# Patient Record
Sex: FEMALE | Race: BLACK OR AFRICAN AMERICAN | Employment: UNEMPLOYED | URBAN - METROPOLITAN AREA
[De-identification: names, ages, dates, MRNs, and addresses within clinical notes are randomized per-mention and may not be internally consistent; named-entity substitution may affect disease eponyms.]

---

## 2017-02-02 ENCOUNTER — OFFICE VISIT (OUTPATIENT)
Dept: FAMILY MEDICINE CLINIC | Age: 57
End: 2017-02-02

## 2017-02-02 VITALS
TEMPERATURE: 96.4 F | HEIGHT: 66 IN | WEIGHT: 293 LBS | RESPIRATION RATE: 16 BRPM | BODY MASS INDEX: 47.09 KG/M2 | HEART RATE: 72 BPM | SYSTOLIC BLOOD PRESSURE: 103 MMHG | DIASTOLIC BLOOD PRESSURE: 59 MMHG | OXYGEN SATURATION: 97 %

## 2017-02-02 DIAGNOSIS — R73.9 HYPERGLYCEMIA: ICD-10-CM

## 2017-02-02 DIAGNOSIS — I10 ESSENTIAL HYPERTENSION: ICD-10-CM

## 2017-02-02 DIAGNOSIS — R74.8 ELEVATED ALKALINE PHOSPHATASE LEVEL: ICD-10-CM

## 2017-02-02 DIAGNOSIS — R73.03 PREDIABETES: Primary | ICD-10-CM

## 2017-02-02 RX ORDER — CARVEDILOL 25 MG/1
25 TABLET ORAL 2 TIMES DAILY WITH MEALS
Qty: 180 TAB | Refills: 3 | Status: ON HOLD | OUTPATIENT
Start: 2017-02-02 | End: 2017-09-12

## 2017-02-02 RX ORDER — MORPHINE SULFATE 15 MG/1
15 TABLET ORAL
COMMUNITY
End: 2017-04-18 | Stop reason: ALTCHOICE

## 2017-02-02 RX ORDER — METFORMIN HYDROCHLORIDE 500 MG/1
1000 TABLET, EXTENDED RELEASE ORAL DAILY
Qty: 180 TAB | Refills: 3 | Status: SHIPPED | OUTPATIENT
Start: 2017-02-02 | End: 2017-10-30

## 2017-02-02 RX ORDER — CARVEDILOL 25 MG/1
25 TABLET ORAL 2 TIMES DAILY WITH MEALS
COMMUNITY
End: 2017-02-02 | Stop reason: SDUPTHER

## 2017-02-02 RX ORDER — LANCETS
EACH MISCELLANEOUS
Qty: 100 EACH | Refills: 11 | Status: SHIPPED | OUTPATIENT
Start: 2017-02-02 | End: 2017-09-08

## 2017-02-02 NOTE — MR AVS SNAPSHOT
Visit Information Date & Time Provider Department Dept. Phone Encounter #  
 2/2/2017 11:40 AM Nonda Fearing. MD Karen Sanabria 72 [phone] [id number] Your Appointments 11/1/2017  8:40 AM  
ESTABLISHED PATIENT with Heidy Lopez MD  
[facility] (ALICE SCHEDULING) Appt Note: 1 yr f/u  
 [address] [place] [address]  
[address] [address] Upcoming Health Maintenance Date Due DTaP/Tdap/Td series (1 - Tdap) 5/27/1979 BREAST CANCER SCRN MAMMOGRAM 8/5/2017 PAP AKA CERVICAL CYTOLOGY 11/13/2018 COLONOSCOPY 6/22/2021 Allergies as of 2/2/2017  Review Complete On: 11/3/2016 By: Corwin Guzman RN Severity Noted Reaction Type Reactions Bees [Hymenoptera Allergenic Extract] High 10/14/2014   Systemic Shortness of Breath, Swelling [place] High 10/14/2014   Systemic Shortness of Breath, Swelling Lisinopril  10/17/2014    Cough Current Immunizations  Reviewed on 10/11/2016 Name Date Influenza Vaccine Katharine Miu) 11/13/2015 Influenza Vaccine (Quad) PF 10/11/2016 Pneumococcal Polysaccharide (PPSV-23) 8/29/2016 Not reviewed this visit You Were Diagnosed With   
  
 Codes Comments Prediabetes    -  Primary ICD-10-CM: R73.03 
ICD-9-CM: 790.29 Essential hypertension     ICD-10-CM: I10 
ICD-9-CM: 401.9 Hyperglycemia     ICD-10-CM: R73.9 ICD-9-CM: 790.29 Vitals BP Pulse Temp Resp Height(growth percentile) Weight(growth percentile) 103/59 (BP 1 Location: Left arm, BP Patient Position: Sitting) 72 96.4 °F (35.8 °C) (Oral) 16 5' 6\" (1.676 m) 331 lb 9.6 oz (150.4 kg) SpO2 BMI OB Status Smoking Status 97% 53.52 kg/m2 Postmenopausal Former Smoker Vitals History BMI and BSA Data Body Mass Index Body Surface Area 53.52 kg/m 2 2.65 m 2 Preferred Pharmacy Pharmacy Name Phone [facility] [address] [facility] [phone] Your Updated Medication List  
  
   
This list is accurate as of: 2/2/17  1:25 PM.  Always use your most recent med list.  
  
  
  
  
 acetaminophen 325 mg tablet Commonly known as:  TYLENOL Take 650 mg by mouth daily. albuterol 90 mcg/actuation inhaler Commonly known as:  PROVENTIL HFA, VENTOLIN HFA, PROAIR HFA Take 2 Puffs by inhalation every four (4) hours as needed for Wheezing or Shortness of Breath. amLODIPine 5 mg tablet Commonly known as:  Og Sandoval Take 1 Tab by mouth daily. aspirin delayed-release 81 mg tablet Take 81 mg by mouth daily. atorvastatin 40 mg tablet Commonly known as:  LIPITOR Take 1 Tab by mouth nightly. carvedilol 25 mg tablet Commonly known as:  Rick Marshal Take 1 Tab by mouth two (2) times daily (with meals). cyanocobalamin 1,000 mcg/mL injection Commonly known as:  VITAMIN B12  
1 mL by SubCUTAneous route every fourteen (14) days. For b12 deficiency  
  
 cyclobenzaprine 5 mg tablet Commonly known as:  FLEXERIL Take 5 mg by mouth two (2) times a day. DULoxetine 60 mg capsule Commonly known as:  CYMBALTA Take 1 Cap by mouth every evening. EPINEPHrine 0.3 mg/0.3 mL injection Commonly known as:  EPIPEN 2-LAURA  
0.3 mL by IntraMUSCular route once as needed for up to 1 dose. ferrous sulfate 325 mg (65 mg iron) tablet Take 325 mg by mouth Daily (before breakfast). furosemide 40 mg tablet Commonly known as:  LASIX Take 1 Tab by mouth daily. glucose blood VI test strips strip Commonly known as:  blood glucose test  
For use with one touch ultra glucometer to check blood sugar once a day and when needed  
  
 hydrALAZINE 100 mg tablet Commonly known as:  APRESOLINE Take 1 Tab by mouth two (2) times a day. HYDROcodone-acetaminophen 5-325 mg per tablet Commonly known as:  Viviana Hogan  
 Take 1 Tab by mouth daily as needed. Insulin Syringe-Needle U-100 1 mL 30 gauge x 5/16 Syrg 1 Each by Does Not Apply route every fourteen (14) days. Indications: for b12 injections Lancets Misc For use with one touch ultra glucometer to check blood sugar once a day and when needed  
  
 levothyroxine 125 mcg tablet Commonly known as:  SYNTHROID Take 1 Tab by mouth Daily (before breakfast). lidocaine 5 % Commonly known as:  Favian Net Apply patch to the affected area for 12 hours a day and remove for 12 hours a day. linaclotide 145 mcg Cap capsule Commonly known as:  Filemon Flock Take 1 Cap by mouth Daily (before breakfast). For constipation  
  
 losartan 100 mg tablet Commonly known as:  COZAAR Take 1 Tab by mouth daily. metFORMIN  mg tablet Commonly known as:  GLUCOPHAGE XR Take 2 Tabs by mouth daily. methocarbamol 500 mg tablet Commonly known as:  ROBAXIN Take 500 mg by mouth three (3) times daily. morphine IR 15 mg tablet Commonly known as:  MS IR Take 15 mg by mouth nightly. pantoprazole 40 mg tablet Commonly known as:  PROTONIX Take 1 Tab by mouth daily. spironolactone 25 mg tablet Commonly known as:  ALDACTONE Take 1 Tab by mouth daily. traMADol 50 mg tablet Commonly known as:  ULTRAM  
Take 1-2 Tabs by mouth every six (6) hours as needed for Pain. Max Daily Amount: 400 mg.  
  
 zolpidem 5 mg tablet Commonly known as:  AMBIEN Take 1 Tab by mouth nightly as needed for Sleep. Max Daily Amount: 5 mg. Prescriptions Sent to Pharmacy Refills  
 metFORMIN ER (GLUCOPHAGE XR) 500 mg tablet 3 Sig: Take 2 Tabs by mouth daily. Class: Normal  
 Pharmacy: [address], [address] [place] [address] Ph #: [phone] Route: Oral  
 carvedilol (COREG) 25 mg tablet 3 Sig: Take 1 Tab by mouth two (2) times daily (with meals).   
 Class: Normal  
 Pharmacy: [address], [address] [place] [address] Ph #: [phone] Route: Oral  
 glucose blood VI test strips (BLOOD GLUCOSE TEST) strip 11 Sig: For use with one touch ultra glucometer to check blood sugar once a day and when needed Class: Normal  
 Pharmacy: [address] Ph #: [phone] Lancets misc 11 Sig: For use with one touch ultra glucometer to check blood sugar once a day and when needed Class: Normal  
 Pharmacy: [address] Ph #: [phone] We Performed the Following HEMOGLOBIN A1C WITH EAG [56358 CPT(R)] METABOLIC PANEL, COMPREHENSIVE [12246 CPT(R)] Patient Instructions TODAY, go to: CHECK OUT Please schedule the following appointments: 
· HTN, heart, weight follow up with Dr. Shelley Caldwell in 3 months 
 
_____________________ Today you were seen for: 
 
I will let you know if we need to change anything because of your labs Keep up the great work! So proud of your weight loss! 
 
_____________________ Review your health maintenance below. Make plans to return and address anything that is due or will be due soon. Health Maintenance Due Topic Date Due  
 DTaP/Tdap/Td  (1 - Tdap) 05/27/1979 Tdap vaccine Please get a tetanus plus pertussis (whooping cough) vaccine at your pharmacy. This is also known as the Tdap vaccine. Ask the pharmacist to tell you what your copay will be. If you need to you can call your insurance company to ask more questions. After you get the vaccine, have the pharmacist fax in documentation to the office. Introducing [facility]! Dear Ez Ortez: Thank you for requesting a [employer] account. Our records indicate that you already have an active [employer] account. You can access your account anytime at [email or web address] Did you know that you can access your hospital and ER discharge instructions at any time in [facility]? You can also review all of your test results from your hospital stay or ER visit. Additional Information If you have questions, please visit the Frequently Asked Questions section of the [employer] website at [email or web address] Remember, [facility] is NOT to be used for urgent needs. For medical emergencies, dial 911. Now available from your iPhone and Android! Please provide this summary of care documentation to your next provider. Your primary care clinician is listed as Juan J Garg. If you have any questions after today's visit, please call [phone].

## 2017-02-02 NOTE — PROGRESS NOTES
[address] Visit   Patient ID:   Magda Colin is a 60 y.o. female. Assessment/Plan:    Denzel Faustin was seen today for medication refill and diabetes. Diagnoses and all orders for this visit:    Prediabetes  Hyperglycemia   -     HEMOGLOBIN A1C WITH EAG  -     metFORMIN ER (GLUCOPHAGE XR) 500 mg tablet; Take 2 Tabs by mouth daily. Essential hypertension  At goal  -     METABOLIC PANEL, COMPREHENSIVE    Other orders  -     carvedilol (COREG) 25 mg tablet; Take 1 Tab by mouth two (2) times daily (with meals). -     glucose blood VI test strips (BLOOD GLUCOSE TEST) strip; For use with one touch ultra glucometer to check blood sugar once a day and when needed  -     Lancets misc; For use with one touch ultra glucometer to check blood sugar once a day and when needed    Obesity  Congratulated on success. Provided encouragement. Medications refilled as above. New order for test strips and lancets sent. Counselled pt on:  Patient health concerns. Patient was offered a choice/choices in the treatment plan today. Patient expresses understanding of the plan and agrees with recommendations. Patient Instructions     TODAY, go to:   CHECK OUT    Please schedule the following appointments:  · HTN, heart, weight follow up with Dr. Michele Guerra in 3 months    _____________________     Today you were seen for:    I will let you know if we need to change anything because of your labs    Keep up the great work! So proud of your weight loss!    _____________________     Review your health maintenance below. Make plans to return and address anything that is due or will be due soon. Health Maintenance Due   Topic Date Due    DTaP/Tdap/Td  (1 - Tdap) 05/27/1979       Tdap vaccine  Please get a tetanus plus pertussis (whooping cough) vaccine at your pharmacy. This is also known as the Tdap vaccine. Ask the pharmacist to tell you what your copay will be.  If you need to you can call your insurance company to ask more questions. After you get the vaccine, have the pharmacist fax in documentation to the office. ?  Subjective:   HPI:  Kirsten Urena is a 60 y.o. female being seen for:   Chief Complaint   Patient presents with    Medication Refill     Metformin, Coreg    Diabetes     Fasting labs. -62 at home. Prediabetes/weight  · Started metformin last visit about 2 months ago  · Now up to two tablets once a day  · Needs refill of metformin  · Went to diabetes class  · Weight down 5 lbs since last visit  · Remains motivated    Medication rec  · needs metformin refill  · Coreg should be 1 tablets twice a day, last time [facility] sent 1/2 tablet  · Initially had ultra one touch meter. She was sent a new glucometer, infinity. As of mid [facility] she is getting supplies for both. She prefers the one touch because the other uses a solution     Able to drive herself to the appointment today    Screening and Prevention Due:  Health Maintenance Due   Topic Date Due    DTaP/Tdap/Td series (1 - Tdap) 05/27/1979        Review of Systems  Otherwise, per HPI    Objective:     Visit Vitals    /59 (BP 1 Location: Left arm, BP Patient Position: Sitting)    Pulse 72    Temp 96.4 °F (35.8 °C) (Oral)    Resp 16    Ht 5' 6\" (1.676 m)    Wt 331 lb 9.6 oz (150.4 kg)    SpO2 97%    BMI 53.52 kg/m2   . Alfredo Nava Wt Readings from Last 3 Encounters:   02/02/17 331 lb 9.6 oz (150.4 kg)   11/03/16 336 lb 9.6 oz (152.7 kg)   11/02/16 338 lb 6.4 oz (153.5 kg)       PHQ 2 / 9, over the last two weeks 11/3/2016   Little interest or pleasure in doing things Not at all   Feeling down, depressed or hopeless Not at all   Total Score PHQ 2 0     Physical Exam   Constitutional: She appears well-developed and well-nourished. No distress. Pulmonary/Chest: Effort normal.   Neurological: She is alert. Psychiatric: She has a normal mood and affect.  Her behavior is normal.     Allergies   Allergen Reactions    Bees [Hymenoptera Allergenic Extract] Shortness of Breath and Swelling    Strawberry Shortness of Breath and Swelling    Lisinopril Cough     Prior to Admission medications    Medication Sig Start Date End Date Taking? Authorizing Provider   morphine IR (MS IR) 15 mg tablet Take 15 mg by mouth nightly. Yes Historical Provider   metFORMIN ER (GLUCOPHAGE XR) 500 mg tablet Take 2 Tabs by mouth daily. 2/2/17  Yes Sofiya Vickers. Martin Jamison MD   carvedilol (COREG) 25 mg tablet Take 1 Tab by mouth two (2) times daily (with meals). 2/2/17  Yes Sofiya Vickers. Martin Jamison MD   glucose blood VI test strips (BLOOD GLUCOSE TEST) strip For use with one touch ultra glucometer to check blood sugar once a day and when needed 2/2/17  Yes Sofiya Vickers. Martin Jamison MD   Lancets misc For use with one touch ultra glucometer to check blood sugar once a day and when needed 2/2/17  Yes Sofiya Vickers. Martin Jamison MD   spironolactone (ALDACTONE) 25 mg tablet Take 1 Tab by mouth daily. 10/18/16  Yes Maudie Gosselin, MD   cyclobenzaprine (FLEXERIL) 5 mg tablet Take 5 mg by mouth two (2) times a day. 10/7/16  Yes Historical Provider   HYDROcodone-acetaminophen (NORCO) 5-325 mg per tablet Take 1 Tab by mouth daily as needed. 10/7/16  Yes Historical Provider   levothyroxine (SYNTHROID) 125 mcg tablet Take 1 Tab by mouth Daily (before breakfast). 10/11/16  Yes 2115 Eric Jamison MD   hydrALAZINE (APRESOLINE) 100 mg tablet Take 1 Tab by mouth two (2) times a day. 10/11/16  Yes 2115 Eric Jamison MD   albuterol (PROVENTIL HFA, VENTOLIN HFA, PROAIR HFA) 90 mcg/actuation inhaler Take 2 Puffs by inhalation every four (4) hours as needed for Wheezing or Shortness of Breath. 10/11/16  Yes 2115 Eric Jamison MD   amLODIPine (NORVASC) 5 mg tablet Take 1 Tab by mouth daily. 10/11/16  Yes 2115 Eric Jamison MD   DULoxetine (CYMBALTA) 60 mg capsule Take 1 Cap by mouth every evening. 10/11/16  Yes 4261 [employer]  Martin Jamison MD   lidocaine (LIDODERM) 5 % Apply patch to the affected area for 12 hours a day and remove for 12 hours a day. 10/11/16  Yes Angelique Eubanks MD   furosemide (LASIX) 40 mg tablet Take 1 Tab by mouth daily. Patient taking differently: Take 40 mg by mouth daily. Only taking lasix Tuesday, Thursday Saturday and gorge 10/11/16  Yes Delfino Zabalas. Christina Eubanks MD   pantoprazole (PROTONIX) 40 mg tablet Take 1 Tab by mouth daily. 10/11/16  Yes Angelique Eubanks MD   linaclotide Laqueta Solomon) 145 mcg cap capsule Take 1 Cap by mouth Daily (before breakfast). For constipation 10/11/16  Yes Delfino Green. Christina Eubanks MD   losartan (COZAAR) 100 mg tablet Take 1 Tab by mouth daily. 10/11/16  Yes Angelique Eubanks MD   atorvastatin (LIPITOR) 40 mg tablet Take 1 Tab by mouth nightly. 10/11/16  Yes Angelique Eubanks MD   cyanocobalamin (VITAMIN B12) 1,000 mcg/mL injection 1 mL by SubCUTAneous route every fourteen (14) days. For b12 deficiency 10/11/16  Yes Delfino Zabalas. Christina Eubanks MD   zolpidem (AMBIEN) 5 mg tablet Take 1 Tab by mouth nightly as needed for Sleep. Max Daily Amount: 5 mg. 10/11/16  Yes Delfino Green. Christina Eubanks MD   Insulin Syringe-Needle U-100 1 mL 30 gauge x 5/16 syrg 1 Each by Does Not Apply route every fourteen (14) days. Indications: for b12 injections 10/11/16  Yes Delfino Zabalas. Christina Eubanks MD   traMADol Aurelia Lior) 50 mg tablet Take 1-2 Tabs by mouth every six (6) hours as needed for Pain. Max Daily Amount: 400 mg. 10/11/16  Yes Delfino Green. Christina Eubanks MD   ferrous sulfate 325 mg (65 mg iron) tablet Take 325 mg by mouth Daily (before breakfast). Yes Historical Provider   methocarbamol (ROBAXIN) 500 mg tablet Take 500 mg by mouth three (3) times daily. Yes Historical Provider   EPINEPHrine (EPIPEN 2-LAURA) 0.3 mg/0.3 mL injection 0.3 mL by IntraMUSCular route once as needed for up to 1 dose. 5/6/16  Yes Zabrina Rodríguez MD   aspirin delayed-release 81 mg tablet Take 81 mg by mouth daily. Yes Historical Provider   acetaminophen (TYLENOL) 325 mg tablet Take 650 mg by mouth daily.    Yes

## 2017-02-02 NOTE — PATIENT INSTRUCTIONS
TODAY, go to:   CHECK OUT    Please schedule the following appointments:  · HTN, heart, weight follow up with Dr. Michele Guerra in 3 months    _____________________     Today you were seen for:    I will let you know if we need to change anything because of your labs    Keep up the great work! So proud of your weight loss!    _____________________     Review your health maintenance below. Make plans to return and address anything that is due or will be due soon. Health Maintenance Due   Topic Date Due    DTaP/Tdap/Td  (1 - Tdap) 05/27/1979       Tdap vaccine  Please get a tetanus plus pertussis (whooping cough) vaccine at your pharmacy. This is also known as the Tdap vaccine. Ask the pharmacist to tell you what your copay will be. If you need to you can call your insurance company to ask more questions. After you get the vaccine, have the pharmacist fax in documentation to the office.

## 2017-02-02 NOTE — PROGRESS NOTES
Chief Complaint   Patient presents with    Medication Refill     Metformin, Coreg    Diabetes     Fasting labs. -62 at home. 1. Have you been to the ER, urgent care clinic since your last visit? Hospitalized since your last visit? No    2. Have you seen or consulted any other health care providers outside of the [address] [place] [address] since your last visit? Include any pap smears or colon screening. No   The patient was counseled on the dangers of tobacco use, and Patient is a non smoker. Reviewed strategies to maximize success, including Continue not to smoke. I have reviewed Health Maintenance with the patient and updated. Advance Care Plan is on file.

## 2017-02-03 LAB
ALBUMIN SERPL-MCNC: 4.1 G/DL (ref 3.5–5.5)
ALBUMIN/GLOB SERPL: 1.5 {RATIO} (ref 1.1–2.5)
ALP SERPL-CCNC: 137 IU/L (ref 39–117)
ALT SERPL-CCNC: 14 IU/L (ref 0–32)
AST SERPL-CCNC: 21 IU/L (ref 0–40)
BILIRUB SERPL-MCNC: 0.3 MG/DL (ref 0–1.2)
BUN SERPL-MCNC: 18 MG/DL (ref 6–24)
BUN/CREAT SERPL: 18 (ref 9–23)
CALCIUM SERPL-MCNC: 9.1 MG/DL (ref 8.7–10.2)
CHLORIDE SERPL-SCNC: 101 MMOL/L (ref 96–106)
CO2 SERPL-SCNC: 23 MMOL/L (ref 18–29)
CREAT SERPL-MCNC: 1.02 MG/DL (ref 0.57–1)
EST. AVERAGE GLUCOSE BLD GHB EST-MCNC: 123 MG/DL
GLOBULIN SER CALC-MCNC: 2.8 G/DL (ref 1.5–4.5)
GLUCOSE SERPL-MCNC: 56 MG/DL (ref 65–99)
HBA1C MFR BLD: 5.9 % (ref 4.8–5.6)
POTASSIUM SERPL-SCNC: 4.9 MMOL/L (ref 3.5–5.2)
PROT SERPL-MCNC: 6.9 G/DL (ref 6–8.5)
SODIUM SERPL-SCNC: 141 MMOL/L (ref 134–144)

## 2017-02-06 NOTE — PROGRESS NOTES
Please add on GGT. I am placing order. Please verify with lab that sample is still available to be added on. If not, let me know so I can make a plan for pt to get blood drawn again. Thank you,  CWT      Continue to monitor your blood sugar. It was low when you had this test checked, at 56. Less than 80 is too low and should be corrected. I will send you a separate message with more on how to correct. Your kidney number is just above normal. We will continue to monitor. Your electrolytes are normal.     Congratulations! Your hemoglobin a1c is down to 5.9. This is the lowest it has been in a year. This is still consistent with prediabetes, but is moving in the right direction. Keep up the great work. Your alkaline phosphatase is moderately elevated. This is a number that can go up either because of your bile ducts in your liver or because of your bones. It has been mildly elevated for at least a year. I will check an additional lab to if liver or bone and let you know if anything different needs to happen.      Best,  Dr. Edilia Melendez

## 2017-02-07 LAB
GGT SERPL-CCNC: 37 IU/L (ref 0–60)
SPECIMEN STATUS REPORT, ROLRST: NORMAL

## 2017-02-10 NOTE — PROGRESS NOTES
Your GGT is normal. This means the alkaline phosphatase may be elevated because of your bones. I am referring you to endocrinology for further evaluation. If you have not been contacted by Endocrinology please let us know.      Best,  Dr. Winters Shaker

## 2017-02-15 ENCOUNTER — TELEPHONE (OUTPATIENT)
Dept: FAMILY MEDICINE CLINIC | Age: 57
End: 2017-02-15

## 2017-02-16 NOTE — PROGRESS NOTES
Spoke with patient, ID verified X 2. Informed patient of lab results and recommendations per Dr. Jose Osborne. Patient verbalized understanding.  Information for Endo was given to patient

## 2017-02-20 ENCOUNTER — TELEPHONE (OUTPATIENT)
Dept: SLEEP MEDICINE | Age: 57
End: 2017-02-20

## 2017-02-20 DIAGNOSIS — G47.33 OSA (OBSTRUCTIVE SLEEP APNEA): Primary | ICD-10-CM

## 2017-03-20 ENCOUNTER — OFFICE VISIT (OUTPATIENT)
Dept: SLEEP MEDICINE | Age: 57
End: 2017-03-20

## 2017-03-20 VITALS
OXYGEN SATURATION: 99 % | HEART RATE: 74 BPM | HEIGHT: 66 IN | BODY MASS INDEX: 47.09 KG/M2 | DIASTOLIC BLOOD PRESSURE: 68 MMHG | WEIGHT: 293 LBS | SYSTOLIC BLOOD PRESSURE: 115 MMHG

## 2017-03-20 DIAGNOSIS — G47.33 OSA (OBSTRUCTIVE SLEEP APNEA): Primary | ICD-10-CM

## 2017-03-20 DIAGNOSIS — E66.01 OBESITY, MORBID, BMI 50 OR HIGHER (HCC): ICD-10-CM

## 2017-03-20 NOTE — PATIENT INSTRUCTIONS
[address] [place]. [place], [address]  Tel.  [phone]  Fax. [address] [facility] [address]  [place], [address] [facility]  Tel.  [phone]  Fax. [phone] [address]  Tel.  [phone]  Fax. [phone]     Learning About CPAP for Sleep Apnea  What is CPAP? CPAP is a small machine that you use at home every night while you sleep. It increases air pressure in your throat to keep your airway open. When you have sleep apnea, this can help you sleep better so you feel much better. CPAP stands for \"continuous positive airway pressure. \"  The CPAP machine will have one of the following:  · A mask that covers your nose and mouth  · Prongs that fit into your nose  · A mask that covers your nose only, the most common type. This type is called NCPAP. The N stands for \"nasal.\"  Why is it done? CPAP is usually the best treatment for obstructive sleep apnea. It is the first treatment choice and the most widely used. Your doctor may suggest CPAP if you have:  · Moderate to severe sleep apnea. · Sleep apnea and coronary artery disease (CAD) or heart failure. How does it help? · CPAP can help you have more normal sleep, so you feel less sleepy and more alert during the daytime. · CPAP may help keep heart failure or other heart problems from getting worse. · NCPAP may help lower your blood pressure. · If you use CPAP, your bed partner may also sleep better because you are not snoring or restless. What are the side effects? Some people who use CPAP have:  · A dry or stuffy nose and a sore throat. · Irritated skin on the face. · Sore eyes. · Bloating. If you have any of these problems, work with your doctor to fix them. Here are some things you can try:  · Be sure the mask or nasal prongs fit well. · See if your doctor can adjust the pressure of your CPAP. · If your nose is dry, try a humidifier.   · If your nose is runny or stuffy, try decongestant medicine or a steroid nasal spray. If these things do not help, you might try a different type of machine. Some machines have air pressure that adjusts on its own. Others have air pressures that are different when you breathe in than when you breathe out. This may reduce discomfort caused by too much pressure in your nose. Where can you learn more? Go to redIT.be  Enter Brii Lombardi in the search box to learn more about \"Learning About CPAP for Sleep Apnea. \"   © 4250-1871 Healthwise, Incorporated. Care instructions adapted under license by [employer] (which disclaims liability or warranty for this information). This care instruction is for use with your licensed healthcare professional. If you have questions about a medical condition or this instruction, always ask your healthcare professional. [address] any warranty or liability for your use of this information. Content Version: 4.5.96428; Last Revised: January 11, 2010  PROPER SLEEP HYGIENE    What to avoid  · Do not have drinks with caffeine, such as coffee or black tea, for 8 hours before bed. · Do not smoke or use other types of tobacco near bedtime. Nicotine is a stimulant and can keep you awake. · Avoid drinking alcohol late in the evening, because it can cause you to wake in the middle of the night. · Do not eat a big meal close to bedtime. If you are hungry, eat a light snack. · Do not drink a lot of water close to bedtime, because the need to urinate may wake you up during the night. · Do not read or watch TV in bed. Use the bed only for sleeping and sexual activity. What to try  · Go to bed at the same time every night, and wake up at the same time every morning. Do not take naps during the day. · Keep your bedroom quiet, dark, and cool. · Get regular exercise, but not within 3 to 4 hours of your bedtime. .  · Sleep on a comfortable pillow and mattress.   · If watching the clock makes you anxious, turn it facing away from you so you cannot see the time. · If you worry when you lie down, start a worry book. Well before bedtime, write down your worries, and then set the book and your concerns aside. · Try meditation or other relaxation techniques before you go to bed. · If you cannot fall asleep, get up and go to another room until you feel sleepy. Do something relaxing. Repeat your bedtime routine before you go to bed again. · Make your house quiet and calm about an hour before bedtime. Turn down the lights, turn off the TV, log off the computer, and turn down the volume on music. This can help you relax after a busy day. Drowsy Driving: The [facility] cites drowsiness as a causing factor in more than 432,719 police reported crashes annually, resulting in 76,000 injuries and 1,500 deaths. Other surveys suggest 55% of people polled have driven while drowsy in the past year, 23% had fallen asleep but not crashed, 3% crashed, and 2% had and accident due to drowsy driving. Who is at risk? Young Drivers: One study of drowsy driving accidents states that 55% of the drivers were under 25 years. Of those, 75% were male. Shift Workers and Travelers: People who work overnight or travel across time zones frequently are at higher risk of experiencing Circadian Rhythm Disorders. They are trying to work and function when their body is programed to sleep. Sleep Deprived: Lack of sleep has a serious impact on your ability to pay attention or focus on a task. Consistently getting less than the average of 8 hours your body needs creates partial or cumulative sleep deprivation. Untreated Sleep Disorders: Sleep Apnea, Narcolepsy, R.L.S., and other sleep disorders (untreated) prevent a person from getting enough restful sleep. This leads to excessive daytime sleepiness and increases the risk for drowsy driving accidents by up to 7 times.   Medications / Alcohol: Even over the counter medications can cause drowsiness. Medications that impair a drivers attention should have a warning label. Alcohol naturally makes you sleepy and on its own can cause accidents. Combined with excessive drowsiness its effects are amplified. Signs of Drowsy Driving:   * You don't remember driving the last few miles   * You may drift out of your rakel   * You are unable to focus and your thoughts wander   * You may yawn more often than normal   * You have difficulty keeping your eyes open / nodding off   * Missing traffic signs, speeding, or tailgating  Prevention-   Good sleep hygiene, lifestyle and behavioral choices have the most impact on drowsy driving. There is no substitute for sleep and the average person requires 8 hours nightly. If you find yourself driving drowsy, stop and sleep. Consider the sleep hygiene tips provided during your visit as well. Medication Refill Policy: Refills for all medications require 1 week advance notice. Please have your pharmacy fax a refill request. We are unable to fax, or call in \"controled substance\" medications and you will need to pick these prescriptions up from our office. Say-Hey Activation    Thank you for requesting access to Say-Hey. Please follow the instructions below to securely access and download your online medical record. Say-Hey allows you to send messages to your doctor, view your test results, renew your prescriptions, schedule appointments, and more. How Do I Sign Up? 1. In your internet browser, go to [email or web address]. 2. Click on the First Time User? Click Here link in the Sign In box. You will see the New Member Sign Up page. 3. Enter your Say-Hey Access Code exactly as it appears below. You will not need to use this code after youve completed the sign-up process. If you do not sign up before the expiration date, you must request a new code. Say-Hey Access Code:  Activation code not generated  Current EyeScribes Status: Active (This is the date your EyeScribes access code will )    4. Enter the last four digits of your Social Security Number (xxxx) and Date of Birth (mm/dd/yyyy) as indicated and click Submit. You will be taken to the next sign-up page. 5. Create a EyeScribes ID. This will be your EyeScribes login ID and cannot be changed, so think of one that is secure and easy to remember. 6. Create a EyeScribes password. You can change your password at any time. 7. Enter your Password Reset Question and Answer. This can be used at a later time if you forget your password. 8. Enter your e-mail address. You will receive e-mail notification when new information is available in [address] 9. Click Sign Up. You can now view and download portions of your medical record. 10. Click the Download Summary menu link to download a portable copy of your medical information. Additional Information    If you have questions, please call [phone]. Remember, EyeScribes is NOT to be used for urgent needs. For medical emergencies, dial 911. Starting a Weight Loss Plan: After Your Visit  Your Care Instructions  If you are thinking about losing weight, it can be hard to know where to start. Your doctor can help you set up a weight loss plan that best meets your needs. You may want to take a class on nutrition or exercise, or join a weight loss support group. If you have questions about how to make changes to your eating or exercise habits, ask your doctor about seeing a registered dietitian or an exercise specialist.  It can be a big challenge to lose weight. But you do not have to make huge changes at once. Make small changes, and stick with them. When those changes become habit, add a few more changes. If you do not think you are ready to make changes right now, try to pick a date in the future.  Make an appointment to see your doctor to discuss whether the time is right for you to start a plan.  Follow-up care is a key part of your treatment and safety. Be sure to make and go to all appointments, and call your doctor if you are having problems. It's also a good idea to know your test results and keep a list of the medicines you take. How can you care for yourself at home? · Set realistic goals. Many people expect to lose much more weight than is likely. A weight loss of 5% to 10% of your body weight may be enough to improve your health. · Get family and friends involved to provide support. Talk to them about why you are trying to lose weight, and ask them to help. They can help by participating in exercise and having meals with you, even if they may be eating something different. · Find what works best for you. If you do not have time or do not like to cook, a program that offers meal replacement bars or shakes may be better for you. Or if you like to prepare meals, finding a plan that includes daily menus and recipes may be best.  · Ask your doctor about other health professionals who can help you achieve your weight loss goals. ¨ A dietitian can help you make healthy changes in your diet. ¨ An exercise specialist or  can help you develop a safe and effective exercise program.  ¨ A counselor or psychiatrist can help you cope with issues such as depression, anxiety, or family problems that can make it hard to focus on weight loss. · Consider joining a support group for people who are trying to lose weight. Your doctor can suggest groups in your area. Where can you learn more? Go to OQO.be  Enter U357 in the search box to learn more about \"Starting a Weight Loss Plan: After Your Visit. \"   © 0512-0445 Healthwise, Incorporated. Care instructions adapted under license by [employer] (which disclaims liability or warranty for this information).  This care instruction is for use with your licensed healthcare professional. If you have questions about a medical condition or this instruction, always ask your healthcare professional. [address] any warranty or liability for your use of this information.   Content Version: 5.2.25361; Last Revised: September 1, 2009

## 2017-03-20 NOTE — MR AVS SNAPSHOT
Visit Information Date & Time Provider Department Dept. Phone Encounter #  
 3/20/2017  9:40 AM Zhane Pizano, [address] [facility] [place] [id number] Follow-up Instructions Return in about 1 year (around 3/20/2018), or if symptoms worsen or fail to improve. Follow-up and Disposition History Your Appointments 4/18/2017  8:50 AM  
New Patient with Emelina Montano MD  
[facility] [address]) Appt Note: np, thyroid and pre diabetic  
 [address] [address]  
[address] [address]  
  
    
 5/2/2017 10:00 AM  
ROUTINE CARE with Clorinda [address] ([address]) Appt Note: 3 month follow up for htn,heart weight [address] [facility] [address]  
  
   
 [address]  
  
    
 11/1/2017  8:40 AM  
ESTABLISHED PATIENT with Elizabeth Mcpherson MD  
[facility] ([facility]) Appt Note: 1 yr f/u  
 [address] [place]  
[address] [address] Upcoming Health Maintenance Date Due DTaP/Tdap/Td series (1 - Tdap) 5/27/1979 BREAST CANCER SCRN MAMMOGRAM 8/5/2017 PAP AKA CERVICAL CYTOLOGY 11/13/2018 COLONOSCOPY 6/22/2021 Allergies as of 3/20/2017  Review Complete On: 3/20/2017 By: Zhane Pizano MD  
  
 Severity Noted Reaction Type Reactions Bees [Hymenoptera Allergenic Extract] High 10/14/2014   Systemic Shortness of Breath, Swelling [place] High 10/14/2014   Systemic Shortness of Breath, Swelling Lisinopril  10/17/2014    Cough Current Immunizations  Reviewed on 10/11/2016 Name Date Influenza Vaccine Janette Felton) 11/13/2015 Influenza Vaccine (Quad) PF 10/11/2016 Pneumococcal Polysaccharide (PPSV-23) 8/29/2016 Not reviewed this visit You Were Diagnosed With   
  
 Codes Comments EDDIE (obstructive sleep apnea)    -  Primary ICD-10-CM: G47.33 
ICD-9-CM: 327.23 Obesity, morbid, BMI 50 or higher (HCC)     ICD-10-CM: E66.01 
ICD-9-CM: 278.01 Vitals BP Pulse Height(growth percentile) Weight(growth percentile) SpO2 BMI  
 115/68 74 5' 6\" (1.676 m) 333 lb (151 kg) 99% 53.75 kg/m2 OB Status Smoking Status Postmenopausal Former Smoker Vitals History BMI and BSA Data Body Mass Index Body Surface Area 53.75 kg/m 2 2.65 m 2 Preferred Pharmacy Pharmacy Name Phone [facility] [address] [place] [address] [phone] Your Updated Medication List  
  
   
This list is accurate as of: 3/20/17  9:54 AM.  Always use your most recent med list.  
  
  
  
  
 acetaminophen 325 mg tablet Commonly known as:  TYLENOL Take 650 mg by mouth daily. albuterol 90 mcg/actuation inhaler Commonly known as:  PROVENTIL HFA, VENTOLIN HFA, PROAIR HFA Take 2 Puffs by inhalation every four (4) hours as needed for Wheezing or Shortness of Breath. amLODIPine 5 mg tablet Commonly known as:  Yaz Croon Take 1 Tab by mouth daily. aspirin delayed-release 81 mg tablet Take 81 mg by mouth daily. atorvastatin 40 mg tablet Commonly known as:  LIPITOR Take 1 Tab by mouth nightly. carvedilol 25 mg tablet Commonly known as:  Yadira Taniya Take 1 Tab by mouth two (2) times daily (with meals). cyanocobalamin 1,000 mcg/mL injection Commonly known as:  VITAMIN B12  
1 mL by SubCUTAneous route every fourteen (14) days. For b12 deficiency  
  
 cyclobenzaprine 5 mg tablet Commonly known as:  FLEXERIL Take 5 mg by mouth two (2) times a day. DULoxetine 60 mg capsule Commonly known as:  CYMBALTA Take 1 Cap by mouth every evening. EPINEPHrine 0.3 mg/0.3 mL injection Commonly known as:  EPIPEN 2-LAURA  
0.3 mL by IntraMUSCular route once as needed for up to 1 dose. ferrous sulfate 325 mg (65 mg iron) tablet Take 325 mg by mouth Daily (before breakfast). furosemide 40 mg tablet Commonly known as:  LASIX Take 1 Tab by mouth daily. glucose blood VI test strips strip Commonly known as:  blood glucose test  
For use with one touch ultra glucometer to check blood sugar once a day and when needed  
  
 hydrALAZINE 100 mg tablet Commonly known as:  APRESOLINE Take 1 Tab by mouth two (2) times a day. HYDROcodone-acetaminophen 5-325 mg per tablet Commonly known as:  Ina Miguel Angel Take 1 Tab by mouth daily as needed. Insulin Syringe-Needle U-100 1 mL 30 gauge x 5/16 Syrg 1 Each by Does Not Apply route every fourteen (14) days. Indications: for b12 injections Lancets Misc For use with one touch ultra glucometer to check blood sugar once a day and when needed  
  
 levothyroxine 125 mcg tablet Commonly known as:  SYNTHROID Take 1 Tab by mouth Daily (before breakfast). lidocaine 5 % Commonly known as:  Joy Narayanan Apply patch to the affected area for 12 hours a day and remove for 12 hours a day. linaclotide 145 mcg Cap capsule Commonly known as:  Georgianne Both Take 1 Cap by mouth Daily (before breakfast). For constipation  
  
 losartan 100 mg tablet Commonly known as:  COZAAR Take 1 Tab by mouth daily. metFORMIN  mg tablet Commonly known as:  GLUCOPHAGE XR Take 2 Tabs by mouth daily. methocarbamol 500 mg tablet Commonly known as:  ROBAXIN Take 500 mg by mouth three (3) times daily. morphine IR 15 mg tablet Commonly known as:  MS IR Take 15 mg by mouth nightly. pantoprazole 40 mg tablet Commonly known as:  PROTONIX Take 1 Tab by mouth daily. spironolactone 25 mg tablet Commonly known as:  ALDACTONE Take 1 Tab by mouth daily. traMADol 50 mg tablet Commonly known as:  ULTRAM  
Take 1-2 Tabs by mouth every six (6) hours as needed for Pain. Max Daily Amount: 400 mg.  
  
 zolpidem 5 mg tablet Commonly known as:  AMBIEN Take 1 Tab by mouth nightly as needed for Sleep. Max Daily Amount: 5 mg. We Performed the Following AMB SUPPLY ORDER [[id number] Custom] Comments:  
 PAP Mask -patient preference, tubing, filters as needed (all sleep supplies) Length of Need = 99 months Jesus Mac M.D.  (electronically signed) Diplomate in Sleep Medicine, ABIM Follow-up Instructions Return in about 1 year (around 3/20/2018), or if symptoms worsen or fail to improve. Patient Instructions 217 [facility] [place] [place]. 1668 [facility], [facility] Tel.  [phone] Fax. 100 [facility] 60 [facility] 200 [facility] Tel.  [phone] Fax. [phone] [address] [place] [address] Tel.  [phone] Fax. [phone] Learning About CPAP for Sleep Apnea What is CPAP? CPAP is a small machine that you use at home every night while you sleep. It increases air pressure in your throat to keep your airway open. When you have sleep apnea, this can help you sleep better so you feel much better. CPAP stands for \"continuous positive airway pressure. \" The CPAP machine will have one of the following: · A mask that covers your nose and mouth · Prongs that fit into your nose · A mask that covers your nose only, the most common type. This type is called NCPAP. The N stands for \"nasal.\" Why is it done? CPAP is usually the best treatment for obstructive sleep apnea. It is the first treatment choice and the most widely used. Your doctor may suggest CPAP if you have: · Moderate to severe sleep apnea. · Sleep apnea and coronary artery disease (CAD) or heart failure. How does it help?  
· CPAP can help you have more normal sleep, so you feel less sleepy and more alert during the daytime. · CPAP may help keep heart failure or other heart problems from getting worse. · NCPAP may help lower your blood pressure. · If you use CPAP, your bed partner may also sleep better because you are not snoring or restless. What are the side effects? Some people who use CPAP have: · A dry or stuffy nose and a sore throat. · Irritated skin on the face. · Sore eyes. · Bloating. If you have any of these problems, work with your doctor to fix them. Here are some things you can try: · Be sure the mask or nasal prongs fit well. · See if your doctor can adjust the pressure of your CPAP. · If your nose is dry, try a humidifier. · If your nose is runny or stuffy, try decongestant medicine or a steroid nasal spray. If these things do not help, you might try a different type of machine. Some machines have air pressure that adjusts on its own. Others have air pressures that are different when you breathe in than when you breathe out. This may reduce discomfort caused by too much pressure in your nose. Where can you learn more? Go to [employer].be Enter C626 in the search box to learn more about \"Learning About CPAP for Sleep Apnea. \"  
© 0602-3563 Healthwise, Incorporated. Care instructions adapted under license by [facility] (which disclaims liability or warranty for this information). This care instruction is for use with your licensed healthcare professional. If you have questions about a medical condition or this instruction, always ask your healthcare professional. [place] [address] any warranty or liability for your use of this information. Content Version: 6.4.26513; Last Revised: January 11, 2010 PROPER SLEEP HYGIENE What to avoid · Do not have drinks with caffeine, such as coffee or black tea, for 8 hours before bed. · Do not smoke or use other types of tobacco near bedtime.  Nicotine is a stimulant and can keep you awake. · Avoid drinking alcohol late in the evening, because it can cause you to wake in the middle of the night. · Do not eat a big meal close to bedtime. If you are hungry, eat a light snack. · Do not drink a lot of water close to bedtime, because the need to urinate may wake you up during the night. · Do not read or watch TV in bed. Use the bed only for sleeping and sexual activity. What to try · Go to bed at the same time every night, and wake up at the same time every morning. Do not take naps during the day. · Keep your bedroom quiet, dark, and cool. · Get regular exercise, but not within 3 to 4 hours of your bedtime. Maximiliano Cruz · Sleep on a comfortable pillow and mattress. · If watching the clock makes you anxious, turn it facing away from you so you cannot see the time. · If you worry when you lie down, start a worry book. Well before bedtime, write down your worries, and then set the book and your concerns aside. · Try meditation or other relaxation techniques before you go to bed. · If you cannot fall asleep, get up and go to another room until you feel sleepy. Do something relaxing. Repeat your bedtime routine before you go to bed again. · Make your house quiet and calm about an hour before bedtime. Turn down the lights, turn off the TV, log off the computer, and turn down the volume on music. This can help you relax after a busy day. Drowsy Driving: The [employer] cites drowsiness as a causing factor in more than 986,981 police reported crashes annually, resulting in 76,000 injuries and 1,500 deaths. Other surveys suggest 55% of people polled have driven while drowsy in the past year, 23% had fallen asleep but not crashed, 3% crashed, and 2% had and accident due to drowsy driving. Who is at risk? Young Drivers: One study of drowsy driving accidents states that 55% of the drivers were under 25 years. Of those, 75% were male. Shift Workers and Travelers: People who work overnight or travel across time zones frequently are at higher risk of experiencing Circadian Rhythm Disorders. They are trying to work and function when their body is programed to sleep. Sleep Deprived: Lack of sleep has a serious impact on your ability to pay attention or focus on a task. Consistently getting less than the average of 8 hours your body needs creates partial or cumulative sleep deprivation. Untreated Sleep Disorders: Sleep Apnea, Narcolepsy, R.L.S., and other sleep disorders (untreated) prevent a person from getting enough restful sleep. This leads to excessive daytime sleepiness and increases the risk for drowsy driving accidents by up to 7 times. Medications / Alcohol: Even over the counter medications can cause drowsiness. Medications that impair a drivers attention should have a warning label. Alcohol naturally makes you sleepy and on its own can cause accidents. Combined with excessive drowsiness its effects are amplified. Signs of Drowsy Driving: * You don't remember driving the last few miles * You may drift out of your rakel * You are unable to focus and your thoughts wander * You may yawn more often than normal 
 * You have difficulty keeping your eyes open / nodding off * Missing traffic signs, speeding, or tailgating Prevention-  
Good sleep hygiene, lifestyle and behavioral choices have the most impact on drowsy driving. There is no substitute for sleep and the average person requires 8 hours nightly. If you find yourself driving drowsy, stop and sleep. Consider the sleep hygiene tips provided during your visit as well. Medication Refill Policy: Refills for all medications require 1 week advance notice. Please have your pharmacy fax a refill request. We are unable to fax, or call in \"controled substance\" medications and you will need to pick these prescriptions up from our office. MyChart Activation Thank you for requesting access to [employer]. Please follow the instructions below to securely access and download your online medical record. [employer] allows you to send messages to your doctor, view your test results, renew your prescriptions, schedule appointments, and more. How Do I Sign Up? 1. In your internet browser, go to [email or web address]. 2. Click on the First Time User? Click Here link in the Sign In box. You will see the New Member Sign Up page. 3. Enter your Perfect [employer] Access Code exactly as it appears below. You will not need to use this code after youve completed the sign-up process. If you do not sign up before the expiration date, you must request a new code. [employer] Access Code: Activation code not generated Current [employer] Status: Active (This is the date your [employer] access code will ) 4. Enter the last four digits of your Social Security Number (xxxx) and Date of Birth (mm/dd/yyyy) as indicated and click Submit. You will be taken to the next sign-up page. 5. Create a [employer] ID. This will be your [employer] login ID and cannot be changed, so think of one that is secure and easy to remember. 6. Create a [employer] password. You can change your password at any time. 7. Enter your Password Reset Question and Answer. This can be used at a later time if you forget your password. 8. Enter your e-mail address. You will receive e-mail notification when new information is available in [address] 9. Click Sign Up. You can now view and download portions of your medical record. 10. Click the Download Summary menu link to download a portable copy of your medical information. Additional Information If you have questions, please call [phone]. Remember, [employer] is NOT to be used for urgent needs. For medical emergencies, dial 911. Starting a Weight Loss Plan: After Your Visit Your Care Instructions If you are thinking about losing weight, it can be hard to know where to start. Your doctor can help you set up a weight loss plan that best meets your needs. You may want to take a class on nutrition or exercise, or join a weight loss support group. If you have questions about how to make changes to your eating or exercise habits, ask your doctor about seeing a registered dietitian or an exercise specialist. 
It can be a big challenge to lose weight. But you do not have to make huge changes at once. Make small changes, and stick with them. When those changes become habit, add a few more changes. If you do not think you are ready to make changes right now, try to pick a date in the future. Make an appointment to see your doctor to discuss whether the time is right for you to start a plan. Follow-up care is a key part of your treatment and safety. Be sure to make and go to all appointments, and call your doctor if you are having problems. It's also a good idea to know your test results and keep a list of the medicines you take. How can you care for yourself at home? · Set realistic goals. Many people expect to lose much more weight than is likely. A weight loss of 5% to 10% of your body weight may be enough to improve your health. · Get family and friends involved to provide support. Talk to them about why you are trying to lose weight, and ask them to help. They can help by participating in exercise and having meals with you, even if they may be eating something different. · Find what works best for you. If you do not have time or do not like to cook, a program that offers meal replacement bars or shakes may be better for you. Or if you like to prepare meals, finding a plan that includes daily menus and recipes may be best. 
· Ask your doctor about other health professionals who can help you achieve your weight loss goals. ¨ A dietitian can help you make healthy changes in your diet. ¨ An exercise specialist or  can help you develop a safe and effective exercise program. 
¨ A counselor or psychiatrist can help you cope with issues such as depression, anxiety, or family problems that can make it hard to focus on weight loss. · Consider joining a support group for people who are trying to lose weight. Your doctor can suggest groups in your area. Where can you learn more? Go to [facility].be Enter Y553 in the search box to learn more about \"Starting a Weight Loss Plan: After Your Visit. \"  
© 2745-9846 Healthwise, Incorporated. Care instructions adapted under license by [facility] (which disclaims liability or warranty for this information). This care instruction is for use with your licensed healthcare professional. If you have questions about a medical condition or this instruction, always ask your healthcare professional. [address] any warranty or liability for your use of this information. Content Version: 3.2.88301; Last Revised: September 1, 2009 Introducing [facility]! Dear Macrina Ulrich: Thank you for requesting a [employer] account. Our records indicate that you already have an active [employer] account. You can access your account anytime at [email or web address] Did you know that you can access your hospital and ER discharge instructions at any time in [employer]? You can also review all of your test results from your hospital stay or ER visit. Additional Information If you have questions, please visit the Frequently Asked Questions section of the [employer] website at [email or web address] Remember, [employer] is NOT to be used for urgent needs. For medical emergencies, dial 911. Now available from your iPhone and Android! Please provide this summary of care documentation to your next provider. Your primary care clinician is listed as Lisbeth García. Mercedes Llanos. If you have any questions after today's visit, please call [phone].

## 2017-03-21 ENCOUNTER — DOCUMENTATION ONLY (OUTPATIENT)
Dept: SLEEP MEDICINE | Age: 57
End: 2017-03-21

## 2017-04-18 ENCOUNTER — OFFICE VISIT (OUTPATIENT)
Dept: ENDOCRINOLOGY | Age: 57
End: 2017-04-18

## 2017-04-18 ENCOUNTER — TELEPHONE (OUTPATIENT)
Dept: FAMILY MEDICINE CLINIC | Age: 57
End: 2017-04-18

## 2017-04-18 VITALS
BODY MASS INDEX: 47.09 KG/M2 | HEIGHT: 66 IN | HEART RATE: 72 BPM | DIASTOLIC BLOOD PRESSURE: 59 MMHG | SYSTOLIC BLOOD PRESSURE: 112 MMHG | WEIGHT: 293 LBS

## 2017-04-18 DIAGNOSIS — I25.10 CORONARY ARTERY DISEASE INVOLVING NATIVE CORONARY ARTERY OF NATIVE HEART WITHOUT ANGINA PECTORIS: ICD-10-CM

## 2017-04-18 DIAGNOSIS — E55.9 VITAMIN D DEFICIENCY: ICD-10-CM

## 2017-04-18 DIAGNOSIS — Z98.84 S/P GASTRIC BYPASS: ICD-10-CM

## 2017-04-18 DIAGNOSIS — E89.0 POST-SURGICAL HYPOTHYROIDISM: ICD-10-CM

## 2017-04-18 DIAGNOSIS — Z86.2 HISTORY OF ANEMIA: ICD-10-CM

## 2017-04-18 DIAGNOSIS — I10 HTN (HYPERTENSION), BENIGN: ICD-10-CM

## 2017-04-18 DIAGNOSIS — R74.8 ALKALINE PHOSPHATASE ELEVATION: Primary | ICD-10-CM

## 2017-04-18 RX ORDER — LEVOTHYROXINE SODIUM 150 UG/1
150 TABLET ORAL
Qty: 90 TAB | Refills: 3
Start: 2017-04-18 | End: 2017-04-18 | Stop reason: SDUPTHER

## 2017-04-18 RX ORDER — INDAPAMIDE 2.5 MG/1
2.5 TABLET, FILM COATED ORAL DAILY
Qty: 90 TAB | Refills: 3
Start: 2017-04-18 | End: 2017-04-18 | Stop reason: SDUPTHER

## 2017-04-18 RX ORDER — MORPHINE SULFATE 15 MG/1
15 TABLET, FILM COATED, EXTENDED RELEASE ORAL DAILY
Status: ON HOLD | COMMUNITY
Start: 2017-03-26 | End: 2017-09-12

## 2017-04-18 RX ORDER — ERGOCALCIFEROL 1.25 MG/1
50000 CAPSULE ORAL 2 TIMES WEEKLY
Qty: 24 CAP | Refills: 3
Start: 2017-04-21 | End: 2017-07-31 | Stop reason: SDUPTHER

## 2017-04-18 RX ORDER — HYDRALAZINE HYDROCHLORIDE 25 MG/1
25 TABLET, FILM COATED ORAL 2 TIMES DAILY
Qty: 180 TAB | Refills: 3
Start: 2017-04-18 | End: 2017-04-18 | Stop reason: SDUPTHER

## 2017-04-18 RX ORDER — CHLORZOXAZONE 500 MG/1
500 TABLET ORAL 3 TIMES DAILY
COMMUNITY
Start: 2017-03-27 | End: 2017-09-12

## 2017-04-18 RX ORDER — HYDROCODONE BITARTRATE AND ACETAMINOPHEN 7.5; 325 MG/1; MG/1
1 TABLET ORAL
COMMUNITY
Start: 2017-03-26 | End: 2017-09-08

## 2017-04-18 NOTE — PROGRESS NOTES
Chief Complaint   Patient presents with    Thyroid Problem    Diabetes    New Patient     History of Present Illness: Margarette Dias is a 60 y.o. female presents for evaluation of elevated alkaline phosphatase. She also has hypothyroidism and pre-diabetes. She is s/p gastric bypass in 1980s (pre-surgery wt was 465), and has also had CVA. Alkaline phosphatase has largely been mildly elevated over last few years  She is not taking any calcium or vitamin D after gastric bypass. Check levels on labs. She also has elevated hemoglobin A1c. She feels she can move forwards and decrease carb intake         Past Medical History:   Diagnosis Date    Advanced care planning/counseling discussion 3/4/16    On File    Bronchitis 2/24/2014    Cervicalgia 8/18/15    Dr. Ade Montero    Chest pain 5/25/15    Hospital[facility] at [facility] 5/25/15 (lab work negative)    Congestive heart failure, unspecified     Last Echo 2/8/15: EF 55-60%    Enthesopathy, spinal ([address] 8/18/15    Dr. Henrry Rubalcava Essential hypertension     Foot drop 8/18/15    Dr. Henrry Rubalcava Heart attack [facility]) 2/24/2013    Was supposed to See Cardiology for possible pacemaker in november 2014- After Cardiology consult locally, no need, EF greatly improved.  Established with Dr. Ibeth Dunn Hiatal hernia 6/2015    3 cm hiatal hernia     Hip pain 8/18/15    Dr. Ade Montero    Hypercholesterolemia     Hyperglycemia 7/2015    A1c 5.9     Hyperlipidemia 6/30/2015    NMR lipoprofile- LDL P 997, LDL-c 71, HLD-C-39, TG-60, HLD-P (25.2), Small LDL-P -541, LDL size 20.6    Hypothyroid     Lower extremity edema     Lumbar spondylosis 8/18/15    Dr. Kim Cuellar 6/2015    EGD/Colonscopy 6/15- Gastritis, internal hemorrhoids and 3 polyps    Murmur     EDDIE on CPAP     Was referred to Pulmonology - Uses CPAP    Osteoarthritis of hip 8/18/15    Dr. Ade Montero    Osteoarthritis, shoulder 8/18/15    Dr. Ade Montero    Radiculopathy, cervical 8/18/15    Dr. Ade Montero    Shoulder pain 8/18/15    Dr. Vahid Cleary Spinal stenosis of cervical region 8/18/15    Dr. Vahid Cleary Spinal stenosis, lumbar 8/18/15    Dr. Vahid Cleary Stroke [facility]) 2/25/2014    Established with Neurology, Emerita Hughes NP-Just hospitalized at [facility] 2/7/15-2/10/15. CT negative, but Late effect CV accident with increased tone described on discharge summary, Carotid dopplers showed 50% stenosis bilaterally.  Vitamin D deficiency 7/2015     Past Surgical History:   Procedure Laterality Date    COLONOSCOPY N/A 6/22/2016    COLONOSCOPY performed by Mak Paredes MD at [facility] ENDOSCOPY    HX BREAST [facility]OPSY  8/11/15    [facility]    HX CHOLECYSTECTOMY      HX COLONOSCOPY  6/2015    Dr. Marily Diaz- 3 complete polypectomies, Internal hemorrhoids, difficult study due to spasm. Repeat in 1 year    HX ENDOSCOPY  6/2015    mild gastritis, 3cm hiatal hernia     HX GASTRIC BYPASS  6/1989    HX HEART CATHETERIZATION  2/2014    HX ORTHOPAEDIC      Right middle finger distal amputation    HX PARTIAL THYROIDECTOMY  ~1990    HX THYROIDECTOMY Left 1985    90% of one side of the thyroid removed     Current Outpatient Prescriptions   Medication Sig    morphine IR (MS IR) 15 mg tablet Take 15 mg by mouth nightly.  metFORMIN ER (GLUCOPHAGE XR) 500 mg tablet Take 2 Tabs by mouth daily.  carvedilol (COREG) 25 mg tablet Take 1 Tab by mouth two (2) times daily (with meals).  glucose blood VI test strips (BLOOD GLUCOSE TEST) strip For use with one touch ultra glucometer to check blood sugar once a day and when needed    Lancets misc For use with one touch ultra glucometer to check blood sugar once a day and when needed    spironolactone (ALDACTONE) 25 mg tablet Take 1 Tab by mouth daily.  cyclobenzaprine (FLEXERIL) 5 mg tablet Take 5 mg by mouth two (2) times a day.  HYDROcodone-acetaminophen (NORCO) 5-325 mg per tablet Take 1 Tab by mouth daily as needed.  levothyroxine (SYNTHROID) 125 mcg tablet Take 1 Tab by mouth Daily (before breakfast).  hydrALAZINE (APRESOLINE) 100 mg tablet Take 1 Tab by mouth two (2) times a day.  amLODIPine (NORVASC) 5 mg tablet Take 1 Tab by mouth daily.  DULoxetine (CYMBALTA) 60 mg capsule Take 1 Cap by mouth every evening.  lidocaine (LIDODERM) 5 % Apply patch to the affected area for 12 hours a day and remove for 12 hours a day.  furosemide (LASIX) 40 mg tablet Take 1 Tab by mouth daily. (Patient taking differently: Take 40 mg by mouth daily. Only taking lasix Tuesday, Thursday Saturday and sunday)    pantoprazole (PROTONIX) 40 mg tablet Take 1 Tab by mouth daily.  linaclotide (LINZESS) 145 mcg cap capsule Take 1 Cap by mouth Daily (before breakfast). For constipation    losartan (COZAAR) 100 mg tablet Take 1 Tab by mouth daily.  atorvastatin (LIPITOR) 40 mg tablet Take 1 Tab by mouth nightly.  cyanocobalamin (VITAMIN B12) 1,000 mcg/mL injection 1 mL by SubCUTAneous route every fourteen (14) days. For b12 deficiency    zolpidem (AMBIEN) 5 mg tablet Take 1 Tab by mouth nightly as needed for Sleep. Max Daily Amount: 5 mg.  Insulin Syringe-Needle U-100 1 mL 30 gauge x 5/16 syrg 1 Each by Does Not Apply route every fourteen (14) days. Indications: for b12 injections    traMADol (ULTRAM) 50 mg tablet Take 1-2 Tabs by mouth every six (6) hours as needed for Pain. Max Daily Amount: 400 mg.  ferrous sulfate 325 mg (65 mg iron) tablet Take 325 mg by mouth Daily (before breakfast).  methocarbamol (ROBAXIN) 500 mg tablet Take 500 mg by mouth three (3) times daily.  EPINEPHrine (EPIPEN 2-LAURA) 0.3 mg/0.3 mL injection 0.3 mL by IntraMUSCular route once as needed for up to 1 dose.  aspirin delayed-release 81 mg tablet Take 81 mg by mouth daily.  acetaminophen (TYLENOL) 325 mg tablet Take 650 mg by mouth daily.  albuterol (PROVENTIL HFA, VENTOLIN HFA, PROAIR HFA) 90 mcg/actuation inhaler Take 2 Puffs by inhalation every four (4) hours as needed for Wheezing or Shortness of Breath.      No current facility-administered medications for this visit. Allergies   Allergen Reactions    Bees [Hymenoptera Allergenic Extract] Shortness of Breath and Swelling    Strawberry Shortness of Breath and Swelling    Lisinopril Cough     Family History   Problem Relation Age of Onset    Heart Disease Father 61    Hypertension Mother     Heart Disease Brother 62    Diabetes Maternal Grandmother      Social History     Social History    Marital status: SINGLE     Spouse name: N/A    Number of children: N/A    Years of education: N/A     Occupational History    Not on file.      Social History Main Topics    Smoking status: Former Smoker     Packs/day: 0.25     Years: 15.00     Types: Cigarettes     Quit date: 6/13/2007    Smokeless tobacco: Never Used    Alcohol use No    Drug use: No    Sexual activity: No     Other Topics Concern    Not on file     Social History Narrative     Review of Systems:  - Constitutional Symptoms: no fevers, chills, weight loss  - Eyes: no blurry vision or double vision  - Cardiovascular: no chest pain or palpitations  - Respiratory: no cough or shortness of breath  - Gastrointestinal: no dysphagia or abdominal pain  - Musculoskeletal: no joint pains or weakness  - Integumentary: no rashes  - Neurological: no numbness, tingling, or headaches  - Psychiatric: no depression or anxiety  - Endocrine: no heat or cold intolerance, no polyuria or polydipsia    Physical Examination:  Visit Vitals    /59    Pulse 72    Ht 5' 6\" (1.676 m)    Wt 336 lb (152.4 kg)    BMI 54.23 kg/m2   -   - General: pleasant, no distress,   - HEENT:No scleral/conjunctival injection, EOMI,MMM  - Neck: supple, no thyromegaly, masses, lymph nodes,  no supraclavicular or dorsocervical fat pads  - Cardiovascular: regular, normal rate  - Respiratory: normal effort  - Integumentary: no edema  - Neurological: alert and oriented  - Psychiatric: normal mood and affect    Data Reviewed:      Component Latest Ref Rng & Units 2/2/2017 10/27/2016 6/7/2016 5/2/2016           2:31 PM  1:48 PM  4:23 AM  9:14 AM   Cholesterol, total      <200 MG/DL   111    Triglyceride      <150 MG/DL   76    HDL Cholesterol      MG/DL   38    LDL, calculated      0 - 100 MG/DL   57.8    VLDL, calculated      MG/DL   15.2    CHOL/HDL Ratio      0 - 5.0     2.9    Hemoglobin A1c, (calculated)      4.8 - 5.6 % 5.9 (H) 6.2 (H)  6.0 (H)   Estimated average glucose      mg/dL 123 131  126     Component      Latest Ref Rng & Units 7/6/2016 5/2/2016 5/2/2016 5/2/2016           8:43 AM  9:14 AM  9:14 AM  9:14 AM   TSH      0.450 - 4.500 uIU/mL 3.150  6.100 (H)    T4, Free      0.82 - 1.77 ng/dL    1.17   Triiodothyronine (T3), free      2.0 - 4.4 pg/mL  3.8       Component      Latest Ref Rng & Units 11/13/2015 11/13/2015 11/13/2015          11:51 AM 11:51 AM 11:51 AM   TSH      0.450 - 4.500 uIU/mL   0.419 (L)   T4, Free      0.82 - 1.77 ng/dL  1.63    Triiodothyronine (T3), free      2.0 - 4.4 pg/mL 3.5       Assessment/Plan:   1. Alkaline phosphatase elevation   - likely due to sub-optimal calcium absorption after gastric bypass and due to lack of vitamin D.   - Recommended ergocalciferol 50,000 units twice weekly  - adding indapamide 2.5 mg may help (less urinary excretion of calcium . 2. S/P gastric bypass   - screening for iron, calcium, vit D, etc deficiencies    3. Post-surgical hypothyroidism    4. Coronary artery disease involving native coronary artery of native heart without angina pectoris    5. Vitamin D deficiency - recommended 50,000 units twice weekly   6. HTN (hypertension), benign   Overall well controlled  -recommend taking losartan and amlodipine at night. Continue spironolactone and carvedilol  Lowered hydralazine dose to 25 mg twice daily. Add indapamide 2.5 mg to AM.   . 7. History of anemia - check ferritin to evaluate system as well.       .Greater than 50% of 45 minute visit was spent counseling the patient about above    Patient Instructions   Thyroid:  Increase levothyroxine to 150 mcg. Take in AM like you are doing  Repeat labs in 3 months    Alkaline phosphatase elevated:  - improving vitamin D will help. Take vitamin D 50,000 units twice weekly    Adding indapamide 2.5 mg (for blood pressure and to decrease calcium losses in urine to improve blood calcium)    Blood pressure:  - Adding indapamide will lower blood pressure  - Decrease Hydralazine to 25 mg when indapamide added. - continue carvedilol  - recommend taking losartan and amlodipine at bedtime/evening.   - continue spironolactone in AM.     Feeling cold:  - optimize thyroid  - will check ferrtin (iron assessment with pre-labs. Follow-up Disposition:  Return in about 3 months (around 7/18/2017).

## 2017-04-18 NOTE — MR AVS SNAPSHOT
Visit Information Date & Time Provider Department Dept. Phone Encounter #  
 4/18/2017  8:50 AM Kishore Jacobson [facility] [phone] [id number] Follow-up Instructions Return in about 3 months (around 7/18/2017). Your Appointments 5/2/2017 10:00 AM  
ROUTINE CARE with Tri Damon. [address] ([facility]) Appt Note: 3 month follow up for htn,heart weight [address] [facility] [address]  
  
   
 [address]  
  
    
 11/1/2017  8:40 AM  
ESTABLISHED PATIENT with Angel Maza MD  
[facility] ([facility] SCHEDULING) Appt Note: 1 yr f/u  
 [address] [place]  
[address] [address] Upcoming Health Maintenance Date Due DTaP/Tdap/Td series (1 - Tdap) 5/27/1979 BREAST CANCER SCRN MAMMOGRAM 8/5/2017 PAP AKA CERVICAL CYTOLOGY 11/13/2018 COLONOSCOPY 6/22/2021 Allergies as of 4/18/2017  Review Complete On: 4/18/2017 By: Andrea Rangel MD  
  
 Severity Noted Reaction Type Reactions Bees [Hymenoptera Allergenic Extract] High 10/14/2014   Systemic Shortness of Breath, Swelling [place] High 10/14/2014   Systemic Shortness of Breath, Swelling Lisinopril  10/17/2014    Cough Current Immunizations  Reviewed on 10/11/2016 Name Date Influenza Vaccine Jaylen Kelly) 11/13/2015 Influenza Vaccine (Quad) PF 10/11/2016 Pneumococcal Polysaccharide (PPSV-23) 8/29/2016 Not reviewed this visit You Were Diagnosed With   
  
 Codes Comments Alkaline phosphatase elevation    -  Primary ICD-10-CM: R74.8 ICD-9-CM: 790.5 S/P gastric bypass     ICD-10-CM: H27.83 ICD-9-CM: V45.86 Post-surgical hypothyroidism     ICD-10-CM: E89.0 ICD-9-CM: 244.0 Coronary artery disease involving native coronary artery of native heart without angina pectoris     ICD-10-CM: I25.10 ICD-9-CM: 414.01 Vitamin D deficiency     ICD-10-CM: E55.9 ICD-9-CM: 268.9 HTN (hypertension), benign     ICD-10-CM: I10 
ICD-9-CM: 401.1 History of anemia     ICD-10-CM: Z86.2 ICD-9-CM: V12.3 Vitals BP Pulse Height(growth percentile) Weight(growth percentile) BMI OB Status 112/59 72 5' 6\" (1.676 m) 336 lb (152.4 kg) 54.23 kg/m2 Postmenopausal  
 Smoking Status Former Smoker Vitals History BMI and BSA Data Body Mass Index Body Surface Area  
 54.23 kg/m 2 2.66 m 2 Preferred Pharmacy Pharmacy Name Phone [place]  [facility] [phone] Your Updated Medication List  
  
   
This list is accurate as of: 4/18/17 10:08 AM.  Always use your most recent med list.  
  
  
  
  
 acetaminophen 325 mg tablet Commonly known as:  TYLENOL Take 650 mg by mouth daily. albuterol 90 mcg/actuation inhaler Commonly known as:  PROVENTIL HFA, VENTOLIN HFA, PROAIR HFA Take 2 Puffs by inhalation every four (4) hours as needed for Wheezing or Shortness of Breath. amLODIPine 5 mg tablet Commonly known as:  Lizzy Punter Take 1 Tab by mouth daily. aspirin delayed-release 81 mg tablet Take 81 mg by mouth daily. atorvastatin 40 mg tablet Commonly known as:  LIPITOR Take 1 Tab by mouth nightly. carvedilol 25 mg tablet Commonly known as:  Maria L Stable Take 1 Tab by mouth two (2) times daily (with meals). chlorzoxazone 500 mg tablet Commonly known as:  Ashwini Notice Take 500 mg by mouth three (3) times daily. cyanocobalamin 1,000 mcg/mL injection Commonly known as:  VITAMIN B12  
1 mL by SubCUTAneous route every fourteen (14) days. For b12 deficiency DULoxetine 60 mg capsule Commonly known as:  CYMBALTA Take 1 Cap by mouth every evening. EPINEPHrine 0.3 mg/0.3 mL injection Commonly known as:  EPIPEN 2-LAURA  
0.3 mL by IntraMUSCular route once as needed for up to 1 dose. ergocalciferol 50,000 unit capsule Commonly known as:  ERGOCALCIFEROL Take 1 Cap by mouth two (2) times a week. Start taking on:  4/21/2017  
  
 ferrous sulfate 325 mg (65 mg iron) tablet Take 325 mg by mouth Daily (before breakfast). furosemide 40 mg tablet Commonly known as:  LASIX Take 1 Tab by mouth daily. glucose blood VI test strips strip Commonly known as:  blood glucose test  
For use with one touch ultra glucometer to check blood sugar once a day and when needed  
  
 hydrALAZINE 25 mg tablet Commonly known as:  APRESOLINE Take 1 Tab by mouth two (2) times a day. HYDROcodone-acetaminophen 7.5-325 mg per tablet Commonly known as:  Elyn Barley Take  by mouth every eight (8) hours as needed. indapamide 2.5 mg tablet Commonly known as:  Anna Rhianna Take 1 Tab by mouth daily. Insulin Syringe-Needle U-100 1 mL 30 gauge x 5/16 Syrg 1 Each by Does Not Apply route every fourteen (14) days. Indications: for b12 injections Lancets Misc For use with one touch ultra glucometer to check blood sugar once a day and when needed  
  
 levothyroxine 150 mcg tablet Commonly known as:  SYNTHROID Take 1 Tab by mouth Daily (before breakfast). lidocaine 5 % Commonly known as:  Twila Evans Apply patch to the affected area for 12 hours a day and remove for 12 hours a day. linaclotide 145 mcg Cap capsule Commonly known as:  Herssaminal Borders Take 1 Cap by mouth Daily (before breakfast). For constipation  
  
 losartan 100 mg tablet Commonly known as:  COZAAR Take 1 Tab by mouth daily. metFORMIN  mg tablet Commonly known as:  GLUCOPHAGE XR Take 2 Tabs by mouth daily. morphine CR 15 mg CR tablet Commonly known as:  MS CONTIN Take 15 mg by mouth daily. pantoprazole 40 mg tablet Commonly known as:  PROTONIX Take 1 Tab by mouth daily. spironolactone 25 mg tablet Commonly known as:  ALDACTONE Take 1 Tab by mouth daily. zolpidem 5 mg tablet Commonly known as:  AMBIEN Take 1 Tab by mouth nightly as needed for Sleep. Max Daily Amount: 5 mg. We Performed the Following FERRITIN [96393 CPT(R)] METABOLIC PANEL, COMPREHENSIVE [07225 CPT(R)] PTH INTACT [76199 CPT(R)] TSH 3RD GENERATION [26683 CPT(R)] VITAMIN D, 25 HYDROXY [id number] CPT(R)] Follow-up Instructions Return in about 3 months (around 7/18/2017). Patient Instructions Thyroid: 
Increase levothyroxine to 150 mcg. Take in AM like you are doing Repeat labs in 3 months Alkaline phosphatase elevated: 
- improving vitamin D will help. Take vitamin D 50,000 units twice weekly Adding indapamide 2.5 mg (for blood pressure and to decrease calcium losses in urine to improve blood calcium) Blood pressure: - Adding indapamide will lower blood pressure - Decrease Hydralazine to 25 mg when indapamide added. - continue carvedilol 
- recommend taking losartan and amlodipine at bedtime/evening.  
- continue spironolactone in AM. Feeling cold: 
- optimize thyroid 
- will check ferrtin (iron assessment with pre-labs. Introducing [facility]! Dear Hemant Pop: Thank you for requesting a "[employer] account. Our records indicate that you already have an active "[employer] account. You can access your account anytime at [email or web address] Did you know that you can access your hospital and ER discharge instructions at any time in "[employer]? You can also review all of your test results from your hospital stay or ER visit. Additional Information If you have questions, please visit the Frequently Asked Questions section of the "[employer] website at [email or web address] Remember, MyChart is NOT to be used for urgent needs. For medical emergencies, dial 911. Now available from your iPhone and Android! Please provide this summary of care documentation to your next provider. Your primary care clinician is listed as Joey Lares. Susanne Oscar. If you have any questions after today's visit, please call [phone].

## 2017-04-18 NOTE — PATIENT INSTRUCTIONS
Thyroid:  Increase levothyroxine to 150 mcg. Take in AM like you are doing  Repeat labs in 3 months    Alkaline phosphatase elevated:  - improving vitamin D will help. Take vitamin D 50,000 units twice weekly    Adding indapamide 2.5 mg (for blood pressure and to decrease calcium losses in urine to improve blood calcium)    Blood pressure:  - Adding indapamide will lower blood pressure  - Decrease Hydralazine to 25 mg when indapamide added. - continue carvedilol  - recommend taking losartan and amlodipine at bedtime/evening.   - continue spironolactone in AM.     Feeling cold:  - optimize thyroid  - will check ferrtin (iron assessment with pre-labs.

## 2017-04-25 DIAGNOSIS — I10 ESSENTIAL HYPERTENSION: ICD-10-CM

## 2017-04-27 ENCOUNTER — TELEPHONE (OUTPATIENT)
Dept: ENDOCRINOLOGY | Age: 57
End: 2017-04-27

## 2017-04-27 RX ORDER — SPIRONOLACTONE 25 MG/1
25 TABLET ORAL DAILY
Qty: 90 TAB | Refills: 3 | Status: SHIPPED | OUTPATIENT
Start: 2017-04-27 | End: 2017-09-12

## 2017-04-27 NOTE — TELEPHONE ENCOUNTER
Requested Prescriptions     Signed Prescriptions Disp Refills    spironolactone (ALDACTONE) 25 mg tablet 90 Tab 3     Sig: Take 1 Tab by mouth daily.      Authorizing Provider: Víctor Cartagena     Ordering User: Saranya Rae    Per Dr. Inna Cao verbal order

## 2017-04-28 DIAGNOSIS — R53.83 FATIGUE, UNSPECIFIED TYPE: ICD-10-CM

## 2017-04-28 NOTE — TELEPHONE ENCOUNTER
She will take indapamide every day. Hopefully the furosemide will not be needed. I would like her to take furosemide only as needed for bothersome edema.

## 2017-05-01 NOTE — TELEPHONE ENCOUNTER
From: Precious Redman  To: [id number] Sahil Mcguire MD  Sent: 4/28/2017 8:17 AM EDT  Subject: Medication Renewal Request    Original authorizing provider: Juan Carlos Rome. MD Precious Rodriguez would like a refill of the following medications:  Insulin Syringe-Needle U-100 1 mL 30 gauge x 5/16 syrg Teagan Mcguire MD]    Preferred pharmacy: [place] [id number]    Comment:

## 2017-05-02 ENCOUNTER — OFFICE VISIT (OUTPATIENT)
Dept: FAMILY MEDICINE CLINIC | Age: 57
End: 2017-05-02

## 2017-05-02 VITALS
BODY MASS INDEX: 47.09 KG/M2 | SYSTOLIC BLOOD PRESSURE: 116 MMHG | RESPIRATION RATE: 18 BRPM | HEIGHT: 66 IN | TEMPERATURE: 97.4 F | WEIGHT: 293 LBS | OXYGEN SATURATION: 98 % | DIASTOLIC BLOOD PRESSURE: 69 MMHG | HEART RATE: 67 BPM

## 2017-05-02 DIAGNOSIS — G47.00 INSOMNIA, UNSPECIFIED TYPE: ICD-10-CM

## 2017-05-02 DIAGNOSIS — R73.03 PREDIABETES: ICD-10-CM

## 2017-05-02 DIAGNOSIS — E66.09 OBESITY DUE TO EXCESS CALORIES, UNSPECIFIED OBESITY SEVERITY: Primary | ICD-10-CM

## 2017-05-02 DIAGNOSIS — I10 ESSENTIAL HYPERTENSION: ICD-10-CM

## 2017-05-02 RX ORDER — LANCETS 33 GAUGE
EACH MISCELLANEOUS
COMMUNITY
Start: 2017-02-06 | End: 2017-09-08

## 2017-05-02 RX ORDER — LEVOTHYROXINE SODIUM 125 UG/1
TABLET ORAL
COMMUNITY
Start: 2017-04-10 | End: 2017-06-13 | Stop reason: DRUGHIGH

## 2017-05-02 RX ORDER — MULTIVITAMIN
TABLET,CHEWABLE ORAL
COMMUNITY
Start: 2017-04-19 | End: 2017-09-08

## 2017-05-02 RX ORDER — ALCOHOL ANTISEPTIC PADS
PADS, MEDICATED (EA) TOPICAL
COMMUNITY
Start: 2017-04-19 | End: 2017-08-01 | Stop reason: SDUPTHER

## 2017-05-02 RX ORDER — CYCLOBENZAPRINE HCL 5 MG
TABLET ORAL
COMMUNITY
Start: 2017-01-23 | End: 2017-06-13

## 2017-05-02 RX ORDER — BLOOD-GLUCOSE CONTROL, NORMAL
EACH MISCELLANEOUS
COMMUNITY
Start: 2017-01-23 | End: 2017-09-08

## 2017-05-02 RX ORDER — HYDRALAZINE HYDROCHLORIDE 100 MG/1
TABLET, FILM COATED ORAL
COMMUNITY
Start: 2017-04-10 | End: 2017-06-13 | Stop reason: DRUGHIGH

## 2017-05-02 RX ORDER — BLOOD-GLUCOSE CONTROL, NORMAL
EACH MISCELLANEOUS
COMMUNITY
Start: 2017-04-19 | End: 2017-09-08

## 2017-05-02 NOTE — MR AVS SNAPSHOT
Visit Information Date & Time Provider Department Dept. Phone Encounter #  
 5/2/2017 10:00 AM Tanisha Stanton MD [facility] [phone] [id number] Your Appointments 7/31/2017  8:50 AM  
ROUTINE CARE with MD Primitivo Richardson Diabetes and Endocrinology [facility]) Appt Note: F/U DIABEETES CP0.00  
 [address] [place] [address]  
[id number] [phone]  
  
    
 11/1/2017  8:40 AM  
ESTABLISHED PATIENT with Shayne Alicea MD  
[facility] ([facility] SCH[facility]) Appt Note: 1 yr f/u  
 [address] [place] [address]  
[address] [address] Upcoming Health Maintenance Date Due DTaP/Tdap/Td series (1 - Tdap) 5/27/1979 BREAST CANCER SCRN MAMMOGRAM 8/5/2017 INFLUENZA AGE 9 TO ADULT 8/1/2017 PAP AKA CERVICAL CYTOLOGY 11/13/2018 COLONOSCOPY 6/22/2021 Allergies as of 5/2/2017  Review Complete On: 5/2/2017 By: Gabriel Pena LPN Severity Noted Reaction Type Reactions Bees [Hymenoptera Allergenic Extract] High 10/14/2014   Systemic Shortness of Breath, Swelling [place] High 10/14/2014   Systemic Shortness of Breath, Swelling Lisinopril  10/17/2014    Cough Current Immunizations  Reviewed on 10/11/2016 Name Date Influenza Vaccine Ethan Freddy) 11/13/2015 Influenza Vaccine (Quad) PF 10/11/2016 Pneumococcal Polysaccharide (PPSV-23) 8/29/2016 Not reviewed this visit You Were Diagnosed With   
  
 Codes Comments Obesity due to excess calories, unspecified obesity severity    -  Primary ICD-10-CM: E66.09 
ICD-9-CM: 278.00 Prediabetes     ICD-10-CM: R73.03 
ICD-9-CM: 790.29 Essential hypertension     ICD-10-CM: I10 
ICD-9-CM: 401.9 Insomnia, unspecified type     ICD-10-CM: G47.00 ICD-9-CM: 780.52 Vitals BP Pulse Temp Resp Height(growth percentile) Weight(growth percentile) 116/69 (BP 1 Location: Right arm, BP Patient Position: Sitting) 67 97.4 °F (36.3 °C) (Oral) 18 5' 6\" (1.676 m) 331 lb (150.1 kg) SpO2 BMI OB Status Smoking Status 98% 53.42 kg/m2 Postmenopausal Former Smoker BMI and BSA Data Body Mass Index Body Surface Area  
 53.42 kg/m 2 2.64 m 2 Preferred Pharmacy Pharmacy Name [facility], [address] Your Updated Medication List  
  
   
This list is accurate as of: 5/2/17 11:07 AM.  Always use your most recent med list.  
  
  
  
  
 acetaminophen 325 mg tablet Commonly known as:  TYLENOL Take 650 mg by mouth daily. albuterol 90 mcg/actuation inhaler Commonly known as:  PROVENTIL HFA, VENTOLIN HFA, PROAIR HFA Take 2 Puffs by inhalation every four (4) hours as needed for Wheezing or Shortness of Breath. ALCOHOL PREP PADS Padm Generic drug:  alcohol swabs  
  
 amLODIPine 5 mg tablet Commonly known as:  Mertzon Tameka Take 1 Tab by mouth daily. aspirin delayed-release 81 mg tablet Take 81 mg by mouth daily. atorvastatin 40 mg tablet Commonly known as:  LIPITOR Take 1 Tab by mouth nightly. carvedilol 25 mg tablet Commonly known as:  Cande Norman Take 1 Tab by mouth two (2) times daily (with meals). chlorzoxazone 500 mg tablet Commonly known as:  Jodeen Ear Take 500 mg by mouth three (3) times daily. cyanocobalamin 1,000 mcg/mL injection Commonly known as:  VITAMIN B12  
1 mL by SubCUTAneous route every fourteen (14) days. For b12 deficiency  
  
 cyclobenzaprine 5 mg tablet Commonly known as:  FLEXERIL  
  
 DULoxetine 60 mg capsule Commonly known as:  CYMBALTA Take 1 Cap by mouth every evening. EPINEPHrine 0.3 mg/0.3 mL injection Commonly known as:  EPIPEN 2-LAURA  
0.3 mL by IntraMUSCular route once as needed for up to 1 dose. ergocalciferol 50,000 unit capsule Commonly known as:  ERGOCALCIFEROL Take 1 Cap by mouth two (2) times a week. ferrous sulfate 325 mg (65 mg iron) tablet Take 325 mg by mouth Daily (before breakfast). Freestyle Control Soln Generic drug:  Blood Glucose Control High&Low  
  
 furosemide 40 mg tablet Commonly known as:  LASIX Take 1 Tab by mouth daily. glucose blood VI test strips strip Commonly known as:  blood glucose test  
For use with one touch ultra glucometer to check blood sugar once a day and when needed * hydrALAZINE 100 mg tablet Commonly known as:  APRESOLINE * hydrALAZINE 25 mg tablet Commonly known as:  APRESOLINE Take 1 Tab by mouth two (2) times a day. HYDROcodone-acetaminophen 7.5-325 mg per tablet Commonly known as:  Yamel Rook Take  by mouth every eight (8) hours as needed. indapamide 2.5 mg tablet Commonly known as:  Javed Curd Take 1 Tab by mouth daily. Insulin Syringe-Needle U-100 1 mL 30 gauge x 5/16 Syrg 1 Each by Does Not Apply route every fourteen (14) days. Indications: for b12 injections * lancets 30 gauge Misc * Lancets Misc For use with one touch ultra glucometer to check blood sugar once a day and when needed * One Touch Delica 33 gauge Misc Generic drug:  lancets * ULTRA THIN LANCETS 31 gauge Misc Generic drug:  lancets * levothyroxine 125 mcg tablet Commonly known as:  SYNTHROID * levothyroxine 150 mcg tablet Commonly known as:  SYNTHROID Take 1 Tab by mouth Daily (before breakfast). lidocaine 5 % Commonly known as:  Power Villarreal Apply patch to the affected area for 12 hours a day and remove for 12 hours a day. linaclotide 145 mcg Cap capsule Commonly known as:  Sachi Roberto Carlos Take 1 Cap by mouth Daily (before breakfast). For constipation  
  
 losartan 100 mg tablet Commonly known as:  COZAAR Take 1 Tab by mouth daily. metFORMIN  mg tablet Commonly known as:  GLUCOPHAGE XR Take 2 Tabs by mouth daily. morphine CR 15 mg CR tablet Commonly known as:  MS CONTIN Take 15 mg by mouth daily. pantoprazole 40 mg tablet Commonly known as:  PROTONIX Take 1 Tab by mouth daily. spironolactone 25 mg tablet Commonly known as:  ALDACTONE Take 1 Tab by mouth daily. suvorexant 5 mg tablet Commonly known as:  Lizzy Roberto Carlos Take 1 Tab by mouth nightly as needed for Insomnia. Max Daily Amount: 5 mg. Increase to 2 tab at night after one week if needed. * Notice: This list has 8 medication(s) that are the same as other medications prescribed for you. Read the directions carefully, and ask your doctor or other care provider to review them with you. Prescriptions Printed Refills  
 suvorexant (BELSOMRA) 5 mg tablet 1 Sig: Take 1 Tab by mouth nightly as needed for Insomnia. Max Daily Amount: 5 mg. Increase to 2 tab at night after one week if needed. Class: Print Route: Oral  
  
Patient Instructions TODAY, please go to: CHECK OUT Please schedule the following appointments at CHECK OUT: 
· BP, weight, insomnia follow up with Dr. Tia Sharpe in 4-6 weeks 
 
_____________________ Today's Plan: 
· STOP ambien. · Start belsomra. · Goal of at least 150 min per week. Ultimate goal of 60 min per day.  
_____________________ Review your health maintenance below. Make plans to return and address anything that is due or will be due soon. Health Maintenance Due Topic Date Due  
 DTaP/Tdap/Td  (1 - Tdap) 05/27/1979  Breast Cancer Screening  08/05/2017 If you need to get your labs done at [facility], visit this site to find a convenient location: [email or web address] Introducing [facility] HE[facility]! Dear Hemant Pop: Thank you for requesting a E96 account. Our records indicate that you already have an active E96 account.   You can access your account anytime at [email or web address] Did you know that you can access your hospital and ER discharge instructions at any time in AF83? You can also review all of your test results from your hospital stay or ER visit. Additional Information If you have questions, please visit the Frequently Asked Questions section of the AF83 website at [email or web address] Remember, AF83 is NOT to be used for urgent needs. For medical emergencies, dial 911. Now available from your iPhone and Android! Please provide this summary of care documentation to your next provider. Your primary care clinician is listed as Joey Lares. Susanne Oscar. If you have any questions after today's visit, please call [phone].

## 2017-05-02 NOTE — PROGRESS NOTES
.. [address] Visit   Patient ID:   Latoya Bautista is a 60 y.o. female. Assessment/Plan:    Hemant Pop was seen today for hypertension and weight management. Diagnoses and all orders for this visit:    Obesity due to excess calories, unspecified obesity severity  Prediabetes  Essential hypertension  Pt does not desire additional medications. Counseled extensively on lifestyle management, ways to improve current activities and food choices. Insomnia, unspecified type  D/c ambien. Change to belsomra. -     suvorexant (BELSOMRA) 5 mg tablet; Take 1 Tab by mouth nightly as needed for Insomnia. Max Daily Amount: 5 mg. Increase to 2 tab at night after one week if needed. Elevated AP  Reviewed Endo note. Counselled pt on:  Patient health concerns, plan as outlined in patient instructions and above. Patient was offered a choice/choices in the treatment plan today. Patient expresses understanding of the plan and agrees with recommendations. More than 50% of this >25 minute encounter was spent in counseling and coordination of care today. Patient Instructions     TODAY, please go to:   CHECK OUT     Please schedule the following appointments at CHECK OUT:  · BP, weight, insomnia follow up with Dr. Tia Sharpe in 4-6 weeks    _____________________     Keon Chavez Plan:  · STOP ambien. · Start belsomra. · Goal of at least 150 min per week. Ultimate goal of 60 min per day.   _____________________     Review your health maintenance below. Make plans to return and address anything that is due or will be due soon.    Health Maintenance Due   Topic Date Due    DTaP/Tdap/Td  (1 - Tdap) 05/27/1979    Breast Cancer Screening  08/05/2017       If you need to get your labs done at [facility], visit this site to find a convenient location: [email or web address]      Subjective:   HPI:  Latoya Bautista is a 60 y.o. female being seen for:   Chief Complaint   Patient presents with    Hypertension     follow up     Weight Management     follow up      · Saw endocrine for elevate AP. Thought to be due to sub-optimal calcium absorption after gastric bypass and lack of vitamin d. MD recommended 50K units ergocalciferol 2x/week. · Lowered hydralazine to 25mg po bid and added indapamide 2.5mg qAM.   · She is to return to see Dr. Gene Perez in July. · Pill pack has not sent yet, but they are coming. Will arrive about 5/9. Weight  · Frustration is what her sister makes her she does not like, so she ultimately throws away. Instead will have oatmeal or special K cereal dry. Her sister may make boiled eggs, rice cakes, waffles, then protein drink, scambled egg and turkey sausage, but she dislikes this. When she dislike the food her sister makes she will have PB and lennie crackers. · For lunch her sister will make her a sandwich and fruit, and she eats. Pt may add a bag of chips  · Would like to do [employer], and lets her work on machine and exercise participation at [employer]. She could participate as desired. She would like to go on Tuesday and Thursday, the days she is not at [facility]. · Baked chicken, baked fish for dinner  · Lennie crackers and cashews are biggest weakness.    · She may eat 3 lennie crackers and PB- this is about 720 calories at a time  · May have palm of cashews this is about 150-200 calories at a time    Insomnia     Screening and Prevention Due:  Health Maintenance Due   Topic Date Due    DTaP/Tdap/Td series (1 - Tdap) 05/27/1979    BREAST CANCER SCRN MAMMOGRAM  08/05/2017        Review of Systems  Otherwise, per HPI  Active Problem List:  Patient Active Problem List   Diagnosis Code    Coronary artery disease involving native coronary artery of native heart without angina pectoris I25.10    Bronchitis J40    High cholesterol E78.00    Cardiomyopathy ([facility] [address] I42.9    SOB (shortness of breath) R06.02    Neck pain, bilateral M54.2    Shoulder pain, bilateral M25.511, M25.512    Muscle spasticity M62.838    Hemiparesis and alteration of sensations as late effects of stroke ([facility]) I69.359, I69.398    Head pain, chronic R51, G89.29    Expressive aphasia R47.01    Heart palpitations R00.2    Ventricular ectopic activity I49.3    Stroke ([facility] [address] I63.9    Thyroid disease E07.9    Hypercholesterolemia E78.00    EDDIE on CPAP G47.33, Z99.89    Essential hypertension I10    Congestive heart failure, unspecified I50.9    Chest pain R07.9    Melena K92.1    Hiatal hernia K44.9    Postoperative hypothyroidism E89.0    Stroke (cerebrum) ([facility]) I63.9    Chronic pain G89.29    Prediabetes R73.03    Obesity  E66.09     ? Objective:     Visit Vitals    /69 (BP 1 Location: Right arm, BP Patient Position: Sitting)    Pulse 67    Temp 97.4 °F (36.3 °C) (Oral)    Resp 18    Ht 5' 6\" (1.676 m)    Wt 331 lb (150.1 kg)    SpO2 98%    BMI 53.42 kg/m2     Wt Readings from Last 3 Encounters:   05/02/17 331 lb (150.1 kg)   04/18/17 336 lb (152.4 kg)   03/20/17 333 lb (151 kg)     PHQ over the last two weeks 5/2/2017   Little interest or pleasure in doing things Not at all   Feeling down, depressed or hopeless Not at all   Total Score PHQ 2 0     Physical Exam  Allergies   Allergen Reactions    Bees [Hymenoptera Allergenic Extract] Shortness of Breath and Swelling    Strawberry Shortness of Breath and Swelling    Lisinopril Cough     Prior to Admission medications    Medication Sig Start Date End Date Taking?  Authorizing Provider   ALCOHOL PREP PADS padm  4/19/17  Yes Historical Provider   FREESTYLE CONTROL soln  4/19/17  Yes Historical Provider   hydrALAZINE (APRESOLINE) 100 mg tablet  4/10/17  Yes Historical Provider   ULTRA THIN LANCETS 31 gauge misc  4/19/17  Yes Historical Provider   ONE TOUCH DELICA 33 gauge misc  8/7/92  Yes Historical Provider   lancets 30 gauge misc  1/23/17  Yes Historical Provider   levothyroxine (SYNTHROID) 125 mcg tablet 4/10/17  Yes Historical Provider   suvorexant (BELSOMRA) 5 mg tablet Take 1 Tab by mouth nightly as needed for Insomnia. Max Daily Amount: 5 mg. Increase to 2 tab at night after one week if needed. 5/2/17  Yes [address] [facility] [address] Geneva Medel MD   spironolactone (ALDACTONE) 25 mg tablet Take 1 Tab by mouth daily. 4/27/17  Yes Lisa Hassan MD   chlorzoxazone (PARAFON FORTE) 500 mg tablet Take 500 mg by mouth three (3) times daily. 3/27/17  Yes Historical Provider   HYDROcodone-acetaminophen (NORCO) 7.5-325 mg per tablet Take  by mouth every eight (8) hours as needed. 3/26/17  Yes Historical Provider   morphine CR (MS CONTIN) 15 mg CR tablet Take 15 mg by mouth daily. 3/26/17  Yes Historical Provider   indapamide (LOZOL) 2.5 mg tablet Take 1 Tab by mouth daily. 4/18/17  Yes Jennifer Jean MD   metFORMIN ER (GLUCOPHAGE XR) 500 mg tablet Take 2 Tabs by mouth daily. 2/2/17  Yes Greig Brittle. Geneva Medel MD   carvedilol (COREG) 25 mg tablet Take 1 Tab by mouth two (2) times daily (with meals). 2/2/17  Yes Greig Brittle. Geneva Medel MD   glucose blood VI test strips (BLOOD GLUCOSE TEST) strip For use with one touch ultra glucometer to check blood sugar once a day and when needed 2/2/17  Yes Greig Brittle. Geneva Medel MD   Lancets misc For use with one touch ultra glucometer to check blood sugar once a day and when needed 2/2/17  Yes Greig Brittle. Geneva Medel MD   albuterol (PROVENTIL HFA, VENTOLIN HFA, PROAIR HFA) 90 mcg/actuation inhaler Take 2 Puffs by inhalation every four (4) hours as needed for Wheezing or Shortness of Breath. 10/11/16  Yes [address] [employer] Geneva Medel MD   amLODIPine (NORVASC) 5 mg tablet Take 1 Tab by mouth daily. 10/11/16  Yes [address] [facility] Geneva Medel MD   DULoxetine (CYMBALTA) 60 mg capsule Take 1 Cap by mouth every evening. 10/11/16  Yes [address] [facility] Dominik Medel MD   lidocaine (LIDODERM) 5 % Apply patch to the affected area for 12 hours a day and remove for 12 hours a day. 10/11/16  Yes [address]  Geneva Medel MD   furosemide (LASIX) 40 mg tablet Take 1 Tab by mouth daily. Patient taking differently: Take 40 mg by mouth daily. Only taking lasix Tuesday, Thursday Saturday and gorge 10/11/16  Yes Charles Heller. Dimitrios Kruger MD   pantoprazole (PROTONIX) 40 mg tablet Take 1 Tab by mouth daily. 10/11/16  Yes Ann Marie Kruger MD   linaclotide Jeri Susie) 145 mcg cap capsule Take 1 Cap by mouth Daily (before breakfast). For constipation 10/11/16  Yes Charles Heller. Dimitrios Kruger MD   losartan (COZAAR) 100 mg tablet Take 1 Tab by mouth daily. 10/11/16  Yes Ann Marie Kruger MD   atorvastatin (LIPITOR) 40 mg tablet Take 1 Tab by mouth nightly. 10/11/16  Yes Ann Marie Kruger MD   cyanocobalamin (VITAMIN B12) 1,000 mcg/mL injection 1 mL by SubCUTAneous route every fourteen (14) days. For b12 deficiency 10/11/16  Yes Charles Heller. Dimitrios Kruger MD   ferrous sulfate 325 mg (65 mg iron) tablet Take 325 mg by mouth Daily (before breakfast). Yes Historical Provider   EPINEPHrine (EPIPEN 2-LAURA) 0.3 mg/0.3 mL injection 0.3 mL by IntraMUSCular route once as needed for up to 1 dose. 5/6/16  Yes Anne Pollack MD   aspirin delayed-release 81 mg tablet Take 81 mg by mouth daily. Yes Historical Provider   acetaminophen (TYLENOL) 325 mg tablet Take 650 mg by mouth daily. Yes    Insulin Syringe-Needle U-100 1 mL 30 gauge x 5/16 syrg 1 Each by Does Not Apply route every fourteen (14) days. Indications: for b12 injections 5/4/17   Charles Heller. Dimitrios Kruger MD   cyclobenzaprine (FLEXERIL) 5 mg tablet  1/23/17   Historical Provider   ergocalciferol (ERGOCALCIFEROL) 50,000 unit capsule Take 1 Cap by mouth two (2) times a week. 4/21/17   Stefani Plunkett MD   levothyroxine (SYNTHROID) 150 mcg tablet Take 1 Tab by mouth Daily (before breakfast). 4/18/17   Stefani Plunkett MD   hydrALAZINE (APRESOLINE) 25 mg tablet Take 1 Tab by mouth two (2) times a day.  4/18/17   Stefani Plunkett MD

## 2017-05-02 NOTE — PATIENT INSTRUCTIONS
TODAY, please go to:   CHECK OUT     Please schedule the following appointments at CHECK OUT:  · BP, weight, insomnia follow up with Dr. Aracelis Fraire in 4-6 weeks    _____________________     Machelle Gordon Plan:  · STOP ambien. · Start belsomra. · Goal of at least 150 min per week. Ultimate goal of 60 min per day.   _____________________     Review your health maintenance below. Make plans to return and address anything that is due or will be due soon.    Health Maintenance Due   Topic Date Due    DTaP/Tdap/Td  (1 - Tdap) 05/27/1979    Breast Cancer Screening  08/05/2017       If you need to get your labs done at [facility], visit this site to find a convenient location: [email or web address]

## 2017-05-02 NOTE — PROGRESS NOTES
Chief Complaint   Patient presents with    Hypertension     follow up     Weight Management     follow up      1. Have you been to the ER, urgent care clinic since your last visit? Hospitalized since your last visit? No     2. Have you seen or consulted any other health care providers outside of the [facility] since your last visit? Include any pap smears or colon screening. No     The patient was counseled on the dangers of tobacco use, and was advised never to start again. Reviewed strategies to maximize success, including never to start again. Health Maintenance Due   Topic Date Due    DTaP/Tdap/Td series (1 - Tdap) Discussed with patient today and advised to follow up. Stated she would receive today. 05/27/1979    BREAST CANCER SCRN MAMMOGRAM Discussed with patient today and advised to follow up.    08/05/2017     ACP is not on file, advised to return.

## 2017-05-04 RX ORDER — SYRINGE-NEEDLE,INSULIN,0.5 ML 30 GX5/16"
1 SYRINGE, EMPTY DISPOSABLE MISCELLANEOUS
Qty: 10 SYRINGE | Refills: 2 | Status: SHIPPED | OUTPATIENT
Start: 2017-05-04 | End: 2017-09-08

## 2017-06-13 ENCOUNTER — PATIENT OUTREACH (OUTPATIENT)
Dept: FAMILY MEDICINE CLINIC | Age: 57
End: 2017-06-13

## 2017-06-13 ENCOUNTER — OFFICE VISIT (OUTPATIENT)
Dept: FAMILY MEDICINE CLINIC | Age: 57
End: 2017-06-13

## 2017-06-13 VITALS
RESPIRATION RATE: 18 BRPM | OXYGEN SATURATION: 97 % | DIASTOLIC BLOOD PRESSURE: 54 MMHG | WEIGHT: 293 LBS | BODY MASS INDEX: 47.09 KG/M2 | HEIGHT: 66 IN | SYSTOLIC BLOOD PRESSURE: 92 MMHG | TEMPERATURE: 97.4 F | HEART RATE: 72 BPM

## 2017-06-13 DIAGNOSIS — I50.9 CHRONIC CONGESTIVE HEART FAILURE, UNSPECIFIED CONGESTIVE HEART FAILURE TYPE: ICD-10-CM

## 2017-06-13 DIAGNOSIS — Z00.00 INITIAL MEDICARE ANNUAL WELLNESS VISIT: Primary | ICD-10-CM

## 2017-06-13 DIAGNOSIS — Z12.39 BREAST CANCER SCREENING: ICD-10-CM

## 2017-06-13 DIAGNOSIS — G47.00 INSOMNIA, UNSPECIFIED TYPE: ICD-10-CM

## 2017-06-13 DIAGNOSIS — Z86.79 HISTORY OF CARDIOMYOPATHY: ICD-10-CM

## 2017-06-13 DIAGNOSIS — I63.9 CEREBROVASCULAR ACCIDENT (CVA), UNSPECIFIED MECHANISM (HCC): ICD-10-CM

## 2017-06-13 DIAGNOSIS — K59.00 CONSTIPATION, UNSPECIFIED CONSTIPATION TYPE: ICD-10-CM

## 2017-06-13 DIAGNOSIS — S91.319A CUT OF FOOT: ICD-10-CM

## 2017-06-13 DIAGNOSIS — Z51.81 MEDICATION MONITORING ENCOUNTER: ICD-10-CM

## 2017-06-13 DIAGNOSIS — Z23 ENCOUNTER FOR IMMUNIZATION: ICD-10-CM

## 2017-06-13 RX ORDER — ZOLPIDEM TARTRATE 5 MG/1
TABLET ORAL
COMMUNITY
Start: 2017-03-06 | End: 2017-06-13

## 2017-06-13 NOTE — MR AVS SNAPSHOT
Visit Information Date & Time Provider Department Dept. Phone Encounter #  
 6/13/2017 10:40 AM Charles Heller. Dimitrios Kruger MD [facility] [phone] [id number] Your Appointments 7/31/2017  8:50 AM  
ROUTINE CARE with MD Prim[facility]) Appt Note: F/U DIABEETES CP0.00  
 [address] [place] [address]  
[address] [facility] [address]  
  
    
 11/1/2017  8:40 AM  
ESTABLISHED PATIENT with Ha Crawford MD  
[facility] ([facility] SCHEDUL[facility]) Appt Note: 1 yr f/u  
 [address] [place] [address]  
[address] [id number] Upcoming Health Maintenance Date Due  
 MEDICARE YEARLY EXAM 5/27/1976 DTaP/Tdap/Td series (1 - Tdap) 5/27/1979 BREAST CANCER SCRN MAMMOGRAM 8/5/2017 INFLUENZA AGE 9 TO ADULT 8/1/2017 PAP AKA CERVICAL CYTOLOGY 11/13/2018 COLONOSCOPY 6/22/2021 Allergies as of 6/13/2017  Review Complete On: 6/13/2017 By: Charles Heller. Dimitrios Kruger MD  
  
 Severity Noted Reaction Type Reactions Bees [Hymenoptera Allergenic Extract] High 10/14/2014   Systemic Shortness of Breath, Swelling [place] High 10/14/2014   Systemic Shortness of Breath, Swelling Lisinopril  10/17/2014    Cough Current Immunizations  Reviewed on 10/11/2016 Name Date Influenza Vaccine Gina Delude) 11/13/2015 Influenza Vaccine (Quad) PF 10/11/2016 Pneumococcal Polysaccharide (PPSV-23) 8/29/2016 Not reviewed this visit You Were Diagnosed With   
  
 Codes Comments Initial Medicare annual wellness visit    -  Primary ICD-10-CM: Z00.00 ICD-9-CM: V70.0 History of cardiomyopathy     ICD-10-CM: Z86.79 
ICD-9-CM: V12.59 Chronic congestive heart failure, unspecified congestive heart failure type ([facility]     ICD-10-CM: I50.9 ICD-9-CM: 428.0 Insomnia, unspecified type     ICD-10-CM: G47.00 ICD-9-CM: 780.52 Constipation, unspecified constipation type     ICD-10-CM: K59.00 ICD-9-CM: 564.00 Cerebrovascular accident (CVA), unspecified mechanism ([facility]     ICD-10-CM: I63.9 ICD-9-CM: 434.91 Cut of foot     ICD-10-CM: S91.319A 
ICD-9-CM: 892.0 Medication monitoring encounter     ICD-10-CM: Z51.81 
ICD-9-CM: V58.83 Vitals BP Pulse Temp Resp Height(growth percentile) Weight(growth percentile) 92/54 (BP 1 Location: Right arm, BP Patient Position: Sitting) 72 97.4 °F (36.3 °C) (Oral) 18 5' 6\" (1.676 m) 318 lb (144.2 kg) SpO2 BMI OB Status Smoking Status 97% 51.33 kg/m2 Postmenopausal Former Smoker BMI and BSA Data Body Mass Index Body Surface Area  
 51.33 kg/m 2 2.59 m 2 Preferred Pharmacy Pharmacy Name [facility], [address] Your Updated Medication List  
  
   
This list is accurate as of: 6/13/17 12:31 PM.  Always use your most recent med list.  
  
  
  
  
 acetaminophen 325 mg tablet Commonly known as:  TYLENOL Take 650 mg by mouth daily. albuterol 90 mcg/actuation inhaler Commonly known as:  PROVENTIL HFA, VENTOLIN HFA, PROAIR HFA Take 2 Puffs by inhalation every four (4) hours as needed for Wheezing or Shortness of Breath. ALCOHOL PREP PADS Padm Generic drug:  alcohol swabs  
  
 amLODIPine 5 mg tablet Commonly known as:  Daly Pedro Take 1 Tab by mouth daily. aspirin delayed-release 81 mg tablet Take 81 mg by mouth daily. atorvastatin 40 mg tablet Commonly known as:  LIPITOR Take 1 Tab by mouth nightly. carvedilol 25 mg tablet Commonly known as:  Uzma Rice Take 1 Tab by mouth two (2) times daily (with meals). chlorzoxazone 500 mg tablet Commonly known as:  Shayla Columbia Take 500 mg by mouth three (3) times daily. cyanocobalamin 1,000 mcg/mL injection Commonly known as:  VITAMIN B12  
1 mL by SubCUTAneous route every fourteen (14) days. For b12 deficiency DULoxetine 60 mg capsule Commonly known as:  CYMBALTA Take 1 Cap by mouth every evening. EPINEPHrine 0.3 mg/0.3 mL injection Commonly known as:  EPIPEN 2-LAURA  
0.3 mL by IntraMUSCular route once as needed for up to 1 dose. ergocalciferol 50,000 unit capsule Commonly known as:  ERGOCALCIFEROL Take 1 Cap by mouth two (2) times a week. ferrous sulfate 325 mg (65 mg iron) tablet Take 325 mg by mouth Daily (before breakfast). Freestyle Control Soln Generic drug:  Blood Glucose Control High&Low  
  
 furosemide 40 mg tablet Commonly known as:  LASIX Take 1 Tab by mouth daily. glucose blood VI test strips strip Commonly known as:  blood glucose test  
For use with one touch ultra glucometer to check blood sugar once a day and when needed  
  
 hydrALAZINE 25 mg tablet Commonly known as:  APRESOLINE Take 1 Tab by mouth two (2) times a day. HYDROcodone-acetaminophen 7.5-325 mg per tablet Commonly known as:  Naila Niels Take  by mouth every eight (8) hours as needed. indapamide 2.5 mg tablet Commonly known as:  Ben Dyerp Take 1 Tab by mouth daily. Insulin Syringe-Needle U-100 1 mL 30 gauge x 5/16 Syrg 1 Each by Does Not Apply route every fourteen (14) days. Indications: for b12 injections * lancets 30 gauge Misc * Lancets Misc For use with one touch ultra glucometer to check blood sugar once a day and when needed * One Touch Delica 33 gauge Misc Generic drug:  lancets * ULTRA THIN LANCETS 31 gauge Misc Generic drug:  lancets  
  
 levothyroxine 150 mcg tablet Commonly known as:  SYNTHROID Take 1 Tab by mouth Daily (before breakfast). lidocaine 5 % Commonly known as:  Mirna Mahoney Apply patch to the affected area for 12 hours a day and remove for 12 hours a day. linaclotide 145 mcg Cap capsule Commonly known as:  Ceil Brooks Take 1 Cap by mouth Daily (before breakfast). For constipation  
  
 losartan 100 mg tablet Commonly known as:  COZAAR Take 1 Tab by mouth daily. metFORMIN  mg tablet Commonly known as:  GLUCOPHAGE XR Take 2 Tabs by mouth daily. morphine CR 15 mg CR tablet Commonly known as:  MS CONTIN Take 15 mg by mouth daily. pantoprazole 40 mg tablet Commonly known as:  PROTONIX Take 1 Tab by mouth daily. spironolactone 25 mg tablet Commonly known as:  ALDACTONE Take 1 Tab by mouth daily. suvorexant 20 mg tablet Commonly known as:  Mary Ann Harsh Take 1 Tab by mouth nightly as needed for Insomnia. Max Daily Amount: 20 mg.  
  
 * Notice: This list has 4 medication(s) that are the same as other medications prescribed for you. Read the directions carefully, and ask your doctor or other care provider to review them with you. Prescriptions Printed Refills  
 suvorexant (BELSOMRA) 20 mg tablet 2 Sig: Take 1 Tab by mouth nightly as needed for Insomnia. Max Daily Amount: 20 mg.  
 Class: Print Route: Oral  
  
We Performed the Following AMB POC EKG ROUTINE W/ 12 LEADS, INTER & REP [56530 CPT(R)] [facility] MONITORING [[id number] Custom] Patient Instructions Brii Stewart TODAY, please go to: CHECK OUT Tdap Urine sample Please schedule the following appointments at CHECK OUT: 
· Insomnia, weight, blood pressure follow up in 3 months 
 
_____________________ Today's Plan: 
· Schedule mammogram 
· Try new dose of belsomra 
_____________________ Review your health maintenance below. Make plans to return and address anything that is due or will be due soon. Health Maintenance Due Topic Date Due  
 Annual Well Visit  05/27/1976  
 DTaP/Tdap/Td  (1 - Tdap) 05/27/1979  Breast Cancer Screening  08/05/2017  
 
 
 
_____________________ Are you stressed out? Overwhelmed? In pain? Feeling disconnected? Consider MINDFULNESS. .. 
[email or web address] 
_____________________ If you need to get your labs done at [facility], visit this site to find a convenient location: [email or web address] Medicare Wellness Visit, Female The best way to live healthy is to have a healthy lifestyle by eating a well-balanced diet, exercising regularly, limiting alcohol and stopping smoking. Regular physical exams and screening tests are another way to keep healthy. Preventive exams provided by your health care provider can find health problems before they become diseases or illnesses. Preventive services including immunizations, screening tests, monitoring and exams can help you take care of your own health. All people over age 72 should have a pneumovax  and and a prevnar shot to prevent pneumonia. These are once in a lifetime unless you and your provider decide differently. All people over 65 should have a yearly flu shot and a tetanus vaccine every 10 years. A bone mass density to screen for osteoporosis or thinning of the bones should be done every 2 years after 65. Screening for diabetes mellitus with a blood sugar test should be done every year. Glaucoma is a disease of the eye due to increased ocular pressure that can lead to blindness and it should be done every year by an eye professional. 
 
Cardiovascular screening tests that check for elevated lipids (fatty part of blood) which can lead to heart disease and strokes should be done every 5 years. Colorectal screening that evaluates for blood or polyps in your colon should be done yearly as a stool test or every five years as a flexible sigmoidoscope or every 10 years as a colonoscopy up to age 76. Breast cancer screening with a mammogram is recommended biennially  for women age 54-69. Screening for cervical cancer with a pap smear and pelvic exam is recommended for women after age 72 years every 2 years up to age 79 or when the provider and patient decide to stop. If there is a history of cervical abnormalities or other increased risk for cancer then the test is recommended yearly. Hepatitis C screening is also recommended for anyone born between [birth year] through [address]. A shingles vaccine is also recommended once in a lifetime after age 61. Your Medicare Wellness Exam is recommended annually. Here is a list of your current Health Maintenance items with a due date: 
Health Maintenance Due Topic Date Due  
 Annual Well Visit  05/27/1978  
 DTaP/Tdap/Td  (1 - Tdap) 05/27/1979  Breast Cancer Screening  08/05/2017 [facility] SERVICES! Dear Maria Ines Brice: Thank you for requesting a [employer] account. Our records indicate that you already have an active [employer] account. You can access your account anytime at [email or web address] Did you know that you can access your hospital and ER discharge instructions at any time in [employer]? You can also review all of your test results from your hospital stay or ER visit. Additional Information If you have questions, please visit the Frequently Asked Questions section of the [employer] website at [email or web address] Remember, [employer] is NOT to be used for urgent needs. For medical emergencies, dial 911. Now available from your iPhone and Android! Please provide this summary of care documentation to your next provider. Your primary care clinician is listed as Tommie Menendez. If you have any questions after today's visit, please call [phone].

## 2017-06-13 NOTE — PATIENT INSTRUCTIONS
.. TODAY, please go to:   CHECK OUT    Tdap   Urine sample      Please schedule the following appointments at CHECK OUT:  · Insomnia, weight, blood pressure follow up in 3 months    _____________________     Today's Plan:  · Schedule mammogram  · Try new dose of belsomra  _____________________     Review your health maintenance below. Make plans to return and address anything that is due or will be due soon. Health Maintenance Due   Topic Date Due    Annual Well Visit  05/27/1976    DTaP/Tdap/Td  (1 - Tdap) 05/27/1979    Breast Cancer Screening  08/05/2017         _____________________     Are you stressed out? Overwhelmed? In pain? Feeling disconnected? Consider MINDFULNESS. ..  [email or web address]  _____________________     If you need to get your labs done at [facility], visit this site to find a convenient location: [email or web address]    Medicare Wellness Visit, Female    The best way to live healthy is to have a healthy lifestyle by eating a well-balanced diet, exercising regularly, limiting alcohol and stopping smoking. Regular physical exams and screening tests are another way to keep healthy. Preventive exams provided by your health care provider can find health problems before they become diseases or illnesses. Preventive services including immunizations, screening tests, monitoring and exams can help you take care of your own health. All people over age 72 should have a pneumovax  and and a prevnar shot to prevent pneumonia. These are once in a lifetime unless you and your provider decide differently. All people over 65 should have a yearly flu shot and a tetanus vaccine every 10 years. A bone mass density to screen for osteoporosis or thinning of the bones should be done every 2 years after 65. Screening for diabetes mellitus with a blood sugar test should be done every year.     Glaucoma is a disease of the eye due to increased ocular pressure that can lead to blindness and it should be done every year by an eye professional.    Cardiovascular screening tests that check for elevated lipids (fatty part of blood) which can lead to heart disease and strokes should be done every 5 years. Colorectal screening that evaluates for blood or polyps in your colon should be done yearly as a stool test or every five years as a flexible sigmoidoscope or every 10 years as a colonoscopy up to age 76. Breast cancer screening with a mammogram is recommended biennially  for women age 54-69. Screening for cervical cancer with a pap smear and pelvic exam is recommended for women after age 72 years every 2 years up to age 79 or when the provider and patient decide to stop. If there is a history of cervical abnormalities or other increased risk for cancer then the test is recommended yearly. Hepatitis C screening is also recommended for anyone born between [birth year] through [address]. A shingles vaccine is also recommended once in a lifetime after age 61. Your Medicare Wellness Exam is recommended annually.     Here is a list of your current Health Maintenance items with a due date:  Health Maintenance Due   Topic Date Due    Annual Well Visit  05/27/1976    DTaP/Tdap/Td  (1 - Tdap) 05/27/1979    Breast Cancer Screening  08/05/2017

## 2017-06-13 NOTE — PROGRESS NOTES
NN Note    [employer] 2017 Patient Annual Health Assessment Form [facility]) received.     Copy of 6/13/17 Encounter note printed & submitted with [facility])

## 2017-06-13 NOTE — PROGRESS NOTES
.. [address] Visit   Patient ID:   Fernando Virk is a 59 y.o. female. Assessment/Plan:    Maria Ines Brice was seen today for blood pressure check, insomnia, weight loss, medication problem and immunization/injection. Diagnoses and all orders for this visit:    Initial Medicare annual wellness visit  -     AMB POC EKG ROUTINE W/ 12 LEADS, INTER & REP  -     COMPLIANCE DRUG SCREEN/PRESCRIPTION MONITORING  - mammogram   ACP reviewed and appropriate. Chronic congestive heart failure, unspecified congestive heart failure type [facility])  History of cardiomyopathy  - sees cardiology. Per last cards note, improved with HTN control. Last EF was normal.     Insomnia, unspecified type  Controlled substance plan:  Medication: belsomra-- also on other medications from pain specialist   appropriate and last checked on: 6/13/2017  Last Urine Toxicology: collected today  Treatment agreement was signed by pt and myself on: 6/13/2017  -     suvorexant (BELSOMRA) 20 mg tablet; Take 1 Tab by mouth nightly as needed for Insomnia. Max Daily Amount: 20 mg. Constipation, unspecified constipation type  Reviewed prn medication options. Encouraged to use. Cerebrovascular accident (CVA), unspecified mechanism ([address]  Still has some left sided weakness. Improves with PT. Continue physical activity. One fall related to LLE on stairs. Has appropriate assistive devices. Cut of foot  -     Tetanus, diphtheria toxoids and acellular pertussis (TDAP) vaccine, in individuals >=7 years, IM  -     NE IMMUNIZ ADMIN,1 SINGLE/COMB VAC/TOXOID    Medication monitoring encounter  Utox    Encounter for immunization  -     Tetanus, diphtheria toxoids and acellular pertussis (TDAP) vaccine, in individuals >=7 years, IM  -     NE IMMUNIZ ADMIN,1 SINGLE/COMB VAC/TOXOID      Counselled pt on:  Patient health concerns, plan as outlined in patient instructions and above.   Patient was offered a choice/choices in the treatment plan today. Patient expresses understanding of the plan and agrees with recommendations. Patient Instructions     . Lara Alma TODAY, please go to:   [facility]    Tdap   Urine sample      Please schedule the following appointments at [facility]:  · Insomnia, weight, blood pressure follow up in 3 months    _____________________     Today's Plan:  · Schedule mammogram  · Try new dose of belsomra  _____________________     Review your health maintenance below. Make plans to return and address anything that is due or will be due soon. Health Maintenance Due   Topic Date Due    Annual Well Visit  05/27/1976    DTaP/Tdap/Td  (1 - Tdap) 05/27/1979    Breast Cancer Screening  08/05/2017         _____________________     Are you stressed out? Overwhelmed? In pain? Feeling disconnected? Consider MINDFULNESS. ..  [email or web address]  _____________________     If you need to get your labs done at [facility], visit this site to find a convenient location: [email or web address]    Medicare Wellness Visit, Female    The best way to live healthy is to have a healthy lifestyle by eating a well-balanced diet, exercising regularly, limiting alcohol and stopping smoking. Regular physical exams and screening tests are another way to keep healthy. Preventive exams provided by your health care provider can find health problems before they become diseases or illnesses. Preventive services including immunizations, screening tests, monitoring and exams can help you take care of your own health. All people over age 72 should have a pneumovax  and and a prevnar shot to prevent pneumonia. These are once in a lifetime unless you and your provider decide differently. All people over 65 should have a yearly flu shot and a tetanus vaccine every 10 years.     A bone mass density to screen for osteoporosis or thinning of the bones should be done every 2 years after 72.    Screening for diabetes mellitus with a blood sugar test should be done every year. Glaucoma is a disease of the eye due to increased ocular pressure that can lead to blindness and it should be done every year by an eye professional.    Cardiovascular screening tests that check for elevated lipids (fatty part of blood) which can lead to heart disease and strokes should be done every 5 years. Colorectal screening that evaluates for blood or polyps in your colon should be done yearly as a stool test or every five years as a flexible sigmoidoscope or every 10 years as a colonoscopy up to age 76. Breast cancer screening with a mammogram is recommended biennially  for women age 54-69. Screening for cervical cancer with a pap smear and pelvic exam is recommended for women after age 72 years every 2 years up to age 79 or when the provider and patient decide to stop. If there is a history of cervical abnormalities or other increased risk for cancer then the test is recommended yearly. Hepatitis C screening is also recommended for anyone born between [birth year] through [address]. A shingles vaccine is also recommended once in a lifetime after age 61. Your Medicare Wellness Exam is recommended annually. Here is a list of your current Health Maintenance items with a due date:  Health Maintenance Due   Topic Date Due    Annual Well Visit  05/27/1976    DTaP/Tdap/Td  (1 - Tdap) 05/27/1979    Breast Cancer Screening  08/05/2017         Subjective:   HPI:  Cheryl Sierra is a 59 y.o. female being seen for:   Chief Complaint   Patient presents with    Blood Pressure Check     follow up     Insomnia     follow up     Weight Loss     follow up     Medication Problem     stated that [address] isn't working for her    Immunization/Injection     · BP- not dizzy or lightheaded    · Insomnia- belsomra worked for a while at 10mg, about a week, then stopped working. Back to \"staring at th ceiling\".  Avoided caffeine. Tried to eat stephenie. Bed at 10. Quiet bedroom, but still could not sleep. Gets up at 5 to get ready to club rec. · Wt down 13 lbs since last visit- sister has tried new calories  · Can walk further, not as tired. · Has cut junk down to minimum, had monica hiding sweets. · Has cut down significantly on lennie crackers. · No stool in about 6 days. Taking linzess. Not using miralax, senna, or colace anymore. Has not yet used suppository or enema this time. [facility] More- Patient Annual Health Assessment  - see below   Review of Systems  Otherwise as noted in HPI      Objective:     Visit Vitals    BP 92/54 (BP 1 Location: Right arm, BP Patient Position: Sitting)    Pulse 72    Temp 97.4 °F (36.3 °C) (Oral)    Resp 18    Ht 5' 6\" (1.676 m)    Wt 318 lb (144.2 kg)    SpO2 97%    BMI 51.33 kg/m2     Wt Readings from Last 3 Encounters:   06/13/17 318 lb (144.2 kg)   05/02/17 331 lb (150.1 kg)   04/18/17 336 lb (152.4 kg)     BP Readings from Last 3 Encounters:   06/13/17 92/54   05/02/17 116/69   04/18/17 112/59     PHQ over the last two weeks 6/13/2017   Little interest or pleasure in doing things Not at all   Feeling down, depressed or hopeless Not at all   Total Score PHQ 2 0         Physical Exam   Constitutional: She appears well-developed and well-nourished. No distress. Pulmonary/Chest: Effort normal. No respiratory distress. Neurological: She is alert. Psychiatric: She has a normal mood and affect. Her behavior is normal.   ._____________________   . Jamila Ferreira This is a \"Welcome to [employer]"  Initial Preventive Physical Examination (IPPE) providing Personalized Prevention Plan Services (Performed in the first 12 months of enrollment)    I have reviewed the patient's medical history in detail and updated the computerized patient record.      History     Past Medical History:   Diagnosis Date    Advanced care planning/counseling discussion 3/4/16    On File    Bronchitis 2/24/2014    Cervicalgia 8/18/15    Dr. Ana Bucio    Chest pain 5/25/15    Hospitalized at [facility] 5/25/15 (lab work negative)    Congestive heart failure, unspecified     Last Echo 2/8/15: EF 55-60%    Constipation 6/13/2017    Enthesopathy, spinal ([address] 8/18/15    Dr. Ady Sam Essential hypertension     Foot drop 8/18/15    Dr. Ady Sam Heart attack [facility]) 2/24/2013    Was supposed to See Cardiology for possible pacemaker in november 2014- After Cardiology consult locally, no need, EF greatly improved. Established with Dr. Mohinder Herrera Hiatal hernia 6/2015    3 cm hiatal hernia     Hip pain 8/18/15    Dr. Ana Bucio    Hypercholesterolemia     Hyperglycemia 7/2015    A1c 5.9     Hyperlipidemia 6/30/2015    NMR lipoprofile- LDL P 997, LDL-c 71, HLD-C-39, TG-60, HLD-P (25.2), Small LDL-P -541, LDL size 20.6    Hypothyroid     Insomnia 6/13/2017    Lower extremity edema     Lumbar spondylosis 8/18/15    Dr. Anne Dooley 6/2015    EGD/Colonscopy 6/15- Gastritis, internal hemorrhoids and 3 polyps    Murmur     EDDIE on CPAP     Was referred to Pulmonology - Uses CPAP    Osteoarthritis of hip 8/18/15    Dr. Ana Bucio    Osteoarthritis, shoulder 8/18/15    Dr. Ady Sam Radiculopathy, cervical 8/18/15    Dr. Ana Bucio    Shoulder pain 8/18/15    Dr. Ady Sam Spinal stenosis of cervical region 8/18/15    Dr. Ady Sam Spinal stenosis, lumbar 8/18/15    Dr. Ady Sam Stroke [facility]) 2/25/2014    Established with Neurology, Sahil Vasquez NP-Just hospitalized at [facility] 2/7/15-2/10/15. CT negative, but Late effect CV accident with increased tone described on discharge summary, Carotid dopplers showed 50% stenosis bilaterally.      Vitamin D deficiency 7/2015      Past Surgical History:   Procedure Laterality Date    COLONOSCOPY N/A 6/22/2016    COL[facility]OSCOPY performed by Steven Power [facility] at [facility] ENDOSCOPY    HX BREAST BIOPSY  8/11/15    [facility]    HX CHOLECYSTECTOMY      HX COLONOSCOPY  6/2015    Dr. Chris Betancourt- 3 complete polypectomies, Internal hemorrhoids, difficult study due to spasm. Repeat in 1 year    HX ENDOSCOPY  6/2015    mild gastritis, 3cm hiatal hernia     HX GASTRIC BYPASS  6/1989    HX HEART CATHETERIZATION  2/2014    HX ORTHOPAEDIC      Right middle finger distal amputation    HX PARTIAL THYROIDECTOMY  ~1990    HX THYROIDECTOMY Left 1985    90% of one side of the thyroid removed     Current Outpatient Prescriptions   Medication Sig Dispense Refill    suvorexant (BELSOMRA) 20 mg tablet Take 1 Tab by mouth nightly as needed for Insomnia. Max Daily Amount: 20 mg. 30 Tab 2    Insulin Syringe-Needle U-100 1 mL 30 gauge x 5/16 syrg 1 Each by Does Not Apply route every fourteen (14) days. Indications: for b12 injections 10 Syringe 2    ALCOHOL PREP PADS padm       FREESTYLE CONTROL soln       ULTRA THIN LANCETS 31 gauge misc       ONE TOUCH DELICA 33 gauge misc       lancets 30 gauge misc       spironolactone (ALDACTONE) 25 mg tablet Take 1 Tab by mouth daily. 90 Tab 3    chlorzoxazone (PARAFON FORTE) 500 mg tablet Take 500 mg by mouth three (3) times daily.  HYDROcodone-acetaminophen (NORCO) 7.5-325 mg per tablet Take  by mouth every eight (8) hours as needed.  morphine CR (MS CONTIN) 15 mg CR tablet Take 15 mg by mouth daily.  ergocalciferol (ERGOCALCIFEROL) 50,000 unit capsule Take 1 Cap by mouth two (2) times a week. 24 Cap 3    levothyroxine (SYNTHROID) 150 mcg tablet Take 1 Tab by mouth Daily (before breakfast). 90 Tab 3    indapamide (LOZOL) 2.5 mg tablet Take 1 Tab by mouth daily. 90 Tab 3    hydrALAZINE (APRESOLINE) 25 mg tablet Take 1 Tab by mouth two (2) times a day. 180 Tab 3    metFORMIN ER (GLUCOPHAGE XR) 500 mg tablet Take 2 Tabs by mouth daily. 180 Tab 3    carvedilol (COREG) 25 mg tablet Take 1 Tab by mouth two (2) times daily (with meals).  180 Tab 3    glucose blood VI test strips (BLOOD GLUCOSE TEST) strip For use with one touch ultra glucometer to check blood sugar once a day and when needed 100 Strip 11    Lancets misc For use with one touch ultra glucometer to check blood sugar once a day and when needed 100 Each 11    albuterol (PROVENTIL HFA, VENTOLIN HFA, PROAIR HFA) 90 mcg/actuation inhaler Take 2 Puffs by inhalation every four (4) hours as needed for Wheezing or Shortness of Breath. 3 Inhaler 3    amLODIPine (NORVASC) 5 mg tablet Take 1 Tab by mouth daily. 90 Tab 3    DULoxetine (CYMBALTA) 60 mg capsule Take 1 Cap by mouth every evening. 90 Cap 1    lidocaine (LIDODERM) 5 % Apply patch to the affected area for 12 hours a day and remove for 12 hours a day. 90 Each 1    furosemide (LASIX) 40 mg tablet Take 1 Tab by mouth daily. (Patient taking differently: Take 40 mg by mouth daily. Only taking lasix Tuesday, Thursday Saturday and sunday) 90 Tab 1    pantoprazole (PROTONIX) 40 mg tablet Take 1 Tab by mouth daily. 90 Tab 3    linaclotide (LINZESS) 145 mcg cap capsule Take 1 Cap by mouth Daily (before breakfast). For constipation 90 Cap 3    losartan (COZAAR) 100 mg tablet Take 1 Tab by mouth daily. 90 Tab 3    atorvastatin (LIPITOR) 40 mg tablet Take 1 Tab by mouth nightly. 90 Tab 3    cyanocobalamin (VITAMIN B12) 1,000 mcg/mL injection 1 mL by SubCUTAneous route every fourteen (14) days. For b12 deficiency 10 mL 11    ferrous sulfate 325 mg (65 mg iron) tablet Take 325 mg by mouth Daily (before breakfast).  EPINEPHrine (EPIPEN 2-LAURA) 0.3 mg/0.3 mL injection 0.3 mL by IntraMUSCular route once as needed for up to 1 dose. 2 Syringe 0    aspirin delayed-release 81 mg tablet Take 81 mg by mouth daily.  acetaminophen (TYLENOL) 325 mg tablet Take 650 mg by mouth daily.        Allergies   Allergen Reactions    Bees [Hymenoptera Allergenic Extract] Shortness of Breath and Swelling    Strawberry Shortness of Breath and Swelling    Lisinopril Cough     Family History   Problem Relation Age of Onset    Heart Disease Father 61    Hypertension Mother     Heart Disease Brother 62    Diabetes Maternal Grandmother      Social History   Substance Use Topics    Smoking status: Former Smoker     Packs/day: 0.25     Years: 15.00     Types: Cigarettes     Quit date: 6/13/2007    Smokeless tobacco: Never Used    Alcohol use No     Diet, Lifestyle: less carbs, more vegetables    Exercise level: incorporating more    Depression Risk Screen     PHQ over the last two weeks 6/13/2017   Little interest or pleasure in doing things Not at all   Feeling down, depressed or hopeless Not at all   Total Score PHQ 2 0     Alcohol Risk Screen     History   Alcohol Use No       Functional Ability and Level of Safety     Hearing Loss   Denies hearing loss    Activities of Daily Living     ADL Assessment 6/13/2017   Feeding yourself No Help Needed   Getting from bed to chair Help Needed   Getting dressed Help Needed   Bathing or showering Help Needed   Walk across the room (includes cane/walker) Help Needed   Using the telphone No Help Needed   Taking your medications No Help Needed   Preparing meals Help Needed   Managing money (expenses/bills) No Help Needed   Moderately strenuous housework (laundry) Help Needed   Shopping for personal items (toiletries/medicines) No Help Needed   Shopping for groceries No Help Needed   Driving No Help Needed   Climbing a flight of stairs Help Needed   Getting to places beyond walking distances No Help Needed       Fall Risk Screen     Fall Risk Assessment, last 12 mths 6/13/2017   Able to walk? Yes   Fall in past 12 months? Yes   Fall with injury? No   Number of falls in past 12 months 1   Fall Risk Score 1   late March, early April. Going up stairs, using hand rail on left side. 15 steps, was on top step. Leg felt like it gave way. Tracy Barriosons onto floor. Had bruising. Was able to rollover. Scoot down a few steps, set for a while, then pulled herself up. Called and let sister know who gave an ice pack when she got home. Stayed upstairs the rest of that day. First fall on those stairs. Abuse Screen   Safe at home. Denies physical or verbal abuse at home. Review of Systems   Had cut on foot, not sure how it happened. Doing well      Physical Examination      Visual Acuity Screening    Right eye Left eye Both eyes   Without correction: 20/50 20/30 20/50   With correction:         Evaluation of Cognitive Function:  Mood/affect: calm today  Appearance: casually dressed    No concerns about memory. 6CIT today is normal.        See above    EKG Screening: NSR, nl axis, nonspecific T wave findings. Patient Care Team:  Bryanna Angelo MD as PCP - General (Family Practice)  Freda Lindsay MD (Cardiology)  Uriel Mc NP (Nurse Practitioner)  Sarika Jon MD as Physician (Physical Medicine and Rehab)  Genny Stein MD (Sleep Medicine)  Ankita Gann MD as Consulting Provider (Endocrinology)   Gets eyes [facility] at [facility].      End-of-life planning  Advanced Directive discussed and documented: yes      Assessment/Plan     Education and counseling provided:  · HM reviewed with due dates: See orders and patient instructions for more  · Personalized Health Advice provided  · Risk factors and management reviewed   · Depression Screening   · Smoking Cessation if applicable  · Vision screening  · Alcohol Screening  · EKG today  · ACP Counseling given with patient consent    Health Maintenance Due   Topic Date Due    Annual Well Visit  05/27/1976    DTaP/Tdap/Td  (1 - Tdap) 05/27/1979    Breast Cancer Screening  08/05/2017

## 2017-06-13 NOTE — PROGRESS NOTES
Chief Complaint   Patient presents with    Blood Pressure Check     follow up     Insomnia     follow up     Weight Loss     follow up     Medication Problem     stated that [address] isn't working for her     1. Have you been to the ER, urgent care clinic since your last visit? Hospitalized since your last visit? No     2. Have you seen or consulted any other health care providers outside of the [address] [place] [address] since your last visit? Include any pap smears or colon screening. Yes, pain management doctor. The patient was counseled on the dangers of tobacco use, and was advised never to start again. Reviewed strategies to maximize success, including never to start again. Health Maintenance Due   Topic Date Due    MEDICARE YEARLY EXAM Discussed with patient today and advised to follow up.    05/27/1976    DTaP/Tdap/Td series (1 - Tdap) Discussed with patient today and advised to follow up.    05/27/1979    BREAST CANCER SCRN MAMMOGRAM Discussed with patient today and advised to follow up.    08/05/2017     ACP is not on file, advised to return.

## 2017-06-13 NOTE — ACP (ADVANCE CARE PLANNING)
Advance Care Planning (ACP) Provider Conversation Snapshot    Date of ACP Conversation: 06/13/17  Persons included in Conversation:  patient  Length of ACP Conversation in minutes:  <16 minutes (Non-Billable)    Authorized Decision Maker (if patient is incapable of making informed decisions): This person is:   SisterAlexandr          For Patients with Decision Making Capacity:   Values/Goals: Exploration of values, goals, and preferences if recovery is not expected, even with continued medical treatment in the event of:  Imminent death  Severe, permanent brain injury    Conversation Outcomes / Follow-Up Plan:   Reviewed existing Advance Directive - reviewed in detail today. Scanned on 8/5/2015 document on file reflects pt's current desires.

## 2017-07-17 DIAGNOSIS — R53.83 FATIGUE, UNSPECIFIED TYPE: ICD-10-CM

## 2017-07-18 RX ORDER — CYANOCOBALAMIN 1000 UG/ML
1000 INJECTION, SOLUTION INTRAMUSCULAR; SUBCUTANEOUS
Qty: 10 ML | Refills: 5 | Status: SHIPPED | OUTPATIENT
Start: 2017-07-18 | End: 2018-07-09 | Stop reason: SDUPTHER

## 2017-07-29 LAB
25(OH)D3+25(OH)D2 SERPL-MCNC: 11.7 NG/ML (ref 30–100)
ALBUMIN SERPL-MCNC: 4.3 G/DL (ref 3.5–5.5)
ALBUMIN/GLOB SERPL: 1.7 {RATIO} (ref 1.2–2.2)
ALP SERPL-CCNC: 135 IU/L (ref 39–117)
ALT SERPL-CCNC: 20 IU/L (ref 0–32)
AST SERPL-CCNC: 18 IU/L (ref 0–40)
BILIRUB SERPL-MCNC: 0.3 MG/DL (ref 0–1.2)
BUN SERPL-MCNC: 33 MG/DL (ref 6–24)
BUN/CREAT SERPL: 22 (ref 9–23)
CALCIUM SERPL-MCNC: 9.2 MG/DL (ref 8.7–10.2)
CHLORIDE SERPL-SCNC: 100 MMOL/L (ref 96–106)
CO2 SERPL-SCNC: 24 MMOL/L (ref 18–29)
CREAT SERPL-MCNC: 1.52 MG/DL (ref 0.57–1)
FERRITIN SERPL-MCNC: 253 NG/ML (ref 15–150)
GLOBULIN SER CALC-MCNC: 2.6 G/DL (ref 1.5–4.5)
GLUCOSE SERPL-MCNC: 93 MG/DL (ref 65–99)
POTASSIUM SERPL-SCNC: 5.2 MMOL/L (ref 3.5–5.2)
PROT SERPL-MCNC: 6.9 G/DL (ref 6–8.5)
PTH-INTACT SERPL-MCNC: 102 PG/ML (ref 15–65)
SODIUM SERPL-SCNC: 138 MMOL/L (ref 134–144)
TSH SERPL DL<=0.005 MIU/L-ACNC: 1.9 UIU/ML (ref 0.45–4.5)

## 2017-07-31 ENCOUNTER — TELEPHONE (OUTPATIENT)
Dept: ENDOCRINOLOGY | Age: 57
End: 2017-07-31

## 2017-07-31 ENCOUNTER — OFFICE VISIT (OUTPATIENT)
Dept: ENDOCRINOLOGY | Age: 57
End: 2017-07-31

## 2017-07-31 VITALS
BODY MASS INDEX: 47.09 KG/M2 | DIASTOLIC BLOOD PRESSURE: 62 MMHG | SYSTOLIC BLOOD PRESSURE: 109 MMHG | WEIGHT: 293 LBS | HEART RATE: 65 BPM | HEIGHT: 66 IN

## 2017-07-31 DIAGNOSIS — N25.81 SECONDARY HYPERPARATHYROIDISM (HCC): ICD-10-CM

## 2017-07-31 DIAGNOSIS — R79.89 ELEVATED SERUM CREATININE: ICD-10-CM

## 2017-07-31 DIAGNOSIS — I50.9 CHRONIC CONGESTIVE HEART FAILURE, UNSPECIFIED CONGESTIVE HEART FAILURE TYPE: ICD-10-CM

## 2017-07-31 DIAGNOSIS — E55.9 VITAMIN D DEFICIENCY: Primary | ICD-10-CM

## 2017-07-31 DIAGNOSIS — R74.8 ALKALINE PHOSPHATASE ELEVATION: ICD-10-CM

## 2017-07-31 DIAGNOSIS — Z98.84 S/P GASTRIC BYPASS: ICD-10-CM

## 2017-07-31 DIAGNOSIS — I10 ESSENTIAL HYPERTENSION: ICD-10-CM

## 2017-07-31 RX ORDER — INDAPAMIDE 1.25 MG/1
1.25 TABLET, FILM COATED ORAL DAILY
Qty: 90 TAB | Refills: 3 | Status: SHIPPED | OUTPATIENT
Start: 2017-07-31 | End: 2017-09-12

## 2017-07-31 RX ORDER — ERGOCALCIFEROL 1.25 MG/1
50000 CAPSULE ORAL 2 TIMES WEEKLY
Qty: 90 CAP | Refills: 3 | Status: SHIPPED | OUTPATIENT
Start: 2017-07-31 | End: 2017-07-31 | Stop reason: SDUPTHER

## 2017-07-31 RX ORDER — AMLODIPINE BESYLATE 5 MG/1
5 TABLET ORAL
Qty: 90 TAB | Refills: 3 | Status: SHIPPED | OUTPATIENT
Start: 2017-07-31 | End: 2017-09-12

## 2017-07-31 RX ORDER — LOSARTAN POTASSIUM 100 MG/1
100 TABLET ORAL
Qty: 90 TAB | Refills: 3 | Status: ON HOLD | OUTPATIENT
Start: 2017-07-31 | End: 2017-09-12

## 2017-07-31 RX ORDER — ERGOCALCIFEROL 1.25 MG/1
50000 CAPSULE ORAL DAILY
Qty: 90 CAP | Refills: 3 | Status: SHIPPED | OUTPATIENT
Start: 2017-07-31 | End: 2018-02-26 | Stop reason: SDUPTHER

## 2017-07-31 NOTE — MR AVS SNAPSHOT
Visit Information Date & Time Provider Department Dept. Phone Encounter #  
 7/31/2017  8:50 AM James Moss, [facility] [id number] Follow-up Instructions Return in about 3 months (around 10/31/2017). Your Appointments 9/12/2017 10:00 AM  
ROUTINE CARE with Stanley Blackwell. Nick Guzman 28 ([facility]) Appt Note: Routine f/up check. , 3 months, per Dr. Gina Smith. , sleep, wt., BP. - lp  
 [address] 
[address] [facility] [address]  
  
   
 [address]  
  
    
 11/1/2017  8:40 AM  
ESTABLISHED PATIENT with Yumiko Guerrero MD  
[facility] (ALICE SCHEDULING) Appt Note: 1 yr f/u  
 [address]  [address]  
[address] [address] Upcoming Health Maintenance Date Due  
 BREAST CANCER SCRN MAMMOGRAM 8/5/2017 INFLUENZA AGE 9 TO ADULT 8/1/2017 MEDICARE YEARLY EXAM 6/14/2018 PAP AKA CERVICAL CYTOLOGY 11/13/2018 COLONOSCOPY 6/22/2021 DTaP/Tdap/Td series (2 - Td) 6/13/2027 Allergies as of 7/31/2017  Review Complete On: 7/31/2017 By: Darron Ibrahim LPN Severity Noted Reaction Type Reactions Bees [Hymenoptera Allergenic Extract] High 10/14/2014   Systemic Shortness of Breath, Swelling [place] High 10/14/2014   Systemic Shortness of Breath, Swelling Lisinopril  10/17/2014    Cough Current Immunizations  Reviewed on 10/11/2016 Name Date Influenza Vaccine Parvin Blight) 11/13/2015 Influenza Vaccine (Quad) PF 10/11/2016 Pneumococcal Polysaccharide (PPSV-23) 8/29/2016 Tdap 6/13/2017 Not reviewed this visit You Were Diagnosed With   
  
 Codes Comments Vitamin D deficiency    -  Primary ICD-10-CM: E55.9 ICD-9-CM: 268.9 Essential hypertension     ICD-10-CM: I10 
ICD-9-CM: 401.9 Chronic congestive heart failure, unspecified congestive heart failure type ([facility]     ICD-10-CM: I50.9 ICD-9-CM: 428.0 S/P gastric bypass     ICD-10-CM: I25.44 ICD-9-CM: V45.86 Alkaline phosphatase elevation     ICD-10-CM: R74.8 ICD-9-CM: 790.5 Secondary hyperparathyroidism ([facility]     ICD-10-CM: N25.81 ICD-9-CM: 18.80 Elevated serum creatinine     ICD-10-CM: R79.89 ICD-9-CM: 790.99 Vitals BP Pulse Height(growth percentile) Weight(growth percentile) BMI OB Status 109/62 65 5' 6\" (1.676 m) 323 lb (146.5 kg) 52.13 kg/m2 Postmenopausal  
 Smoking Status Former Smoker Vitals History BMI and BSA Data Body Mass Index Body Surface Area  
 52.13 kg/m 2 2.61 m 2 Preferred Pharmacy Pharmacy Name Phone [facility], [address] Your Updated Medication List  
  
   
This list is accurate as of: 7/31/17  9:54 AM.  Always use your most recent med list.  
  
  
  
  
 acetaminophen 325 mg tablet Commonly known as:  TYLENOL Take 650 mg by mouth daily. albuterol 90 mcg/actuation inhaler Commonly known as:  PROVENTIL HFA, VENTOLIN HFA, PROAIR HFA Take 2 Puffs by inhalation every four (4) hours as needed for Wheezing or Shortness of Breath. ALCOHOL PREP PADS Padm Generic drug:  alcohol swabs  
  
 amLODIPine 5 mg tablet Commonly known as:  Candido Tiffany Take 1 Tab by mouth nightly. aspirin delayed-release 81 mg tablet Take 81 mg by mouth daily. atorvastatin 40 mg tablet Commonly known as:  LIPITOR Take 1 Tab by mouth nightly. carvedilol 25 mg tablet Commonly known as:  Derian Lacrosse Take 1 Tab by mouth two (2) times daily (with meals). chlorzoxazone 500 mg tablet Commonly known as:  Jamila Gayer Take 500 mg by mouth three (3) times daily. cyanocobalamin 1,000 mcg/mL injection Commonly known as:  VITAMIN B12  
 1 mL by SubCUTAneous route every fourteen (14) days. For b12 deficiency DULoxetine 60 mg capsule Commonly known as:  CYMBALTA Take 1 Cap by mouth every evening. EPINEPHrine 0.3 mg/0.3 mL injection Commonly known as:  EPIPEN 2-LAURA  
0.3 mL by IntraMUSCular route once as needed for up to 1 dose. ergocalciferol 50,000 unit capsule Commonly known as:  ERGOCALCIFEROL Take 1 Cap by mouth two (2) times a week. High needs due to gastric bypass history  
  
 ferrous sulfate 325 mg (65 mg iron) tablet Take 325 mg by mouth Daily (before breakfast). Freestyle Control Soln Generic drug:  Blood Glucose Control High&Low  
  
 glucose blood VI test strips strip Commonly known as:  blood glucose test  
For use with one touch ultra glucometer to check blood sugar once a day and when needed HYDROcodone-acetaminophen 7.5-325 mg per tablet Commonly known as:  Jamel Errol Take  by mouth every eight (8) hours as needed. indapamide 1.25 mg tablet Commonly known as:  [place] Alkory Take 1 Tab by mouth daily. Insulin Syringe-Needle U-100 1 mL 30 gauge x 5/16 Syrg 1 Each by Does Not Apply route every fourteen (14) days. Indications: for b12 injections * lancets 30 gauge Misc * Lancets Misc For use with one touch ultra glucometer to check blood sugar once a day and when needed * One Touch Delica 33 gauge Misc Generic drug:  lancets * ULTRA THIN LANCETS 31 gauge Misc Generic drug:  lancets  
  
 levothyroxine 150 mcg tablet Commonly known as:  SYNTHROID Take 1 Tab by mouth Daily (before breakfast). lidocaine 5 % Commonly known as:  Daryle Gates Apply patch to the affected area for 12 hours a day and remove for 12 hours a day. linaclotide 145 mcg Cap capsule Commonly known as:  Filemon Able Take 1 Cap by mouth Daily (before breakfast). For constipation  
  
 losartan 100 mg tablet Commonly known as:  COZAAR Take 1 Tab by mouth nightly. metFORMIN  mg tablet Commonly known as:  GLUCOPHAGE XR Take 2 Tabs by mouth daily. morphine CR 15 mg CR tablet Commonly known as:  MS CONTIN Take 15 mg by mouth daily. pantoprazole 40 mg tablet Commonly known as:  PROTONIX Take 1 Tab by mouth daily. spironolactone 25 mg tablet Commonly known as:  ALDACTONE Take 1 Tab by mouth daily. suvorexant 20 mg tablet Commonly known as:  Starleen Gadsden Take 1 Tab by mouth nightly as needed for Insomnia. Max Daily Amount: 20 mg.  
  
 * Notice: This list has 4 medication(s) that are the same as other medications prescribed for you. Read the directions carefully, and ask your doctor or other care provider to review them with you. Prescriptions Sent to Pharmacy Refills  
 ergocalciferol (ERGOCALCIFEROL) 50,000 unit capsule 3 Sig: Take 1 Cap by mouth two (2) times a week. High needs due to gastric bypass history Class: Normal  
 Pharmacy: [address] Ph #: [phone] Route: Oral  
 indapamide (LOZOL) 1.25 mg tablet 3 Sig: Take 1 Tab by mouth daily. Class: Normal  
 Pharmacy: [address] Ph #: [phone] Route: Oral  
 amLODIPine (NORVASC) 5 mg tablet 3 Sig: Take 1 Tab by mouth nightly. Class: Normal  
 Pharmacy: [address] Ph #: [phone] Route: Oral  
 losartan (COZAAR) 100 mg tablet 3 Sig: Take 1 Tab by mouth nightly. Class: Normal  
 Pharmacy: [address] Ph #: [phone] Route: Oral  
  
We Performed the Following PTH INTACT [57436 CPT(R)] RENAL FUNCTION PANEL [63267 CPT(R)] VITAMIN D, 25 HYDROXY [id number] CPT(R)] Follow-up Instructions Return in about 3 months (around 10/31/2017). To-Do List   
 08/04/2017 9:00 AM  
 Appointment with [facility] JASON 2 at [address] [place] [address] ([phone]) Shower or bathe using soap and water. Do not use deodorant, powder, perfumes, or lotion the day of your exam.  If your prior mammograms were not performed at [facility] 6 please bring films with you or forward prior images 2 days before your procedure. Check in at registration 15min before your appointment time unless you were instructed to do otherwise. A script is not necessary, but if you have one, please bring it on the day of the mammogram or have it faxed to the department. [facility] [phone] [facility]  [phone] [facility] KIM  [id number] [address] [phone] [facility] [address] [facility] [phone] Patient Instructions Thyroid: 
Continue levothyroxine to 150 mcg. Take in AM like you are doing Low Vitamin D, High Parathyroid hormone level, Alkaline phosphatase elevated: 
Increase vitamin D 50,000 units daily Continue indapamide 2.5 mg (for blood pressure and to decrease calcium losses in urine to improve blood calcium) Blood pressure: 
Stop  Hydralazine to 25 mg 
- continue carvedilol 
- Continue taking losartan - take at night 
- continue spironolactone in AM. - May stop amlodipine (take at night)  if your blood pressure remains low. (less than 110-115 often). Let me know if BP remains < 115 often. Feeling cold: 
- raise blood pressure by taking less medicine - will assess blood counts in future. . 
 
 
  
Introducing [facility]! Dear Wilfred MULLER: Thank you for requesting a mPura account. Our records indicate that you already have an active mPura account. You can access your account anytime at [email or web address] [email or web address] Did you know that you can access your hospital and ER discharge instructions at any time in mPura? You can also review all of your test results from your hospital stay or ER visit. Additional Information If you have questions, please visit the Frequently Asked Questions section of the [employer] website at [email or web address] Remember, [employer] is NOT to be used for urgent needs. For medical emergencies, dial 911. Now available from your iPhone and Android! Please provide this summary of care documentation to your next provider. Your primary care clinician is listed as Akanksha Raza. If you have any questions after today's visit, please call [phone].

## 2017-07-31 NOTE — PROGRESS NOTES
History of Present Illness: Courtney Carrillo is a 59 y.o. female presents for follow-up of follow-up of elevated alkaline phosphatase and vitamin D deficiency. She also has hypothyroidism and pre-diabetes. Of note, she is s/p gastric bypass in 1980s (pre-surgery wt was 465), and has also had CVA. Additionally, she is taking several medications for history of heart failure. This was diagnosed at the same time she had a heart attack and a stroke in 2014. We reviewed that most recent echocardiogram showed normal ejection fraction    After initial visit, she was started on ergocalciferol 50,000 units twice weekly. Additionally, indapamide was added to decrease urinary calcium excretion  Hydralazine dose was decreased from 50-25 mg twice daily. We reviewed her pre-labs. Her vitamin D levels remain very low at 11. Parathyroid hormone alkaline phosphatase remain elevated. She is taking the vitamin D as directed. Hypertension: Blood pressure remains low. She does have fatigue. She reports her blood pressure is also low at home. We discussed her elevated creatinine on recent labs. Her main problems are chronic cold intolerance as well as numbness in her fingers and hands. Past Medical History:   Diagnosis Date    Advanced care planning/counseling discussion 3/4/16    On File    Bronchitis 2/24/2014    Cervicalgia 8/18/15    Dr. Mary Cr    Chest pain 5/25/15    Hospitalized at [facility] 5/25/15 (lab work negative)    Congestive heart failure, unspecified     Last Echo 2/8/15: EF 55-60%    Constipation 6/13/2017    Enthesopathy, spinal ([facility] 8/18/15    Dr. Nikkie Unger Essential hypertension     Foot drop 8/18/15    Dr. Nikkie Unger Heart attack [facility]) 2/24/2013    Was supposed to See Cardiology for possible pacemaker in november 2014- After Cardiology consult locally, no need, EF greatly improved.  Established with Dr. Jerman Wellington Hiatal hernia 6/2015    3 cm hiatal hernia     Hip pain 8/18/15    Dr. Mary Cr  Hypercholesterolemia     Hyperglycemia 7/2015    A1c 5.9     Hyperlipidemia 6/30/2015    NMR lipoprofile- LDL P 997, LDL-c 71, HLD-C-39, TG-60, HLD-P (25.2), Small LDL-P -541, LDL size 20.6    Hypothyroid     Insomnia 6/13/2017    Lower extremity edema     Lumbar spondylosis 8/18/15    Dr. Constantino Dance 6/2015    EGD/Colonscopy 6/15- Gastritis, internal hemorrhoids and 3 polyps    Murmur     EDDIE on CPAP     Was referred to Pulmonology - Uses CPAP    Osteoarthritis of hip 8/18/15    Dr. Robert Jay    Osteoarthritis, shoulder 8/18/15    Dr. Robert Jay    Radiculopathy, cervical 8/18/15    Dr. Robert Jay    Shoulder pain 8/18/15    Dr. Kar Mota Spinal stenosis of cervical region 8/18/15    Dr. Kar Mota Spinal stenosis, lumbar 8/18/15    Dr. Kar Mota Stroke [facility]) 2/25/2014    Established with Neurology, Praveen Bennett, JYOTI-Just hospitalized at [facility] 2/7/15-2/10/15. CT negative, but Late effect CV accident with increased tone described on discharge summary, Carotid dopplers showed 50% stenosis bilaterally.  Vitamin D deficiency 7/2015     Current Outpatient Prescriptions   Medication Sig    indapamide (LOZOL) 1.25 mg tablet Take 1 Tab by mouth daily.  amLODIPine (NORVASC) 5 mg tablet Take 1 Tab by mouth nightly.  losartan (COZAAR) 100 mg tablet Take 1 Tab by mouth nightly.  ergocalciferol (ERGOCALCIFEROL) 50,000 unit capsule Take 1 Cap by mouth daily. High needs due to gastric bypass history    cyanocobalamin (VITAMIN B12) 1,000 mcg/mL injection 1 mL by SubCUTAneous route every fourteen (14) days. For b12 deficiency    suvorexant (BELSOMRA) 20 mg tablet Take 1 Tab by mouth nightly as needed for Insomnia. Max Daily Amount: 20 mg.    Insulin Syringe-Needle U-100 1 mL 30 gauge x 5/16 syrg 1 Each by Does Not Apply route every fourteen (14) days.  Indications: for b12 injections    ALCOHOL PREP PADS padm     FREESTYLE CONTROL soln     ULTRA THIN LANCETS 31 gauge misc     ONE TOUCH DELICA 33 gauge misc  lancets 30 gauge misc     spironolactone (ALDACTONE) 25 mg tablet Take 1 Tab by mouth daily.  chlorzoxazone (PARAFON FORTE) 500 mg tablet Take 500 mg by mouth three (3) times daily.  HYDROcodone-acetaminophen (NORCO) 7.5-325 mg per tablet Take  by mouth every eight (8) hours as needed.  morphine CR (MS CONTIN) 15 mg CR tablet Take 15 mg by mouth daily.  levothyroxine (SYNTHROID) 150 mcg tablet Take 1 Tab by mouth Daily (before breakfast).  metFORMIN ER (GLUCOPHAGE XR) 500 mg tablet Take 2 Tabs by mouth daily.  carvedilol (COREG) 25 mg tablet Take 1 Tab by mouth two (2) times daily (with meals).  glucose blood VI test strips (BLOOD GLUCOSE TEST) strip For use with one touch ultra glucometer to check blood sugar once a day and when needed    Lancets misc For use with one touch ultra glucometer to check blood sugar once a day and when needed    albuterol (PROVENTIL HFA, VENTOLIN HFA, PROAIR HFA) 90 mcg/actuation inhaler Take 2 Puffs by inhalation every four (4) hours as needed for Wheezing or Shortness of Breath.  DULoxetine (CYMBALTA) 60 mg capsule Take 1 Cap by mouth every evening.  lidocaine (LIDODERM) 5 % Apply patch to the affected area for 12 hours a day and remove for 12 hours a day.  pantoprazole (PROTONIX) 40 mg tablet Take 1 Tab by mouth daily.  linaclotide (LINZESS) 145 mcg cap capsule Take 1 Cap by mouth Daily (before breakfast). For constipation    atorvastatin (LIPITOR) 40 mg tablet Take 1 Tab by mouth nightly.  ferrous sulfate 325 mg (65 mg iron) tablet Take 325 mg by mouth Daily (before breakfast).  EPINEPHrine (EPIPEN 2-LAURA) 0.3 mg/0.3 mL injection 0.3 mL by IntraMUSCular route once as needed for up to 1 dose.  aspirin delayed-release 81 mg tablet Take 81 mg by mouth daily.  acetaminophen (TYLENOL) 325 mg tablet Take 650 mg by mouth daily. No current facility-administered medications for this visit.       Allergies   Allergen Reactions    Bees [Hymenoptera Allergenic Extract] Shortness of Breath and Swelling    Strawberry Shortness of Breath and Swelling    Lisinopril Cough         Physical Examination:  Visit Vitals    /62    Pulse 65    Ht 5' 6\" (1.676 m)    Wt 323 lb (146.5 kg)    BMI 52.13 kg/m2   -   - General: pleasant, no distress, uses walker   HEENT: hearing intact, EOMI, clear sclera without icterus  - Cardiovascular: regular, normal rate   - Respiratory: normal effort  - Integumentary: no edema  - Psychiatric: normal mood and affect    Data Reviewed:   Component      Latest Ref Rng & Units 7/28/2017 7/28/2017 7/28/2017 7/28/2017           4:18 PM  4:18 PM  4:18 PM  4:18 PM   Glucose      65 - 99 mg/dL 93      BUN      6 - 24 mg/dL 33 (H)      Creatinine      0.57 - 1.00 mg/dL 1.52 (H)      GFR est non-AA      >59 mL/min/1.73 37 (L)      GFR est AA      >59 mL/min/1.73 43 (L)      BUN/Creatinine ratio      9 - 23 22      Sodium      134 - 144 mmol/L 138      Potassium      3.5 - 5.2 mmol/L 5.2      Chloride      96 - 106 mmol/L 100      CO2      18 - 29 mmol/L 24      Calcium      8.7 - 10.2 mg/dL 9.2      Protein, total      6.0 - 8.5 g/dL 6.9      Albumin      3.5 - 5.5 g/dL 4.3      GLOBULIN, TOTAL      1.5 - 4.5 g/dL 2.6      A-G Ratio      1.2 - 2.2 1.7      Bilirubin, total      0.0 - 1.2 mg/dL 0.3      Alk.  phosphatase      39 - 117 IU/L 135 (H)      AST      0 - 40 IU/L 18      ALT (SGPT)      0 - 32 IU/L 20      TSH      0.450 - 4.500 uIU/mL       Ferritin      15 - 150 ng/mL    253 (H)   VITAMIN D, 25-HYDROXY      30.0 - 100.0 ng/mL   11.7 (L)    PTH, Intact      15 - 65 pg/mL  102 (H)       Component      Latest Ref Rng & Units 7/28/2017           4:18 PM   Glucose      65 - 99 mg/dL    BUN      6 - 24 mg/dL    Creatinine      0.57 - 1.00 mg/dL    GFR est non-AA      >59 mL/min/1.73    GFR est AA      >59 mL/min/1.73    BUN/Creatinine ratio      9 - 23    Sodium      134 - 144 mmol/L    Potassium      3.5 - 5.2 mmol/L Chloride      96 - 106 mmol/L    CO2      18 - 29 mmol/L    Calcium      8.7 - 10.2 mg/dL    Protein, total      6.0 - 8.5 g/dL    Albumin      3.5 - 5.5 g/dL    GLOBULIN, TOTAL      1.5 - 4.5 g/dL    A-G Ratio      1.2 - 2.2    Bilirubin, total      0.0 - 1.2 mg/dL    Alk. phosphatase      39 - 117 IU/L    AST      0 - 40 IU/L    ALT (SGPT)      0 - 32 IU/L    TSH      0.450 - 4.500 uIU/mL 1.900   Ferritin      15 - 150 ng/mL    VITAMIN D, 25-HYDROXY      30.0 - 100.0 ng/mL    PTH, Intact      15 - 65 pg/mL        Assessment/Plan:   1. Vitamin D deficiency   With her history of gastric bypass, her absorption is very poor and therefore her supplementation needs are very high. Change ergocalciferol 50,000 units from twice weekly to daily. We will reassess labs in 3 months   2. Essential hypertension   Blood pressure is too low  We will have her stop hydralazine and decrease indapamide to 1.25 mg daily  If blood pressure remains low, within favor stopping amlodipine. Recommend she take losartan at bedtime. Also recommended she take amlodipine at bedtime as this is associated with lower rates of edema than a.m. dosing. She will continue taking the carvedilol, losartan, spironolactone which she is taking for her history of CHF   3. Chronic congestive heart failure, unspecified congestive heart failure type ([address]   Her ejection fraction has been normal with her last 2 echocardiograms  She is taking carvedilol, losartan, spironolactone for her CHF   4. S/P gastric bypass   -Poor absorption for calcium and vitamin D. Also likely has poor absorption of iron and B12. She is taking B12 injections   5. Alkaline phosphatase elevation   -See above. Correct vitamin D deficiency and secondary hyperparathyroidism   6. Secondary hyperparathyroidism ([address]   Due to vitamin D deficiency. 7. Elevated serum creatinine   May be due to hypotension versus increased diuretics.   Her decreasing blood pressure medicines and I decreased the indapamide dose. We will reassess in 3 months. Dr. Karlos Otero is addressing her B12 deficiency. Either her or I can check her CBC in the future. Patient Instructions   Thyroid:  Continue levothyroxine to 150 mcg. Take in AM like you are doing    Low Vitamin D, High Parathyroid hormone level, Alkaline phosphatase elevated:  Increase vitamin D 50,000 units daily    Continue indapamide 2.5 mg (for blood pressure and to decrease calcium losses in urine to improve blood calcium)    Blood pressure:  Stop  Hydralazine to 25 mg  - continue carvedilol  - Continue taking losartan - take at night  - continue spironolactone in AM.   - May stop amlodipine (take at night)  if your blood pressure remains low. (less than 110-115 often). Let me know if BP remains < 115 often. Feeling cold:  - raise blood pressure by taking less medicine  - will assess blood counts in future. .    Follow-up Disposition:  Return in about 3 months (around 10/31/2017).     Copy sent to:

## 2017-07-31 NOTE — PATIENT INSTRUCTIONS
Thyroid:  Continue levothyroxine to 150 mcg. Take in AM like you are doing    Low Vitamin D, High Parathyroid hormone level, Alkaline phosphatase elevated:  Increase vitamin D 50,000 units daily    Continue indapamide 2.5 mg (for blood pressure and to decrease calcium losses in urine to improve blood calcium)    Blood pressure:  Stop  Hydralazine to 25 mg  - continue carvedilol  - Continue taking losartan - take at night  - continue spironolactone in AM.   - May stop amlodipine (take at night)  if your blood pressure remains low. (less than 110-115 often). Let me know if BP remains < 115 often. Feeling cold:  - raise blood pressure by taking less medicine  - will assess blood counts in future. Manuel Menjivar

## 2017-07-31 NOTE — TELEPHONE ENCOUNTER
Per Dr. Domi Jewell called [place] [id number] pharmacy and spoke to pharmacist Constance Lui and cancelled prescription for hydralazine.

## 2017-08-01 RX ORDER — ALCOHOL ANTISEPTIC PADS
1 PADS, MEDICATED (EA) TOPICAL AS NEEDED
Qty: 100 LANCET | Refills: 5 | Status: SHIPPED | OUTPATIENT
Start: 2017-08-01 | End: 2017-09-08

## 2017-08-04 ENCOUNTER — HOSPITAL ENCOUNTER (OUTPATIENT)
Dept: MAMMOGRAPHY | Age: 57
Discharge: HOME | End: 2017-08-04
Attending: FAMILY MEDICINE
Payer: MEDICARE

## 2017-08-04 DIAGNOSIS — Z12.31 ENCOUNTER FOR MAMMOGRAM TO ESTABLISH BASELINE MAMMOGRAM: ICD-10-CM

## 2017-08-04 PROCEDURE — 77067 SCR MAMMO BI INCL CAD: CPT

## 2017-08-07 RX ORDER — EPINEPHRINE 0.3 MG/.3ML
0.3 INJECTION SUBCUTANEOUS
Qty: 2 SYRINGE | Refills: 3 | Status: SHIPPED | OUTPATIENT
Start: 2017-08-07 | End: 2019-09-05 | Stop reason: SDUPTHER

## 2017-09-08 ENCOUNTER — APPOINTMENT (OUTPATIENT)
Dept: MRI IMAGING | Age: 57
End: 2017-09-08
Attending: HOSPITALIST
Payer: MEDICARE

## 2017-09-08 ENCOUNTER — HOSPITAL ENCOUNTER (OUTPATIENT)
Age: 57
Setting detail: OBSERVATION
Discharge: NURSING, REHAB OR LONG-TERM CARE | End: 2017-09-12
Attending: EMERGENCY MEDICINE | Admitting: HOSPITALIST
Payer: MEDICARE

## 2017-09-08 ENCOUNTER — APPOINTMENT (OUTPATIENT)
Dept: CT IMAGING | Age: 57
End: 2017-09-08
Attending: EMERGENCY MEDICINE
Payer: MEDICARE

## 2017-09-08 DIAGNOSIS — G43.019 INTRACTABLE MIGRAINE WITHOUT AURA AND WITHOUT STATUS MIGRAINOSUS: ICD-10-CM

## 2017-09-08 DIAGNOSIS — R53.1 LEFT-SIDED WEAKNESS: ICD-10-CM

## 2017-09-08 DIAGNOSIS — I63.9 CEREBROVASCULAR ACCIDENT (CVA), UNSPECIFIED MECHANISM (HCC): Primary | ICD-10-CM

## 2017-09-08 DIAGNOSIS — I10 ESSENTIAL HYPERTENSION: ICD-10-CM

## 2017-09-08 LAB
ALBUMIN SERPL-MCNC: 3.4 G/DL (ref 3.5–5)
ALBUMIN/GLOB SERPL: 0.9 {RATIO} (ref 1.1–2.2)
ALP SERPL-CCNC: 130 U/L (ref 45–117)
ALT SERPL-CCNC: 27 U/L (ref 12–78)
ANION GAP SERPL CALC-SCNC: 6 MMOL/L (ref 5–15)
APPEARANCE UR: ABNORMAL
AST SERPL-CCNC: 21 U/L (ref 15–37)
ATRIAL RATE: 68 BPM
BACTERIA URNS QL MICRO: ABNORMAL /HPF
BASOPHILS # BLD: 0 K/UL (ref 0–0.1)
BASOPHILS NFR BLD: 1 % (ref 0–1)
BILIRUB SERPL-MCNC: 0.4 MG/DL (ref 0.2–1)
BILIRUB UR QL: NEGATIVE
BUN SERPL-MCNC: 40 MG/DL (ref 6–20)
BUN/CREAT SERPL: 27 (ref 12–20)
CALCIUM SERPL-MCNC: 8.5 MG/DL (ref 8.5–10.1)
CALCULATED P AXIS, ECG09: 57 DEGREES
CALCULATED R AXIS, ECG10: 46 DEGREES
CALCULATED T AXIS, ECG11: 33 DEGREES
CHLORIDE SERPL-SCNC: 103 MMOL/L (ref 97–108)
CK SERPL-CCNC: 70 U/L (ref 26–192)
CO2 SERPL-SCNC: 25 MMOL/L (ref 21–32)
COLOR UR: ABNORMAL
CREAT SERPL-MCNC: 1.47 MG/DL (ref 0.55–1.02)
DIAGNOSIS, 93000: NORMAL
EOSINOPHIL # BLD: 0.1 K/UL (ref 0–0.4)
EOSINOPHIL NFR BLD: 2 % (ref 0–7)
EPITH CASTS URNS QL MICRO: ABNORMAL /LPF
ERYTHROCYTE [DISTWIDTH] IN BLOOD BY AUTOMATED COUNT: 14.5 % (ref 11.5–14.5)
GLOBULIN SER CALC-MCNC: 3.7 G/DL (ref 2–4)
GLUCOSE BLD STRIP.AUTO-MCNC: 145 MG/DL (ref 65–100)
GLUCOSE BLD STRIP.AUTO-MCNC: 84 MG/DL (ref 65–100)
GLUCOSE BLD STRIP.AUTO-MCNC: 94 MG/DL (ref 65–100)
GLUCOSE SERPL-MCNC: 282 MG/DL (ref 65–100)
GLUCOSE UR STRIP.AUTO-MCNC: NEGATIVE MG/DL
HCT VFR BLD AUTO: 34.8 % (ref 35–47)
HGB BLD-MCNC: 10.9 G/DL (ref 11.5–16)
HGB UR QL STRIP: NEGATIVE
HYALINE CASTS URNS QL MICRO: ABNORMAL /LPF (ref 0–5)
INR BLD: 1.1 (ref 0.9–1.2)
INR PPP: 1.1 (ref 0.9–1.1)
KETONES UR QL STRIP.AUTO: NEGATIVE MG/DL
LEUKOCYTE ESTERASE UR QL STRIP.AUTO: ABNORMAL
LYMPHOCYTES # BLD: 2.4 K/UL (ref 0.8–3.5)
LYMPHOCYTES NFR BLD: 29 % (ref 12–49)
MCH RBC QN AUTO: 27.7 PG (ref 26–34)
MCHC RBC AUTO-ENTMCNC: 31.3 G/DL (ref 30–36.5)
MCV RBC AUTO: 88.5 FL (ref 80–99)
MONOCYTES # BLD: 0.9 K/UL (ref 0–1)
MONOCYTES NFR BLD: 11 % (ref 5–13)
NEUTS SEG # BLD: 4.7 K/UL (ref 1.8–8)
NEUTS SEG NFR BLD: 57 % (ref 32–75)
NITRITE UR QL STRIP.AUTO: POSITIVE
P-R INTERVAL, ECG05: 162 MS
PH UR STRIP: 5 [PH] (ref 5–8)
PLATELET # BLD AUTO: 257 K/UL (ref 150–400)
POTASSIUM SERPL-SCNC: 4.4 MMOL/L (ref 3.5–5.1)
PROT SERPL-MCNC: 7.1 G/DL (ref 6.4–8.2)
PROT UR STRIP-MCNC: NEGATIVE MG/DL
PROTHROMBIN TIME: 10.8 SEC (ref 9–11.1)
Q-T INTERVAL, ECG07: 396 MS
QRS DURATION, ECG06: 88 MS
QTC CALCULATION (BEZET), ECG08: 421 MS
RBC # BLD AUTO: 3.93 M/UL (ref 3.8–5.2)
RBC #/AREA URNS HPF: ABNORMAL /HPF (ref 0–5)
SERVICE CMNT-IMP: ABNORMAL
SERVICE CMNT-IMP: NORMAL
SERVICE CMNT-IMP: NORMAL
SODIUM SERPL-SCNC: 134 MMOL/L (ref 136–145)
SP GR UR REFRACTOMETRY: 1.01 (ref 1–1.03)
TROPONIN I SERPL-MCNC: <0.04 NG/ML
TROPONIN I SERPL-MCNC: <0.04 NG/ML
UROBILINOGEN UR QL STRIP.AUTO: 0.2 EU/DL (ref 0.2–1)
VENTRICULAR RATE, ECG03: 68 BPM
WBC # BLD AUTO: 8.2 K/UL (ref 3.6–11)
WBC URNS QL MICRO: ABNORMAL /HPF (ref 0–4)

## 2017-09-08 PROCEDURE — 93005 ELECTROCARDIOGRAM TRACING: CPT

## 2017-09-08 PROCEDURE — 74011250637 HC RX REV CODE- 250/637: Performed by: EMERGENCY MEDICINE

## 2017-09-08 PROCEDURE — 99218 HC RM OBSERVATION: CPT

## 2017-09-08 PROCEDURE — 74011250636 HC RX REV CODE- 250/636: Performed by: HOSPITALIST

## 2017-09-08 PROCEDURE — 70450 CT HEAD/BRAIN W/O DYE: CPT

## 2017-09-08 PROCEDURE — 85025 COMPLETE CBC W/AUTO DIFF WBC: CPT | Performed by: EMERGENCY MEDICINE

## 2017-09-08 PROCEDURE — 36415 COLL VENOUS BLD VENIPUNCTURE: CPT | Performed by: HOSPITALIST

## 2017-09-08 PROCEDURE — 74011250636 HC RX REV CODE- 250/636: Performed by: EMERGENCY MEDICINE

## 2017-09-08 PROCEDURE — 94762 N-INVAS EAR/PLS OXIMTRY CONT: CPT

## 2017-09-08 PROCEDURE — 82962 GLUCOSE BLOOD TEST: CPT

## 2017-09-08 PROCEDURE — 94660 CPAP INITIATION&MGMT: CPT

## 2017-09-08 PROCEDURE — A9576 INJ PROHANCE MULTIPACK: HCPCS | Performed by: HOSPITALIST

## 2017-09-08 PROCEDURE — 96375 TX/PRO/DX INJ NEW DRUG ADDON: CPT

## 2017-09-08 PROCEDURE — 81001 URINALYSIS AUTO W/SCOPE: CPT | Performed by: EMERGENCY MEDICINE

## 2017-09-08 PROCEDURE — 84484 ASSAY OF TROPONIN QUANT: CPT | Performed by: EMERGENCY MEDICINE

## 2017-09-08 PROCEDURE — 85610 PROTHROMBIN TIME: CPT | Performed by: EMERGENCY MEDICINE

## 2017-09-08 PROCEDURE — 80053 COMPREHEN METABOLIC PANEL: CPT | Performed by: EMERGENCY MEDICINE

## 2017-09-08 PROCEDURE — 85610 PROTHROMBIN TIME: CPT

## 2017-09-08 PROCEDURE — 74011250637 HC RX REV CODE- 250/637: Performed by: HOSPITALIST

## 2017-09-08 PROCEDURE — 96372 THER/PROPH/DIAG INJ SC/IM: CPT

## 2017-09-08 PROCEDURE — 99285 EMERGENCY DEPT VISIT HI MDM: CPT

## 2017-09-08 PROCEDURE — 93880 EXTRACRANIAL BILAT STUDY: CPT

## 2017-09-08 PROCEDURE — 82550 ASSAY OF CK (CPK): CPT | Performed by: EMERGENCY MEDICINE

## 2017-09-08 PROCEDURE — 70553 MRI BRAIN STEM W/O & W/DYE: CPT

## 2017-09-08 PROCEDURE — 96361 HYDRATE IV INFUSION ADD-ON: CPT

## 2017-09-08 PROCEDURE — 86900 BLOOD TYPING SEROLOGIC ABO: CPT | Performed by: EMERGENCY MEDICINE

## 2017-09-08 RX ORDER — CYANOCOBALAMIN 1000 UG/ML
1000 INJECTION, SOLUTION INTRAMUSCULAR; SUBCUTANEOUS
Status: DISCONTINUED | OUTPATIENT
Start: 2017-09-08 | End: 2017-09-12 | Stop reason: HOSPADM

## 2017-09-08 RX ORDER — HYDROCODONE BITARTRATE AND ACETAMINOPHEN 7.5; 325 MG/1; MG/1
1 TABLET ORAL DAILY
Status: DISCONTINUED | OUTPATIENT
Start: 2017-09-09 | End: 2017-09-12

## 2017-09-08 RX ORDER — CYCLOBENZAPRINE HCL 5 MG
5 TABLET ORAL 2 TIMES DAILY
COMMUNITY
End: 2019-10-29

## 2017-09-08 RX ORDER — FUROSEMIDE 40 MG/1
40 TABLET ORAL
Status: DISCONTINUED | OUTPATIENT
Start: 2017-09-09 | End: 2017-09-12

## 2017-09-08 RX ORDER — LIDOCAINE 50 MG/G
1 PATCH TOPICAL EVERY 24 HOURS
Status: DISCONTINUED | OUTPATIENT
Start: 2017-09-08 | End: 2017-09-12 | Stop reason: HOSPADM

## 2017-09-08 RX ORDER — CARVEDILOL 12.5 MG/1
25 TABLET ORAL 2 TIMES DAILY WITH MEALS
Status: DISCONTINUED | OUTPATIENT
Start: 2017-09-08 | End: 2017-09-12

## 2017-09-08 RX ORDER — ACETAMINOPHEN 325 MG/1
650 TABLET ORAL
Status: DISCONTINUED | OUTPATIENT
Start: 2017-09-08 | End: 2017-09-12 | Stop reason: HOSPADM

## 2017-09-08 RX ORDER — CYCLOBENZAPRINE HCL 10 MG
10 TABLET ORAL
Status: DISCONTINUED | OUTPATIENT
Start: 2017-09-08 | End: 2017-09-12 | Stop reason: HOSPADM

## 2017-09-08 RX ORDER — SODIUM CHLORIDE 0.9 % (FLUSH) 0.9 %
5-10 SYRINGE (ML) INJECTION EVERY 8 HOURS
Status: DISCONTINUED | OUTPATIENT
Start: 2017-09-08 | End: 2017-09-12 | Stop reason: HOSPADM

## 2017-09-08 RX ORDER — PANTOPRAZOLE SODIUM 40 MG/1
40 TABLET, DELAYED RELEASE ORAL DAILY
Status: DISCONTINUED | OUTPATIENT
Start: 2017-09-09 | End: 2017-09-12 | Stop reason: HOSPADM

## 2017-09-08 RX ORDER — CHLORZOXAZONE 500 MG/1
500 TABLET ORAL 3 TIMES DAILY
Status: DISCONTINUED | OUTPATIENT
Start: 2017-09-08 | End: 2017-09-12 | Stop reason: HOSPADM

## 2017-09-08 RX ORDER — SODIUM CHLORIDE 0.9 % (FLUSH) 0.9 %
10 SYRINGE (ML) INJECTION
Status: COMPLETED | OUTPATIENT
Start: 2017-09-08 | End: 2017-09-08

## 2017-09-08 RX ORDER — ATORVASTATIN CALCIUM 40 MG/1
40 TABLET, FILM COATED ORAL
Status: DISCONTINUED | OUTPATIENT
Start: 2017-09-08 | End: 2017-09-12 | Stop reason: HOSPADM

## 2017-09-08 RX ORDER — LOSARTAN POTASSIUM 50 MG/1
100 TABLET ORAL
Status: DISCONTINUED | OUTPATIENT
Start: 2017-09-08 | End: 2017-09-12

## 2017-09-08 RX ORDER — FUROSEMIDE 40 MG/1
40 TABLET ORAL
Status: ON HOLD | COMMUNITY
End: 2017-09-12

## 2017-09-08 RX ORDER — LORAZEPAM 2 MG/ML
0.5 INJECTION INTRAMUSCULAR
Status: COMPLETED | OUTPATIENT
Start: 2017-09-08 | End: 2017-09-08

## 2017-09-08 RX ORDER — MORPHINE SULFATE 15 MG/1
15 TABLET, FILM COATED, EXTENDED RELEASE ORAL DAILY
Status: DISCONTINUED | OUTPATIENT
Start: 2017-09-09 | End: 2017-09-12 | Stop reason: HOSPADM

## 2017-09-08 RX ORDER — HEPARIN SODIUM 5000 [USP'U]/ML
5000 INJECTION, SOLUTION INTRAVENOUS; SUBCUTANEOUS EVERY 8 HOURS
Status: DISCONTINUED | OUTPATIENT
Start: 2017-09-08 | End: 2017-09-12 | Stop reason: HOSPADM

## 2017-09-08 RX ORDER — ACETAMINOPHEN 325 MG/1
650 TABLET ORAL
Status: COMPLETED | OUTPATIENT
Start: 2017-09-08 | End: 2017-09-08

## 2017-09-08 RX ORDER — CYCLOBENZAPRINE HCL 10 MG
10 TABLET ORAL
COMMUNITY
End: 2017-09-12

## 2017-09-08 RX ORDER — CYCLOBENZAPRINE HCL 10 MG
5 TABLET ORAL 2 TIMES DAILY
Status: DISCONTINUED | OUTPATIENT
Start: 2017-09-08 | End: 2017-09-09

## 2017-09-08 RX ORDER — DULOXETIN HYDROCHLORIDE 60 MG/1
60 CAPSULE, DELAYED RELEASE ORAL EVERY EVENING
Status: DISCONTINUED | OUTPATIENT
Start: 2017-09-08 | End: 2017-09-09

## 2017-09-08 RX ORDER — ALBUTEROL SULFATE 0.83 MG/ML
2.5 SOLUTION RESPIRATORY (INHALATION)
Status: DISCONTINUED | OUTPATIENT
Start: 2017-09-08 | End: 2017-09-12 | Stop reason: HOSPADM

## 2017-09-08 RX ORDER — SODIUM CHLORIDE 0.9 % (FLUSH) 0.9 %
5-10 SYRINGE (ML) INJECTION AS NEEDED
Status: DISCONTINUED | OUTPATIENT
Start: 2017-09-08 | End: 2017-09-12 | Stop reason: HOSPADM

## 2017-09-08 RX ORDER — IBUPROFEN 600 MG/1
600 TABLET ORAL
Status: DISCONTINUED | OUTPATIENT
Start: 2017-09-08 | End: 2017-09-12 | Stop reason: HOSPADM

## 2017-09-08 RX ORDER — AMLODIPINE BESYLATE 5 MG/1
5 TABLET ORAL
Status: DISCONTINUED | OUTPATIENT
Start: 2017-09-08 | End: 2017-09-12

## 2017-09-08 RX ORDER — HYDROCODONE BITARTRATE AND ACETAMINOPHEN 7.5; 325 MG/1; MG/1
1 TABLET ORAL DAILY
COMMUNITY
End: 2017-09-12

## 2017-09-08 RX ORDER — TRIPROLIDINE/PSEUDOEPHEDRINE 2.5MG-60MG
600 TABLET ORAL
Status: DISCONTINUED | OUTPATIENT
Start: 2017-09-08 | End: 2017-09-12 | Stop reason: HOSPADM

## 2017-09-08 RX ORDER — ACETAMINOPHEN 650 MG/1
650 SUPPOSITORY RECTAL
Status: DISCONTINUED | OUTPATIENT
Start: 2017-09-08 | End: 2017-09-12 | Stop reason: HOSPADM

## 2017-09-08 RX ORDER — LANOLIN ALCOHOL/MO/W.PET/CERES
325 CREAM (GRAM) TOPICAL
Status: DISCONTINUED | OUTPATIENT
Start: 2017-09-09 | End: 2017-09-12 | Stop reason: HOSPADM

## 2017-09-08 RX ORDER — INDAPAMIDE 2.5 MG/1
1.25 TABLET, FILM COATED ORAL DAILY
Status: DISCONTINUED | OUTPATIENT
Start: 2017-09-09 | End: 2017-09-12

## 2017-09-08 RX ORDER — LEVOTHYROXINE SODIUM 150 UG/1
150 TABLET ORAL
Status: DISCONTINUED | OUTPATIENT
Start: 2017-09-09 | End: 2017-09-12

## 2017-09-08 RX ORDER — SPIRONOLACTONE 25 MG/1
25 TABLET ORAL DAILY
Status: DISCONTINUED | OUTPATIENT
Start: 2017-09-09 | End: 2017-09-12

## 2017-09-08 RX ORDER — ERGOCALCIFEROL 1.25 MG/1
50000 CAPSULE ORAL DAILY
Status: DISCONTINUED | OUTPATIENT
Start: 2017-09-09 | End: 2017-09-12 | Stop reason: HOSPADM

## 2017-09-08 RX ORDER — METFORMIN HYDROCHLORIDE 500 MG/1
1000 TABLET, EXTENDED RELEASE ORAL DAILY
Status: DISCONTINUED | OUTPATIENT
Start: 2017-09-09 | End: 2017-09-09

## 2017-09-08 RX ORDER — ASPIRIN 81 MG/1
81 TABLET ORAL DAILY
Status: DISCONTINUED | OUTPATIENT
Start: 2017-09-09 | End: 2017-09-12 | Stop reason: HOSPADM

## 2017-09-08 RX ADMIN — LOSARTAN POTASSIUM 100 MG: 50 TABLET ORAL at 21:54

## 2017-09-08 RX ADMIN — GADOTERIDOL 20 ML: 279.3 INJECTION, SOLUTION INTRAVENOUS at 21:31

## 2017-09-08 RX ADMIN — HEPARIN SODIUM 5000 UNITS: 5000 INJECTION, SOLUTION INTRAVENOUS; SUBCUTANEOUS at 17:48

## 2017-09-08 RX ADMIN — Medication 10 ML: at 17:49

## 2017-09-08 RX ADMIN — CARVEDILOL 25 MG: 12.5 TABLET, FILM COATED ORAL at 17:48

## 2017-09-08 RX ADMIN — CHLORZOXAZONE 500 MG: 500 TABLET ORAL at 21:58

## 2017-09-08 RX ADMIN — AMLODIPINE BESYLATE 5 MG: 5 TABLET ORAL at 21:54

## 2017-09-08 RX ADMIN — ACETAMINOPHEN 650 MG: 325 TABLET, FILM COATED ORAL at 17:47

## 2017-09-08 RX ADMIN — CYCLOBENZAPRINE HYDROCHLORIDE 5 MG: 10 TABLET, FILM COATED ORAL at 17:47

## 2017-09-08 RX ADMIN — Medication 10 ML: at 21:31

## 2017-09-08 RX ADMIN — LORAZEPAM 0.5 MG: 2 INJECTION INTRAMUSCULAR; INTRAVENOUS at 20:02

## 2017-09-08 RX ADMIN — CHLORZOXAZONE 500 MG: 500 TABLET ORAL at 18:11

## 2017-09-08 RX ADMIN — ACETAMINOPHEN 650 MG: 325 TABLET, FILM COATED ORAL at 14:59

## 2017-09-08 RX ADMIN — SODIUM CHLORIDE 1000 ML: 900 INJECTION, SOLUTION INTRAVENOUS at 13:36

## 2017-09-08 RX ADMIN — DULOXETINE HYDROCHLORIDE 60 MG: 60 CAPSULE, DELAYED RELEASE ORAL at 17:53

## 2017-09-08 RX ADMIN — Medication 10 ML: at 21:56

## 2017-09-08 RX ADMIN — ATORVASTATIN CALCIUM 40 MG: 40 TABLET, FILM COATED ORAL at 21:54

## 2017-09-08 NOTE — ED NOTES
TRANSFER - OUT REPORT:    Verbal report given to Tara Cornell Rn(name) on Clarissa Velázquez  being transferred to [facility](unit) for routine progression of care       Report consisted of patients Situation, Background, Assessment and   Recommendations(SBAR). Information from the following report(s) SBAR, ED Summary, Procedure Summary and Recent Results was reviewed with the receiving nurse. Lines:   Peripheral IV 09/08/17 Left Antecubital (Active)   Site Assessment Clean, dry, & intact 9/8/2017 12:05 PM   Phlebitis Assessment 0 9/8/2017 12:05 PM   Infiltration Assessment 0 9/8/2017 12:05 PM   Dressing Status Clean, dry, & intact 9/8/2017 12:05 PM        Opportunity for questions and clarification was provided.       Patient transported with:   [employer]

## 2017-09-08 NOTE — ED NOTES
Dr. Donna Cao at bedside assessing the pt.  Wants to know when we get paperwork from [facility] general.

## 2017-09-08 NOTE — PROGRESS NOTES
Stroke Education documented in Patient Education: YES  Core Measures Documented in Connect Care:  Risk Factors: YES  Warning signs of stroke: YES  When to Activate 911: YES  Medication Education for Risk Factors: YES  Smoking cessation if applicable: N/A  Written Education Given:  YES    Discharge NIH Completed: Admission  Score: 8    BRAINS: YES    Follow Up Appointment Made: Not at this time  Date/Time if applicable: N/A    Stroke binder at bedside with patient along with discharge binder.

## 2017-09-08 NOTE — INTERDISCIPLINARY ROUNDS
IDR/SLIDR Summary          Patient: Carla Narayanan MRN: [id number]    Age: 59 y.o. YOB: 1960 Room/Bed: [facility]   Admit Diagnosis: Left-sided weakness  Principal Diagnosis: <principal problem not specified>   Goals: Stroke education, MRI/Echo/Carotids/labs  Readmission: NO  Quality Measure: CHF  VTE Prophylaxis: Chemical  Influenza Vaccine screening completed? NO  Pneumococcal Vaccine screening completed? NO  Mobility needs: Yes   Nutrition plan:Yes  Consults:P.T, O.T., Speech and Case Management    Financial concerns:No  Escalated to CM? YES  RRAT Score: 12   [facility]   Testing due for pt today? YES  LOS: 0 days Expected length of stay 1-2 days  [facility]   PCP: Izabela Alas.  Elif Singer MD  Transportation needs: Yes    Days before discharge:one day until discharge   Discharge disposition: home with [facility] outpatient vs PT/OT recommendations     Signed:     Karlee Cantu RN  9/8/2017  7:02 PM

## 2017-09-08 NOTE — PROGRESS NOTES
Bedside shift change report given to LILIANA Andrade (oncoming nurse) by Rosalio Vazquez RN (offgoing nurse). Report included the following information SBAR, Kardex, MAR and Cardiac Rhythm NSR.

## 2017-09-08 NOTE — PROGRESS NOTES
Admission Medication Reconciliation:    Information obtained from: patient, [facility] at ([address], [place] [address] pharmacist Santana Bettencourt    Significant PMH/Disease States:   Past Medical History:   Diagnosis Date    Advanced care planning/counseling discussion 3/4/16    On File    Bronchitis 2/24/2014    Cervicalgia 8/18/15    Dr. Tony Bergman    Chest pain 5/25/15    Hospitalized at [facility] 5/25/15 (lab work negative)    Congestive heart failure, unspecified     Last Echo 2/8/15: EF 55-60%    Constipation 6/13/2017    Enthesopathy, spinal ([facility] [address] 8/18/15    Dr. Rosa Hsieh hypertension     Foot drop 8/18/15    Dr. Devin Alvarez Heart attack [facility]) 2/24/2013    Was supposed to See Cardiology for possible pacemaker in november 2014- After Cardiology consult locally, no need, EF greatly improved. Established with Dr. Lilia Mason Hiatal hernia 6/2015    3 cm hiatal hernia     Hip pain 8/18/15    Dr. Tony Bergman    Hypercholesterolemia     Hyperglycemia 7/2015    A1c 5.9     Hyperlipidemia 6/30/2015    NMR lipoprofile- LDL P 997, LDL-c 71, HLD-C-39, TG-60, HLD-P (25.2), Small LDL-P -541, LDL size 20.6    Hypothyroid     Insomnia 6/13/2017    Lower extremity edema     Lumbar spondylosis 8/18/15    Dr. Brandi Busby 6/2015    EGD/Colonscopy 6/15- Gastritis, internal hemorrhoids and 3 polyps    Murmur     EDDIE on CPAP     Was referred to Pulmonology - Uses CPAP    Osteoarthritis of hip 8/18/15    Dr. Tony Bergman    Osteoarthritis, shoulder 8/18/15    Dr. Devin Alvarez Radiculopathy, cervical 8/18/15    Dr. Tony Bergman    Shoulder pain 8/18/15    Dr. Devin Alvarez Spinal stenosis of cervical region 8/18/15    Dr. Devin Alvarez Spinal stenosis, lumbar 8/18/15    Dr. Devin Alvarez Stroke [facility]) 2/25/2014    Established with Neurology, Denice Fonseca NP-Just hospitalized at [facility] 2/7/15-2/10/15. CT negative, but Late effect CV accident with increased tone described on discharge summary, Carotid dopplers showed 50% stenosis bilaterally.      Vitamin D deficiency 2015       Chief Complaint for this Admission:  stroke    Allergies:  Bees [hymenoptera allergenic extract]; Strawberry; and Lisinopril    Prior to Admission Medications:   Prior to Admission Medications   Prescriptions Last Dose Informant Patient Reported? Taking? DULoxetine (CYMBALTA) 60 mg capsule   No Yes   Sig: Take 1 Cap by mouth every evening. EPINEPHrine (EPIPEN 2-LAURA) 0.3 mg/0.3 mL injection   No Yes   Si.3 mL by IntraMUSCular route once as needed for up to 1 dose. HYDROcodone-acetaminophen (NORCO) 7.5-325 mg per tablet   Yes Yes   Sig: Take 1 Tab by mouth daily. albuterol (PROVENTIL HFA, VENTOLIN HFA, PROAIR HFA) 90 mcg/actuation inhaler   No Yes   Sig: Take 2 Puffs by inhalation every four (4) hours as needed for Wheezing or Shortness of Breath. amLODIPine (NORVASC) 5 mg tablet   No Yes   Sig: Take 1 Tab by mouth nightly. aspirin delayed-release 81 mg tablet   Yes Yes   Sig: Take 81 mg by mouth daily. atorvastatin (LIPITOR) 40 mg tablet   No Yes   Sig: Take 1 Tab by mouth nightly. carvedilol (COREG) 25 mg tablet   No Yes   Sig: Take 1 Tab by mouth two (2) times daily (with meals). chlorzoxazone (PARAFON FORTE) 500 mg tablet   Yes Yes   Sig: Take 500 mg by mouth three (3) times daily. cyanocobalamin (VITAMIN B12) 1,000 mcg/mL injection 2017  No Yes   Si mL by SubCUTAneous route every fourteen (14) days. For b12 deficiency   cyclobenzaprine (FLEXERIL) 10 mg tablet   Yes Yes   Sig: Take 10 mg by mouth three (3) times daily as needed for Muscle Spasm(s). cyclobenzaprine (FLEXERIL) 5 mg tablet   Yes Yes   Sig: Take 5 mg by mouth two (2) times a day. ergocalciferol (ERGOCALCIFEROL) 50,000 unit capsule   No Yes   Sig: Take 1 Cap by mouth daily. High needs due to gastric bypass history   ferrous sulfate 325 mg (65 mg iron) tablet   Yes Yes   Sig: Take 325 mg by mouth Daily (before breakfast).    furosemide (LASIX) 40 mg tablet 2017 at Unknown time Yes Yes   Sig: Take 40 mg by mouth every Tuesday, Thursday, Saturday & Sunday. indapamide (LOZOL) 1.25 mg tablet   No Yes   Sig: Take 1 Tab by mouth daily. levothyroxine (SYNTHROID) 150 mcg tablet   No Yes   Sig: Take 1 Tab by mouth Daily (before breakfast). lidocaine (LIDODERM) 5 %   No Yes   Sig: Apply patch to the affected area for 12 hours a day and remove for 12 hours a day. linaclotide (LINZESS) 145 mcg cap capsule   No Yes   Sig: Take 1 Cap by mouth Daily (before breakfast). For constipation   losartan (COZAAR) 100 mg tablet   No Yes   Sig: Take 1 Tab by mouth nightly. metFORMIN ER (GLUCOPHAGE XR) 500 mg tablet   No Yes   Sig: Take 2 Tabs by mouth daily. morphine CR (MS CONTIN) 15 mg CR tablet   Yes Yes   Sig: Take 15 mg by mouth daily. pantoprazole (PROTONIX) 40 mg tablet   No Yes   Sig: Take 1 Tab by mouth daily. spironolactone (ALDACTONE) 25 mg tablet   No Yes   Sig: Take 1 Tab by mouth daily. Facility-Administered Medications: None       Comments/Recommendations: Called patients pharmacy as well as [facility]. Confirmed the above medication list. Added furosemide. Of note, patient had two different prescriptions being filled for two different doses of cyclobenzaprine as noted above. Upon interview, the patient and her family member did not have any medication questions. Patient also noted that she uses a CPAP machine at night. She was able to speak single words at a time which were slurred and was able to gesture with her right hand. Confirmed allergies with patient's pharmacy as well as with the patient.     Sara Neri, PharmD  ED EXT [id number]

## 2017-09-08 NOTE — PROCEDURES
[facility]  *** FINAL REPORT ***    Name: Rony Pepper  MRN: [id number]    Outpatient  : 27 May 1960  HIS Order #: [id number]  [id number] [facility] Visit #: [id number]  Date: 08 Sep 2017    TYPE OF TEST: Cerebrovascular Duplex    REASON FOR TEST  Transient ischemic attacks    Right Carotid:-             Proximal               Mid                 Distal  cm/s  Systolic  Diastolic  Systolic  Diastolic  Systolic  Diastolic  CCA:     69.9      11.0                            61.0      16.0  Bulb:  ECA:     62.0      10.0  ICA:     36.0      14.0                            33.0      10.0  ICA/CCA:  0.6       0.9    ICA Stenosis:    Right Vertebral:-  Finding: Antegrade  Sys:       46.0  Vicky:       19.0    Right Subclavian:    Left Carotid:-            Proximal                Mid                 Distal  cm/s  Systolic  Diastolic  Systolic  Diastolic  Systolic  Diastolic  CCA:    007.9      15.0                            78.0      13.0  Bulb:  ECA:     39.0      12.0  ICA:     56.0      13.0                            40.0      13.0  ICA/CCA:  0.7       1.0    ICA Stenosis:    Left Vertebral:-  Finding: Antegrade  Sys:       55.0  Vicky:       19.0    Left Subclavian:    INTERPRETATION/FINDINGS  PROCEDURE:  Color duplex ultrasound imaging of extracranial  cerebrovascular arteries. FINDINGS:       Right: Internal carotid velocity is within normal limits. There  is narrowing of the internal carotid flow channel on color Doppler  imaging and non- calcific plaque on B-mode imaging, consistent with  less than 50 percent stenosis. The common and external carotid  arteries are patent and without evidence of hemodynamically  significant stenosis. Left:  Internal carotid velocity is within normal limits. There  is narrowing of the internal carotid flow channel on color Doppler  imaging and  calcific plaque on B-mode imaging, consistent with less  than 50 percent stenosis.   The common and external carotid arteries  are patent and without evidence of hemodynamically significant  stenosis. IMPRESSION:  Consistent with less than 50 percent stenosis of the  right internal carotid and less than 50 percent stenosis of the left  internal carotid. Vertebrals are patent with antegrade flow. ADDITIONAL COMMENTS    I have personally reviewed the data relevant to the interpretation of  this  study.     TECHNOLOGIST: Shahida Zavala RVT  Signed: 09/08/2017 06:15 PM    PHYSICIAN: Harjit Vasquez MD  Signed: 09/11/2017 08:56 AM

## 2017-09-08 NOTE — H&P
[address]   [address], [address]   HISTORY AND PHYSICAL       Name:  Karl Hall   MR#:  [id number]   :  1960   Account #:  [de-identified]        Date of Adm:  2017       PRIMARY CARE PHYSICIAN: Jose Diallo MD    CHIEF COMPLAINT: Left-sided weakness since this morning. HISTORY OF PRESENT ILLNESS: This is a 59-year-old female with   past medical history of chronic diastolic CHF, stroke with left residual   weakness, hypercholesterolemia, hypertension, hyperlipidemia, spinal   stenosis, hypothyroidism, who comes over here with worsening   weakness on the left side. The patient claims that she has a history of   stroke in 2014, from which she has residual left-sided weakness   with left-sided foot drop. She lives at home with her sister, but she   goes to [address] [place] [address] rehab 3 days a week. Today, while she was at   [address] [place] [address] at around 10:00 while she was \"playing [employer]\" at   that time she slid on her left side. At that time, her blood sugar was   checked and it was 73 and D10 was administered and the patient was   brought into the ER. The patient claims that at that time, when she slid,   she was having some chest pain as well, which was about 3-4/10 in   intensity, dull, retrosternal, nonradiating. No nausea, vomiting,   headache, dizziness, cough, fever, no changes in bowel movements. REVIEW OF SYSTEMS: Ten review of systems were done. Pertinent   positives as above. The rest of the review of systems were reviewed,   but were all negative. PAST MEDICAL HISTORY   1. Constipation. 2. Chronic diastolic CHF. 3. Constipation. 4. Foot drop. 5. Essential hypertension. 6. Hiatal hernia. 7. Hypercholesterolemia. 8. Hyperlipidemia. 9. Hypothyroidism. 10. Obstructive sleep apnea on CPAP. 11. Stroke. PAST SURGERIES   1. Colonoscopy. 2. Breast biopsy. 3. Cholecystectomy. 4. Gastric bypass in .     FAMILY HISTORY: Significant for coronary artery disease and   diabetes. SOCIAL HISTORY: The patient is a former smoker, quit in 9029,   nonalcoholic, nondrug abuser. ALLERGIES: THE PATIENT IS ALLERGIC TO LISINOPRIL. HOME ALLERGIES: The patient is on the following medications   1. Albuterol 2 puffs q.4h. p.r.n.   2. Norvasc 5 mg p.o. at bedtime. 3. Aspirin 81 mg p.o. daily. 4. Lipitor 40 mg p.o. at bedtime. 5. Coreg 25 mg p.o. b.i.d.   6. Flexeril 10 mg p.o. t.i.d.   7.  mg p.o. b.i.d.   8. Cymbalta 60 mg p.o. q.p.m.   9. Iron sulfate 325 mg p.o. daily. 10. Lasix 40 mg p.o. Tuesday, Thursday, Saturday, Sunday. 11. [place] 7.5 p.o. daily. 12. Lozol 1.25 mg p.o. daily. 13. Synthroid 150 mcg p.o. daily. 14. Linzess 145 mcg p.o. before breakfast.   15. Cozaar 100 mg p.o. at bedtime. 16. Metformin  mg tablets 2 tablets daily. 17. MS Contin CR 15 mg p.o. daily. 18. Protonix 40 mg p.o. daily. 19. Aldactone 25 mg p.o. daily. PHYSICAL EXAMINATION   VITAL SIGNS: At the time the patient was seen, the patient's vitals   were as follows: Blood pressure 116/90, pulse 78, respiratory rate 16,   saturating 100% on room air. GENERAL: Not in acute distress, comfortably lying in bed. HEENT: Head normocephalic, atraumatic. EYES: PERRLA, EOMI. EARS: Bilateral hearing normal, no growths. NOSE: No polyps, no bleeding. MOUTH: Moist mucous membranes. Decent oral hygiene. NECK: Supple. No JVD, no thyromegaly. RESPIRATORY: Clear to auscultation bilaterally. No adventitious   breath sounds. CARDIOVASCULAR: S1, S2 normal. No murmurs, rubs or gallops. GASTROINTESTINAL: Bowel sounds present. Soft, nontender,   nondistended. NEUROLOGICAL: Cranial nerves 2 through 12 intact. Motor 4+/5 left   upper and left lower extremity, 5/5 in right upper and right lower   extremity. Sensory stable. PSYCHIATRIC: Mood and affect appropriate. Alert and oriented x3. SKIN: No rashes or ulcers.     LABORATORY AND RADIOLOGICAL RESULTS: WBC 8.8,   hemoglobin 10.9, hematocrit 34, and a platelet count of 112,170. INR   1.1. Sodium 134, potassium 4.4, chloride 103, bicarbonate 25, glucose   280, BUN 40, creatinine 1.4, albumin of 3.4, AST 21, ALT 27. Troponin   x1 is negative. CT scan of the head was done which did not show any acute process. EKG showed normal sinus rhythm, no ST-T wave changes suggestive   of ischemia. ASSESSMENT AND PLAN: This is a 57-year-old [facility]   female with a past medical history of coronary artery disease,   hypertension, hypercholesterolemia, spinal stenosis, chronic diastolic   CHF, stroke with left residual weakness, comes over here with   worsening left-sided weakness. 1. Worsening left-sided weakness. I am going to admit the patient   under observation. I have already told the patient and the patient's   sister sitting beside the patient. Neurotele was consulted and there   was a question about past medical history of hemorrhagic stroke, that   is why the patient was not deemed a candidate for tPA. We will get   MRI, echo and  for the patient and consult Neurology regarding the   same. 2. Hypertension. We will restart the patient's home medications. 3. History of stroke with left residual. The patient is on aspirin as well as   statin. We will continue that. 4. Morbid obesity. We will consult Nutrition. 5. Hypothyroidism, stable. We would restart the patient's Synthroid. 6. Chest pain. I think it is atypical, but would cycle the troponins   and anyway the patient would get echo. For now I would not consult   Cardiology, but if any of this is abnormal then we can do so. I spent about 45 minutes in taking care of the patient.         Indra Doe MD PG / Pamela Membreno   D:  09/08/2017   14:14   T:  09/08/2017   15:12   Job #:  [id number]

## 2017-09-08 NOTE — ED TRIAGE NOTES
Per EMS pt was @ a rehab facility and became very lethargic with slurred speech. Upon arrival of EMS pt Bblood sugar was 73. Pt was given an infusion of D10. Upon arrival to ER blood sugar was 145.

## 2017-09-08 NOTE — ED NOTES
Teleneurology on camera with pt conducting assessment. Requesting paperwork from [facility] to know if last stroke was hemorrhagic. Teleneurology is waiting on this to make decision about alteplase administration.

## 2017-09-08 NOTE — IP AVS SNAPSHOT
1594 [facility] [place] [address] 
[phone] Patient: Annika Child MRN: [id number] BUM:0/29/8904 You are allergic to the following Allergen Reactions Bees (Hymenoptera Allergenic Extract) Shortness of Breath Swelling Strawberry Shortness of Breath Swelling Lisinopril Cough Recent Documentation Height Weight Breastfeeding? BMI OB Status Smoking Status 1.676 m 148.1 kg No 52.68 kg/m2 Postmenopausal Former Smoker Emergency Contacts Name Discharge Info Relation Home Work Mobile [address] CAREGIVER [3] Sister [23] [phone] Julius SIMENTAL  Other Relative [6]   [phone] About your hospitalization You were admitted on:  September 8, 2017 You last received care in the:  [facility] 6S NEURO-SCI TELE You were discharged on:  September 12, 2017 Unit phone number:  [phone] Why you were hospitalized Your primary diagnosis was:  Left-Sided Weakness Your diagnoses also included:  Intractable Migraine Without Aura And Without Status Migrainosus Providers Seen During Your Hospitalizations Provider Role Specialty Primary office phone Dee Keller MD Attending Provider Emergency Medicine [phone] Andrew Quiroga MD Attending Provider Internal Medicine [phone] Stephanie Deleon MD Attending Provider Hospitalist [phone] Brenda Kaye MD Attending Provider Internal Medicine [phone] Cris Jessica MD Attending Provider Internal Medicine [phone] Your Primary Care Physician (PCP) Primary Care Physician Office Phone Office Fax Trae Prado [phone] Follow-up Information Follow up With Details Comments Contact Info Andrez Cabot Jacquetta Rubins, MD Schedule an appointment as soon as possible for a visit in 1 week for hospital discharge follow-up [address] [place] [address] 
[phone] 9 [facility], DO Schedule an appointment as soon as possible for a visit in 4 weeks For follow-up of migraine headaches [address] [place] [address] [address] [facility] Neurology [facility][1] [address] [place] 
[phone] Ana [facility] 227  home health- call agency if you have not been contacted by noon the day after discharge to inquire as to the day and time of initial visit  [place] [address] 
[phone] Current Discharge Medication List  
  
START taking these medications Dose & Instructions Dispensing Information Comments Morning Noon Evening Bedtime  
 albuterol-ipratropium 2.5 mg-0.5 mg/3 ml Nebu Commonly known as:  Tona Zhuve Your last dose was: Your next dose is:    
   
   
 Dose:  3 mL  
3 mL by Nebulization route every six (6) hours as needed. Quantity:  30 Nebule Refills:  0 HYDROcodone-acetaminophen 5-325 mg per tablet Commonly known as:  [place] No Replaces:  NORCO 7.5-325 mg per tablet Your last dose was: Your next dose is:    
   
   
 Dose:  1 Tab Take 1 Tab by mouth every six (6) hours as needed for Pain. Max Daily Amount: 4 Tabs. Quantity:  20 Tab Refills:  0  
     
   
   
   
  
 insulin lispro 100 unit/mL injection Commonly known as:  HUMALOG Your last dose was: Your next dose is:    
   
   
 Sliding scale before each meal only 140-200 : 2 units 201-250 : 4 units 251-300 : 6 units 301-350 : 8 units >350 : 10 units and call MD  
 Quantity:  1 Vial  
Refills:  0  
     
   
   
   
  
 topiramate 25 mg tablet Commonly known as:  TOPAMAX Your last dose was: Your next dose is:    
   
   
 Dose:  25 mg Take 1 Tab by mouth two (2) times daily (with meals) for 30 days. Quantity:  60 Tab Refills:  0 CONTINUE these medications which have CHANGED Dose & Instructions Dispensing Information Comments Morning Noon Evening Bedtime  
 carvedilol 25 mg tablet Commonly known as:  Suresh Hamper What changed:  additional instructions Your last dose was: Your next dose is:    
   
   
 Dose:  25 mg Take 1 Tab by mouth two (2) times daily (with meals). Hold for SBP<100 or HR <55 Quantity:  180 Tab Refills:  3  
     
   
   
   
  
 cyclobenzaprine 5 mg tablet Commonly known as:  FLEXERIL What changed:  Another medication with the same name was removed. Continue taking this medication, and follow the directions you see here. Your last dose was: Your next dose is:    
   
   
 Dose:  5 mg Take 5 mg by mouth two (2) times a day. Refills:  0  
     
   
   
   
  
 levothyroxine 125 mcg tablet Commonly known as:  SYNTHROID What changed:   
- medication strength 
- how much to take Your last dose was: Your next dose is:    
   
   
 Dose:  125 mcg Take 1 Tab by mouth Daily (before breakfast). Quantity:  30 Tab Refills:  0 Dose increase  
    
   
   
   
  
 losartan 50 mg tablet Commonly known as:  COZAAR Start taking on:  9/15/2017 What changed:   
- medication strength 
- how much to take 
- additional instructions Your last dose was: Your next dose is:    
   
   
 Dose:  50 mg Take 1 Tab by mouth nightly. Hold for SBP<115 Quantity:  30 Tab Refills:  0 Change from AM dosing to PM dosing  
    
   
   
   
  
 morphine CR 15 mg CR tablet Commonly known as:  MS CONTIN What changed:  when to take this Your last dose was: Your next dose is:    
   
   
 Dose:  15 mg Take 1 Tab by mouth every twelve (12) hours. Max Daily Amount: 30 mg.  
 Quantity:  60 Tab Refills:  0 CONTINUE these medications which have NOT CHANGED Dose & Instructions Dispensing Information Comments Morning Noon Evening Bedtime  
 albuterol 90 mcg/actuation inhaler Commonly known as:  PROVENTIL HFA, VENTOLIN HFA, PROAIR HFA Your last dose was: Your next dose is:    
   
   
 Dose:  2 Puff Take 2 Puffs by inhalation every four (4) hours as needed for Wheezing or Shortness of Breath. Quantity:  3 Inhaler Refills:  3  
     
   
   
   
  
 aspirin delayed-release 81 mg tablet Your last dose was: Your next dose is:    
   
   
 Dose:  81 mg Take 81 mg by mouth daily. Refills:  0  
     
   
   
   
  
 atorvastatin 40 mg tablet Commonly known as:  LIPITOR Your last dose was: Your next dose is:    
   
   
 Dose:  40 mg Take 1 Tab by mouth nightly. Quantity:  90 Tab Refills:  3  
     
   
   
   
  
 cyanocobalamin 1,000 mcg/mL injection Commonly known as:  VITAMIN B12 Your last dose was: Your next dose is:    
   
   
 Dose:  1000 mcg 1 mL by SubCUTAneous route every fourteen (14) days. For b12 deficiency Quantity:  10 mL Refills:  5 DULoxetine 60 mg capsule Commonly known as:  CYMBALTA Your last dose was: Your next dose is:    
   
   
 Dose:  60 mg Take 1 Cap by mouth every evening. Quantity:  90 Cap Refills:  1 EPINEPHrine 0.3 mg/0.3 mL injection Commonly known as:  EPIPEN 2-LAURA Your last dose was: Your next dose is:    
   
   
 Dose:  0.3 mg  
0.3 mL by IntraMUSCular route once as needed for up to 1 dose. Quantity:  2 Syringe Refills:  3  
     
   
   
   
  
 ergocalciferol 50,000 unit capsule Commonly known as:  ERGOCALCIFEROL Your last dose was: Your next dose is:    
   
   
 Dose:  82693 Units Take 1 Cap by mouth daily. High needs due to gastric bypass history Quantity:  90 Cap Refills:  3 Higher needs due to gastric bypass. Vit D level only 11 with twice weekly ergocalciferol. ferrous sulfate 325 mg (65 mg iron) tablet Your last dose was: Your next dose is:    
   
   
 Dose:  325 mg Take 325 mg by mouth Daily (before breakfast). Refills:  0  
     
   
   
   
  
 lidocaine 5 % Commonly known as:  Jann Kitchen Your last dose was: Your next dose is:    
   
   
 Apply patch to the affected area for 12 hours a day and remove for 12 hours a day. Quantity:  90 Each Refills:  1  
     
   
   
   
  
 linaclotide 145 mcg Cap capsule Commonly known as:  Syhann Parma Your last dose was: Your next dose is:    
   
   
 Dose:  145 mcg Take 1 Cap by mouth Daily (before breakfast). For constipation Quantity:  90 Cap Refills:  3  
     
   
   
   
  
 metFORMIN  mg tablet Commonly known as:  GLUCOPHAGE XR Your last dose was: Your next dose is:    
   
   
 Dose:  1000 mg Take 2 Tabs by mouth daily. Quantity:  180 Tab Refills:  3  
     
   
   
   
  
 pantoprazole 40 mg tablet Commonly known as:  PROTONIX Your last dose was: Your next dose is:    
   
   
 Dose:  40 mg Take 1 Tab by mouth daily. Quantity:  90 Tab Refills:  3 STOP taking these medications   
 amLODIPine 5 mg tablet Commonly known as:  NORVASC  
   
  
 chlorzoxazone 500 mg tablet Commonly known as:  PARAFON FORTE  
   
  
 furosemide 40 mg tablet Commonly known as:  LASIX  
   
  
 indapamide 1.25 mg tablet Commonly known as:  Heard Jenni 7.5-325 mg per tablet Generic drug:  HYDROcodone-acetaminophen Replaced by:  HYDROcodone-acetaminophen 5-325 mg per tablet  
   
  
 spironolactone 25 mg tablet Commonly known as:  ALDACTONE Where to Get Your Medications These medications were sent to [address] [place], [place] [address]Abhay, [address] [facility] [address] Phone:  [phone]  
  losartan 50 mg tablet Information on where to get these meds will be given to you by the nurse or doctor. ! Ask your nurse or doctor about these medications  
  albuterol-ipratropium 2.5 mg-0.5 mg/3 ml Nebu  
 carvedilol 25 mg tablet HYDROcodone-acetaminophen 5-325 mg per tablet  
 insulin lispro 100 unit/mL injection  
 levothyroxine 125 mcg tablet  
 morphine CR 15 mg CR tablet  
 topiramate 25 mg tablet Discharge Instructions Discharge Instructions PATIENT ID: Jim Conroy MRN: [id number] YOB: 1960 DATE OF ADMISSION: 9/8/2017 11:14 AM   
DATE OF DISCHARGE: 9/10/2017 PRIMARY CARE PROVIDER: Rio Christianson MD  
 
ATTENDING PHYSICIAN: Josue Duke MD 
DISCHARGING PROVIDER: Dr. Nestor Nath MD   
To contact this individual call [phone] and ask the  to page. If unavailable ask to be transferred the Adult Hospitalist Department. DISCHARGE DIAGNOSES weakness, migraine headache CONSULTATIONS: IP CONSULT TO HOSPITALIST 
IP CONSULT TO NEUROLOGY PROCEDURES/SURGERIES: * No surgery found * PENDING TEST RESULTS:  
At the time of discharge the following test results are still pending: n/a FOLLOW UP APPOINTMENTS:  
Follow-up Information Follow up With Details Comments Contact Info Rio Christianson MD Schedule an appointment as soon as possible for a visit in 1 week for hospital discharge follow-up [facility] [address] [place] [address] 
[phone] [address] [place] [address],  Schedule an appointment as soon as possible for a visit in 4 weeks For follow-up of migraine headaches [address] [place] [address] [address] [facility] [address] 
[phone] ADDITIONAL CARE RECOMMENDATIONS:  
You were started on Topomax for migraine headache prevention.  MRI of the brain did not show any new stroke. Follow-up with your doctors for routine care. DIET: Diabetic Diet ACTIVITY: Activity as tolerated WOUND CARE: n/a DISCHARGE MEDICATIONS: 
 See Medication Reconciliation Form · It is important that you take the medication exactly as they are prescribed. · Keep your medication in the bottles provided by the pharmacist and keep a list of the medication names, dosages, and times to be taken in your wallet. · Do not take other medications without consulting your doctor. NOTIFY YOUR PHYSICIAN FOR ANY OF THE FOLLOWING:  
Fever over 101 degrees for 24 hours. Chest pain, shortness of breath, fever, chills, nausea, vomiting, diarrhea, change in mentation, falling, weakness, bleeding. Severe pain or pain not relieved by medications. Or, any other signs or symptoms that you may have questions about. DISPOSITION: 
  Home With: 
 OT  PT  HH  RN  
  
XX SNF/Inpatient Rehab/LTAC Independent/assisted living Hospice Other:  
 
Special instruction 1. Vital sign check Q shift for next 3 days, call MD if SBP<100 or for fever 2. PT/OT 3. Insulin sliding scale as prescribed 4. CPAP, family is bringing from home to use at night, until get CPAP use oxygen 2 L/min at night only 5. BMP and Mg on 9/14/17. PROBLEM LIST Updated: 
Yes x Signed:  
Sebastian Garcia MD 
9/10/2017 
11:00 AM 
 
 
Discharge Orders None ACO Transitions of Care Introducing [employer] [address] offers a voluntary care coordination program to provide high quality service and care to [place] fee-for-service beneficiaries. Karthik Aparicio was designed to help you enhance your health and well-being through the following services: ? Transitions of Care  support for individuals who are transitioning from one care setting to another (example: Hospital to home). ? Chronic and Complex Care Coordination  support for individuals and caregivers of those with serious or chronic illnesses or with more than one chronic (ongoing) condition and those who take a number of different medications. If you meet specific medical criteria, a [address] [place] [address] Rd may call you directly to coordinate your care with your primary care physician and your other care providers. For questions about the [facility] programs, please, contact your physicians office. For general questions or additional information about Accountable Care Organizations: 
Please visit www.medicare.gov/acos. html or call 1-800-MEDICARE (7-261.484.2063) TTY users should call 6-178.432.5072. [employer] Announcement We are excited to announce that we are making your provider's discharge notes available to you in [employer]. You will see these notes when they are completed and signed by the physician that discharged you from your recent hospital stay. If you have any questions or concerns about any information you see in [employer], please call the Health Information Department where you were seen or reach out to your Primary Care Provider for more information about your plan of care. Introducing [facility]! Dear Genny Greenwood: Thank you for requesting a [employer] account. Our records indicate that you already have an active [employer] account. You can access your account anytime at [email or web address] Did you know that you can access your hospital and ER discharge instructions at any time in [employer]? You can also review all of your test results from your hospital stay or ER visit. Additional Information If you have questions, please visit the Frequently Asked Questions section of the [employer] website at [email or web address] Remember, [employer] is NOT to be used for urgent needs. For medical emergencies, dial 911. Now available from your iPhone and Android! General Information Please provide this summary of care documentation to your next provider. Patient Signature:  ____________________________________________________________ Date:  ____________________________________________________________  
  
Rexann Ports Provider Signature:  ____________________________________________________________ Date:  ____________________________________________________________

## 2017-09-08 NOTE — ED PROVIDER NOTES
HPI Comments: 59 y.o. female with extensive past medical history, please see list, significant for MI, CHF, stroke, bronchitis, hypercholesterolemia, essential HTN, hiatal hernia, hyperlipidemia, spinal stenosis, osteoarthritis, and hypothyroid who presents from [place] [address] via EMS with chief complaint of lethargy. Pt has a history of stroke (February 2014) with residual L-sided weakness. Pt was at [place] [address] this morning when she suddenly became lethargic and unable to perform the activities about 30 minutes ago (1040). Pt was able to nod to questions but had slurred speech. EMS states she had a BG level of 73 and BP of 112/68 en route. D10 was administered PTA. Pt currently reports having a HA and CP. Per relative, Pt was normal this morning with clear speech. Relative states Pt took all of her morning medications. Pt is on 81 mg ASA and no other anticoagulants. Pt denies having fever or vomiting. There are no other acute medical concerns at this time. 11:40 PM  Cory Collins MD reviewed electronic medical record system for any past medical records that were available that may contribute to the patients current condition. Pt has a history of stroke on 02/25/2014 with residual L-hemiparesis. Pt was admitted to [facility] from 6/6/16 to 6/8/16 for sudden onset of L-sided weakness, numbness, CP, and HA during [facility]. Pt was seen by  neurotelemetry in the ER and deemed not a candidate for TPA because of her previous hemorrhagic stroke history, and was admitted for further management. MRI brain - chronic lacunar infarcts consistent with intracranial small vessel disease. 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  Pt was discharged on ASA only. PCP: Izabela Alas. Elif Singer MD    Full history, physical exam, and ROS unable to be obtained due to:  Speech difficulty. Note written by Carla Rojas.  Mckayla Garcia, as dictated by Cory Collins MD 11:15 AM    The history is provided by the patient, a relative and the EMS personnel. Past Medical History:   Diagnosis Date    Advanced care planning/counseling discussion 3/4/16    On File    Bronchitis 2/24/2014    Cervicalgia 8/18/15    Dr. Robert Jay    Chest pain 5/25/15    Hospitalized at [facility] 5/25/15 (lab work negative)    Congestive heart failure, unspecified     Last Echo 2/8/15: EF 55-60%    Constipation 6/13/2017    Enthesopathy, spinal ([address] 8/18/15    Dr. Kar Mota Essential hypertension     Foot drop 8/18/15    Dr. Kar Mota Heart attack [facility]) 2/24/2013    Was supposed to See Cardiology for possible pacemaker in november 2014- After Cardiology consult locally, no need, EF greatly improved. Established with Dr. Dee Dee Hale Hiatal hernia 6/2015    3 cm hiatal hernia     Hip pain 8/18/15    Dr. Robert Jay    Hypercholesterolemia     Hyperglycemia 7/2015    A1c 5.9     Hyperlipidemia 6/30/2015    NMR lipoprofile- LDL P 997, LDL-c 71, HLD-C-39, TG-60, HLD-P (25.2), Small LDL-P -541, LDL size 20.6    Hypothyroid     Insomnia 6/13/2017    Lower extremity edema     Lumbar spondylosis 8/18/15    Dr. Constantino Dance 6/2015    EGD/Colonscopy 6/15- Gastritis, internal hemorrhoids and 3 polyps    Murmur     EDDIE on CPAP     Was referred to Pulmonology - Uses CPAP    Osteoarthritis of hip 8/18/15    Dr. Robert Jay    Osteoarthritis, shoulder 8/18/15    Dr. Kar Mota Radiculopathy, cervical 8/18/15    Dr. Robert Jay    Shoulder pain 8/18/15    Dr. Kar Mota Spinal stenosis of cervical region 8/18/15    Dr. Kar Mota Spinal stenosis, lumbar 8/18/15    Dr. Kar Mota Stroke [facility]) 2/25/2014    Established with NeurologyPraveen NP-Just hospitalized at [facility] 2/7/15-2/10/15. CT negative, but Late effect CV accident with increased tone described on discharge summary, Carotid dopplers showed 50% stenosis bilaterally.      Vitamin D deficiency 7/2015       Past Surgical History:   Procedure Laterality Date    COLONOSCOPY N/A 6/22/2016    COLONOSCOPY performed by Ino Bolanos MD at [facility] ENDOSCOPY    HX BREAST BIOPSY  8/11/15    [facility]Neg    HX CHOLECYSTECTOMY      HX COLONOSCOPY  6/2015    Dr. Jerris Sever- 3 complete polypectomies, Internal hemorrhoids, difficult study due to spasm. Repeat in 1 year    HX ENDOSCOPY  6/2015    mild gastritis, 3cm hiatal hernia     HX GASTRIC BYPASS  6/1989    HX HEART CATHETERIZATION  2/2014    HX ORTHOPAEDIC      Right middle finger distal amputation    HX PARTIAL THYROIDECTOMY  ~1990    HX THYROIDECTOMY Left 1985    90% of one side of the thyroid removed         Family History:   Problem Relation Age of Onset    Heart Disease Father 61    Hypertension Mother     Heart Disease Brother 62    Diabetes Maternal Grandmother        Social History     Social History    Marital status: SINGLE     Spouse name: N/A    Number of children: N/A    Years of education: N/A     Occupational History    Not on file. Social History Main Topics    Smoking status: Former Smoker     Packs/day: 0.25     Years: 15.00     Types: Cigarettes     Quit date: 6/13/2007    Smokeless tobacco: Never Used    Alcohol use No    Drug use: No    Sexual activity: No     Other Topics Concern    Not on file     Social History Narrative         ALLERGIES: Bees [hymenoptera allergenic extract]; Strawberry; and Lisinopril    Review of Systems   Unable to perform ROS: Other       Vitals:    09/08/17 1135   BP: (!) 137/33   Pulse: 75   Resp: 25   Temp: 97.6 °F (36.4 °C)   Weight: 143.7 kg (316 lb 12.8 oz)   Height: 5' 6\" (1.676 m)            Physical Exam   Constitutional: She is oriented to person, place, and time. Obese. HENT:   Head: Normocephalic and atraumatic. Mouth/Throat: Mucous membranes are dry. Eyes: EOM are normal. Pupils are equal, round, and reactive to light. Neck: Normal range of motion. Neck supple. No tracheal deviation present.    Cardiovascular: Normal rate, regular rhythm, normal heart sounds and intact distal pulses. Pulmonary/Chest: Effort normal and breath sounds normal. No stridor. No respiratory distress. She has no wheezes. She has no rales. She exhibits no tenderness. Abdominal: Soft. Bowel sounds are normal. She exhibits no distension. There is no tenderness. There is no rebound. Musculoskeletal: Normal range of motion. She exhibits no edema or tenderness. Neurological: She is alert and oriented to person, place, and time. Slow to respond. Upon arrival to ED, Pt was oriented to person, place, and time. L-upper and lower extremity weakness. Subjective decreased sensation in L-upper extremity. Skin: Skin is warm and dry. No rash noted. No pallor. Nursing note and vitals reviewed. Note written by Frederick Cifuentes. Stephanie Sima, as dictated by Beatriz Alvarez MD 11:15 AM       MDM  Number of Diagnoses or Management Options  Cerebrovascular accident (CVA), unspecified mechanism [facility]):   Diagnosis management comments: 64-year-old female with history of hypertension, high cholesterol, CVA with residual left-sided weakness presents from rehabilitation center for sudden change in mental status approximately 30 minutes ago. Patient is slow to respond to questions, left upper and lower extremity weakness. Plan-code stroke protocol. Records indicate a history of hemorrhagic stroke-not candidate for TPA.   Head CT shows no acute process       Amount and/or Complexity of Data Reviewed  Clinical lab tests: ordered and reviewed  Tests in the radiology section of CPT®: ordered and reviewed  Review and summarize past medical records: yes  Discuss the patient with other providers: yes  Independent visualization of images, tracings, or specimens: yes    Risk of Complications, Morbidity, and/or Mortality  Presenting problems: high  Diagnostic procedures: high  Management options: high    Critical Care  Total time providing critical care: 30-74 minutes    Patient Progress  Patient progress: stable    ED Course       Procedures    CONSULT NOTE:  11:28 AM Rachelle An MD spoke with Dr. Tila Lowry, Consult for Teleneurology. Discussed available diagnostic tests and clinical findings. She would like to be called back after Pt returns from CT and is fully examined. ED EKG interpretation:  Rhythm: normal sinus rhythm; and regular . Rate (approx.): 68 bpm; Axis: normal; Normal FL and QRS intervals. Normal ST/T wave segments. Low voltage. No acute ischemia. Unchanged when compared to EKG done 6/13/17. Note written by Kerry Mendoza. Robbi Clark, as dictated by Rachelle An MD 11:47 AM    11:52 AM  Dr. Arielle Peace states Pt is probably not a candidate for TPA due to hx of hemorrhagic stroke in the past.  However, she would like to see Pt via robot. 12:22 PM  Dr. Arielle Peace, Teleneurology, is evaluating Pt. She is requesting records from outside hospital to confirm hemorrhagic stroke in 2014    CONSULT NOTE:  12:55 PM Rachelle An MD spoke with Dr. Genia Cuellar, Consult for Hospitalist.  Discussed available diagnostic tests and clinical findings. He is in agreement with care plans as outlined. He will see and admit.

## 2017-09-08 NOTE — PROGRESS NOTES
Primary Nurse Gabriella Lombardi and Jackelin Collier RN performed a dual skin assessment on this patient No impairment noted  Elier score is 19

## 2017-09-08 NOTE — PROGRESS NOTES
Speech pathology  Consult received for SLP dysphagia screening. Nursing dysphagia screen completed and WNL. If formal SLP evaluation is desired, please re-consult with SLP eval and treat order as SLP does not complete \"screenings\" only evaluations or treatments. Thanks.     Abril Delgado M.S. CCC-SLP

## 2017-09-08 NOTE — PROGRESS NOTES
Spiritual Care Assessment/Progress Notes    Irais Blount [id number]  [id number]    1960  59 y.o.  female    Patient Telephone Number: [phone] (home)   Protestant Affiliation: Wily   Language: [place]   Extended Emergency Contact Information  Primary Emergency Contact: [address] Phone: [phone]  Mobile Phone: [phone]  Relation: Sister  Secondary Emergency Contact: [address] Phone: [phone]  Relation: Other Relative   Patient Active Problem List    Diagnosis Date Noted    Left-sided weakness 09/08/2017    Constipation 06/13/2017    Insomnia 06/13/2017    Obesity  11/03/2016    Prediabetes 10/28/2016    Chronic pain 10/27/2016    Stroke (cerebrum) ([facility] [address] 06/06/2016    Postoperative hypothyroidism 03/04/2016    Thyroid disease     Hypercholesterolemia     EDDIE on CPAP     Essential hypertension     Congestive heart failure ([address]     Melena 06/01/2015    Hiatal hernia 06/01/2015    Chest pain 05/25/2015    Heart palpitations 05/05/2015    Ventricular ectopic activity 05/05/2015    Neck pain, bilateral 04/03/2015    Shoulder pain, bilateral 04/03/2015    Muscle spasticity 04/03/2015    Hemiparesis and alteration of sensations as late effects of stroke ([address] 04/03/2015    Head pain, chronic 04/03/2015    Expressive aphasia 04/03/2015    History of cardiomyopathy 10/17/2014    SOB (shortness of breath) 10/17/2014    High cholesterol 10/14/2014    Bronchitis     Stroke ([address] 02/25/2014    Coronary artery disease involving native coronary artery of native heart without angina pectoris 02/24/2014        Date: 9/8/2017       Level of Protestant/Spiritual Activity:  []         Involved in edie tradition/spiritual practice    []         Not involved in edie tradition/spiritual practice  [x]         Spiritually oriented    []         Claims no spiritual orientation    []         seeking spiritual identity  []         Feels alienated from Islam practice/tradition  []         Feels angry about Islam practice/tradition  [x]         Spirituality/Islam tradition is a resource for coping at this time. []         Not able to assess due to medical condition    Services Provided Today:  [x]         crisis intervention    []         reading Scriptures  [x]         spiritual assessment    [x]         prayer  [x]         empathic listening/emotional support  []         rites and rituals (cite in comments)  []         life review     []         Islam support  []         theological development   []         advocacy  []         ethical dialog     []         blessing  []         bereavement support    [x]         support to family  []         anticipatory grief support   []         help with AMD  []         spiritual guidance    []         meditation      Spiritual Care Needs  [x]         Emotional Support  []         Spiritual/Hinduism Care  []         Loss/Adjustment  []         Advocacy/Referral                /Ethics  []         No needs expressed at               this time  []         Other: (note in               comments)  [address]  []         Follow up visits with               pt/family  []         Provide materials  []         Schedule sacraments  []         Contact Community               Clergy  [x]         Follow up as needed  []         Other: (note in               comments)   Responded to Code S in [phone]. Provided support for sister who was sitting at bedside as pt was being assessed. Assisted by searching and providing a contact number that sister needed. Once some of the staff had cleared room, provided pastoral support for pt who was tearful and mouthing that she wanted to go home. Held pt's hand as sister issued words of encouragement and reassured along with . Prayed with pt and sister.  Advised of availability and attempted to accommodate sister who self-reported and appeared to be coping stephanie.   NELIDA Brothers

## 2017-09-09 PROBLEM — G43.019 INTRACTABLE MIGRAINE WITHOUT AURA AND WITHOUT STATUS MIGRAINOSUS: Status: ACTIVE | Noted: 2017-09-09

## 2017-09-09 LAB
ABO + RH BLD: NORMAL
BLOOD GROUP ANTIBODIES SERPL: NORMAL
CHOLEST SERPL-MCNC: 111 MG/DL
ERYTHROCYTE [DISTWIDTH] IN BLOOD BY AUTOMATED COUNT: 14.4 % (ref 11.5–14.5)
EST. AVERAGE GLUCOSE BLD GHB EST-MCNC: 137 MG/DL
GLUCOSE BLD STRIP.AUTO-MCNC: 86 MG/DL (ref 65–100)
GLUCOSE BLD STRIP.AUTO-MCNC: 87 MG/DL (ref 65–100)
GLUCOSE BLD STRIP.AUTO-MCNC: 88 MG/DL (ref 65–100)
GLUCOSE BLD STRIP.AUTO-MCNC: 88 MG/DL (ref 65–100)
HBA1C MFR BLD: 6.4 % (ref 4.2–6.3)
HCT VFR BLD AUTO: 34.1 % (ref 35–47)
HDLC SERPL-MCNC: 37 MG/DL
HDLC SERPL: 3 {RATIO} (ref 0–5)
HGB BLD-MCNC: 10.7 G/DL (ref 11.5–16)
LDLC SERPL CALC-MCNC: 61.8 MG/DL (ref 0–100)
LIPID PROFILE,FLP: NORMAL
MCH RBC QN AUTO: 27.8 PG (ref 26–34)
MCHC RBC AUTO-ENTMCNC: 31.4 G/DL (ref 30–36.5)
MCV RBC AUTO: 88.6 FL (ref 80–99)
PLATELET # BLD AUTO: 241 K/UL (ref 150–400)
RBC # BLD AUTO: 3.85 M/UL (ref 3.8–5.2)
SERVICE CMNT-IMP: NORMAL
SPECIMEN EXP DATE BLD: NORMAL
T3FREE SERPL-MCNC: 1.9 PG/ML (ref 2.2–4)
TRIGL SERPL-MCNC: 61 MG/DL (ref ?–150)
TROPONIN I SERPL-MCNC: <0.04 NG/ML
TSH SERPL DL<=0.05 MIU/L-ACNC: 0.28 UIU/ML (ref 0.36–3.74)
VLDLC SERPL CALC-MCNC: 12.2 MG/DL
WBC # BLD AUTO: 6.9 K/UL (ref 3.6–11)

## 2017-09-09 PROCEDURE — 82962 GLUCOSE BLOOD TEST: CPT

## 2017-09-09 PROCEDURE — 74011250636 HC RX REV CODE- 250/636: Performed by: HOSPITALIST

## 2017-09-09 PROCEDURE — G8978 MOBILITY CURRENT STATUS: HCPCS

## 2017-09-09 PROCEDURE — 74011250637 HC RX REV CODE- 250/637: Performed by: HOSPITALIST

## 2017-09-09 PROCEDURE — 94660 CPAP INITIATION&MGMT: CPT

## 2017-09-09 PROCEDURE — G8979 MOBILITY GOAL STATUS: HCPCS

## 2017-09-09 PROCEDURE — 85027 COMPLETE CBC AUTOMATED: CPT | Performed by: HOSPITALIST

## 2017-09-09 PROCEDURE — 99218 HC RM OBSERVATION: CPT

## 2017-09-09 PROCEDURE — G8987 SELF CARE CURRENT STATUS: HCPCS

## 2017-09-09 PROCEDURE — 84443 ASSAY THYROID STIM HORMONE: CPT | Performed by: HOSPITALIST

## 2017-09-09 PROCEDURE — 80061 LIPID PANEL: CPT | Performed by: HOSPITALIST

## 2017-09-09 PROCEDURE — G8988 SELF CARE GOAL STATUS: HCPCS

## 2017-09-09 PROCEDURE — 74011000250 HC RX REV CODE- 250: Performed by: PSYCHIATRY & NEUROLOGY

## 2017-09-09 PROCEDURE — 36415 COLL VENOUS BLD VENIPUNCTURE: CPT | Performed by: HOSPITALIST

## 2017-09-09 PROCEDURE — 97165 OT EVAL LOW COMPLEX 30 MIN: CPT

## 2017-09-09 PROCEDURE — 97161 PT EVAL LOW COMPLEX 20 MIN: CPT

## 2017-09-09 PROCEDURE — 74011000258 HC RX REV CODE- 258: Performed by: PSYCHIATRY & NEUROLOGY

## 2017-09-09 PROCEDURE — 96365 THER/PROPH/DIAG IV INF INIT: CPT

## 2017-09-09 PROCEDURE — 83036 HEMOGLOBIN GLYCOSYLATED A1C: CPT | Performed by: HOSPITALIST

## 2017-09-09 PROCEDURE — 93306 TTE W/DOPPLER COMPLETE: CPT

## 2017-09-09 PROCEDURE — 84484 ASSAY OF TROPONIN QUANT: CPT | Performed by: HOSPITALIST

## 2017-09-09 PROCEDURE — 84481 FREE ASSAY (FT-3): CPT | Performed by: HOSPITALIST

## 2017-09-09 PROCEDURE — 96372 THER/PROPH/DIAG INJ SC/IM: CPT

## 2017-09-09 RX ORDER — KETOROLAC TROMETHAMINE 30 MG/ML
30 INJECTION, SOLUTION INTRAMUSCULAR; INTRAVENOUS
Status: DISCONTINUED | OUTPATIENT
Start: 2017-09-09 | End: 2017-09-09

## 2017-09-09 RX ORDER — INSULIN LISPRO 100 [IU]/ML
INJECTION, SOLUTION INTRAVENOUS; SUBCUTANEOUS
Status: DISCONTINUED | OUTPATIENT
Start: 2017-09-09 | End: 2017-09-12 | Stop reason: HOSPADM

## 2017-09-09 RX ORDER — CYCLOBENZAPRINE HCL 10 MG
5 TABLET ORAL EVERY 12 HOURS
Status: DISCONTINUED | OUTPATIENT
Start: 2017-09-09 | End: 2017-09-12 | Stop reason: HOSPADM

## 2017-09-09 RX ORDER — DEXTROSE 50 % IN WATER (D50W) INTRAVENOUS SYRINGE
12.5-25 AS NEEDED
Status: DISCONTINUED | OUTPATIENT
Start: 2017-09-09 | End: 2017-09-12 | Stop reason: HOSPADM

## 2017-09-09 RX ORDER — DULOXETIN HYDROCHLORIDE 60 MG/1
60 CAPSULE, DELAYED RELEASE ORAL
Status: DISCONTINUED | OUTPATIENT
Start: 2017-09-09 | End: 2017-09-12 | Stop reason: HOSPADM

## 2017-09-09 RX ORDER — MAGNESIUM SULFATE 100 %
4 CRYSTALS MISCELLANEOUS AS NEEDED
Status: DISCONTINUED | OUTPATIENT
Start: 2017-09-09 | End: 2017-09-12 | Stop reason: HOSPADM

## 2017-09-09 RX ADMIN — CYCLOBENZAPRINE HYDROCHLORIDE 5 MG: 10 TABLET, FILM COATED ORAL at 09:17

## 2017-09-09 RX ADMIN — DULOXETINE HYDROCHLORIDE 60 MG: 60 CAPSULE, DELAYED RELEASE ORAL at 21:11

## 2017-09-09 RX ADMIN — ASPIRIN 81 MG: 81 TABLET, COATED ORAL at 09:17

## 2017-09-09 RX ADMIN — CHLORZOXAZONE 500 MG: 500 TABLET ORAL at 09:29

## 2017-09-09 RX ADMIN — CARVEDILOL 25 MG: 12.5 TABLET, FILM COATED ORAL at 09:17

## 2017-09-09 RX ADMIN — ERGOCALCIFEROL 50000 UNITS: 1.25 CAPSULE ORAL at 09:17

## 2017-09-09 RX ADMIN — CYCLOBENZAPRINE HYDROCHLORIDE 5 MG: 10 TABLET, FILM COATED ORAL at 21:13

## 2017-09-09 RX ADMIN — CHLORZOXAZONE 500 MG: 500 TABLET ORAL at 18:22

## 2017-09-09 RX ADMIN — Medication 10 ML: at 21:15

## 2017-09-09 RX ADMIN — CHLORZOXAZONE 500 MG: 500 TABLET ORAL at 21:19

## 2017-09-09 RX ADMIN — SPIRONOLACTONE 25 MG: 25 TABLET, FILM COATED ORAL at 09:18

## 2017-09-09 RX ADMIN — HYDROCODONE BITARTRATE AND ACETAMINOPHEN 1 TABLET: 7.5; 325 TABLET ORAL at 09:18

## 2017-09-09 RX ADMIN — INDAPAMIDE 1.25 MG: 2.5 TABLET, FILM COATED ORAL at 09:29

## 2017-09-09 RX ADMIN — HEPARIN SODIUM 5000 UNITS: 5000 INJECTION, SOLUTION INTRAVENOUS; SUBCUTANEOUS at 16:50

## 2017-09-09 RX ADMIN — LOSARTAN POTASSIUM 100 MG: 50 TABLET ORAL at 21:12

## 2017-09-09 RX ADMIN — ATORVASTATIN CALCIUM 40 MG: 40 TABLET, FILM COATED ORAL at 21:12

## 2017-09-09 RX ADMIN — CYANOCOBALAMIN 1000 MCG: 1000 INJECTION, SOLUTION INTRAMUSCULAR at 18:22

## 2017-09-09 RX ADMIN — LEVOTHYROXINE SODIUM 150 MCG: 150 TABLET ORAL at 06:56

## 2017-09-09 RX ADMIN — Medication 10 ML: at 06:56

## 2017-09-09 RX ADMIN — FUROSEMIDE 40 MG: 40 TABLET ORAL at 16:18

## 2017-09-09 RX ADMIN — ACETAMINOPHEN 650 MG: 325 TABLET, FILM COATED ORAL at 16:15

## 2017-09-09 RX ADMIN — AMLODIPINE BESYLATE 5 MG: 5 TABLET ORAL at 21:12

## 2017-09-09 RX ADMIN — HEPARIN SODIUM 5000 UNITS: 5000 INJECTION, SOLUTION INTRAVENOUS; SUBCUTANEOUS at 01:16

## 2017-09-09 RX ADMIN — VALPROATE SODIUM 1000 MG: 100 INJECTION, SOLUTION INTRAVENOUS at 13:24

## 2017-09-09 RX ADMIN — HEPARIN SODIUM 5000 UNITS: 5000 INJECTION, SOLUTION INTRAVENOUS; SUBCUTANEOUS at 09:19

## 2017-09-09 RX ADMIN — FERROUS SULFATE TAB 325 MG (65 MG ELEMENTAL FE) 325 MG: 325 (65 FE) TAB at 06:56

## 2017-09-09 RX ADMIN — PANTOPRAZOLE SODIUM 40 MG: 40 TABLET, DELAYED RELEASE ORAL at 09:17

## 2017-09-09 RX ADMIN — MORPHINE SULFATE 15 MG: 15 TABLET, FILM COATED, EXTENDED RELEASE ORAL at 09:18

## 2017-09-09 NOTE — PROGRESS NOTES
Bedside shift change report given to Otilia Uribe (oncoming nurse) by Kathleen Parekh RN (offgoing nurse).  Report included the following information SBAR, Kardex, MAR, Recent Results and Cardiac Rhythm SR.

## 2017-09-09 NOTE — PROGRESS NOTES
Problem: Self Care Deficits Care Plan (Adult)  Goal: *Acute Goals and Plan of Care (Insert Text)  Occupational Therapy Goals  Initiated 9/9/2017  1. Patient will perform lower body ADLs with minimal assistance/contact guard assist within 7 day(s). 2. Patient will perform upper body ADLs standing 2 mins without fatigue or LOB with modified independence within 7 day(s). 3. Patient will perform all aspects of toileting with independence within 7day(s). 4. Patient will participate in upper extremity therapeutic exercise/activities with independence for 10 minutes within 7 day(s). 5. Patient will utilize energy conservation techniques during functional activities without cues within 7 day(s). OCCUPATIONAL THERAPY EVALUATION  Patient: Ailyn Sheriff (65 y.o. female)  Date: 9/9/2017  Primary Diagnosis: Left-sided weakness        Precautions:   Fall (L AFO)      ASSESSMENT :  Based on the objective data described below, the patient presents with overall Min A x1 for functional mobility with rollator, up to Mod A for lower body ADLs, and overall independent-SBA for upper body ADLs. Patient received supine in bed with nursing present, requesting to get up to [facility]. Patient prefers to be independent and states she likes to [facility] as much as she can for herself\". Patient with baseline LUE/LLE weakness from history of CVA. However, patient reporting increased feeling of global weakness and \"fatigue\" throughout LUE/LLE and face (primarily her eyelid). Per RN, unsure of migraines as potential cause of symptoms; CT negative. Vision WNL and patient reporting light sensitivity at baseline but no functional change since admission. Patient able to complete [facility] transfer with Min A x2 and significantly increased time. Requires assist for bowel hygiene at baseline and is able to complete clothing management with supervision for standing balance. Patient returned to supine with Min A x1 and additional time.  Patient very fatigued and limited with ability to participate in ADLs. Disposition TBD pending ability to progress with acute therapy and need for further history on level of assist available at home and patient's baseline for ambulation (very drowsy for PT and OT sessions and unable to obtain full history); rehab vs HHOT. Patient will benefit from skilled intervention to address the above impairments. Patients rehabilitation potential is considered to be Good  Factors which may influence rehabilitation potential include:   [ ]             None noted  [ ]             Mental ability/status  [ ]             Medical condition  [ ]             Home/family situation and support systems  [ ]             Safety awareness  [ ]             Pain tolerance/management  [ ]             Other:        PLAN :  Recommendations and Planned Interventions:  [X]               Self Care Training                  [X]        Therapeutic Activities  [X]               Functional Mobility Training    [ ]        Cognitive Retraining  [X]               Therapeutic Exercises           [X]        Endurance Activities  [X]               Balance Training                   [ ]        Neuromuscular Re-Education  [ ]               Visual/Perceptual Training     [X]   Home Safety Training  [X]               Patient Education                 [X]        Family Training/Education  [ ]               Other (comment):     Frequency/Duration: Patient will be followed by occupational therapy 5 times a week to address goals. Discharge Recommendations: Rehab vs HHOT  Further Equipment Recommendations for Discharge: Anticipate no needs       SUBJECTIVE:   Patient stated  I try to do as much as I can for myself.       OBJECTIVE DATA SUMMARY:   HISTORY:   Past Medical History:   Diagnosis Date    Advanced care planning/counseling discussion 3/4/16     On File    Bronchitis 2/24/2014    Cervicalgia 8/18/15     Dr. Fareed Bauer    Chest pain 5/25/15     Hospitalized at [facility] 5/25/15 (lab work negative)    Congestive heart failure, unspecified       Last Echo 2/8/15: EF 55-60%    Constipation 6/13/2017    Enthesopathy, spinal ([address] 8/18/15     Dr. Tamra Herrera    Essential hypertension      Foot drop 8/18/15     Dr. Arnold Adorno Heart attack [facility]) 2/24/2013     Was supposed to See Cardiology for possible pacemaker in november 2014- After Cardiology consult locally, no need, EF greatly improved. Established with Dr. Wilson Gloandre Hiatal hernia 6/2015     3 cm hiatal hernia     Hip pain 8/18/15     Dr. Tamra Herrera    Hypercholesterolemia      Hyperglycemia 7/2015     A1c 5.9     Hyperlipidemia 6/30/2015     NMR lipoprofile- LDL P 997, LDL-c 71, HLD-C-39, TG-60, HLD-P (25.2), Small LDL-P -541, LDL size 20.6    Hypothyroid      Insomnia 6/13/2017    Lower extremity edema      Lumbar spondylosis 8/18/15     Dr. Juleen Babinski 6/2015     EGD/Colonscopy 6/15- Gastritis, internal hemorrhoids and 3 polyps    Murmur      EDDIE on CPAP       Was referred to Pulmonology - Uses CPAP    Osteoarthritis of hip 8/18/15     Dr. Tamra Herrera    Osteoarthritis, shoulder 8/18/15     Dr. Arnold Adorno Radiculopathy, cervical 8/18/15     Dr. Tamra Herrera    Shoulder pain 8/18/15     Dr. Arnold Adorno Spinal stenosis of cervical region 8/18/15     Dr. Arnold Adorno Spinal stenosis, lumbar 8/18/15     Dr. Arnold Adorno Stroke [facility]) 2/25/2014     Established with Neurology, Yosvany Frances NP-Just hospitalized at [facility] 2/7/15-2/10/15. CT negative, but Late effect CV accident with increased tone described on discharge summary, Carotid dopplers showed 50% stenosis bilaterally.  Vitamin D deficiency 7/2015     Past Surgical History:   Procedure Laterality Date    COLONOSCOPY N/A 6/22/2016     COLONOSCOPY performed by Cherry Green MD at [facility] ENDOSCOPY    HX BREAST BIOPSY   8/11/15     [facility]---Neg    HX CHOLECYSTECTOMY        HX COLONOSCOPY   6/2015     Dr. Melody Baumgarten- 3 complete polypectomies, Internal hemorrhoids, difficult study due to spasm.  Repeat in 1 year  HX ENDOSCOPY   6/2015     mild gastritis, 3cm hiatal hernia     HX GASTRIC BYPASS   6/1989    HX HEART CATHETERIZATION   2/2014    HX ORTHOPAEDIC         Right middle finger distal amputation    HX PARTIAL THYROIDECTOMY   ~1990    HX THYROIDECTOMY Left 1985     90% of one side of the thyroid removed        Prior Level of Function/Home Situation: per patient report, lives at home with sister and niece's. Patient states she attempts to complete as many ADLs as seh can but she overall has assist for bathing/dressing. Sister completes IADLs and home management tasks. Patient has been attending outpatient PT services for a while now. History of CVA with baseline LUE weakness. Ambulates with rollator. Expanded or extensive additional review of patient history:      Home Situation  Home Environment: Private residence  # Steps to Enter: 3  Rails to Enter: Yes  Hand Rails : Bilateral  One/Two Story Residence: Two story  # of Interior Steps: 14  Living Alone: No  Support Systems: Family member(s)  Patient Expects to be Discharged to[de-identified] Private residence  Current DME Used/Available at Home: Brace/Splint, Walker, rollator  [ ]  Right hand dominant   [ ]  Left hand dominant     EXAMINATION OF PERFORMANCE DEFICITS:  Cognitive/Behavioral Status:  Neurologic State: Alert;Drowsy  Orientation Level: Oriented X4  Cognition: Follows commands;Decreased attention/concentration  Perception: Appears intact  Perseveration: No perseveration noted  Safety/Judgement: Decreased awareness of need for safety;Decreased awareness of need for assistance     Skin: Appears intact     Edema: None noted in BUEs     Hearing:   Auditory  Auditory Impairment: None     Vision/Perceptual:                                      Range of Motion:  AROM: Generally decreased, functional  PROM: Generally decreased, functional     Strength:  Strength: Generally decreased, functional        Coordination:  Coordination: Generally decreased, functional  Fine Motor Skills-Upper: Left Intact; Right Intact    Gross Motor Skills-Upper: Right Intact; Left Impaired     Tone & Sensation:  Tone: Normal  Sensation: Impaired (Baseline some decreased sensation in LUE)        Balance:  Sitting: Intact  Standing: Impaired; Without support  Standing - Static: Good;Constant support  Standing - Dynamic : Fair     Functional Mobility and Transfers for ADLs:  Bed Mobility:  Supine to Sit: Contact guard assistance; Additional time;Assist x1  Sit to Supine: Minimum assistance;Assist x1;Additional time     Transfers:  Sit to Stand: Minimum assistance  Stand to Sit: Minimum assistance  Toilet Transfer : Minimum assistance;Assist x2; Additional time; Adaptive equipment (BSC)     ADL Assessment:  Feeding: Independent*     Oral Facial Hygiene/Grooming: Independent*     Bathing: Moderate assistance (Baseline sister or niece assist)*     Upper Body Dressing: Stand-by assistance*     Lower Body Dressing: Moderate assistance*     Toileting: Moderate assistance   *Inferred per obs of BUE ROM, functional transfers, and activity tolerance/endurance        ADL Intervention and task modifications: Toileting  Bladder Hygiene: Stand-by assistance  Bowel Hygiene: Maximum assistance  Clothing Management: Minimum assistance     Cognitive Retraining  Safety/Judgement: Decreased awareness of need for safety;Decreased awareness of need for assistance     Functional Measure:  Barthel Index:      Bathin  Bladder: 10  Bowels: 10  Groomin  Dressin  Feeding: 10  Mobility: 0  Stairs: 0  Toilet Use: 5  Transfer (Bed to Chair and Back): 10  Total: 55         Barthel and G-code impairment scale:  Percentage of impairment CH  0% CI  1-19% CJ  20-39% CK  40-59% CL  60-79% CM  80-99% CN  100%   Barthel Score 0-100 100 99-80 79-60 59-40 20-39 1-19    0   Barthel Score 0-20 20 17-19 13-16 9-12 5-8 1-4 0      The Barthel ADL Index: Guidelines  1.  The index should be used as a record of what a patient does, not as a record of what a patient could do. 2. The main aim is to establish degree of independence from any help, physical or verbal, however minor and for whatever reason. 3. The need for supervision renders the patient not independent. 4. A patient's performance should be established using the best available evidence. Asking the patient, friends/relatives and nurses are the usual sources, but direct observation and common sense are also important. However direct testing is not needed. 5. Usually the patient's performance over the preceding 24-48 hours is important, but occasionally longer periods will be relevant. 6. Middle categories imply that the patient supplies over 50 per cent of the effort. 7. Use of aids to be independent is allowed. Angel Reynolds., Barthel, D.W. (2633). Functional evaluation: the Barthel Index. [address] [facility] (14)2. YINA Dubois, Paulo North., Dilan Melo., [place], [address] [place] [address] (1999). Measuring the change indisability after inpatient rehabilitation; comparison of the responsiveness of the Barthel Index and Functional [place] Measure. Journal of Neurology, Neurosurgery, and Psychiatry, 66(4), 653-815. Corwin Gan, N.J.A, JES Guthrie.ADAM, & Suzanne Hsieh, M.A. (2004.) Assessment of post-stroke quality of life in cost-effectiveness studies: The usefulness of the Barthel Index and the EuroQoL-5D. Quality of Life Research, 13, 843-46            G codes: In compliance with CMSs Claims Based Outcome Reporting, the following G-code set was chosen for this patient based on their primary functional limitation being treated: The outcome measure chosen to determine the severity of the functional limitation was the Barthel Index with a score of 55/100 which was correlated with the impairment scale.       · Self Care:               - CURRENT STATUS:    CK - 40%-59% impaired, limited or restricted               - GOAL STATUS:           CJ - 20%-39% impaired, limited or restricted               - D/C STATUS:                       ---------------To be determined---------------      Occupational Therapy Evaluation Charge Determination   History Examination Decision-Making   LOW Complexity : Brief history review  MEDIUM Complexity : 3-5 performance deficits relating to physical, cognitive , or psychosocial skils that result in activity limitations and / or participation restrictions MEDIUM Complexity : Patient may present with comorbidities that affect occupational performnce. Miniml to moderate modification of tasks or assistance (eg, physical or verbal ) with assesment(s) is necessary to enable patient to complete evaluation       Based on the above components, the patient evaluation is determined to be of the following complexity level: LOW   Pain:  Pain Scale 1: Visual  Pain Intensity 1: 0  Pain Location 1: Head  Pain Orientation 1: Anterior; Left  Pain Description 1: Constant  Pain Intervention(s) 1: Repositioned;MD notified (comment)  Activity Tolerance:   Good. Please refer to the flowsheet for vital signs taken during this treatment. After treatment:   [ ] Patient left in no apparent distress sitting up in chair  [X] Patient left in no apparent distress in bed  [X] Call bell left within reach  [X] Nursing notified  [ ] Caregiver present  [ ] Bed alarm activated      COMMUNICATION/EDUCATION:   The patients plan of care was discussed with: Physical Therapist and Registered Nurse.  [X] Home safety education was provided and the patient/caregiver indicated understanding. [X] Patient/family have participated as able in goal setting and plan of care. [ ] Patient/family agree to work toward stated goals and plan of care. [ ] Patient understands intent and goals of therapy, but is neutral about his/her participation. [ ] Patient is unable to participate in goal setting and plan of care. This patients plan of care is appropriate for delegation to [facility].      Thank you for this referral.  Luis Enrique Booth, OT  Time Calculation: 15 mins

## 2017-09-09 NOTE — PROGRESS NOTES
Bedside and Verbal shift change report given to [address] [place] [address] (oncoming nurse) by Chante Villarreal RN (offgoing nurse). Report included the following information SBAR, Kardex, ED Summary, Intake/Output, MAR, Accordion, Recent Results and Cardiac Rhythm NSR.

## 2017-09-09 NOTE — PROGRESS NOTES
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 Assessment/Plan:  Left-sided weakness: (POA) acute on chronic, improving. Unclear etiology. Neuro consulted - question of migraine headaches  -MRI brain with no new stroke, notes old infarcts. Carotid dopplers with <50 stenosis b/l.  -PT, OT evals  -trial of IV depakote for headache     HTN: stable on current regimen    Hypothyroidism: continue synthroid    Chest discomfort: resolved, seemed non-cardiac in nature, cardiac biomarkers x3 negative    Chronic diastolic HF: NYHA class II, appears euvolemic, continue home meds. Type II DM: monitor BG, continue SSI    Morbid obesity    Code status: full  DVT prophylaxis: sq heparin  Disposition: home later today or tomorrow  ----------------------------------------------    CC: headache    S: feels like her weakness is at baseline, now c/o of a moderate generalized headache more notable in the front. No vision changes, new weakness, or paresthesias. No n/v/d or dyspnea. Review of Systems:  Pertinent items are noted in HPI.     O:  Visit Vitals    BP 94/51 (BP 1 Location: Right arm, BP Patient Position: At rest)    Pulse 67    Temp 99 °F (37.2 °C)    Resp 14    Ht 5' 6\" (1.676 m)    Wt 147.3 kg (324 lb 12.8 oz)    SpO2 100%    Breastfeeding No    BMI 52.42 kg/m2       PHYSICAL EXAM:  Gen: NAD, non-toxic, obese  HEENT: anicteric sclerae, normal conjunctiva, oropharynx clear, MM moist  Neck: supple, trachea midline, no adenopathy  Heart: RRR, no MRG, no JVD, no peripheral edema  Lungs: CTA b/l, non-labored respirations  Abd: soft, NT, ND, BS+  Extr: warm  Skin: dry, no rash  Neuro: CN II-XII grossly intact, normal speech, moves all extremities, diminished strength LUE  Psych: normal mood, appropriate affect      Intake/Output Summary (Last 24 hours) at 09/09/17 1548  Last data filed at 09/09/17 1324   Gross per 24 hour   Intake               50 ml   Output              150 ml   Net             -100 ml        Recent labs & imaging reviewed:  Recent Labs      09/09/17   0240  09/08/17   1144   WBC  6.9  8.2   HGB  10.7*  10.9*   HCT  34.1*  34.8*   PLT  241  257     Recent Labs      09/08/17   1144   NA  134*   K  4.4   CL  103   CO2  25   BUN  40*   CREA  1.47*   GLU  282*   CA  8.5     Recent Labs      09/08/17   1144   SGOT  21   ALT  27   AP  130*   TBILI  0.4   TP  7.1   ALB  3.4*   GLOB  3.7     Recent Labs      09/08/17   1144  09/08/17   1121   INR  1.1  1.1   PTP  10.8   --         Med list reviewed  Current Facility-Administered Medications   Medication Dose Route Frequency    insulin lispro (HUMALOG) injection   SubCUTAneous AC&HS    glucose chewable tablet 16 g  4 Tab Oral PRN    dextrose (D50W) injection syrg 12.5-25 g  12.5-25 g IntraVENous PRN    glucagon (GLUCAGEN) injection 1 mg  1 mg IntraMUSCular PRN    cyclobenzaprine (FLEXERIL) tablet 5 mg  5 mg Oral Q12H    DULoxetine (CYMBALTA) capsule 60 mg  60 mg Oral QHS    aspirin delayed-release tablet 81 mg  81 mg Oral DAILY    ferrous sulfate tablet 325 mg  325 mg Oral ACB    albuterol (PROVENTIL VENTOLIN) nebulizer solution 2.5 mg  2.5 mg Nebulization Q4H PRN    lidocaine (LIDODERM) 5 % patch 1 Patch  1 Patch TransDERmal Q24H    pantoprazole (PROTONIX) tablet 40 mg  40 mg Oral DAILY    . PHARMACY TO SUBSTITUTE PER PROTOCOL    Per Protocol    atorvastatin (LIPITOR) tablet 40 mg  40 mg Oral QHS    carvedilol (COREG) tablet 25 mg  25 mg Oral BID WITH MEALS    chlorzoxazone (PARAFON FORTE) tablet 500 mg  500 mg Oral TID    morphine CR (MS CONTIN) tablet 15 mg  15 mg Oral DAILY    levothyroxine (SYNTHROID) tablet 150 mcg  150 mcg Oral ACB    spironolactone (ALDACTONE) tablet 25 mg  25 mg Oral DAILY    cyanocobalamin (VITAMIN B12) injection 1,000 mcg  1,000 mcg SubCUTAneous Q 14 DAYS    indapamide (LOZOL) tablet 1.25 mg  1.25 mg Oral DAILY    amLODIPine (NORVASC) tablet 5 mg  5 mg Oral QHS    losartan (COZAAR) tablet 100 mg  100 mg Oral QHS    ergocalciferol (ERGOCALCIFEROL) capsule 50,000 Units  50,000 Units Oral DAILY    HYDROcodone-acetaminophen (NORCO) 7.5-325 mg per tablet 1 Tab  1 Tab Oral DAILY    cyclobenzaprine (FLEXERIL) tablet 10 mg  10 mg Oral TID PRN    furosemide (LASIX) tablet 40 mg  40 mg Oral EVERY TUES,THUR,SAT,SUN    sodium chloride (NS) flush 5-10 mL  5-10 mL IntraVENous Q8H    sodium chloride (NS) flush 5-10 mL  5-10 mL IntraVENous PRN    acetaminophen (TYLENOL) tablet 650 mg  650 mg Oral Q4H PRN    Or    acetaminophen (TYLENOL) solution 650 mg  650 mg Per NG tube Q4H PRN    Or    acetaminophen (TYLENOL) suppository 650 mg  650 mg Rectal Q4H PRN    ibuprofen (MOTRIN) tablet 600 mg  600 mg Oral Q6H PRN    Or    ibuprofen (ADVIL;MOTRIN) 100 mg/5 mL oral suspension 600 mg  600 mg Per NG tube Q6H PRN    meperidine (DEMEROL) injection 12.5 mg  12.5 mg IntraVENous Q4H PRN    heparin (porcine) injection 5,000 Units  5,000 Units SubCUTAneous Q8H       Care Plan discussed with:  Patient/Family and Nurse    Brenda Pretty MD  Internal Medicine  Date of Service: 9/9/2017

## 2017-09-09 NOTE — PROGRESS NOTES
Problem: Falls - Risk of  Goal: *Absence of Falls  Document Yessenia Fall Risk and appropriate interventions in the flowsheet.    Outcome: Progressing Towards Goal  Fall Risk Interventions:  Mobility Interventions: Communicate number of staff needed for ambulation/transfer, Patient to call before getting OOB, PT Consult for mobility concerns           Medication Interventions: Patient to call before getting OOB, Teach patient to arise slowly, Utilize gait belt for transfers/ambulation     Elimination Interventions: Call light in reach, Elevated toilet seat, Patient to call for help with toileting needs

## 2017-09-09 NOTE — CONSULTS
NEUROLOGY  9/9/2017     Consulted by: Stefany Hackett MD        Patient ID:  Abelardo López  [id number]  59 y.o.  1960    Chief Complaint   Patient presents with    Stroke       HPI    Brittany Chen is a 20-year-old woman with multiple stroke risk factors (CHF, MI, hyperlipidemia, hypertension, history of stroke) who was brought to the hospital from rehabilitation when she had worsening lethargy, poor responsiveness, and slurred speech. She had concomitant headache and chest pain at that time. She tells me that she continues to have a left frontal temporal headache that is pounding at times worse with bright lights. She has nausea. She tells me she gets headaches every now and then very severely. She feels acutely ill during her headaches. She is on aspirin 81 mg and Lipitor prior to admission. MRI brain was done and there is no acute process but there is notable chronic ischemic disease. She has residual left hemiparesis due to her stroke. Review of Systems   Eyes: Positive for photophobia. Gastrointestinal: Positive for nausea. Neurological: Positive for speech change, weakness and headaches. All other systems reviewed and are negative. Past Medical History:   Diagnosis Date    Advanced care planning/counseling discussion 3/4/16    On File    Bronchitis 2/24/2014    Cervicalgia 8/18/15    Dr. Fareed Bauer    Chest pain 5/25/15    Hospitalized at [facility] 5/25/15 (lab work negative)    Congestive heart failure, unspecified     Last Echo 2/8/15: EF 55-60%    Constipation 6/13/2017    Enthesopathy, spinal ([facility] [address] 8/18/15    Dr. Amy Marcus Essential hypertension     Foot drop 8/18/15    Dr. Amy Marcus Heart attack [facility]) 2/24/2013    Was supposed to See Cardiology for possible pacemaker in november 2014- After Cardiology consult locally, no need, EF greatly improved.  Established with Dr. Angeli Thorpe Hiatal hernia 6/2015    3 cm hiatal hernia     Hip pain 8/18/15    Dr. Amy Marcus Hypercholesterolemia     Hyperglycemia 7/2015    A1c 5.9     Hyperlipidemia 6/30/2015    NMR lipoprofile- LDL P 997, LDL-c 71, HLD-C-39, TG-60, HLD-P (25.2), Small LDL-P -541, LDL size 20.6    Hypothyroid     Insomnia 6/13/2017    Lower extremity edema     Lumbar spondylosis 8/18/15    Dr. Rachel Rodney 6/2015    EGD/Colonscopy 6/15- Gastritis, internal hemorrhoids and 3 polyps    Murmur     EDDIE on CPAP     Was referred to Pulmonology - Uses CPAP    Osteoarthritis of hip 8/18/15    Dr. Beryl Osgood    Osteoarthritis, shoulder 8/18/15    Dr. Beryl Osgood    Radiculopathy, cervical 8/18/15    Dr. Beryl Osgood    Shoulder pain 8/18/15    Dr. Bassem Sánchez Spinal stenosis of cervical region 8/18/15    Dr. Bassem Sánchez Spinal stenosis, lumbar 8/18/15    Dr. Bassem Sánchez Stroke [facility]) 2/25/2014    Established with Neurology, Peter Airas NP-Just hospitalized at [facility] 2/7/15-2/10/15. CT negative, but Late effect CV accident with increased tone described on discharge summary, Carotid dopplers showed 50% stenosis bilaterally.  Vitamin D deficiency 7/2015     Family History   Problem Relation Age of Onset    Heart Disease Father 61    Hypertension Mother     Heart Disease Brother 62    Diabetes Maternal Grandmother      Social History     Social History    Marital status: SINGLE     Spouse name: N/A    Number of children: N/A    Years of education: N/A     Occupational History    Not on file.      Social History Main Topics    Smoking status: Former Smoker     Packs/day: 0.25     Years: 15.00     Types: Cigarettes     Quit date: 6/13/2007    Smokeless tobacco: Never Used    Alcohol use No    Drug use: No    Sexual activity: No     Other Topics Concern    Not on file     Social History Narrative     Current Facility-Administered Medications   Medication Dose Route Frequency    insulin lispro (HUMALOG) injection   SubCUTAneous AC&HS    glucose chewable tablet 16 g  4 Tab Oral PRN    dextrose (D50W) injection syrg 12.5-25 g 12.5-25 g IntraVENous PRN    glucagon (GLUCAGEN) injection 1 mg  1 mg IntraMUSCular PRN    valproate (DEPACON) 1,000 mg in 0.9% sodium chloride 50 mL IVPB  1 g IntraVENous ONCE    aspirin delayed-release tablet 81 mg  81 mg Oral DAILY    ferrous sulfate tablet 325 mg  325 mg Oral ACB    albuterol (PROVENTIL VENTOLIN) nebulizer solution 2.5 mg  2.5 mg Nebulization Q4H PRN    DULoxetine (CYMBALTA) capsule 60 mg  60 mg Oral QPM    lidocaine (LIDODERM) 5 % patch 1 Patch  1 Patch TransDERmal Q24H    pantoprazole (PROTONIX) tablet 40 mg  40 mg Oral DAILY    . PHARMACY TO SUBSTITUTE PER PROTOCOL    Per Protocol    atorvastatin (LIPITOR) tablet 40 mg  40 mg Oral QHS    carvedilol (COREG) tablet 25 mg  25 mg Oral BID WITH MEALS    chlorzoxazone (PARAFON FORTE) tablet 500 mg  500 mg Oral TID    morphine CR (MS CONTIN) tablet 15 mg  15 mg Oral DAILY    levothyroxine (SYNTHROID) tablet 150 mcg  150 mcg Oral ACB    spironolactone (ALDACTONE) tablet 25 mg  25 mg Oral DAILY    cyanocobalamin (VITAMIN B12) injection 1,000 mcg  1,000 mcg SubCUTAneous Q 14 DAYS    indapamide (LOZOL) tablet 1.25 mg  1.25 mg Oral DAILY    amLODIPine (NORVASC) tablet 5 mg  5 mg Oral QHS    losartan (COZAAR) tablet 100 mg  100 mg Oral QHS    ergocalciferol (ERGOCALCIFEROL) capsule 50,000 Units  50,000 Units Oral DAILY    HYDROcodone-acetaminophen (NORCO) 7.5-325 mg per tablet 1 Tab  1 Tab Oral DAILY    cyclobenzaprine (FLEXERIL) tablet 10 mg  10 mg Oral TID PRN    cyclobenzaprine (FLEXERIL) tablet 5 mg  5 mg Oral BID    furosemide (LASIX) tablet 40 mg  40 mg Oral EVERY TUES,THUR,SAT,SUN    sodium chloride (NS) flush 5-10 mL  5-10 mL IntraVENous Q8H    sodium chloride (NS) flush 5-10 mL  5-10 mL IntraVENous PRN    acetaminophen (TYLENOL) tablet 650 mg  650 mg Oral Q4H PRN    Or    acetaminophen (TYLENOL) solution 650 mg  650 mg Per NG tube Q4H PRN    Or    acetaminophen (TYLENOL) suppository 650 mg  650 mg Rectal Q4H PRN    ibuprofen (MOTRIN) tablet 600 mg  600 mg Oral Q6H PRN    Or    ibuprofen (ADVIL;MOTRIN) 100 mg/5 mL oral suspension 600 mg  600 mg Per NG tube Q6H PRN    meperidine (DEMEROL) injection 12.5 mg  12.5 mg IntraVENous Q4H PRN    heparin (porcine) injection 5,000 Units  5,000 Units SubCUTAneous Q8H     Allergies   Allergen Reactions    Bees [Hymenoptera Allergenic Extract] Shortness of Breath and Swelling    Strawberry Shortness of Breath and Swelling    Lisinopril Cough       Visit Vitals    /65 (BP 1 Location: Left arm, BP Patient Position: At rest)    Pulse 65    Temp 98 °F (36.7 °C)    Resp 14    Ht 5' 6\" (1.676 m)    Wt 147.3 kg (324 lb 12.8 oz)    SpO2 100%    Breastfeeding No    BMI 52.42 kg/m2     Physical Exam   Constitutional: She appears well-developed and well-nourished. Cardiovascular: Normal rate. Pulmonary/Chest: Effort normal.   Skin: Skin is warm and dry. Psychiatric:   In a great deal of pain. Poor eye contact. Slow to respond. Vitals reviewed. Neurologic Exam     Mental Status   Attention: normal.   Level of consciousness: drowsy    Cranial Nerves   Cranial nerves II through XII intact.      CN VII   Left facial weakness: central    Motor Exam   Muscle bulk: normal  Overall muscle tone: normal       Left hemiparesis 4/5 right middle finger  Traumatic amputation distally  Left foot drop requiring AFO     Sensory Exam        Grossly intact to touch     Gait, Coordination, and Reflexes     Gait  Gait: (DeferredAFO not being worn and she is a falls risk)           Lab Results  Component Value Date/Time   WBC 6.9 09/09/2017 02:40 AM   HGB 10.7 09/09/2017 02:40 AM   Hemoglobin (POC) 14.3 02/22/2016 10:57 AM   HCT 34.1 09/09/2017 02:40 AM   Hematocrit (POC) 42 02/22/2016 10:57 AM   PLATELET 947 25/10/0915 02:40 AM   MCV 88.6 09/09/2017 02:40 AM     Lab Results  Component Value Date/Time   Hemoglobin A1c 6.4 09/09/2017 02:40 AM   Hemoglobin A1c 5.9 02/02/2017 02:31 PM Hemoglobin A1c 6.2 10/27/2016 01:48 PM   Glucose 282 09/08/2017 11:44 AM   Glucose (POC) 88 09/09/2017 06:51 AM   LDL, calculated 61.8 09/09/2017 02:40 AM   Creatinine (POC) 0.9 02/22/2016 10:57 AM   Creatinine 1.47 09/08/2017 11:44 AM      Lab Results  Component Value Date/Time   Cholesterol, total 111 09/09/2017 02:40 AM   HDL Cholesterol 37 09/09/2017 02:40 AM   LDL, calculated 61.8 09/09/2017 02:40 AM   LDL-C, External 71 07/01/2015 09:44 AM   Triglyceride 61 09/09/2017 02:40 AM   CHOL/HDL Ratio 3.0 09/09/2017 02:40 AM   Lab Results  Component Value Date/Time   ALT (SGPT) 27 09/08/2017 11:44 AM   AST (SGOT) 21 09/08/2017 11:44 AM   GGT 37 02/02/2017 02:31 PM   Alk. phosphatase 130 09/08/2017 11:44 AM   Bilirubin, total 0.4 09/08/2017 11:44 AM   Albumin 3.4 09/08/2017 11:44 AM   Protein, total 7.1 09/08/2017 11:44 AM   INR 1.1 09/08/2017 11:44 AM   Prothrombin time 10.8 09/08/2017 11:44 AM   PLATELET 369 58/42/3822 02:40 AM                    CT Results (maximum last 3): Results from [place] encounter on 09/08/17   CT CODE NEURO HEAD WO CONTRAST   Narrative INDICATION:   code S    EXAM:  HEAD CT WITHOUT CONTRAST    COMPARISON:  June 6, 2016    TECHNIQUE:  Routine noncontrast axial head CT was performed. Sagittal and  coronal reconstructions were generated. CT dose reduction was achieved through use of a standardized protocol tailored  for this examination and automatic exposure control for dose modulation. Adaptive statistical iterative reconstruction (ASIR) was utilized. FINDINGS:    Ventricles:  Midline, no hydrocephalus. Intracranial Hemorrhage:  None. Brain Parenchyma/Brainstem:  Few small areas of hypodensity involving the right  basal ganglia and corona radiata similar to prior study suggests chronic  ischemic changes. .   Basal Cisterns:  Normal.   Paranasal Sinuses:  Visualized sinuses are clear. Additional Comments:  N/A.          Impression IMPRESSION:    No acute process. Results from [place] encounter on 06/06/16   CTA CODE NEURO HEAD AND NECK W CONT   Narrative **Final Report**      ICD Codes / Adm. Diagnosis: 902707  M50.13 / Chest Pain  chest pain  Examination:  CTA CODE NEURO HEAD NECK W CON  - [id number] - Jun 6 2016 12:22PM  Accession No:  [id number]  Reason:  Increase in left side deficit and high NIH score      REPORT:  EXAM:  CTA CODE NEURO HEAD NECK W CON  INDICATION:  Increase in left side deficit and high NIH score  TECHNIQUE: Axial spiral acquired CT angiography was performed from the   aortic arch up through the intracranial vessels. This was performed during   intravenous bolus infusion 100 mL of Isovue 370. Post-processing with  MIP   reconstructions as well as 3 D surface shaded reconstructions  from aortic   arch to the skull base. COMPARISON: CT head  FINDINGS:  Tortuous otherwise normal origins of the brachiocephalic arteries from the   arch. The common carotid arteries are normal. There is tortuosity of the proximal   left common carotid artery. Atherosclerotic calcification is noted at both carotid bifurcations without   associated stenosis involving the internal or external carotid arteries. There is left greater than right cervical internal carotid artery tortuosity   without associated stenosis. Carotid siphons are unremarkable. Relatively symmetric opacification of middle and anterior cerebral arteries. Vertebral arteries are similar in size. Moderate mid basilar artery stenosis is likely atherosclerotic. The basilar   artery is otherwise relatively unremarkable and supplies both posterior   cerebral arteries. Minimal if any posterior communicating arteries   demonstrated. No abnormal intracranial enhancement. Small foci of decreased attenuation in the right basal ganglia and right   posterior frontal corona radiata. These are consistent with areas of small   vessel ischemic injury of indeterminate age. IMPRESSION:  1. Mild/moderate mid basilar artery stenosis. 2. Atherosclerotic calcification carotid bifurcations without associated   stenosis. 3. Generalized tortuosity of the brachiocephalic and carotid arteries may be   related to chronic hypertension. 4. Areas of decreased attenuation in the right basal ganglia and right   posterior frontal centrum semiovale consistent with age-indeterminate small   vessel type ischemic injury. Signing/Reading Doctor: Oralia Edmonds ([id number])    Approved: Oralia Edmonds ([id number])  Jun 6 2016 12:55PM                                   MRI Results (maximum last 3): Results from [place] encounter on 09/08/17   MRI BRAIN W WO CONT   Narrative CLINICAL HISTORY: Left-sided weakness    INDICATION: Patient presented with left-sided weakness. COMPARISON: 9/8/2017 at 11:11 AM    TECHNIQUE: MR examination of the brain includes axial and sagittal T1  pre-and-post contrast, axial T2, axial FLAIR, axial gradient echo, axial DWI,  coronal T1 postcontrast.  CONTRAST: The patient was administered gadolinium-based contrast material axial  and coronal T1-weighted postcontrast enhanced imaging was obtained. FINDINGS:   There is no intracranial mass, hemorrhage or evidence of acute infarction. There is a remote infarction in the periventricular white matter on the right. Remote infarction in the caudate and right basal ganglia as well. There is no  acute infarction. Minimal scattered foci of increased T2 signal intensity and  corona radiata and centrum semiovale. There is no Chiari or syrinx. The pituitary and infundibulum are grossly  unremarkable. There is no skull base mass. Cerebellopontine angles are grossly  unremarkable. The major intracranial vascular flow-voids are unremarkable. There is no abnormal parenchymal enhancement. There is no abnormal meningeal  enhancement. No evidence of demyelinating process. The cavernous sinuses are symmetric. Optic chiasm and infundibulum grossly  unremarkable. Orbits are grossly symmetric. Dural venous sinuses are grossly  patent. The brain architecture is normal. There is no evidence of midline shift  or mass-effect. There are no extra-axial fluid collections. The mastoid air cells are well pneumatized and clear. Impression IMPRESSION:  No intracranial mass, hemorrhage or evidence of acute infarction. Remote small infarctions in the periventricular white matter on the right and in  the basal ganglia on the right. Results from [place] encounter on 06/06/16   MRA NECK W CONT   Narrative **Final Report**      ICD Codes / Adm. Diagnosis: 780.79  R53.1 / Other malaise and fatigue    Weakness  Examination:  MR NECK MRA W CON  - [id number] - Jun 6 2016  9:26PM  Accession No:  [id number]  Reason:  stroke      REPORT:  EXAM:  MR NECK MRA W CON    INDICATION:  stroke    COMPARISON:  CT angiogram of earlier in the day    TECHNIQUE:    . Contrast enhanced coronal acquisition and 2-D time-of-flight MRA of the   neck were performed utilizing 15 cc Gadavist. Multiplanar reconstructions   were obtained. FINDINGS:    MRA NECK  There is conventional three vessel arch anatomy. The bilateral subclavian,   common carotid, and internal carotid arteries are patent with no   flow-limiting stenosis. There is tortuosity of the great vessels likely due   to chronic hypertension. The vertebral arteries are codominant and patent. There is mild to moderate   stenosis of the mid basilar artery as demonstrated on the previous CT   angiogram. Detailed evaluation of the intracranial vessels was not performed   on this study of the neck. IMPRESSION:   Vascular tortuosity. Otherwise negative MRA of the neck, without stenosis or   irregularity of the great vessels. Mild to moderate mid basilar stenosis.              Signing/Reading Doctor: Kash Claudio ([id number])    Approved: Kash Claudio ([id number])  Jun 6 2016  9:46PM                                 MRI BRAIN W WO CONT   Narrative **Final Report**      ICD Codes / Adm. Diagnosis: 780.79  R53.1 / Other malaise and fatigue    Weakness  Examination:  MR BRAIN W AND WO CON  - [id number] - Jun 6 2016  9:26PM  Accession No:  [id number]  Reason:  Stroke      REPORT:  EXAM:  MR BRAIN W AND WO CON  INDICATION:  Stroke, left-sided facial droop, left-sided weakness  COMPARISON:  CT head of earlier in the day, CTA of head and neck of earlier   in the day  TECHNIQUE:  MR imaging of the brain was performed with sagittal T1, axial   T1, T2, FLAIR, GRE, DWI/ADC; pre and post contrast multiplanar T1 utilizing   15 mL Gadavist.         FINDINGS:    The ventricles are normal in size and are midline. There is no    intracranial hemorrhage  or extra-axial fluid collection. There are a few   chronic lacunar infarcts in the periventricular white matter adjacent to the   right lateral ventricle. There is a chronic lacunar infarct in the left   inferior basal ganglia. There is no acute infarction. The major   intracranial vascular flow-voids are patent. There is no abnormal   parenchymal or meningeal enhancement. The paranasal sinuses and mastoid air   cells are clear. IMPRESSION:     1. No acute findings. 2. Chronic lacunar infarcts consistent with intracranial small vessel   disease. Signing/Reading Doctor: Jacy Francis ([id number])    Deonna Weiss ([id number])  Jun 6 2016  9:42PM                                   VAS/US/Carotid Doppler Results (maximum last 3): Results from [place] encounter on 09/08/17   DUPLEX CAROTID BILATERAL    PET Results (maximum last 3): No results found for this or any previous visit. Assessment and Plan        68-year-old woman with history of stroke who presented with worsening baseline weakness associated with speech change and poor responsiveness.   She had a concomitant severe headache which is suspicious for migraine. MRI is reassuring that there is no acute process so no change to aspirin or Lipitor. I am going to give her a dose of Depakote IV to help abort the headache potentially. Continue antiemetics as needed. Avoid excessive sedating narcotic medication if possible. If She is feeling improvement, I anticipate she will be discharged.             Veverly LolitaDO  NEUROLOGIST  Diplomate [facility]  9/9/2017

## 2017-09-09 NOTE — PROGRESS NOTES
Bedside and Verbal shift change report given to Elver Sifuentes RN (oncoming nurse) by Nadja Hector RN (offgoing nurse). Report included the following information SBAR, Kardex, Intake/Output, MAR, Accordion, Recent Results, Med Rec Status, Cardiac Rhythm NSr and Alarm Parameters . 1600-Bedside and verbal shift change report given to [employer] (oncoming nurse) by Elver Sifuentes RN (offgoing nurse). Report included the following information SBAR, Kardex, Intake/Output, MAR, Accordion, Recent Results, Med Rec Status, Cardiac Rhythm NSR and Alarm Parameters .

## 2017-09-09 NOTE — PROGRESS NOTES
Problem: Mobility Impaired (Adult and Pediatric)  Goal: *Acute Goals and Plan of Care (Insert Text)  Physical Therapy Goals  Initiated 9/9/2017  1. Patient will move from supine to sit and sit to supine and scoot up and down in bed with supervision/set-up within 7 day(s). 2. Patient will transfer from bed to chair and chair to bed with supervision/set-up using the least restrictive device within 7 day(s). 3. Patient will perform sit to stand with supervision/set-up within 7 day(s). 4. Patient will ambulate with supervision/set-up for 50 feet with the least restrictive device within 7 day(s). 5. Patient will ascend/descend 2 stairs with B handrail(s) with minimal assistance/contact guard assist within 7 day(s). 6. Patient will improve Woody Balance score by 7 points within 7 days. PHYSICAL THERAPY EVALUATION- NEURO POPULATION     Patient: Cordelia Arreola (37 y.o. female)  Date: 9/9/2017  Primary Diagnosis: Left-sided weakness        Precautions:   Fall (L AFO)      ASSESSMENT :  Based on the objective data described below, the patient presents with impaired functional mobility as compared to baseline level 2* c/o severe headache with photophobia, increased lethargy, and decreased strength (L>R - hx of L hemiparesis s/p previous CVA) following admission for possible CVA. MRI shows remote small infarcts in periventricular white matter and R BG. Detailed PLOF information difficult to obtain during eval 2* decreased alertness - however best I gather she lives with a sister in a 2 story house, was ambulatory with L AFO and rollator, required assist with ADLs, and was attending rehab 3 days/week. Unsure if pt has 24/7 assistance. Pt was cleared for limited mobility by RN (requesting to allow pt to lay down 2* migraine) - received sitting on EOB. She required increased processing time and cues to complete sit<>stand with min A.  Once standing, pt was able to take several side steps up towards [facility] with min A for stability and RW management. Assisted back to bed with min A. Unable to complete Woody assessment 2* decreased alertness- will reattempt next visit. Unsure at this time if pt is at or below her baseline - anticipate that she will require assistance from family upon discharge. Will place pt on Sunday PT list for follow up. Recommending discharge home with family assist and continued OP PT vs rehab. Patient will benefit from skilled intervention to address the above impairments. Patients rehabilitation potential is considered to be Good  Factors which may influence rehabilitation potential include:   [X]           None noted  [ ]           Mental ability/status  [ ]           Medical condition  [ ]           Home/family situation and support systems  [ ]           Safety awareness  [ ]           Pain tolerance/management  [ ]           Other:        PLAN :  Recommendations and Planned Interventions:  [X]             Bed Mobility Training             [X]      Neuromuscular Re-Education  [X]             Transfer Training                   [ ]      Orthotic/Prosthetic Training  [X]             Gait Training                         [ ]      Modalities  [X]             Therapeutic Exercises           [ ]      Edema Management/Control  [X]             Therapeutic Activities            [X]      Patient and Family Training/Education  [ ]             Other (comment):  Frequency/Duration: Patient will be followed by physical therapy 5 times a week to address goals. Discharge Recommendations: Rehab and Outpatient  Further Equipment Recommendations for Discharge: None anticipated       SUBJECTIVE:   Patient stated My head hurts so bad.       OBJECTIVE DATA SUMMARY:   HISTORY:    Past Medical History:   Diagnosis Date    Advanced care planning/counseling discussion 3/4/16     On File    Bronchitis 2/24/2014    Cervicalgia 8/18/15     Dr. Kimberly Rodrigez    Chest pain 5/25/15     Hospitalized at [facility] 5/25/15 (lab work negative)  Congestive heart failure, unspecified       Last Echo 2/8/15: EF 55-60%    Constipation 6/13/2017    Enthesopathy, spinal ([address] 8/18/15     Dr. Elaina Espino    Essential hypertension      Foot drop 8/18/15     Dr. Casey Harrington Heart attack [facility]) 2/24/2013     Was supposed to See Cardiology for possible pacemaker in november 2014- After Cardiology consult locally, no need, EF greatly improved. Established with Dr. Yonny Zhao Hiatal hernia 6/2015     3 cm hiatal hernia     Hip pain 8/18/15     Dr. Elaina Espino    Hypercholesterolemia      Hyperglycemia 7/2015     A1c 5.9     Hyperlipidemia 6/30/2015     NMR lipoprofile- LDL P 997, LDL-c 71, HLD-C-39, TG-60, HLD-P (25.2), Small LDL-P -541, LDL size 20.6    Hypothyroid      Insomnia 6/13/2017    Lower extremity edema      Lumbar spondylosis 8/18/15     Dr. Suzanne Gutierrez 6/2015     EGD/Colonscopy 6/15- Gastritis, internal hemorrhoids and 3 polyps    Murmur      EDDIE on CPAP       Was referred to Pulmonology - Uses CPAP    Osteoarthritis of hip 8/18/15     Dr. Elaina Espino    Osteoarthritis, shoulder 8/18/15     Dr. Casey Harrington Radiculopathy, cervical 8/18/15     Dr. Elaina Espino    Shoulder pain 8/18/15     Dr. Casey Harrington Spinal stenosis of cervical region 8/18/15     Dr. Casey Harrington Spinal stenosis, lumbar 8/18/15     Dr. Casey Harrington Stroke [facility]) 2/25/2014     Established with Neurology, Corie Cheney, NP-Just hospitalized at [facility] 2/7/15-2/10/15. CT negative, but Late effect CV accident with increased tone described on discharge summary, Carotid dopplers showed 50% stenosis bilaterally.  Vitamin D deficiency 7/2015     Past Surgical History:   Procedure Laterality Date    COLONOSCOPY N/A 6/22/2016     COLONOSCOPY performed by Tracy Rivas MD at [facility] ENDOSCOPY    HX BREAST BIOPSY   8/11/15     [facility]---Neg    HX CHOLECYSTECTOMY        HX COLONOSCOPY   6/2015     Dr. Herman Rodriguez- 3 complete polypectomies, Internal hemorrhoids, difficult study due to spasm.  Repeat in 1 year    HX ENDOSCOPY   6/2015     mild gastritis, 3cm hiatal hernia     HX GASTRIC BYPASS   6/1989    HX HEART CATHETERIZATION   2/2014    HX ORTHOPAEDIC         Right middle finger distal amputation    HX PARTIAL THYROIDECTOMY   ~1990    HX THYROIDECTOMY Left 1985     90% of one side of the thyroid removed     Prior Level of Function/Home Situation: lives with a sister in a 2 story house, 2 steps to enter. Required assist with ADLs, ambulated with Rollator and L AFO. Attending therapy 3 days/week. Unsure level of assist and supervision that she has at home  Personal factors and/or comorbidities impacting plan of care:      Home Situation  Home Environment: Private residence  # Steps to Enter: 3  Rails to Enter: Yes  Hand Rails : Bilateral  One/Two Story Residence: Two story  # of Interior Steps: 14  Living Alone: No  Support Systems: Family member(s)  Patient Expects to be Discharged to[de-identified] Private residence  Current DME Used/Available at Home: [employer], Juan [employer], rollator     EXAMINATION/PRESENTATION/DECISION MAKING:   Critical Behavior:  Neurologic State: Alert, Drowsy  Orientation Level: Oriented X4  Cognition: Follows commands, Decreased attention/concentration  Safety/Judgement: Decreased awareness of need for safety, Decreased awareness of need for assistance  Hearing: Auditory  Auditory Impairment: None  Skin:  Intact where exposed  Edema: none noted  Range Of Motion:  AROM: Generally decreased, functional      Strength:    Strength: Generally decreased, functional (L>R)      Coordination:  Coordination: Generally decreased, functional  Functional Mobility:  Bed Mobility:     Sit to Supine: Minimum assistance     Transfers:  Sit to Stand: Minimum assistance  Stand to Sit: Minimum assistance     Balance:   Sitting: Intact  Standing: Impaired; Without support  Standing - Static: Good;Constant support  Standing - Dynamic : Fair  Ambulation/Gait Training:     Functional Measure  Woody Balance Test: Unable to assess Woody 2* decreased alertness. Will attempt next visit. 56=Maximum possible score;   0-20=High fall risk  21-40=Moderate fall risk   41-56=Low fall risk      Woody Balance Test and G-code impairment scale:  Percentage of Impairment CH     0%    CI     1-19% CJ     20-39% CK     40-59% CL     60-79% CM     80-99% CN      100%   Woody   Score 0-56 56 45-55 34-44 23-33 12-22 1-11 0         G codes: In compliance with CMSs Claims Based Outcome Reporting, the following G-code set was chosen for this patient based on their primary functional limitation being treated: The outcome measure chosen to determine the severity of the functional limitation was the Arora with a score of 0/unable which was correlated with the impairment scale. · Mobility - Walking and Moving Around:               - CURRENT STATUS:    CN - 100% impaired, limited or restricted               - GOAL STATUS:           CN - 100% impaired, limited or restricted               - D/C STATUS:                       ---------------To be determined---------------       Physical Therapy Evaluation Charge Determination   History Examination Presentation Decision-Making   HIGH Complexity :3+ comorbidities / personal factors will impact the outcome/ POC  MEDIUM Complexity : 3 Standardized tests and measures addressing body structure, function, activity limitation and / or participation in recreation  LOW Complexity : Stable, uncomplicated  HIGH Complexity : FOTO score of 1- 25       Based on the above components, the patient evaluation is determined to be of the following complexity level: LOW      Pain:  Pain Scale 1: Visual  Pain Intensity 1: 0  Pain Location 1: Head  Pain Orientation 1: Anterior; Left  Pain Description 1: Constant  Pain Intervention(s) 1: Repositioned;MD notified (comment)  Activity Tolerance:   Limited 2* severe headache and increased lethargy      Please refer to the flowsheet for vital signs taken during this treatment. After treatment:   [ ]     Patient left in no apparent distress sitting up in chair  [X]     Patient left in no apparent distress in bed  [X]     Call bell left within reach  [X]     Nursing notified  [ ]     Caregiver present  [ ]     Bed alarm activated      COMMUNICATION/EDUCATION:   The patients plan of care was discussed with: Registered Nurse.     [X]  Fall prevention education was provided and the patient/caregiver indicated understanding. [X]  Patient/family have participated as able in goal setting and plan of care. [X]  Patient/family agree to work toward stated goals and plan of care. [ ]  Patient understands intent and goals of therapy, but is neutral about his/her participation. [ ]  Patient is unable to participate in goal setting and plan of care.      Thank you for this referral.  Amol Arreola, PT , DPT   Time Calculation: 11 mins

## 2017-09-09 NOTE — PROGRESS NOTES
Problem: Falls - Risk of  Goal: *Absence of Falls  Document Yessenia Fall Risk and appropriate interventions in the flowsheet.    Outcome: Progressing Towards Goal  Fall Risk Interventions:  Mobility Interventions: Communicate number of staff needed for ambulation/transfer, OT consult for ADLs, PT Consult for mobility concerns, Patient to call before getting OOB, PT Consult for assist device competence, Utilize gait belt for transfers/ambulation, Utilize walker, cane, or other assitive device           Medication Interventions: Evaluate medications/consider consulting pharmacy, Patient to call before getting OOB, Teach patient to arise slowly, Utilize gait belt for transfers/ambulation     Elimination Interventions: Call light in reach, Patient to call for help with toileting needs, Toileting schedule/hourly rounds

## 2017-09-10 ENCOUNTER — HOME HEALTH ADMISSION (OUTPATIENT)
Dept: HOME HEALTH SERVICES | Facility: HOME HEALTH | Age: 57
End: 2017-09-10

## 2017-09-10 PROBLEM — G43.019 INTRACTABLE MIGRAINE WITHOUT AURA AND WITHOUT STATUS MIGRAINOSUS: Status: RESOLVED | Noted: 2017-09-09 | Resolved: 2017-09-10

## 2017-09-10 LAB
GLUCOSE BLD STRIP.AUTO-MCNC: 83 MG/DL (ref 65–100)
GLUCOSE BLD STRIP.AUTO-MCNC: 83 MG/DL (ref 65–100)
GLUCOSE BLD STRIP.AUTO-MCNC: 86 MG/DL (ref 65–100)
GLUCOSE BLD STRIP.AUTO-MCNC: 90 MG/DL (ref 65–100)
SERVICE CMNT-IMP: NORMAL

## 2017-09-10 PROCEDURE — 82962 GLUCOSE BLOOD TEST: CPT

## 2017-09-10 PROCEDURE — 97116 GAIT TRAINING THERAPY: CPT

## 2017-09-10 PROCEDURE — 99218 HC RM OBSERVATION: CPT

## 2017-09-10 PROCEDURE — 96367 TX/PROPH/DG ADDL SEQ IV INF: CPT

## 2017-09-10 PROCEDURE — 97530 THERAPEUTIC ACTIVITIES: CPT

## 2017-09-10 PROCEDURE — 74011250637 HC RX REV CODE- 250/637: Performed by: HOSPITALIST

## 2017-09-10 PROCEDURE — 74011250636 HC RX REV CODE- 250/636: Performed by: HOSPITALIST

## 2017-09-10 PROCEDURE — 96372 THER/PROPH/DIAG INJ SC/IM: CPT

## 2017-09-10 PROCEDURE — 94660 CPAP INITIATION&MGMT: CPT

## 2017-09-10 RX ORDER — TOPIRAMATE 25 MG/1
25 TABLET ORAL 2 TIMES DAILY WITH MEALS
Qty: 60 TAB | Refills: 0 | Status: SHIPPED | OUTPATIENT
Start: 2017-09-10 | End: 2017-10-10

## 2017-09-10 RX ORDER — MAGNESIUM SULFATE HEPTAHYDRATE 40 MG/ML
2 INJECTION, SOLUTION INTRAVENOUS ONCE
Status: COMPLETED | OUTPATIENT
Start: 2017-09-10 | End: 2017-09-10

## 2017-09-10 RX ORDER — TOPIRAMATE 25 MG/1
25 TABLET ORAL 2 TIMES DAILY WITH MEALS
Status: DISCONTINUED | OUTPATIENT
Start: 2017-09-10 | End: 2017-09-12 | Stop reason: HOSPADM

## 2017-09-10 RX ADMIN — DULOXETINE HYDROCHLORIDE 60 MG: 60 CAPSULE, DELAYED RELEASE ORAL at 21:22

## 2017-09-10 RX ADMIN — CHLORZOXAZONE 500 MG: 500 TABLET ORAL at 17:15

## 2017-09-10 RX ADMIN — CHLORZOXAZONE 500 MG: 500 TABLET ORAL at 21:31

## 2017-09-10 RX ADMIN — Medication 10 ML: at 21:23

## 2017-09-10 RX ADMIN — TOPIRAMATE 25 MG: 25 TABLET, FILM COATED ORAL at 11:44

## 2017-09-10 RX ADMIN — CHLORZOXAZONE 500 MG: 500 TABLET ORAL at 09:10

## 2017-09-10 RX ADMIN — CARVEDILOL 25 MG: 12.5 TABLET, FILM COATED ORAL at 09:09

## 2017-09-10 RX ADMIN — HEPARIN SODIUM 5000 UNITS: 5000 INJECTION, SOLUTION INTRAVENOUS; SUBCUTANEOUS at 01:00

## 2017-09-10 RX ADMIN — Medication 10 ML: at 13:19

## 2017-09-10 RX ADMIN — FERROUS SULFATE TAB 325 MG (65 MG ELEMENTAL FE) 325 MG: 325 (65 FE) TAB at 09:08

## 2017-09-10 RX ADMIN — ERGOCALCIFEROL 50000 UNITS: 1.25 CAPSULE ORAL at 09:09

## 2017-09-10 RX ADMIN — Medication 10 ML: at 05:28

## 2017-09-10 RX ADMIN — HEPARIN SODIUM 5000 UNITS: 5000 INJECTION, SOLUTION INTRAVENOUS; SUBCUTANEOUS at 17:08

## 2017-09-10 RX ADMIN — TOPIRAMATE 25 MG: 25 TABLET, FILM COATED ORAL at 17:10

## 2017-09-10 RX ADMIN — INDAPAMIDE 1.25 MG: 2.5 TABLET, FILM COATED ORAL at 09:10

## 2017-09-10 RX ADMIN — FUROSEMIDE 40 MG: 40 TABLET ORAL at 17:10

## 2017-09-10 RX ADMIN — MORPHINE SULFATE 15 MG: 15 TABLET, FILM COATED, EXTENDED RELEASE ORAL at 09:10

## 2017-09-10 RX ADMIN — AMLODIPINE BESYLATE 5 MG: 5 TABLET ORAL at 21:24

## 2017-09-10 RX ADMIN — PANTOPRAZOLE SODIUM 40 MG: 40 TABLET, DELAYED RELEASE ORAL at 09:09

## 2017-09-10 RX ADMIN — CYCLOBENZAPRINE HYDROCHLORIDE 5 MG: 10 TABLET, FILM COATED ORAL at 21:24

## 2017-09-10 RX ADMIN — MAGNESIUM SULFATE HEPTAHYDRATE 2 G: 40 INJECTION, SOLUTION INTRAVENOUS at 11:43

## 2017-09-10 RX ADMIN — Medication 10 ML: at 11:43

## 2017-09-10 RX ADMIN — SPIRONOLACTONE 25 MG: 25 TABLET, FILM COATED ORAL at 09:10

## 2017-09-10 RX ADMIN — HYDROCODONE BITARTRATE AND ACETAMINOPHEN 1 TABLET: 7.5; 325 TABLET ORAL at 09:09

## 2017-09-10 RX ADMIN — HEPARIN SODIUM 5000 UNITS: 5000 INJECTION, SOLUTION INTRAVENOUS; SUBCUTANEOUS at 09:09

## 2017-09-10 RX ADMIN — LOSARTAN POTASSIUM 100 MG: 50 TABLET ORAL at 21:23

## 2017-09-10 RX ADMIN — ATORVASTATIN CALCIUM 40 MG: 40 TABLET, FILM COATED ORAL at 21:22

## 2017-09-10 RX ADMIN — LEVOTHYROXINE SODIUM 150 MCG: 150 TABLET ORAL at 09:10

## 2017-09-10 RX ADMIN — CYCLOBENZAPRINE HYDROCHLORIDE 5 MG: 10 TABLET, FILM COATED ORAL at 09:10

## 2017-09-10 RX ADMIN — ASPIRIN 81 MG: 81 TABLET, COATED ORAL at 09:09

## 2017-09-10 NOTE — PROGRESS NOTES
Problem: Falls - Risk of  Goal: *Absence of Falls  Document Yessenia Fall Risk and appropriate interventions in the flowsheet.    Outcome: Progressing Towards Goal  Fall Risk Interventions:  Mobility Interventions: Communicate number of staff needed for ambulation/transfer, OT consult for ADLs, Patient to call before getting OOB, PT Consult for assist device competence, Utilize walker, cane, or other assitive device, Strengthening exercises (ROM-active/passive), PT Consult for mobility concerns           Medication Interventions: Evaluate medications/consider consulting pharmacy, Teach patient to arise slowly, Patient to call before getting OOB     Elimination Interventions: Call light in reach, Elevated toilet seat, Patient to call for help with toileting needs, Toilet paper/wipes in reach, Toileting schedule/hourly rounds

## 2017-09-10 NOTE — PROGRESS NOTES
Problem: Mobility Impaired (Adult and Pediatric)  Goal: *Acute Goals and Plan of Care (Insert Text)  Physical Therapy Goals  Initiated 9/9/2017  1. Patient will move from supine to sit and sit to supine and scoot up and down in bed with supervision/set-up within 7 day(s). 2. Patient will transfer from bed to chair and chair to bed with supervision/set-up using the least restrictive device within 7 day(s). 3. Patient will perform sit to stand with supervision/set-up within 7 day(s). 4. Patient will ambulate with supervision/set-up for 50 feet with the least restrictive device within 7 day(s). 5. Patient will ascend/descend 2 stairs with B handrail(s) with minimal assistance/contact guard assist within 7 day(s). 6. Patient will improve Woody Balance score by 7 points within 7 days. PHYSICAL THERAPY TREATMENT  Patient: Jim Conroy (64 y.o. female)  Date: 9/10/2017  Diagnosis: Left-sided weakness Left-sided weakness       Precautions: Fall (L AFO)      ASSESSMENT:  Chart reviewed. Patient cleared to be seen by nursing staff. Patient seems more awake and alert today. Independently donning AFO on the L sitting EOB. /60. Patient needing min assist sit to stand with bariatric rollator. Patient ambulating out to hallway x 15 feet, CGA. Wide ELISABETH and increased trunk sway noted. Significantly decreased foot clearance on the left even with AFL. Hip hike, exaggerated right sided weight shift, and circumduction to accommodate foot drop. As patient appraching the hallway, lethargy, slower speech and eyes closing noted. Patient asked repeatedly if she was okay, and she continued to state \"I'm alright. I'm alright\". Patient assisted into sitting and instructed to open eyes. Pt reporting that the light in the hallway is hurting her eyes. Patient also agrees that this is not normal for her. After 2-3 minute rest break, patient able to ambulate back to bedside and seated safely. Of concern, BP down to 90/51.  Nursing notified. Pt needing assist with LLE during bed mobility. Called patient's sisiter. Patient is home alone during the day. Patient spends most of the day in her room and has a bedside commode as needed. Family is comfortable with patient coming home, however she would need to get up a full flight of stairs to get to her bedroom. Not safe to attempt stairs today. Discussed with nurse. Patient would benefit from another therapy session with wheelchair transport to the stairs. If patient continues to feel \"dizzy\", family will need to set up patient downstairs. Will call sister back to discuss. Recommend home with HHPT and family support upon discharge after clearing the stairs. Patient adamantly does not want to go to rehab.     [phone]: Called and left a message for family, encouraging first floor set up. Asked them to call back at the [address] [facility] so as not to leave too much information on a voicemail. Progression toward goals:  [X]    Improving appropriately and progressing toward goals  [ ]    Improving slowly and progressing toward goals  [ ]    Not making progress toward goals and plan of care will be adjusted       PLAN:  Patient continues to benefit from skilled intervention to address the above impairments. Continue treatment per established plan of care. Discharge Recommendations:  Home Health with family supervision  Further Equipment Recommendations for Discharge:  none       SUBJECTIVE:   Patient stated I'm alright. Just let me go home.       OBJECTIVE DATA SUMMARY:   Critical Behavior:  Neurologic State: Alert, Eyes open spontaneously  Orientation Level: Oriented X4  Cognition: Follows commands, Appropriate decision making, Appropriate for age attention/concentration, Appropriate safety awareness, Recognition of people/places  Safety/Judgement: Decreased awareness of need for safety, Decreased awareness of need for assistance  Functional Mobility Training:  Bed Mobility:  Supine to Sit: Contact guard assistance  Sit to Supine: Minimum assistance;Assist x1     Transfers:  Sit to Stand: Minimum assistance;Assist x1  Stand to Sit: Contact guard assistance     Balance:  Sitting: Intact  Standing: Intact; With support  Standing - Static: Good;Constant support  Standing - Dynamic : Fair  Ambulation/Gait Training:  Distance (ft): 30 Feet (ft)  Assistive Device: Gait belt;Walker, rollator  Ambulation - Level of Assistance: Contact guard assistance  Gait Abnormalities: Decreased step clearance;Trunk sway increased  Base of Support: Widened  Speed/Brenda: Slow  Step Length: Right shortened;Left shortened                Pain:  Pain Scale 1: Numeric (0 - 10)  Pain Intensity 1: 6  Pain Location 1: Head  Pain Orientation 1: Left; Anterior  Pain Description 1: Aching  Pain Intervention(s) 1: Medication (see MAR) (Topamax po and Magnesium IV)  Activity Tolerance:   Decreased activity tolerance. BP to 90/51 with gait task  Please refer to the flowsheet for vital signs taken during this treatment.   After treatment:   [ ]    Patient left in no apparent distress sitting up in chair  [X]    Patient left in no apparent distress in bed  [X]    Call bell left within reach  [X]    Nursing notified  [ ]    Caregiver present  [ ]    Bed alarm activated      COMMUNICATION/COLLABORATION:   The patients plan of care was discussed with: Registered Nurse     Shreyas York PT, DPT   Time Calculation: 26 mins

## 2017-09-10 NOTE — PROGRESS NOTES
Care Management Interventions  PCP Verified by CM: Yes  Last Visit to PCP: 07/31/17  Mode of Transport at Discharge:  (car)  Transition of Care Consult (CM Consult): [address]: Yes  Discharge Durable Medical Equipment: No  Physical Therapy Consult: Yes  Occupational Therapy Consult: Yes  Current Support Network: Other (patient and sister live together in 2 level home. Patient needs assistance with adl's and ambulates with walker. She was going to outpatient  therapy at [facility] prior to admission. AMD in chart. Good family support. )  Confirm Follow Up Transport: Family  Plan discussed with Pt/Family/Caregiver: Yes  Freedom of Choice Offered: Yes  Discharge Location  Discharge Placement: Home with home health       Cm met with patient as she is being discharged today and consult for [place] was placed in connect [facility]. Patient  confirmed the above-and demographics. AMD in chart-- Darwin Sincere [phone]  ( primary MPOA. ) in 2 level home. Patient has hx of CHF, stroke 2014 with left sided weakness, and HTN. Needs some assistance with adl's and uses RW for ambulation. Patient has good family support. Patient has [facility] Medicare. [address] and is in the  [employer] program. -  Receives medications through mail order. Patient was in agreement with home health and chose [id number]. Referral made to the agency via [employer] [facility] and accepted. Name and number of agency placed on discharge instructions and patient advised to call agency if not contacted by noon the day after discharge to inquire as to the day and time of initial visit. Patient said she has been going to [facility] 3 times a week for outpatient therapy and hopes to continue after home health is completed. Patient has appointment with PCP Joyce Savage in a week. No other transition of care needs identified at this time.

## 2017-09-10 NOTE — PROGRESS NOTES
Problem: Discharge Planning  Goal: *Discharge to safe environment  Outcome: Resolved/Met Date Met:  09/10/17  Home with family and home health

## 2017-09-10 NOTE — PROGRESS NOTES
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 Assessment/Plan:  Left-sided weakness: (POA) acute on chronic, improving. Unclear etiology. Neuro consulted -question of migraine headaches  -MRI brain with no new stroke, notes old infarcts. Carotid dopplers with <50 stenosis b/l.  -worked with PT today, felt like current ome situation not safe, plan to re-eval tomorrow  -headache improved with IV depakote but persists. D/w Dr. Marisa Olivo - rec a trial of IV magnesium and starting topiramate 25 mg bid    HTN: stable on current regimen    Hypothyroidism: continue synthroid    Chest discomfort: resolved, seemed non-cardiac in nature, cardiac biomarkers x3 negative    Chronic diastolic HF: NYHA class II, appears euvolemic, continue home meds. Type II DM: monitor BG, continue SSI    Morbid obesity    Code status: full  DVT prophylaxis: sq heparin  Disposition: home likely tomorrow  ----------------------------------------------    CC: headache    S: better today, frontal headache persists but is only mild. Weakness is at baseline. No n/v/d, no dyspnea. Review of Systems:  Pertinent items are noted in HPI.     O:  Visit Vitals    BP 90/51 (BP 1 Location: Right arm, BP Patient Position: Post activity)    Pulse (!) 58    Temp 98.2 °F (36.8 °C)    Resp 11    Ht 5' 6\" (1.676 m)    Wt 149.4 kg (329 lb 5.9 oz)    SpO2 96%    Breastfeeding No    BMI 53.16 kg/m2       PHYSICAL EXAM:  Gen: NAD, non-toxic, obese  HEENT: anicteric sclerae, normal conjunctiva  Neck: supple  Heart: RRR, no MRG, no JVD, no peripheral edema  Lungs: CTA b/l, non-labored respirations  Abd: soft, NT, ND, BS+  Extr: warm  Skin: dry, no rash  Neuro: CN II-XII grossly intact, normal speech, moves all extremities, diminished strength LUE  Psych: normal mood, appropriate affect    No intake or output data in the 24 hours ending 09/10/17 1355     Recent labs & imaging reviewed:  Recent Labs      09/09/17   0240  09/08/17   1144   WBC  6.9  8.2   HGB  10.7*  10.9*   HCT  34.1*  34.8*   PLT  241  257     Recent Labs      09/08/17   1144   NA  134*   K  4.4   CL  103   CO2  25   BUN  40*   CREA  1.47*   GLU  282*   CA  8.5     Recent Labs      09/08/17   1144   SGOT  21   ALT  27   AP  130*   TBILI  0.4   TP  7.1   ALB  3.4*   GLOB  3.7     Recent Labs      09/08/17   1144  09/08/17   1121   INR  1.1  1.1   PTP  10.8   --         Med list reviewed  Current Facility-Administered Medications   Medication Dose Route Frequency    topiramate (TOPAMAX) tablet 25 mg  25 mg Oral BID WITH MEALS    insulin lispro (HUMALOG) injection   SubCUTAneous AC&HS    glucose chewable tablet 16 g  4 Tab Oral PRN    dextrose (D50W) injection syrg 12.5-25 g  12.5-25 g IntraVENous PRN    glucagon (GLUCAGEN) injection 1 mg  1 mg IntraMUSCular PRN    cyclobenzaprine (FLEXERIL) tablet 5 mg  5 mg Oral Q12H    DULoxetine (CYMBALTA) capsule 60 mg  60 mg Oral QHS    aspirin delayed-release tablet 81 mg  81 mg Oral DAILY    ferrous sulfate tablet 325 mg  325 mg Oral ACB    albuterol (PROVENTIL VENTOLIN) nebulizer solution 2.5 mg  2.5 mg Nebulization Q4H PRN    lidocaine (LIDODERM) 5 % patch 1 Patch  1 Patch TransDERmal Q24H    pantoprazole (PROTONIX) tablet 40 mg  40 mg Oral DAILY    linaclotide (LINZESS) capsule 290 mcg  290 mcg Oral ACB    atorvastatin (LIPITOR) tablet 40 mg  40 mg Oral QHS    carvedilol (COREG) tablet 25 mg  25 mg Oral BID WITH MEALS    chlorzoxazone (PARAFON FORTE) tablet 500 mg  500 mg Oral TID    morphine CR (MS CONTIN) tablet 15 mg  15 mg Oral DAILY    levothyroxine (SYNTHROID) tablet 150 mcg  150 mcg Oral ACB    spironolactone (ALDACTONE) tablet 25 mg  25 mg Oral DAILY    cyanocobalamin (VITAMIN B12) injection 1,000 mcg  1,000 mcg SubCUTAneous Q 14 DAYS    indapamide (LOZOL) tablet 1.25 mg  1.25 mg Oral DAILY    amLODIPine (NORVASC) tablet 5 mg  5 mg Oral QHS    losartan (COZAAR) tablet 100 mg  100 mg Oral QHS    ergocalciferol (ERGOCALCIFEROL) capsule 50,000 Units  50,000 Units Oral DAILY    HYDROcodone-acetaminophen (NORCO) 7.5-325 mg per tablet 1 Tab  1 Tab Oral DAILY    cyclobenzaprine (FLEXERIL) tablet 10 mg  10 mg Oral TID PRN    furosemide (LASIX) tablet 40 mg  40 mg Oral EVERY TUES,THUR,SAT,SUN    sodium chloride (NS) flush 5-10 mL  5-10 mL IntraVENous Q8H    sodium chloride (NS) flush 5-10 mL  5-10 mL IntraVENous PRN    acetaminophen (TYLENOL) tablet 650 mg  650 mg Oral Q4H PRN    Or    acetaminophen (TYLENOL) solution 650 mg  650 mg Per NG tube Q4H PRN    Or    acetaminophen (TYLENOL) suppository 650 mg  650 mg Rectal Q4H PRN    ibuprofen (MOTRIN) tablet 600 mg  600 mg Oral Q6H PRN    Or    ibuprofen (ADVIL;MOTRIN) 100 mg/5 mL oral suspension 600 mg  600 mg Per NG tube Q6H PRN    meperidine (DEMEROL) injection 12.5 mg  12.5 mg IntraVENous Q4H PRN    heparin (porcine) injection 5,000 Units  5,000 Units SubCUTAneous Q8H       Care Plan discussed with:  Patient/Family and Nurse    Isaiah Panad MD  Internal Medicine  Date of Service: 9/10/2017

## 2017-09-11 LAB
GLUCOSE BLD STRIP.AUTO-MCNC: 105 MG/DL (ref 65–100)
GLUCOSE BLD STRIP.AUTO-MCNC: 93 MG/DL (ref 65–100)
GLUCOSE BLD STRIP.AUTO-MCNC: 93 MG/DL (ref 65–100)
GLUCOSE BLD STRIP.AUTO-MCNC: 99 MG/DL (ref 65–100)
SERVICE CMNT-IMP: ABNORMAL
SERVICE CMNT-IMP: NORMAL

## 2017-09-11 PROCEDURE — 74011250637 HC RX REV CODE- 250/637: Performed by: HOSPITALIST

## 2017-09-11 PROCEDURE — 74011000250 HC RX REV CODE- 250: Performed by: HOSPITALIST

## 2017-09-11 PROCEDURE — 94660 CPAP INITIATION&MGMT: CPT

## 2017-09-11 PROCEDURE — 97530 THERAPEUTIC ACTIVITIES: CPT

## 2017-09-11 PROCEDURE — 96366 THER/PROPH/DIAG IV INF ADDON: CPT

## 2017-09-11 PROCEDURE — 97116 GAIT TRAINING THERAPY: CPT

## 2017-09-11 PROCEDURE — 96372 THER/PROPH/DIAG INJ SC/IM: CPT

## 2017-09-11 PROCEDURE — 99218 HC RM OBSERVATION: CPT

## 2017-09-11 PROCEDURE — 82962 GLUCOSE BLOOD TEST: CPT

## 2017-09-11 PROCEDURE — 74011250636 HC RX REV CODE- 250/636: Performed by: HOSPITALIST

## 2017-09-11 PROCEDURE — 74011000258 HC RX REV CODE- 258: Performed by: HOSPITALIST

## 2017-09-11 RX ADMIN — MORPHINE SULFATE 15 MG: 15 TABLET, FILM COATED, EXTENDED RELEASE ORAL at 08:37

## 2017-09-11 RX ADMIN — CYCLOBENZAPRINE HYDROCHLORIDE 5 MG: 10 TABLET, FILM COATED ORAL at 08:40

## 2017-09-11 RX ADMIN — TOPIRAMATE 25 MG: 25 TABLET, FILM COATED ORAL at 16:49

## 2017-09-11 RX ADMIN — Medication 10 ML: at 06:33

## 2017-09-11 RX ADMIN — ATORVASTATIN CALCIUM 40 MG: 40 TABLET, FILM COATED ORAL at 22:04

## 2017-09-11 RX ADMIN — FERROUS SULFATE TAB 325 MG (65 MG ELEMENTAL FE) 325 MG: 325 (65 FE) TAB at 08:37

## 2017-09-11 RX ADMIN — HEPARIN SODIUM 5000 UNITS: 5000 INJECTION, SOLUTION INTRAVENOUS; SUBCUTANEOUS at 16:49

## 2017-09-11 RX ADMIN — CHLORZOXAZONE 500 MG: 500 TABLET ORAL at 08:44

## 2017-09-11 RX ADMIN — LOSARTAN POTASSIUM 100 MG: 50 TABLET ORAL at 22:04

## 2017-09-11 RX ADMIN — HEPARIN SODIUM 5000 UNITS: 5000 INJECTION, SOLUTION INTRAVENOUS; SUBCUTANEOUS at 08:38

## 2017-09-11 RX ADMIN — TOPIRAMATE 25 MG: 25 TABLET, FILM COATED ORAL at 08:37

## 2017-09-11 RX ADMIN — ASPIRIN 81 MG: 81 TABLET, COATED ORAL at 08:38

## 2017-09-11 RX ADMIN — ERGOCALCIFEROL 50000 UNITS: 1.25 CAPSULE ORAL at 08:37

## 2017-09-11 RX ADMIN — CHLORZOXAZONE 500 MG: 500 TABLET ORAL at 23:16

## 2017-09-11 RX ADMIN — DULOXETINE HYDROCHLORIDE 60 MG: 60 CAPSULE, DELAYED RELEASE ORAL at 20:54

## 2017-09-11 RX ADMIN — PANTOPRAZOLE SODIUM 40 MG: 40 TABLET, DELAYED RELEASE ORAL at 08:37

## 2017-09-11 RX ADMIN — AMLODIPINE BESYLATE 5 MG: 5 TABLET ORAL at 22:04

## 2017-09-11 RX ADMIN — INDAPAMIDE 1.25 MG: 2.5 TABLET, FILM COATED ORAL at 08:44

## 2017-09-11 RX ADMIN — Medication 10 ML: at 22:30

## 2017-09-11 RX ADMIN — HYDROCODONE BITARTRATE AND ACETAMINOPHEN 1 TABLET: 7.5; 325 TABLET ORAL at 08:37

## 2017-09-11 RX ADMIN — CYCLOBENZAPRINE HYDROCHLORIDE 5 MG: 10 TABLET, FILM COATED ORAL at 20:54

## 2017-09-11 RX ADMIN — CARVEDILOL 25 MG: 12.5 TABLET, FILM COATED ORAL at 08:38

## 2017-09-11 RX ADMIN — Medication 10 ML: at 16:50

## 2017-09-11 RX ADMIN — SPIRONOLACTONE 25 MG: 25 TABLET, FILM COATED ORAL at 08:38

## 2017-09-11 RX ADMIN — ACETAMINOPHEN 650 MG: 325 TABLET, FILM COATED ORAL at 06:32

## 2017-09-11 RX ADMIN — CHLORZOXAZONE 500 MG: 500 TABLET ORAL at 16:50

## 2017-09-11 RX ADMIN — VALPROATE SODIUM 1000 MG: 100 INJECTION, SOLUTION INTRAVENOUS at 09:54

## 2017-09-11 RX ADMIN — LEVOTHYROXINE SODIUM 150 MCG: 150 TABLET ORAL at 08:37

## 2017-09-11 RX ADMIN — HEPARIN SODIUM 5000 UNITS: 5000 INJECTION, SOLUTION INTRAVENOUS; SUBCUTANEOUS at 01:17

## 2017-09-11 NOTE — INTERDISCIPLINARY ROUNDS
IDR/SLIDR Summary          Patient: Mildred Robert MRN: [id number]    Age: 59 y.o. YOB: 1960 Room/Bed: [facility]   Admit Diagnosis: Left-sided weakness  Principal Diagnosis: Left-sided weakness   Goals: Increase Mobitlity, Work with PT  Readmission: NO  Quality Measure: Not applicable  VTE Prophylaxis: Chemical  Influenza Vaccine screening completed? YES  Pneumococcal Vaccine screening completed? YES  Mobility needs: Yes   Nutrition plan:Yes  Consults:P.T, O.T., Speech and Case Management    Financial concerns:Yes  Escalated to CM? YES  RRAT Score: 9   Interventions:Home Health  Testing due for pt today? NO  LOS: 0 days Expected length of stay 2 days  Discharge plan: Home w/in home rehab   PCP: Akanksha Machado.  Shahida Raza MD  Transportation needs: Yes    Days before discharge:longer than expected LOS  Discharge disposition: Home    Signed:     Carlos Aguirre  9/11/2017  12:19 AM

## 2017-09-11 NOTE — PROGRESS NOTES
Bedside and Verbal shift change report given to Nicole Olmedo RN (oncoming nurse) by Roger Soto RN (offgoing nurse). Report included the following information SBAR, Kardex, Intake/Output, MAR, Recent Results and Cardiac Rhythm NSR.

## 2017-09-11 NOTE — PROGRESS NOTES
Problem: Mobility Impaired (Adult and Pediatric)  Goal: *Acute Goals and Plan of Care (Insert Text)  Physical Therapy Goals  Initiated 9/9/2017  1. Patient will move from supine to sit and sit to supine and scoot up and down in bed with supervision/set-up within 7 day(s). 2. Patient will transfer from bed to chair and chair to bed with supervision/set-up using the least restrictive device within 7 day(s). 3. Patient will perform sit to stand with supervision/set-up within 7 day(s). 4. Patient will ambulate with supervision/set-up for 50 feet with the least restrictive device within 7 day(s). 5. Patient will ascend/descend 2 stairs with B handrail(s) with minimal assistance/contact guard assist within 7 day(s). 6. Patient will improve Woody Balance score by 7 points within 7 days. PHYSICAL THERAPY TREATMENT  Patient: Anastasiya Recinos (41 y.o. female)  Date: 9/11/2017  Diagnosis: Left-sided weakness Left-sided weakness       Precautions: Fall (L AFO)      ASSESSMENT:  Chart reviewed. Patient cleared to be seen by nursing staff. Patient seems more awake and alert today. Independently donning AFO on the L sitting EOB. /58. Patient needing min assist sit to stand with HHA. Patient ambulating short distance to wheelchair with HHA x2 d/t poor balance without RW and poor LLE motor control. Wide ELISABETH and increased trunk sway noted. Significantly decreased foot clearance on the left even with AFO. Hip hike, exaggerated right sided weight shift, and circumduction to accommodate foot drop. Pt wheeled to stairs to attempt stair training to discharge home. Pt required Max A x 2 to complete 2.5 steps. Unable to reach third step despite manual cues and rest breaks. Three attempts to bring L LE onto step. Descending stairs with Max A x 2 and unsafe sequencing. Poor motor control in L LE and weakness limiting ability to complete stairs. Pt is NOT CLEARED on stair training and recommending rehab at discharge.  Pt tearful in stair well but understands the importance of rehab and receptive to this option. Agreeable to try a week of rehab before returning homea s pt realizes she is much weaker than before. Recommending inpatient rehab as pt is highly motivated, has family support and can tolerate 3 hours of therapy a day. Left sitting up in wheelchair. Progression toward goals:  [X]    Improving appropriately and progressing toward goals  [ ]    Improving slowly and progressing toward goals  [ ]    Not making progress toward goals and plan of care will be adjusted       PLAN:  Patient continues to benefit from skilled intervention to address the above impairments. Continue treatment per established plan of care. Discharge Recommendations:  Inpatient Rehab  Further Equipment Recommendations for Discharge:  TBD       SUBJECTIVE:   Patient stated Can I go home now? Collins Pretty      OBJECTIVE DATA SUMMARY:   Critical Behavior:  Neurologic State: Alert  Orientation Level: Oriented X4  Cognition: Appropriate for age attention/concentration, Follows commands  Safety/Judgement: Decreased awareness of need for safety, Decreased awareness of need for assistance  Functional Mobility Training:  Bed Mobility:     Supine to Sit: Contact guard assistance              Transfers:  Sit to Stand: Minimum assistance;Assist x2  Stand to Sit: Contact guard assistance                             Balance:  Sitting: Intact  Standing: Impaired; With support  Standing - Static: Fair;Constant support  Standing - Dynamic : Fair  Ambulation/Gait Training:  Distance (ft): 25 Feet (ft)  Assistive Device: Gait belt; Other (comment) (HHA bilateral)  Ambulation - Level of Assistance: Minimal assistance;Assist x2        Gait Abnormalities: Decreased step clearance;Circumduction; Ataxic;Trunk sway increased        Base of Support: Widened;Shift to right;Center of gravity altered     Speed/Brenda: Shuffled;Pace decreased (<100 feet/min); Delayed  Step Length: Right shortened;Left shortened  Swing Pattern: Left asymmetrical           Stairs:  Number of Stairs Trained: 3  Stairs - Level of Assistance: Maximum assistance;Assist X2;Additional time              Rail Use: Left   Activity Tolerance:   Good  Please refer to the flowsheet for vital signs taken during this treatment.   After treatment:   [X]    Patient left in no apparent distress sitting up in chair  [ ]    Patient left in no apparent distress in bed  [X]    Call bell left within reach  [X]    Nursing notified  [ ]    Caregiver present  [ ]    Bed alarm activated      COMMUNICATION/COLLABORATION:   The patients plan of care was discussed with: Registered Nurse     Rosalio Hernandez, PT   Time Calculation: 32 mins

## 2017-09-11 NOTE — PROGRESS NOTES
Problem: Falls - Risk of  Goal: *Absence of Falls  Document Yessenia Fall Risk and appropriate interventions in the flowsheet.    Outcome: Progressing Towards Goal  Fall Risk Interventions:  Mobility Interventions: Communicate number of staff needed for ambulation/transfer, Patient to call before getting OOB, PT Consult for mobility concerns, PT Consult for assist device competence, Strengthening exercises (ROM-active/passive), Utilize walker, cane, or other assitive device           Medication Interventions: Bed/chair exit alarm, Patient to call before getting OOB, Teach patient to arise slowly     Elimination Interventions: Call light in reach, Toileting schedule/hourly rounds

## 2017-09-11 NOTE — PROGRESS NOTES
Bedside shift change report given to Otilia Uribe (oncoming nurse) by Roscoe Whyte RN (offgoing nurse). Report included the following information SBAR, Kardex, Intake/Output, MAR, Recent Results and Cardiac Rhythm SB/SR. Discussed DC plan for Monday. John Mack

## 2017-09-11 NOTE — PROGRESS NOTES
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 Assessment/Plan:  Left-sided weakness: (POA) acute on chronic, improving. Unclear etiology. Neuro consulted -question of migraine headaches  -MRI brain with no new stroke, notes old infarcts. Carotid dopplers with <50 stenosis b/l.  -worked with PT today, she seems to be doing worse and she now needs rehab  -headache improved with IV depakote but persists. IV magnesium did not help much. D/w Dr. Peter Edwrads will try another 1g of IV Depakote    HTN: stable on current regimen    Hypothyroidism: continue synthroid    Chest discomfort: resolved, seemed non-cardiac in nature, cardiac biomarkers x3 negative    Chronic diastolic HF: NYHA class II, appears euvolemic, continue home meds. Type II DM: monitor BG, continue SSI    Morbid obesity    Code status: full  DVT prophylaxis: sq heparin  Disposition: inpatient rehab vs SNF  ----------------------------------------------    CC: headache    S: asking to go home - overall better, but headache persists although improved in severity. Worked with PT and didn't do well, they are recommending rehab. No n/v/d. Review of Systems:  Pertinent items are noted in HPI.     O:  Visit Vitals    /61    Pulse 67    Temp 98.2 °F (36.8 °C)    Resp 17    Ht 5' 6\" (1.676 m)    Wt 147.2 kg (324 lb 8 oz)    SpO2 100%    Breastfeeding No    BMI 52.38 kg/m2       PHYSICAL EXAM:  Gen: NAD, non-toxic, obese  HEENT: anicteric sclerae, normal conjunctiva  Neck: supple  Heart: RRR, no MRG, no peripheral edema  Lungs: CTA b/l, non-labored respirations  Abd: soft, NT, ND, BS+  Extr: warm  Skin: dry, no rash  Neuro: CN II-XII grossly intact, normal speech, moves all extremities, diminished strength LUE  Psych: normal mood, appropriate affect    No intake or output data in the 24 hours ending 09/11/17 1057     Recent labs & imaging reviewed:  Recent Labs      09/09/17   0240  09/08/17   1144   WBC  6.9  8.2   HGB  10.7*  10.9*   HCT  34.1*  34.8*   PLT  241  257     Recent Labs      09/08/17   1144   NA  134*   K  4.4   CL  103   CO2  25   BUN  40*   CREA  1.47*   GLU  282*   CA  8.5     Recent Labs      09/08/17   1144   SGOT  21   ALT  27   AP  130*   TBILI  0.4   TP  7.1   ALB  3.4*   GLOB  3.7     Recent Labs      09/08/17   1144  09/08/17   1121   INR  1.1  1.1   PTP  10.8   --         Med list reviewed  Current Facility-Administered Medications   Medication Dose Route Frequency    topiramate (TOPAMAX) tablet 25 mg  25 mg Oral BID WITH MEALS    insulin lispro (HUMALOG) injection   SubCUTAneous AC&HS    glucose chewable tablet 16 g  4 Tab Oral PRN    dextrose (D50W) injection syrg 12.5-25 g  12.5-25 g IntraVENous PRN    glucagon (GLUCAGEN) injection 1 mg  1 mg IntraMUSCular PRN    cyclobenzaprine (FLEXERIL) tablet 5 mg  5 mg Oral Q12H    DULoxetine (CYMBALTA) capsule 60 mg  60 mg Oral QHS    aspirin delayed-release tablet 81 mg  81 mg Oral DAILY    ferrous sulfate tablet 325 mg  325 mg Oral ACB    albuterol (PROVENTIL VENTOLIN) nebulizer solution 2.5 mg  2.5 mg Nebulization Q4H PRN    lidocaine (LIDODERM) 5 % patch 1 Patch  1 Patch TransDERmal Q24H    pantoprazole (PROTONIX) tablet 40 mg  40 mg Oral DAILY    linaclotide (LINZESS) capsule 290 mcg  290 mcg Oral ACB    atorvastatin (LIPITOR) tablet 40 mg  40 mg Oral QHS    carvedilol (COREG) tablet 25 mg  25 mg Oral BID WITH MEALS    chlorzoxazone (PARAFON FORTE) tablet 500 mg  500 mg Oral TID    morphine CR (MS CONTIN) tablet 15 mg  15 mg Oral DAILY    levothyroxine (SYNTHROID) tablet 150 mcg  150 mcg Oral ACB    spironolactone (ALDACTONE) tablet 25 mg  25 mg Oral DAILY    cyanocobalamin (VITAMIN B12) injection 1,000 mcg  1,000 mcg SubCUTAneous Q 14 DAYS    indapamide (LOZOL) tablet 1.25 mg  1.25 mg Oral DAILY    amLODIPine (NORVASC) tablet 5 mg  5 mg Oral QHS    losartan (COZAAR) tablet 100 mg  100 mg Oral QHS    ergocalciferol (ERGOCALCIFEROL) capsule 50,000 Units  50,000 Units Oral DAILY    HYDROcodone-acetaminophen (NORCO) 7.5-325 mg per tablet 1 Tab  1 Tab Oral DAILY    cyclobenzaprine (FLEXERIL) tablet 10 mg  10 mg Oral TID PRN    furosemide (LASIX) tablet 40 mg  40 mg Oral EVERY TUES,THUR,SAT,SUN    sodium chloride (NS) flush 5-10 mL  5-10 mL IntraVENous Q8H    sodium chloride (NS) flush 5-10 mL  5-10 mL IntraVENous PRN    acetaminophen (TYLENOL) tablet 650 mg  650 mg Oral Q4H PRN    Or    acetaminophen (TYLENOL) solution 650 mg  650 mg Per NG tube Q4H PRN    Or    acetaminophen (TYLENOL) suppository 650 mg  650 mg Rectal Q4H PRN    ibuprofen (MOTRIN) tablet 600 mg  600 mg Oral Q6H PRN    Or    ibuprofen (ADVIL;MOTRIN) 100 mg/5 mL oral suspension 600 mg  600 mg Per NG tube Q6H PRN    meperidine (DEMEROL) injection 12.5 mg  12.5 mg IntraVENous Q4H PRN    heparin (porcine) injection 5,000 Units  5,000 Units SubCUTAneous Q8H       Care Plan discussed with:  Patient/Family, Nurse and     Kay Amin MD  Internal Medicine  Date of Service: 9/11/2017

## 2017-09-11 NOTE — PROGRESS NOTES
Consult received and appreciated. Reviewed chart and discussed case with nsg.  nsg dysphagia screen completed and WNL and MRI negative for acute infarct. Patient back to baseline function at this time with no concerns regarding speech, language or swallowing function. Formal SLP evaluation not clinically indicated. Please re-consult if further needs arise. Thanks. Maricel Malone M.CD.  CCC-SLP

## 2017-09-11 NOTE — PROGRESS NOTES
Problem: Falls - Risk of  Goal: *Absence of Falls  Document Yessenia Fall Risk and appropriate interventions in the flowsheet.    Outcome: Progressing Towards Goal  Fall Risk Interventions:  Mobility Interventions: Communicate number of staff needed for ambulation/transfer, OT consult for ADLs, Patient to call before getting OOB, PT Consult for mobility concerns, PT Consult for assist device competence, Strengthening exercises (ROM-active/passive), Utilize walker, cane, or other assitive device           Medication Interventions: Assess postural VS orthostatic hypotension, Evaluate medications/consider consulting pharmacy, Patient to call before getting OOB, Teach patient to arise slowly     Elimination Interventions: Call light in reach, Patient to call for help with toileting needs, Toilet paper/wipes in reach, Toileting schedule/hourly rounds

## 2017-09-11 NOTE — PROGRESS NOTES
Patient requests referral to be made to in patient rehab at [address] [place] [address]. Referral has been sent via [email or web address]. Mitul Linares RN     [facility] has declined patient as she is showing to be a [employer] insurance policy participant. [facility] is not contracted/in network with [facility]. Have spoken with physician at [facility] to develop an appropriate plan of care. SNF has been recommended by [facility] Physician. Will present [address] [place] [address] to Ms. Thony Metzger as this facility is in network with her insurance. Based upon therapy notes, patient will need stair training for a safe transition back to her home. Mitul Linares RN      Update: 9/11/17  4:50pm  [facility] has a private room for Ms. Thony Metzger. Will discuss with patient who will need additional therapy following discharge from acute setting.

## 2017-09-12 VITALS
SYSTOLIC BLOOD PRESSURE: 96 MMHG | RESPIRATION RATE: 16 BRPM | BODY MASS INDEX: 47.09 KG/M2 | WEIGHT: 293 LBS | DIASTOLIC BLOOD PRESSURE: 58 MMHG | HEART RATE: 70 BPM | OXYGEN SATURATION: 100 % | HEIGHT: 66 IN | TEMPERATURE: 97.8 F

## 2017-09-12 LAB
ANION GAP SERPL CALC-SCNC: 9 MMOL/L (ref 5–15)
BASOPHILS # BLD: 0 K/UL (ref 0–0.1)
BASOPHILS NFR BLD: 0 % (ref 0–1)
BUN SERPL-MCNC: 37 MG/DL (ref 6–20)
BUN/CREAT SERPL: 24 (ref 12–20)
CALCIUM SERPL-MCNC: 8.9 MG/DL (ref 8.5–10.1)
CHLORIDE SERPL-SCNC: 106 MMOL/L (ref 97–108)
CO2 SERPL-SCNC: 23 MMOL/L (ref 21–32)
CREAT SERPL-MCNC: 1.57 MG/DL (ref 0.55–1.02)
EOSINOPHIL # BLD: 0.2 K/UL (ref 0–0.4)
EOSINOPHIL NFR BLD: 2 % (ref 0–7)
ERYTHROCYTE [DISTWIDTH] IN BLOOD BY AUTOMATED COUNT: 14.7 % (ref 11.5–14.5)
GLUCOSE BLD STRIP.AUTO-MCNC: 101 MG/DL (ref 65–100)
GLUCOSE BLD STRIP.AUTO-MCNC: 112 MG/DL (ref 65–100)
GLUCOSE BLD STRIP.AUTO-MCNC: 81 MG/DL (ref 65–100)
GLUCOSE SERPL-MCNC: 169 MG/DL (ref 65–100)
HCT VFR BLD AUTO: 36.5 % (ref 35–47)
HGB BLD-MCNC: 11.4 G/DL (ref 11.5–16)
LYMPHOCYTES # BLD: 1.6 K/UL (ref 0.8–3.5)
LYMPHOCYTES NFR BLD: 25 % (ref 12–49)
MCH RBC QN AUTO: 27.7 PG (ref 26–34)
MCHC RBC AUTO-ENTMCNC: 31.2 G/DL (ref 30–36.5)
MCV RBC AUTO: 88.6 FL (ref 80–99)
MONOCYTES # BLD: 0.6 K/UL (ref 0–1)
MONOCYTES NFR BLD: 10 % (ref 5–13)
NEUTS SEG # BLD: 3.9 K/UL (ref 1.8–8)
NEUTS SEG NFR BLD: 63 % (ref 32–75)
PLATELET # BLD AUTO: 225 K/UL (ref 150–400)
POTASSIUM SERPL-SCNC: 4.5 MMOL/L (ref 3.5–5.1)
RBC # BLD AUTO: 4.12 M/UL (ref 3.8–5.2)
SERVICE CMNT-IMP: ABNORMAL
SERVICE CMNT-IMP: ABNORMAL
SERVICE CMNT-IMP: NORMAL
SODIUM SERPL-SCNC: 138 MMOL/L (ref 136–145)
WBC # BLD AUTO: 6.2 K/UL (ref 3.6–11)

## 2017-09-12 PROCEDURE — 96361 HYDRATE IV INFUSION ADD-ON: CPT

## 2017-09-12 PROCEDURE — 36415 COLL VENOUS BLD VENIPUNCTURE: CPT | Performed by: INTERNAL MEDICINE

## 2017-09-12 PROCEDURE — 74011250637 HC RX REV CODE- 250/637: Performed by: INTERNAL MEDICINE

## 2017-09-12 PROCEDURE — 85025 COMPLETE CBC W/AUTO DIFF WBC: CPT | Performed by: INTERNAL MEDICINE

## 2017-09-12 PROCEDURE — 74011250637 HC RX REV CODE- 250/637: Performed by: HOSPITALIST

## 2017-09-12 PROCEDURE — 97535 SELF CARE MNGMENT TRAINING: CPT

## 2017-09-12 PROCEDURE — 74011250636 HC RX REV CODE- 250/636: Performed by: HOSPITALIST

## 2017-09-12 PROCEDURE — 82962 GLUCOSE BLOOD TEST: CPT

## 2017-09-12 PROCEDURE — 74011250636 HC RX REV CODE- 250/636: Performed by: INTERNAL MEDICINE

## 2017-09-12 PROCEDURE — 96372 THER/PROPH/DIAG INJ SC/IM: CPT

## 2017-09-12 PROCEDURE — 99218 HC RM OBSERVATION: CPT

## 2017-09-12 PROCEDURE — 80048 BASIC METABOLIC PNL TOTAL CA: CPT | Performed by: INTERNAL MEDICINE

## 2017-09-12 RX ORDER — MORPHINE SULFATE 15 MG/1
15 TABLET, FILM COATED, EXTENDED RELEASE ORAL EVERY 12 HOURS
Qty: 60 TAB | Refills: 0 | Status: SHIPPED | OUTPATIENT
Start: 2017-09-12 | End: 2019-10-29

## 2017-09-12 RX ORDER — CARVEDILOL 25 MG/1
25 TABLET ORAL 2 TIMES DAILY WITH MEALS
Qty: 180 TAB | Refills: 3 | Status: SHIPPED | OUTPATIENT
Start: 2017-09-12 | End: 2017-10-30

## 2017-09-12 RX ORDER — INSULIN LISPRO 100 [IU]/ML
INJECTION, SOLUTION INTRAVENOUS; SUBCUTANEOUS
Qty: 1 VIAL | Refills: 0 | Status: SHIPPED | OUTPATIENT
Start: 2017-09-12 | End: 2017-10-20

## 2017-09-12 RX ORDER — FUROSEMIDE 40 MG/1
40 TABLET ORAL DAILY
Qty: 30 TAB | Refills: 0 | Status: SHIPPED | OUTPATIENT
Start: 2017-09-12 | End: 2017-09-12

## 2017-09-12 RX ORDER — LOSARTAN POTASSIUM 50 MG/1
50 TABLET ORAL
Qty: 30 TAB | Refills: 0 | Status: SHIPPED | OUTPATIENT
Start: 2017-09-15 | End: 2019-03-05 | Stop reason: SDUPTHER

## 2017-09-12 RX ORDER — LEVOTHYROXINE SODIUM 125 UG/1
125 TABLET ORAL
Qty: 30 TAB | Refills: 0 | Status: SHIPPED | OUTPATIENT
Start: 2017-09-12 | End: 2017-10-30 | Stop reason: DRUGHIGH

## 2017-09-12 RX ORDER — HYDROCODONE BITARTRATE AND ACETAMINOPHEN 5; 325 MG/1; MG/1
1 TABLET ORAL
Qty: 20 TAB | Refills: 0 | Status: SHIPPED | OUTPATIENT
Start: 2017-09-12 | End: 2020-04-30 | Stop reason: ALTCHOICE

## 2017-09-12 RX ORDER — IPRATROPIUM BROMIDE AND ALBUTEROL SULFATE 2.5; .5 MG/3ML; MG/3ML
3 SOLUTION RESPIRATORY (INHALATION)
Qty: 30 NEBULE | Refills: 0 | Status: SHIPPED | OUTPATIENT
Start: 2017-09-12

## 2017-09-12 RX ORDER — LOSARTAN POTASSIUM 50 MG/1
50 TABLET ORAL
Qty: 30 TAB | Refills: 0 | Status: SHIPPED | OUTPATIENT
Start: 2017-09-12 | End: 2017-09-12

## 2017-09-12 RX ADMIN — HEPARIN SODIUM 5000 UNITS: 5000 INJECTION, SOLUTION INTRAVENOUS; SUBCUTANEOUS at 00:35

## 2017-09-12 RX ADMIN — ERGOCALCIFEROL 50000 UNITS: 1.25 CAPSULE ORAL at 09:14

## 2017-09-12 RX ADMIN — TOPIRAMATE 25 MG: 25 TABLET, FILM COATED ORAL at 09:14

## 2017-09-12 RX ADMIN — CHLORZOXAZONE 500 MG: 500 TABLET ORAL at 16:13

## 2017-09-12 RX ADMIN — HEPARIN SODIUM 5000 UNITS: 5000 INJECTION, SOLUTION INTRAVENOUS; SUBCUTANEOUS at 16:13

## 2017-09-12 RX ADMIN — IBUPROFEN 600 MG: 600 TABLET, FILM COATED ORAL at 16:14

## 2017-09-12 RX ADMIN — HYDROCODONE BITARTRATE AND ACETAMINOPHEN 1 TABLET: 7.5; 325 TABLET ORAL at 09:14

## 2017-09-12 RX ADMIN — PANTOPRAZOLE SODIUM 40 MG: 40 TABLET, DELAYED RELEASE ORAL at 09:14

## 2017-09-12 RX ADMIN — ASPIRIN 81 MG: 81 TABLET, COATED ORAL at 09:13

## 2017-09-12 RX ADMIN — CHLORZOXAZONE 500 MG: 500 TABLET ORAL at 09:13

## 2017-09-12 RX ADMIN — Medication 10 ML: at 06:32

## 2017-09-12 RX ADMIN — LEVOTHYROXINE SODIUM 150 MCG: 150 TABLET ORAL at 06:32

## 2017-09-12 RX ADMIN — MORPHINE SULFATE 15 MG: 15 TABLET, FILM COATED, EXTENDED RELEASE ORAL at 09:13

## 2017-09-12 RX ADMIN — FERROUS SULFATE TAB 325 MG (65 MG ELEMENTAL FE) 325 MG: 325 (65 FE) TAB at 06:32

## 2017-09-12 RX ADMIN — SODIUM CHLORIDE 500 ML: 900 INJECTION, SOLUTION INTRAVENOUS at 11:06

## 2017-09-12 RX ADMIN — SODIUM CHLORIDE 1000 ML: 900 INJECTION, SOLUTION INTRAVENOUS at 12:17

## 2017-09-12 RX ADMIN — TOPIRAMATE 25 MG: 25 TABLET, FILM COATED ORAL at 17:00

## 2017-09-12 RX ADMIN — HEPARIN SODIUM 5000 UNITS: 5000 INJECTION, SOLUTION INTRAVENOUS; SUBCUTANEOUS at 09:13

## 2017-09-12 RX ADMIN — Medication 10 ML: at 13:56

## 2017-09-12 RX ADMIN — SPIRONOLACTONE 25 MG: 25 TABLET, FILM COATED ORAL at 09:14

## 2017-09-12 RX ADMIN — CYCLOBENZAPRINE HYDROCHLORIDE 5 MG: 10 TABLET, FILM COATED ORAL at 09:13

## 2017-09-12 NOTE — PROGRESS NOTES
Problem: Pressure Injury - Risk of  Goal: *Prevention of pressure ulcer  Outcome: Progressing Towards Goal  Frequent repositioning, more time OOB, PT/OT consult.

## 2017-09-12 NOTE — PROGRESS NOTES
Agree with Raiza Batista RN's assessment     09/11/17 2000   Assessment  Type   Assessment Type Shift assessment   Psychosocial   Psychosocial (WDL) WDL   Purposeful Interaction Yes   Pt Identified Daily Priority Clinical issues (comment)   Caritas Process Nurture loving kindness;Establish trust;Attend basic human needs;Create healing environment   Caring Interventions Reassure; Therapeutic modalities   Reassure Therapeutic listening;Caring rounds   Therapeutic Modalities Humor; Intentional therapeutic touch   Patient Behaviors Calm; Cooperative   Visitor Behaviors Calm; Cooperative   Neuro   Neurologic State Alert   Orientation Level Oriented X4   Cognition Appropriate decision making; Appropriate for age attention/concentration; Appropriate safety awareness   Speech Clear   LUE Motor Response  unequal R greater than L   RUE Motor Response  unequal R greater than L   RLE Motor Response Purposeful   Facial Droop Normal   Assessment L Pupil Brisk;Round   Size L Pupil (mm) 3   Assessment R Pupil Brisk;Round   Size R Pupil (mm) 3   LLE Motor Response Purposeful;Weak;Numbness   Seizure Activity   Seizure Precautions? 0   [place] Coma Scale   Eye Opening 4   Best Verbal Response 5   Best Motor Response 6   [place] Coma Scale Score 15   Confusion Assessment Method (CAM)   Acute Onset and Fluctuating Course (1A) No   Cranial Nerves   Eye Assessment Focuses with eyes   Eye Opening Spontaneously   Cranial Nerve Assessments No facial numbness; No facial weakness; Tongue midline   EENT   EENT (WDL) WDL   Visual Aid Glasses; With patient   Visual Impairment None   Cardiac   Cardiac Rhythm NSR   B/P Outside of Defined Limits Yes   Cardiac Regularity Regular   Heart Sounds S1 S2   Cardiac/Telemetry   Cardiac/Telemetry Monitor On Yes   Box Number 663   Alarm Audible Yes   Alarms Set Yes   Electrodes Replaced No   Respiratory   Respiratory (WDL) X   Respiratory Pattern Dyspnea with exertion   Breath Sounds Bilateral Diminished;Clear Oxygen Therapy   O2 Sat (%) 97 %   O2 Device Room air   Pulmonary Toilet   Pulmonary Toilet H. O.B elevated   Abdominal    Abdominal (WDL) WDL   Last Bowel Movement Date 09/10/17   Abdominal Assessment Obese; Soft   Appetite Good   Bowel Sounds Active    Genitourinary   Genitourinary (WDL) WDL   Urine Assessment   Urinary Status Voiding; Bathroom privileges   Urine Color Shelia   Urine Appearance Clear   Urine Odor None   VTE Prophylaxis (Shift Required Documentation)   Mechanical VTE Orders No  (heparin)   Peripheral Vascular   Peripheral Vascular (WDL) X   LUE Peripheral Vascular   Capillary Refill Less than/equal to 3 seconds   Color Appropriate for race   Temperature Warm   Sensation Decreased;Numbness   LLE Peripheral Vascular    Capillary Refill Less than/equal to 3 seconds   Color Appropriate for race   Temperature Warm   Sensation Decreased; Hypersensitivity   Pedal Pulse Present   RUE Peripheral Vascular    Capillary Refill Less than/equal to 3 seconds   Color Appropriate for race   Temperature Warm   Sensation Present   RLE Peripheral Vascular    Capillary Refill Less than/equal to 3 seconds   Color Appropriate for race   Temperature Warm   Sensation Decreased;Numbness   Skin Integumentary   Skin Integumentary (WDL) WDL   Pressure  Injury Documentation No Pressure Injury Noted-Pressure Ulcer Prevention Initiated   Skin Color Appropriate for ethnicity   Skin Condition/Temp Dry; Warm   Skin Integrity Intact   Turgor Non-tenting   Hair Growth Present   Varicosities Absent   Wound Prevention and Protection Methods   Orientation of Wound Prevention Posterior   Location of Wound Prevention Sacrum/Coccyx   Dressing Present  No   Wound Offloading (Prevention Methods) Bed, pressure reduction mattress;Repositioning;Pillows   Elier Scale (Greater than 11years of age)   Sensory Perception 3   Sensory Interventions Assess changes in LOC; Float heels / Suspension boots;Pressure redistribution bed / mattress / cushion chair (bed type)   Moisture 4   Activity 3   Activity Interventions Increase time out of bed;PT / OT evaluation   Mobility 3   Mobility Interventions HOB 30 degrees or less; Turn and reposition (pillow and wedges);PT / OT evaluation   Nutrition 4   Nutrition Interventions Document food / fluid / supplement intake;Encourage / assist with meals as needed   Friction and Shear 3   Friction Interventions Lift sheet;HOB 30 degrees or less   Elier Score 20   Hygiene and [address] Bathed;Gown changed;Linen changed   Type of [place]/Soap/Water   Hygiene Assistance Partial   Comfort Bed pad changed;Draw sheet changed;Gown changed;Lights dim;Linen changed   Activity and Safety   Activity Level Up with Assistance   Activity In bed   Activity Assistance Partial (one person)   Weight Bearing Status WBAT (Weight Bearing as Tolerated)   PWB (Partial Weight Bearing %) extremities LLE (Left Lower Extremity)   Mode of Transportation Wheelchair   Repositioned Head of bed elevated (degrees)   Patient Turned Turns self   Head of Bed Elevated Self regulated   Activity Response Dyspnea on exertion   Assistive Device Walker (comment)   Safety Measures Bed/Chair-Wheels locked; Bed in low position;Call light within reach   [facility] Risk   Mobility 1.0   Mobility Interventions Patient to call before getting OOB;PT Consult for mobility concerns;Strengthening exercises (ROM-active/passive); Utilize walker, cane, or other assitive device   Mentation 0   Medication 1   Medication Interventions Patient to call before getting OOB; Teach patient to arise slowly;Utilize gait belt for transfers/ambulation   Elimination 1.0   Elimination Interventions Call light in reach; Patient to call for help with toileting needs; Toileting schedule/hourly rounds   Prior Fall History 0   Total Score 3   Standard Fall Precautions Yes   High Fall Risk Yes   Neuro   Neuro (WDL) X   Cardiac   Cardiac (WDL) X   Musculoskeletal   Musculoskeletal (WDL) X   LUE Weakness LLE Weakness;Numbness; Foot drop   RLE Weakness   Special Appliance/Equipment   Special Appliance/Equipment Needed Yes   Special Appliances/Equipment Bed (comment); Louisa Santana

## 2017-09-12 NOTE — PROGRESS NOTES
Physical Therapy    Reviewed chart. Attempted to treat pt. On arrival pt sleepy. BP 81/43 supine HR 73. Notified nurse who is aware of decrease BP. Deferred visit at this time. Will continue to follow.

## 2017-09-12 NOTE — PROGRESS NOTES
BRINA spoke with Kate Morrison at the [facility] today and she stated that she can accept this pt. BRINA also spoke with  from [facility] and he wants to discharge pt to the [facility] today. Pt in agreement with plan. Brina obtained an auth for transport via ambulance with [facility] from [facility] . The auth number is [id number]. BRINA called [facility] and set up transport for 5pm today. An envelope containing pt's kardex, MARs and AVS will be sent with pt to [employer]. Also, pt's nurse will call report.  Pearl Cardona

## 2017-09-12 NOTE — INTERDISCIPLINARY ROUNDS
IDR/SLIDR Summary          Patient: Isela Gardner MRN: [id number]    Age: 59 y.o. YOB: 1960 Room/Bed: [facility]   Admit Diagnosis: Left-sided weakness  Principal Diagnosis: Left-sided weakness   Goals: safety, increase mobility  Readmission: NO  Quality Measure: CHF  VTE Prophylaxis: Chemical  Influenza Vaccine screening completed? YES  Pneumococcal Vaccine screening completed? YES  Mobility needs: Yes   Nutrition plan:Yes  Consults:P.T, O.T. and Case Management    Financial concerns:Yes  Escalated to CM? YES  RRAT Score: 9   Interventions:Home Health  Testing due for pt today? NO  LOS: 0 days Expected length of stay 2 days  Discharge plan: home with in home rehab   PCP: Jeremiah Escobar.  Suleiman Bolden MD  Transportation needs: Yes    Days before discharge:one day until discharge   Discharge disposition: Home    Signed:     Cydney Santiago  9/12/2017  12:16 AM

## 2017-09-12 NOTE — PROGRESS NOTES
Problem: Self Care Deficits Care Plan (Adult)  Goal: *Acute Goals and Plan of Care (Insert Text)  Occupational Therapy Goals  Initiated 9/9/2017  1. Patient will perform lower body ADLs with minimal assistance/contact guard assist within 7 day(s). GOAL MET 9/12/17  2. Patient will perform upper body ADLs standing 2 mins without fatigue or LOB with modified independence within 7 day(s). 3. Patient will perform all aspects of toileting with independence within 7day(s). 4. Patient will participate in upper extremity therapeutic exercise/activities with independence for 10 minutes within 7 day(s). 5. Patient will utilize energy conservation techniques during functional activities without cues within 7 day(s). OCCUPATIONAL THERAPY TREATMENT  Patient: Tatiana Orozco (64 y.o. female)  Date: 9/12/2017  Diagnosis: Left-sided weakness Left-sided weakness       Precautions: Fall (L AFO)      ASSESSMENT:  Patient received on toilet with nurse and PCT. Nursing cleared for OT session. Provided education on safety, energy conservation techniques and sequencing with toileting, UB/LB bathing, UB/LB dressing and oral care tasks at EOB. CGA for all aspects of rollator use with slow pace. Patient requesting to not put on AFO at this time for short amb distance. Patient demonstrated ability to complete all seated aspects of self care at EOB with set up and supervision secondary to assess BP and brief standing aspects with CGA. LUE with impaired coordination and strength however patient with excellent attempts of use of LUE with additional time(which she reports is her dominant hand). Patient needing additional time for all aspects of task secondary to L sided impairments and drowsiness/fatigue. BP with activity stable at 95/54 at EOB, similar to BP taken by nurses prior to toileting. Left sitting EOB with PCT present.       She would benefit from additional therapy services once medically stable prior to returning home with sister. Patient is very motivated to highest level of indep. She reports at home it takes her up to two hours for ADL morning routine at mod indep level. Progression toward goals:  [X]       Improving appropriately and progressing toward goals  [ ]       Improving slowly and progressing toward goals  [ ]       Not making progress toward goals and plan of care will be adjusted       PLAN:  Patient continues to benefit from skilled intervention to address the above impairments. Continue treatment per established plan of care. Discharge Recommendations:  Rehab  Further Equipment Recommendations for Discharge:  TBD rehab       SUBJECTIVE:   Patient stated I would like to live alone again, but my sister won't let me. Kandi Guaman      OBJECTIVE DATA SUMMARY:   Cognitive/Behavioral Status:  Neurologic State: Drowsy  Orientation Level: Oriented X4  Cognition: Appropriate decision making; Appropriate for age attention/concentration; Follows commands              Functional Mobility and Transfers for ADLs:  Bed Mobility:        Transfers:     Functional Transfers  Toilet Transfer : Moderate assistance (sit > stand, amb to bed with CGA at rollator lvel)     Balance:  Sitting: Intact  Standing: Impaired  Standing - Static: Constant support; Fair  Standing - Dynamic : Fair     ADL Intervention:    Provided education through verbal cues on safety, energy conservation techniques and sequencing with toileting, UB/LB bathing, UB/LB dressing and oral care tasks at EOB. CGA for all aspects of rollator use with slow pace. Patient demonstrated ability to complete all seated aspects of self care at EOB with set up and supervision secondary to assess BP. Perdomo sitting at EOB for distal LB dressing and bathing techniques. Brief standing aspects with CGA.   LUE with impaired fine motor and gross motor coordination and strength however patient with excellent attempts of use of LUE with additional time(which she reports is her dominant hand). Patient needing additional time for all aspects of task secondary to L sided impairments and drowsiness/fatigue. She reports additional time for ADLs at home. Grooming  Grooming Assistance: Supervision/set up (seated with additional time)  Washing Face: Supervision/set-up  Brushing Teeth: Supervision/set-up     Upper Body Bathing  Bathing Assistance: Supervision/set-up  Position Performed: Seated edge of bed     Lower Body Bathing  Perineal  : Supervision/set-up  Position Performed:  (EOB, lateral weight shifting)  Lower Body : Supervision/set-up; Compensatory technique training (additional time)  Position Performed: Seated edge of bed (Leanne sitting, vaseline pplied to feet)     Upper Body [address]: Supervision/ set-up     Lower Body Dressing Assistance  Socks: Supervision/set-up (Sergio sitting)  Position Performed: Seated edge of bed     Toileting  Bladder Hygiene: Supervision/set-up  Bowel Hygiene: Maximum assistance (standing)     Pain:  Pain Scale 1: Numeric (0 - 10)  Pain Intensity 1: 0     Activity Tolerance:   BP with activity sitting EOB: 95/54. Stable from previous BP taken by nursing prior to toileting tasks.       After treatment:   [ ] Patient left in no apparent distress sitting up in chair  [X] Patient left in no apparent distress sitting EOB with PCT present  [X] Call bell left within reach  [X] Nursing notified  [ ] PCT present  [ ] Bed alarm activated      COMMUNICATION/COLLABORATION:   The patients plan of care was discussed with: Physical Therapy Assistant, Registered Nurse and Pateint     Oscar Espinosa OT  Time Calculation: 46 mins

## 2017-09-12 NOTE — PROGRESS NOTES
Problem: Self Care Deficits Care Plan (Adult)  Goal: *Acute Goals and Plan of Care (Insert Text)  Occupational Therapy Goals  Initiated 9/9/2017  1. Patient will perform lower body ADLs with minimal assistance/contact guard assist within 7 day(s). GOAL MET 9/12/17  2. Patient will perform upper body ADLs standing 2 mins without fatigue or LOB with modified independence within 7 day(s). 3. Patient will perform all aspects of toileting with independence within 7day(s). 4. Patient will participate in upper extremity therapeutic exercise/activities with independence for 10 minutes within 7 day(s). 5. Patient will utilize energy conservation techniques during functional activities without cues within 7 day(s). OCCUPATIONAL THERAPY TREATMENT  Patient: Isela Gardner (80 y.o. female)  Date: 9/12/2017  Diagnosis: Left-sided weakness Left-sided weakness       Precautions: Fall (L AFO)      ASSESSMENT:  Patient received with nursing on commode who cleared for OT services. Provided education on sequencing and safety with toileting tasks at rollator level. Patient completed UB bathing, LB bathing, UB dressing and LB dressing while EOB or brief standing. Completed with set up for all seated activities and CGA for all standing with additional time. Patient with L UE weakness and impaired coordination which she reports is her dominant hand and she has had deficits since her previous stroke. Demonstrates good compensatory techniques and ability to complete Leanne sitting for distal LB bathing and dressing. Good attempt for use of LUE in all activities. BP      Recommend continued rehab once medically stable prior to dc home with sister.       Progression toward goals:  [X]       Improving appropriately and progressing toward goals  [ ]       Improving slowly and progressing toward goals  [ ]       Not making progress toward goals and plan of care will be adjusted       PLAN:  Patient continues to benefit from skilled intervention to address the above impairments. Continue treatment per established plan of care. Discharge Recommendations:  {AFTER [facility]}  Further Equipment Recommendations for Discharge:  { :19098}       SUBJECTIVE:   Patient stated ***.      OBJECTIVE DATA SUMMARY:   Cognitive/Behavioral Status:  Neurologic State: Drowsy  Orientation Level: Oriented X4  Cognition: Appropriate decision making; Appropriate for age attention/concentration; Follows commands              Functional Mobility and Transfers for ADLs:  Bed Mobility:        Transfers:     Functional Transfers  Toilet Transfer : Moderate assistance (sit > stand, amb to bed with CGA at rollator lvel)     Balance:  Sitting: Intact  Standing: Impaired  Standing - Static: Constant support; Fair  Standing - Dynamic : Fair     ADL Intervention:        Grooming  Grooming Assistance: Supervision/set up (seated with additional time)  Washing Face: Supervision/set-up  Brushing Teeth: Supervision/set-up     Upper Body Bathing  Bathing Assistance: Supervision/set-up  Position Performed: Seated edge of bed     Lower Body Bathing  Perineal  : Supervision/set-up  Position Performed:  (EOB, lateral weight shifting)  Lower Body : Supervision/set-up; Compensatory technique training (additional time)  Position Performed: Seated edge of bed (Leanne sitting, vaseline pplied to feet)     Upper Body [address]: Supervision/ set-up     Lower Body Dressing Assistance  Socks: Supervision/set-up (Sergio sitting)  Position Performed: Seated edge of bed     Toileting  Bladder Hygiene: Supervision/set-up  Bowel Hygiene: Maximum assistance (standing)           Neuro Re-Education:           Therapeutic Exercises:   ***  Pain:  Pain Scale 1: Numeric (0 - 10)  Pain Intensity 1: 0              Activity Tolerance:   ***  Please refer to the flowsheet for vital signs taken during this treatment.   After treatment:   [ ] Patient left in no apparent distress sitting up in chair  [ ] Patient left in no apparent distress in bed  [ ] Call bell left within reach  [ ] Nursing notified  [ ] Caregiver present  [ ] Bed alarm activated      COMMUNICATION/COLLABORATION:   The patients plan of care was discussed with: {therapies:[id number]}     Devin Dates, OT  Time Calculation: 46 mins

## 2017-09-12 NOTE — PROGRESS NOTES
Bedside and Verbal shift change report given to Olimpia Levin RN (oncoming nurse) by Janis Del Angel RN (offgoing nurse). Report included the following information SBAR, Kardex, Intake/Output, MAR and Recent Results.

## 2017-09-12 NOTE — PROGRESS NOTES
Problem: Falls - Risk of  Goal: *Absence of Falls  Document Yessenia Fall Risk and appropriate interventions in the flowsheet.    Outcome: Progressing Towards Goal  Fall Risk Interventions:  Mobility Interventions: Patient to call before getting OOB, PT Consult for mobility concerns, Strengthening exercises (ROM-active/passive), Utilize walker, cane, or other assitive device           Medication Interventions: Patient to call before getting OOB, Teach patient to arise slowly, Utilize gait belt for transfers/ambulation     Elimination Interventions: Call light in reach, Patient to call for help with toileting needs, Toileting schedule/hourly rounds

## 2017-09-12 NOTE — PROGRESS NOTES
Patient a [facility] patient. Spoke with Dr Eduar Kim, he will take over care of patient.      Landy Gonzalez Np   Hospitalist  9/12/2017

## 2017-09-12 NOTE — DISCHARGE INSTRUCTIONS
Discharge Instructions       PATIENT ID: Abelardo López  MRN: [id number]   YOB: 1960    DATE OF ADMISSION: 9/8/2017 11:14 AM    DATE OF DISCHARGE: 9/10/2017    PRIMARY CARE PROVIDER: Patricia Robles. Heath Pantoja MD     ATTENDING PHYSICIAN: Stefany Hackett MD  DISCHARGING PROVIDER: Dr. Natasha Ktae MD    To contact this individual call [phone] and ask the  to page. If unavailable ask to be transferred the Adult Hospitalist Department. DISCHARGE DIAGNOSES weakness, migraine headache    CONSULTATIONS: IP CONSULT TO HOSPITALIST  IP CONSULT TO NEUROLOGY    PROCEDURES/SURGERIES: * No surgery found *    PENDING TEST RESULTS:   At the time of discharge the following test results are still pending: n/a    FOLLOW UP APPOINTMENTS:   Follow-up Information     Follow up With Details Comments [address] PIYUSH Pantoja MD Schedule an appointment as soon as possible for a visit in 1 week for hospital discharge follow-up [address] [facility] [address]      [address] [place] [address],  Schedule an appointment as soon as possible for a visit in 4 weeks For follow-up of migraine headaches [facility] [address] [facility].  [phone]             ADDITIONAL CARE RECOMMENDATIONS:   You were started on Topomax for migraine headache prevention. MRI of the brain did not show any new stroke. Follow-up with your doctors for routine care. DIET: Diabetic Diet    ACTIVITY: Activity as tolerated    WOUND CARE: n/a      DISCHARGE MEDICATIONS:   See Medication Reconciliation Form    · It is important that you take the medication exactly as they are prescribed. · Keep your medication in the bottles provided by the pharmacist and keep a list of the medication names, dosages, and times to be taken in your wallet. · Do not take other medications without consulting your doctor.        NOTIFY YOUR PHYSICIAN FOR ANY OF THE FOLLOWING: Fever over 101 degrees for 24 hours. Chest pain, shortness of breath, fever, chills, nausea, vomiting, diarrhea, change in mentation, falling, weakness, bleeding. Severe pain or pain not relieved by medications. Or, any other signs or symptoms that you may have questions about. DISPOSITION:    Home With:   OT  PT  HH  RN      XX SNF/Inpatient Rehab/LTAC    Independent/assisted living    Hospice    Other:     Special instruction    1. Vital sign check Q shift for next 3 days, call MD if SBP<100 or for fever  2. PT/OT  3. Insulin sliding scale as prescribed  4. CPAP, family is bringing from home to use at night, until get CPAP use oxygen 2 L/min at night only  5. BMP and Mg on 9/14/17.     PROBLEM LIST Updated:  Yes x       Signed:   Josy Hoffmann MD  9/10/2017  11:00 AM

## 2017-10-13 ENCOUNTER — OFFICE VISIT (OUTPATIENT)
Dept: NEUROLOGY | Age: 57
End: 2017-10-13

## 2017-10-13 VITALS
RESPIRATION RATE: 16 BRPM | OXYGEN SATURATION: 96 % | WEIGHT: 293 LBS | HEART RATE: 78 BPM | HEIGHT: 66 IN | BODY MASS INDEX: 47.09 KG/M2 | SYSTOLIC BLOOD PRESSURE: 118 MMHG | DIASTOLIC BLOOD PRESSURE: 70 MMHG

## 2017-10-13 DIAGNOSIS — G43.709 CHRONIC MIGRAINE W/O AURA W/O STATUS MIGRAINOSUS, NOT INTRACTABLE: Primary | ICD-10-CM

## 2017-10-13 RX ORDER — TOPIRAMATE 25 MG/1
75 TABLET ORAL 2 TIMES DAILY
Qty: 180 TAB | Refills: 3 | Status: SHIPPED | OUTPATIENT
Start: 2017-10-13 | End: 2018-01-12 | Stop reason: SDUPTHER

## 2017-10-13 RX ORDER — TOPIRAMATE 25 MG/1
75 TABLET ORAL 2 TIMES DAILY
Qty: 180 TAB | Refills: 3 | Status: SHIPPED | OUTPATIENT
Start: 2017-10-13 | End: 2017-10-13 | Stop reason: SDUPTHER

## 2017-10-13 NOTE — PROGRESS NOTES
Chief Complaint   Patient presents with    Extremity Weakness     left effect    Headache     eval       HPI    Saolme Rome is a 57-year-old woman with a history of stroke who has left hemiparesis here to follow-up after I saw her in the hospital in early September. She presented with a severe left frontal headache determined to be migraine. MRI done at the time of admission did not show any new process. She was discharged on topiramate which she is taking 50 mg twice daily. Still having headache but not as severe. Headache is vertex diffuse and throbbing with light and sound sensitivity. Review of Systems   Eyes: Positive for photophobia. Gastrointestinal: Positive for nausea. Neurological: Positive for headaches. All other systems reviewed and are negative. Past Medical History:   Diagnosis Date    Advanced care planning/counseling discussion 3/4/16    On File    Bronchitis 2/24/2014    Cervicalgia 8/18/15    Dr. Connie Langford    Chest pain 5/25/15    [facility] 5/25/15 (lab work negative)    Congestive heart failure, unspecified     Last Echo 2/8/15: EF 55-60%    Constipation 6/13/2017    Enthesopathy, spinal ([facility] [address] 8/18/15    Dr. Michael Bender Essential hypertension     Foot drop 8/18/15    Dr. Michael Bender Heart attack [facility]) 2/24/2013    Was supposed to See Cardiology for possible pacemaker in november 2014- After Cardiology consult locally, no need, EF greatly improved.  Established with Dr. Carolina Rogers Hiatal hernia 6/2015    3 cm hiatal hernia     Hip pain 8/18/15    Dr. Connie Langford    Hypercholesterolemia     Hyperglycemia 7/2015    A1c 5.9     Hyperlipidemia 6/30/2015    NMR lipoprofile- LDL P 997, LDL-c 71, HLD-C-39, TG-60, HLD-P (25.2), Small LDL-P -541, LDL size 20.6    Hypothyroid     Insomnia 6/13/2017    Lower extremity edema     Lumbar spondylosis 8/18/15    Dr. Dianelys Michelle 6/2015    EGD/Colonscopy 6/15- Gastritis, internal hemorrhoids and 3 polyps    Murmur     EDDIE on CPAP     Was referred to Pulmonology - Uses CPAP    Osteoarthritis of hip 8/18/15    Dr. Elin Trejo    Osteoarthritis, shoulder 8/18/15    Dr. Elin Trejo    Radiculopathy, cervical 8/18/15    Dr. Elin Trejo    Shoulder pain 8/18/15    Dr. Wolfgang Wood Spinal stenosis of cervical region 8/18/15    Dr. Wolfgang Wood Spinal stenosis, lumbar 8/18/15    Dr. Wolfgang Wood Stroke [facility]) 2/25/2014    Established with Neurology, Catrachito Bill, NP-[facility]15. CT negative, but Late effect CV accident with increased tone described on discharge summary, Carotid dopplers showed 50% stenosis bilaterally.  Vitamin D deficiency 7/2015     Family History   Problem Relation Age of Onset    Heart Disease Father 61    Hypertension Mother     Heart Disease Brother 62    Diabetes Maternal Grandmother      Social History     Social History    Marital status: SINGLE     Spouse name: N/A    Number of children: N/A    Years of education: N/A     Occupational History    Not on file. Social History Main Topics    Smoking status: Former Smoker     Packs/day: 0.25     Years: 15.00     Types: Cigarettes     Quit date: 6/13/2007    Smokeless tobacco: Never Used    Alcohol use No    Drug use: No    Sexual activity: No     Other Topics Concern    Not on file     Social History Narrative     Allergies   Allergen Reactions    Bees [Hymenoptera Allergenic Extract] Shortness of Breath and Swelling    Strawberry Shortness of Breath and Swelling    Lisinopril Cough         Current Outpatient Prescriptions   Medication Sig    topiramate (TOPAMAX) 25 mg tablet Take 3 Tabs by mouth two (2) times a day.  carvedilol (COREG) 25 mg tablet Take 1 Tab by mouth two (2) times daily (with meals). Hold for SBP<100 or HR <55    morphine CR (MS CONTIN) 15 mg CR tablet Take 1 Tab by mouth every twelve (12) hours. Max Daily Amount: 30 mg.    levothyroxine (SYNTHROID) 125 mcg tablet Take 1 Tab by mouth Daily (before breakfast).     HYDROcodone-acetaminophen (NORCO) 5-325 mg per tablet Take 1 Tab by mouth every six (6) hours as needed for Pain. Max Daily Amount: 4 Tabs.  albuterol-ipratropium (DUO-NEB) 2.5 mg-0.5 mg/3 ml nebu 3 mL by Nebulization route every six (6) hours as needed.  cyclobenzaprine (FLEXERIL) 5 mg tablet Take 5 mg by mouth two (2) times a day.  EPINEPHrine (EPIPEN 2-LAURA) 0.3 mg/0.3 mL injection 0.3 mL by IntraMUSCular route once as needed for up to 1 dose.  ergocalciferol (ERGOCALCIFEROL) 50,000 unit capsule Take 1 Cap by mouth daily. High needs due to gastric bypass history    cyanocobalamin (VITAMIN B12) 1,000 mcg/mL injection 1 mL by SubCUTAneous route every fourteen (14) days. For b12 deficiency    metFORMIN ER (GLUCOPHAGE XR) 500 mg tablet Take 2 Tabs by mouth daily.  albuterol (PROVENTIL HFA, VENTOLIN HFA, PROAIR HFA) 90 mcg/actuation inhaler Take 2 Puffs by inhalation every four (4) hours as needed for Wheezing or Shortness of Breath.  DULoxetine (CYMBALTA) 60 mg capsule Take 1 Cap by mouth every evening.  lidocaine (LIDODERM) 5 % Apply patch to the affected area for 12 hours a day and remove for 12 hours a day.  pantoprazole (PROTONIX) 40 mg tablet Take 1 Tab by mouth daily.  linaclotide (LINZESS) 145 mcg cap capsule Take 1 Cap by mouth Daily (before breakfast). For constipation    atorvastatin (LIPITOR) 40 mg tablet Take 1 Tab by mouth nightly.  ferrous sulfate 325 mg (65 mg iron) tablet Take 325 mg by mouth Daily (before breakfast).  aspirin delayed-release 81 mg tablet Take 81 mg by mouth daily.  insulin lispro (HUMALOG) 100 unit/mL injection Sliding scale before each meal only  140-200 : 2 units  201-250 : 4 units  251-300 : 6 units  301-350 : 8 units  >350 : 10 units and call MD    losartan (COZAAR) 50 mg tablet Take 1 Tab by mouth nightly. Hold for SBP<115     No current facility-administered medications for this visit.             Neurologic Exam     Mental Status WD/WN adult in NAD, normal grooming  VSS  A&O x 3    PERRL, nonicteric  Face is asymmetric, tongue midline  Speech is fluent and clear  No limb ataxia. No abnl movements. Moving all extemities spontaneously and symmetric  Circumducting gait    CVS RRR  Lungs nonlabored  Skin is warm and dry         Visit Vitals    /70    Pulse 78    Resp 16    Ht 5' 6\" (1.676 m)    Wt 146.5 kg (323 lb)    SpO2 96%    BMI 52.13 kg/m2       Assessment and Plan   Diagnoses and all orders for this visit:    1. Chronic migraine w/o aura w/o status migrainosus, not intractable    Other orders  -     topiramate (TOPAMAX) 25 mg tablet; Take 3 Tabs by mouth two (2) times a day. 49-year-old woman with multiple chronic conditions having severe frequent migraine headaches. I suspect this is multifactorial in the setting of her multiple chronic conditions. She is having some mild benefit with 50 mg dosing so I am going to titrate to 75 twice daily. Side effects have been discussed with her at length. I would like her to continue to be compliant with her CPAP machine. This will help with headache prevention. I would like to see her in 3 months.         2 [facility], [address]  Diplomate QUINTIN

## 2017-10-13 NOTE — MR AVS SNAPSHOT
Visit Information Date & Time Provider Department Dept. Phone Encounter #  
 10/13/2017  2:20  Fili Cabrera, [address] [facility] at [address] [id number] Follow-up Instructions Return in about 3 months (around 1/13/2018). Routing History Your Appointments 10/30/2017  9:10 AM  
ROUTINE CARE with MD Primitivo Chavez Diabetes and Endocrinology [facility]) Appt Note: f/u diabetes cp30.00  
 [address] [place] [address]  
[address] [address]  
  
    
 11/1/2017  8:40 AM  
ESTABLISHED PATIENT with Heidy Lopez MD  
[facility] (ALICE SCHEDULING) Appt Note: 1 yr f/u  
 [address] [place] [address]  
[address] [address] Upcoming Health Maintenance Date Due INFLUENZA AGE 9 TO ADULT 8/1/2017 MEDICARE YEARLY EXAM 6/14/2018 PAP AKA CERVICAL CYTOLOGY 11/13/2018 BREAST CANCER SCRN MAMMOGRAM 8/4/2019 COLONOSCOPY 6/22/2021 DTaP/Tdap/Td series (2 - Td) 6/13/2027 Allergies as of 10/13/2017  Review Complete On: 10/13/2017 By: [address], DO Severity Noted Reaction Type Reactions Bees [Hymenoptera Allergenic Extract] High 10/14/2014   Systemic Shortness of Breath, Swelling [place] High 10/14/2014   Systemic Shortness of Breath, Swelling Lisinopril  10/17/2014    Cough Current Immunizations  Reviewed on 10/11/2016 Name Date Influenza Vaccine Katharine Miu) 11/13/2015 Influenza Vaccine (Quad) PF 10/11/2016 Pneumococcal Polysaccharide (PPSV-23) 8/29/2016 Tdap 6/13/2017 Not reviewed this visit You Were Diagnosed With   
  
 Codes Comments Chronic migraine w/o aura w/o status migrainosus, not intractable    -  Primary ICD-10-CM: M30.515 ICD-9-CM: 346.70 Vitals BP Pulse Resp Height(growth percentile) Weight(growth percentile) SpO2  
 118/70 78 16 5' 6\" (1.676 m) 323 lb (146.5 kg) 96% BMI OB Status Smoking Status 52.13 kg/m2 Postmenopausal Former Smoker Vitals History BMI and BSA Data Body Mass Index Body Surface Area  
 52.13 kg/m 2 2.61 m 2 Preferred Pharmacy Pharmacy Name Phone [facility] [address] [place] [address] [phone] Your Updated Medication List  
  
   
This list is accurate as of: 10/13/17  2:53 PM.  Always use your most recent med list.  
  
  
  
  
 albuterol 90 mcg/actuation inhaler Commonly known as:  PROVENTIL HFA, VENTOLIN HFA, PROAIR HFA Take 2 Puffs by inhalation every four (4) hours as needed for Wheezing or Shortness of Breath. albuterol-ipratropium 2.5 mg-0.5 mg/3 ml Nebu Commonly known as:  DUO-NEB  
3 mL by Nebulization route every six (6) hours as needed. aspirin delayed-release 81 mg tablet Take 81 mg by mouth daily. atorvastatin 40 mg tablet Commonly known as:  LIPITOR Take 1 Tab by mouth nightly. carvedilol 25 mg tablet Commonly known as:  Jestine Pellet Take 1 Tab by mouth two (2) times daily (with meals). Hold for SBP<100 or HR <55  
  
 cyanocobalamin 1,000 mcg/mL injection Commonly known as:  VITAMIN B12  
1 mL by SubCUTAneous route every fourteen (14) days. For b12 deficiency  
  
 cyclobenzaprine 5 mg tablet Commonly known as:  FLEXERIL Take 5 mg by mouth two (2) times a day. DULoxetine 60 mg capsule Commonly known as:  CYMBALTA Take 1 Cap by mouth every evening. EPINEPHrine 0.3 mg/0.3 mL injection Commonly known as:  EPIPEN 2-LAURA  
0.3 mL by IntraMUSCular route once as needed for up to 1 dose. ergocalciferol 50,000 unit capsule Commonly known as:  ERGOCALCIFEROL Take 1 Cap by mouth daily. High needs due to gastric bypass history  
  
 ferrous sulfate 325 mg (65 mg iron) tablet Take 325 mg by mouth Daily (before breakfast). HYDROcodone-acetaminophen 5-325 mg per tablet Commonly known as:  Seth Spears Take 1 Tab by mouth every six (6) hours as needed for Pain. Max Daily Amount: 4 Tabs. insulin lispro 100 unit/mL injection Commonly known as:  HUMALOG Sliding scale before each meal only 140-200 : 2 units 201-250 : 4 units 251-300 : 6 units 301-350 : 8 units >350 : 10 units and call MD  
  
 levothyroxine 125 mcg tablet Commonly known as:  SYNTHROID Take 1 Tab by mouth Daily (before breakfast). lidocaine 5 % Commonly known as:  Calderon Lyman Apply patch to the affected area for 12 hours a day and remove for 12 hours a day. linaclotide 145 mcg Cap capsule Commonly known as:  Noemi Angeles Take 1 Cap by mouth Daily (before breakfast). For constipation  
  
 losartan 50 mg tablet Commonly known as:  COZAAR Take 1 Tab by mouth nightly. Hold for SBP<115  
  
 metFORMIN  mg tablet Commonly known as:  GLUCOPHAGE XR Take 2 Tabs by mouth daily. morphine CR 15 mg CR tablet Commonly known as:  MS CONTIN Take 1 Tab by mouth every twelve (12) hours. Max Daily Amount: 30 mg.  
  
 pantoprazole 40 mg tablet Commonly known as:  PROTONIX Take 1 Tab by mouth daily. topiramate 25 mg tablet Commonly known as:  TOPAMAX Take 3 Tabs by mouth two (2) times a day. Prescriptions Sent to Pharmacy Refills  
 topiramate (TOPAMAX) 25 mg tablet 3 Sig: Take 3 Tabs by mouth two (2) times a day. Class: Normal  
 Pharmacy: [address] [place] [address] #: [phone] Route: Oral  
  
Follow-up Instructions Return in about 3 months (around 1/13/2018). Introducing [facility]! Dear Nazanin Mcdowell: Thank you for requesting a [employer] account. Our records indicate that you already have an active [employer] account. You can access your account anytime at [email or web address] Did you know that you can access your hospital and ER discharge instructions at any time in [employer]? You can also review all of your test results from your hospital stay or ER visit. Additional Information If you have questions, please visit the Frequently Asked Questions section of the [employer] website at [email or web address] Remember, [employer] is NOT to be used for urgent needs. For medical emergencies, dial 911. Now available from your iPhone and Android! Please provide this summary of care documentation to your next provider. Your primary care clinician is listed as Jessie Santiago. If you have any questions after today's visit, please call [phone].

## 2017-10-20 ENCOUNTER — OFFICE VISIT (OUTPATIENT)
Dept: FAMILY MEDICINE CLINIC | Age: 57
End: 2017-10-20

## 2017-10-20 VITALS
DIASTOLIC BLOOD PRESSURE: 83 MMHG | SYSTOLIC BLOOD PRESSURE: 129 MMHG | RESPIRATION RATE: 18 BRPM | TEMPERATURE: 97.9 F | HEART RATE: 87 BPM | BODY MASS INDEX: 47.09 KG/M2 | OXYGEN SATURATION: 99 % | WEIGHT: 293 LBS | HEIGHT: 66 IN

## 2017-10-20 DIAGNOSIS — R73.03 PREDIABETES: ICD-10-CM

## 2017-10-20 DIAGNOSIS — I10 ESSENTIAL HYPERTENSION: Primary | ICD-10-CM

## 2017-10-20 DIAGNOSIS — Z23 ENCOUNTER FOR IMMUNIZATION: ICD-10-CM

## 2017-10-20 DIAGNOSIS — I69.359 HEMIPARESIS AND ALTERATION OF SENSATIONS AS LATE EFFECTS OF STROKE (HCC): ICD-10-CM

## 2017-10-20 DIAGNOSIS — I69.398 HEMIPARESIS AND ALTERATION OF SENSATIONS AS LATE EFFECTS OF STROKE (HCC): ICD-10-CM

## 2017-10-20 DIAGNOSIS — Z00.00 LABORATORY EXAM ORDERED AS PART OF ROUTINE GENERAL MEDICAL EXAMINATION: ICD-10-CM

## 2017-10-20 DIAGNOSIS — Z09 HOSPITAL DISCHARGE FOLLOW-UP: ICD-10-CM

## 2017-10-20 DIAGNOSIS — R32 URINARY INCONTINENCE, UNSPECIFIED TYPE: ICD-10-CM

## 2017-10-20 NOTE — PROGRESS NOTES
Chief Complaint   Patient presents with    Diabetes    Immunization/Injection     flu vaccine     1. Have you been to the ER, urgent care clinic since your last visit? Hospitalized since your last visit? No    2. Have you seen or consulted any other health care providers outside of the [address] [place] [address] since your last visit? Include any pap smears or colon screening. Yes When: neurology clinc-10/13/17,eye doctor-10/18/17,[facility](PT)Sept 9,17    Jatinder Fall is a 59 y.o. female who presents for routine immunizations. She denies any symptoms , reactions or allergies that would exclude them from being immunized today. Risks and adverse reactions were discussed and the VIS was given to them. All questions were addressed. She was observed for 15 min post injection. There were no reactions observed.     Andi Olszewski

## 2017-10-20 NOTE — PATIENT INSTRUCTIONS
Lydia Wan TODAY, please go to:   CHECK OUT     Please schedule the following appointments at [facility] OUT:  · Prediabetes and specialist follow up with Dr. Javan Ramesh in late Jan 2018  · Lab visit, not fasting in the next week.      Today's Plan:  - see Terry Coe

## 2017-10-20 NOTE — PROGRESS NOTES
.. [address] [place] [address] Visit   10/20/2017  Patient ID: Huyen Spain is a 59 y.o. female. Assessment/Plan:    Diagnoses and all orders for this visit:    1. Essential hypertension  At goal. No change     2. Encounter for immunization  -     Influenza virus vaccine (QUADRIVALENT PRES FREE SYRINGE) IM (88618)  -     GA IMMUNIZ ADMIN,1 SINGLE/COMB VAC/TOXOID    3. Urinary incontinence, unspecified type  Discuss alternative options for mx with urogyn  -     REFERRAL TO FEMALE PELVIC MEDICINE AND RECONSTRUCTIVE SURGERY    4. Prediabetes    5. BMI 45.0-49.9, adult ([facility] [address]  Counseled on lifestyle. Congratulated on recent wt loss  Wt Readings from Last 3 Encounters:   10/20/17 305 lb 6.4 oz (138.5 kg)   10/13/17 323 lb (146.5 kg)   09/12/17 326 lb 6.4 oz (148.1 kg)       6. Hospital discharge follow-up  Continue f/u with Neuro    7. Laboratory exam ordered as part of routine general medical examination  -     METABOLIC PANEL, BASIC  -     CBC W/O DIFF    8. Hemiparesis and alteration of sensations as late effects of stroke ([facility] [address]  Continue f/u with neuruology    Other orders  -     CKD REPORT        Counselled pt on Patient health concerns and plans. Patient was offered a choice/choices in the treatment plan today. Reviewed return precautions as appropriate. Patient expresses understanding of the plan and agrees with recommendations. See patient instructions for more. Patient Instructions   . TODAY, please go to:   CHECK OUT     Please schedule the following appointments at [facility] OUT:  · Prediabetes and specialist follow up with Dr. Rona Gurrola in late Jan 2018  · Lab visit, not fasting in the next week.      Today's Plan:  - see urogyn        Subjective:   HPI:  Huyen Spain is a 59 y.o. female being seen for:   Chief Complaint   Patient presents with    Diabetes    Immunization/Injection     flu vaccine        · Admitted from 9/8 to 7/57 for complicated migraine  · At the [facility] from 9/8 to 10/8  · Now back at sister's house. Can't climb stairs yet so has hospital bed downstairs  · P/w workseing left sided weakness   · MRI, ECHO, neuro c/s - possible   · Needs incontinence supplies . Darek should be reaching out to us to get supplies. · Now topamax 75mg po bid   · Lots of problem holding urine. Frequent. · Down at least 20 lbs since last   · BG were high in the laurels and was on insulin   · HHPT OT now    Future Appointments  Date Time Provider Denilson Thompson   10/30/2017 9:10 AM Tabatha Melchor MD RDE [address] [facility]   11/1/2017 8:40 AM Kartik Phillip  [address]   1/12/2018 10:20 AM DO RONAN Roca SCHED   1/24/2018 8:00 AM Geetha Marroquin. Margarita Richards MD [facility] ALICE SCHED       Lab Results   Component Value Date/Time    Hemoglobin A1c 6.4 09/09/2017 02:40 AM    Hemoglobin A1c (POC) 5.9 07/23/2015 09:35 AM        Review of Systems  Otherwise as noted in HPI  ? Objective:     Visit Vitals    /83    Pulse 87    Temp 97.9 °F (36.6 °C) (Oral)    Resp 18    Ht 5' 6\" (1.676 m)    Wt 305 lb 6.4 oz (138.5 kg)    SpO2 99%    BMI 49.29 kg/m2     Wt Readings from Last 3 Encounters:   10/20/17 305 lb 6.4 oz (138.5 kg)   10/13/17 323 lb (146.5 kg)   09/12/17 326 lb 6.4 oz (148.1 kg)     BP Readings from Last 3 Encounters:   10/20/17 129/83   10/13/17 118/70   09/12/17 96/58     PHQ over the last two weeks 6/13/2017   Little interest or pleasure in doing things Not at all   Feeling down, depressed or hopeless Not at all   Total Score PHQ 2 0       Physical Exam   Constitutional: She appears well-developed and well-nourished. No distress. Pulmonary/Chest: Effort normal. No respiratory distress. Neurological: She is alert. Psychiatric: She has a normal mood and affect.  Her behavior is normal.       Allergies   Allergen Reactions    Bees [Hymenoptera Allergenic Extract] Shortness of Breath and Swelling    Strawberry Shortness of Breath and Swelling    Lisinopril Cough     Prior to Admission medications    Medication Sig Start Date End Date Taking? Authorizing Provider   topiramate (TOPAMAX) 25 mg tablet Take 3 Tabs by mouth two (2) times a day. 10/13/17  Yes Vanessa Drew DO   carvedilol (COREG) 25 mg tablet Take 1 Tab by mouth two (2) times daily (with meals). Hold for SBP<100 or HR <55 9/12/17  Yes Louise Badillo MD   morphine CR (MS CONTIN) 15 mg CR tablet Take 1 Tab by mouth every twelve (12) hours. Max Daily Amount: 30 mg. Patient taking differently: Take 15 mg by mouth daily. 9/12/17  Yes Louise Badillo MD   levothyroxine (SYNTHROID) 125 mcg tablet Take 1 Tab by mouth Daily (before breakfast). 9/12/17  Yes Louise Badillo MD   HYDROcodone-acetaminophen (NORCO) 5-325 mg per tablet Take 1 Tab by mouth every six (6) hours as needed for Pain. Max Daily Amount: 4 Tabs. 9/12/17  Yes Louise Badillo MD   albuterol-ipratropium (DUO-NEB) 2.5 mg-0.5 mg/3 ml nebu 3 mL by Nebulization route every six (6) hours as needed. 9/12/17  Yes Louise Badillo MD   losartan (COZAAR) 50 mg tablet Take 1 Tab by mouth nightly. Hold for SBP<115 9/15/17  Yes Louise Badillo MD   cyclobenzaprine (FLEXERIL) 5 mg tablet Take 5 mg by mouth two (2) times a day. Yes Historical Provider   EPINEPHrine (EPIPEN 2-LAURA) 0.3 mg/0.3 mL injection 0.3 mL by IntraMUSCular route once as needed for up to 1 dose. 8/7/17  Yes Dimple Pereira MD   ergocalciferol (ERGOCALCIFEROL) 50,000 unit capsule Take 1 Cap by mouth daily. High needs due to gastric bypass history 7/31/17  Yes Roxy Martin MD   cyanocobalamin (VITAMIN B12) 1,000 mcg/mL injection 1 mL by SubCUTAneous route every fourteen (14) days. For b12 deficiency 7/18/17  Yes Little Pereira MD   metFORMIN ER (GLUCOPHAGE XR) 500 mg tablet Take 2 Tabs by mouth daily. 2/2/17  Yes Little Young.  Jess Pereira MD   albuterol (PROVENTIL HFA, VENTOLIN HFA, PROAIR HFA) 90 mcg/actuation inhaler Take 2 Puffs by inhalation every four (4) hours as needed for Wheezing or Shortness of Breath. 10/11/16  Yes [facility] Dominik Myers MD   DULoxetine (CYMBALTA) 60 mg capsule Take 1 Cap by mouth every evening. 10/11/16  Yes [facility] Dominik Myers MD   lidocaine (LIDODERM) 5 % Apply patch to the affected area for 12 hours a day and remove for 12 hours a day. 10/11/16  Yes [facility] Eric Myers MD   pantoprazole (PROTONIX) 40 mg tablet Take 1 Tab by mouth daily. 10/11/16  Yes [facility] Dominik [facility] MD   linaclotide Darliss Angelucci) 145 mcg cap capsule Take 1 Cap by mouth Daily (before breakfast). For constipation 10/11/16  Yes Princess Plasencia. Austin Myers MD   ferrous sulfate 325 mg (65 mg iron) tablet Take 325 mg by mouth Daily (before breakfast). Yes Historical Provider   aspirin delayed-release 81 mg tablet Take 81 mg by mouth daily.    Yes Historical Provider     Patient Active Problem List   Diagnosis Code    Coronary artery disease involving native coronary artery of native heart without angina pectoris I25.10    Bronchitis J40    High cholesterol E78.00    History of cardiomyopathy Z86.79    SOB (shortness of breath) R06.02    Neck pain, bilateral M54.2    Shoulder pain, bilateral M25.511, M25.512    Muscle spasticity M62.838    Hemiparesis and alteration of sensations as late effects of stroke ([facility]) I69.359, I69.398    Head pain, chronic R51, G89.29    Expressive aphasia R47.01    Heart palpitations R00.2    Ventricular ectopic activity I49.3    Stroke ([facility] [address] I63.9    Thyroid disease E07.9    Hypercholesterolemia E78.00    EDDIE on CPAP G47.33, Z99.89    Essential hypertension I10    Congestive heart failure ([facility]) I50.9    Chest pain R07.9    Melena K92.1    Hiatal hernia K44.9    Postoperative hypothyroidism E89.0    Stroke (cerebrum) ([facility]) I63.9    Chronic pain G89.29    Prediabetes R73.03    Obesity  E66.09    Constipation K59.00    Insomnia G47.00    Left-sided weakness R53.1

## 2017-10-20 NOTE — MR AVS SNAPSHOT
Visit Information Date & Time Provider Department Dept. Phone Encounter #  
 10/20/2017  4:40 PM Sonia Groves MD [facility] [phone] [id number] Your Appointments 10/30/2017  9:10 AM  
ROUTINE CARE with MD Primitivo Phelan Diabetes and Endocrinology [facility]) Appt Note: f/u diabetes cp30.00  
 [address] [place] [address]  
[address] [address]  
  
    
 11/1/2017  8:40 AM  
ESTABLISHED PATIENT with Shaq Jaramillo MD  
[facility] (ALICE SCHEDULING) Appt Note: 1 yr f/u  
 [address] [place] [address]  
[address] [address]  
  
    
 1/12/2018 10:20 AM  
Follow Up with [address] [place], [place] [address] [facility]) Appt Note: f/u headache mj 10/13/2017 [address] [place] [id number]  
[phone]  
  
   
 [address] [place] [address]  [id number] Upcoming Health Maintenance Date Due INFLUENZA AGE 9 TO ADULT 8/1/2017 MEDICARE YEARLY EXAM 6/14/2018 PAP AKA CERVICAL CYTOLOGY 11/13/2018 BREAST CANCER SCRN MAMMOGRAM 8/4/2019 COLONOSCOPY 6/22/2021 DTaP/Tdap/Td series (2 - Td) 6/13/2027 Allergies as of 10/20/2017  Review Complete On: 10/20/2017 By: Andi Olszewski Severity Noted Reaction Type Reactions Bees [Hymenoptera Allergenic Extract] High 10/14/2014   Systemic Shortness of Breath, Swelling Shade High 10/14/2014   Systemic Shortness of Breath, Swelling Lisinopril  10/17/2014    Cough Current Immunizations  Reviewed on 10/11/2016 Name Date Influenza Vaccine Phil Mari) 11/13/2015 Influenza Vaccine (Quad) PF 10/20/2017, 10/11/2016 Pneumococcal Polysaccharide (PPSV-23) 8/29/2016 Tdap 6/13/2017 Not reviewed this visit You Were Diagnosed With   
  
 Codes Comments Encounter for immunization    -  Primary ICD-10-CM: Q76 ICD-9-CM: V03.89 Vitals BP Pulse Temp Resp Height(growth percentile) Weight(growth percentile) 129/83 87 97.9 °F (36.6 °C) (Oral) 18 5' 6\" (1.676 m) 305 lb 6.4 oz (138.5 kg) SpO2 BMI OB Status Smoking Status 99% 49.29 kg/m2 Postmenopausal Former Smoker Vitals History BMI and BSA Data Body Mass Index Body Surface Area  
 49.29 kg/m 2 2.54 m 2 Preferred Pharmacy Pharmacy Name Phone [facility] [address] [place] [address] [phone] Your Updated Medication List  
  
   
This list is accurate as of: 10/20/17  5:27 PM.  Always use your most recent med list.  
  
  
  
  
 albuterol 90 mcg/actuation inhaler Commonly known as:  PROVENTIL HFA, VENTOLIN HFA, PROAIR HFA Take 2 Puffs by inhalation every four (4) hours as needed for Wheezing or Shortness of Breath. albuterol-ipratropium 2.5 mg-0.5 mg/3 ml Nebu Commonly known as:  DUO-NEB  
3 mL by Nebulization route every six (6) hours as needed. aspirin delayed-release 81 mg tablet Take 81 mg by mouth daily. carvedilol 25 mg tablet Commonly known as:  Aamir Gut Take 1 Tab by mouth two (2) times daily (with meals). Hold for SBP<100 or HR <55  
  
 cyanocobalamin 1,000 mcg/mL injection Commonly known as:  VITAMIN B12  
1 mL by SubCUTAneous route every fourteen (14) days. For b12 deficiency  
  
 cyclobenzaprine 5 mg tablet Commonly known as:  FLEXERIL Take 5 mg by mouth two (2) times a day. DULoxetine 60 mg capsule Commonly known as:  CYMBALTA Take 1 Cap by mouth every evening. EPINEPHrine 0.3 mg/0.3 mL injection Commonly known as:  EPIPEN 2-LAURA  
0.3 mL by IntraMUSCular route once as needed for up to 1 dose. ergocalciferol 50,000 unit capsule Commonly known as:  ERGOCALCIFEROL Take 1 Cap by mouth daily. High needs due to gastric bypass history  
  
 ferrous sulfate 325 mg (65 mg iron) tablet Take 325 mg by mouth Daily (before breakfast). HYDROcodone-acetaminophen 5-325 mg per tablet Commonly known as:  Tara Petri Take 1 Tab by mouth every six (6) hours as needed for Pain. Max Daily Amount: 4 Tabs. levothyroxine 125 mcg tablet Commonly known as:  SYNTHROID Take 1 Tab by mouth Daily (before breakfast). lidocaine 5 % Commonly known as:  Franco Cardenas Apply patch to the affected area for 12 hours a day and remove for 12 hours a day. linaclotide 145 mcg Cap capsule Commonly known as:  Nadira Putty Take 1 Cap by mouth Daily (before breakfast). For constipation  
  
 losartan 50 mg tablet Commonly known as:  COZAAR Take 1 Tab by mouth nightly. Hold for SBP<115  
  
 metFORMIN  mg tablet Commonly known as:  GLUCOPHAGE XR Take 2 Tabs by mouth daily. morphine CR 15 mg CR tablet Commonly known as:  MS CONTIN Take 1 Tab by mouth every twelve (12) hours. Max Daily Amount: 30 mg.  
  
 pantoprazole 40 mg tablet Commonly known as:  PROTONIX Take 1 Tab by mouth daily. topiramate 25 mg tablet Commonly known as:  TOPAMAX Take 3 Tabs by mouth two (2) times a day. We Performed the Following INFLUENZA VIRUS VAC QUAD,SPLIT,PRESV FREE SYRINGE IM [id number] CPT(R)] HI IMMUNIZ ADMIN,1 SINGLE/COMB VAC/TOXOID [id number] CPT(R)] Patient Instructions Marcos Phoenix TODAY, please go to: CHECK OUT Please schedule the following appointments at [address] [place] [address]: 
· Prediabetes and specialist follow up with Dr. Ailyn Stone in late Jan 2018 · Lab visit, not fasting in the next week. Today's Plan: 
- see Michaelle López Introducing [facility] & HEALTH SERVICES! Dear Claudette Blue: Thank you for requesting a [employer] account. Our records indicate that you already have an active [employer] account.   You can access your account anytime at [email or web address] Did you know that you can access your hospital and ER discharge instructions at any time in OptTown? You can also review all of your test results from your hospital stay or ER visit. Additional Information If you have questions, please visit the Frequently Asked Questions section of the OptTown website at [email or web address] Remember, OptTown is NOT to be used for urgent needs. For medical emergencies, dial 911. Now available from your iPhone and Android! Please provide this summary of care documentation to your next provider. Your primary care clinician is listed as Bertin Khoury. If you have any questions after today's visit, please call [phone].

## 2017-10-25 ENCOUNTER — TELEPHONE (OUTPATIENT)
Dept: FAMILY MEDICINE CLINIC | Age: 57
End: 2017-10-25

## 2017-10-25 DIAGNOSIS — I51.9 HEART DISEASE: Primary | ICD-10-CM

## 2017-10-25 NOTE — TELEPHONE ENCOUNTER
Patient will be seeing dr Giacomo Mortimer Lamon Riedel on 11/01/2017. Can you please put in a referral requisition for this.

## 2017-10-26 LAB
BUN SERPL-MCNC: 17 MG/DL (ref 6–24)
BUN/CREAT SERPL: 16 (ref 9–23)
CALCIUM SERPL-MCNC: 9.5 MG/DL (ref 8.7–10.2)
CHLORIDE SERPL-SCNC: 107 MMOL/L (ref 96–106)
CO2 SERPL-SCNC: 23 MMOL/L (ref 18–29)
CREAT SERPL-MCNC: 1.07 MG/DL (ref 0.57–1)
ERYTHROCYTE [DISTWIDTH] IN BLOOD BY AUTOMATED COUNT: 15.3 % (ref 12.3–15.4)
GFR SERPLBLD CREATININE-BSD FMLA CKD-EPI: 57 ML/MIN/1.73
GFR SERPLBLD CREATININE-BSD FMLA CKD-EPI: 66 ML/MIN/1.73
GLUCOSE SERPL-MCNC: 109 MG/DL (ref 65–99)
HCT VFR BLD AUTO: 37.7 % (ref 34–46.6)
HGB BLD-MCNC: 12.2 G/DL (ref 11.1–15.9)
INTERPRETATION: NORMAL
MCH RBC QN AUTO: 28.1 PG (ref 26.6–33)
MCHC RBC AUTO-ENTMCNC: 32.4 G/DL (ref 31.5–35.7)
MCV RBC AUTO: 87 FL (ref 79–97)
PLATELET # BLD AUTO: 321 X10E3/UL (ref 150–379)
POTASSIUM SERPL-SCNC: 4.4 MMOL/L (ref 3.5–5.2)
RBC # BLD AUTO: 4.34 X10E6/UL (ref 3.77–5.28)
SODIUM SERPL-SCNC: 146 MMOL/L (ref 134–144)
WBC # BLD AUTO: 7.6 X10E3/UL (ref 3.4–10.8)

## 2017-10-26 NOTE — PROGRESS NOTES
I have reviewed your labs. The results are okay. Kidney function is better.      Best,  Dr. Austin Myers

## 2017-10-27 ENCOUNTER — TELEPHONE (OUTPATIENT)
Dept: FAMILY MEDICINE CLINIC | Age: 57
End: 2017-10-27

## 2017-10-27 NOTE — TELEPHONE ENCOUNTER
[facility] with [address] calling on behalf of patient wanting to know if orders have been received by doctor.  Her contact  Number is [phone]

## 2017-10-30 ENCOUNTER — OFFICE VISIT (OUTPATIENT)
Dept: ENDOCRINOLOGY | Age: 57
End: 2017-10-30

## 2017-10-30 VITALS
BODY MASS INDEX: 47.09 KG/M2 | SYSTOLIC BLOOD PRESSURE: 125 MMHG | WEIGHT: 293 LBS | DIASTOLIC BLOOD PRESSURE: 89 MMHG | HEART RATE: 91 BPM | HEIGHT: 66 IN

## 2017-10-30 DIAGNOSIS — E89.0 POSTOPERATIVE HYPOTHYROIDISM: Chronic | ICD-10-CM

## 2017-10-30 DIAGNOSIS — E55.9 VITAMIN D DEFICIENCY: Primary | ICD-10-CM

## 2017-10-30 DIAGNOSIS — Z98.84 STATUS POST GASTRIC BYPASS FOR OBESITY: ICD-10-CM

## 2017-10-30 DIAGNOSIS — R73.09 ELEVATED HEMOGLOBIN A1C: ICD-10-CM

## 2017-10-30 RX ORDER — CARVEDILOL 6.25 MG/1
TABLET ORAL 2 TIMES DAILY WITH MEALS
COMMUNITY
End: 2018-01-03 | Stop reason: SDUPTHER

## 2017-10-30 RX ORDER — LEVOTHYROXINE SODIUM 150 UG/1
150 TABLET ORAL
Qty: 30 TAB | Refills: 11 | Status: SHIPPED | OUTPATIENT
Start: 2017-10-30 | End: 2018-02-26 | Stop reason: SDUPTHER

## 2017-10-30 RX ORDER — METFORMIN HYDROCHLORIDE 1000 MG/1
1000 TABLET ORAL 2 TIMES DAILY WITH MEALS
COMMUNITY
End: 2018-01-24 | Stop reason: SDUPTHER

## 2017-10-30 NOTE — PATIENT INSTRUCTIONS
Thyroid:  Continue levothyroxine to 150 mcg.  Take in AM like you are doing    Low Vitamin D, High Parathyroid hormone level, Alkaline phosphatase elevated:  Reassess vitamin D after daily vitamin D supplementation    Blood pressure:  Much better/higher and more normal  Kidney function is improved

## 2017-10-30 NOTE — PROGRESS NOTES
History of Present Illness: Catherine Chiang is a 59 y.o. female presents for follow-up of elevated alkaline phosphatase and vitamin D deficiency. She also has hypothyroidism and pre-diabetes. Of note, she is s/p gastric bypass in 1980s (pre-surgery wt was 465), and has also had CVA. Additionally, she is taking several medications for history of heart failure. This was diagnosed at the same time she had a heart attack and a stroke in 2014 . Had hospitalization 9/2017 for stroke-like sx. Multiple BP medications stopped due to hypotension. Renal function improved after medications stopped. Weight down about 20 lbs as well since hospitalization. Taking ergocalciferol 50,000 units weekly now for vit D deficiency. No recent labs. Hypertension: blood pressure is higher and normal now. Was previously too low. Multiple medications stopped and/or decreased. She says her sister thinks her energy is better    Trying to get strength back. Not strong enough to climb stairs. Hypothyroidism: post-surgical. TSH was at goal in 7/2017 after dose increase. Dose decreased from 150 mcg to 125 mcg during hospitalization (labs consistent with non-thyroidial illness/euthyroid sick)    Hemoglobin A1c also elevated. Improving diet and has been having success with wt loss. Past Medical History:   Diagnosis Date    Advanced care planning/counseling discussion 3/4/16    On File    Bronchitis 2/24/2014    Cervicalgia 8/18/15    Dr. Fay Boo    Chest pain 5/25/15    Hospitalized at [facility] 5/25/15 (lab work negative)    Congestive heart failure, unspecified     Last Echo 2/8/15: EF 55-60%    Constipation 6/13/2017    Enthesopathy, spinal ([facility] [address] 8/18/15    Dr. Felicita Ramirez Essential hypertension     Foot drop 8/18/15    Dr. Felicita Ramirez Heart attack 2/24/2013    Was supposed to See Cardiology for possible pacemaker in november 2014- After Cardiology consult locally, no need, EF greatly improved.  Established with Dr. Quiana Stovall Hiatal hernia 6/2015    3 cm hiatal hernia     Hip pain 8/18/15    Dr. Bharat Sidhu    Hypercholesterolemia     Hyperglycemia 7/2015    A1c 5.9     Hyperlipidemia 6/30/2015    NMR lipoprofile- LDL P 997, LDL-c 71, HLD-C-39, TG-60, HLD-P (25.2), Small LDL-P -541, LDL size 20.6    Hypothyroid     Insomnia 6/13/2017    Lower extremity edema     Lumbar spondylosis 8/18/15    Dr. Patsy Estrada 6/2015    EGD/Colonscopy 6/15- Gastritis, internal hemorrhoids and 3 polyps    Murmur     EDDIE on CPAP     Was referred to Pulmonology - Uses CPAP    Osteoarthritis of hip 8/18/15    Dr. Bharat Sidhu    Osteoarthritis, shoulder 8/18/15    Dr. Bharat Sidhu    Radiculopathy, cervical 8/18/15    Dr. Bharat Sidhu    Shoulder pain 8/18/15    Dr. Pope Labdilip Spinal stenosis of cervical region 8/18/15    Dr. Toshia Del Angel Spinal stenosis, lumbar 8/18/15    Dr. Pope Labdilip Stroke [facility]) 2/25/2014    Established with Neurology, Gracie Aly NP-[facility]. CT negative, but Late effect CV accident with increased tone described on discharge summary, Carotid dopplers showed 50% stenosis bilaterally.  Vitamin D deficiency 7/2015     Current Outpatient Prescriptions   Medication Sig    carvedilol (COREG) 6.25 mg tablet Take  by mouth two (2) times daily (with meals).  metFORMIN (GLUCOPHAGE) 1,000 mg tablet Take 1,000 mg by mouth two (2) times daily (with meals).  topiramate (TOPAMAX) 25 mg tablet Take 3 Tabs by mouth two (2) times a day.  morphine CR (MS CONTIN) 15 mg CR tablet Take 1 Tab by mouth every twelve (12) hours. Max Daily Amount: 30 mg. (Patient taking differently: Take 15 mg by mouth daily.)    levothyroxine (SYNTHROID) 125 mcg tablet Take 1 Tab by mouth Daily (before breakfast).  HYDROcodone-acetaminophen (NORCO) 5-325 mg per tablet Take 1 Tab by mouth every six (6) hours as needed for Pain. Max Daily Amount: 4 Tabs.     albuterol-ipratropium (DUO-NEB) 2.5 mg-0.5 mg/3 ml nebu 3 mL by Nebulization route every six (6) hours as needed.  losartan (COZAAR) 50 mg tablet Take 1 Tab by mouth nightly. Hold for SBP<115    cyclobenzaprine (FLEXERIL) 5 mg tablet Take 5 mg by mouth two (2) times a day.  EPINEPHrine (EPIPEN 2-LAURA) 0.3 mg/0.3 mL injection 0.3 mL by IntraMUSCular route once as needed for up to 1 dose.  ergocalciferol (ERGOCALCIFEROL) 50,000 unit capsule Take 1 Cap by mouth daily. High needs due to gastric bypass history    cyanocobalamin (VITAMIN B12) 1,000 mcg/mL injection 1 mL by SubCUTAneous route every fourteen (14) days. For b12 deficiency    albuterol (PROVENTIL HFA, VENTOLIN HFA, PROAIR HFA) 90 mcg/actuation inhaler Take 2 Puffs by inhalation every four (4) hours as needed for Wheezing or Shortness of Breath.  DULoxetine (CYMBALTA) 60 mg capsule Take 1 Cap by mouth every evening.  lidocaine (LIDODERM) 5 % Apply patch to the affected area for 12 hours a day and remove for 12 hours a day.  pantoprazole (PROTONIX) 40 mg tablet Take 1 Tab by mouth daily.  linaclotide (LINZESS) 145 mcg cap capsule Take 1 Cap by mouth Daily (before breakfast). For constipation    ferrous sulfate 325 mg (65 mg iron) tablet Take 325 mg by mouth Daily (before breakfast).  aspirin delayed-release 81 mg tablet Take 81 mg by mouth daily.  carvedilol (COREG) 25 mg tablet Take 1 Tab by mouth two (2) times daily (with meals). Hold for SBP<100 or HR <55    metFORMIN ER (GLUCOPHAGE XR) 500 mg tablet Take 2 Tabs by mouth daily. No current facility-administered medications for this visit. Allergies   Allergen Reactions    Bees [Hymenoptera Allergenic Extract] Shortness of Breath and Swelling    Strawberry Shortness of Breath and Swelling    Lisinopril Cough       Review of Systems:  - Eyes: no recent vision changes.   - Cardiovascular: no chest pain  - Respiratory: no shortness of breath  - Musculoskeletal:see HPI    Physical Examination:  Visit Vitals    /89    Pulse 91    Ht 5' 6\" (1.676 m)    Wt 303 lb (137.4 kg)    BMI 48.91 kg/m2   -   - General: pleasant, no distress, using wheelchair. HEENT: hearing intact, EOMI, clear sclera without icterus  - Cardiovascular: regular, normal rate   - Respiratory: normal effort  - Integumentary: no edema  - Psychiatric: normal mood and affect    Data Reviewed:   Component      Latest Ref Rng & Units 10/25/2017 9/9/2017 9/9/2017 9/9/2017           3:32 PM  2:40 AM  2:40 AM  2:40 AM   Glucose      65 - 99 mg/dL 109 (H)      BUN      6 - 24 mg/dL 17      Creatinine      0.57 - 1.00 mg/dL 1.07 (H)      GFR est non-AA      >59 mL/min/1.73 57 (L)      GFR est AA      >59 mL/min/1.73 66      BUN/Creatinine ratio      9 - 23 16      Sodium      134 - 144 mmol/L 146 (H)      Potassium      3.5 - 5.2 mmol/L 4.4      Chloride      96 - 106 mmol/L 107 (H)      CO2      18 - 29 mmol/L 23      Calcium      8.7 - 10.2 mg/dL 9.5      Hemoglobin A1c, (calculated)      4.2 - 6.3 %    6.4 (H)   Est. average glucose      mg/dL    137   TSH      0.36 - 3.74 uIU/mL   0.28 (L)    VITAMIN D, 25-HYDROXY      30.0 - 100.0 ng/mL       Free Triiodothyronine (T3)      2.2 - 4.0 pg/mL  1.9 (L)       Component      Latest Ref Rng & Units 7/28/2017 7/28/2017           4:18 PM  4:18 PM   Glucose      65 - 99 mg/dL     BUN      6 - 24 mg/dL     Creatinine      0.57 - 1.00 mg/dL     GFR est non-AA      >59 mL/min/1.73     GFR est AA      >59 mL/min/1.73     BUN/Creatinine ratio      9 - 23     Sodium      134 - 144 mmol/L     Potassium      3.5 - 5.2 mmol/L     Chloride      96 - 106 mmol/L     CO2      18 - 29 mmol/L     Calcium      8.7 - 10.2 mg/dL     Hemoglobin A1c, (calculated)      4.2 - 6.3 %     Est. average glucose      mg/dL     TSH      0.36 - 3.74 uIU/mL  1.900   VITAMIN D, 25-HYDROXY      30.0 - 100.0 ng/mL 11.7 (L)    Free Triiodothyronine (T3)      2.2 - 4.0 pg/mL         Assessment/Plan:   1.  Vitamin D deficiency   - should be improved after change to daily ergocalciferol 50,000 units. Will see if Dr Gavino Ferris can add this on to labs done last week. 2. Postoperative hypothyroidism   - TSH was at goal in 7/2017 on 150 mcg. Dose decreased during hospitalization due to low TSH, but Free T3 was also low. Overall labs were consistent with nonthyroidal illness. Increase back to 150 mcg and repeat labs in 3-4 months   3. Status post gastric bypass for obesity   - poor absorption of vit D   4. BMI 45.0-49.9, adult ([facility] [address]  - encouraged wt loss for pre-diabetes and to improve mobility    5      Elevated hemoglobin A1c. Encouraged continued wt loss efforts    Patient Instructions   Thyroid:  Continue levothyroxine to 150 mcg. Take in AM like you are doing    Low Vitamin D, High Parathyroid hormone level, Alkaline phosphatase elevated:  Reassess vitamin D after daily vitamin D supplementation    Blood pressure:  Much better/higher and more normal  Kidney function is improved         Follow-up Disposition:  Return in about 4 months (around 2/28/2018).     Copy sent to:

## 2017-10-30 NOTE — MR AVS SNAPSHOT
Visit Information Date & Time Provider Department Dept. Phone Encounter #  
 10/30/2017  9:10 AM Danial Martins, [address] [facility] Diabetes and Endocrinology [phone] Follow-up Instructions Return in about 4 months (around 2/28/2018). Your Appointments 11/1/2017  8:40 AM  
ESTABLISHED PATIENT with Delora Lanes, MD  
[facility] (ALICE SCHEDULING) Appt Note: 1 yr f/u  
 [address] [place] [address]  
[address] [place] [address]  
  
    
 1/12/2018 10:20 AM  
Follow Up with [address],  [address] [facility] at [address] [facility]) Appt Note: f/u headache mj 10/13/2017 [address] [place] [address] [address]  
[place] [address]  
  
    
 1/24/2018  8:00 AM  
ROUTINE CARE with Husam Helm. Lavinia Kehr, [address] ([facility] Appt Note: pre-diabetes and referral f/u  
 [address] 
[address] [place] [address] [address]  
[phone]  
  
   
 [address] [facility] [address] [facility] [address] [facility] Upcoming Health Maintenance Date Due  
 MEDICARE YEARLY EXAM 6/14/2018 PAP AKA CERVICAL CYTOLOGY 11/13/2018 BREAST CANCER SCRN MAMMOGRAM 8/4/2019 COLONOSCOPY 6/22/2021 DTaP/Tdap/Td series (2 - Td) 6/13/2027 Allergies as of 10/30/2017  Review Complete On: 10/30/2017 By: Danial Martins MD  
  
 Severity Noted Reaction Type Reactions Bees [Hymenoptera Allergenic Extract] High 10/14/2014   Systemic Shortness of Breath, Swelling [place] High 10/14/2014   Systemic Shortness of Breath, Swelling Lisinopril  10/17/2014    Cough Current Immunizations  Reviewed on 10/11/2016 Name Date Influenza Vaccine Leigha Bible) 11/13/2015 Influenza Vaccine (Quad) PF 10/20/2017, 10/11/2016 Pneumococcal Polysaccharide (PPSV-23) 8/29/2016 Tdap 6/13/2017 Not reviewed this visit You Were Diagnosed With   
  
 Codes Comments Vitamin D deficiency    -  Primary ICD-10-CM: E55.9 ICD-9-CM: 268.9 Postoperative hypothyroidism     ICD-10-CM: E89.0 ICD-9-CM: 244.0 Status post gastric bypass for obesity     ICD-10-CM: Z98.84 ICD-9-CM: V45.86 BMI 45.0-49.9, adult [facility])     ICD-10-CM: S21.29 
ICD-9-CM: V85.42 Vitals BP Pulse Height(growth percentile) Weight(growth percentile) BMI OB Status 125/89 91 5' 6\" (1.676 m) 303 lb (137.4 kg) 48.91 kg/m2 Postmenopausal  
 Smoking Status Former Smoker Vitals History BMI and BSA Data Body Mass Index Body Surface Area 48.91 kg/m 2 2.53 m 2 Preferred Pharmacy Pharmacy Name Phone [facility], [address] Your Updated Medication List  
  
   
This list is accurate as of: 10/30/17 10:13 AM.  Always use your most recent med list.  
  
  
  
  
 albuterol 90 mcg/actuation inhaler Commonly known as:  PROVENTIL HFA, VENTOLIN HFA, PROAIR HFA Take 2 Puffs by inhalation every four (4) hours as needed for Wheezing or Shortness of Breath. albuterol-ipratropium 2.5 mg-0.5 mg/3 ml Nebu Commonly known as:  DUO-NEB  
3 mL by Nebulization route every six (6) hours as needed. aspirin delayed-release 81 mg tablet Take 81 mg by mouth daily. carvedilol 6.25 mg tablet Commonly known as:  Martin Been Take  by mouth two (2) times daily (with meals). cyanocobalamin 1,000 mcg/mL injection Commonly known as:  VITAMIN B12  
1 mL by SubCUTAneous route every fourteen (14) days. For b12 deficiency  
  
 cyclobenzaprine 5 mg tablet Commonly known as:  FLEXERIL Take 5 mg by mouth two (2) times a day. DULoxetine 60 mg capsule Commonly known as:  CYMBALTA Take 1 Cap by mouth every evening. EPINEPHrine 0.3 mg/0.3 mL injection Commonly known as:  EPIPEN 2-LAURA  
0.3 mL by IntraMUSCular route once as needed for up to 1 dose. ergocalciferol 50,000 unit capsule Commonly known as:  ERGOCALCIFEROL Take 1 Cap by mouth daily. High needs due to gastric bypass history  
  
 ferrous sulfate 325 mg (65 mg iron) tablet Take 325 mg by mouth Daily (before breakfast). HYDROcodone-acetaminophen 5-325 mg per tablet Commonly known as:  Awa Kind Take 1 Tab by mouth every six (6) hours as needed for Pain. Max Daily Amount: 4 Tabs. levothyroxine 150 mcg tablet Commonly known as:  SYNTHROID Take 1 Tab by mouth Daily (before breakfast). lidocaine 5 % Commonly known as:  [place] Patch Apply patch to the affected area for 12 hours a day and remove for 12 hours a day. linaclotide 145 mcg Cap capsule Commonly known as:  Radha Cola Take 1 Cap by mouth Daily (before breakfast). For constipation  
  
 losartan 50 mg tablet Commonly known as:  COZAAR Take 1 Tab by mouth nightly. Hold for SBP<115  
  
 metFORMIN 1,000 mg tablet Commonly known as:  GLUCOPHAGE Take 1,000 mg by mouth two (2) times daily (with meals). morphine CR 15 mg CR tablet Commonly known as:  MS CONTIN Take 1 Tab by mouth every twelve (12) hours. Max Daily Amount: 30 mg.  
  
 pantoprazole 40 mg tablet Commonly known as:  PROTONIX Take 1 Tab by mouth daily. topiramate 25 mg tablet Commonly known as:  TOPAMAX Take 3 Tabs by mouth two (2) times a day. Prescriptions Sent to Pharmacy Refills  
 levothyroxine (SYNTHROID) 150 mcg tablet 11 Sig: Take 1 Tab by mouth Daily (before breakfast). Class: Normal  
 Pharmacy: [facility] [address] [facility] Ph #: [phone] Route: Oral  
  
Follow-up Instructions Return in about 4 months (around 2/28/2018). Patient Instructions Thyroid: Continue levothyroxine to 150 mcg. Take in AM like you are doing Low Vitamin D, High Parathyroid hormone level, Alkaline phosphatase elevated: 
Reassess vitamin D after daily vitamin D supplementation Blood pressure: Much better/higher and more normal 
Kidney function is improved Introducing [facility] [place] [facility] SERVICES! Dear Asya Mckinney: Thank you for requesting a [employer] account. Our records indicate that you already have an active [employer] account. You can access your account anytime at [email or web address] Did you know that you can access your hospital and ER discharge instructions at any time in [employer]? You can also review all of your test results from your hospital stay or ER visit. Additional Information If you have questions, please visit the Frequently Asked Questions section of the [employer] website at [email or web address] Remember, [employer]anner is NOT to be used for urgent needs. For medical emergencies, dial 911. Now available from your iPhone and Android! Please provide this summary of care documentation to your next provider. Your primary care clinician is listed as Sherman Campbell. If you have any questions after today's visit, please call [phone].

## 2017-11-01 ENCOUNTER — OFFICE VISIT (OUTPATIENT)
Dept: CARDIOLOGY CLINIC | Age: 57
End: 2017-11-01

## 2017-11-01 VITALS
BODY MASS INDEX: 47.09 KG/M2 | WEIGHT: 293 LBS | DIASTOLIC BLOOD PRESSURE: 60 MMHG | SYSTOLIC BLOOD PRESSURE: 110 MMHG | HEART RATE: 80 BPM | HEIGHT: 66 IN

## 2017-11-01 DIAGNOSIS — I10 ESSENTIAL HYPERTENSION: Primary | ICD-10-CM

## 2017-11-01 DIAGNOSIS — I42.0 DILATED CARDIOMYOPATHY (HCC): ICD-10-CM

## 2017-11-01 DIAGNOSIS — I69.359 HEMIPARESIS AND ALTERATION OF SENSATIONS AS LATE EFFECTS OF STROKE (HCC): ICD-10-CM

## 2017-11-01 DIAGNOSIS — I69.398 HEMIPARESIS AND ALTERATION OF SENSATIONS AS LATE EFFECTS OF STROKE (HCC): ICD-10-CM

## 2017-11-01 NOTE — MR AVS SNAPSHOT
Visit Information Date & Time Provider Department Dept. Phone Encounter #  
 11/1/2017  8:40 AM Jaime Ashford MD [facility] [place] [phone] [id number] Your Appointments 1/12/2018 10:20 AM  
Follow Up with [address] DO Sun Cabrera [facility] AT [place] Devora Byrd) Appt Note: f/u headache mj 10/13/2017 [address] [facility] [address]  
[place] [address]  
  
    
 1/24/2018  8:00 AM  
ROUTINE CARE with Kaylie Huerta. Nick Owens 28 (Devora Byrd) Appt Note: pre-diabetes and referral f/u  
 [address] 
[address] [place] [facility] [address]  
[phone]  
  
   
 [address]  
  
    
 2/26/2018  9:10 AM  
ROUTINE CARE with Georgina Castro MD  
[address] [facility] Devora Byrd) Appt Note: f/u diabetes cp0.00  
 [address] [place] [address]  
[address] [facility] [address] [address] Upcoming Health Maintenance Date Due  
 MEDICARE YEARLY EXAM 6/14/2018 PAP AKA CERVICAL CYTOLOGY 11/13/2018 BREAST CANCER SCRN MAMMOGRAM 8/4/2019 COLONOSCOPY 6/22/2021 DTaP/Tdap/Td series (2 - Td) 6/13/2027 Allergies as of 11/1/2017  Review Complete On: 11/1/2017 By: Raudel Williamson LPN Severity Noted Reaction Type Reactions Bees [Hymenoptera Allergenic Extract] High 10/14/2014   Systemic Shortness of Breath, Swelling [place] High 10/14/2014   Systemic Shortness of Breath, Swelling Lisinopril  10/17/2014    Cough Current Immunizations  Reviewed on 10/11/2016 Name Date Influenza Vaccine Arletha Ladan) 11/13/2015 Influenza Vaccine (Quad) PF 10/20/2017, 10/11/2016 Pneumococcal Polysaccharide (PPSV-23) 8/29/2016 Tdap 6/13/2017 Not reviewed this visit Vitals BP Pulse Height(growth percentile) Weight(growth percentile) BMI OB Status 110/60 80 5' 6\" (1.676 m) 300 lb (136.1 kg) 48.42 kg/m2 Postmenopausal  
 Smoking Status Former Smoker Vitals History BMI and BSA Data Body Mass Index Body Surface Area  
 48.42 kg/m 2 2.52 m 2 Preferred Pharmacy Pharmacy Name Phone [phone] Your Updated Medication List  
  
   
This list is accurate as of: 11/1/17  9:27 AM.  Always use your most recent med list.  
  
  
  
  
 albuterol 90 mcg/actuation inhaler Commonly known as:  PROVENTIL HFA, VENTOLIN HFA, PROAIR HFA Take 2 Puffs by inhalation every four (4) hours as needed for Wheezing or Shortness of Breath. albuterol-ipratropium 2.5 mg-0.5 mg/3 ml Nebu Commonly known as:  DUO-NEB  
3 mL by Nebulization route every six (6) hours as needed. aspirin delayed-release 81 mg tablet Take 81 mg by mouth daily. carvedilol 6.25 mg tablet Commonly known as:  Layman Rajan Take  by mouth two (2) times daily (with meals). cyanocobalamin 1,000 mcg/mL injection Commonly known as:  VITAMIN B12  
1 mL by SubCUTAneous route every fourteen (14) days. For b12 deficiency  
  
 cyclobenzaprine 5 mg tablet Commonly known as:  FLEXERIL Take 5 mg by mouth two (2) times a day. DULoxetine 60 mg capsule Commonly known as:  CYMBALTA Take 1 Cap by mouth every evening. EPINEPHrine 0.3 mg/0.3 mL injection Commonly known as:  EPIPEN 2-LAURA  
0.3 mL by IntraMUSCular route once as needed for up to 1 dose. ergocalciferol 50,000 unit capsule Commonly known as:  ERGOCALCIFEROL Take 1 Cap by mouth daily. High needs due to gastric bypass history  
  
 ferrous sulfate 325 mg (65 mg iron) tablet Take 325 mg by mouth Daily (before breakfast). HYDROcodone-acetaminophen 5-325 mg per tablet Commonly known as:  Tara Luis Miguel  
 Take 1 Tab by mouth every six (6) hours as needed for Pain. Max Daily Amount: 4 Tabs. levothyroxine 150 mcg tablet Commonly known as:  SYNTHROID Take 1 Tab by mouth Daily (before breakfast). lidocaine 5 % Commonly known as:  Patrici Copas Apply patch to the affected area for 12 hours a day and remove for 12 hours a day. linaclotide 145 mcg Cap capsule Commonly known as:  Owen Filbert Take 1 Cap by mouth Daily (before breakfast). For constipation  
  
 losartan 50 mg tablet Commonly known as:  COZAAR Take 1 Tab by mouth nightly. Hold for SBP<115  
  
 metFORMIN 1,000 mg tablet Commonly known as:  GLUCOPHAGE Take 1,000 mg by mouth two (2) times daily (with meals). morphine CR 15 mg CR tablet Commonly known as:  MS CONTIN Take 1 Tab by mouth every twelve (12) hours. Max Daily Amount: 30 mg.  
  
 pantoprazole 40 mg tablet Commonly known as:  PROTONIX Take 1 Tab by mouth daily. topiramate 25 mg tablet Commonly known as:  TOPAMAX Take 3 Tabs by mouth two (2) times a day. Introducing [facility]! Dear Graham Koyanagi: Thank you for requesting a [facility] account. Our records indicate that you already have an active [facility] account. You can access your account anytime at [email or web address] Did you know that you can access your hospital and ER discharge instructions at any time in [facility]? You can also review all of your test results from your hospital stay or ER visit. Additional Information If you have questions, please visit the Frequently Asked Questions section of the [employer] website at [email or web address] Remember, [facility] is NOT to be used for urgent needs. For medical emergencies, dial 911. Now available from your iPhone and Android! Please provide this summary of care documentation to your next provider. Your primary care clinician is listed as Hebert Batista.  If you have any questions after today's visit, please call [phone].

## 2017-11-01 NOTE — PROGRESS NOTES
HISTORY OF PRESENT ILLNESS  Arik Mead is a 59 y.o. female. She has obesity and hypertension, as well as hypertensive heart disease. In the past, her left ventricular function was reduced, but it has since come back to normal with control of her blood pressure. She was in the hospital recently with weakness, but no specific diagnosis was made. She has lost thirty-eight pounds since I saw her one year ago and her blood pressure has come down. Her amlodipine has been stopped, as well as her insulin. An echo in the hospital showed normal left ventricular function. Her carvedilol dose was reduced. She is now in a wheelchair. HPI  Patient Active Problem List   Diagnosis Code    Coronary artery disease involving native coronary artery of native heart without angina pectoris I25.10    Bronchitis J40    High cholesterol E78.00    History of cardiomyopathy Z86.79    SOB (shortness of breath) R06.02    Neck pain, bilateral M54.2    Shoulder pain, bilateral M25.511, M25.512    Muscle spasticity M62.838    Hemiparesis and alteration of sensations as late effects of stroke ([facility]) I69.359, I69.398    Head pain, chronic R51, G89.29    Expressive aphasia R47.01    Heart palpitations R00.2    Ventricular ectopic activity I49.3    Stroke (HCC) I63.9    Thyroid disease E07.9    Hypercholesterolemia E78.00    EDDIE on CPAP G47.33, Z99.89    Essential hypertension I10    Congestive heart failure (HCC) I50.9    Chest pain R07.9    Melena K92.1    Hiatal hernia K44.9    Postoperative hypothyroidism E89.0    Stroke (cerebrum) ([facility]) I63.9    Chronic pain G89.29    Prediabetes R73.03    Obesity  E66.09    Constipation K59.00    Insomnia G47.00    Left-sided weakness R53.1     Current Outpatient Prescriptions   Medication Sig Dispense Refill    carvedilol (COREG) 6.25 mg tablet Take  by mouth two (2) times daily (with meals).       metFORMIN (GLUCOPHAGE) 1,000 mg tablet Take 1,000 mg by mouth two (2) times daily (with meals).  levothyroxine (SYNTHROID) 150 mcg tablet Take 1 Tab by mouth Daily (before breakfast). 30 Tab 11    topiramate (TOPAMAX) 25 mg tablet Take 3 Tabs by mouth two (2) times a day. 180 Tab 3    morphine CR (MS CONTIN) 15 mg CR tablet Take 1 Tab by mouth every twelve (12) hours. Max Daily Amount: 30 mg. (Patient taking differently: Take 15 mg by mouth daily.) 60 Tab 0    HYDROcodone-acetaminophen (NORCO) 5-325 mg per tablet Take 1 Tab by mouth every six (6) hours as needed for Pain. Max Daily Amount: 4 Tabs. 20 Tab 0    albuterol-ipratropium (DUO-NEB) 2.5 mg-0.5 mg/3 ml nebu 3 mL by Nebulization route every six (6) hours as needed. 30 Nebule 0    losartan (COZAAR) 50 mg tablet Take 1 Tab by mouth nightly. Hold for SBP<115 30 Tab 0    cyclobenzaprine (FLEXERIL) 5 mg tablet Take 5 mg by mouth two (2) times a day.  EPINEPHrine (EPIPEN 2-LAURA) 0.3 mg/0.3 mL injection 0.3 mL by IntraMUSCular route once as needed for up to 1 dose. 2 Syringe 3    ergocalciferol (ERGOCALCIFEROL) 50,000 unit capsule Take 1 Cap by mouth daily. High needs due to gastric bypass history 90 Cap 3    cyanocobalamin (VITAMIN B12) 1,000 mcg/mL injection 1 mL by SubCUTAneous route every fourteen (14) days. For b12 deficiency 10 mL 5    albuterol (PROVENTIL HFA, VENTOLIN HFA, PROAIR HFA) 90 mcg/actuation inhaler Take 2 Puffs by inhalation every four (4) hours as needed for Wheezing or Shortness of Breath. 3 Inhaler 3    DULoxetine (CYMBALTA) 60 mg capsule Take 1 Cap by mouth every evening. 90 Cap 1    lidocaine (LIDODERM) 5 % Apply patch to the affected area for 12 hours a day and remove for 12 hours a day. 90 Each 1    pantoprazole (PROTONIX) 40 mg tablet Take 1 Tab by mouth daily. 90 Tab 3    linaclotide (LINZESS) 145 mcg cap capsule Take 1 Cap by mouth Daily (before breakfast).  For constipation 90 Cap 3    ferrous sulfate 325 mg (65 mg iron) tablet Take 325 mg by mouth Daily (before breakfast).  aspirin delayed-release 81 mg tablet Take 81 mg by mouth daily. Past Medical History:   Diagnosis Date    Advanced care planning/counseling discussion 3/4/16    On File    Bronchitis 2/24/2014    Cervicalgia 8/18/15    Dr. Jose L Holguin    Chest pain 5/25/15    Hospitalized at [facility] 5/25/15 (lab work negative)    Congestive heart failure, unspecified     Last Echo 2/8/15: EF 55-60%    Constipation 6/13/2017    Enthesopathy, spinal ([address] 8/18/15    Dr. Roselia Vogt Essential hypertension     Foot drop 8/18/15    Dr. Roselia Vogt Heart attack 2/24/2013    Was supposed to See Cardiology for possible pacemaker in november 2014- After Cardiology consult locally, no need, EF greatly improved. Established with Dr. Rosalee Valera Hiatal hernia 6/2015    3 cm hiatal hernia     Hip pain 8/18/15    Dr. Jose L Holguin    Hypercholesterolemia     Hyperglycemia 7/2015    A1c 5.9     Hyperlipidemia 6/30/2015    NMR lipoprofile- LDL P 997, LDL-c 71, HLD-C-39, TG-60, HLD-P (25.2), Small LDL-P -541, LDL size 20.6    Hypothyroid     Insomnia 6/13/2017    Lower extremity edema     Lumbar spondylosis 8/18/15    Dr. Talib Marques 6/2015    EGD/Colonscopy 6/15- Gastritis, internal hemorrhoids and 3 polyps    Murmur     EDDIE on CPAP     Was referred to Pulmonology - Uses CPAP    Osteoarthritis of hip 8/18/15    Dr. Jose L Holguin    Osteoarthritis, shoulder 8/18/15    Dr. Roselia Vogt Radiculopathy, cervical 8/18/15    Dr. Jose L Holguin    Shoulder pain 8/18/15    Dr. Roselia Vogt Spinal stenosis of cervical region 8/18/15    Dr. Roselia Vogt Spinal stenosis, lumbar 8/18/15    Dr. Roselia Vogt Stroke [facility]) 2/25/2014    Established with NeurologyShea NP-Just hospitalized at [facility] 2/7/15-2/10/15. CT negative, but Late effect CV accident with increased tone described on discharge summary, Carotid dopplers showed 50% stenosis bilaterally.      Vitamin D deficiency 7/2015     Past Surgical History:   Procedure Laterality Date    COLONOSCOPY N/A 6/22/2016    [facility]ak Castillo [facility] ENDOSCOPY    HX BREAST BIOPSY  8/11/15    [facility]Neg    HX CHOLECYSTECTOMY      HX COLONOSCOPY  6/2015    Dr. Kalee Patterson- 3 complete polypectomies, Internal hemorrhoids, difficult study due to spasm. Repeat in 1 year    HX ENDOSCOPY  6/2015    mild gastritis, 3cm hiatal hernia     HX GASTRIC BYPASS  6/1989    HX HEART CATHETERIZATION  2/2014    HX ORTHOPAEDIC      Right middle finger distal amputation    HX PARTIAL THYROIDECTOMY  ~1990    HX THYROIDECTOMY Left 1985    90% of one side of the thyroid removed       Review of Systems   Neurological: Positive for focal weakness. All other systems reviewed and are negative. Visit Vitals    /60    Pulse 80    Ht 5' 6\" (1.676 m)    Wt 300 lb (136.1 kg)    BMI 48.42 kg/m2       Physical Exam   Constitutional: She is oriented to person, place, and time. She appears well-nourished. HENT:   Head: Atraumatic. Eyes: Conjunctivae are normal.   Neck: Neck supple. Cardiovascular: Normal rate, regular rhythm and normal heart sounds. Exam reveals no gallop and no friction rub. No murmur heard. Pulmonary/Chest: Breath sounds normal. She has no wheezes. Abdominal: Bowel sounds are normal.   Musculoskeletal: She exhibits no edema. Neurological: She is oriented to person, place, and time. Skin: Skin is dry. Nursing note and vitals reviewed. ASSESSMENT and PLAN  She still has morbid obesity, but she is doing much better following the weight loss. Her blood pressure is improved and she is off insulin. For now, I will continue her regimen and see her in one year.

## 2017-11-02 DIAGNOSIS — E55.9 VITAMIN D DEFICIENCY: Primary | ICD-10-CM

## 2017-11-02 NOTE — PROGRESS NOTES
Please complete form to add on vit D level to 10/25 labs. Please call lab and verify that they can be added on. If not please let me know.      Thank you,  AFTABT

## 2017-11-08 ENCOUNTER — TELEPHONE (OUTPATIENT)
Dept: CARDIOLOGY CLINIC | Age: 57
End: 2017-11-08

## 2017-11-08 NOTE — TELEPHONE ENCOUNTER
[facility]/ Dr. Sy Alexander requesting cardiac clearance for cysto EUA and possible catheter placement under IVCS anesthesia. Clearance to include holding aspirin. If okay, how many days may she hold? I will fax note to [phone] attn: Brandon Cruz.  Phone # [phone]

## 2017-11-08 NOTE — TELEPHONE ENCOUNTER
MD Ashly Owens, RN       Caller: Unspecified (Today,  3:28 PM)                     OK to proceed and stop ASA 5 days prior.        Faxed note

## 2017-11-28 ENCOUNTER — TELEPHONE (OUTPATIENT)
Dept: CARDIOLOGY CLINIC | Age: 57
End: 2017-11-28

## 2017-11-28 NOTE — TELEPHONE ENCOUNTER
[facility]/ Dr. Kash Ramirez is requesting cardiac clearance for cystoscopy with exam and mojica catheter placement under general anesthesia. Clearance may include holding aspirin. If okay, how long may she hold? I will fax to #[phone] attn: Rebecca Cramer. Phone# [phone].

## 2017-11-29 NOTE — TELEPHONE ENCOUNTER
MD Sun Dickerson, RN       Caller: Unspecified (Yesterday,  9:41 AM)                     Clear for surgery, can stop ASA 5 days prior      Faxed clearance

## 2018-01-03 DIAGNOSIS — G89.29 OTHER CHRONIC PAIN: ICD-10-CM

## 2018-01-03 RX ORDER — CARVEDILOL 6.25 MG/1
6.25 TABLET ORAL 2 TIMES DAILY WITH MEALS
Qty: 60 TAB | Refills: 5 | Status: SHIPPED | OUTPATIENT
Start: 2018-01-03 | End: 2018-07-20 | Stop reason: SDUPTHER

## 2018-01-03 NOTE — TELEPHONE ENCOUNTER
Request for Coreg 6.25 mg BID. Last office visit 11/1/17, next office visit 11/7/18. Refills per verbal order from Dr. Ari Berry.

## 2018-01-12 ENCOUNTER — OFFICE VISIT (OUTPATIENT)
Dept: NEUROLOGY | Age: 58
End: 2018-01-12

## 2018-01-12 VITALS
HEART RATE: 88 BPM | SYSTOLIC BLOOD PRESSURE: 130 MMHG | DIASTOLIC BLOOD PRESSURE: 88 MMHG | OXYGEN SATURATION: 95 % | RESPIRATION RATE: 18 BRPM | HEIGHT: 66 IN | BODY MASS INDEX: 46.77 KG/M2 | WEIGHT: 291 LBS

## 2018-01-12 DIAGNOSIS — G43.709 CHRONIC MIGRAINE W/O AURA W/O STATUS MIGRAINOSUS, NOT INTRACTABLE: Primary | ICD-10-CM

## 2018-01-12 PROBLEM — E66.01 OBESITY, MORBID (HCC): Status: ACTIVE | Noted: 2018-01-12

## 2018-01-12 RX ORDER — TOPIRAMATE 100 MG/1
100 TABLET, FILM COATED ORAL 2 TIMES DAILY
Qty: 60 TAB | Refills: 3 | Status: SHIPPED | OUTPATIENT
Start: 2018-01-12 | End: 2018-04-12 | Stop reason: SDUPTHER

## 2018-01-12 RX ORDER — LIDOCAINE 50 MG/G
PATCH TOPICAL
Qty: 90 EACH | Refills: 3 | Status: SHIPPED | OUTPATIENT
Start: 2018-01-12 | End: 2018-06-25 | Stop reason: SDUPTHER

## 2018-01-12 NOTE — PROGRESS NOTES
Pt f/u on headaches. They are better but still gets at least 3 a week. .lasting more than 4 hours at a time. Pt does have an advanced directive on file.

## 2018-01-12 NOTE — PROGRESS NOTES
Chief Complaint   Patient presents with    Follow-up    Migraine       HPI    Ms. Abi Salcido is a 53-year-old woman here to follow-up. She is being seen here for chronic migraines. She has history of stroke with left hemiparesis chronically. Last visit we titrated topiramate to 75 mg twice daily. Headaches are still about 3 days per week which is better than baseline. She is having some mild weight loss which is been helpful. She is using her CPAP machine she tells me. Headaches are still debilitating even though 3 days a week. Headaches are diffuse and throbbing with photophobia and nausea. Review of Systems   Eyes: Positive for photophobia. Gastrointestinal: Positive for nausea. Neurological: Positive for headaches. All other systems reviewed and are negative. Past Medical History:   Diagnosis Date    Advanced care planning/counseling discussion 3/4/16    On File    Bronchitis 2/24/2014    Cervicalgia 8/18/15    Dr. Elier Jessica    Chest pain 5/25/15    [facility] negative)    Congestive heart failure, unspecified     Last Echo 2/8/15: EF 55-60%    Constipation 6/13/2017    Enthesopathy, spinal ([address] 8/18/15    Dr. Han Clayton Essential hypertension     Foot drop 8/18/15    Dr. Han Clayton Heart attack 2/24/2013    Was supposed to See Cardiology for possible pacemaker in november 2014- After Cardiology consult locally, no need, EF greatly improved.  Established with Dr. Lena Go Hiatal hernia 6/2015    3 cm hiatal hernia     Hip pain 8/18/15    Dr. Elier Jessica    Hypercholesterolemia     Hyperglycemia 7/2015    A1c 5.9     Hyperlipidemia 6/30/2015    NMR lipoprofile- LDL P 997, LDL-c 71, HLD-C-39, TG-60, HLD-P (25.2), Small LDL-P -541, LDL size 20.6    Hypothyroid     Insomnia 6/13/2017    Lower extremity edema     Lumbar spondylosis 8/18/15    Dr. Cherrie De La Fuente 6/2015    EGD/Colonscopy 6/15- Gastritis, internal hemorrhoids and 3 polyps    Murmur     EDDIE on CPAP     Was referred to Pulmonology - Uses CPAP    Osteoarthritis of hip 8/18/15    Dr. Leisa Sever    Osteoarthritis, shoulder 8/18/15    Dr. Leisa Sever    Radiculopathy, cervical 8/18/15    Dr. Leisa Sever    Shoulder pain 8/18/15    Dr. Erik Aguila Spinal stenosis of cervical region 8/18/15    Dr. Erik Aguila Spinal stenosis, lumbar 8/18/15    Dr. Erik Aguila Stroke [facility]) 2/25/2014    Established with Neurology, Sosa Fuller [facility]15. CT negative, but Late effect CV accident with increased tone described on discharge summary, Carotid dopplers showed 50% stenosis bilaterally.  Vitamin D deficiency 7/2015     Family History   Problem Relation Age of Onset    Heart Disease Father 61    Hypertension Mother     Heart Disease Brother 62    Diabetes Maternal Grandmother      Social History     Social History    Marital status: SINGLE     Spouse name: N/A    Number of children: N/A    Years of education: N/A     Occupational History    Not on file. Social History Main Topics    Smoking status: Former Smoker     Packs/day: 0.25     Years: 15.00     Types: Cigarettes     Quit date: 6/13/2007    Smokeless tobacco: Never Used    Alcohol use No    Drug use: No    Sexual activity: No     Other Topics Concern    Not on file     Social History Narrative     Allergies   Allergen Reactions    Bees [Hymenoptera Allergenic Extract] Shortness of Breath and Swelling    Strawberry Shortness of Breath and Swelling    Lisinopril Cough         Current Outpatient Prescriptions   Medication Sig    topiramate (TOPAMAX) 100 mg tablet Take 1 Tab by mouth two (2) times a day.  carvedilol (COREG) 6.25 mg tablet Take 1 Tab by mouth two (2) times daily (with meals).  metFORMIN (GLUCOPHAGE) 1,000 mg tablet Take 1,000 mg by mouth two (2) times daily (with meals).  levothyroxine (SYNTHROID) 150 mcg tablet Take 1 Tab by mouth Daily (before breakfast).     morphine CR (MS CONTIN) 15 mg CR tablet Take 1 Tab by mouth every twelve (12) hours. Max Daily Amount: 30 mg. (Patient taking differently: Take 15 mg by mouth daily.)    HYDROcodone-acetaminophen (NORCO) 5-325 mg per tablet Take 1 Tab by mouth every six (6) hours as needed for Pain. Max Daily Amount: 4 Tabs.  albuterol-ipratropium (DUO-NEB) 2.5 mg-0.5 mg/3 ml nebu 3 mL by Nebulization route every six (6) hours as needed.  losartan (COZAAR) 50 mg tablet Take 1 Tab by mouth nightly. Hold for SBP<115    cyclobenzaprine (FLEXERIL) 5 mg tablet Take 5 mg by mouth two (2) times a day.  EPINEPHrine (EPIPEN 2-LAURA) 0.3 mg/0.3 mL injection 0.3 mL by IntraMUSCular route once as needed for up to 1 dose.  ergocalciferol (ERGOCALCIFEROL) 50,000 unit capsule Take 1 Cap by mouth daily. High needs due to gastric bypass history    cyanocobalamin (VITAMIN B12) 1,000 mcg/mL injection 1 mL by SubCUTAneous route every fourteen (14) days. For b12 deficiency    albuterol (PROVENTIL HFA, VENTOLIN HFA, PROAIR HFA) 90 mcg/actuation inhaler Take 2 Puffs by inhalation every four (4) hours as needed for Wheezing or Shortness of Breath.  DULoxetine (CYMBALTA) 60 mg capsule Take 1 Cap by mouth every evening.  lidocaine (LIDODERM) 5 % Apply patch to the affected area for 12 hours a day and remove for 12 hours a day.  pantoprazole (PROTONIX) 40 mg tablet Take 1 Tab by mouth daily.  linaclotide (LINZESS) 145 mcg cap capsule Take 1 Cap by mouth Daily (before breakfast). For constipation    ferrous sulfate 325 mg (65 mg iron) tablet Take 325 mg by mouth Daily (before breakfast).  aspirin delayed-release 81 mg tablet Take 81 mg by mouth daily. No current facility-administered medications for this visit. Neurologic Exam     Mental Status        WD/WN adult in NAD, normal grooming  VSS  A&O x 3    PERRL, nonicteric  Face is asymmetric, tongue midline  Speech is fluent and clear  No limb ataxia. No abnl movements.   Moving all extemities spontaneously and symmetric  Wide circumducting gait with walker    CVS RRR  Lungs nonlabored  Skin is warm and dry         Visit Vitals    /88 (BP 1 Location: Left arm, BP Patient Position: Sitting)    Pulse 88    Resp 18    Ht 5' 6\" (1.676 m)    Wt 132 kg (291 lb)    SpO2 95%    BMI 46.97 kg/m2       Assessment and Plan   Diagnoses and all orders for this visit:    1. Chronic migraine w/o aura w/o status migrainosus, not intractable    Other orders  -     topiramate (TOPAMAX) 100 mg tablet; Take 1 Tab by mouth two (2) times a day. 58-year-old woman with chronic migraine headaches having some mild improvement with topiramate. I am going to titrate to full 100 mg twice daily to hopefully induce better control and at the same time help accelerate her weight loss. Side effects discussed. She agrees. I will see her in 3 months.       [address] [place] [address], [address]  Diplomate GREGN

## 2018-01-12 NOTE — TELEPHONE ENCOUNTER
From: Marcos Self  To: Mela Bermudez MD  Sent: 1/3/2018 11:26 AM EST  Subject: Medication Renewal Request    Original authorizing provider: MD Marcos Rodriguez would like a refill of the following medications:  lidocaine (LIDODERM) 5 % Tex Matthews. Sandy Bermudez MD]    Preferred pharmacy: [place]    Comment:  Dr. Sandy Bermudez, I need to have my metformin 1000mg, furosemide 20mg, One Touch Verio Flex Test Strips and Lidocaine Patch 5%. Hopefully with the patches I can get 60 instead of 30. By it being so cold, pain level increases. Thank you. Please send to [facility].

## 2018-01-12 NOTE — TELEPHONE ENCOUNTER
PCP: Cory Bull. Heena Marley MD    Last appt: 10/25/2017  Future Appointments  Date Time Provider Denilson Thompson   1/24/2018 8:00 AM John Diaz Heena Marley MD [facility] ALICE SCHED   2/26/2018 9:10 AM Nils Torres MD RDE [address] [facility]   4/12/2018 9:40 AM Nathalie Cranker Loring Dumas, DO [address]   11/7/2018 9:20 AM Tisha lBackmon  [address]       Requested Prescriptions     Pending Prescriptions Disp Refills    lidocaine (LIDODERM) 5 % 90 Each 1     Sig: Apply patch to the affected area for 12 hours a day and remove for 12 hours a day.      Lab Results   Component Value Date/Time    Sodium 146 10/25/2017 03:32 PM    Potassium 4.4 10/25/2017 03:32 PM    Chloride 107 10/25/2017 03:32 PM    CO2 23 10/25/2017 03:32 PM    Anion gap 9 09/12/2017 10:06 AM    Glucose 109 10/25/2017 03:32 PM    BUN 17 10/25/2017 03:32 PM    Creatinine 1.07 10/25/2017 03:32 PM    BUN/Creatinine ratio 16 10/25/2017 03:32 PM    GFR est AA 66 10/25/2017 03:32 PM    GFR est non-AA 57 10/25/2017 03:32 PM    Calcium 9.5 10/25/2017 03:32 PM     Lab Results   Component Value Date/Time    Hemoglobin A1c 6.4 09/09/2017 02:40 AM    Hemoglobin A1c (POC) 5.9 07/23/2015 09:35 AM     Lab Results   Component Value Date/Time    Cholesterol, total 111 09/09/2017 02:40 AM    HDL Cholesterol 37 09/09/2017 02:40 AM    LDL, calculated 61.8 09/09/2017 02:40 AM    VLDL, calculated 12.2 09/09/2017 02:40 AM    Triglyceride 61 09/09/2017 02:40 AM    CHOL/HDL Ratio 3.0 09/09/2017 02:40 AM     Lab Results   Component Value Date/Time    TSH 0.28 09/09/2017 02:40 AM

## 2018-01-12 NOTE — MR AVS SNAPSHOT
Visit Information Date & Time Provider Department Dept. Phone Encounter #  
 1/12/2018 10:20 AM Mo Levin Brain Shasta, DO [facility] at [address] [id number] Follow-up Instructions Return in about 3 months (around 4/12/2018). Routing History Your Appointments 1/24/2018  8:00 AM  
ROUTINE CARE with Aretha Quintero. Nick Dejesus 28 ([address]) Appt Note: pre-diabetes and referral f/u  
 [address] 
[address] [place] [address]  
[phone]  
  
   
 [address]  
  
    
 2/26/2018  9:10 AM  
ROUTINE CARE with Jeff Bhatia MD  
[facility] [address]) Appt Note: f/u diabetes cp0.00  
 [address] [address]  
[facility] [address] [address]  
  
    
 11/7/2018  9:20 AM  
ESTABLISHED PATIENT with Steve Soliz MD  
[facility] ([address]) Appt Note: 1 yr f/u  
 [address] [place] [address]  
[address] [address] Upcoming Health Maintenance Date Due  
 MEDICARE YEARLY EXAM 6/14/2018 PAP AKA CERVICAL CYTOLOGY 11/13/2018 BREAST CANCER SCRN MAMMOGRAM 8/4/2019 COLONOSCOPY 6/22/2021 DTaP/Tdap/Td series (2 - Td) 6/13/2027 Allergies as of 1/12/2018  Review Complete On: 1/12/2018 By: Rosette Unger Severity Noted Reaction Type Reactions Bees [Hymenoptera Allergenic Extract] High 10/14/2014   Systemic Shortness of Breath, Swelling [place] High 10/14/2014   Systemic Shortness of Breath, Swelling Lisinopril  10/17/2014    Cough Current Immunizations  Reviewed on 10/11/2016 Name Date Influenza Vaccine Aura Gallo) 11/13/2015 Influenza Vaccine (Quad) PF 10/20/2017, 10/11/2016 Pneumococcal Polysaccharide (PPSV-23) 8/29/2016 Tdap 6/13/2017 Not reviewed this visit You Were Diagnosed With   
  
 Codes Comments Chronic migraine w/o aura w/o status migrainosus, not intractable    -  Primary ICD-10-CM: J69.538 ICD-9-CM: 346.70 Vitals BP Pulse Resp Height(growth percentile) Weight(growth percentile) SpO2  
 130/88 (BP 1 Location: Left arm, BP Patient Position: Sitting) 88 18 5' 6\" (1.676 m) 291 lb (132 kg) 95% BMI OB Status Smoking Status 46.97 kg/m2 Postmenopausal Former Smoker BMI and BSA Data Body Mass Index Body Surface Area  
 46.97 kg/m 2 2.48 m 2 Preferred Pharmacy Pharmacy Name Phone [address] [phone] Your Updated Medication List  
  
   
This list is accurate as of: 1/12/18 11:06 AM.  Always use your most recent med list.  
  
  
  
  
 albuterol 90 mcg/actuation inhaler Commonly known as:  PROVENTIL HFA, VENTOLIN HFA, PROAIR HFA Take 2 Puffs by inhalation every four (4) hours as needed for Wheezing or Shortness of Breath. albuterol-ipratropium 2.5 mg-0.5 mg/3 ml Nebu Commonly known as:  DUO-NEB  
3 mL by Nebulization route every six (6) hours as needed. aspirin delayed-release 81 mg tablet Take 81 mg by mouth daily. carvedilol 6.25 mg tablet Commonly known as:  Mozelle Abebey Take 1 Tab by mouth two (2) times daily (with meals). cyanocobalamin 1,000 mcg/mL injection Commonly known as:  VITAMIN B12  
1 mL by SubCUTAneous route every fourteen (14) days. For b12 deficiency  
  
 cyclobenzaprine 5 mg tablet Commonly known as:  FLEXERIL Take 5 mg by mouth two (2) times a day. DULoxetine 60 mg capsule Commonly known as:  CYMBALTA Take 1 Cap by mouth every evening. EPINEPHrine 0.3 mg/0.3 mL injection Commonly known as:  EPIPEN 2-LAURA  
0.3 mL by IntraMUSCular route once as needed for up to 1 dose. ergocalciferol 50,000 unit capsule Commonly known as:  ERGOCALCIFEROL Take 1 Cap by mouth daily. High needs due to gastric bypass history  
  
 ferrous sulfate 325 mg (65 mg iron) tablet Take 325 mg by mouth Daily (before breakfast). HYDROcodone-acetaminophen 5-325 mg per tablet Commonly known as:  Ferne Arrow Take 1 Tab by mouth every six (6) hours as needed for Pain. Max Daily Amount: 4 Tabs. levothyroxine 150 mcg tablet Commonly known as:  SYNTHROID Take 1 Tab by mouth Daily (before breakfast). lidocaine 5 % Commonly known as:  Antonia Coho Apply patch to the affected area for 12 hours a day and remove for 12 hours a day. linaclotide 145 mcg Cap capsule Commonly known as:  Teodoro Tee Take 1 Cap by mouth Daily (before breakfast). For constipation  
  
 losartan 50 mg tablet Commonly known as:  COZAAR Take 1 Tab by mouth nightly. Hold for SBP<115  
  
 metFORMIN 1,000 mg tablet Commonly known as:  GLUCOPHAGE Take 1,000 mg by mouth two (2) times daily (with meals). morphine CR 15 mg CR tablet Commonly known as:  MS CONTIN Take 1 Tab by mouth every twelve (12) hours. Max Daily Amount: 30 mg.  
  
 pantoprazole 40 mg tablet Commonly known as:  PROTONIX Take 1 Tab by mouth daily. topiramate 100 mg tablet Commonly known as:  TOPAMAX Take 1 Tab by mouth two (2) times a day. Prescriptions Sent to Pharmacy Refills  
 topiramate (TOPAMAX) 100 mg tablet 3 Sig: Take 1 Tab by mouth two (2) times a day. Class: Normal  
 Pharmacy: Gilma Sarmiento [address] Ph #: [phone] Route: Oral  
  
Follow-up Instructions Return in about 3 months (around 4/12/2018). Patient Instructions Headache: Care Instructions Your Care Instructions Headaches have many possible causes. Most headaches aren't a sign of a more serious problem, and they will get better on their own.  Home treatment may help you feel better faster. The doctor has checked you carefully, but problems can develop later. If you notice any problems or new symptoms, get medical treatment right away. Follow-up care is a key part of your treatment and safety. Be sure to make and go to all appointments, and call your doctor if you are having problems. It's also a good idea to know your test results and keep a list of the medicines you take. How can you care for yourself at home? · Do not drive if you have taken a prescription pain medicine. · Rest in a quiet, dark room until your headache is gone. Close your eyes and try to relax or go to sleep. Don't watch TV or read. · Put a cold, moist cloth or cold pack on the painful area for 10 to 20 minutes at a time. Put a thin cloth between the cold pack and your skin. · Use a warm, moist towel or a heating pad set on low to relax tight shoulder and neck muscles. · Have someone gently massage your neck and shoulders. · Take pain medicines exactly as directed. ¨ If the doctor gave you a prescription medicine for pain, take it as prescribed. ¨ If you are not taking a prescription pain medicine, ask your doctor if you can take an over-the-counter medicine. · Be careful not to take pain medicine more often than the instructions allow, because you may get worse or more frequent headaches when the medicine wears off. · Do not ignore new symptoms that occur with a headache, such as a fever, weakness or numbness, vision changes, or confusion. These may be signs of a more serious problem. To prevent headaches · Keep a headache diary so you can figure out what triggers your headaches. Avoiding triggers may help you prevent headaches. Record when each headache began, how long it lasted, and what the pain was like (throbbing, aching, stabbing, or dull).  Write down any other symptoms you had with the headache, such as nausea, flashing lights or dark spots, or sensitivity to bright light or loud noise. Note if the headache occurred near your period. List anything that might have triggered the headache, such as certain foods (chocolate, cheese, wine) or odors, smoke, bright light, stress, or lack of sleep. · Find healthy ways to deal with stress. Headaches are most common during or right after stressful times. Take time to relax before and after you do something that has caused a headache in the past. 
· Try to keep your muscles relaxed by keeping good posture. Check your jaw, face, neck, and shoulder muscles for tension, and try relaxing them. When sitting at a desk, change positions often, and stretch for 30 seconds each hour. · Get plenty of sleep and exercise. · Eat regularly and well. Long periods without food can trigger a headache. · Treat yourself to a massage. Some people find that regular massages are very helpful in relieving tension. · Limit caffeine by not drinking too much coffee, tea, or soda. But don't quit caffeine suddenly, because that can also give you headaches. · Reduce eyestrain from computers by blinking frequently and looking away from the computer screen every so often. Make sure you have proper eyewear and that your monitor is set up properly, about an arm's length away. · Seek help if you have depression or anxiety. Your headaches may be linked to these conditions. Treatment can both prevent headaches and help with symptoms of anxiety or depression. When should you call for help? Call 911 anytime you think you may need emergency care. For example, call if: 
? · You have signs of a stroke. These may include: 
¨ Sudden numbness, paralysis, or weakness in your face, arm, or leg, especially on only one side of your body. ¨ Sudden vision changes. ¨ Sudden trouble speaking. ¨ Sudden confusion or trouble understanding simple statements. ¨ Sudden problems with walking or balance. ¨ A sudden, severe headache that is different from past headaches. ?Call your doctor now or seek immediate medical care if: 
? · You have a new or worse headache. ? · Your headache gets much worse. Where can you learn more? Go to http://noa-em.info/. Enter M271 in the search box to learn more about \"Headache: Care Instructions. \" Current as of: October 14, 2016 Content Version: 11.4 © 3738-2894 [employer]. Care instructions adapted under license by [employer] (which disclaims liability or warranty for this information). If you have questions about a medical condition or this instruction, always ask your healthcare professional. [address] any warranty or liability for your use of this information. Introducing [facility] & HEALTH SERVICES! Dear Terra Giles: Thank you for requesting a ZALORA account. Our records indicate that you already have an active ZALORA account. You can access your account anytime at [email or web address]. [email or web address] Did you know that you can access your hospital and ER discharge instructions at any time in ZALORA? You can also review all of your test results from your hospital stay or ER visit. Additional Information If you have questions, please visit the Frequently Asked Questions section of the ZALORA website at [email or web address] Remember, ZALORA is NOT to be used for urgent needs. For medical emergencies, dial 911. Now available from your iPhone and Android! Please provide this summary of care documentation to your next provider. Your primary care clinician is listed as Mike York. If you have any questions after today's visit, please call [phone].

## 2018-01-12 NOTE — PATIENT INSTRUCTIONS

## 2018-01-24 ENCOUNTER — OFFICE VISIT (OUTPATIENT)
Dept: FAMILY MEDICINE CLINIC | Age: 58
End: 2018-01-24

## 2018-01-24 VITALS
DIASTOLIC BLOOD PRESSURE: 98 MMHG | WEIGHT: 288 LBS | BODY MASS INDEX: 46.28 KG/M2 | RESPIRATION RATE: 18 BRPM | SYSTOLIC BLOOD PRESSURE: 146 MMHG | HEART RATE: 76 BPM | HEIGHT: 66 IN | TEMPERATURE: 96.2 F | OXYGEN SATURATION: 97 %

## 2018-01-24 DIAGNOSIS — E66.01 OBESITY, MORBID (HCC): Primary | ICD-10-CM

## 2018-01-24 DIAGNOSIS — Z97.8 CHRONIC INDWELLING FOLEY CATHETER: ICD-10-CM

## 2018-01-24 DIAGNOSIS — I69.359 HEMIPARESIS AND ALTERATION OF SENSATIONS AS LATE EFFECTS OF STROKE (HCC): ICD-10-CM

## 2018-01-24 DIAGNOSIS — R73.03 PREDIABETES: ICD-10-CM

## 2018-01-24 DIAGNOSIS — R53.1 LEFT-SIDED WEAKNESS: ICD-10-CM

## 2018-01-24 DIAGNOSIS — I69.398 HEMIPARESIS AND ALTERATION OF SENSATIONS AS LATE EFFECTS OF STROKE (HCC): ICD-10-CM

## 2018-01-24 DIAGNOSIS — G47.00 INSOMNIA, UNSPECIFIED TYPE: ICD-10-CM

## 2018-01-24 DIAGNOSIS — I10 ESSENTIAL HYPERTENSION: ICD-10-CM

## 2018-01-24 DIAGNOSIS — I50.9 CHRONIC CONGESTIVE HEART FAILURE, UNSPECIFIED CONGESTIVE HEART FAILURE TYPE: ICD-10-CM

## 2018-01-24 LAB — HBA1C MFR BLD HPLC: 5.6 %

## 2018-01-24 RX ORDER — FUROSEMIDE 40 MG/1
40 TABLET ORAL
COMMUNITY
End: 2018-01-24 | Stop reason: SDUPTHER

## 2018-01-24 RX ORDER — FUROSEMIDE 40 MG/1
40 TABLET ORAL DAILY
Qty: 90 TAB | Refills: 3 | Status: SHIPPED | OUTPATIENT
Start: 2018-01-24 | End: 2018-12-04 | Stop reason: SDUPTHER

## 2018-01-24 RX ORDER — METFORMIN HYDROCHLORIDE 1000 MG/1
1000 TABLET ORAL 2 TIMES DAILY WITH MEALS
Qty: 180 TAB | Refills: 3 | Status: SHIPPED | OUTPATIENT
Start: 2018-01-24 | End: 2018-03-14 | Stop reason: SDUPTHER

## 2018-01-24 NOTE — MR AVS SNAPSHOT
[address] [facility] 
 
 
 [address] 
[address] [place] [address] 
[phone] Patient: Nelia Ramirez MRN: [id number] Visit Information Date & Time Provider Department Dept. Phone Encounter #  
 1/24/2018  8:00 AM Sherrin Prader Dorene Scotland, MD [facility] 74 [phone] [id number] Your Appointments 2/26/2018  9:10 AM  
ROUTINE CARE with MD Primit[facility]) Appt Note: f/u diabetes cp0.00  
 [address]  
[address] [facility] [address]  
  
    
 4/12/2018  9:40 AM  
Follow Up with [facility]) Appt Note: f/u Headaches / [place] [address] [facility] [address]  
[facility] [address] [address]  
  
    
 11/7/2018  9:20 AM  
ESTABLISHED PATIENT with Lucrecia Sarmiento MD  
[facility] (ALICE SCHEDULING) Appt Note: 1 yr f/u  
 [address]  
[address] [facility] [address] Upcoming Health Maintenance Date Due  
 MEDICARE YEARLY EXAM 6/14/2018 PAP AKA CERVICAL CYTOLOGY 11/13/2018 BREAST CANCER SCRN MAMMOGRAM 8/4/2019 COLONOSCOPY 6/22/2021 DTaP/Tdap/Td series (2 - Td) 6/13/2027 Allergies as of 1/24/2018  Review Complete On: 1/24/2018 By: Chikis Alberto. Tracy Kelly MD  
  
 Severity Noted Reaction Type Reactions Bees [Hymenoptera Allergenic Extract] High 10/14/2014   Systemic Shortness of Breath, Swelling [place] High 10/14/2014   Systemic Shortness of Breath, Swelling Lisinopril  10/17/2014    Cough Current Immunizations  Reviewed on 10/11/2016 Name Date Influenza Vaccine Naima Sands) 11/13/2015 Influenza Vaccine (Quad) PF 10/20/2017, 10/11/2016 Pneumococcal Polysaccharide (PPSV-23) 8/29/2016 Tdap 6/13/2017 Not reviewed this visit You Were Diagnosed With   
  
 Codes Comments Obesity, morbid ([facility].)    -  Primary ICD-10-CM: E66.01 
ICD-9-CM: 278.01 Chronic indwelling Dunn catheter     ICD-10-CM: Z92.89 ICD-9-CM: V45.89 Chronic congestive heart failure, unspecified congestive heart failure type ([facility].)     ICD-10-CM: I50.9 ICD-9-CM: 428.0 Essential hypertension     ICD-10-CM: I10 
ICD-9-CM: 401.9 Prediabetes     ICD-10-CM: R73.03 
ICD-9-CM: 790.29 Left-sided weakness     ICD-10-CM: R53.1 ICD-9-CM: 728.87 Hemiparesis and alteration of sensations as late effects of stroke [facility])     ICD-10-CM: A38.209, U57.232 ICD-9-CM: 438.20, 438.6 Insomnia, unspecified type     ICD-10-CM: G47.00 ICD-9-CM: 780.52 Vitals BP Pulse Temp Resp Height(growth percentile) Weight(growth percentile) (!) 146/98 (BP 1 Location: Right arm, BP Patient Position: Sitting) 76 96.2 °F (35.7 °C) (Oral) 18 5' 6\" (1.676 m) 288 lb (130.6 kg) SpO2 BMI OB Status Smoking Status 97% 46.48 kg/m2 Postmenopausal Former Smoker Vitals History BMI and BSA Data Body Mass Index Body Surface Area  
 46.48 kg/m 2 2.47 m 2 Preferred Pharmacy Pharmacy Name Phone [address] [phone] Your Updated Medication List  
  
   
This list is accurate as of: 1/24/18  9:17 AM.  Always use your most recent med list.  
  
  
  
  
 albuterol 90 mcg/actuation inhaler Commonly known as:  PROVENTIL HFA, VENTOLIN HFA, PROAIR HFA Take 2 Puffs by inhalation every four (4) hours as needed for Wheezing or Shortness of Breath. albuterol-ipratropium 2.5 mg-0.5 mg/3 ml Nebu Commonly known as:  DUO-NEB  
3 mL by Nebulization route every six (6) hours as needed. aspirin delayed-release 81 mg tablet Take 81 mg by mouth daily. carvedilol 6.25 mg tablet Commonly known as:  Tio Belcher Take 1 Tab by mouth two (2) times daily (with meals). cyanocobalamin 1,000 mcg/mL injection Commonly known as:  VITAMIN B12  
1 mL by SubCUTAneous route every fourteen (14) days. For b12 deficiency  
  
 cyclobenzaprine 5 mg tablet Commonly known as:  FLEXERIL Take 5 mg by mouth two (2) times a day. DULoxetine 60 mg capsule Commonly known as:  CYMBALTA Take 1 Cap by mouth every evening. EPINEPHrine 0.3 mg/0.3 mL injection Commonly known as:  EPIPEN 2-LAURA  
0.3 mL by IntraMUSCular route once as needed for up to 1 dose. ergocalciferol 50,000 unit capsule Commonly known as:  ERGOCALCIFEROL Take 1 Cap by mouth daily. High needs due to gastric bypass history  
  
 ferrous sulfate 325 mg (65 mg iron) tablet Take 325 mg by mouth Daily (before breakfast). furosemide 40 mg tablet Commonly known as:  LASIX Take 1 Tab by mouth daily. glucose blood VI test strips strip Commonly known as:  blood glucose test  
For use with glucometer to check blood sugar once a day in morning before eating. Please dispense VERIO FLEX STRIPS HYDROcodone-acetaminophen 5-325 mg per tablet Commonly known as:  [place] Take 1 Tab by mouth every six (6) hours as needed for Pain. Max Daily Amount: 4 Tabs. levothyroxine 150 mcg tablet Commonly known as:  SYNTHROID Take 1 Tab by mouth Daily (before breakfast). lidocaine 5 % Commonly known as:  Nader Delatorre Apply patch to the affected area for 12 hours a day and remove for 12 hours a day. linaclotide 145 mcg Cap capsule Commonly known as:  Tip Briggs Take 1 Cap by mouth Daily (before breakfast). For constipation * liraglutide 3 mg/0.5 mL (18 mg/3 mL) pen Commonly known as:  SAXENDA  
0.6 mg daily x1wk then 1.2 mg daily x1wk then 1.8 mg daily x1wk then 2.4 mg daily x1wk then 3.0 mg daily * liraglutide 3 mg/0.5 mL (18 mg/3 mL) pen Commonly known as:  SAXENDA  
0.5 mL by SubCUTAneous route daily. losartan 50 mg tablet Commonly known as:  COZAAR Take 1 Tab by mouth nightly. Hold for SBP<115  
  
 metFORMIN 1,000 mg tablet Commonly known as:  GLUCOPHAGE Take 1 Tab by mouth two (2) times daily (with meals). morphine CR 15 mg CR tablet Commonly known as:  MS CONTIN Take 1 Tab by mouth every twelve (12) hours. Max Daily Amount: 30 mg.  
  
 pantoprazole 40 mg tablet Commonly known as:  PROTONIX Take 1 Tab by mouth daily. suvorexant 20 mg tablet Commonly known as:  Jigar Goody Take 1 Tab by mouth nightly as needed for Insomnia. Max Daily Amount: 20 mg.  
  
 topiramate 100 mg tablet Commonly known as:  TOPAMAX Take 1 Tab by mouth two (2) times a day. * Notice: This list has 2 medication(s) that are the same as other medications prescribed for you. Read the directions carefully, and ask your doctor or other care provider to review them with you. Prescriptions Printed Refills  
 suvorexant (BELSOMRA) 20 mg tablet 5 Sig: Take 1 Tab by mouth nightly as needed for Insomnia. Max Daily Amount: 20 mg.  
 Class: Print Route: Oral  
  
Prescriptions Sent to Pharmacy Refills  
 metFORMIN (GLUCOPHAGE) 1,000 mg tablet 3 Sig: Take 1 Tab by mouth two (2) times daily (with meals). Class: Normal  
 Pharmacy: [facility] DR KARLENE ROSALES [address] [place] [address] Ph #: [phone] Route: Oral  
 furosemide (LASIX) 40 mg tablet 3 Sig: Take 1 Tab by mouth daily. Class: Normal  
 Pharmacy: [facility] DR KARLENE ROSALES [address] [place] [address] Ph #: [phone] Route: Oral  
 glucose blood VI test strips (BLOOD GLUCOSE TEST) strip 11 Sig: For use with glucometer to check blood sugar once a day in morning before eating. Please dispense VERIO FLEX STRIPS  Class: Normal  
 Pharmacy: [address] [place] [address] Ph #: [phone]  
 liraglutide (SAXENDA) 3 mg/0.5 mL (18 mg/3 mL) pen 0 Si.6 mg daily x1wk then 1.2 mg daily x1wk then 1.8 mg daily x1wk then 2.4 mg daily x1wk then 3.0 mg daily Class: Normal  
 Pharmacy: [address] [place] [address] Ph #: [phone]  
 liraglutide (SAXENDA) 3 mg/0.5 mL (18 mg/3 mL) pen 1 Si.5 mL by SubCUTAneous route daily. Class: Normal  
 Pharmacy: [address] [place] [address] Ph #: [phone] Route: SubCUTAneous We Performed the Following REFERRAL TO PHARMACIST [CZG274 Custom] Comments:  
 Please evaluate patient for JÄRVENPÄÄ teaching REFERRAL TO PHYSICAL THERAPY [KZN07 Custom] Comments:  
 Please evaluate patient for PT, weakness related to stroke [facility], [address] Phone numbers: 
General inquiries: [phone] [facility] Rec: [phone] View Location Details Referral Information Referral ID Referred By Referred To  
  
 [id number] Elizabeth Cordova, [address] [address] Visits Status Start Date End Date 1 New Request 18 If your referral has a status of pending review or denied, additional information will be sent to support the outcome of this decision. Referral ID Referred By Referred To  
 [id number] Elizabeth Caceres Not Available Visits Status Start Date End Date 1 New Request 18 If your referral has a status of pending review or denied, additional information will be sent to support the outcome of this decision. Patient Instructions Millicent Delgado TODAY, please go to: CHECK OUT If you received a referral, Show the  Please schedule the following appointments at [facility] OUT: 
· Disability paperwork follow up with Dr. Aldair Gannon before  · Pharmacy Rodolfo Funes) for CHARAN teaching · [place]/Weight and blood pressure follow up with Dr. Feilpe Altamirano in 5-6 weeks Introducing [facility] SERVICES! Dear Cr Bermudez: Thank you for requesting a GLG account. Our records indicate that you already have an active GLG account. You can access your account anytime at [email or web address]. [email or web address] Source Did you know that you can access your hospital and ER discharge instructions at any time in GLG? You can also review all of your test results from your hospital stay or ER visit. Additional Information If you have questions, please visit the Frequently Asked Questions section of the GLG website at [email or web address] Remember, GLG is NOT to be used for urgent needs. For medical emergencies, dial 911. Now available from your iPhone and Android! Please provide this summary of care documentation to your next provider. Your primary care clinician is listed as Jono Muñiz. If you have any questions after today's visit, please call [phone].

## 2018-01-24 NOTE — PROGRESS NOTES
Chief Complaint   Patient presents with    Other     pre-diabetes F/U     Referral Follow Up     1. Have you been to the ER, urgent care clinic since your last visit? Hospitalized since your last visit? No     2. Have you seen or consulted any other health care providers outside of the [address] [place] [address] since your last visit? Include any pap smears or colon screening. Yes, Dr. Elena Her placed in a cath. The patient was counseled on the dangers of tobacco use, and was advised never to start again . Reviewed strategies to maximize success, including never to start again. Leoncio Salazar  Identified pt with two pt identifiers(name and ). Chief Complaint   Patient presents with    Other     pre-diabetes F/U     Referral Follow Up       1. Have you been to the ER, urgent care clinic since your last visit? Hospitalized since your last visit? NO    2. Have you seen or consulted any other health care providers outside of the [address] [place] [address] since your last visit? Include any pap smears or colon screening. NO    [unfilled] has been notified of reason for visit, vitals and flowsheets obtained on patients. Patient received paperwork for advance directive during previous visit but has not completed at this time     Reviewed record In preparation for visit, huddled with provider and have obtained necessary documentation      There are no preventive care reminders to display for this patient.     Wt Readings from Last 3 Encounters:   18 291 lb (132 kg)   17 300 lb (136.1 kg)   10/30/17 303 lb (137.4 kg)     Temp Readings from Last 3 Encounters:   10/20/17 97.9 °F (36.6 °C) (Oral)   17 97.8 °F (36.6 °C)   17 97.4 °F (36.3 °C) (Oral)     BP Readings from Last 3 Encounters:   18 130/88   17 110/60   10/30/17 125/89     Pulse Readings from Last 3 Encounters:   18 88   17 80   10/30/17 91     There were no vitals filed for this visit. Learning Assessment:  :     Learning Assessment 10/13/2017 3/24/2015   PRIMARY LEARNER Patient Patient   HIGHEST LEVEL OF EDUCATION - PRIMARY LEARNER  - SOME COLLEGE   BARRIERS PRIMARY LEARNER - NONE   CO-LEARNER CAREGIVER - No   CO-LEARNER NAME - n/a   PRIMARY LANGUAGE ENGLISH ENGLISH   LEARNER PREFERENCE PRIMARY LISTENING LISTENING   ANSWERED BY patient patient   RELATIONSHIP SELF SELF       Depression Screening:  :     PHQ over the last two weeks 1/24/2018   Little interest or pleasure in doing things Not at all   Feeling down, depressed or hopeless Not at all   Total Score PHQ 2 0       Fall Risk Assessment:  :     Fall Risk Assessment, last 12 mths 6/13/2017   Able to walk? Yes   Fall in past 12 months? Yes   Fall with injury? No   Number of falls in past 12 months 1   Fall Risk Score 1       Abuse Screening:  :     No flowsheet data found. ADL Screening:  :     ADL Assessment 6/13/2017   Feeding yourself No Help Needed   Getting from bed to chair Help Needed   Getting dressed Help Needed   Bathing or showering Help Needed   Walk across the room (includes cane/walker) Help Needed   Using the telphone No Help Needed   Taking your medications No Help Needed   Preparing meals Help Needed   Managing money (expenses/bills) No Help Needed   Moderately strenuous housework (laundry) Help Needed   Shopping for personal items (toiletries/medicines) No Help Needed   Shopping for groceries No Help Needed   Driving No Help Needed   Climbing a flight of stairs Help Needed   Getting to places beyond walking distances No Help Needed               Patient is accompanied by no one I have received verbal consent from Eliana Martinez to discuss any/all medical information while they are present in the room. Medication reconciliation up to date and corrected with patient at this time.

## 2018-01-24 NOTE — PATIENT INSTRUCTIONS
Sari Elizondo TODAY, please go to:   CHECK OUT     If you received a referral, Show the       Please schedule the following appointments at CHECK OUT:  · Disability paperwork follow up with Dr. Franca Azevedo before feb 4th  · Pharmacy Lois Mo) for [address] [place] [address]  · Saxenda/Weight and blood pressure follow up with Dr. Franca Azevedo in 5-6 weeks     Liraglutide (By injection)   Liraglutide (jpp-s-EMEZ-tide)  Treats type 2 diabetes and helps with weight loss in certain patients. Also reduces the risk of heart attacks and strokes in patients with type 2 diabetes and heart or blood vessel disease. Brand Name(s): Saxenda, Victoza   There may be other brand names for this medicine. When This Medicine Should Not Be Used: This medicine is not right for everyone. Do not use it if you had an allergic reaction to liraglutide, or if you have multiple endocrine neoplasia syndrome type 2 (MEN 2) or if you or anyone in your family had medullary thyroid cancer. Tell your doctor if you are pregnant or have become pregnant while you are using this medicine. How to Use This Medicine:   Injectable  · Your doctor will prescribe your exact dose and tell you how often it should be given. This medicine is given as a shot under the skin of your stomach, thighs, or upper arms. · If you use insulin in addition to this medicine, do not mix them into the same syringe. You may give the shots in the same area (including your stomach), but do not give the shots right next to each other. · You may be taught how to give your medicine at home. Make sure you understand all instructions before giving yourself an injection. Do not use more medicine or use it more often than your doctor tells you to. · Check the liquid in the pen. It should be clear and colorless. Do not use it if it is cloudy, discolored, or has particles in it. · You will be shown the body areas where this shot can be given. Use a different body area each time you give yourself a shot.  Keep track of where you give each shot to make sure you rotate body areas. · Use a new needle and syringe each time you inject your medicine. · Never share medicine pens with others under any circumstances. Sharing needles or pens can result in transmission of infection. · Drink extra fluids so you will urinate more often and help prevent kidney problems. · This medicine should come with a Medication Guide. Ask your pharmacist for a copy if you do not have one. · Missed dose: If you miss a dose of this medicine, use it as soon as you remember. Then take your next dose at your usual time. Never take extra medicine to make up for a missed dose. If you miss a dose for 3 days or more, call your doctor to talk about how to restart your treatment. · Store your new, unused medicine pen in its original carton in the refrigerator. Protect it from light. Do not freeze this medicine or use it if it has been frozen. You may store the opened medicine pen in the refrigerator or at room temperature for 30 days. Throw away your used pen after 30 days, even if it still has medicine in it. Always remove the needle from the pen before you store it. · Throw away used needles in a hard, closed container that the needles cannot poke through. Keep this container away from children and pets. Drugs and Foods to Avoid:      Ask your doctor or pharmacist before using any other medicine, including over-the-counter medicines, vitamins, and herbal products. Warnings While Using This Medicine:   · Tell your doctor if you are breastfeeding, or if you have kidney disease, liver disease, digestion problems (including gastroparesis), gallbladder disease, or a history of pancreas problems, depression, or angioedema (swelling of the arms, face, hands, mouth, or throat). · Do not use Saxenda® if you are also using Victoza®. They contain the same medicine.   · This medicine may cause the following problems:   ¨ Increased risk for thyroid tumors  ¨ Pancreatitis  ¨ Low blood sugar  ¨ Kidney problems  ¨ Gallbladder problems, including gallstones  ¨ Thoughts of hurting yourself Amy Fuller)  · Your doctor will do lab tests at regular visits to check on the effects of this medicine. Keep all appointments. · Keep all medicine out of the reach of children. Never share your medicine with anyone. Possible Side Effects While Using This Medicine:   Call your doctor right away if you notice any of these side effects:  · Allergic reaction: Itching or hives, swelling in your face or hands, swelling or tingling in your mouth or throat, chest tightness, trouble breathing  · Change in how much or how often you urinate, painful or burning urination  · Feeling sad or depressed, thoughts of suicide, unusual changes in mood or behavior  · Shaking, trembling, sweating, fast or pounding heartbeat, fainting, hunger, confusion  · Sudden and severe stomach pain, nausea, vomiting, fever, lightheadedness  · Trouble breathing or swallowing, a lump in your neck, hoarseness when speaking  · Yellow skin or eyes  If you notice these less serious side effects, talk with your doctor:   · Decreased appetite  · Diarrhea, constipation, stomach upset  · Dizziness  · Headache  · Redness, itching, swelling, or any changes in your skin where the shot was given  If you notice other side effects that you think are caused by this medicine, tell your doctor. Call your doctor for medical advice about side effects. You may report side effects to FDA at 7-707-FDA-5621  © 2017 [address] [employer] Information is for End User's use only and may not be sold, redistributed or otherwise used for commercial purposes. The above information is an  only. It is not intended as medical advice for individual conditions or treatments. Talk to your doctor, nurse or pharmacist before following any medical regimen to see if it is safe and effective for you.

## 2018-01-24 NOTE — LETTER
NOTIFICATION RETURN TO WORK / SCHOOL 
 
1/24/2018 9:14 AM 
 
Ms. Yonis Mac [facility] To Whom It May Concern: 
 
Yonis Mac is currently under the care of Gerson Randall. She may return to [employer] as of 1/24/2018. If there are questions or concerns please have the patient contact our office. Sincerely, 
 
 
Lisha Cerda MD

## 2018-01-24 NOTE — PROGRESS NOTES
[address] [place] [address] Visit   01/24/2018  Patient ID: Jan Mcmillan is a 59 y.o. female. Assessment/Plan:    Diagnoses and all orders for this visit:    1. Obesity, morbid ([facility] [address]  Doing well with lifestyle efforts. Interest in saxenda based on prior counseling. To pharmacy for teaching and then f/u with me.   -     REFERRAL TO PHARMACIST  -     liraglutide (SAXENDA) 3 mg/0.5 mL (18 mg/3 mL) pen; 0.6 mg daily x1wk then 1.2 mg daily x1wk then 1.8 mg daily x1wk then 2.4 mg daily x1wk then 3.0 mg daily  -     liraglutide (SAXENDA) 3 mg/0.5 mL (18 mg/3 mL) pen; 0.5 mL by SubCUTAneous route daily. 2. Chronic indwelling Dunn catheter  New since seeing urogynecology. 3. Chronic congestive heart failure, unspecified congestive heart failure type ([facility] [address]  4. Essential hypertension  Lasix  refilled    5. Prediabetes  -     glucose blood VI test strips (BLOOD GLUCOSE TEST) strip; For use with glucometer to check blood sugar once a day in morning before eating. Please dispense VERIO FLEX STRIPS  -     AMB POC HEMOGLOBIN A1C    6. Left-sided weakness  -     REFERRAL TO PHYSICAL THERAPY  7. Hemiparesis and alteration of sensations as late effects of stroke (HCC)  -     REFERRAL TO PHYSICAL THERAPY    8. Insomnia, unspecified type  Restart belsomra. New treatment agreement emanuel by pt and myself today. Narcotics will remain under the care of her pain specialist.  -     suvorexant (BELSOMRA) 20 mg tablet; Take 1 Tab by mouth nightly as needed for Insomnia. Max Daily Amount: 20 mg. Other orders  -     metFORMIN (GLUCOPHAGE) 1,000 mg tablet; Take 1 Tab by mouth two (2) times daily (with meals). -     furosemide (LASIX) 40 mg tablet; Take 1 Tab by mouth daily. Counselled pt on Patient health concerns and plans. Patient was offered a choice/choices in the treatment plan today. Reviewed return precautions as appropriate.    Patient expresses understanding of the plan and agrees with recommendations. See patient instructions for more. Next visit: disability paper, urine tox. Patient Instructions   . TODAY, please go to:   CHECK OUT     If you received a referral, Show the       Please schedule the following appointments at CHECK OUT:  · Disability paperwork follow up with Dr. Raul Aldrich before feb 4th  · Pharmacy Susy Herrera) for [address] [place] [address]  · Saxenda/Weight and blood pressure follow up with Dr. Raul Aldrich in 5-6 weeks       Subjective:   HPI:  Laura Garrido is a 59 y.o. female being seen for:   Chief Complaint   Patient presents with    Other     pre-diabetes F/U     Referral Follow Up       Saw Urogynecologist. Lawerance Maze two medications. Had cystoscopy. Ultimately decided on indwelling mojica. Will go to Dr. Jessica Egan office for monthly replacement. Carmen Martines out over the weekend, had to go back to get it replaced    ·  needs refill of lasix (one a day), metformin  · Using pill pack, but cost is variable  · Would like to try belsomra again. It helped, but was too costly. Now on a program that helps her afford medication. · Needs strips for verio flexstrips for glucometer  · Would like to try saxenda to help for weight loss. She is going on a cruise. Overall goal is 225lb. · Needs a release to return to [employer]. Has not been since Sept.  · Typical activities: cooking, shopping, menu planning, movies, feeding hungry  · Would like a referral to go to PT at [address] [place] [address]. Tomorrow is last day of PT at home. ([facility])  · Roaring in left ear, wax draining yesterday     Review of Systems  Otherwise as noted in HPI  ?   Objective:     Visit Vitals    BP (!) 146/98 (BP 1 Location: Right arm, BP Patient Position: Sitting)    Pulse 76    Temp 96.2 °F (35.7 °C) (Oral)    Resp 18    Ht 5' 6\" (1.676 m)    Wt 288 lb (130.6 kg)    SpO2 97%    BMI 46.48 kg/m2     Wt Readings from Last 3 Encounters:   01/24/18 288 lb (130.6 kg)   01/12/18 291 lb (132 kg)   11/01/17 300 lb (136.1 kg)     BP Readings from Last 3 Encounters:   01/24/18 (!) 146/98   01/12/18 130/88   11/01/17 110/60     PHQ over the last two weeks 1/24/2018   Little interest or pleasure in doing things Not at all   Feeling down, depressed or hopeless Not at all   Total Score PHQ 2 0         Physical Exam   Constitutional: She appears well-developed and well-nourished. No distress. HENT:   Left Ear: Tympanic membrane and ear canal normal. Tympanic membrane is not perforated, not erythematous and not bulging. Pulmonary/Chest: Effort normal. No respiratory distress. Neurological: She is alert. Psychiatric: She has a normal mood and affect. Her behavior is normal.       Allergies   Allergen Reactions    Bees [Hymenoptera Allergenic Extract] Shortness of Breath and Swelling    Strawberry Shortness of Breath and Swelling    Lisinopril Cough     Prior to Admission medications    Medication Sig Start Date End Date Taking? Authorizing Provider   metFORMIN (GLUCOPHAGE) 1,000 mg tablet Take 1 Tab by mouth two (2) times daily (with meals). 1/24/18  Yes Mindy Altamirano MD   furosemide (LASIX) 40 mg tablet Take 1 Tab by mouth daily. 1/24/18  Yes Mindy Altamirano MD   suvorexant [facility]) 20 mg tablet Take 1 Tab by mouth nightly as needed for Insomnia. Max Daily Amount: 20 mg. 1/24/18  Yes Debroah Peabody. Felipe Altamirano MD   glucose blood VI test strips (BLOOD GLUCOSE TEST) strip For use with glucometer to check blood sugar once a day in morning before eating. Please dispense VERIO FLEX STRIPS 1/24/18  Yes Debroah Peabody. Felipe Altamirano MD   liraglutide (SAXENDA) 3 mg/0.5 mL (18 mg/3 mL) pen 0.6 mg daily x1wk then 1.2 mg daily x1wk then 1.8 mg daily x1wk then 2.4 mg daily x1wk then 3.0 mg daily 1/24/18  Yes Debroah Peabody. Felipe Altamirano MD   liraglutide (SAXENDA) 3 mg/0.5 mL (18 mg/3 mL) pen 0.5 mL by SubCUTAneous route daily. 1/24/18  Yes Mindy Altamirano MD   lidocaine (LIDODERM) 5 % Apply patch to the affected area for 12 hours a day and remove for 12 hours a day. 1/12/18  Yes [facility] Mingo Griffin MD   topiramate (TOPAMAX) 100 mg tablet Take 1 Tab by mouth two (2) times a day. 1/12/18  Yes Vanessa Drew DO   carvedilol (COREG) 6.25 mg tablet Take 1 Tab by mouth two (2) times daily (with meals). 1/3/18  Yes Farhad Sagastume MD   levothyroxine (SYNTHROID) 150 mcg tablet Take 1 Tab by mouth Daily (before breakfast). 10/30/17  Yes Herminia Mcdonald MD   morphine CR (MS CONTIN) 15 mg CR tablet Take 1 Tab by mouth every twelve (12) hours. Max Daily Amount: 30 mg. Patient taking differently: Take 15 mg by mouth daily. 9/12/17  Yes Omid Gerard MD   HYDROcodone-acetaminophen (NORCO) 5-325 mg per tablet Take 1 Tab by mouth every six (6) hours as needed for Pain. Max Daily Amount: 4 Tabs. 9/12/17  Yes Omid Gerard MD   albuterol-ipratropium (DUO-NEB) 2.5 mg-0.5 mg/3 ml nebu 3 mL by Nebulization route every six (6) hours as needed. 9/12/17  Yes Omid Gerard MD   losartan (COZAAR) 50 mg tablet Take 1 Tab by mouth nightly. Hold for SBP<115 9/15/17  Yes Omid Gerard MD   cyclobenzaprine (FLEXERIL) 5 mg tablet Take 5 mg by mouth two (2) times a day. Yes Historical Provider   EPINEPHrine (EPIPEN 2-LAURA) 0.3 mg/0.3 mL injection 0.3 mL by IntraMUSCular route once as needed for up to 1 dose. 8/7/17  Yes [facility] Mingo Griffin MD   ergocalciferol (ERGOCALCIFEROL) 50,000 unit capsule Take 1 Cap by mouth daily. High needs due to gastric bypass history 7/31/17  Yes Herminia Mcdonald MD   cyanocobalamin (VITAMIN B12) 1,000 mcg/mL injection 1 mL by SubCUTAneous route every fourteen (14) days. For b12 deficiency 7/18/17  Yes Linus Hodge. Mingo Griffin MD   albuterol (PROVENTIL HFA, VENTOLIN HFA, PROAIR HFA) 90 mcg/actuation inhaler Take 2 Puffs by inhalation every four (4) hours as needed for Wheezing or Shortness of Breath. 10/11/16  Yes [facility] Mingo Griffin MD   DULoxetine (CYMBALTA) 60 mg capsule Take 1 Cap by mouth every evening. 10/11/16  Yes [facility]  Mingo Griffin MD   pantoprazole (PROTONIX) 40 mg tablet Take 1 Tab by mouth daily. 10/11/16  Yes Mason Elkins MD   linaclotide Karthik Sin) 145 mcg cap capsule Take 1 Cap by mouth Daily (before breakfast). For constipation 10/11/16  Yes Geraldine Monroe. Jossue Elkins MD   ferrous sulfate 325 mg (65 mg iron) tablet Take 325 mg by mouth Daily (before breakfast). Yes Historical Provider   aspirin delayed-release 81 mg tablet Take 81 mg by mouth daily.    Yes Historical Provider     Patient Active Problem List   Diagnosis Code    Coronary artery disease involving native coronary artery of native heart without angina pectoris I25.10    Bronchitis J40    High cholesterol E78.00    History of cardiomyopathy Z86.79    SOB (shortness of breath) R06.02    Neck pain, bilateral M54.2    Shoulder pain, bilateral M25.511, M25.512    Muscle spasticity M62.838    Hemiparesis and alteration of sensations as late effects of stroke ([facility]) I69.359, I69.398    Head pain, chronic R51, G89.29    Expressive aphasia R47.01    Heart palpitations R00.2    Ventricular ectopic activity I49.3    Stroke ([facility] [address] I63.9    Thyroid disease E07.9    Hypercholesterolemia E78.00    EDDIE on CPAP G47.33, Z99.89    Essential hypertension I10    Congestive heart failure ([facility]) I50.9    Chest pain R07.9    Melena K92.1    Hiatal hernia K44.9    Postoperative hypothyroidism E89.0    Stroke (cerebrum) ([facility]) I63.9    Chronic pain G89.29    Prediabetes R73.03    Obesity  E66.09    Constipation K59.00    Insomnia G47.00    Left-sided weakness R53.1    Vitamin D deficiency E55.9    Obesity, morbid ([facility]) E66.01    Chronic indwelling Dunn catheter Z92.89

## 2018-02-02 ENCOUNTER — OFFICE VISIT (OUTPATIENT)
Dept: FAMILY MEDICINE CLINIC | Age: 58
End: 2018-02-02

## 2018-02-02 VITALS
SYSTOLIC BLOOD PRESSURE: 123 MMHG | DIASTOLIC BLOOD PRESSURE: 75 MMHG | WEIGHT: 278 LBS | TEMPERATURE: 96.8 F | HEART RATE: 88 BPM | HEIGHT: 66 IN | OXYGEN SATURATION: 100 % | BODY MASS INDEX: 44.68 KG/M2 | RESPIRATION RATE: 18 BRPM

## 2018-02-02 DIAGNOSIS — Z97.8 CHRONIC INDWELLING FOLEY CATHETER: ICD-10-CM

## 2018-02-02 DIAGNOSIS — Z02.71 DISABILITY EXAMINATION: Primary | ICD-10-CM

## 2018-02-02 DIAGNOSIS — I25.10 CORONARY ARTERY DISEASE INVOLVING NATIVE CORONARY ARTERY OF NATIVE HEART WITHOUT ANGINA PECTORIS: ICD-10-CM

## 2018-02-02 DIAGNOSIS — I69.398 HEMIPARESIS AND ALTERATION OF SENSATIONS AS LATE EFFECTS OF STROKE (HCC): ICD-10-CM

## 2018-02-02 DIAGNOSIS — I63.9 CEREBROVASCULAR ACCIDENT (CVA), UNSPECIFIED MECHANISM (HCC): ICD-10-CM

## 2018-02-02 DIAGNOSIS — I69.359 HEMIPARESIS AND ALTERATION OF SENSATIONS AS LATE EFFECTS OF STROKE (HCC): ICD-10-CM

## 2018-02-02 DIAGNOSIS — K21.9 GASTROESOPHAGEAL REFLUX DISEASE, ESOPHAGITIS PRESENCE NOT SPECIFIED: ICD-10-CM

## 2018-02-02 NOTE — PROGRESS NOTES
Chief Complaint   Patient presents with    Form Completion     F/U for disability forms     Vitamin D Deficiency     stated that she wants a refill of vitamin D2.      1. Have you been to the ER, urgent care clinic since your last visit? Hospitalized since your last visit? No     2. Have you seen or consulted any other health care providers outside of the [facility] since your last visit? Include any pap smears or colon screening. No     The patient was counseled on the dangers of tobacco use, and was advised never to start again. Reviewed strategies to maximize success, including never to start again. Nettie Daryl  Identified pt with two pt identifiers(name and ). Chief Complaint   Patient presents with    Form Completion     F/U for disability forms     Vitamin D Deficiency     stated that she wants a refill of vitamin D2.        1. Have you been to the ER, urgent care clinic since your last visit? Hospitalized since your last visit? NO    2. Have you seen or consulted any other health care providers outside of the [facility] since your last visit? Include any pap smears or colon screening. NO    Today's provider has been notified of reason for visit, vitals and flowsheets obtained on patients. Patient received paperwork for advance directive during previous visit but has not completed at this time     Reviewed record In preparation for visit, huddled with provider and have obtained necessary documentation      There are no preventive care reminders to display for this patient.     Wt Readings from Last 3 Encounters:   18 288 lb (130.6 kg)   18 291 lb (132 kg)   17 300 lb (136.1 kg)     Temp Readings from Last 3 Encounters:   18 96.2 °F (35.7 °C) (Oral)   10/20/17 97.9 °F (36.6 °C) (Oral)   17 97.8 °F (36.6 °C)     BP Readings from Last 3 Encounters:   18 (!) 146/98   18 130/88   17 110/60     Pulse Readings from Last 3 Encounters:   01/24/18 76   01/12/18 88   11/01/17 80     There were no vitals filed for this visit. Learning Assessment:  :     Learning Assessment 10/13/2017 3/24/2015   PRIMARY LEARNER Patient Patient   HIGHEST LEVEL OF EDUCATION - PRIMARY LEARNER  - SOME COLLEGE   BARRIERS PRIMARY LEARNER - NONE   CO-LEARNER CAREGIVER - No   CO-LEARNER NAME - n/a   PRIMARY LANGUAGE ENGLISH ENGLISH   LEARNER PREFERENCE PRIMARY LISTENING LISTENING   ANSWERED BY patient patient   RELATIONSHIP SELF SELF       Depression Screening:  :     PHQ over the last two weeks 2/2/2018   Little interest or pleasure in doing things Not at all   Feeling down, depressed or hopeless Not at all   Total Score PHQ 2 0       Fall Risk Assessment:  :     Fall Risk Assessment, last 12 mths 6/13/2017   Able to walk? Yes   Fall in past 12 months? Yes   Fall with injury? No   Number of falls in past 12 months 1   Fall Risk Score 1       Abuse Screening:  :     No flowsheet data found. ADL Screening:  :     ADL Assessment 6/13/2017   Feeding yourself No Help Needed   Getting from bed to chair Help Needed   Getting dressed Help Needed   Bathing or showering Help Needed   Walk across the room (includes cane/walker) Help Needed   Using the telphone No Help Needed   Taking your medications No Help Needed   Preparing meals Help Needed   Managing money (expenses/bills) No Help Needed   Moderately strenuous housework (laundry) Help Needed   Shopping for personal items (toiletries/medicines) No Help Needed   Shopping for groceries No Help Needed   Driving No Help Needed   Climbing a flight of stairs Help Needed   Getting to places beyond walking distances No Help Needed       ACP is on file. Medication reconciliation up to date and corrected with patient at this time.

## 2018-02-02 NOTE — PROGRESS NOTES
[address] [place] [address] Visit   02/02/2018  Patient ID: Alyssa Kaiser is a 59 y.o. female. Assessment/Plan:    Diagnoses and all orders for this visit:    1. Disability examination    2. Gastroesophageal reflux disease, esophagitis presence not specified  -     pantoprazole (PROTONIX) 40 mg tablet; Take 1 Tab by mouth daily. 3. Hemiparesis and alteration of sensations as late effects of stroke ([facility] [address]    4. Cerebrovascular accident (CVA), unspecified mechanism ([address]    5. Coronary artery disease involving native coronary artery of native heart without angina pectoris    6. Chronic indwelling Dunn catheter    paperwork completed during visit. See media for more. Counselled pt on Patient health concerns and plans. Patient was offered a choice/choices in the treatment plan today. Reviewed return precautions as appropriate. Patient expresses understanding of the plan and agrees with recommendations. See patient instructions for more. More than 50% of this >25 minute encounter was spent in counseling and coordination of care today. Patient Instructions     . TODAY, please go to:   CHECK OUT        Please schedule the following appointments at [address] [place] [address] [place]:  · Pharmacy [facility]) for [address] [place] [address] Appointments  Date Time Provider Denilson Thompson   2/22/2018 8:40 AM MD LORNE Lynne ALICE SCHED   2/26/2018 9:10 AM Vincent Kim MD RDE [id number] [facility]   4/12/2018 9:40 AM DO LARY Fermin/ [address]   11/7/2018 9:20 AM Sonia Acosta MD [facility] ALICE SCHED         Liraglutide (By injection)   Liraglutide (wtk-g-[id number]-tide)  Treats type 2 diabetes and helps with weight loss in certain patients. Also reduces the risk of heart attacks and strokes in patients with type 2 diabetes and heart or blood vessel disease. Brand Name(s): Saxenda, Victochristiano   There may be other brand names for this medicine. When This Medicine Should Not Be Used:    This medicine is not right for everyone. Do not use it if you had an allergic reaction to liraglutide, or if you have multiple endocrine neoplasia syndrome type 2 (MEN 2) or if you or anyone in your family had medullary thyroid cancer. Tell your doctor if you are pregnant or have become pregnant while you are using this medicine. How to Use This Medicine:   Injectable  · Your doctor will prescribe your exact dose and tell you how often it should be given. This medicine is given as a shot under the skin of your stomach, thighs, or upper arms. · If you use insulin in addition to this medicine, do not mix them into the same syringe. You may give the shots in the same area (including your stomach), but do not give the shots right next to each other. · You may be taught how to give your medicine at home. Make sure you understand all instructions before giving yourself an injection. Do not use more medicine or use it more often than your doctor tells you to. · Check the liquid in the pen. It should be clear and colorless. Do not use it if it is cloudy, discolored, or has particles in it. · You will be shown the body areas where this shot can be given. Use a different body area each time you give yourself a shot. Keep track of where you give each shot to make sure you rotate body areas. · Use a new needle and syringe each time you inject your medicine. · Never share medicine pens with others under any circumstances. Sharing needles or pens can result in transmission of infection. · Drink extra fluids so you will urinate more often and help prevent kidney problems. · This medicine should come with a Medication Guide. Ask your pharmacist for a copy if you do not have one. · Missed dose: If you miss a dose of this medicine, use it as soon as you remember. Then take your next dose at your usual time. Never take extra medicine to make up for a missed dose.  If you miss a dose for 3 days or more, call your doctor to talk about how to restart your treatment. · Store your new, unused medicine pen in its original carton in the refrigerator. Protect it from light. Do not freeze this medicine or use it if it has been frozen. You may store the opened medicine pen in the refrigerator or at room temperature for 30 days. Throw away your used pen after 30 days, even if it still has medicine in it. Always remove the needle from the pen before you store it. · Throw away used needles in a hard, closed container that the needles cannot poke through. Keep this container away from children and pets. Drugs and Foods to Avoid:      Ask your doctor or pharmacist before using any other medicine, including over-the-counter medicines, vitamins, and herbal products. Warnings While Using This Medicine:   · Tell your doctor if you are breastfeeding, or if you have kidney disease, liver disease, digestion problems (including gastroparesis), gallbladder disease, or a history of pancreas problems, depression, or angioedema (swelling of the arms, face, hands, mouth, or throat). · Do not use Saxenda® if you are also using Victoza®. They contain the same medicine. · This medicine may cause the following problems:   ¨ Increased risk for thyroid tumors  ¨ Pancreatitis  ¨ Low blood sugar  ¨ Kidney problems  ¨ Gallbladder problems, including gallstones  ¨ Thoughts of hurting yourself Yovanny Sandoval)  · Your doctor will do lab tests at regular visits to check on the effects of this medicine. Keep all appointments. · Keep all medicine out of the reach of children. Never share your medicine with anyone.   Possible Side Effects While Using This Medicine:   Call your doctor right away if you notice any of these side effects:  · Allergic reaction: Itching or hives, swelling in your face or hands, swelling or tingling in your mouth or throat, chest tightness, trouble breathing  · Change in how much or how often you urinate, painful or burning urination  · Feeling sad or depressed, thoughts of suicide, unusual changes in mood or behavior  · Shaking, trembling, sweating, fast or pounding heartbeat, fainting, hunger, confusion  · Sudden and severe stomach pain, nausea, vomiting, fever, lightheadedness  · Trouble breathing or swallowing, a lump in your neck, hoarseness when speaking  · Yellow skin or eyes  If you notice these less serious side effects, talk with your doctor:   · Decreased appetite  · Diarrhea, constipation, stomach upset  · Dizziness  · Headache  · Redness, itching, swelling, or any changes in your skin where the shot was given  If you notice other side effects that you think are caused by this medicine, tell your doctor. Call your doctor for medical advice about side effects. You may report side effects to FDA at 4-784-BLP-3072  © 2017 [facility] Information is for End User's use only and may not be sold, redistributed or otherwise used for commercial purposes. The above information is an  only. It is not intended as medical advice for individual conditions or treatments. Talk to your doctor, nurse or pharmacist before following any medical regimen to see if it is safe and effective for you. Subjective:   HPI:  Eliel Fleming is a 59 y.o. female being seen for:   Chief Complaint   Patient presents with    Form Completion     F/U for disability forms     Vitamin D Deficiency     stated that she wants a refill of vitamin D2.      ·  here to complete disability paperwork from last job   · On disability d/t stroke  · See media for more     Review of Systems  Otherwise as noted in HPI  ?   Objective:     Visit Vitals    /75 (BP 1 Location: Right arm, BP Patient Position: Sitting)    Pulse 88    Temp 96.8 °F (36 °C) (Oral)    Resp 18    Ht 5' 6\" (1.676 m)    Wt 278 lb (126.1 kg)    SpO2 100%    BMI 44.87 kg/m2     Wt Readings from Last 3 Encounters:   02/02/18 278 lb (126.1 kg)   01/24/18 288 lb (130.6 kg)   01/12/18 291 lb (132 kg)     BP Readings from Last 3 Encounters:   02/02/18 123/75   01/24/18 (!) 146/98   01/12/18 130/88     PHQ over the last two weeks 2/2/2018   Little interest or pleasure in doing things Not at all   Feeling down, depressed or hopeless Not at all   Total Score PHQ 2 0     Physical Exam   Constitutional: She appears well-developed and well-nourished. No distress. Pulmonary/Chest: Effort normal. No respiratory distress. Neurological: She is alert. Psychiatric: She has a normal mood and affect. Her behavior is normal.       Allergies   Allergen Reactions    Bees [Hymenoptera Allergenic Extract] Shortness of Breath and Swelling    Strawberry Shortness of Breath and Swelling    Lisinopril Cough     Prior to Admission medications    Medication Sig Start Date End Date Taking? Authorizing Provider   pantoprazole (PROTONIX) 40 mg tablet Take 1 Tab by mouth daily. 2/16/18  Yes Sherrin Prader Dorene Scotland, MD   metFORMIN (GLUCOPHAGE) 1,000 mg tablet Take 1 Tab by mouth two (2) times daily (with meals). 1/24/18  Yes Sherrin Prader Dorene Scotland, MD   furosemide (LASIX) 40 mg tablet Take 1 Tab by mouth daily. 1/24/18  Yes Sherrin Prader Dorene Scotland, MD   suvorexant [facility]) 20 mg tablet Take 1 Tab by mouth nightly as needed for Insomnia. Max Daily Amount: 20 mg. 1/24/18  Yes Chikis Kelly MD   glucose blood VI test strips (BLOOD GLUCOSE TEST) strip For use with glucometer to check blood sugar once a day in morning before eating. Please dispense VERIO FLEX STRIPS 1/24/18  Yes Chikis Kelly MD   lidocaine (LIDODERM) 5 % Apply patch to the affected area for 12 hours a day and remove for 12 hours a day. 1/12/18  Yes Sherrin Prader Dorene Scotland, MD   topiramate (TOPAMAX) 100 mg tablet Take 1 Tab by mouth two (2) times a day. 1/12/18  Yes Vanessa Drew DO   carvedilol (COREG) 6.25 mg tablet Take 1 Tab by mouth two (2) times daily (with meals).  1/3/18  Yes Lucrecia Sarmiento MD   levothyroxine (SYNTHROID) 150 mcg tablet Take 1 Tab by mouth Daily (before breakfast). 10/30/17  Yes Ayaan Green MD   morphine CR (MS CONTIN) 15 mg CR tablet Take 1 Tab by mouth every twelve (12) hours. Max Daily Amount: 30 mg. Patient taking differently: Take 15 mg by mouth daily. 9/12/17  Yes Clement Alvarez MD   HYDROcodone-acetaminophen (NORCO) 5-325 mg per tablet Take 1 Tab by mouth every six (6) hours as needed for Pain. Max Daily Amount: 4 Tabs. 9/12/17  Yes Clement Alvarez MD   losartan (COZAAR) 50 mg tablet Take 1 Tab by mouth nightly. Hold for SBP<115 9/15/17  Yes Clement Alvarez MD   cyclobenzaprine (FLEXERIL) 5 mg tablet Take 5 mg by mouth two (2) times a day. Yes Historical Provider   EPINEPHrine (EPIPEN 2-LAURA) 0.3 mg/0.3 mL injection 0.3 mL by IntraMUSCular route once as needed for up to 1 dose. 8/7/17  Yes Manuela Self MD   ergocalciferol (ERGOCALCIFEROL) 50,000 unit capsule Take 1 Cap by mouth daily. High needs due to gastric bypass history 7/31/17  Yes Ayaan Green MD   cyanocobalamin (VITAMIN B12) 1,000 mcg/mL injection 1 mL by SubCUTAneous route every fourteen (14) days. For b12 deficiency 7/18/17  Yes Phil Hernandez. Kristin Self MD   albuterol (PROVENTIL HFA, VENTOLIN HFA, PROAIR HFA) 90 mcg/actuation inhaler Take 2 Puffs by inhalation every four (4) hours as needed for Wheezing or Shortness of Breath. 10/11/16  Yes Manuela Self MD   DULoxetine (CYMBALTA) 60 mg capsule Take 1 Cap by mouth every evening. 10/11/16  Yes Manuela Self MD   linaclotide Sun Blue) 145 mcg cap capsule Take 1 Cap by mouth Daily (before breakfast). For constipation 10/11/16  Yes Phil Self MD   ferrous sulfate 325 mg (65 mg iron) tablet Take 325 mg by mouth Daily (before breakfast). Yes Historical Provider   aspirin delayed-release 81 mg tablet Take 81 mg by mouth daily.    Yes Historical Provider   liraglutide (SAXENDA) 3 mg/0.5 mL (18 mg/3 mL) pen 0.6 mg daily x1wk then 1.2 mg daily x1wk then 1.8 mg daily x1wk then 2.4 mg daily x1wk then 3.0 mg daily 1/24/18 Kendrick Wray MD   liraglutide (SAXENDA) 3 mg/0.5 mL (18 mg/3 mL) pen 0.5 mL by SubCUTAneous route daily. 1/24/18   Jennifer Wray MD   albuterol-ipratropium (DUO-NEB) 2.5 mg-0.5 mg/3 ml nebu 3 mL by Nebulization route every six (6) hours as needed.  9/12/17   Roberta Wu MD     Patient Active Problem List   Diagnosis Code    Coronary artery disease involving native coronary artery of native heart without angina pectoris I25.10    Bronchitis J40    High cholesterol E78.00    History of cardiomyopathy Z86.79    SOB (shortness of breath) R06.02    Neck pain, bilateral M54.2    Shoulder pain, bilateral M25.511, M25.512    Muscle spasticity M62.838    Hemiparesis and alteration of sensations as late effects of stroke ([facility]) I69.359, I69.398    Head pain, chronic R51, G89.29    Expressive aphasia R47.01    Heart palpitations R00.2    Ventricular ectopic activity I49.3    Stroke ([facility] [address] I63.9    Thyroid disease E07.9    Hypercholesterolemia E78.00    EDDIE on CPAP G47.33, Z99.89    Essential hypertension I10    Congestive heart failure ([facility]) I50.9    Chest pain R07.9    Melena K92.1    Hiatal hernia K44.9    Postoperative hypothyroidism E89.0    Stroke (cerebrum) ([facility]) I63.9    Chronic pain G89.29    Prediabetes R73.03    Obesity  E66.09    Constipation K59.00    Insomnia G47.00    Left-sided weakness R53.1    Vitamin D deficiency E55.9    Obesity, morbid ([facility]) E66.01    Chronic indwelling Dunn catheter Z92.89

## 2018-02-02 NOTE — MR AVS SNAPSHOT
[address] [facility] [address] 
 
 
 [address] 
[address] [place] [address] 
[phone] Patient: Nurys Goldsmith MRN: [id number] Visit Information Date & Time Provider Department Dept. Phone Encounter #  
 2/2/2018  8:00 AM Mindy Altamirano MD [facility] 74 [phone] [id number] Your Appointments 2/22/2018  8:40 AM  
ROUTINE CARE with Debroah Peabody. Nick Bah 28 ([address]) Appt Note: 5-6 wks follow up  
 [address] 
[address] [facility] [address]  
[phone]  
  
   
 [address]  
  
    
 2/26/2018  9:10 AM  
ROUTINE CARE with Lesia Aguilar MD  
[facility] [facility]) Appt Note: f/u diabetes cp0.00  
 [address] [place] [address]  
[address] [facility] [address]  
  
    
 4/12/2018  9:40 AM  
Follow Up with [facility], DO [facility] at [address] [place] [address]) Appt Note: f/u Headaches / [place] [address] [facility] [address]  
[place] [address] [facility] [address] [address]  
  
    
 11/7/2018  9:20 AM  
ESTABLISHED PATIENT with Shawn Jimenez MD  
[facility] Appt Note: 1 yr f/u  
 [address] [facility] [address] [facility] [address] Upcoming Health Maintenance Date Due  
 MEDICARE YEARLY EXAM 6/14/2018 PAP AKA CERVICAL CYTOLOGY 11/13/2018 BREAST CANCER SCRN MAMMOGRAM 8/4/2019 COLONOSCOPY 6/22/2021 DTaP/Tdap/Td series (2 - Td) 6/13/2027 Allergies as of 2/2/2018  Review Complete On: 2/2/2018 By: Alexey Thompson LPN Severity Noted Reaction Type Reactions Bees [Hymenoptera Allergenic Extract] High 10/14/2014   Systemic Shortness of Breath, Swelling [place] High 10/14/2014   Systemic Shortness of Breath, Swelling Lisinopril  10/17/2014    Cough Current Immunizations  Reviewed on 10/11/2016 Name Date Influenza Vaccine Gavino Massey) 11/13/2015 Influenza Vaccine (Quad) PF 10/20/2017, 10/11/2016 Pneumococcal Polysaccharide (PPSV-23) 8/29/2016 Tdap 6/13/2017 Not reviewed this visit You Were Diagnosed With   
  
 Codes Comments Gastroesophageal reflux disease, esophagitis presence not specified     ICD-10-CM: K21.9 ICD-9-CM: 530.81 Vitals BP Pulse Temp Resp Height(growth percentile) Weight(growth percentile) 123/75 (BP 1 Location: Right arm, BP Patient Position: Sitting) 88 96.8 °F (36 °C) (Oral) 18 5' 6\" (1.676 m) 278 lb (126.1 kg) SpO2 BMI OB Status Smoking Status 100% 44.87 kg/m2 Postmenopausal Former Smoker BMI and BSA Data Body Mass Index Body Surface Area 44.87 kg/m 2 2.42 m 2 Preferred Pharmacy Pharmacy Name Phone [address] [facility] [phone] Your Updated Medication List  
  
   
This list is accurate as of: 2/2/18  9:19 AM.  Always use your most recent med list.  
  
  
  
  
 albuterol 90 mcg/actuation inhaler Commonly known as:  PROVENTIL HFA, VENTOLIN HFA, PROAIR HFA Take 2 Puffs by inhalation every four (4) hours as needed for Wheezing or Shortness of Breath. albuterol-ipratropium 2.5 mg-0.5 mg/3 ml Nebu Commonly known as:  DUO-NEB  
3 mL by Nebulization route every six (6) hours as needed. aspirin delayed-release 81 mg tablet Take 81 mg by mouth daily. carvedilol 6.25 mg tablet Commonly known as:  Martha Locus Take 1 Tab by mouth two (2) times daily (with meals). cyanocobalamin 1,000 mcg/mL injection Commonly known as:  VITAMIN B12  
 1 mL by SubCUTAneous route every fourteen (14) days. For b12 deficiency  
  
 cyclobenzaprine 5 mg tablet Commonly known as:  FLEXERIL Take 5 mg by mouth two (2) times a day. DULoxetine 60 mg capsule Commonly known as:  CYMBALTA Take 1 Cap by mouth every evening. EPINEPHrine 0.3 mg/0.3 mL injection Commonly known as:  EPIPEN 2-LAURA  
0.3 mL by IntraMUSCular route once as needed for up to 1 dose. ergocalciferol 50,000 unit capsule Commonly known as:  ERGOCALCIFEROL Take 1 Cap by mouth daily. High needs due to gastric bypass history  
  
 ferrous sulfate 325 mg (65 mg iron) tablet Take 325 mg by mouth Daily (before breakfast). furosemide 40 mg tablet Commonly known as:  LASIX Take 1 Tab by mouth daily. glucose blood VI test strips strip Commonly known as:  blood glucose test  
For use with glucometer to check blood sugar once a day in morning before eating. Please dispense VERIO FLEX STRIPS HYDROcodone-acetaminophen 5-325 mg per tablet Commonly known as:  Afua Helms Take 1 Tab by mouth every six (6) hours as needed for Pain. Max Daily Amount: 4 Tabs. levothyroxine 150 mcg tablet Commonly known as:  SYNTHROID Take 1 Tab by mouth Daily (before breakfast). lidocaine 5 % Commonly known as:  Conchita Pereira Apply patch to the affected area for 12 hours a day and remove for 12 hours a day. linaclotide 145 mcg Cap capsule Commonly known as:  Margaretann Sacks Take 1 Cap by mouth Daily (before breakfast). For constipation * liraglutide 3 mg/0.5 mL (18 mg/3 mL) pen Commonly known as:  SAXENDA  
0.6 mg daily x1wk then 1.2 mg daily x1wk then 1.8 mg daily x1wk then 2.4 mg daily x1wk then 3.0 mg daily * liraglutide 3 mg/0.5 mL (18 mg/3 mL) pen Commonly known as:  SAXENDA  
0.5 mL by SubCUTAneous route daily. losartan 50 mg tablet Commonly known as:  COZAAR Take 1 Tab by mouth nightly. Hold for SBP<115  
  
 metFORMIN 1,000 mg tablet Commonly known as:  GLUCOPHAGE Take 1 Tab by mouth two (2) times daily (with meals). morphine CR 15 mg CR tablet Commonly known as:  MS CONTIN Take 1 Tab by mouth every twelve (12) hours. Max Daily Amount: 30 mg.  
  
 pantoprazole 40 mg tablet Commonly known as:  PROTONIX Take 1 Tab by mouth daily. suvorexant 20 mg tablet Commonly known as:  Randell Charm Take 1 Tab by mouth nightly as needed for Insomnia. Max Daily Amount: 20 mg.  
  
 topiramate 100 mg tablet Commonly known as:  TOPAMAX Take 1 Tab by mouth two (2) times a day. * Notice: This list has 2 medication(s) that are the same as other medications prescribed for you. Read the directions carefully, and ask your doctor or other care provider to review them with you. Patient Instructions Dee Deedavide Ferrara TODAY, please go to: CHECK OUT Please schedule the following appointments at [address] [place] [address]: 
· Pharmacy Sergio Delay) for [facility] teaching Future Appointments Date Time Provider Denilson Thompson 2/22/2018 8:40 AM Delmas Boeck. Hannah Jones MD [facility] ALICE SCHED  
2/26/2018 9:10 AM Anand Damian MD RDE [address] [facility]  
4/12/2018 9:40 AM Brain Tami,  [address]  
11/7/2018 9:20 AM Evin Rodrigues  [address] Liraglutide (By injection) Liraglutide (qpp-t-KJKA-tide) Treats type 2 diabetes and helps with weight loss in certain patients. Also reduces the risk of heart attacks and strokes in patients with type 2 diabetes and heart or blood vessel disease. Brand Name(s): Oral Arango There may be other brand names for this medicine. When This Medicine Should Not Be Used: This medicine is not right for everyone. Do not use it if you had an allergic reaction to liraglutide, or if you have multiple endocrine neoplasia syndrome type 2 (MEN 2) or if you or anyone in your family had medullary thyroid cancer.  Tell your doctor if you are pregnant or have become pregnant while you are using this medicine. How to Use This Medicine:  
Injectable · Your doctor will prescribe your exact dose and tell you how often it should be given. This medicine is given as a shot under the skin of your stomach, thighs, or upper arms. · If you use insulin in addition to this medicine, do not mix them into the same syringe. You may give the shots in the same area (including your stomach), but do not give the shots right next to each other. · You may be taught how to give your medicine at home. Make sure you understand all instructions before giving yourself an injection. Do not use more medicine or use it more often than your doctor tells you to. · Check the liquid in the pen. It should be clear and colorless. Do not use it if it is cloudy, discolored, or has particles in it. · You will be shown the body areas where this shot can be given. Use a different body area each time you give yourself a shot. Keep track of where you give each shot to make sure you rotate body areas. · Use a new needle and syringe each time you inject your medicine. · Never share medicine pens with others under any circumstances. Sharing needles or pens can result in transmission of infection. · Drink extra fluids so you will urinate more often and help prevent kidney problems. · This medicine should come with a Medication Guide. Ask your pharmacist for a copy if you do not have one. · Missed dose: If you miss a dose of this medicine, use it as soon as you remember. Then take your next dose at your usual time. Never take extra medicine to make up for a missed dose. If you miss a dose for 3 days or more, call your doctor to talk about how to restart your treatment. · Store your new, unused medicine pen in its original carton in the refrigerator. Protect it from light. Do not freeze this medicine or use it if it has been frozen.  You may store the opened medicine pen in the refrigerator or at room temperature for 30 days. Throw away your used pen after 30 days, even if it still has medicine in it. Always remove the needle from the pen before you store it. · Throw away used needles in a hard, closed container that the needles cannot poke through. Keep this container away from children and pets. Drugs and Foods to Avoid: Ask your doctor or pharmacist before using any other medicine, including over-the-counter medicines, vitamins, and herbal products. Warnings While Using This Medicine: · Tell your doctor if you are breastfeeding, or if you have kidney disease, liver disease, digestion problems (including gastroparesis), gallbladder disease, or a history of pancreas problems, depression, or angioedema (swelling of the arms, face, hands, mouth, or throat). · Do not use Saxenda® if you are also using Victoza®. They contain the same medicine. · This medicine may cause the following problems:  
¨ Increased risk for thyroid tumors ¨ Pancreatitis ¨ Low blood sugar ¨ Kidney problems ¨ Gallbladder problems, including gallstones ¨ Thoughts of hurting yourself Amber Vernon) · Your doctor will do lab tests at regular visits to check on the effects of this medicine. Keep all appointments. · Keep all medicine out of the reach of children. Never share your medicine with anyone. Possible Side Effects While Using This Medicine:  
Call your doctor right away if you notice any of these side effects: · Allergic reaction: Itching or hives, swelling in your face or hands, swelling or tingling in your mouth or throat, chest tightness, trouble breathing · Change in how much or how often you urinate, painful or burning urination · Feeling sad or depressed, thoughts of suicide, unusual changes in mood or behavior · Shaking, trembling, sweating, fast or pounding heartbeat, fainting, hunger, confusion · Sudden and severe stomach pain, nausea, vomiting, fever, lightheadedness · Trouble breathing or swallowing, a lump in your neck, hoarseness when speaking · Yellow skin or eyes If you notice these less serious side effects, talk with your doctor: · Decreased appetite · Diarrhea, constipation, stomach upset · Dizziness · Headache · Redness, itching, swelling, or any changes in your skin where the shot was given If you notice other side effects that you think are caused by this medicine, tell your doctor. Call your doctor for medical advice about side effects. You may report side effects to FDA at 1-301-XWF-1893 © 2017 [address] Information is for End User's use only and may not be sold, redistributed or otherwise used for commercial purposes. The above information is an  only. It is not intended as medical advice for individual conditions or treatments. Talk to your doctor, nurse or pharmacist before following any medical regimen to see if it is safe and effective for you. Introducing [facility]! Dear Liz Lester: Thank you for requesting a [employer] account. Our records indicate that you already have an active [employer] account. You can access your account anytime at [email or web address] Did you know that you can access your hospital and ER discharge instructions at any time in [employer]? You can also review all of your test results from your hospital stay or ER visit. Additional Information If you have questions, please visit the Frequently Asked Questions section of the [employer] website at [email or web address] Remember, [employer] is NOT to be used for urgent needs. For medical emergencies, dial 911. Now available from your iPhone and Android! Please provide this summary of care documentation to your next provider. Your primary care clinician is listed as Ruth Ann Clark. If you have any questions after today's visit, please call [phone].

## 2018-02-02 NOTE — PATIENT INSTRUCTIONS
Jairon Quiros TODAY, please go to:   CHECK OUT        Please schedule the following appointments at [address] [place] [address]:  · Pharmacy Vicky Moya) for [facility] [address] [facility] teaching    Future Appointments  Date Time Provider Denilson Densoni   2/22/2018 8:40 AM MD LORNE Urbano ALICE SCHED   2/26/2018 9:10 AM Abril Pete MD RDE [address] [facility]   4/12/2018 9:40 AM Abbi Acre Merlin Police, DO [address]   11/7/2018 9:20 AM MD YESENIA Ward ALICE SCHED         Liraglutide (By injection)   Liraglutide (qim-b-EHHF-tide)  Treats type 2 diabetes and helps with weight loss in certain patients. Also reduces the risk of heart attacks and strokes in patients with type 2 diabetes and heart or blood vessel disease. Brand Name(s): Saxenda, Victoza   There may be other brand names for this medicine. When This Medicine Should Not Be Used: This medicine is not right for everyone. Do not use it if you had an allergic reaction to liraglutide, or if you have multiple endocrine neoplasia syndrome type 2 (MEN 2) or if you or anyone in your family had medullary thyroid cancer. Tell your doctor if you are pregnant or have become pregnant while you are using this medicine. How to Use This Medicine:   Injectable  · Your doctor will prescribe your exact dose and tell you how often it should be given. This medicine is given as a shot under the skin of your stomach, thighs, or upper arms. · If you use insulin in addition to this medicine, do not mix them into the same syringe. You may give the shots in the same area (including your stomach), but do not give the shots right next to each other. · You may be taught how to give your medicine at home. Make sure you understand all instructions before giving yourself an injection. Do not use more medicine or use it more often than your doctor tells you to. · Check the liquid in the pen. It should be clear and colorless. Do not use it if it is cloudy, discolored, or has particles in it.   · You will be shown the body areas where this shot can be given. Use a different body area each time you give yourself a shot. Keep track of where you give each shot to make sure you rotate body areas. · Use a new needle and syringe each time you inject your medicine. · Never share medicine pens with others under any circumstances. Sharing needles or pens can result in transmission of infection. · Drink extra fluids so you will urinate more often and help prevent kidney problems. · This medicine should come with a Medication Guide. Ask your pharmacist for a copy if you do not have one. · Missed dose: If you miss a dose of this medicine, use it as soon as you remember. Then take your next dose at your usual time. Never take extra medicine to make up for a missed dose. If you miss a dose for 3 days or more, call your doctor to talk about how to restart your treatment. · Store your new, unused medicine pen in its original carton in the refrigerator. Protect it from light. Do not freeze this medicine or use it if it has been frozen. You may store the opened medicine pen in the refrigerator or at room temperature for 30 days. Throw away your used pen after 30 days, even if it still has medicine in it. Always remove the needle from the pen before you store it. · Throw away used needles in a hard, closed container that the needles cannot poke through. Keep this container away from children and pets. Drugs and Foods to Avoid:      Ask your doctor or pharmacist before using any other medicine, including over-the-counter medicines, vitamins, and herbal products. Warnings While Using This Medicine:   · Tell your doctor if you are breastfeeding, or if you have kidney disease, liver disease, digestion problems (including gastroparesis), gallbladder disease, or a history of pancreas problems, depression, or angioedema (swelling of the arms, face, hands, mouth, or throat). · Do not use Saxenda® if you are also using Victoza®.  They contain the same medicine. · This medicine may cause the following problems:   ¨ Increased risk for thyroid tumors  ¨ Pancreatitis  ¨ Low blood sugar  ¨ Kidney problems  ¨ Gallbladder problems, including gallstones  ¨ Thoughts of hurting yourself Susana Quintana)  · Your doctor will do lab tests at regular visits to check on the effects of this medicine. Keep all appointments. · Keep all medicine out of the reach of children. Never share your medicine with anyone. Possible Side Effects While Using This Medicine:   Call your doctor right away if you notice any of these side effects:  · Allergic reaction: Itching or hives, swelling in your face or hands, swelling or tingling in your mouth or throat, chest tightness, trouble breathing  · Change in how much or how often you urinate, painful or burning urination  · Feeling sad or depressed, thoughts of suicide, unusual changes in mood or behavior  · Shaking, trembling, sweating, fast or pounding heartbeat, fainting, hunger, confusion  · Sudden and severe stomach pain, nausea, vomiting, fever, lightheadedness  · Trouble breathing or swallowing, a lump in your neck, hoarseness when speaking  · Yellow skin or eyes  If you notice these less serious side effects, talk with your doctor:   · Decreased appetite  · Diarrhea, constipation, stomach upset  · Dizziness  · Headache  · Redness, itching, swelling, or any changes in your skin where the shot was given  If you notice other side effects that you think are caused by this medicine, tell your doctor. Call your doctor for medical advice about side effects. You may report side effects to FDA at 9-678-KHB-9938  © 2017 [facility] Information is for End User's use only and may not be sold, redistributed or otherwise used for commercial purposes. The above information is an  only. It is not intended as medical advice for individual conditions or treatments.  Talk to your doctor, nurse or pharmacist before following any medical regimen to see if it is safe and effective for you.

## 2018-02-15 ENCOUNTER — TELEPHONE (OUTPATIENT)
Dept: FAMILY MEDICINE CLINIC | Age: 58
End: 2018-02-15

## 2018-02-15 NOTE — TELEPHONE ENCOUNTER
----- Message from Kit [address] Dr sent at 2/15/2018 10:16 AM EST -----  Regarding: Dr. Vera Elliott / Telephone  Contact: [phone]  Bhavin Jones, Nurse  wants to know if the request for office notes was received on 02/06/2017. Also, the form for the attending physician wasn't dated when Dr. Vera Elliott sent it back. Please put the date on the form and resend. Best phone # for Bhavin Jones is [phone].

## 2018-02-16 RX ORDER — PANTOPRAZOLE SODIUM 40 MG/1
40 TABLET, DELAYED RELEASE ORAL DAILY
Qty: 90 TAB | Refills: 3 | Status: SHIPPED | OUTPATIENT
Start: 2018-02-16 | End: 2018-03-14 | Stop reason: SDUPTHER

## 2018-02-26 ENCOUNTER — OFFICE VISIT (OUTPATIENT)
Dept: ENDOCRINOLOGY | Age: 58
End: 2018-02-26

## 2018-02-26 VITALS
BODY MASS INDEX: 44.68 KG/M2 | WEIGHT: 278 LBS | HEART RATE: 82 BPM | HEIGHT: 66 IN | SYSTOLIC BLOOD PRESSURE: 97 MMHG | DIASTOLIC BLOOD PRESSURE: 64 MMHG

## 2018-02-26 DIAGNOSIS — R79.89 LOW VITAMIN D LEVEL: Primary | ICD-10-CM

## 2018-02-26 DIAGNOSIS — Z98.84 S/P GASTRIC BYPASS: ICD-10-CM

## 2018-02-26 DIAGNOSIS — E89.0 POSTOPERATIVE HYPOTHYROIDISM: Chronic | ICD-10-CM

## 2018-02-26 DIAGNOSIS — R74.8 ALKALINE PHOSPHATASE ELEVATION: ICD-10-CM

## 2018-02-26 RX ORDER — ERGOCALCIFEROL 1.25 MG/1
50000 CAPSULE ORAL DAILY
Qty: 90 CAP | Refills: 3 | Status: SHIPPED | OUTPATIENT
Start: 2018-02-26 | End: 2018-04-24

## 2018-02-26 RX ORDER — LEVOTHYROXINE SODIUM 150 UG/1
150 TABLET ORAL
Qty: 90 TAB | Refills: 3 | Status: SHIPPED | OUTPATIENT
Start: 2018-02-26 | End: 2018-03-12 | Stop reason: SDUPTHER

## 2018-02-26 NOTE — PATIENT INSTRUCTIONS
Thyroid:  Continue levothyroxine to 150 mcg.    Reassess    Low Vitamin D, High Parathyroid hormone level, Alkaline phosphatase elevated:  Reassess vitamin D after daily vitamin D supplementation

## 2018-02-26 NOTE — PROGRESS NOTES
History of Present Illness: Rickie Stratton is a 59 y.o. female presents for follow-up of elevated alkaline phosphatase and vitamin D deficiency. She also has hypothyroidism and pre-diabetes. Of note, she is s/p gastric bypass in 1980s (pre-surgery wt was 465), and has also had CVA. Additionally, she is taking several medications for history of heart failure. This was diagnosed at the same time she had a heart attack and a stroke in 2014 . Had hospitalization 9/2017 for stroke-like sx. Multiple BP medications stopped due to hypotension. Renal function improved after medications stopped. She continues to lose weight. Weight is down almost 50 lbs since last summer. Taking ergocalciferol 50,000 units daily for vitamin D deficiency. No recent labs. Dose was increased from twice weekly with Vit D level of 11 to daily in summer 2017. Hypothyroidism: post-surgical. TSH was at goal in 7/2017 after dose increase - 150 mcg. TSH was low during hospitalization last summer - suspect non-thyroidal illness was cause. Hemoglobin A1c - normal last month with Dr Linda Walker. Main problem today is gas-related pain. She reports stopping metformin for 1 week in the past and noting no changes in her GI sx. She is drinking lactose -free milk      Past Medical History:   Diagnosis Date    Advanced care planning/counseling discussion 3/4/16    On File    Bronchitis 2/24/2014    Cervicalgia 8/18/15    Dr. Yuan Farris    Chest pain 5/25/15    [facility]ospital[facility] 5/25/15 (lab work negative)    Chronic indwelling Dunn catheter 1/24/2018    Initially placed Jan 2018. Mx by Dr. Nurys Oneill.      Congestive heart failure, unspecified     Last Echo 2/8/15: EF 55-60%    Constipation 6/13/2017    Enthesopathy, spinal ([facility] [address] 8/18/15    Dr. Celestina Vincent Essential hypertension     Foot drop 8/18/15    Dr. Celestina Vincent Heart attack 2/24/2013    Was supposed to See Cardiology for possible pacemaker in november 2014- After Cardiology consult locally, no need, EF greatly improved. Established with Dr. Charlotte Liang Hiatal hernia 6/2015    3 cm hiatal hernia     Hip pain 8/18/15    Dr. Laura Person    Hypercholesterolemia     Hyperglycemia 7/2015    A1c 5.9     Hyperlipidemia 6/30/2015    NMR lipoprofile- LDL P 997, LDL-c 71, HLD-C-39, TG-60, HLD-P (25.2), Small LDL-P -541, LDL size 20.6    Hypothyroid     Insomnia 6/13/2017    Lower extremity edema     Lumbar spondylosis 8/18/15    Dr. Sarah Toro 6/2015    EGD/Colonscopy 6/15- Gastritis, internal hemorrhoids and 3 polyps    Murmur     EDDIE on CPAP     Was referred to Pulmonology - Uses CPAP    Osteoarthritis of hip 8/18/15    Dr. Laura Person    Osteoarthritis, shoulder 8/18/15    Dr. Vashti Tapia Radiculopathy, cervical 8/18/15    Dr. Laura Person    Shoulder pain 8/18/15    Dr. Vashti Tapia Spinal stenosis of cervical region 8/18/15    Dr. Vashti Tapia Spinal stenosis, lumbar 8/18/15    Dr. Vashti Tapia Stroke [facility]) 2/25/2014    Established with Neurology, Ru Wetzel, NP-Just hospitalized at [facility] 2/7/15-2/10/15. CT negative, but Late effect CV accident with increased tone described on discharge summary, Carotid dopplers showed 50% stenosis bilaterally.  Vitamin D deficiency 7/2015     Current Outpatient Prescriptions   Medication Sig    pantoprazole (PROTONIX) 40 mg tablet Take 1 Tab by mouth daily.  metFORMIN (GLUCOPHAGE) 1,000 mg tablet Take 1 Tab by mouth two (2) times daily (with meals).  furosemide (LASIX) 40 mg tablet Take 1 Tab by mouth daily.  suvorexant (BELSOMRA) 20 mg tablet Take 1 Tab by mouth nightly as needed for Insomnia. Max Daily Amount: 20 mg.    glucose blood VI test strips (BLOOD GLUCOSE TEST) strip For use with glucometer to check blood sugar once a day in morning before eating. Please dispense VERIO FLEX STRIPS    lidocaine (LIDODERM) 5 % Apply patch to the affected area for 12 hours a day and remove for 12 hours a day.     topiramate (TOPAMAX) 100 mg tablet Take 1 Tab by mouth two (2) times a day.  carvedilol (COREG) 6.25 mg tablet Take 1 Tab by mouth two (2) times daily (with meals).  levothyroxine (SYNTHROID) 150 mcg tablet Take 1 Tab by mouth Daily (before breakfast).  morphine CR (MS CONTIN) 15 mg CR tablet Take 1 Tab by mouth every twelve (12) hours. Max Daily Amount: 30 mg. (Patient taking differently: Take 15 mg by mouth daily.)    HYDROcodone-acetaminophen (NORCO) 5-325 mg per tablet Take 1 Tab by mouth every six (6) hours as needed for Pain. Max Daily Amount: 4 Tabs.  albuterol-ipratropium (DUO-NEB) 2.5 mg-0.5 mg/3 ml nebu 3 mL by Nebulization route every six (6) hours as needed.  losartan (COZAAR) 50 mg tablet Take 1 Tab by mouth nightly. Hold for SBP<115    cyclobenzaprine (FLEXERIL) 5 mg tablet Take 5 mg by mouth two (2) times a day.  EPINEPHrine (EPIPEN 2-LAURA) 0.3 mg/0.3 mL injection 0.3 mL by IntraMUSCular route once as needed for up to 1 dose.  ergocalciferol (ERGOCALCIFEROL) 50,000 unit capsule Take 1 Cap by mouth daily. High needs due to gastric bypass history    cyanocobalamin (VITAMIN B12) 1,000 mcg/mL injection 1 mL by SubCUTAneous route every fourteen (14) days. For b12 deficiency    albuterol (PROVENTIL HFA, VENTOLIN HFA, PROAIR HFA) 90 mcg/actuation inhaler Take 2 Puffs by inhalation every four (4) hours as needed for Wheezing or Shortness of Breath.  DULoxetine (CYMBALTA) 60 mg capsule Take 1 Cap by mouth every evening.  linaclotide (LINZESS) 145 mcg cap capsule Take 1 Cap by mouth Daily (before breakfast). For constipation    ferrous sulfate 325 mg (65 mg iron) tablet Take 325 mg by mouth Daily (before breakfast).  aspirin delayed-release 81 mg tablet Take 81 mg by mouth daily.     liraglutide (SAXENDA) 3 mg/0.5 mL (18 mg/3 mL) pen 0.6 mg daily x1wk then 1.2 mg daily x1wk then 1.8 mg daily x1wk then 2.4 mg daily x1wk then 3.0 mg daily    liraglutide (SAXENDA) 3 mg/0.5 mL (18 mg/3 mL) pen 0.5 mL by SubCUTAneous route daily. No current facility-administered medications for this visit. Allergies   Allergen Reactions    Bees [Hymenoptera Allergenic Extract] Shortness of Breath and Swelling    Strawberry Shortness of Breath and Swelling    Lisinopril Cough     Review of Systems:  - Eyes: no blurry vision or double vision  - Cardiovascular: no chest pain  - Respiratory: no shortness of breath  - Musculoskeletal: no myalgias      Physical Examination:  Visit Vitals    BP 97/64    Pulse 82    Ht 5' 6\" (1.676 m)    Wt 278 lb (126.1 kg)    BMI 44.87 kg/m2   -   - General: pleasant, no distress, uses walker   HEENT: hearing intact, EOMI, clear sclera without icterus  - Neck: no thyromegaly or LAD  - Cardiovascular: regular, normal rate   - Respiratory: normal effort  - Integumentary: no edema  - Psychiatric: normal mood and affect    Data Reviewed:   Component      Latest Ref Rng & Units 1/24/2018 9/9/2017 9/8/2017 7/28/2017           8:58 AM  2:40 AM 11:44 AM  4:18 PM   Sodium      136 - 145 mmol/L   134 (L)    Potassium      3.5 - 5.1 mmol/L   4.4    Chloride      97 - 108 mmol/L   103    CO2      21 - 32 mmol/L   25    Anion gap      5 - 15 mmol/L   6    Glucose      65 - 100 mg/dL   282 (H)    BUN      6 - 20 MG/DL   40 (H)    Creatinine      0.55 - 1.02 MG/DL   1.47 (H)    BUN/Creatinine ratio      12 - 20     27 (H)    GFR est AA      >60 ml/min/1.73m2   44 (L)    GFR est non-AA      >60 ml/min/1.73m2   36 (L)    Calcium      8.5 - 10.1 MG/DL   8.5    Bilirubin, total      0.2 - 1.0 MG/DL   0.4    ALT (SGPT)      12 - 78 U/L   27    AST      15 - 37 U/L   21    Alk.  phosphatase      45 - 117 U/L   130 (H)    Protein, total      6.4 - 8.2 g/dL   7.1    Albumin      3.5 - 5.0 g/dL   3.4 (L)    Globulin      2.0 - 4.0 g/dL   3.7    A-G Ratio      1.1 - 2.2     0.9 (L)    GGT      0 - 60 IU/L       TSH      0.36 - 3.74 uIU/mL  0.28 (L)     Ferritin      15 - 150 ng/mL       VITAMIN D, 25-HYDROXY 30.0 - 100.0 ng/mL       PTH, Intact      15 - 65 pg/mL    102 (H)   Hemoglobin A1c (POC)      % 5.6        Component      Latest Ref Rng & Units 7/28/2017 7/28/2017 7/28/2017           4:18 PM  4:18 PM  4:18 PM   Sodium      136 - 145 mmol/L      Potassium      3.5 - 5.1 mmol/L      Chloride      97 - 108 mmol/L      CO2      21 - 32 mmol/L      Anion gap      5 - 15 mmol/L      Glucose      65 - 100 mg/dL      BUN      6 - 20 MG/DL      Creatinine      0.55 - 1.02 MG/DL      BUN/Creatinine ratio      12 - 20        GFR est AA      >60 ml/min/1.73m2      GFR est non-AA      >60 ml/min/1.73m2      Calcium      8.5 - 10.1 MG/DL      Bilirubin, total      0.2 - 1.0 MG/DL      ALT (SGPT)      12 - 78 U/L      AST      15 - 37 U/L      Alk. phosphatase      45 - 117 U/L      Protein, total      6.4 - 8.2 g/dL      Albumin      3.5 - 5.0 g/dL      Globulin      2.0 - 4.0 g/dL      A-G Ratio      1.1 - 2.2        GGT      0 - 60 IU/L      TSH      0.36 - 3.74 uIU/mL   1.900   Ferritin      15 - 150 ng/mL  253 (H)    VITAMIN D, 25-HYDROXY      30.0 - 100.0 ng/mL 11.7 (L)     PTH, Intact      15 - 65 pg/mL      Hemoglobin A1c (POC)      %          Assessment/Plan:   1. Low vitamin D level   - reassess after increase to once daily dosing. Recommend checking CMP and PTH level as well   Has much higher needs due to poor absorption after gastric bypass. 2. S/P gastric bypass   - vitamin d, iron, calcium and B12 needs may be higher. 3. Postoperative hypothyroidism   - reassess after weight loss. Needs may have decreased. Low TSH last summer suspected to have been due to non-thyroidal illness. 4. BMI 40.0-44.9, adult [facility])   - she reports making dietary efforts  - assure thyroid level ok. 5. Alkaline phosphatase elevation   - presumably due to secondary hyperparathyroidism due to vit D deficiency and low calcium. Patient Instructions   Thyroid:  Continue levothyroxine to 150 mcg.    Reassess    Low Vitamin D, High Parathyroid hormone level, Alkaline phosphatase elevated:  Reassess vitamin D after daily vitamin D supplementation            Follow-up Disposition:  Return in about 6 months (around 8/26/2018).

## 2018-02-26 NOTE — MR AVS SNAPSHOT
[address] [facility] [address] [place] [address] 
[phone] Patient: Marcos Self MRN: [id number] Visit Information Date & Time Provider Department Dept. Phone Encounter #  
 2/26/2018  9:10 AM Herbert Alicea, [address] [place] [address] Diabetes and Endocrinology [phone] [id number] Your Appointments 2/27/2018  3:40 PM  
ROUTINE CARE with Nick Paiz 28 ([facility] Appt Note: 5-6 wks follow up; â¢5-6 wks follow up cross 02/22/18  
 [address] 
[address] [place] [address]  
[phone]  
  
   
 [address] [place] [address] 4/12/2018  9:40 AM  
Follow Up with Carlos Christiansen DO [facility] at [facility]) Appt Note: f/u Headaches / [place] [address] [facility] [address]  
[address] [address]  
  
    
 11/7/2018  9:20 AM  
ESTABLISHED PATIENT with Baudilio Rosales MD  
[facility] ([facility]) Appt Note: 1 yr f/u  
 [address]  [address] [place] [address]  
[address] [facility],[address] [address] Upcoming Health Maintenance Date Due  
 MEDICARE YEARLY EXAM 6/14/2018 PAP AKA CERVICAL CYTOLOGY 11/13/2018 BREAST CANCER SCRN MAMMOGRAM 8/4/2019 COLONOSCOPY 6/22/2021 DTaP/Tdap/Td series (2 - Td) 6/13/2027 Allergies as of 2/26/2018  Review Complete On: 2/26/2018 By: Herbert Alicea MD  
  
 Severity Noted Reaction Type Reactions Bees [Hymenoptera Allergenic Extract] High 10/14/2014   Systemic Shortness of Breath, Swelling [place] High 10/14/2014   Systemic Shortness of Breath, Swelling Lisinopril  10/17/2014    Cough Current Immunizations  Reviewed on 10/11/2016 Name Date Influenza Vaccine Eloisa Chung) 11/13/2015 Influenza Vaccine (Quad) PF 10/20/2017, 10/11/2016 Pneumococcal Polysaccharide (PPSV-23) 8/29/2016 Tdap 6/13/2017 Not reviewed this visit You Were Diagnosed With   
  
 Codes Comments Low vitamin D level    -  Primary ICD-10-CM: E55.9 ICD-9-CM: 268.9 S/P gastric bypass     ICD-10-CM: H62.86 ICD-9-CM: V45.86 Postoperative hypothyroidism     ICD-10-CM: E89.0 ICD-9-CM: 244.0 Vitals BP Pulse Height(growth percentile) Weight(growth percentile) BMI OB Status 97/64 82 5' 6\" (1.676 m) 278 lb (126.1 kg) 44.87 kg/m2 Postmenopausal  
 Smoking Status Former Smoker Vitals History BMI and BSA Data Body Mass Index Body Surface Area 44.87 kg/m 2 2.42 m 2 Preferred Pharmacy Pharmacy Name Phone [facility] [address] [place] [address] [phone] Your Updated Medication List  
  
   
This list is accurate as of 2/26/18 10:08 AM.  Always use your most recent med list.  
  
  
  
  
 albuterol 90 mcg/actuation inhaler Commonly known as:  PROVENTIL HFA, VENTOLIN HFA, PROAIR HFA Take 2 Puffs by inhalation every four (4) hours as needed for Wheezing or Shortness of Breath. albuterol-ipratropium 2.5 mg-0.5 mg/3 ml Nebu Commonly known as:  DUO-NEB  
3 mL by Nebulization route every six (6) hours as needed. aspirin delayed-release 81 mg tablet Take 81 mg by mouth daily. carvedilol 6.25 mg tablet Commonly known as:  Shagufta [place] Take 1 Tab by mouth two (2) times daily (with meals). cyanocobalamin 1,000 mcg/mL injection Commonly known as:  VITAMIN B12  
1 mL by SubCUTAneous route every fourteen (14) days. For b12 deficiency  
  
 cyclobenzaprine 5 mg tablet Commonly known as:  FLEXERIL Take 5 mg by mouth two (2) times a day. DULoxetine 60 mg capsule Commonly known as:  CYMBALTA Take 1 Cap by mouth every evening. EPINEPHrine 0.3 mg/0.3 mL injection Commonly known as:  EPIPEN 2-LAURA  
0.3 mL by IntraMUSCular route once as needed for up to 1 dose. ergocalciferol 50,000 unit capsule Commonly known as:  ERGOCALCIFEROL Take 1 Cap by mouth daily. High needs due to gastric bypass history  
  
 ferrous sulfate 325 mg (65 mg iron) tablet Take 325 mg by mouth Daily (before breakfast). furosemide 40 mg tablet Commonly known as:  LASIX Take 1 Tab by mouth daily. glucose blood VI test strips strip Commonly known as:  blood glucose test  
For use with glucometer to check blood sugar once a day in morning before eating. Please dispense VERIO FLEX STRIPS HYDROcodone-acetaminophen 5-325 mg per tablet Commonly known as:  [address] [place] Take 1 Tab by mouth every six (6) hours as needed for Pain. Max Daily Amount: 4 Tabs. levothyroxine 150 mcg tablet Commonly known as:  SYNTHROID Take 1 Tab by mouth Daily (before breakfast). lidocaine 5 % Commonly known as:  Bogdan Necessary Apply patch to the affected area for 12 hours a day and remove for 12 hours a day. linaclotide 145 mcg Cap capsule Commonly known as:  Lauretha Mylar Take 1 Cap by mouth Daily (before breakfast). For constipation  
  
 losartan 50 mg tablet Commonly known as:  COZAAR Take 1 Tab by mouth nightly. Hold for SBP<115  
  
 metFORMIN 1,000 mg tablet Commonly known as:  GLUCOPHAGE Take 1 Tab by mouth two (2) times daily (with meals). morphine CR 15 mg CR tablet Commonly known as:  MS CONTIN Take 1 Tab by mouth every twelve (12) hours. Max Daily Amount: 30 mg.  
  
 pantoprazole 40 mg tablet Commonly known as:  PROTONIX Take 1 Tab by mouth daily. suvorexant 20 mg tablet Commonly known as:  [place] Take 1 Tab by mouth nightly as needed for Insomnia. Max Daily Amount: 20 mg.  
  
 topiramate 100 mg tablet Commonly known as:  TOPAMAX Take 1 Tab by mouth two (2) times a day. Patient Instructions Thyroid: 
Continue levothyroxine to 150 mcg. Reassess Low Vitamin D, High Parathyroid hormone level, Alkaline phosphatase elevated: 
Reassess vitamin D after daily vitamin D supplementation Introducing [facility]! Dear Osvaldo Babinski: Thank you for requesting a [employer] account. Our records indicate that you already have an active [employer] account. You can access your account anytime at [email or web address] Did you know that you can access your hospital and ER discharge instructions at any time in [employer]? You can also review all of your test results from your hospital stay or ER visit. Additional Information If you have questions, please visit the Frequently Asked Questions section of the [employer] website at [email or web address] Remember, [employer] is NOT to be used for urgent needs. For medical emergencies, dial 911. Now available from your iPhone and Android! Please provide this summary of care documentation to your next provider. Your primary care clinician is listed as Genevieve Osuna. If you have any questions after today's visit, please call [phone].

## 2018-02-27 ENCOUNTER — OFFICE VISIT (OUTPATIENT)
Dept: FAMILY MEDICINE CLINIC | Age: 58
End: 2018-02-27

## 2018-02-27 VITALS
SYSTOLIC BLOOD PRESSURE: 108 MMHG | RESPIRATION RATE: 18 BRPM | OXYGEN SATURATION: 100 % | HEART RATE: 94 BPM | HEIGHT: 66 IN | TEMPERATURE: 97.3 F | BODY MASS INDEX: 44.36 KG/M2 | DIASTOLIC BLOOD PRESSURE: 69 MMHG | WEIGHT: 276 LBS

## 2018-02-27 DIAGNOSIS — E66.01 OBESITY, MORBID (HCC): ICD-10-CM

## 2018-02-27 DIAGNOSIS — F33.1 MODERATE EPISODE OF RECURRENT MAJOR DEPRESSIVE DISORDER (HCC): ICD-10-CM

## 2018-02-27 DIAGNOSIS — G47.00 INSOMNIA, UNSPECIFIED TYPE: Primary | ICD-10-CM

## 2018-02-27 DIAGNOSIS — I10 ESSENTIAL HYPERTENSION: ICD-10-CM

## 2018-02-27 RX ORDER — ZALEPLON 10 MG/1
10 CAPSULE ORAL
Qty: 30 CAP | Refills: 0 | Status: SHIPPED | OUTPATIENT
Start: 2018-02-27 | End: 2018-03-27 | Stop reason: ALTCHOICE

## 2018-02-27 NOTE — MR AVS SNAPSHOT
[address] [facility] [address] 
 
 
 [address] 
[address] [place] [address] 
[phone] Patient: Jamal Jackson MRN: [id number] [facility]:[birth year] Visit Information Date & Time Provider Department Dept. Phone Encounter #  
 2/27/2018  3:40 PM Geraldine Monroe. Jossue Elkins MD [facility] [phone] [id number] Your Appointments 4/12/2018  9:40 AM  
Follow Up with [address] [place] [address] [facility]rology [facility] at [facility] Appt Note: f/u Headaches / [address] [facility] [address] [facility] [address]  
[address] [address]  
  
    
 11/7/2018  9:20 AM  
ESTABLISHED PATIENT with Cleve Whelan MD  
[facility] ([facility]UL[facility]) Appt Note: 1 yr f/u  
 [address]  [address] [place] [address]  
[address] [address] Upcoming Health Maintenance Date Due  
 MEDICARE YEARLY EXAM 6/14/2018 PAP AKA CERVICAL CYTOLOGY 11/13/2018 BREAST CANCER SCRN MAMMOGRAM 8/4/2019 COLONOSCOPY 6/22/2021 DTaP/Tdap/Td series (2 - Td) 6/13/2027 Allergies as of 2/27/2018  Review Complete On: 2/27/2018 By: Lucy Rodriguez LPN Severity Noted Reaction Type Reactions Bees [Hymenoptera Allergenic Extract] High 10/14/2014   Systemic Shortness of Breath, Swelling [place] High 10/14/2014   Systemic Shortness of Breath, Swelling Lisinopril  10/17/2014    Cough Current Immunizations  Reviewed on 10/11/2016 Name Date Influenza Vaccine Nic Pan) 11/13/2015 Influenza Vaccine (Quad) PF 10/20/2017, 10/11/2016 Pneumococcal Polysaccharide (PPSV-23) 8/29/2016 Tdap 6/13/2017 Not reviewed this visit You Were Diagnosed With   
  
 Codes Comments Insomnia, unspecified type    -  Primary ICD-10-CM: G47.00 ICD-9-CM: 780.52   
 Ob[facility] mor[facility]     ICD-10-CM: E66.01 
ICD-9-CM: 278.01 Essential hypertension     ICD-10-CM: I10 
ICD-9-CM: 401.9 Moderate episode of recurrent major depressive disorder (HCC)     ICD-10-CM: F33.1 ICD-9-CM: 296.32 Vitals BP Pulse Temp Resp Height(growth percentile) Weight(growth percentile) 108/69 (BP 1 Location: Right arm, BP Patient Position: Sitting) 94 97.3 °F (36.3 °C) (Oral) 18 5' 6\" (1.676 m) 276 lb (125.2 kg) SpO2 BMI OB Status Smoking Status 100% 44.55 kg/m2 Postmenopausal Former Smoker BMI and BSA Data Body Mass Index Body Surface Area 44.55 kg/m 2 2.41 m 2 Preferred Pharmacy Pharmacy Name Phone [address] [facility] [address] [place] [address] [phone] Your Updated Medication List  
  
   
This list is accurate as of 2/27/18  4:52 PM.  Always use your most recent med list.  
  
  
  
  
 albuterol 90 mcg/actuation inhaler Commonly known as:  PROVENTIL HFA, VENTOLIN HFA, PROAIR HFA Take 2 Puffs by inhalation every four (4) hours as needed for Wheezing or Shortness of Breath. albuterol-ipratropium 2.5 mg-0.5 mg/3 ml Nebu Commonly known as:  DUO-NEB  
3 mL by Nebulization route every six (6) hours as needed. aspirin delayed-release 81 mg tablet Take 81 mg by mouth daily. carvedilol 6.25 mg tablet Commonly known as:  Shagufta [place] Take 1 Tab by mouth two (2) times daily (with meals). cyanocobalamin 1,000 mcg/mL injection Commonly known as:  VITAMIN B12  
1 mL by SubCUTAneous route every fourteen (14) days. For b12 deficiency  
  
 cyclobenzaprine 5 mg tablet Commonly known as:  FLEXERIL Take 5 mg by mouth two (2) times a day. DULoxetine 60 mg capsule Commonly known as:  CYMBALTA Take 1 Cap by mouth every evening. EPINEPHrine 0.3 mg/0.3 mL injection Commonly known as:  EPIPEN 2-LAURA  
0.3 mL by IntraMUSCular route once as needed for up to 1 dose. ergocalciferol 50,000 unit capsule Commonly known as:  ERGOCALCIFEROL Take 1 Cap by mouth daily. High needs due to gastric bypass history  
  
 ferrous sulfate 325 mg (65 mg iron) tablet Take 325 mg by mouth Daily (before breakfast). furosemide 40 mg tablet Commonly known as:  LASIX Take 1 Tab by mouth daily. glucose blood VI test strips strip Commonly known as:  blood glucose test  
For use with glucometer to check blood sugar once a day in morning before eating. Please dispense VERIO FLEX STRIPS HYDROcodone-acetaminophen 5-325 mg per tablet Commonly known as:  Carly Boast Take 1 Tab by mouth every six (6) hours as needed for Pain. Max Daily Amount: 4 Tabs. levothyroxine 150 mcg tablet Commonly known as:  SYNTHROID Take 1 Tab by mouth Daily (before breakfast). lidocaine 5 % Commonly known as:  Madelyn [place] Apply patch to the affected area for 12 hours a day and remove for 12 hours a day. linaclotide 145 mcg Cap capsule Commonly known as:  Carlos [place] Take 1 Cap by mouth Daily (before breakfast). For constipation  
  
 losartan 50 mg tablet Commonly known as:  COZAAR Take 1 Tab by mouth nightly. Hold for SBP<115  
  
 metFORMIN 1,000 mg tablet Commonly known as:  GLUCOPHAGE Take 1 Tab by mouth two (2) times daily (with meals). morphine CR 15 mg CR tablet Commonly known as:  MS CONTIN Take 1 Tab by mouth every twelve (12) hours. Max Daily Amount: 30 mg.  
  
 pantoprazole 40 mg tablet Commonly known as:  PROTONIX Take 1 Tab by mouth daily. topiramate 100 mg tablet Commonly known as:  TOPAMAX Take 1 Tab by mouth two (2) times a day. zaleplon 10 mg capsule Commonly known as:  SONATA Take 1 Cap by mouth nightly as needed for Sleep. Max Daily Amount: 10 mg. REPLACES [place] Charm Prescriptions Printed  Refills  
 zaleplon (SONATA) 10 mg capsule 0  
 Sig: Take 1 Cap by mouth nightly as needed for Sleep. Max Daily Amount: 10 mg. REPLACES Josh Fish Class: Print Route: Oral  
  
Patient Instructions Lyn Gao TODAY, please go to: CHECK OUT Please schedule the following appointments at CHECK OUT: 
Insomnia and depression follow up with Dr. Celeste Rose in 1 month Today's Plan: 
- take cymbalta in morning 
- start sonata Introducing [facility] SERVICES! Dear Lul Piedra: Thank you for requesting a [employer] account. Our records indicate that you already have an active [employer] account. You can access your account anytime at [email or web address] Did you know that you can access your hospital and ER discharge instructions at any time in [employer]? You can also review all of your test results from your hospital stay or ER visit. Additional Information If you have questions, please visit the Frequently Asked Questions section of the [employer] website at [email or web address] Remember, [employer] is NOT to be used for urgent needs. For medical emergencies, dial 911. Now available from your iPhone and Android! Please provide this summary of care documentation to your next provider. Your primary care clinician is listed as Linda Damico. If you have any questions after today's visit, please call [phone].

## 2018-02-27 NOTE — PATIENT INSTRUCTIONS
Porter Robles TODAY, please go to:   CHECK OUT       Please schedule the following appointments at CHECK OUT:  Insomnia and depression follow up with Dr. Saleem Avalos in 1 month    Today's Plan:  - take cymbalta in morning  - start sonata

## 2018-02-27 NOTE — PROGRESS NOTES
Lisandro Fernandes [address] [place] [address] Visit   02/27/2018  Patient ID: Umesh Briceno is a 59 y.o. female. Assessment/Plan:    Diagnoses and all orders for this visit:    1. Insomnia, unspecified type  Trial sonata. Encouraged combining with sleep hygiene tips as well  -     zaleplon (SONATA) 10 mg capsule; Take 1 Cap by mouth nightly as needed for Sleep. Max Daily Amount: 10 mg. REPLACES BELSOMRA    2. Obesity, morbid ([address]  saxenda not approved. She is still loosing weight without medication. Congratulated on success    3. Essential hypertension  At goal, no change    4. Moderate episode of recurrent major depressive disorder (HCC)  Change cymbalta to AM. Sleep mx as above. If not improved on follow consider changing o adding to pharmacotherapy      Counselled pt on Patient health concerns and plans. Patient was offered a choice/choices in the treatment plan today. Reviewed return precautions as appropriate. Patient expresses understanding of the plan and agrees with recommendations. See patient instructions for more. Patient Instructions   . TODAY, please go to:   CHECK OUT       Please schedule the following appointments at CHECK OUT:  Insomnia and depression follow up with Dr. Megha Ma in 1 month    Today's Plan:  - take cymbalta in morning  - start sonata        Subjective:   HPI:  Umesh Briceno is a 59 y.o. female being seen for:   Chief Complaint   Patient presents with    Other     overall F/U     Sleep Problem    Medication Problem     stated that she wishes to take Cymbalta at a different time, it isn't helping her at all. BP and weight follow up  ·  added saxenda in Jan. Not approved. insomnia   ·  added belsomra. Not working. · Asks if she can retry ambien  · In past has tried: [place], trazodone, lunesta    Takes cymbalta 60mg at 5pm. Asks if she can take smaller doses, several time during the day.  She asks because she cries spontaneously in the day, or related to stress For mood: has only been on cymbalta. Review of Systems  Otherwise as noted in HPI  ? Objective:     Visit Vitals    /69 (BP 1 Location: Right arm, BP Patient Position: Sitting)    Pulse 94    Temp 97.3 °F (36.3 °C) (Oral)    Resp 18    Ht 5' 6\" (1.676 m)    Wt 276 lb (125.2 kg)    SpO2 100%    BMI 44.55 kg/m2     . BP Readings from Last 3 Encounters:   02/27/18 108/69   02/26/18 97/64   02/02/18 123/75     . Wt Readings from Last 3 Encounters:   02/27/18 276 lb (125.2 kg)   02/26/18 278 lb (126.1 kg)   02/02/18 278 lb (126.1 kg)     Physical Exam   Constitutional: She appears well-developed and well-nourished. No distress. Pulmonary/Chest: Effort normal. No respiratory distress. Neurological: She is alert. Psychiatric: She has a normal mood and affect. Her behavior is normal.       Allergies   Allergen Reactions    Bees [Hymenoptera Allergenic Extract] Shortness of Breath and Swelling    Strawberry Shortness of Breath and Swelling    Lisinopril Cough     Prior to Admission medications    Medication Sig Start Date End Date Taking? Authorizing Provider   zaleplon (SONATA) 10 mg capsule Take 1 Cap by mouth nightly as needed for Sleep. Max Daily Amount: 10 mg. REPLACES BELSOMRA 2/27/18  Yes Aretha Quintero. Selena Christensen MD   ergocalciferol (ERGOCALCIFEROL) 50,000 unit capsule Take 1 Cap by mouth daily. High needs due to gastric bypass history 2/26/18  Yes Jeff Bhatia MD   levothyroxine (SYNTHROID) 150 mcg tablet Take 1 Tab by mouth Daily (before breakfast). 2/26/18  Yes Jeff Bhatia MD   pantoprazole (PROTONIX) 40 mg tablet Take 1 Tab by mouth daily. 2/16/18  Yes Kendrick Christensen MD   metFORMIN (GLUCOPHAGE) 1,000 mg tablet Take 1 Tab by mouth two (2) times daily (with meals). 1/24/18  Yes Kendrick Christensen MD   furosemide (LASIX) 40 mg tablet Take 1 Tab by mouth daily. 1/24/18  Yes Kendrick Christensen MD   glucose blood VI test strips (BLOOD GLUCOSE TEST) strip For use with glucometer to check blood sugar once a day in morning before eating. Please dispense VERIO FLEX STRIPS 1/24/18  Yes Soila Chowdhury. Saleem Avalos MD   lidocaine (LIDODERM) 5 % Apply patch to the affected area for 12 hours a day and remove for 12 hours a day. 1/12/18  Yes Veneda Councilman Glenice Libel, MD   topiramate (TOPAMAX) 100 mg tablet Take 1 Tab by mouth two (2) times a day. 1/12/18  Yes Vanessa Drew DO   carvedilol (COREG) 6.25 mg tablet Take 1 Tab by mouth two (2) times daily (with meals). 1/3/18  Yes Elizabeth Delacruz MD   morphine CR (MS CONTIN) 15 mg CR tablet Take 1 Tab by mouth every twelve (12) hours. Max Daily Amount: 30 mg. Patient taking differently: Take 15 mg by mouth daily. 9/12/17  Yes Amari Vogt MD   HYDROcodone-acetaminophen (NORCO) 5-325 mg per tablet Take 1 Tab by mouth every six (6) hours as needed for Pain. Max Daily Amount: 4 Tabs. 9/12/17  Yes Amari Vogt MD   albuterol-ipratropium (DUO-NEB) 2.5 mg-0.5 mg/3 ml nebu 3 mL by Nebulization route every six (6) hours as needed. 9/12/17  Yes Amari Vogt MD   losartan (COZAAR) 50 mg tablet Take 1 Tab by mouth nightly. Hold for SBP<115 9/15/17  Yes Amari Vogt MD   cyclobenzaprine (FLEXERIL) 5 mg tablet Take 5 mg by mouth two (2) times a day. Yes Historical Provider   EPINEPHrine (EPIPEN 2-LAURA) 0.3 mg/0.3 mL injection 0.3 mL by IntraMUSCular route once as needed for up to 1 dose. 8/7/17  Yes Veneda Councilman Glenice Libel, MD   cyanocobalamin (VITAMIN B12) 1,000 mcg/mL injection 1 mL by SubCUTAneous route every fourteen (14) days. For b12 deficiency 7/18/17  Yes Soila Chowdhury. Saleem Avalos MD   albuterol (PROVENTIL HFA, VENTOLIN HFA, PROAIR HFA) 90 mcg/actuation inhaler Take 2 Puffs by inhalation every four (4) hours as needed for Wheezing or Shortness of Breath. 10/11/16  Yes Veneda Councilman Glenice Libel, MD   DULoxetine (CYMBALTA) 60 mg capsule Take 1 Cap by mouth every evening. 10/11/16  Yes Veneda Councilman Glenice Libel, MD   linaclotide [place]) 145 mcg cap capsule Take 1 Cap by mouth Daily (before breakfast). For constipation 10/11/16  Yes Debroah Peabody. Felipe Altamirano MD   ferrous sulfate 325 mg (65 mg iron) tablet Take 325 mg by mouth Daily (before breakfast). Yes Historical Provider   aspirin delayed-release 81 mg tablet Take 81 mg by mouth daily.    Yes Historical Provider     Patient Active Problem List   Diagnosis Code    Coronary artery disease involving native coronary artery of native heart without angina pectoris I25.10    Bronchitis J40    High cholesterol E78.00    History of cardiomyopathy Z86.79    SOB (shortness of breath) R06.02    Neck pain, bilateral M54.2    Shoulder pain, bilateral M25.511, M25.512    Muscle spasticity M62.838    Hemiparesis and alteration of sensations as late effects of stroke ([facility]) I69.359, I69.398    Head pain, chronic R51, G89.29    Expressive aphasia R47.01    Heart palpitations R00.2    Ventricular ectopic activity I49.3    Stroke ([facility] I63.9    Thyroid disease E07.9    Hypercholesterolemia E78.00    EDDIE on CPAP G47.33, Z99.89    Essential hypertension I10    Congestive heart failure (HCC) I50.9    Chest pain R07.9    Melena K92.1    Hiatal hernia K44.9    Postoperative hypothyroidism E89.0    Stroke (cerebrum) ([facility]) I63.9    Chronic pain G89.29    Prediabetes R73.03    Constipation K59.00    Insomnia G47.00    Left-sided weakness R53.1    Vitamin D deficiency E55.9    Obesity, morbid ([facility]) E66.01    Chronic indwelling Dunn catheter Z92.89

## 2018-02-27 NOTE — PROGRESS NOTES
Chief Complaint   Patient presents with    Other     overall F/U      1. Have you been to the ER, urgent care clinic since your last visit? Hospitalized since your last visit? No     2. Have you seen or consulted any other health care providers outside of the [address] [place] [address] since your last visit? Include any pap smears or colon screening. No     The patient was counseled on the dangers of tobacco use, and was advised never to start again. Reviewed strategies to maximize success, including never to start again. Jocelynn Monson  Identified pt with two pt identifiers(name and ). Chief Complaint   Patient presents with    Other     overall F/U        1. Have you been to the ER, urgent care clinic since your last visit? Hospitalized since your last visit? NO    2. Have you seen or consulted any other health care providers outside of the [address] [place] [address] since your last visit? Include any pap smears or colon screening. NO    Today's provider has been notified of reason for visit, vitals and flowsheets obtained on patients. Patient received paperwork for advance directive during previous visit but has not completed at this time     Reviewed record In preparation for visit, huddled with provider and have obtained necessary documentation      There are no preventive care reminders to display for this patient. Wt Readings from Last 3 Encounters:   18 278 lb (126.1 kg)   18 278 lb (126.1 kg)   18 288 lb (130.6 kg)     Temp Readings from Last 3 Encounters:   18 96.8 °F (36 °C) (Oral)   18 96.2 °F (35.7 °C) (Oral)   10/20/17 97.9 °F (36.6 °C) (Oral)     BP Readings from Last 3 Encounters:   18 97/64   18 123/75   18 (!) 146/98     Pulse Readings from Last 3 Encounters:   18 82   18 88   18 76     There were no vitals filed for this visit.       Learning Assessment:  :     Learning Assessment 10/13/2017 3/24/2015   PRIMARY LEARNER Patient Patient   HIGHEST LEVEL OF EDUCATION - PRIMARY LEARNER  - SOME COLLEGE   BARRIERS PRIMARY LEARNER - NONE   CO-LEARNER CAREGIVER - No   CO-LEARNER NAME - n/a   PRIMARY LANGUAGE ENGLISH ENGLISH   LEARNER PREFERENCE PRIMARY LISTENING LISTENING   ANSWERED BY patient patient   RELATIONSHIP SELF SELF       Depression Screening:  :     PHQ over the last two weeks 2/27/2018   Little interest or pleasure in doing things Not at all   Feeling down, depressed or hopeless Not at all   Total Score PHQ 2 0       Fall Risk Assessment:  :     Fall Risk Assessment, last 12 mths 6/13/2017   Able to walk? Yes   Fall in past 12 months? Yes   Fall with injury? No   Number of falls in past 12 months 1   Fall Risk Score 1       Abuse Screening:  :     No flowsheet data found. ADL Screening:  :     ADL Assessment 6/13/2017   Feeding yourself No Help Needed   Getting from bed to chair Help Needed   Getting dressed Help Needed   Bathing or showering Help Needed   Walk across the room (includes cane/walker) Help Needed   Using the telphone No Help Needed   Taking your medications No Help Needed   Preparing meals Help Needed   Managing money (expenses/bills) No Help Needed   Moderately strenuous housework (laundry) Help Needed   Shopping for personal items (toiletries/medicines) No Help Needed   Shopping for groceries No Help Needed   Driving No Help Needed   Climbing a flight of stairs Help Needed   Getting to places beyond walking distances No Help Needed           ACP is on file. Medication reconciliation up to date and corrected with patient at this time.

## 2018-03-12 RX ORDER — LEVOTHYROXINE SODIUM 150 UG/1
150 TABLET ORAL
Qty: 90 TAB | Refills: 3 | Status: SHIPPED | OUTPATIENT
Start: 2018-03-12 | End: 2018-03-14 | Stop reason: SDUPTHER

## 2018-03-12 NOTE — TELEPHONE ENCOUNTER
3/12/2018  10:23 AM      Refill for    -  levothyroxine (SYNTHROID) 150 mcg tablet           Please send to  [id number], [address]    Thanks

## 2018-03-14 DIAGNOSIS — I63.9 CEREBROVASCULAR ACCIDENT (CVA), UNSPECIFIED MECHANISM (HCC): Primary | ICD-10-CM

## 2018-03-14 DIAGNOSIS — G89.29 OTHER CHRONIC PAIN: ICD-10-CM

## 2018-03-14 DIAGNOSIS — I69.398 HEMIPARESIS AND ALTERATION OF SENSATIONS AS LATE EFFECTS OF STROKE (HCC): ICD-10-CM

## 2018-03-14 DIAGNOSIS — K21.9 GASTROESOPHAGEAL REFLUX DISEASE, ESOPHAGITIS PRESENCE NOT SPECIFIED: ICD-10-CM

## 2018-03-14 DIAGNOSIS — I69.359 HEMIPARESIS AND ALTERATION OF SENSATIONS AS LATE EFFECTS OF STROKE (HCC): ICD-10-CM

## 2018-03-14 NOTE — TELEPHONE ENCOUNTER
----- Message from Akash Brasher sent at 3/14/2018 10:45 AM EDT -----  Regarding: Dr. Deny Land / Mireille Ponce  Pt is requesting a refill on \"Metformin\" 1000 mg, \"Msymbalta\" 60 mg, Thyroid medication 150 mg and Gerd medication.   [facility] DR KARLENE ROSALES: [phone]  Fax: pt unsure  Pt's best contact: [phone]

## 2018-03-14 NOTE — TELEPHONE ENCOUNTER
PCP: Yossi See. Margot Cao MD    Last appt: 2/27/2018  Future Appointments  Date Time Provider Denilson Thompson   3/27/2018 10:20 AM York Mac Margot Cao MD [facility] ALICE SCHED   4/12/2018 9:40 AM DO RONAN Fermin ALICE SCHED   4/27/2018 1:50 PM Vincent Kim MD RDE [id number] [facility]   11/7/2018 9:20 AM Sonia Acosta  [address]       Requested Prescriptions     Pending Prescriptions Disp Refills    pantoprazole (PROTONIX) 40 mg tablet 90 Tab 3     Sig: Take 1 Tab by mouth daily.  metFORMIN (GLUCOPHAGE) 1,000 mg tablet 180 Tab 3     Sig: Take 1 Tab by mouth two (2) times daily (with meals).  DULoxetine (CYMBALTA) 60 mg capsule 90 Cap 1     Sig: Take 1 Cap by mouth every evening.  levothyroxine (SYNTHROID) 150 mcg tablet 90 Tab 3     Sig: Take 1 Tab by mouth Daily (before breakfast).      Lab Results   Component Value Date/Time    Sodium 146 (H) 10/25/2017 03:32 PM    Potassium 4.4 10/25/2017 03:32 PM    Chloride 107 (H) 10/25/2017 03:32 PM    CO2 23 10/25/2017 03:32 PM    Anion gap 9 09/12/2017 10:06 AM    Glucose 109 (H) 10/25/2017 03:32 PM    BUN 17 10/25/2017 03:32 PM    Creatinine 1.07 (H) 10/25/2017 03:32 PM    BUN/Creatinine ratio 16 10/25/2017 03:32 PM    GFR est AA 66 10/25/2017 03:32 PM    GFR est non-AA 57 (L) 10/25/2017 03:32 PM    Calcium 9.5 10/25/2017 03:32 PM     Lab Results   Component Value Date/Time    Hemoglobin A1c 6.4 (H) 09/09/2017 02:40 AM    Hemoglobin A1c (POC) 5.6 01/24/2018 08:58 AM     Lab Results   Component Value Date/Time    Cholesterol, total 111 09/09/2017 02:40 AM    HDL Cholesterol 37 09/09/2017 02:40 AM    LDL, calculated 61.8 09/09/2017 02:40 AM    VLDL, calculated 12.2 09/09/2017 02:40 AM    Triglyceride 61 09/09/2017 02:40 AM    CHOL/HDL Ratio 3.0 09/09/2017 02:40 AM     Lab Results   Component Value Date/Time    TSH 0.28 (L) 09/09/2017 02:40 AM

## 2018-03-19 RX ORDER — PANTOPRAZOLE SODIUM 40 MG/1
40 TABLET, DELAYED RELEASE ORAL DAILY
Qty: 90 TAB | Refills: 3 | Status: SHIPPED | OUTPATIENT
Start: 2018-03-19 | End: 2019-03-05 | Stop reason: SDUPTHER

## 2018-03-19 RX ORDER — DULOXETIN HYDROCHLORIDE 60 MG/1
60 CAPSULE, DELAYED RELEASE ORAL EVERY EVENING
Qty: 90 CAP | Refills: 3 | Status: SHIPPED | OUTPATIENT
Start: 2018-03-19 | End: 2018-03-27 | Stop reason: SDUPTHER

## 2018-03-19 RX ORDER — LEVOTHYROXINE SODIUM 150 UG/1
150 TABLET ORAL
Qty: 90 TAB | Refills: 3 | Status: SHIPPED | OUTPATIENT
Start: 2018-03-19 | End: 2018-04-27 | Stop reason: SDUPTHER

## 2018-03-19 RX ORDER — METFORMIN HYDROCHLORIDE 1000 MG/1
1000 TABLET ORAL 2 TIMES DAILY WITH MEALS
Qty: 180 TAB | Refills: 3 | Status: SHIPPED | OUTPATIENT
Start: 2018-03-19 | End: 2018-03-27 | Stop reason: SDUPTHER

## 2018-03-27 ENCOUNTER — OFFICE VISIT (OUTPATIENT)
Dept: FAMILY MEDICINE CLINIC | Age: 58
End: 2018-03-27

## 2018-03-27 VITALS
BODY MASS INDEX: 44.16 KG/M2 | HEIGHT: 66 IN | OXYGEN SATURATION: 97 % | SYSTOLIC BLOOD PRESSURE: 118 MMHG | WEIGHT: 274.8 LBS | HEART RATE: 65 BPM | DIASTOLIC BLOOD PRESSURE: 66 MMHG | TEMPERATURE: 97.2 F | RESPIRATION RATE: 20 BRPM

## 2018-03-27 DIAGNOSIS — N25.81 SECONDARY HYPERPARATHYROIDISM (HCC): ICD-10-CM

## 2018-03-27 DIAGNOSIS — I42.9 CARDIOMYOPATHY, UNSPECIFIED TYPE (HCC): ICD-10-CM

## 2018-03-27 DIAGNOSIS — E55.9 VITAMIN D DEFICIENCY: ICD-10-CM

## 2018-03-27 DIAGNOSIS — I69.354 HEMIPLEGIA AND HEMIPARESIS FOLLOWING CEREBRAL INFARCTION AFFECTING LEFT NON-DOMINANT SIDE (HCC): ICD-10-CM

## 2018-03-27 DIAGNOSIS — R74.8 ELEVATED ALKALINE PHOSPHATASE LEVEL: ICD-10-CM

## 2018-03-27 DIAGNOSIS — F11.20 UNCOMPLICATED OPIOID DEPENDENCE (HCC): ICD-10-CM

## 2018-03-27 DIAGNOSIS — F13.20 SEDATIVE DEPENDENCE (HCC): ICD-10-CM

## 2018-03-27 DIAGNOSIS — I11.0 HYPERTENSIVE HEART DISEASE WITH CONGESTIVE HEART FAILURE, UNSPECIFIED HEART FAILURE TYPE (HCC): ICD-10-CM

## 2018-03-27 DIAGNOSIS — G89.29 OTHER CHRONIC PAIN: ICD-10-CM

## 2018-03-27 DIAGNOSIS — E89.0 POSTOPERATIVE HYPOTHYROIDISM: Chronic | ICD-10-CM

## 2018-03-27 DIAGNOSIS — G47.00 INSOMNIA, UNSPECIFIED TYPE: Primary | ICD-10-CM

## 2018-03-27 RX ORDER — INDAPAMIDE 2.5 MG/1
2.5 TABLET, FILM COATED ORAL
COMMUNITY
End: 2018-04-24

## 2018-03-27 RX ORDER — ZOLPIDEM TARTRATE 6.25 MG/1
6.25 TABLET, FILM COATED, EXTENDED RELEASE ORAL
COMMUNITY
End: 2018-03-27 | Stop reason: ALTCHOICE

## 2018-03-27 RX ORDER — METFORMIN HYDROCHLORIDE 1000 MG/1
1000 TABLET ORAL 2 TIMES DAILY WITH MEALS
Qty: 180 TAB | Refills: 3 | Status: SHIPPED | OUTPATIENT
Start: 2018-03-27 | End: 2018-09-28 | Stop reason: SDUPTHER

## 2018-03-27 RX ORDER — RAMELTEON 8 MG/1
8 TABLET ORAL
Qty: 30 TAB | Refills: 1 | Status: SHIPPED | OUTPATIENT
Start: 2018-03-27 | End: 2018-08-16 | Stop reason: SDUPTHER

## 2018-03-27 RX ORDER — DULOXETIN HYDROCHLORIDE 60 MG/1
60 CAPSULE, DELAYED RELEASE ORAL DAILY
Qty: 90 CAP | Refills: 3 | Status: SHIPPED | OUTPATIENT
Start: 2018-03-27 | End: 2019-03-05

## 2018-03-27 RX ORDER — EPINEPHRINE NASAL SOLUTION 1 MG/ML
0.5 SOLUTION NASAL
COMMUNITY
End: 2018-11-01

## 2018-03-27 RX ORDER — SPIRONOLACTONE 25 MG/1
25 TABLET ORAL
COMMUNITY
End: 2018-04-24

## 2018-03-27 NOTE — PROGRESS NOTES
[address] [place] [address] Visit   03/27/2018  Patient ID: Marcos Self is a 59 y.o. female. Assessment/Plan:    Diagnoses and all orders for this visit:    1. Insomnia, unspecified type   evaluated and appropriate. Treatment agreement on file. -     ramelteon (ROZEREM) 8 mg tablet; Take 1 Tab by mouth nightly. Take 30 min before bedtime    2. Other chronic pain  -     DULoxetine (CYMBALTA) 60 mg capsule; Take 1 Cap by mouth daily. 3. Postoperative hypothyroidism  Managed by Endocrine  -     TSH AND FREE T4    4. Vitamin D deficiency  Managed by Endocrine  -     VITAMIN D, 25 HYDROXY  -     METABOLIC PANEL, COMPREHENSIVE  -     PTH INTACT    5. Elevated alkaline phosphatase level  Managed by Endocrine  -     VITAMIN D, 25 HYDROXY  -     METABOLIC PANEL, COMPREHENSIVE  -     PTH INTACT    6. Secondary hyperparathyroidism ([facility]  Managed by Endocrine  -     VITAMIN D, 25 HYDROXY  -     METABOLIC PANEL, COMPREHENSIVE  -     PTH INTACT    7. Uncomplicated opioid dependence ([facility]  Opioid medication managed by Pain Center    8. Sedative dependence ([facility]  Sleep medication as above. 9. Hypertensive heart disease with congestive heart failure, unspecified heart failure type ([facility]  10. Cardiomyopathy, unspecified type [facility])  Managed by cardiology  Continue obesity management. 11. Hemiplegia and hemiparesis following cerebral infarction affecting left non-dominant side (HCC)  Continue to manage risk factors for stroke. Continue PT/OT    Other orders  -     metFORMIN (GLUCOPHAGE) 1,000 mg tablet; Take 1 Tab by mouth two (2) times daily (with meals). -     CKD REPORT        Counselled pt on Patient health concerns and plans. Patient was offered a choice/choices in the treatment plan today. Reviewed return precautions as appropriate. Patient expresses understanding of the plan and agrees with recommendations. See patient instructions for more.       More than 50% of this >25 minute encounter was spent in counseling and coordination of care today. Patient Instructions   . TODAY, please go to:   CHECK OUT        Please schedule the following appointments at CHECK OUT:  Insomnia follow up with Dr. Juanita Goodman in 4 weeks     Today's Plan:  - stop sonata    - start rozerem  - let Dr. Juanita Goodman know is about 2 weeks if it is not working. Then we will try remeron. Subjective:   HPI:  Ever Walter is a 59 y.o. female being seen for:   Chief Complaint   Patient presents with    Depression     1m f/u    Sleep Problem     1m f/u     Insomnia  · Taking sonata made her more awake  · In past has tried: Jannette Kemps, trazodone, lunesta, belsomra, sonata  · Jannette Kemps was most successful, would get to sleep but not stay sleep. Would get 3-4 hours  · Taking cymbalta in the day has made her more calm. · Has not tried: rozerem, remeron, silenor    Pt has Prediabetes    Screening and Prevention Due:  Health Maintenance Due   Topic Date Due    ZOSTER VACCINE AGE 60>  03/27/2018         Review of Systems  Otherwise as noted in HPI  ? Objective:     Visit Vitals    /66 (BP 1 Location: Right arm, BP Patient Position: Sitting)    Pulse 65    Temp 97.2 °F (36.2 °C) (Oral)    Resp 20    Ht 5' 6\" (1.676 m)    Wt 274 lb 12.8 oz (124.6 kg)    SpO2 97%    BMI 44.35 kg/m2     . Wt Readings from Last 3 Encounters:   03/27/18 274 lb 12.8 oz (124.6 kg)   02/27/18 276 lb (125.2 kg)   02/26/18 278 lb (126.1 kg)       Physical Exam   Constitutional: She appears well-developed and well-nourished. No distress. Pulmonary/Chest: Effort normal. No respiratory distress. Neurological: She is alert. Psychiatric: She has a normal mood and affect. Her behavior is normal.       Allergies   Allergen Reactions    Bees [Hymenoptera Allergenic Extract] Shortness of Breath and Swelling    Strawberry Shortness of Breath and Swelling    Lisinopril Cough     Prior to Admission medications    Medication Sig Start Date End Date Taking? Authorizing Provider   EPINEPHrine (ADRENALIN) 1 mg/mL nasal solution 0.5 mL by Nasal route. Yes Historical Provider   metFORMIN (GLUCOPHAGE) 1,000 mg tablet Take 1 Tab by mouth two (2) times daily (with meals). 3/27/18  Yes Tri Snowden MD   DULoxetine (CYMBALTA) 60 mg capsule Take 1 Cap by mouth daily. 3/27/18  Yes Tri Snowden MD   ramelteon (ROZEREM) 8 mg tablet Take 1 Tab by mouth nightly. Take 30 min before bedtime 3/27/18  Yes Ayaka Veliz. Abelardo Snowden MD   pantoprazole (PROTONIX) 40 mg tablet Take 1 Tab by mouth daily. 3/19/18  Yes Ayaka Veliz. Abelardo Snowden MD   levothyroxine (SYNTHROID) 150 mcg tablet Take 1 Tab by mouth Daily (before breakfast). 3/19/18  Yes Ayaka Veliz. Abelardo Snowden MD   ergocalciferol (ERGOCALCIFEROL) 50,000 unit capsule Take 1 Cap by mouth daily. High needs due to gastric bypass history 2/26/18  Yes Arlette Fernando MD   furosemide (LASIX) 40 mg tablet Take 1 Tab by mouth daily. 1/24/18  Yes Tri Snowden MD   glucose blood VI test strips (BLOOD GLUCOSE TEST) strip For use with glucometer to check blood sugar once a day in morning before eating. Please dispense VERIO FLEX STRIPS 1/24/18  Yes Ayaka Veliz. Abelardo Snowden MD   lidocaine (LIDODERM) 5 % Apply patch to the affected area for 12 hours a day and remove for 12 hours a day. 1/12/18  Yes Tri Snowden MD   topiramate (TOPAMAX) 100 mg tablet Take 1 Tab by mouth two (2) times a day. 1/12/18  Yes Vanessa Drew DO   carvedilol (COREG) 6.25 mg tablet Take 1 Tab by mouth two (2) times daily (with meals). 1/3/18  Yes Mahin Lambert MD   morphine CR (MS CONTIN) 15 mg CR tablet Take 1 Tab by mouth every twelve (12) hours. Max Daily Amount: 30 mg. Patient taking differently: Take 15 mg by mouth daily. 9/12/17  Yes Donavon Ray MD   HYDROcodone-acetaminophen (NORCO) 5-325 mg per tablet Take 1 Tab by mouth every six (6) hours as needed for Pain. Max Daily Amount: 4 Tabs.  9/12/17  Yes Donavon Ray MD   albuterol-ipratropium (DUO-NEB) 2.5 mg-0.5 mg/3 ml nebu 3 mL by Nebulization route every six (6) hours as needed. 9/12/17  Yes Buck Hoffmann MD   losartan (COZAAR) 50 mg tablet Take 1 Tab by mouth nightly. Hold for SBP<115 9/15/17  Yes Buck Hoffmann MD   cyclobenzaprine (FLEXERIL) 5 mg tablet Take 5 mg by mouth two (2) times a day. Yes Historical Provider   EPINEPHrine (EPIPEN 2-LAURA) 0.3 mg/0.3 mL injection 0.3 mL by IntraMUSCular route once as needed for up to 1 dose. 8/7/17  Yes Nelson Aldrich MD   cyanocobalamin (VITAMIN B12) 1,000 mcg/mL injection 1 mL by SubCUTAneous route every fourteen (14) days. For b12 deficiency 7/18/17  Yes Christel Branch. Raul Aldrich MD   albuterol (PROVENTIL HFA, VENTOLIN HFA, PROAIR HFA) 90 mcg/actuation inhaler Take 2 Puffs by inhalation every four (4) hours as needed for Wheezing or Shortness of Breath. 10/11/16  Yes Nelson Aldrich MD   linaclotide [place]i [place]) 145 mcg cap capsule Take 1 Cap by mouth Daily (before breakfast). For constipation 10/11/16  Yes Christel Branch. Raul Aldrich MD   ferrous sulfate 325 mg (65 mg iron) tablet Take 325 mg by mouth Daily (before breakfast). Yes Historical Provider   aspirin delayed-release 81 mg tablet Take 81 mg by mouth daily. Yes Historical Provider   indapamide (LOZOL) 2.5 mg tablet Take 2.5 mg by mouth. Historical Provider   spironolactone (ALDACTONE) 25 mg tablet Take 25 mg by mouth.     Historical Provider     Patient Active Problem List   Diagnosis Code    Coronary artery disease involving native coronary artery of native heart without angina pectoris I25.10    Bronchitis J40    High cholesterol E78.00    History of cardiomyopathy Z86.79    SOB (shortness of breath) R06.02    Neck pain, bilateral M54.2    Shoulder pain, bilateral M25.511, M25.512    Muscle spasticity M62.838    Hemiparesis and alteration of sensations as late effects of stroke ([facility]) I69.359, I69.398    Head pain, chronic R51, G89.29    Expressive aphasia R47.01    Heart palpitations R00.2    Ventricular ectopic activity I49.3    Stroke ([address] I63.9    Thyroid disease E07.9    Hypercholesterolemia E78.00    EDDIE on CPAP G47.33, Z99.89    Essential hypertension I10    Congestive heart failure (HCC) I50.9    Chest pain R07.9    Melena K92.1    Hiatal hernia K44.9    Postoperative hypothyroidism E89.0    Stroke (cerebrum) ([facility]) I63.9    Chronic pain G89.29    Prediabetes R73.03    Constipation K59.00    Insomnia G47.00    Left-sided weakness R53.1    Vitamin D deficiency E55.9    Obesity, morbid ([facility]) E66.01    Chronic indwelling Dunn catheter Z92.89

## 2018-03-27 NOTE — PROGRESS NOTES
Shannon Hays  Identified pt with two pt identifiers(name and ). Chief Complaint   Patient presents with    Depression     1m f/u    Sleep Problem     1m f/u     Pt states the Leeroy Brill is not working. Medication does not cause any sleepiness. Pt to discuss Zoster vaccine and cost.    1. Have you been to the ER, urgent care clinic since your last visit? Hospitalized since your last visit? NO    2. Have you seen or consulted any other health care providers outside of the [address] [place] [address] since your last visit? Include any pap smears or colon screening. NO    Today's provider has been notified of reason for visit, vitals and flowsheets obtained on patients.      Patient received paperwork for advance directive during previous visit but has not completed at this time     Reviewed record In preparation for visit, huddled with provider and have obtained necessary documentation      Health Maintenance Due   Topic    ZOSTER VACCINE AGE 60>        Wt Readings from Last 3 Encounters:   18 274 lb 12.8 oz (124.6 kg)   18 276 lb (125.2 kg)   18 278 lb (126.1 kg)     Temp Readings from Last 3 Encounters:   18 97.2 °F (36.2 °C) (Oral)   18 97.3 °F (36.3 °C) (Oral)   18 96.8 °F (36 °C) (Oral)     BP Readings from Last 3 Encounters:   18 118/66   18 108/69   18 97/64     Pulse Readings from Last 3 Encounters:   18 65   18 94   18 82     Vitals:    18 1039   BP: 118/66   Pulse: 65   Resp: 20   Temp: 97.2 °F (36.2 °C)   TempSrc: Oral   SpO2: 97%   Weight: 274 lb 12.8 oz (124.6 kg)   Height: 5' 6\" (1.676 m)   PainSc:   6   PainLoc: Shoulder         Learning Assessment:  :     Learning Assessment 10/13/2017 3/24/2015   PRIMARY LEARNER Patient Patient   HIGHEST LEVEL OF EDUCATION - PRIMARY LEARNER  - SOME COLLEGE   BARRIERS PRIMARY LEARNER - [address] CAREGIVER - No   CO-LEARNER NAME - n/a   PRIMARY LANGUAGE ENGLISH ENGLISH   LEARNER PREFERENCE PRIMARY LISTENING LISTENING   ANSWERED BY patient patient   RELATIONSHIP SELF SELF       Depression Screening:  :     PHQ over the last two weeks 2/27/2018   Little interest or pleasure in doing things Not at all   Feeling down, depressed or hopeless Not at all   Total Score PHQ 2 0       Fall Risk Assessment:  :     Fall Risk Assessment, last 12 mths 6/13/2017   Able to walk? Yes   Fall in past 12 months? Yes   Fall with injury? No   Number of falls in past 12 months 1   Fall Risk Score 1       Abuse Screening:  :     No flowsheet data found. ADL Screening:  :     ADL Assessment 6/13/2017   Feeding yourself No Help Needed   Getting from bed to chair Help Needed   Getting dressed Help Needed   Bathing or showering Help Needed   Walk across the room (includes cane/walker) Help Needed   Using the telphone No Help Needed   Taking your medications No Help Needed   Preparing meals Help Needed   Managing money (expenses/bills) No Help Needed   Moderately strenuous housework (laundry) Help Needed   Shopping for personal items (toiletries/medicines) No Help Needed   Shopping for groceries No Help Needed   Driving No Help Needed   Climbing a flight of stairs Help Needed   Getting to places beyond walking distances No Help Needed                 Medication reconciliation up to date and corrected with patient at this time.

## 2018-03-27 NOTE — PATIENT INSTRUCTIONS
Da Foster TODAY, please go to:   CHECK OUT        Please schedule the following appointments at CHECK OUT:  Insomnia follow up with Dr. Felipe Altamirano in 4 weeks     Today's Plan:  - stop sonata    - start rozerem  - let Dr. Felipe Altamirano know is about 2 weeks if it is not working. Then we will try remeron.

## 2018-03-27 NOTE — MR AVS SNAPSHOT
[address] [facility] [address] 
 
 
 [address] 
[address] [facility] [address] 
[phone] Patient: Obdulia Chang MRN: [id number] DRK:1/29/8850 Visit Information Date & Time Provider Department Dept. Phone Encounter #  
 3/27/2018 10:20 AM Ingris Sy MD [place] [id number] [phone] [id number] Your Appointments 4/12/2018  9:40 AM  
Follow Up with [address] [place], [place] [address]DO Rogerse Mei [facility] [address] [place] [address]) Appt Note: f/u Headaches / [place] [address] [facility] [address]  
[phone]  
  
   
 [address] [place] [id number]  
  
    
 4/27/2018  1:50 PM  
ROUTINE CARE with Abril Pete MD  
[facility] [address] [facility]) Appt Note: f/u diabetes cp0.00  
 [address] [address]  
[address] [facility] [address]  
  
    
 11/7/2018  9:20 AM  
ESTABLISHED PATIENT with Mahin Elizondo MD  
[facility] ([address]) Appt Note: 1 yr f/u  
 [address] [place] [address]  
[address] [address] Upcoming Health Maintenance Date Due ZOSTER VACCINE AGE 60> 3/27/2018 MEDICARE YEARLY EXAM 6/14/2018 PAP AKA CERVICAL CYTOLOGY 11/13/2018 BREAST CANCER SCRN MAMMOGRAM 8/4/2019 COLONOSCOPY 6/22/2021 DTaP/Tdap/Td series (2 - Td) 6/13/2027 Allergies as of 3/27/2018  Review Complete On: 3/27/2018 By: Virgie Austin LPN Severity Noted Reaction Type Reactions Bees [Hymenoptera Allergenic Extract] High 10/14/2014   Systemic Shortness of Breath, Swelling [place] High 10/14/2014   Systemic Shortness of Breath, Swelling Lisinopril  10/17/2014    Cough Current Immunizations  Reviewed on 10/11/2016 Name Date Influenza Vaccine Bambi Seller) 11/13/2015 Influenza Vaccine (Quad) PF 10/20/2017, 10/11/2016 Pneumococcal Polysaccharide (PPSV-23) 8/29/2016 Tdap 6/13/2017 Not reviewed this visit You Were Diagnosed With   
  
 Codes Comments Insomnia, unspecified type    -  Primary ICD-10-CM: G47.00 ICD-9-CM: 780.52 Other chronic pain     ICD-10-CM: G89.29 ICD-9-CM: 338.29 Vitals BP Pulse Temp Resp Height(growth percentile) Weight(growth percentile)  
 118/66 (BP 1 Location: Right arm, BP Patient Position: Sitting) 65 97.2 °F (36.2 °C) (Oral) 20 5' 6\" (1.676 m) 274 lb 12.8 oz (124.6 kg) SpO2 BMI OB Status Smoking Status 97% 44.35 kg/m2 Postmenopausal Former Smoker Vitals History BMI and BSA Data Body Mass Index Body Surface Area 44.35 kg/m 2 2.41 m 2 Preferred Pharmacy Pharmacy Name Phone [address] [phone] Your Updated Medication List  
  
   
This list is accurate as of 3/27/18 11:33 AM.  Always use your most recent med list.  
  
  
  
  
 albuterol 90 mcg/actuation inhaler Commonly known as:  PROVENTIL HFA, VENTOLIN HFA, PROAIR HFA Take 2 Puffs by inhalation every four (4) hours as needed for Wheezing or Shortness of Breath. albuterol-ipratropium 2.5 mg-0.5 mg/3 ml Nebu Commonly known as:  DUO-NEB  
3 mL by Nebulization route every six (6) hours as needed. aspirin delayed-release 81 mg tablet Take 81 mg by mouth daily. carvedilol 6.25 mg tablet Commonly known as:  Clerance Barley Take 1 Tab by mouth two (2) times daily (with meals). cyanocobalamin 1,000 mcg/mL injection Commonly known as:  VITAMIN B12  
1 mL by SubCUTAneous route every fourteen (14) days. For b12 deficiency  
  
 cyclobenzaprine 5 mg tablet Commonly known as:  FLEXERIL Take 5 mg by mouth two (2) times a day. DULoxetine 60 mg capsule Commonly known as:  CYMBALTA Take 1 Cap by mouth daily. EPINEPHrine 0.3 mg/0.3 mL injection Commonly known as:  EPIPEN 2-LAURA  
0.3 mL by IntraMUSCular route once as needed for up to 1 dose. EPINEPHrine 1 mg/mL nasal solution Commonly known as:  ADRENALIN  
0.5 mL by Nasal route.  
  
 ergocalciferol 50,000 unit capsule Commonly known as:  ERGOCALCIFEROL Take 1 Cap by mouth daily. High needs due to gastric bypass history  
  
 ferrous sulfate 325 mg (65 mg iron) tablet Take 325 mg by mouth Daily (before breakfast). furosemide 40 mg tablet Commonly known as:  LASIX Take 1 Tab by mouth daily. glucose blood VI test strips strip Commonly known as:  blood glucose test  
For use with glucometer to check blood sugar once a day in morning before eating. Please dispense VERIO FLEX STRIPS HYDROcodone-acetaminophen 5-325 mg per tablet Commonly known as:  [place] Take 1 Tab by mouth every six (6) hours as needed for Pain. Max Daily Amount: 4 Tabs. indapamide 2.5 mg tablet Commonly known as:  Waylan Casper Take 2.5 mg by mouth.  
  
 levothyroxine 150 mcg tablet Commonly known as:  SYNTHROID Take 1 Tab by mouth Daily (before breakfast). lidocaine 5 % Commonly known as:  Nader Delatorre Apply patch to the affected area for 12 hours a day and remove for 12 hours a day. linaclotide 145 mcg Cap capsule Commonly known as:  Tip Briggs Take 1 Cap by mouth Daily (before breakfast). For constipation  
  
 losartan 50 mg tablet Commonly known as:  COZAAR Take 1 Tab by mouth nightly. Hold for SBP<115  
  
 metFORMIN 1,000 mg tablet Commonly known as:  GLUCOPHAGE Take 1 Tab by mouth two (2) times daily (with meals). morphine CR 15 mg CR tablet Commonly known as:  MS CONTIN Take 1 Tab by mouth every twelve (12) hours. Max Daily Amount: 30 mg.  
  
 pantoprazole 40 mg tablet Commonly known as:  PROTONIX Take 1 Tab by mouth daily. ramelteon 8 mg tablet Commonly known as:  ROZEREM  
 Take 1 Tab by mouth nightly. Take 30 min before bedtime  
  
 spironolactone 25 mg tablet Commonly known as:  ALDACTONE Take 25 mg by mouth. topiramate 100 mg tablet Commonly known as:  TOPAMAX Take 1 Tab by mouth two (2) times a day. Prescriptions Sent to Pharmacy Refills  
 metFORMIN (GLUCOPHAGE) 1,000 mg tablet 3 Sig: Take 1 Tab by mouth two (2) times daily (with meals). Class: Normal  
 Pharmacy: [address] [place] [address] Ph #: [phone] Route: Oral  
 DULoxetine (CYMBALTA) 60 mg capsule 3 Sig: Take 1 Cap by mouth daily. Class: Normal  
 Pharmacy: [address] [place] [address] Ph #: [phone] Route: Oral  
 ramelteon (ROZEREM) 8 mg tablet 1 Sig: Take 1 Tab by mouth nightly. Take 30 min before bedtime Class: Normal  
 Pharmacy: [address] [place] [address] Ph #: [phone] Route: Oral  
  
Patient Instructions Doreen Gone TODAY, please go to: CHECK OUT Please schedule the following appointments at CHECK OUT: 
Insomnia follow up with Dr. Army Hamilton in 4 weeks Today's Plan: 
- stop sonata 
 
- start rozerem 
- let Dr. Army Hamilton know is about 2 weeks if it is not working. Then we will try remeron. Introducing [facility]! Dear Prashant Arias: Thank you for requesting a [employer] account. Our records indicate that you already have an active [employer] account. You can access your account anytime at [email or web address] Did you know that you can access your hospital and ER discharge instructions at any time in [employer]? You can also review all of your test results from your hospital stay or ER visit. Additional Information If you have questions, please visit the Frequently Asked Questions section of the [employer] website at [email or web address] Remember, [employer] is NOT to be used for urgent needs.  For medical emergencies, dial 911. Now available from your iPhone and Android! Please provide this summary of care documentation to your next provider. Your primary care clinician is listed as Dima Crenshaw. If you have any questions after today's visit, please call [phone].

## 2018-03-28 LAB
25(OH)D3+25(OH)D2 SERPL-MCNC: 121 NG/ML (ref 30–100)
ALBUMIN SERPL-MCNC: 4.1 G/DL (ref 3.5–5.5)
ALBUMIN/GLOB SERPL: 1.8 {RATIO} (ref 1.2–2.2)
ALP SERPL-CCNC: 114 IU/L (ref 39–117)
ALT SERPL-CCNC: 24 IU/L (ref 0–32)
AST SERPL-CCNC: 24 IU/L (ref 0–40)
BILIRUB SERPL-MCNC: 0.3 MG/DL (ref 0–1.2)
BUN SERPL-MCNC: 15 MG/DL (ref 6–24)
BUN/CREAT SERPL: 17 (ref 9–23)
CALCIUM SERPL-MCNC: 9.1 MG/DL (ref 8.7–10.2)
CHLORIDE SERPL-SCNC: 105 MMOL/L (ref 96–106)
CO2 SERPL-SCNC: 24 MMOL/L (ref 18–29)
CREAT SERPL-MCNC: 0.86 MG/DL (ref 0.57–1)
GFR SERPLBLD CREATININE-BSD FMLA CKD-EPI: 74 ML/MIN/1.73
GFR SERPLBLD CREATININE-BSD FMLA CKD-EPI: 86 ML/MIN/1.73
GLOBULIN SER CALC-MCNC: 2.3 G/DL (ref 1.5–4.5)
GLUCOSE SERPL-MCNC: 99 MG/DL (ref 65–99)
INTERPRETATION: NORMAL
POTASSIUM SERPL-SCNC: 4.2 MMOL/L (ref 3.5–5.2)
PROT SERPL-MCNC: 6.4 G/DL (ref 6–8.5)
PTH-INTACT SERPL-MCNC: 43 PG/ML (ref 15–65)
SODIUM SERPL-SCNC: 143 MMOL/L (ref 134–144)
T4 FREE SERPL-MCNC: 1.61 NG/DL (ref 0.82–1.77)
TSH SERPL DL<=0.005 MIU/L-ACNC: 0.44 UIU/ML (ref 0.45–4.5)

## 2018-03-29 NOTE — PROGRESS NOTES
Will send Rhone Apparelt message recommending decrease in Vit D to 5 days/week and levothyroxine to 6.5 tablets/week. Calcium and PTH levels look very good so higher vit D has helped with secondary hyperparathyroidism.

## 2018-04-04 ENCOUNTER — PATIENT OUTREACH (OUTPATIENT)
Dept: FAMILY MEDICINE CLINIC | Age: 58
End: 2018-04-04

## 2018-04-04 NOTE — PROGRESS NOTES
NN Note    [facility] 2018 Patient Annual Health Assessment Form received. Copy of 3/27/2018 Encounter note printed & submitted with form.

## 2018-04-12 ENCOUNTER — OFFICE VISIT (OUTPATIENT)
Dept: NEUROLOGY | Age: 58
End: 2018-04-12

## 2018-04-12 VITALS
BODY MASS INDEX: 43.55 KG/M2 | OXYGEN SATURATION: 98 % | HEART RATE: 82 BPM | HEIGHT: 66 IN | RESPIRATION RATE: 16 BRPM | DIASTOLIC BLOOD PRESSURE: 78 MMHG | WEIGHT: 271 LBS | SYSTOLIC BLOOD PRESSURE: 112 MMHG

## 2018-04-12 DIAGNOSIS — G43.709 CHRONIC MIGRAINE W/O AURA W/O STATUS MIGRAINOSUS, NOT INTRACTABLE: Primary | ICD-10-CM

## 2018-04-12 DIAGNOSIS — G81.14 LEFT SPASTIC HEMIPARESIS (HCC): ICD-10-CM

## 2018-04-12 RX ORDER — TOPIRAMATE 100 MG/1
100 TABLET, FILM COATED ORAL 2 TIMES DAILY
Qty: 60 TAB | Refills: 6 | Status: SHIPPED | OUTPATIENT
Start: 2018-04-12 | End: 2018-12-18 | Stop reason: SDUPTHER

## 2018-04-12 RX ORDER — BACLOFEN 10 MG/1
10 TABLET ORAL 3 TIMES DAILY
Qty: 90 TAB | Refills: 3 | Status: SHIPPED | OUTPATIENT
Start: 2018-04-12 | End: 2018-09-01 | Stop reason: SDUPTHER

## 2018-04-12 NOTE — PROGRESS NOTES
Chief Complaint   Patient presents with    Migraine       HPI    57-year-old woman here to follow-up on chronic migraine. Last visit we titrated Topamax 100 mg twice daily but she still has 4 days of headache pain per week without much change despite medication increase. Additionally, she is complaining of a new issue which is left arm spasm. This is new since I last saw her. She does have a history of left hemiparesis due to old stroke. She finds that her left arm wants to flex at the elbow and her wrist and fingers curl. Pain is persistent. Migraine medication history: Topiramate, duloxetine, valproic acid, losartan, amlodipine    Review of Systems   Eyes: Positive for photophobia. Gastrointestinal: Positive for nausea. Musculoskeletal: Positive for myalgias. Neurological: Positive for headaches. All other systems reviewed and are negative. Past Medical History:   Diagnosis Date    Advanced care planning/counseling discussion 3/4/16    On File    Bronchitis 2/24/2014    Cervicalgia 8/18/15    Dr. Noni Russell    Chest pain 5/25/15    [facility] 5/25/15 (lab work negative)    Chronic indwelling Dunn catheter 1/24/2018    Initially placed Jan 2018. Mx by Dr. Perla Eugene.  Congestive heart failure, unspecified     Last Echo 2/8/15: EF 55-60%    Constipation 6/13/2017    Enthesopathy, spinal ([facility] [address] 8/18/15    Dr. Noni Russell    Essential hypertension     Foot drop 8/18/15    Dr. Irais Alberto Heart attack [facility]) 2/24/2013    Was supposed to See Cardiology for possible pacemaker in november 2014- After Cardiology consult locally, no need, EF greatly improved.  Established with Dr. Sharifa Epps Hiatal hernia 6/2015    3 cm hiatal hernia     Hip pain 8/18/15    Dr. oNni Russell    Hypercholesterolemia     Hyperglycemia 7/2015    A1c 5.9     Hyperlipidemia 6/30/2015    NMR lipoprofile- LDL P 997, LDL-c 71, HLD-C-39, TG-60, HLD-P (25.2), Small LDL-P -541, LDL size 20.6    Hypothyroid     Insomnia 6/13/2017    Lower extremity edema     Lumbar spondylosis 8/18/15    Dr. Kennedy Gene 6/2015    EGD/Colonscopy 6/15- Gastritis, internal hemorrhoids and 3 polyps    Murmur     EDDIE on CPAP     Was referred to Pulmonology - Uses CPAP    Osteoarthritis of hip 8/18/15    Dr. Leo Ding    Osteoarthritis, shoulder 8/18/15    Dr. Leo Ding    Radiculopathy, cervical 8/18/15    Dr. Leo Ding    Shoulder pain 8/18/15    Dr. Sd Clements Spinal stenosis of cervical region 8/18/15    Dr. Sd Clements Spinal stenosis, lumbar 8/18/15    Dr. Sd Clements Stroke [facility]) 2/25/2014    Established with Neurology, Azul Rodriguez NP-[facility]/10[facility]15. CT negative, but Late effect CV accident with increased tone described on discharge summary, Carotid dopplers showed 50% stenosis bilaterally.  Vitamin D deficiency 7/2015     Family History   Problem Relation Age of Onset    Heart Disease Father 61    Hypertension Mother     Heart Disease Brother 62    Diabetes Maternal Grandmother      Social History     Social History    Marital status: SINGLE     Spouse name: N/A    Number of children: N/A    Years of education: N/A     Occupational History    Not on file. Social History Main Topics    Smoking status: Former Smoker     Packs/day: 0.25     Years: 15.00     Types: Cigarettes     Quit date: 6/13/2007    Smokeless tobacco: Never Used    Alcohol use No    Drug use: No    Sexual activity: No     Other Topics Concern    Not on file     Social History Narrative     Allergies   Allergen Reactions    Bees [Hymenoptera Allergenic Extract] Shortness of Breath and Swelling    Strawberry Shortness of Breath and Swelling    Lisinopril Cough         Current Outpatient Prescriptions   Medication Sig    onabotulinumtoxinA (BOTOX) 200 unit injection 200 Units by IntraMUSCular route every three (3) months.  Inject to selected muscles head and neck bilaterally every 3 months for migraine  Indications: MIGRAINE PREVENTION  topiramate (TOPAMAX) 100 mg tablet Take 1 Tab by mouth two (2) times a day.  baclofen (LIORESAL) 10 mg tablet Take 1 Tab by mouth three (3) times daily.  EPINEPHrine (ADRENALIN) 1 mg/mL nasal solution 0.5 mL by Nasal route.  indapamide (LOZOL) 2.5 mg tablet Take 2.5 mg by mouth.  spironolactone (ALDACTONE) 25 mg tablet Take 25 mg by mouth.  DULoxetine (CYMBALTA) 60 mg capsule Take 1 Cap by mouth daily.  ramelteon (ROZEREM) 8 mg tablet Take 1 Tab by mouth nightly. Take 30 min before bedtime    pantoprazole (PROTONIX) 40 mg tablet Take 1 Tab by mouth daily.  levothyroxine (SYNTHROID) 150 mcg tablet Take 1 Tab by mouth Daily (before breakfast).  ergocalciferol (ERGOCALCIFEROL) 50,000 unit capsule Take 1 Cap by mouth daily. High needs due to gastric bypass history    furosemide (LASIX) 40 mg tablet Take 1 Tab by mouth daily.  glucose blood VI test strips (BLOOD GLUCOSE TEST) strip For use with glucometer to check blood sugar once a day in morning before eating. Please dispense VERIO FLEX STRIPS    lidocaine (LIDODERM) 5 % Apply patch to the affected area for 12 hours a day and remove for 12 hours a day.  carvedilol (COREG) 6.25 mg tablet Take 1 Tab by mouth two (2) times daily (with meals).  morphine CR (MS CONTIN) 15 mg CR tablet Take 1 Tab by mouth every twelve (12) hours. Max Daily Amount: 30 mg. (Patient taking differently: Take 15 mg by mouth daily.)    HYDROcodone-acetaminophen (NORCO) 5-325 mg per tablet Take 1 Tab by mouth every six (6) hours as needed for Pain. Max Daily Amount: 4 Tabs.  albuterol-ipratropium (DUO-NEB) 2.5 mg-0.5 mg/3 ml nebu 3 mL by Nebulization route every six (6) hours as needed.  losartan (COZAAR) 50 mg tablet Take 1 Tab by mouth nightly. Hold for SBP<115    cyclobenzaprine (FLEXERIL) 5 mg tablet Take 5 mg by mouth two (2) times a day.     EPINEPHrine (EPIPEN 2-LAURA) 0.3 mg/0.3 mL injection 0.3 mL by IntraMUSCular route once as needed for up to 1 dose.  cyanocobalamin (VITAMIN B12) 1,000 mcg/mL injection 1 mL by SubCUTAneous route every fourteen (14) days. For b12 deficiency    albuterol (PROVENTIL HFA, VENTOLIN HFA, PROAIR HFA) 90 mcg/actuation inhaler Take 2 Puffs by inhalation every four (4) hours as needed for Wheezing or Shortness of Breath.  linaclotide (LINZESS) 145 mcg cap capsule Take 1 Cap by mouth Daily (before breakfast). For constipation    ferrous sulfate 325 mg (65 mg iron) tablet Take 325 mg by mouth Daily (before breakfast).  aspirin delayed-release 81 mg tablet Take 81 mg by mouth daily.  metFORMIN (GLUCOPHAGE) 1,000 mg tablet Take 1 Tab by mouth two (2) times daily (with meals). No current facility-administered medications for this visit. Neurologic Exam     Mental Status        WD/WN adult in NAD, normal grooming  VSS  A&O x 3    PERRL, nonicteric  Face is mildly asymmetric to the left, tongue midline  Speech is slightly dysarthric but baseline  No limb ataxia. No abnl movements. Left arm slightly increased tone, 4/5 left side  Wide slow gait with Rollator    CVS RRR  Lungs nonlabored  Skin is warm and dry         Visit Vitals    /78    Pulse 82    Resp 16    Ht 5' 6\" (1.676 m)    Wt 122.9 kg (271 lb)    SpO2 98%    BMI 43.74 kg/m2       Assessment and Plan   Diagnoses and all orders for this visit:    1. Chronic migraine w/o aura w/o status migrainosus, not intractable    2. Left spastic hemiparesis (HCC)    Other orders  -     onabotulinumtoxinA (BOTOX) 200 unit injection; 200 Units by IntraMUSCular route every three (3) months. Inject to selected muscles head and neck bilaterally every 3 months for migraine  Indications: MIGRAINE PREVENTION  -     topiramate (TOPAMAX) 100 mg tablet; Take 1 Tab by mouth two (2) times a day. -     baclofen (LIORESAL) 10 mg tablet; Take 1 Tab by mouth three (3) times daily.       49-year-old woman with chronic migraine headaches not having good control yet despite medications. She has more than 15 days of headache pain per month. She has been on anticonvulsants, antidepressants, antihypertensives. I do recommend that we attempt Botox injections for chronic migraine at this point. I have placed the order. Additionally, she is having increasing spasticity now of the left arm likely due to her history of stroke. We will try baclofen initially. May need to consider Botox injections to the left limb for upper limb spasticity at some point if needed. I will see her in 3 months for med management and prior to that for Botox injections for chronic migraine.         [address] [facility], [facility] [address]  Diplomate GREGN

## 2018-04-12 NOTE — MR AVS SNAPSHOT
[facility] [address] 
[phone] Patient: Radha Nguyen MRN: [id number] IFN:[id number] Visit Information Date & Time Provider Department Dept. Phone Encounter #  
 4/12/2018  9:40 AM DO Mor Evans [facility] at [address] [place] [address] Road [id number] Follow-up Instructions Return in about 3 months (around 7/12/2018). Routing History Your Appointments 4/12/2018  9:40 AM  
Follow Up with DO Mor Marcial [facility] at [facility] Appt Note: f/u Headaches / [facility] [address]  
[facility] [address]  
  
    
 4/24/2018 10:00 AM  
ROUTINE CARE with Theo Reed. Fiona [place] [address] ([facility]) Appt Note: follow up  
 [address] [facility] 
[address] [facility]  
  
   
 [address] [facility] [address]  
  
    
 4/27/2018  1:50 PM  
ROUTINE CARE with Kaykay Hannah MD  
[address] [facility]) Appt Note: f/u diabetes cp0.00  
 [address] [address]  
[address] [facility] [address]  
  
    
 11/7/2018  9:20 AM  
ESTABLISHED PATIENT with Fany Jo MD  
[facility] ([facility]) Appt Note: 1 yr f/u  
 [address] [place] [address]  
[address] [address] Upcoming Health Maintenance Date Due ZOSTER VACCINE AGE 60> 3/27/2018 MEDICARE YEARLY EXAM 6/14/2018 PAP AKA CERVICAL CYTOLOGY 11/13/2018 BREAST CANCER SCRN MAMMOGRAM 8/4/2019 COLONOSCOPY 6/22/2021 DTaP/Tdap/Td series (2 - Td) 6/13/2027 Allergies as of 4/12/2018  Review Complete On: 4/12/2018 By: Eladio Arevalo Severity Noted Reaction Type Reactions Bees [Hymenoptera Allergenic Extract] High 10/14/2014   Systemic Shortness of Breath, Swelling Beecher High 10/14/2014   Systemic Shortness of Breath, Swelling Lisinopril  10/17/2014    Cough Current Immunizations  Reviewed on 10/11/2016 Name Date Influenza Vaccine Haven Sallies) 11/13/2015 Influenza Vaccine (Quad) PF 10/20/2017, 10/11/2016 Pneumococcal Polysaccharide (PPSV-23) 8/29/2016 Tdap 6/13/2017 Not reviewed this visit You Were Diagnosed With   
  
 Codes Comments Chronic migraine w/o aura w/o status migrainosus, not intractable    -  Primary ICD-10-CM: D12.045 ICD-9-CM: 346.70 Left spastic hemiparesis (HCC)     ICD-10-CM: G81.14 
ICD-9-CM: 342.10 Vitals BP Pulse Resp Height(growth percentile) Weight(growth percentile) SpO2  
 112/78 82 16 5' 6\" (1.676 m) 271 lb (122.9 kg) 98% BMI OB Status Smoking Status 43.74 kg/m2 Postmenopausal Former Smoker Vitals History BMI and BSA Data Body Mass Index Body Surface Area 43.74 kg/m 2 2.39 m 2 Preferred Pharmacy Pharmacy Name Phone [address] [phone] Your Updated Medication List  
  
   
This list is accurate as of 4/12/18  9:36 AM.  Always use your most recent med list.  
  
  
  
  
 albuterol 90 mcg/actuation inhaler Commonly known as:  PROVENTIL HFA, VENTOLIN HFA, PROAIR HFA Take 2 Puffs by inhalation every four (4) hours as needed for Wheezing or Shortness of Breath. albuterol-ipratropium 2.5 mg-0.5 mg/3 ml Nebu Commonly known as:  DUO-NEB  
3 mL by Nebulization route every six (6) hours as needed. aspirin delayed-release 81 mg tablet Take 81 mg by mouth daily. baclofen 10 mg tablet Commonly known as:  LIORESAL Take 1 Tab by mouth three (3) times daily. carvedilol 6.25 mg tablet Commonly known as:  Pennyottoniel Araceli Take 1 Tab by mouth two (2) times daily (with meals). cyanocobalamin 1,000 mcg/mL injection Commonly known as:  VITAMIN B12  
1 mL by SubCUTAneous route every fourteen (14) days. For b12 deficiency  
  
 cyclobenzaprine 5 mg tablet Commonly known as:  FLEXERIL Take 5 mg by mouth two (2) times a day. DULoxetine 60 mg capsule Commonly known as:  CYMBALTA Take 1 Cap by mouth daily. EPINEPHrine 0.3 mg/0.3 mL injection Commonly known as:  EPIPEN 2-LAURA  
0.3 mL by IntraMUSCular route once as needed for up to 1 dose. EPINEPHrine 1 mg/mL nasal solution Commonly known as:  ADRENALIN  
0.5 mL by Nasal route.  
  
 ergocalciferol 50,000 unit capsule Commonly known as:  ERGOCALCIFEROL Take 1 Cap by mouth daily. High needs due to gastric bypass history  
  
 ferrous sulfate 325 mg (65 mg iron) tablet Take 325 mg by mouth Daily (before breakfast). furosemide 40 mg tablet Commonly known as:  LASIX Take 1 Tab by mouth daily. glucose blood VI test strips strip Commonly known as:  blood glucose test  
For use with glucometer to check blood sugar once a day in morning before eating. Please dispense VERIO FLEX STRIPS HYDROcodone-acetaminophen 5-325 mg per tablet Commonly known as:  Billye [place] Take 1 Tab by mouth every six (6) hours as needed for Pain. Max Daily Amount: 4 Tabs. indapamide 2.5 mg tablet Commonly known as:  Wandra [place] Take 2.5 mg by mouth.  
  
 levothyroxine 150 mcg tablet Commonly known as:  SYNTHROID Take 1 Tab by mouth Daily (before breakfast). lidocaine 5 % Commonly known as:  Chan Kelly Apply patch to the affected area for 12 hours a day and remove for 12 hours a day. linaclotide 145 mcg Cap capsule Commonly known as:  Neelam Campos Take 1 Cap by mouth Daily (before breakfast). For constipation  
  
 losartan 50 mg tablet Commonly known as:  COZAAR  
 Take 1 Tab by mouth nightly. Hold for SBP<115  
  
 metFORMIN 1,000 mg tablet Commonly known as:  GLUCOPHAGE Take 1 Tab by mouth two (2) times daily (with meals). morphine CR 15 mg CR tablet Commonly known as:  MS CONTIN Take 1 Tab by mouth every twelve (12) hours. Max Daily Amount: 30 mg.  
  
 onabotulinumtoxinA 200 unit injection Commonly known as:  BOTOX  
200 Units by IntraMUSCular route every three (3) months. Inject to selected muscles head and neck bilaterally every 3 months for migraine  Indications: MIGRAINE PREVENTION  
  
 pantoprazole 40 mg tablet Commonly known as:  PROTONIX Take 1 Tab by mouth daily. ramelteon 8 mg tablet Commonly known as:  ROZEREM Take 1 Tab by mouth nightly. Take 30 min before bedtime  
  
 spironolactone 25 mg tablet Commonly known as:  ALDACTONE Take 25 mg by mouth. topiramate 100 mg tablet Commonly known as:  TOPAMAX Take 1 Tab by mouth two (2) times a day. Prescriptions Printed Refills  
 onabotulinumtoxinA (BOTOX) 200 unit injection 3 Si Units by IntraMUSCular route every three (3) months. Inject to selected muscles head and neck bilaterally every 3 months for migraine  Indications: MIGRAINE PREVENTION Class: Print Route: IntraMUSCular Prescriptions Sent to Pharmacy Refills  
 topiramate (TOPAMAX) 100 mg tablet 6 Sig: Take 1 Tab by mouth two (2) times a day. Class: Normal  
 Pharmacy: [address] [place] [address] Ph #: [phone] Route: Oral  
 baclofen (LIORESAL) 10 mg tablet 3 Sig: Take 1 Tab by mouth three (3) times daily. Class: Normal  
 Pharmacy: [address] [place] [address] Ph #: [phone] Route: Oral  
  
Follow-up Instructions Return in about 3 months (around 2018). Introducing [facility] & HEALTH SERVICES! Dear Vanna Vicente: Thank you for requesting a [email or web address] account.   Our records indicate that you already have an active Beijingyicheng account. You can access your account anytime at [email or web address] Did you know that you can access your hospital and ER discharge instructions at any time in Beijingyicheng? You can also review all of your test results from your hospital stay or ER visit. Additional Information If you have questions, please visit the Frequently Asked Questions section of the Beijing[employer]ic[employer] website at [email or web address] Remember, Beijingyicheng is NOT to be used for urgent needs. For medical emergencies, dial 911. Now available from your iPhone and Android! Please provide this summary of care documentation to your next provider. Your primary care clinician is listed as [facility] . If you have any questions after today's visit, please call [phone].

## 2018-04-24 ENCOUNTER — OFFICE VISIT (OUTPATIENT)
Dept: FAMILY MEDICINE CLINIC | Age: 58
End: 2018-04-24

## 2018-04-24 VITALS
HEIGHT: 66 IN | HEART RATE: 74 BPM | DIASTOLIC BLOOD PRESSURE: 75 MMHG | WEIGHT: 268 LBS | TEMPERATURE: 97.3 F | BODY MASS INDEX: 43.07 KG/M2 | OXYGEN SATURATION: 98 % | SYSTOLIC BLOOD PRESSURE: 118 MMHG | RESPIRATION RATE: 16 BRPM

## 2018-04-24 DIAGNOSIS — E66.01 OBESITY, MORBID (HCC): ICD-10-CM

## 2018-04-24 DIAGNOSIS — I63.9 CEREBROVASCULAR ACCIDENT (CVA), UNSPECIFIED MECHANISM (HCC): Chronic | ICD-10-CM

## 2018-04-24 DIAGNOSIS — I69.398 HEMIPARESIS AND ALTERATION OF SENSATIONS AS LATE EFFECTS OF STROKE (HCC): ICD-10-CM

## 2018-04-24 DIAGNOSIS — Z97.8 CHRONIC INDWELLING FOLEY CATHETER: ICD-10-CM

## 2018-04-24 DIAGNOSIS — Z23 ENCOUNTER FOR IMMUNIZATION: Primary | ICD-10-CM

## 2018-04-24 DIAGNOSIS — I50.9 CHRONIC CONGESTIVE HEART FAILURE, UNSPECIFIED HEART FAILURE TYPE (HCC): ICD-10-CM

## 2018-04-24 DIAGNOSIS — I69.359 HEMIPARESIS AND ALTERATION OF SENSATIONS AS LATE EFFECTS OF STROKE (HCC): ICD-10-CM

## 2018-04-24 RX ORDER — SYRINGE-NEEDLE,INSULIN,0.5 ML 30 GX5/16"
SYRINGE, EMPTY DISPOSABLE MISCELLANEOUS
Qty: 12 SYRINGE | Refills: 1 | Status: SHIPPED | OUTPATIENT
Start: 2018-04-24 | End: 2018-04-27 | Stop reason: SDUPTHER

## 2018-04-24 NOTE — PATIENT INSTRUCTIONS
Brenden Lopez TODAY, please go to:   CHECK OUT - If you received a referral, Show the       Please schedule the following appointments at [address] [place] [address]:  Nurse visit to read PPD on Thursday or Friday (48-72 hours)  Prediabetes and weight follow up with Dr Kelly Santiago between August and October 2018    Today's Plan:  Paper done today.

## 2018-04-24 NOTE — MR AVS SNAPSHOT
Khloe Tellez 
 
 
 [address] 
[address] [place] [address] 
[phone] Patient: Valentina Navarrete MRN: [id number] [id number] Visit Information Date & Time Provider Department Dept. Phone Encounter #  
 4/24/2018 10:00 AM Raymond Conrad Booth MD [facility] [phone] [id number] Your Appointments 4/27/2018  1:50 PM  
ROUTINE CARE with MD Primitivo Lynn [facility] [address] [place] [address]) Appt Note: f/u diabetes cp0.00  
 [address] [place]  
[address] [place] [address]  
  
    
 7/10/2018 10:20 AM  
Follow Up with [address] [place] [address] Neurology Clinic at [facility] [address] [facility]) Appt Note: 3 month follow up KRU 4/12  
 [address] [place] [address]  
[address] [address]  
  
    
 11/7/2018  9:20 AM  
ESTABLISHED PATIENT with Lilly Reynolds MD  
[facility] ([facility]) Appt Note: 1 yr f/u  
 [address] [place]  
[address] [address] Upcoming Health Maintenance Date Due ZOSTER VACCINE AGE 60> 3/27/2018 MEDICARE YEARLY EXAM 6/14/2018 PAP AKA CERVICAL CYTOLOGY 11/13/2018 BREAST CANCER SCRN MAMMOGRAM 8/4/2019 COLONOSCOPY 6/22/2021 DTaP/Tdap/Td series (2 - Td) 6/13/2027 Allergies as of 4/24/2018  Review Complete On: 4/24/2018 By: Jayden South Severity Noted Reaction Type Reactions Bees [Hymenoptera Allergenic Extract] High 10/14/2014   Systemic Shortness of Breath, Swelling [place] High 10/14/2014   Systemic Shortness of Breath, Swelling Lisinopril  10/17/2014    Cough Current Immunizations  Reviewed on 10/11/2016 Name Date Influenza Vaccine Colleen Hollow) 11/13/2015 Influenza Vaccine (Quad) PF 10/20/2017, 10/11/2016 Pneumococcal Polysaccharide (PPSV-23) 8/29/2016 Tdap 6/13/2017 Not reviewed this visit You Were Diagnosed With   
  
 Codes Comments Encounter for immunization    -  Primary ICD-10-CM: F42 ICD-9-CM: V03.89 Cerebrovascular accident (CVA), unspecified mechanism ([facility]     ICD-10-CM: I63.9 ICD-9-CM: 434.91 Hemiparesis and alteration of sensations as late effects of stroke [facility])     ICD-10-CM: Y71.523, P26.484 ICD-9-CM: 438.20, 438.6 Chronic congestive heart failure, unspecified heart failure type ([facility].)     ICD-10-CM: I50.9 ICD-9-CM: 428.0 Obesity, morbid ([facility].)     ICD-10-CM: E66.01 
ICD-9-CM: 278.01 Chronic indwelling Dunn catheter     ICD-10-CM: Z92.89 ICD-9-CM: V45.89 Vitals BP Pulse Temp Resp Height(growth percentile) Weight(growth percentile) 118/75 (BP 1 Location: Left arm, BP Patient Position: Sitting) 74 97.3 °F (36.3 °C) (Oral) 16 5' 6\" (1.676 m) 268 lb (121.6 kg) SpO2 BMI OB Status Smoking Status 98% 43.26 kg/m2 Postmenopausal Former Smoker Vitals History BMI and BSA Data Body Mass Index Body Surface Area  
 43.26 kg/m 2 2.38 m 2 Preferred Pharmacy Pharmacy Name Phone 500 [address] [phone] Your Updated Medication List  
  
   
This list is accurate as of 4/24/18 11:29 AM.  Always use your most recent med list.  
  
  
  
  
 albuterol 90 mcg/actuation inhaler Commonly known as:  PROVENTIL HFA, VENTOLIN HFA, PROAIR HFA Take 2 Puffs by inhalation every four (4) hours as needed for Wheezing or Shortness of Breath. albuterol-ipratropium 2.5 mg-0.5 mg/3 ml Nebu Commonly known as:  DUO-NEB  
3 mL by Nebulization route every six (6) hours as needed. aspirin delayed-release 81 mg tablet Take 81 mg by mouth daily. baclofen 10 mg tablet Commonly known as:  LIORESAL Take 1 Tab by mouth three (3) times daily. carvedilol 6.25 mg tablet Commonly known as:  Penjose g Philliper Take 1 Tab by mouth two (2) times daily (with meals). cyanocobalamin 1,000 mcg/mL injection Commonly known as:  VITAMIN B12  
1 mL by SubCUTAneous route every fourteen (14) days. For b12 deficiency  
  
 cyclobenzaprine 5 mg tablet Commonly known as:  FLEXERIL Take 5 mg by mouth two (2) times a day. DULoxetine 60 mg capsule Commonly known as:  CYMBALTA Take 1 Cap by mouth daily. EPINEPHrine 0.3 mg/0.3 mL injection Commonly known as:  EPIPEN 2-LAURA  
0.3 mL by IntraMUSCular route once as needed for up to 1 dose. EPINEPHrine 1 mg/mL nasal solution Commonly known as:  ADRENALIN  
0.5 mL by Nasal route. ferrous sulfate 325 mg (65 mg iron) tablet Take 325 mg by mouth Daily (before breakfast). furosemide 40 mg tablet Commonly known as:  LASIX Take 1 Tab by mouth daily. glucose blood VI test strips strip Commonly known as:  blood glucose test  
For use with glucometer to check blood sugar once a day in morning before eating. Please dispense VERIO FLEX STRIPS HYDROcodone-acetaminophen 5-325 mg per tablet Commonly known as:  [address] Take 1 Tab by mouth every six (6) hours as needed for Pain. Max Daily Amount: 4 Tabs. Insulin Syringe-Needle U-100 1 mL 30 gauge x 5/16 Syrg Commonly known as:  INSULIN SYRINGE For use to administer b12 injections  
  
 levothyroxine 150 mcg tablet Commonly known as:  SYNTHROID Take 1 Tab by mouth Daily (before breakfast). lidocaine 5 % Commonly known as:  Chan Kelly Apply patch to the affected area for 12 hours a day and remove for 12 hours a day. linaclotide 145 mcg Cap capsule Commonly known as:  Neelam Campos Take 1 Cap by mouth Daily (before breakfast). For constipation  
  
 losartan 50 mg tablet Commonly known as:  COZAAR  
 Take 1 Tab by mouth nightly. Hold for SBP<115  
  
 metFORMIN 1,000 mg tablet Commonly known as:  GLUCOPHAGE Take 1 Tab by mouth two (2) times daily (with meals). morphine CR 15 mg CR tablet Commonly known as:  MS CONTIN Take 1 Tab by mouth every twelve (12) hours. Max Daily Amount: 30 mg.  
  
 onabotulinumtoxinA 200 unit injection Commonly known as:  BOTOX  
200 Units by IntraMUSCular route every three (3) months. Inject to selected muscles head and neck bilaterally every 3 months for migraine  Indications: MIGRAINE PREVENTION  
  
 pantoprazole 40 mg tablet Commonly known as:  PROTONIX Take 1 Tab by mouth daily. ramelteon 8 mg tablet Commonly known as:  ROZEREM Take 1 Tab by mouth nightly. Take 30 min before bedtime  
  
 topiramate 100 mg tablet Commonly known as:  TOPAMAX Take 1 Tab by mouth two (2) times a day. varicella-zoster recombinant (PF) 50 mcg/0.5 mL Susr injection Commonly known as:  SHINGRIX (PF)  
0.5 mL by IntraMUSCular route once for 1 dose. Administer second dose 2-6 months after first dose. Prescriptions Printed Refills  
 varicella-zoster recombinant, PF, (SHINGRIX, PF,) 50 mcg/0.5 mL susr injection 1 Si.5 mL by IntraMUSCular route once for 1 dose. Administer second dose 2-6 months after first dose. Class: Print Route: IntraMUSCular Prescriptions Sent to Pharmacy Refills Insulin Syringe-Needle U-100 (INSULIN SYRINGE) 1 mL 30 gauge x 5/16 syrg 1 Sig: For use to administer b12 injections Class: Normal  
 Pharmacy: [facility] DR KARLENE ROSALES [address] [place] [address] Ph #: [phone] Patient Instructions Rustam Betancur TODAY, please go to: CHECK OUT - If you received a referral, Show the  Please schedule the following appointments at [facility] OUT: 
Nurse visit to read PPD on Thursday or Friday (48-72 hours) Prediabetes and weight follow up with Dr Suleiman Bolden between August and 2018 Today's Plan: 
Paper done today. Introducing [facility] & HEALTH SERVICES! Dear Ree Hamm: Thank you for requesting a Talkable account. Our records indicate that you already have an active Talkable account. You can access your account anytime at [email or web address] Did you know that you can access your hospital and ER discharge instructions at any time in Talkable? You can also review all of your test results from your hospital stay or ER visit. Additional Information If you have questions, please visit the Frequently Asked Questions section of the Talkable website at [email or web address] Remember, Talkable is NOT to be used for urgent needs. For medical emergencies, dial 911. Now available from your iPhone and Android! Please provide this summary of care documentation to your next provider. Your primary care clinician is listed as Jalyn Martínez. If you have any questions after today's visit, please call [phone].

## 2018-04-24 NOTE — PROGRESS NOTES
PPD Placement note  Reji Castañeda, 59 y.o. female is here today for placement of PPD test  Reason for PPD test: [facility]  Pt taken PPD test before: yes  Verified in allergy area and with patient that they are not allergic to the products PPD is made of (Phenol or Tween). Yes  Is patient taking any oral or IV steroid medication now or have they taken it in the last month? no  Has the patient ever received the BCG vaccine?: no  Has the patient been in recent contact with anyone known or suspected of having active TB disease?: no       Date of exposure (if applicable): N/A       Name of person they were exposed to (if applicable): N/A  Patient's Country of origin?: [place]  O: Alert and oriented in NAD. P:  PPD placed on 4/24/2018. Patient advised to return for reading within 48-72 hours, Thursday 04/26/2018 to have PPD read by nurse available.

## 2018-04-24 NOTE — LETTER
4/24/2018 11:09 AM 
 
Ms. Naomie Pineda [address] [facility] Patient Active Problem List  
Diagnosis Code  Coronary artery disease involving native coronary artery of native heart without angina pectoris I25.10  Bronchitis J40  
 High cholesterol E78.00  
 History of cardiomyopathy Z86.79  
 SOB (shortness of breath) R06.02  
 Neck pain, bilateral M54.2  Shoulder pain, bilateral M25.511, M25.512  Muscle spasticity M62.838  
 Hemiparesis and alteration of sensations as late effects of stroke (HCC) I69.359, I69.398  
 Head pain, chronic R51, G89.29  
 Expressive aphasia R47.01  
 Heart palpitations R00.2  Ventricular ectopic activity I49.3  Stroke ([address] I63.9  Thyroid disease E07.9  Hypercholesterolemia E78.00  
 EDDIE on CPAP G47.33, Z99.89  
 Essential hypertension I10  
 Congestive heart failure (HCC) I50.9  Chest pain R07.9  Melena K92.1  Hiatal hernia K44.9  Postoperative hypothyroidism E89.0  Chronic pain G89.29  
 Prediabetes R73.03  
 Constipation K59.00  
 Insomnia G47.00  Left-sided weakness R53.1  Vitamin D deficiency E55.9  Obesity, morbid ([address] E66.01  
 Chronic indwelling Dunn catheter Z92.89 Past Medical History:  
Diagnosis Date  Advanced care planning/counseling discussion 3/4/16 On File  Bronchitis 2/24/2014  Cervicalgia 8/18/15 Dr. Lory Amin  
 Chest pain 5/25/15 Hospitalized at [facility] 5/25/15 (lab work negative)  Chronic indwelling Dunn catheter 1/24/2018 Initially placed Jan 2018. Mx by Dr. Lara Tavares.  Congestive heart failure, unspecified Last Echo 2/8/15: EF 55-60%  Constipation 6/13/2017  Enthesopathy, spinal ([address] 8/18/15 Dr. Keely Elise Essential hypertension  Foot drop 8/18/15 Dr. Keely Elise Heart attack [facility]) 2/24/2013  Was supposed to See Cardiology for possible pacemaker in november 2014- After Cardiology consult locally, no need, EF greatly improved. Established with Dr. Claire Kerr  Hiatal hernia 6/2015  
 3 cm hiatal hernia  Hip pain 8/18/15 Dr. Bradley Lopez  
 Hypercholesterolemia  Hyperglycemia 7/2015 A1c 5.9  Hyperlipidemia 6/30/2015 NMR lipoprofile- LDL P 997, LDL-c 71, HLD-C-39, TG-60, HLD-P (25.2), Small LDL-P -541, LDL size 20.6  Hypothyroid  Insomnia 6/13/2017  Lower extremity edema  Lumbar spondylosis 8/18/15 Dr. Gamaliel Hodgson 6/2015 EGD/Colonscopy 6/15- Gastritis, internal hemorrhoids and 3 polyps  Murmur  EDDIE on CPAP Was referred to Pulmonology - Uses CPAP  
 Osteoarthritis of hip 8/18/15 Dr. Bradley Lopez  
 Osteoarthritis, shoulder 8/18/15 Dr. Bradley Lopez  
 Radiculopathy, cervical 8/18/15 Dr. Bradley Lopez  
 Shoulder pain 8/18/15 Dr. Darrion Phoenix Spinal stenosis of cervical region 8/18/15 Dr. Bradley Lopez  
 Spinal stenosis, lumbar 8/18/15 Dr. Bradley Lopez  
 Stroke [facility]) 2/25/2014 Established with Neurology, Marisol Rucker NP-Just hospitalized at [facility] 2/7/15-2/10/15. CT negative, but Late effect CV accident with increased tone described on discharge summary, Carotid dopplers showed 50% stenosis bilaterally.  Vitamin D deficiency 7/2015 Past Surgical History:  
Procedure Laterality Date  COLONOSCOPY N/A 6/22/2016 COLONOSCOPY performed by Moe Smith MD at [facility] ENDOSCOPY  
 HX BREAST BIOPSY  8/11/15 [facility]---Neg  
 HX CHOLECYSTECTOMY  HX COLONOSCOPY  6/2015 Dr. Nicole Gray- 3 complete polypectomies, Internal hemorrhoids, difficult study due to spasm. Repeat in 1 year  HX ENDOSCOPY  6/2015  
 mild gastritis, 3cm hiatal hernia  HX GASTRIC BYPASS  6/1989  HX HEART CATHETERIZATION  2/2014  HX ORTHOPAEDIC Right middle finger distal amputation  HX PARTIAL THYROIDECTOMY  ~1990  
 HX THYROIDECTOMY Left 1985  
 90% of one side of the thyroid removed Prior to Admission medications Medication Sig Start Date End Date Taking? Authorizing Provider  
varicella-zoster recombinant, PF, (SHINGRIX, PF,) 50 mcg/0.5 mL susr injection 0.5 mL by IntraMUSCular route once for 1 dose. Administer second dose 2-6 months after first dose. 4/24/18 4/24/18 Yes Logan Washington. Dee Dee Pan MD  
topiramate (TOPAMAX) 100 mg tablet Take 1 Tab by mouth two (2) times a day. 4/12/18  Yes Vanessa Drew DO  
baclofen (LIORESAL) 10 mg tablet Take 1 Tab by mouth three (3) times daily. 4/12/18  Yes Vanessa Drew DO  
metFORMIN (GLUCOPHAGE) 1,000 mg tablet Take 1 Tab by mouth two (2) times daily (with meals). 3/27/18  Yes Matti Pan MD  
DULoxetine (CYMBALTA) 60 mg capsule Take 1 Cap by mouth daily. 3/27/18  Yes Matti Pan MD  
ramelteon (ROZEREM) 8 mg tablet Take 1 Tab by mouth nightly. Take 30 min before bedtime 3/27/18  Yes Logan Washington. Dee Dee Pan MD  
pantoprazole (PROTONIX) 40 mg tablet Take 1 Tab by mouth daily. 3/19/18  Yes Logan Washington. Dee Dee Pan MD  
levothyroxine (SYNTHROID) 150 mcg tablet Take 1 Tab by mouth Daily (before breakfast). 3/19/18  Yes Logan Washington. Dee Dee Pan MD  
furosemide (LASIX) 40 mg tablet Take 1 Tab by mouth daily. 1/24/18  Yes Matti Pan MD  
glucose blood VI test strips (BLOOD GLUCOSE TEST) strip For use with glucometer to check blood sugar once a day in morning before eating. Please dispense VERIO FLEX STRIPS 1/24/18  Yes Logan Washington. Dee Dee Pan MD  
lidocaine (LIDODERM) 5 % Apply patch to the affected area for 12 hours a day and remove for 12 hours a day. 1/12/18  Yes Matti Pan MD  
carvedilol (COREG) 6.25 mg tablet Take 1 Tab by mouth two (2) times daily (with meals). 1/3/18  Yes Chikis Rogers MD  
morphine CR (MS CONTIN) 15 mg CR tablet Take 1 Tab by mouth every twelve (12) hours. Max Daily Amount: 30 mg. Patient taking differently: Take 15 mg by mouth daily.  9/12/17  Yes Stephanie Casey MD  
HYDROcodone-acetaminophen (NORCO) 5-325 mg per tablet Take 1 Tab by mouth every six (6) hours as needed for Pain. Max Daily Amount: 4 Tabs. 9/12/17  Yes Cris Jessica MD  
albuterol-ipratropium (DUO-NEB) 2.5 mg-0.5 mg/3 ml nebu 3 mL by Nebulization route every six (6) hours as needed. 9/12/17  Yes Cris Jessica MD  
losartan (COZAAR) 50 mg tablet Take 1 Tab by mouth nightly. Hold for SBP<115 9/15/17  Yes Cris Jessica MD  
cyclobenzaprine (FLEXERIL) 5 mg tablet Take 5 mg by mouth two (2) times a day. Yes Historical Provider EPINEPHrine (EPIPEN 2-LAURA) 0.3 mg/0.3 mL injection 0.3 mL by IntraMUSCular route once as needed for up to 1 dose. 8/7/17  Yes Andrez Cabot Jacquetta Rubins, MD  
cyanocobalamin (VITAMIN B12) 1,000 mcg/mL injection 1 mL by SubCUTAneous route every fourteen (14) days. For b12 deficiency 7/18/17  Yes Edna Gunn. Oskar Tidwell MD  
albuterol (PROVENTIL HFA, VENTOLIN HFA, PROAIR HFA) 90 mcg/actuation inhaler Take 2 Puffs by inhalation every four (4) hours as needed for Wheezing or Shortness of Breath. 10/11/16  Yes Andrez Cabot Jacquetta Rubins, MD  
linaclotide Julane Ramon) 145 mcg cap capsule Take 1 Cap by mouth Daily (before breakfast). For constipation 10/11/16  Yes Edna Gunn. Oskar Tidewll MD  
ferrous sulfate 325 mg (65 mg iron) tablet Take 325 mg by mouth Daily (before breakfast). Yes Historical Provider  
aspirin delayed-release 81 mg tablet Take 81 mg by mouth daily. Yes Historical Provider  
onabotulinumtoxinA (BOTOX) 200 unit injection 200 Units by IntraMUSCular route every three (3) months. Inject to selected muscles head and neck bilaterally every 3 months for migraine  Indications: MIGRAINE PREVENTION 4/12/18   Vanessa Drew,   
EPINEPHrine (ADRENALIN) 1 mg/mL nasal solution 0.5 mL by Nasal route. Historical Provider Allergies Allergen Reactions  Bees [Hymenoptera Allergenic Extract] Shortness of Breath and Swelling  Strawberry Shortness of Breath and Swelling  Lisinopril Cough Sincerely, 
 
 
Andrez Cabot Jacquetta Rubins, MD

## 2018-04-24 NOTE — PROGRESS NOTES
[address] [place] [address] Visit   04/24/2018  Patient ID: Abelardo López is a 59 y.o. female. Assessment/Plan:    Diagnoses and all orders for this visit:    1. Encounter for immunization  -     varicella-zoster recombinant, PF, (SHINGRIX, PF,) 50 mcg/0.5 mL susr injection; 0.5 mL by IntraMUSCular route once for 1 dose. Administer second dose 2-6 months after first dose. -     AMB POC TUBERCULOSIS, INTRADERMAL (SKIN TEST)    2. Cerebrovascular accident (CVA), unspecified mechanism ([address]    3. Hemiparesis and alteration of sensations as late effects of stroke ([address]    4. Chronic congestive heart failure, unspecified heart failure type ([address]    5. Obesity, morbid ([address]    6. Chronic indwelling Dunn catheter    Form completed for [facility]    See patient instructions for more. Counselled pt on Patient health concerns and plans. Patient was offered a choice/choices in the treatment plan today. Reviewed return precautions as appropriate. Patient expresses understanding of the plan and agrees with recommendations. Patient Instructions   . TODAY, please go to:   CHECK OUT - If you received a referral, Show the       Please schedule the following appointments at [address] [place] [address]:  Nurse visit to read PPD on Thursday or Friday (48-72 hours)  Prediabetes and weight follow up with Dr Heath Pantoja between August and October 2018    Today's Plan:  Paper done today. Subjective:   HPI:  Abelardo López is a 59 y.o. female being seen for:   Chief Complaint   Patient presents with    Immunization/Injection     TB shot       · Goes to [facility] MW/F, will start going on M/F  · Will start to go to [address] [place] [address] works day program T/W/R/Sat  · Needs forms completed for [facility]  · Can meet new people, have additional growth. There is always on nurse on duty at the new program  · Started on baclofen by neurology.  Helps with headaches, sometimes stutters though  · Needs TB test  · No cough, fever, unintentional weight loss, international travel. Does spend time in a day program currently       Review of Systems  Otherwise as noted in HPI    Social History     Social History Narrative     History   Smoking Status    Former Smoker    Packs/day: 0.25    Years: 15.00    Types: Cigarettes    Quit date: 6/13/2007   Smokeless Tobacco    Never Used     ? Objective:     Visit Vitals    /75 (BP 1 Location: Left arm, BP Patient Position: Sitting)    Pulse 74    Temp 97.3 °F (36.3 °C) (Oral)    Resp 16    Ht 5' 6\" (1.676 m)    Wt 268 lb (121.6 kg)    SpO2 98%    BMI 43.26 kg/m2       . Wt Readings from Last 3 Encounters:   04/27/18 266 lb 9.6 oz (120.9 kg)   04/24/18 268 lb (121.6 kg)   04/12/18 271 lb (122.9 kg)       Physical Exam   Constitutional: She appears well-developed and well-nourished. No distress. Pulmonary/Chest: Effort normal. No respiratory distress. Neurological: She is alert. Psychiatric: She has a normal mood and affect. Her behavior is normal.       Allergies   Allergen Reactions    Bees [Hymenoptera Allergenic Extract] Shortness of Breath and Swelling    Strawberry Shortness of Breath and Swelling    Lisinopril Cough     Prior to Admission medications    Medication Sig Start Date End Date Taking? Authorizing Provider   topiramate (TOPAMAX) 100 mg tablet Take 1 Tab by mouth two (2) times a day. 4/12/18  Yes Vanessa Drew DO   baclofen (LIORESAL) 10 mg tablet Take 1 Tab by mouth three (3) times daily. 4/12/18  Yes Vanessa Drew DO   metFORMIN (GLUCOPHAGE) 1,000 mg tablet Take 1 Tab by mouth two (2) times daily (with meals). 3/27/18  Yes James Bounds Olga Pulido MD   DULoxetine (CYMBALTA) 60 mg capsule Take 1 Cap by mouth daily. 3/27/18  Yes James Bounds Olga Pulido MD   ramelteon (ROZEREM) 8 mg tablet Take 1 Tab by mouth nightly. Take 30 min before bedtime 3/27/18  Yes Arabella Quiñonez.  Olga Pulido MD   pantoprazole (PROTONIX) 40 mg tablet Take 1 Tab by mouth daily. 3/19/18  Yes Israel Case MD   furosemide (LASIX) 40 mg tablet Take 1 Tab by mouth daily. 1/24/18  Yes Ana Case MD   glucose blood VI test strips (BLOOD GLUCOSE TEST) strip For use with glucometer to check blood sugar once a day in morning before eating. Please dispense VERIO FLEX STRIPS 1/24/18  Yes Israel Case MD   lidocaine (LIDODERM) 5 % Apply patch to the affected area for 12 hours a day and remove for 12 hours a day. 1/12/18  Yes Ana Case MD   carvedilol (COREG) 6.25 mg tablet Take 1 Tab by mouth two (2) times daily (with meals). 1/3/18  Yes Adebyao Kerns MD   morphine CR (MS CONTIN) 15 mg CR tablet Take 1 Tab by mouth every twelve (12) hours. Max Daily Amount: 30 mg. Patient taking differently: Take 15 mg by mouth daily. 9/12/17  Yes Monico Flanagan MD   HYDROcodone-acetaminophen (NORCO) 5-325 mg per tablet Take 1 Tab by mouth every six (6) hours as needed for Pain. Max Daily Amount: 4 Tabs. 9/12/17  Yes Monico Flanagan MD   albuterol-ipratropium (DUO-NEB) 2.5 mg-0.5 mg/3 ml nebu 3 mL by Nebulization route every six (6) hours as needed. 9/12/17  Yes Monico Flanagan MD   losartan (COZAAR) 50 mg tablet Take 1 Tab by mouth nightly. Hold for SBP<115 9/15/17  Yes Monico Flanagan MD   cyclobenzaprine (FLEXERIL) 5 mg tablet Take 5 mg by mouth two (2) times a day. Yes Historical Provider   EPINEPHrine (EPIPEN 2-LAURA) 0.3 mg/0.3 mL injection 0.3 mL by IntraMUSCular route once as needed for up to 1 dose. 8/7/17  Yes Ana Case MD   cyanocobalamin (VITAMIN B12) 1,000 mcg/mL injection 1 mL by SubCUTAneous route every fourteen (14) days. For b12 deficiency 7/18/17  Yes Israel Case MD   albuterol (PROVENTIL HFA, VENTOLIN HFA, PROAIR HFA) 90 mcg/actuation inhaler Take 2 Puffs by inhalation every four (4) hours as needed for Wheezing or Shortness of Breath. 10/11/16  Yes Ana Case MD   linaclotide Mariajose Lowe) 145 mcg cap capsule Take 1 Cap by mouth Daily (before breakfast). For constipation 10/11/16  Yes Izabela Alas. Elif Singer MD   ferrous sulfate 325 mg (65 mg iron) tablet Take 325 mg by mouth Daily (before breakfast). Yes Historical Provider   aspirin delayed-release 81 mg tablet Take 81 mg by mouth daily. Yes Historical Provider   Insulin Syringe-Needle U-100 (INSULIN SYRINGE) 1 mL 30 gauge x 5/16 syrg For use to administer b12 injections 4/27/18   Alice Melgoza MD   ergocalciferol (ERGOCALCIFEROL) 50,000 unit capsule Take 1 Cap by mouth every other day. 4/27/18   Alice Melgoza MD   levothyroxine (SYNTHROID) 150 mcg tablet Take 1 tablet daily and 1/2 tablet on Sundays (6.5 tablets/week). 4/27/18   Alice Melgoza MD   onabotulinumtoxinA (BOTOX) 200 unit injection 200 Units by IntraMUSCular route every three (3) months. Inject to selected muscles head and neck bilaterally every 3 months for migraine  Indications: MIGRAINE PREVENTION 4/12/18   Vanessa Drew DO   EPINEPHrine (ADRENALIN) 1 mg/mL nasal solution 0.5 mL by Nasal route.     Historical Provider     Patient Active Problem List   Diagnosis Code    Coronary artery disease involving native coronary artery of native heart without angina pectoris I25.10    Bronchitis J40    High cholesterol E78.00    History of cardiomyopathy Z86.79    SOB (shortness of breath) R06.02    Neck pain, bilateral M54.2    Shoulder pain, bilateral M25.511, M25.512    Muscle spasticity M62.838    Hemiparesis and alteration of sensations as late effects of stroke (HCC) I69.359, I69.398    Head pain, chronic R51, G89.29    Expressive aphasia R47.01    Heart palpitations R00.2    Ventricular ectopic activity I49.3    Stroke ([address] I63.9    Thyroid disease E07.9    Hypercholesterolemia E78.00    EDDIE on CPAP G47.33, Z99.89    Essential hypertension I10    Congestive heart failure (HCC) I50.9    Chest pain R07.9    Melena K92.1    Hiatal hernia K44.9    Postoperative hypothyroidism E89.0    Chronic pain G89.29    Prediabetes R73.03    Constipation K59.00    Insomnia G47.00    Left-sided weakness R53.1    Vitamin D deficiency E55.9    Obesity, morbid (HCC) E66.01    Chronic indwelling Dunn catheter Z92.89

## 2018-04-24 NOTE — PROGRESS NOTES
Amy Alfredo is a 59 y.o. female    1. Have you been to the ER, urgent care clinic since your last visit? Hospitalized since your last visit? No    2. Have you seen or consulted any other health care providers outside of the [address] [place] [address] since your last visit? Include any pap smears or colon screening.  No    Chief Complaint   Patient presents with    Immunization/Injection     TB shot       Visit Vitals    /75 (BP 1 Location: Left arm, BP Patient Position: Sitting)    Pulse 74    Temp 97.3 °F (36.3 °C) (Oral)    Resp 16    Ht 5' 6\" (1.676 m)    Wt 268 lb (121.6 kg)    SpO2 98%    BMI 43.26 kg/m2

## 2018-04-26 ENCOUNTER — CLINICAL SUPPORT (OUTPATIENT)
Dept: FAMILY MEDICINE CLINIC | Age: 58
End: 2018-04-26

## 2018-04-26 DIAGNOSIS — Z11.1 ENCOUNTER FOR PPD SKIN TEST READING: Primary | ICD-10-CM

## 2018-04-26 LAB
MM INDURATION POC: 0 MM (ref 0–5)
PPD POC: NEGATIVE NEGATIVE

## 2018-04-26 NOTE — PROGRESS NOTES
Patient had PPD read. 0 mm of induration. Form completed. PPD Reading Note  PPD read and results entered in [address]. Result: 0 mm induration.   Interpretation: Negative  If test not read within 48-72 hours of initial placement, patient advised to repeat in other arm 1-3 weeks after this test.  Allergic reaction:no

## 2018-04-27 ENCOUNTER — OFFICE VISIT (OUTPATIENT)
Dept: ENDOCRINOLOGY | Age: 58
End: 2018-04-27

## 2018-04-27 VITALS
WEIGHT: 266.6 LBS | RESPIRATION RATE: 16 BRPM | HEART RATE: 80 BPM | BODY MASS INDEX: 44.42 KG/M2 | HEIGHT: 65 IN | OXYGEN SATURATION: 98 % | SYSTOLIC BLOOD PRESSURE: 97 MMHG | DIASTOLIC BLOOD PRESSURE: 78 MMHG

## 2018-04-27 DIAGNOSIS — E89.0 POSTOPERATIVE HYPOTHYROIDISM: Primary | Chronic | ICD-10-CM

## 2018-04-27 DIAGNOSIS — E55.9 VITAMIN D DEFICIENCY: ICD-10-CM

## 2018-04-27 DIAGNOSIS — Z98.84 S/P GASTRIC BYPASS: ICD-10-CM

## 2018-04-27 RX ORDER — LEVOTHYROXINE SODIUM 150 UG/1
TABLET ORAL
Qty: 90 TAB | Refills: 3 | Status: SHIPPED | OUTPATIENT
Start: 2018-04-27 | End: 2018-11-02 | Stop reason: DRUGHIGH

## 2018-04-27 RX ORDER — ERGOCALCIFEROL 1.25 MG/1
50000 CAPSULE ORAL EVERY OTHER DAY
Qty: 45 CAP | Refills: 3 | Status: SHIPPED | OUTPATIENT
Start: 2018-04-27 | End: 2018-09-28

## 2018-04-27 RX ORDER — SYRINGE-NEEDLE,INSULIN,0.5 ML 30 GX5/16"
SYRINGE, EMPTY DISPOSABLE MISCELLANEOUS
Qty: 12 SYRINGE | Refills: 1 | Status: SHIPPED | OUTPATIENT
Start: 2018-04-27 | End: 2019-03-12

## 2018-04-27 NOTE — PATIENT INSTRUCTIONS
Thyroid:  Small decrease - Take 150 mcg most days of the week and 1/2 tablet on Sundays    Low Vitamin D, High Parathyroid hormone level, Alkaline phosphatase elevated:  Continue vitamin D, but at a lower dose than before    50,000 units every other day.

## 2018-04-27 NOTE — PROGRESS NOTES
History of Present Illness: Radha Nguyen is a 59 y.o. female presents for follow-up of  elevated alkaline phosphatase and vitamin D deficiency. She also has hypothyroidism and pre-diabetes. Of note, she is s/p gastric bypass in 1980s (pre-surgery wt was 465), and has also had CVA. Additionally, she is taking several medications for history of heart failure. This was diagnosed at the same time she had a heart attack and a stroke in 2014 . Had hospitalization 9/2017 for stroke-like sx. Multiple BP medications stopped due to hypotension. Renal function improved after medications stopped. She continues to lose weight. Weight is down 60 lbs in last year. This may be a reason for the decrease in thyroid hormone needs. Weight loss is intentional.     Was ergocalciferol 50,000 units daily for vitamin D deficiency. Level was high at 121 in March and vitamin D stopped. Dose was increased from twice weekly with Vit D level of 11 to daily in summer 2017.   - > will have her decrease by taking 50,000 units every other day. Hypothyroidism: post-surgical. TSH was at goal in 7/2017 after dose increase - 150 mcg. TSH was low during hospitalization in summer 2017 - suspect non-thyroidal illness was cause. However, it has remained low, perhaps due to weight loss. Hemoglobin A1c - normal last after weight loss. Overall she is doing well. Had some word finding problems. Taking topiramate for migraine HA     BP is low. She takes losartan only if BP is > 115. She did take losartan last night. Past Medical History:   Diagnosis Date    Advanced care planning/counseling discussion 3/4/16    On File    Bronchitis 2/24/2014    Cervicalgia 8/18/15    Dr. Raad Hannah    Chest pain 5/25/15    Hospitalized at [facility] 5/25/15 (lab work negative)    Chronic indwelling Dunn catheter 1/24/2018    Initially placed Jan 2018. Mx by Dr. Hermilo Olivo.      Congestive heart failure, unspecified     Last Echo 2/8/15: EF 55-60%  Constipation 6/13/2017    Enthesopathy, spinal ([facility] 8/18/15    Dr. Elaina Espino    Essential hypertension     Foot drop 8/18/15    Dr. Casey Harrington Heart attack [facility]) 2/24/2013    Was supposed to See Cardiology for possible pacemaker in november 2014- After Cardiology consult locally, no need, EF greatly improved. Established with Dr. Yonny Zhao Hiatal hernia 6/2015    3 cm hiatal hernia     Hip pain 8/18/15    Dr. Elaina Espino    Hypercholesterolemia     Hyperglycemia 7/2015    A1c 5.9     Hyperlipidemia 6/30/2015    NMR lipoprofile- LDL P 997, LDL-c 71, HLD-C-39, TG-60, HLD-P (25.2), Small LDL-P -541, LDL size 20.6    Hypothyroid     Insomnia 6/13/2017    Lower extremity edema     Lumbar spondylosis 8/18/15    Dr. Suzanne Gutierrez 6/2015    EGD/Colonscopy 6/15- Gastritis, internal hemorrhoids and 3 polyps    Murmur     EDDIE on CPAP     Was referred to Pulmonology - Uses CPAP    Osteoarthritis of hip 8/18/15    Dr. Elaina Espino    Osteoarthritis, shoulder 8/18/15    Dr. Casey Harrington Radiculopathy, cervical 8/18/15    Dr. Elaina Espino    Shoulder pain 8/18/15    Dr. Casey Harrington Spinal stenosis of cervical region 8/18/15    Dr. Casey Harrington Spinal stenosis, lumbar 8/18/15    Dr. Casey Harrington Stroke [facility]) 2/25/2014    Established with Neurology, Corie Needs, NP-Just hospitalized at [facility] 2/7/15-2/10/15. CT negative, but Late effect CV accident with increased tone described on discharge summary, Carotid dopplers showed 50% stenosis bilaterally.  Vitamin D deficiency 7/2015     Current Outpatient Prescriptions   Medication Sig    Insulin Syringe-Needle U-100 (INSULIN SYRINGE) 1 mL 30 gauge x 5/16 syrg For use to administer b12 injections    topiramate (TOPAMAX) 100 mg tablet Take 1 Tab by mouth two (2) times a day.  baclofen (LIORESAL) 10 mg tablet Take 1 Tab by mouth three (3) times daily.  EPINEPHrine (ADRENALIN) 1 mg/mL nasal solution 0.5 mL by Nasal route.     metFORMIN (GLUCOPHAGE) 1,000 mg tablet Take 1 Tab by mouth two (2) times daily (with meals).  DULoxetine (CYMBALTA) 60 mg capsule Take 1 Cap by mouth daily.  ramelteon (ROZEREM) 8 mg tablet Take 1 Tab by mouth nightly. Take 30 min before bedtime    pantoprazole (PROTONIX) 40 mg tablet Take 1 Tab by mouth daily.  levothyroxine (SYNTHROID) 150 mcg tablet Take 1 Tab by mouth Daily (before breakfast).  furosemide (LASIX) 40 mg tablet Take 1 Tab by mouth daily.  glucose blood VI test strips (BLOOD GLUCOSE TEST) strip For use with glucometer to check blood sugar once a day in morning before eating. Please dispense VERIO FLEX STRIPS    lidocaine (LIDODERM) 5 % Apply patch to the affected area for 12 hours a day and remove for 12 hours a day.  carvedilol (COREG) 6.25 mg tablet Take 1 Tab by mouth two (2) times daily (with meals).  morphine CR (MS CONTIN) 15 mg CR tablet Take 1 Tab by mouth every twelve (12) hours. Max Daily Amount: 30 mg. (Patient taking differently: Take 15 mg by mouth daily.)    HYDROcodone-acetaminophen (NORCO) 5-325 mg per tablet Take 1 Tab by mouth every six (6) hours as needed for Pain. Max Daily Amount: 4 Tabs.  albuterol-ipratropium (DUO-NEB) 2.5 mg-0.5 mg/3 ml nebu 3 mL by Nebulization route every six (6) hours as needed.  losartan (COZAAR) 50 mg tablet Take 1 Tab by mouth nightly. Hold for SBP<115    cyclobenzaprine (FLEXERIL) 5 mg tablet Take 5 mg by mouth two (2) times a day.  EPINEPHrine (EPIPEN 2-LAURA) 0.3 mg/0.3 mL injection 0.3 mL by IntraMUSCular route once as needed for up to 1 dose.  cyanocobalamin (VITAMIN B12) 1,000 mcg/mL injection 1 mL by SubCUTAneous route every fourteen (14) days. For b12 deficiency    albuterol (PROVENTIL HFA, VENTOLIN HFA, PROAIR HFA) 90 mcg/actuation inhaler Take 2 Puffs by inhalation every four (4) hours as needed for Wheezing or Shortness of Breath.  linaclotide (LINZESS) 145 mcg cap capsule Take 1 Cap by mouth Daily (before breakfast).  For constipation    ferrous sulfate 325 mg (65 mg iron) tablet Take 325 mg by mouth Daily (before breakfast).  aspirin delayed-release 81 mg tablet Take 81 mg by mouth daily.  onabotulinumtoxinA (BOTOX) 200 unit injection 200 Units by IntraMUSCular route every three (3) months. Inject to selected muscles head and neck bilaterally every 3 months for migraine  Indications: MIGRAINE PREVENTION     No current facility-administered medications for this visit.       Allergies   Allergen Reactions    Bees [Hymenoptera Allergenic Extract] Shortness of Breath and Swelling    Strawberry Shortness of Breath and Swelling    Lisinopril Cough       Review of Systems:  - Eyes: no blurry vision or double vision  - Cardiovascular: no chest pain  - Respiratory: no shortness of breath  - Musculoskeletal: no myalgias  - Neurological see HPI     Physical Examination:  Visit Vitals    BP 97/78    Pulse 80    Resp 16    Ht 5' 5\" (1.651 m)    Wt 266 lb 9.6 oz (120.9 kg)    SpO2 98%    BMI 44.36 kg/m2   -   - General: pleasant, no distress, uses walker   HEENT: hearing intact, EOMI, clear sclera without icterus  - Cardiovascular: regular, normal rate   - Respiratory: normal effort  - Integumentary: no edema  - Psychiatric: normal mood and affect    Data Reviewed:   Component      Latest Ref Rng & Units 3/27/2018 3/27/2018 3/27/2018 3/27/2018          12:06 PM 12:06 PM 12:06 PM 12:06 PM   Glucose      65 - 99 mg/dL   99    BUN      6 - 24 mg/dL   15    Creatinine      0.57 - 1.00 mg/dL   0.86    GFR est non-AA      >59 mL/min/1.73   74    GFR est AA      >59 mL/min/1.73   86    BUN/Creatinine ratio      9 - 23   17    Sodium      134 - 144 mmol/L   143    Potassium      3.5 - 5.2 mmol/L   4.2    Chloride      96 - 106 mmol/L   105    CO2      18 - 29 mmol/L   24    Calcium      8.7 - 10.2 mg/dL   9.1    Protein, total      6.0 - 8.5 g/dL   6.4    Albumin      3.5 - 5.5 g/dL   4.1    GLOBULIN, TOTAL      1.5 - 4.5 g/dL   2.3    A-G Ratio      1.2 - 2.2   1.8    Bilirubin, total      0.0 - 1.2 mg/dL   0.3    Alk. phosphatase      39 - 117 IU/L   114    AST      0 - 40 IU/L   24    ALT (SGPT)      0 - 32 IU/L   24    TSH      0.450 - 4.500 uIU/mL 0.438 (L)      T4, Free      0.82 - 1.77 ng/dL 1.61      VITAMIN D, 25-HYDROXY      30.0 - 100.0 ng/mL    121.0 (H)   PTH, Intact      15 - 65 pg/mL  43       Component      Latest Ref Rng & Units 7/28/2017 7/28/2017           4:18 PM  4:18 PM   Glucose      65 - 99 mg/dL 93    BUN      6 - 24 mg/dL 33 (H)    Creatinine      0.57 - 1.00 mg/dL 1.52 (H)    GFR est non-AA      >59 mL/min/1.73 37 (L)    GFR est AA      >59 mL/min/1.73 43 (L)    BUN/Creatinine ratio      9 - 23 22    Sodium      134 - 144 mmol/L 138    Potassium      3.5 - 5.2 mmol/L 5.2    Chloride      96 - 106 mmol/L 100    CO2      18 - 29 mmol/L 24    Calcium      8.7 - 10.2 mg/dL 9.2    Protein, total      6.0 - 8.5 g/dL 6.9    Albumin      3.5 - 5.5 g/dL 4.3    GLOBULIN, TOTAL      1.5 - 4.5 g/dL 2.6    A-G Ratio      1.2 - 2.2 1.7    Bilirubin, total      0.0 - 1.2 mg/dL 0.3    Alk. phosphatase      39 - 117 IU/L 135 (H)    AST      0 - 40 IU/L 18    ALT (SGPT)      0 - 32 IU/L 20    TSH      0.450 - 4.500 uIU/mL     T4, Free      0.82 - 1.77 ng/dL     VITAMIN D, 25-HYDROXY      30.0 - 100.0 ng/mL  11.7 (L)   PTH, Intact      15 - 65 pg/mL         Assessment/Plan:   1. Postoperative hypothyroidism   - she has lost almost 20% of her body weight over last 9 months. This is likely explanation for lower thyroid hormone needs  - lower dose by taking 150 mcg most days and 1/2 tablet on Sundays = 6.5 tablets/week or 139 mcg/day   2. Vitamin D deficiency   - level too high on daily 50,000 unit dose, but PTH was normal and alkaline phosphatase was normal, so she responded well to supplementation  - Resume ergocalciferol at 50,000 units EVERY OTHER DAY. - reassess in 3-6 months. 3. S/P gastric bypass   - poor absorption. Higher vitamin D needs   4.       BMI 44- congratulated her on her weight loss and dietary efforts. Patient Instructions   Thyroid:  Small decrease - Take 150 mcg most days of the week and 1/2 tablet on Sundays    Low Vitamin D, High Parathyroid hormone level, Alkaline phosphatase elevated:  Continue vitamin D, but at a lower dose than before    50,000 units every other day. Follow-up Disposition:  Return in about 6 months (around 10/27/2018).     Copy sent to:

## 2018-04-27 NOTE — PROGRESS NOTES
Lab Results   Component Value Date/Time    Hemoglobin A1c 6.4 (H) 09/09/2017 02:40 AM    Hemoglobin A1c 5.9 (H) 02/02/2017 02:31 PM    Hemoglobin A1c 6.2 (H) 10/27/2016 01:48 PM

## 2018-04-27 NOTE — MR AVS SNAPSHOT
[address] [facility] [address] [place] [address] 
[phone] Patient: Prudence Felty MRN: [id number] Visit Information Date & Time Provider Department Dept. Phone Encounter #  
 4/27/2018  1:50 PM Stefano Fleischer, [facility] [id number] Your Appointments 7/10/2018 10:20 AM  
Follow Up with [facility] at [address]) Appt Note: 3 month follow up KRU 4/12  
 [facility] [address]  
[address]  
  
    
 9/25/2018  9:00 AM  
ROUTINE CARE with Lizzy Batista. [address] ([address]) Appt Note: prediabetes and weight follow up  
 [address] 
[address] [facility] [address]  
[phone]  
  
   
 [address]  
  
    
 11/7/2018  9:20 AM  
ESTABLISHED PATIENT with Patrick Sunshine MD  
[facility] ([facility]) Appt Note: 1 yr f/u  
 [address]  [address] [place] [address]  
[address] [facility] [address] Upcoming Health Maintenance Date Due ZOSTER VACCINE AGE 60> 3/27/2018 MEDICARE YEARLY EXAM 6/14/2018 Influenza Age 5 to Adult 8/1/2018 PAP AKA CERVICAL CYTOLOGY 11/13/2018 BREAST CANCER SCRN MAMMOGRAM 8/4/2019 COLONOSCOPY 6/22/2021 DTaP/Tdap/Td series (2 - Td) 6/13/2027 Allergies as of 4/27/2018  Review Complete On: 4/27/2018 By: Stefano Fleischer, MD  
  
 Severity Noted Reaction Type Reactions Bees [Hymenoptera Allergenic Extract] High 10/14/2014   Systemic Shortness of Breath, Swelling [place] High 10/14/2014   Systemic Shortness of Breath, Swelling Lisinopril  10/17/2014    Cough Current Immunizations  Reviewed on 10/11/2016 Name Date Influenza Vaccine Venkatesh Bitter) 11/13/2015 Influenza Vaccine (Quad) PF 10/20/2017, 10/11/2016 Pneumococcal Polysaccharide (PPSV-23) 8/29/2016 TB Skin Test (PPD) Intradermal 4/24/2018 Tdap 6/13/2017 Not reviewed this visit You Were Diagnosed With   
  
 Codes Comments Postoperative hypothyroidism    -  Primary ICD-10-CM: E89.0 ICD-9-CM: 244.0 Vitamin D deficiency     ICD-10-CM: E55.9 ICD-9-CM: 268.9 S/P gastric bypass     ICD-10-CM: W08.77 ICD-9-CM: V45.86 Vitals BP Pulse Resp Height(growth percentile) Weight(growth percentile) SpO2  
 97/78 80 16 5' 5\" (1.651 m) 266 lb 9.6 oz (120.9 kg) 98% BMI OB Status Smoking Status 44.36 kg/m2 Postmenopausal Former Smoker Vitals History BMI and BSA Data Body Mass Index Body Surface Area 44.36 kg/m 2 2.35 m 2 Preferred Pharmacy Pharmacy Name Phone [facility] [address] [place] [address] [phone] Your Updated Medication List  
  
   
This list is accurate as of 4/27/18  2:46 PM.  Always use your most recent med list.  
  
  
  
  
 albuterol 90 mcg/actuation inhaler Commonly known as:  PROVENTIL HFA, VENTOLIN HFA, PROAIR HFA Take 2 Puffs by inhalation every four (4) hours as needed for Wheezing or Shortness of Breath. albuterol-ipratropium 2.5 mg-0.5 mg/3 ml Nebu Commonly known as:  DUO-NEB  
3 mL by Nebulization route every six (6) hours as needed. aspirin delayed-release 81 mg tablet Take 81 mg by mouth daily. baclofen 10 mg tablet Commonly known as:  LIORESAL Take 1 Tab by mouth three (3) times daily. carvedilol 6.25 mg tablet Commonly known as:  Peggi [employer] Take 1 Tab by mouth two (2) times daily (with meals). cyanocobalamin 1,000 mcg/mL injection Commonly known as:  VITAMIN B12  
1 mL by SubCUTAneous route every fourteen (14) days. For b12 deficiency cyclobenzaprine 5 mg tablet Commonly known as:  FLEXERIL Take 5 mg by mouth two (2) times a day. DULoxetine 60 mg capsule Commonly known as:  CYMBALTA Take 1 Cap by mouth daily. EPINEPHrine 0.3 mg/0.3 mL injection Commonly known as:  EPIPEN 2-LAURA  
0.3 mL by IntraMUSCular route once as needed for up to 1 dose. EPINEPHrine 1 mg/mL nasal solution Commonly known as:  ADRENALIN  
0.5 mL by Nasal route.  
  
 ergocalciferol 50,000 unit capsule Commonly known as:  ERGOCALCIFEROL Take 1 Cap by mouth every other day. ferrous sulfate 325 mg (65 mg iron) tablet Take 325 mg by mouth Daily (before breakfast). furosemide 40 mg tablet Commonly known as:  LASIX Take 1 Tab by mouth daily. glucose blood VI test strips strip Commonly known as:  blood glucose test  
For use with glucometer to check blood sugar once a day in morning before eating. Please dispense VERIO FLEX STRIPS HYDROcodone-acetaminophen 5-325 mg per tablet Commonly known as:  Florence Mcleanrolando Take 1 Tab by mouth every six (6) hours as needed for Pain. Max Daily Amount: 4 Tabs. Insulin Syringe-Needle U-100 1 mL 30 gauge x 5/16 Syrg Commonly known as:  INSULIN SYRINGE For use to administer b12 injections  
  
 levothyroxine 150 mcg tablet Commonly known as:  SYNTHROID Take 1 tablet daily and 1/2 tablet on Sundays (6.5 tablets/week). lidocaine 5 % Commonly known as:  Jazmín ud Apply patch to the affected area for 12 hours a day and remove for 12 hours a day. linaclotide 145 mcg Cap capsule Commonly known as:  Bon Christensen Take 1 Cap by mouth Daily (before breakfast). For constipation  
  
 losartan 50 mg tablet Commonly known as:  COZAAR Take 1 Tab by mouth nightly. Hold for SBP<115  
  
 metFORMIN 1,000 mg tablet Commonly known as:  GLUCOPHAGE Take 1 Tab by mouth two (2) times daily (with meals). morphine CR 15 mg CR tablet Commonly known as:  MS CONTIN  
 Take 1 Tab by mouth every twelve (12) hours. Max Daily Amount: 30 mg.  
  
 onabotulinumtoxinA 200 unit injection Commonly known as:  BOTOX  
200 Units by IntraMUSCular route every three (3) months. Inject to selected muscles head and neck bilaterally every 3 months for migraine  Indications: MIGRAINE PREVENTION  
  
 pantoprazole 40 mg tablet Commonly known as:  PROTONIX Take 1 Tab by mouth daily. ramelteon 8 mg tablet Commonly known as:  ROZEREM Take 1 Tab by mouth nightly. Take 30 min before bedtime  
  
 topiramate 100 mg tablet Commonly known as:  TOPAMAX Take 1 Tab by mouth two (2) times a day. Prescriptions Sent to Pharmacy Refills Insulin Syringe-Needle U-100 (INSULIN SYRINGE) 1 mL 30 gauge x 5/16 syrg 1 Sig: For use to administer b12 injections Class: Normal  
 Pharmacy: [facility] DR KARLENE ROSALES [address] [place] [address] Ph #: [phone]  
 ergocalciferol (ERGOCALCIFEROL) 50,000 unit capsule 3 Sig: Take 1 Cap by mouth every other day. Class: Normal  
 Pharmacy: [facility] DR KARLENE ROSALES [address] [place] [address] Ph #: [phone] Route: Oral  
 levothyroxine (SYNTHROID) 150 mcg tablet 3 Sig: Take 1 tablet daily and 1/2 tablet on Sundays (6.5 tablets/week). Class: Normal  
 Pharmacy: [facility] DR KARLENE ROSALES [address] [place] [address] Ph #: [phone] Patient Instructions Thyroid: 
Small decrease - Take 150 mcg most days of the week and 1/2 tablet on Sundays Low Vitamin D, High Parathyroid hormone level, Alkaline phosphatase elevated: 
Continue vitamin D, but at a lower dose than before 50,000 units every other day. Introducing [facility]! Dear Janet Lima: Thank you for requesting a [employer] account. Our records indicate that you already have an active [employer] account. You can access your account anytime at [email or web address] Did you know that you can access your hospital and ER discharge instructions at any time in [employer]? You can also review all of your test results from your hospital stay or ER visit. Additional Information If you have questions, please visit the Frequently Asked Questions section of the [employer] website at [email or web address] Remember, [employer] is NOT to be used for urgent needs. For medical emergencies, dial 911. Now available from your iPhone and Android! Please provide this summary of care documentation to your next provider. Your primary care clinician is listed as Jose Sr. If you have any questions after today's visit, please call [phone].

## 2018-05-22 ENCOUNTER — TELEPHONE (OUTPATIENT)
Dept: NEUROLOGY | Age: 58
End: 2018-05-22

## 2018-05-22 NOTE — TELEPHONE ENCOUNTER
Re: Botox    PA for  submitted via CMM to [employer]. Auto approval:  Desmond Deepti (Key: [id number])   Botox 200UNIT solution   Form  [employer] Electronic PA Form   Created   33 minutes ago   Sent to Plan   10 minutes ago   Plan Response   10 minutes ago   Submit Clinical Questions   1 minute ago   Determination   Favorable   now   Message from Plan  CaseId:[id number];Status:Approved; Review Type:Prior Auth; Coverage Start Date:05/22/2018; Coverage End Date:05/22/2019    Botox CPT code 81820 PA manually faxed to [facility]. Included completed PA form, office note 4/12/18 and Botox  approval.   Pending status. Fax confirmation received 5/22/18. Will update the nurse when determination is made.

## 2018-05-23 ENCOUNTER — TELEPHONE (OUTPATIENT)
Dept: NEUROLOGY | Age: 58
End: 2018-05-23

## 2018-05-23 NOTE — TELEPHONE ENCOUNTER
Patient called back stating she was returning Noreen's call about how long her migraines were lasting. Please call her back.

## 2018-05-24 NOTE — TELEPHONE ENCOUNTER
Spoke with patient who stated headaches last over 12 hrs each time. triptap and spoke with Eryn Dang and provided the above information.  He verbalized undertanding

## 2018-05-25 ENCOUNTER — TELEPHONE (OUTPATIENT)
Dept: NEUROLOGY | Age: 58
End: 2018-05-25

## 2018-05-25 NOTE — TELEPHONE ENCOUNTER
Re: Botox    Approval fax received from [employer] for CPT code 76567. Auth # [id number]. Auth good 5/22/18 - 5/24/19. Per Liza Pineda @ [facility] is for ONE visit only. She cannot modify the auth to include more visits. Per Liza Pineda once procedure has taken place a new PA will be needed for next procedure. She informed me to \"write in\" the number of services being requested the next time. SPP is [facility] per Justin Rosales @ [facility]. [facility] phone # is [phone]. Forward to nurse. Please schedule shipment for medication. Pt needs an appointment scheduled for her procedure.

## 2018-05-30 ENCOUNTER — TELEPHONE (OUTPATIENT)
Dept: FAMILY MEDICINE CLINIC | Age: 58
End: 2018-05-30

## 2018-05-30 NOTE — TELEPHONE ENCOUNTER
Called patient. Verified name and . Verified that she would like Rx for fenoprofen, cyclobenzapril, diclofenac/capsaicin solution, triamcinolone/dimethicone cream, and zulma-K pads.      Paper orders signed for [facility]

## 2018-06-12 DIAGNOSIS — G89.29 OTHER CHRONIC PAIN: ICD-10-CM

## 2018-06-12 DIAGNOSIS — R73.03 PREDIABETES: ICD-10-CM

## 2018-06-13 ENCOUNTER — DOCUMENTATION ONLY (OUTPATIENT)
Dept: NEUROLOGY | Age: 58
End: 2018-06-13

## 2018-06-15 ENCOUNTER — TELEPHONE (OUTPATIENT)
Dept: NEUROLOGY | Age: 58
End: 2018-06-15

## 2018-06-15 RX ORDER — LIDOCAINE 50 MG/G
PATCH TOPICAL
Qty: 90 EACH | Refills: 3 | Status: CANCELLED | OUTPATIENT
Start: 2018-06-15

## 2018-06-18 DIAGNOSIS — R73.03 PREDIABETES: ICD-10-CM

## 2018-06-18 NOTE — TELEPHONE ENCOUNTER
PCP: George Bañuelos. Basim Tee MD    Last appt: 4/26/2018  Future Appointments  Date Time Provider Denilson Thompson   7/10/2018 10:20 AM DO RONAN Yates [facility] SCHED   9/25/2018 9:00 AM [address]  Basim Tee MD [facility] SCHED   11/7/2018 9:20 AM Marcello Kam  [address]       Requested Prescriptions      No prescriptions requested or ordered in this encounter       Prior labs and Blood pressures:  BP Readings from Last 3 Encounters:   04/27/18 97/78   04/24/18 118/75   04/12/18 112/78     Lab Results   Component Value Date/Time    Sodium 143 03/27/2018 12:06 PM    Potassium 4.2 03/27/2018 12:06 PM    Chloride 105 03/27/2018 12:06 PM    CO2 24 03/27/2018 12:06 PM    Anion gap 9 09/12/2017 10:06 AM    Glucose 99 03/27/2018 12:06 PM    BUN 15 03/27/2018 12:06 PM    Creatinine 0.86 03/27/2018 12:06 PM    BUN/Creatinine ratio 17 03/27/2018 12:06 PM    GFR est AA 86 03/27/2018 12:06 PM    GFR est non-AA 74 03/27/2018 12:06 PM    Calcium 9.1 03/27/2018 12:06 PM     Lab Results   Component Value Date/Time    Hemoglobin A1c 6.4 (H) 09/09/2017 02:40 AM    Hemoglobin A1c (POC) 5.6 01/24/2018 08:58 AM     Lab Results   Component Value Date/Time    Cholesterol, total 111 09/09/2017 02:40 AM    HDL Cholesterol 37 09/09/2017 02:40 AM    LDL, calculated 61.8 09/09/2017 02:40 AM    VLDL, calculated 12.2 09/09/2017 02:40 AM    Triglyceride 61 09/09/2017 02:40 AM    CHOL/HDL Ratio 3.0 09/09/2017 02:40 AM     Lab Results   Component Value Date/Time    VITAMIN D, 25-HYDROXY 121.0 (H) 03/27/2018 12:06 PM       Lab Results   Component Value Date/Time    TSH 0.438 (L) 03/27/2018 12:06 PM

## 2018-06-20 PROBLEM — L30.9 DERMATITIS: Status: ACTIVE | Noted: 2018-06-20

## 2018-06-22 ENCOUNTER — TELEPHONE (OUTPATIENT)
Dept: FAMILY MEDICINE CLINIC | Age: 58
End: 2018-06-22

## 2018-06-22 NOTE — TELEPHONE ENCOUNTER
Checked in the completed faxes and there were other medications that have been faxed but did not see these two in there.

## 2018-06-22 NOTE — TELEPHONE ENCOUNTER
[place] pharmacy sent a request for Omega-3-Acid Ethyl Esters 1gm capsule with a quantity of 360 for 90 days. Take 2 capsule by mouth twice daily. Hydrocortisone Valerate Cream 0.2%, 180gm for 90 days. Apply 1 gram up to 2 times daily to affected area(s).

## 2018-06-25 DIAGNOSIS — G89.29 OTHER CHRONIC PAIN: ICD-10-CM

## 2018-06-25 NOTE — TELEPHONE ENCOUNTER
PCP: Lisa Lawson. Prince Efrain MD    Last appt: 4/26/2018  Future Appointments  Date Time Provider Denilson Thompson   6/28/2018 9:00 AM Janak Hallman, DO [facility] SCH[facility]   7/10/2018 10:20 AM Janak Hallman, DO [facility] SCHED   9/28/2018 8:00 AM Eric Lyle NP [facility] SCHED   11/7/2018 9:20 AM Elysia Gamez  [address]       Requested Prescriptions     Pending Prescriptions Disp Refills    lidocaine (LIDODERM) 5 % 90 Each 3     Sig: Apply patch to the affected area for 12 hours a day and remove for 12 hours a day.        Prior labs and Blood pressures:  BP Readings from Last 3 Encounters:   04/27/18 97/78   04/24/18 118/75   04/12/18 112/78     Lab Results   Component Value Date/Time    Sodium 143 03/27/2018 12:06 PM    Potassium 4.2 03/27/2018 12:06 PM    Chloride 105 03/27/2018 12:06 PM    CO2 24 03/27/2018 12:06 PM    Anion gap 9 09/12/2017 10:06 AM    Glucose 99 03/27/2018 12:06 PM    BUN 15 03/27/2018 12:06 PM    Creatinine 0.86 03/27/2018 12:06 PM    BUN/Creatinine ratio 17 03/27/2018 12:06 PM    GFR est AA 86 03/27/2018 12:06 PM    GFR est non-AA 74 03/27/2018 12:06 PM    Calcium 9.1 03/27/2018 12:06 PM     Lab Results   Component Value Date/Time    Hemoglobin A1c 6.4 (H) 09/09/2017 02:40 AM    Hemoglobin A1c (POC) 5.6 01/24/2018 08:58 AM     Lab Results   Component Value Date/Time    Cholesterol, total 111 09/09/2017 02:40 AM    HDL Cholesterol 37 09/09/2017 02:40 AM    LDL, calculated 61.8 09/09/2017 02:40 AM    VLDL, calculated 12.2 09/09/2017 02:40 AM    Triglyceride 61 09/09/2017 02:40 AM    CHOL/HDL Ratio 3.0 09/09/2017 02:40 AM     Lab Results   Component Value Date/Time    VITAMIN D, 25-HYDROXY 121.0 (H) 03/27/2018 12:06 PM       Lab Results   Component Value Date/Time    TSH 0.438 (L) 03/27/2018 12:06 PM

## 2018-06-27 RX ORDER — LIDOCAINE 50 MG/G
PATCH TOPICAL
Qty: 90 EACH | Refills: 3 | Status: SHIPPED | OUTPATIENT
Start: 2018-06-27 | End: 2018-08-09 | Stop reason: SDUPTHER

## 2018-06-28 ENCOUNTER — OFFICE VISIT (OUTPATIENT)
Dept: NEUROLOGY | Age: 58
End: 2018-06-28

## 2018-06-28 VITALS — DIASTOLIC BLOOD PRESSURE: 70 MMHG | SYSTOLIC BLOOD PRESSURE: 110 MMHG | RESPIRATION RATE: 18 BRPM

## 2018-06-28 DIAGNOSIS — G43.719 INTRACTABLE CHRONIC MIGRAINE WITHOUT AURA AND WITHOUT STATUS MIGRAINOSUS: Primary | ICD-10-CM

## 2018-06-28 NOTE — PATIENT INSTRUCTIONS

## 2018-06-28 NOTE — PROGRESS NOTES
Botox Injection Note     2018     Patient:  Amy Alfredo   YOB: 1960  Date of Visit: 2018      Indication: patient has chronic migraine headaches greater than 15 days per month lasting more than 4 hours each. She has failed or not tolerated 2 or more medication preventatives. Written consent was signed and verified by me. Risks and benefits were discussed to include possible cosmetic asymmetry which is not permament or life threatening. She has baseline right facial asymmetry due to stroke. That asymmetry may be exacerbated temporarily with injections and she is aware. Patient name and  was confirmed prior to procedure. Time out performed. Procedure:   Botox concentration: 200 units in 2 ml of preservative-free normal saline. 1:1 dilution. LOT#: [id number]  EXP:  2021    Injections and distribution as follow :      Units/site  Sites Loc Subtotal    procerus 5 1 ML 5   corrugaters 2.5 2 BL 5   frontalis 5 8 BL 40   Temporalis 10 4 BL 40   Occipitalis 10 2 BL 20   Cervical paraspinals 5 4 BL 20   Trapezius 10 4 BL 40         200 units Botox were reconstituted, 170 units injected as above and the remainder was unavoidably wasted. Patient tolerated procedure well. Return in 3 months for repeat injections.     _____________________________   Juan J Gilman DO  [address], ABPN

## 2018-07-09 DIAGNOSIS — R53.83 FATIGUE, UNSPECIFIED TYPE: ICD-10-CM

## 2018-07-09 RX ORDER — CYANOCOBALAMIN 1000 UG/ML
1000 INJECTION, SOLUTION INTRAMUSCULAR; SUBCUTANEOUS
Qty: 10 ML | Refills: 2 | Status: SHIPPED | OUTPATIENT
Start: 2018-07-09 | End: 2018-10-14 | Stop reason: SDUPTHER

## 2018-07-09 NOTE — TELEPHONE ENCOUNTER
PCP: Kaylie Huerta. Roxann Diaz MD    Last appt: 2018  Future Appointments  Date Time Provider Denilson Thompson   7/10/2018 10:20 AM Radha Noel Erlanger, DO [facility]   2018 9:00 AM Vanessa Noel Erlanger, DO [facility]   2018 8:00 AM Marta Hollis NP [facility]   2018 9:20 AM Jaime Ashford  [address]       Requested Prescriptions     Pending Prescriptions Disp Refills    cyanocobalamin (VITAMIN B12) 1,000 mcg/mL injection 10 mL 5     Si mL by SubCUTAneous route every fourteen (14) days.  For b12 deficiency       Prior labs and Blood pressures:  BP Readings from Last 3 Encounters:   18 110/70   18 97/78   18 118/75     Lab Results   Component Value Date/Time    Sodium 143 2018 12:06 PM    Potassium 4.2 2018 12:06 PM    Chloride 105 2018 12:06 PM    CO2 24 2018 12:06 PM    Anion gap 9 2017 10:06 AM    Glucose 99 2018 12:06 PM    BUN 15 2018 12:06 PM    Creatinine 0.86 2018 12:06 PM    BUN/Creatinine ratio 17 2018 12:06 PM    GFR est AA 86 2018 12:06 PM    GFR est non-AA 74 2018 12:06 PM    Calcium 9.1 2018 12:06 PM     Lab Results   Component Value Date/Time    Hemoglobin A1c 6.4 (H) 2017 02:40 AM    Hemoglobin A1c (POC) 5.6 2018 08:58 AM     Lab Results   Component Value Date/Time    Cholesterol, total 111 2017 02:40 AM    HDL Cholesterol 37 2017 02:40 AM    LDL, calculated 61.8 2017 02:40 AM    VLDL, calculated 12.2 2017 02:40 AM    Triglyceride 61 2017 02:40 AM    CHOL/HDL Ratio 3.0 2017 02:40 AM     Lab Results   Component Value Date/Time    VITAMIN D, 25-HYDROXY 121.0 (H) 2018 12:06 PM       Lab Results   Component Value Date/Time    TSH 0.438 (L) 2018 12:06 PM

## 2018-07-20 RX ORDER — CARVEDILOL 6.25 MG/1
6.25 TABLET ORAL 2 TIMES DAILY WITH MEALS
Qty: 60 TAB | Refills: 5 | Status: SHIPPED | OUTPATIENT
Start: 2018-07-20 | End: 2018-11-07 | Stop reason: SDUPTHER

## 2018-07-20 NOTE — TELEPHONE ENCOUNTER
Requested Prescriptions     Signed Prescriptions Disp Refills    carvedilol (COREG) 6.25 mg tablet 60 Tab 5     Sig: Take 1 Tab by mouth two (2) times daily (with meals).      Authorizing Provider: Zuri Henriquez     Ordering User: Belem Abel       Per Dr. Bia Tilley  Date Time Provider Denilson Thompson   9/20/2018 9:00 AM [address] [place] [address] [facility] SCHED   9/28/2018 8:00 AM Anjali Shipman NP [facility] SCHED   11/7/2018 9:20 AM July Crenshaw  [address]

## 2018-08-09 DIAGNOSIS — G89.29 OTHER CHRONIC PAIN: ICD-10-CM

## 2018-08-11 RX ORDER — LIDOCAINE 50 MG/G
PATCH TOPICAL
Qty: 90 EACH | Refills: 3 | Status: SHIPPED | OUTPATIENT
Start: 2018-08-11 | End: 2020-05-04

## 2018-08-15 ENCOUNTER — TELEPHONE (OUTPATIENT)
Dept: NEUROLOGY | Age: 58
End: 2018-08-15

## 2018-08-15 NOTE — TELEPHONE ENCOUNTER
Re: Botox    [id number] PA request submitted to [facility] via fax. Fax included completed PA form and clinical notes 6/28/18 and 4/12/18. Fax confirmation received 8/15/18.    [id number] pending. Will update when determination has been made.

## 2018-08-17 ENCOUNTER — HOSPITAL ENCOUNTER (OUTPATIENT)
Dept: MAMMOGRAPHY | Age: 58
Discharge: HOME | End: 2018-08-17
Attending: FAMILY MEDICINE
Payer: MEDICARE

## 2018-08-17 DIAGNOSIS — Z12.39 SCREENING BREAST EXAMINATION: ICD-10-CM

## 2018-08-17 PROCEDURE — 77067 SCR MAMMO BI INCL CAD: CPT

## 2018-09-04 RX ORDER — BACLOFEN 10 MG/1
TABLET ORAL
Qty: 90 TAB | Refills: 3 | Status: SHIPPED | OUTPATIENT
Start: 2018-09-04 | End: 2018-12-18 | Stop reason: SDUPTHER

## 2018-09-11 ENCOUNTER — DOCUMENTATION ONLY (OUTPATIENT)
Dept: NEUROLOGY | Age: 58
End: 2018-09-11

## 2018-09-19 DIAGNOSIS — R73.03 PREDIABETES: ICD-10-CM

## 2018-09-19 RX ORDER — LANCING DEVICE
EACH MISCELLANEOUS
Status: CANCELLED | OUTPATIENT
Start: 2018-09-19

## 2018-09-19 RX ORDER — INSULIN PUMP SYRINGE, 3 ML
EACH MISCELLANEOUS
Qty: 1 KIT | Refills: 0 | Status: CANCELLED | OUTPATIENT
Start: 2018-09-19

## 2018-09-19 NOTE — TELEPHONE ENCOUNTER
PCP: Sonia Groves MD    Last appt: 4/26/2018  Future Appointments  Date Time Provider Denilson Thompson   9/20/2018 9:00 AM DO RONAN Sanon [facility] SCH[facility]   9/28/2018 8:00 AM JYOTI Rao [facility] SCHED   11/1/2018 1:50 PM Gemma Hernandez MD RDE [address] 11/7/2018 9:20 AM Shaq Jaramillo  [address]   11/8/2018 4:00 PM Malvin Gambino  [address]       Requested Prescriptions     Pending Prescriptions Disp Refills    glucose blood VI test strips (BLOOD GLUCOSE TEST) strip 100 Strip 11     Sig: For use with glucometer to check blood sugar once a day in morning before eating. Please dispense VERIO FLEX STRIPS    Blood Glucose Control, High (ONETOUCH VERIO HIGH CONTROL) soln       Sig: by Does Not Apply route.  alcohol swabs (BD SINGLE USE SWABS REGULAR) padm       Sig: by Apply Externally route.  Lancing Device (LANCING DEVICE WITH LANCETS) misc       Sig: by Does Not Apply route.  Blood-Glucose Meter monitoring kit 1 Kit 0    calcipotriene (DOVONEX) 0.005 % topical cream 60 g 0     Sig: Apply  to affected area two (2) times a day.        Prior labs and Blood pressures:  BP Readings from Last 3 Encounters:   06/28/18 110/70   04/27/18 97/78   04/24/18 118/75     Lab Results   Component Value Date/Time    Sodium 143 03/27/2018 12:06 PM    Potassium 4.2 03/27/2018 12:06 PM    Chloride 105 03/27/2018 12:06 PM    CO2 24 03/27/2018 12:06 PM    Anion gap 9 09/12/2017 10:06 AM    Glucose 99 03/27/2018 12:06 PM    BUN 15 03/27/2018 12:06 PM    Creatinine 0.86 03/27/2018 12:06 PM    BUN/Creatinine ratio 17 03/27/2018 12:06 PM    GFR est AA 86 03/27/2018 12:06 PM    GFR est non-AA 74 03/27/2018 12:06 PM    Calcium 9.1 03/27/2018 12:06 PM     Lab Results   Component Value Date/Time    Hemoglobin A1c 6.4 (H) 09/09/2017 02:40 AM    Hemoglobin A1c (POC) 5.6 01/24/2018 08:58 AM     Lab Results   Component Value Date/Time    Cholesterol, total 111 09/09/2017 02:40 AM    HDL Cholesterol 37 09/09/2017 02:40 AM    LDL, calculated 61.8 09/09/2017 02:40 AM    VLDL, calculated 12.2 09/09/2017 02:40 AM    Triglyceride 61 09/09/2017 02:40 AM    CHOL/HDL Ratio 3.0 09/09/2017 02:40 AM     Lab Results   Component Value Date/Time    VITAMIN D, 25-HYDROXY 121.0 (H) 03/27/2018 12:06 PM       Lab Results   Component Value Date/Time    TSH 0.438 (L) 03/27/2018 12:06 PM

## 2018-09-19 NOTE — TELEPHONE ENCOUNTER
[facility] sent a request for blood glucose monitor, test strips, control solution, lancing device, alcohol swabs, and Calcipotriene cream for 2 times a day and 3 refills.

## 2018-09-20 ENCOUNTER — OFFICE VISIT (OUTPATIENT)
Dept: NEUROLOGY | Age: 58
End: 2018-09-20

## 2018-09-20 VITALS — RESPIRATION RATE: 18 BRPM | OXYGEN SATURATION: 98 %

## 2018-09-20 DIAGNOSIS — G43.719 INTRACTABLE CHRONIC MIGRAINE WITHOUT AURA AND WITHOUT STATUS MIGRAINOSUS: Primary | ICD-10-CM

## 2018-09-20 NOTE — PROGRESS NOTES
Botox Injection Note 2018 Patient:  Rachel Agarwal YOB: 1960 Date of Visit: 2018 Indication: patient has chronic migraine headaches greater than 15 days per month lasting more than 4 hours each. She has failed or not tolerated 2 or more medication preventatives. Written consent was signed and verified by me. Risks and benefits were discussed to include possible cosmetic asymmetry which is not permament or life threatening. Patient name and  was confirmed prior to procedure. Time out performed. Procedure:  
Botox concentration: 200 units in 2 ml of preservative-free normal saline. 1:1 dilution. LOT#: [id number] EXP:  2021 Injections and distribution as follow :  
 
 Units/site  Sites Loc Subtotal   
procerus 5 1 ML 5  
corrugaters 2.5 2 BL 5  
frontalis 5 8 BL 40 Temporalis 10 4 BL 40 Occipitalis 10 2 BL 20 Cervical paraspinals 5 4 BL 20 Trapezius 10 4 BL 40  
 
   
200 units Botox were reconstituted, 170 units injected as above and the remainder was unavoidably wasted. Patient tolerated procedure well. Return in 3 months for repeat injections. _____________________________ Freddie Bergman DO 
NEUROLOGIST [facility]

## 2018-09-20 NOTE — PATIENT INSTRUCTIONS

## 2018-09-20 NOTE — MR AVS SNAPSHOT
[facility] [address] [place] [address] 
[phone] Patient: Gianna Kirby MRN: [id number] Visit Information Date & Time Provider Department Dept. Phone Encounter #  
 9/20/2018  9:00  [facility] at [address] [id number] Your Appointments 9/28/2018  8:00 AM  
New Patient with JYOTI Driver (ALICE Mccrary) Appt Note: prediabetes and weight follow up; new pt prediabetes and weight follow up per dr Brice Schirmer [id number] On license of [facility] [address]  
  
   
 [address]  
  
    
 11/1/2018  1:50 PM  
Follow Up with Erum Navarro MD  
[facility]-[address]) Appt Note: f/up for diabetes $0CP yd 09/04/18  
 [address] [facility] [address] [place] [address]  
[phone]  
  
   
 [address] [facility] [address]  
  
    
 11/7/2018  9:20 AM  
ESTABLISHED PATIENT with Heidy Lopez MD  
[facility] ([address]) Appt Note: 1 yr f/u  
 [address] [address]  
[address] [facility] [address]  
  
    
 11/8/2018  4:00 PM  
New Patient with Erica Rea MD  
[address] [facility] ([address]) Appt Note: Np self refd, last seen by Dr. Luana Mayo in 2017, needs to be reevaluated for EDDIE- bring machine [address] [facility] [address] [phone] [address] [facility] [address] [phone] Upcoming Health Maintenance Date Due ZOSTER VACCINE AGE 60> 3/27/2018 Influenza Age 5 to Adult 8/1/2018 MEDICARE YEARLY EXAM 8/15/2018 PAP AKA CERVICAL CYTOLOGY 11/13/2018 BREAST CANCER SCRN MAMMOGRAM 8/17/2020 COLONOSCOPY 6/22/2021 DTaP/Tdap/Td series (2 - Td) 6/13/2027 Allergies as of 9/20/2018  Review Complete On: 9/20/2018 By: Shea Scott DO Severity Noted Reaction Type Reactions Bees [Hymenoptera Allergenic Extract] High 10/14/2014   Systemic Shortness of Breath, Swelling [place] High 10/14/2014   Systemic Shortness of Breath, Swelling Lisinopril  10/17/2014    Cough Current Immunizations  Reviewed on 10/11/2016 Name Date Influenza Vaccine Trudy Goody) 11/13/2015 Influenza Vaccine (Quad) PF 10/20/2017, 10/11/2016 Pneumococcal Polysaccharide (PPSV-23) 8/29/2016 TB Skin Test (PPD) Intradermal 4/24/2018 Tdap 6/13/2017 Not reviewed this visit You Were Diagnosed With   
  
 Codes Comments Intractable chronic migraine without aura and without status migrainosus    -  Primary ICD-10-CM: D58.397 ICD-9-CM: 346.71 Vitals OB Status Smoking Status Postmenopausal Former Smoker Preferred Pharmacy Pharmacy Name Phone [address] [phone] Your Updated Medication List  
  
   
This list is accurate as of 9/20/18  9:13 AM.  Always use your most recent med list.  
  
  
  
  
 albuterol 90 mcg/actuation inhaler Commonly known as:  PROVENTIL HFA, VENTOLIN HFA, PROAIR HFA Take 2 Puffs by inhalation every four (4) hours as needed for Wheezing or Shortness of Breath. albuterol-ipratropium 2.5 mg-0.5 mg/3 ml Nebu Commonly known as:  DUO-NEB  
3 mL by Nebulization route every six (6) hours as needed. aspirin delayed-release 81 mg tablet Take 81 mg by mouth daily. baclofen 10 mg tablet Commonly known as:  LIORESAL  
TAKE 1 TABLET BY MOUTH THREE TIMES DAILY  
  
 carvedilol 6.25 mg tablet Commonly known as:  Monda Lovings Take 1 Tab by mouth two (2) times daily (with meals). cyanocobalamin 1,000 mcg/mL injection Commonly known as:  VITAMIN B12  
 1 mL by SubCUTAneous route every fourteen (14) days. For b12 deficiency  
  
 cyclobenzaprine 5 mg tablet Commonly known as:  FLEXERIL Take 5 mg by mouth two (2) times a day. DULoxetine 60 mg capsule Commonly known as:  CYMBALTA Take 1 Cap by mouth daily. EPINEPHrine 0.3 mg/0.3 mL injection Commonly known as:  EPIPEN 2-LAURA  
0.3 mL by IntraMUSCular route once as needed for up to 1 dose. EPINEPHrine 1 mg/mL nasal solution Commonly known as:  ADRENALIN  
0.5 mL by Nasal route.  
  
 ergocalciferol 50,000 unit capsule Commonly known as:  ERGOCALCIFEROL Take 1 Cap by mouth every other day. ferrous sulfate 325 mg (65 mg iron) tablet Take 325 mg by mouth Daily (before breakfast). furosemide 40 mg tablet Commonly known as:  LASIX Take 1 Tab by mouth daily. * glucose blood VI test strips strip Commonly known as:  blood glucose test  
For use with glucometer to check blood sugar once a day in morning before eating. Please dispense VERIO FLEX STRIPS  
  
 * glucose blood VI test strips strip Commonly known as:  blood glucose test  
For use with glucometer to check blood sugar once a day in morning before eating. Please dispense brand covered by insurance. HYDROcodone-acetaminophen 5-325 mg per tablet Commonly known as:  March Castles Take 1 Tab by mouth every six (6) hours as needed for Pain. Max Daily Amount: 4 Tabs. Insulin Syringe-Needle U-100 1 mL 30 gauge x 5/16 Syrg Commonly known as:  INSULIN SYRINGE For use to administer b12 injections  
  
 levothyroxine 150 mcg tablet Commonly known as:  SYNTHROID Take 1 tablet daily and 1/2 tablet on Sundays (6.5 tablets/week). lidocaine 5 % Commonly known as:  Minor Alt Apply patch to the affected area for 12 hours a day and remove for 12 hours a day. linaclotide 145 mcg Cap capsule Commonly known as:  Dorothye Bottom Take 1 Cap by mouth Daily (before breakfast). For constipation losartan 50 mg tablet Commonly known as:  COZAAR Take 1 Tab by mouth nightly. Hold for SBP<115  
  
 metFORMIN 1,000 mg tablet Commonly known as:  GLUCOPHAGE Take 1 Tab by mouth two (2) times daily (with meals). morphine CR 15 mg CR tablet Commonly known as:  MS CONTIN Take 1 Tab by mouth every twelve (12) hours. Max Daily Amount: 30 mg.  
  
 onabotulinumtoxinA 200 unit injection Commonly known as:  BOTOX  
200 Units by IntraMUSCular route every three (3) months. Inject to selected muscles head and neck bilaterally every 3 months for migraine  Indications: MIGRAINE PREVENTION  
  
 pantoprazole 40 mg tablet Commonly known as:  PROTONIX Take 1 Tab by mouth daily. ROZEREM 8 mg tablet Generic drug:  ramelteon TAKE 1 TABLET BY MOUTH NIGHTLY. TAKE 30 MINUTES BEFORE BEDTIME  
  
 topiramate 100 mg tablet Commonly known as:  TOPAMAX Take 1 Tab by mouth two (2) times a day. * Notice: This list has 2 medication(s) that are the same as other medications prescribed for you. Read the directions carefully, and ask your doctor or other care provider to review them with you. We Performed the Following [address] [id number] CPT(R)] Introducing \[facility]\"" & [facility] SERVICES! Dear Elena Estrella: Thank you for requesting a [employer] account. Our records indicate that you already have an active [employer] account. You can access your account anytime at [email or web address] Did you know that you can access your hospital and ER discharge instructions at any time in [employer]? You can also review all of your test results from your hospital stay or ER visit. Additional Information If you have questions, please visit the Frequently Asked Questions section of the [employer] website at [email or web address] Remember, [employer] is NOT to be used for urgent needs. For medical emergencies, dial 911. Now available from your iPhone and Android! Please provide this summary of care documentation to your next provider. Your primary care clinician is listed as Elaine Restrepo. If you have any questions after today's visit, please call [phone].

## 2018-09-24 RX ORDER — LANCETS
EACH MISCELLANEOUS
Qty: 100 EACH | Refills: 3 | Status: SHIPPED | OUTPATIENT
Start: 2018-09-24 | End: 2019-03-12

## 2018-09-24 RX ORDER — INSULIN PUMP SYRINGE, 3 ML
EACH MISCELLANEOUS
Qty: 1 KIT | Refills: 0 | Status: SHIPPED | OUTPATIENT
Start: 2018-09-24 | End: 2018-11-01

## 2018-09-24 RX ORDER — ISOPROPYL ALCOHOL 70 ML/100ML
SWAB TOPICAL
Qty: 100 PAD | Refills: 3 | Status: SHIPPED | OUTPATIENT
Start: 2018-09-24 | End: 2019-03-12

## 2018-09-24 RX ORDER — CALCIPOTRIENE 50 UG/G
CREAM TOPICAL
Qty: 60 G | Refills: 11 | Status: SHIPPED | OUTPATIENT
Start: 2018-09-24

## 2018-09-24 NOTE — TELEPHONE ENCOUNTER
Orders have been signed to print. Please clarify where they need to go.  They will be in my office when the information is available

## 2018-09-28 ENCOUNTER — OFFICE VISIT (OUTPATIENT)
Dept: FAMILY MEDICINE CLINIC | Age: 58
End: 2018-09-28

## 2018-09-28 VITALS
HEIGHT: 65 IN | SYSTOLIC BLOOD PRESSURE: 94 MMHG | RESPIRATION RATE: 18 BRPM | HEART RATE: 88 BPM | DIASTOLIC BLOOD PRESSURE: 57 MMHG | WEIGHT: 226.3 LBS | BODY MASS INDEX: 37.7 KG/M2 | TEMPERATURE: 97.8 F | OXYGEN SATURATION: 99 %

## 2018-09-28 DIAGNOSIS — R73.03 PREDIABETES: Primary | ICD-10-CM

## 2018-09-28 DIAGNOSIS — K59.04 CHRONIC IDIOPATHIC CONSTIPATION: ICD-10-CM

## 2018-09-28 DIAGNOSIS — M62.81 LEFT-SIDED MUSCLE WEAKNESS: ICD-10-CM

## 2018-09-28 LAB — HBA1C MFR BLD HPLC: 5.1 %

## 2018-09-28 RX ORDER — METFORMIN HYDROCHLORIDE 1000 MG/1
1000 TABLET ORAL DAILY
Qty: 180 TAB | Refills: 3
Start: 2018-09-28 | End: 2018-12-28

## 2018-09-28 NOTE — PATIENT INSTRUCTIONS
1) Prediabetes - current therapy: metformin 1000mg twice a day    Hemoglobin A1c is a 3 month average of your blood sugar and used as a marker of diabetes. Goal is less than 5.7%, and above 6.4% is considered diabetes. Your Hemoglobin A1c is 5.1% which means your prediabetes is under better control compared to your A1c value when it was 5.6% at your last check. Continue to work on [employer] and exercises. With the weight loss and healthy eating, your metformin dose can be decreased. Reduce the metformin to 1 pill a day and come back in 3 months to recheck the value. 2) Refill on Linzess sent in    3) Please send us information on where to send order for hospital bed (fax number and form, if you have one)    4) Referral to physical therapy     Prediabetes: Care Instructions  Your Care Instructions    Prediabetes is a warning sign that you are at risk for getting type 2 diabetes. It means that your blood sugar is higher than it should be. The food you eat turns into sugar, which your body uses for energy. Normally, an organ called the pancreas makes insulin, which allows the sugar in your blood to get into your body's cells. But when your body can't use insulin the right way, the sugar doesn't move into cells. It stays in your blood instead. This is called insulin resistance. The buildup of sugar in the blood causes prediabetes. The good news is that lifestyle changes may help you get your blood sugar back to normal and help you avoid or delay diabetes. Follow-up care is a key part of your treatment and safety. Be sure to make and go to all appointments, and call your doctor if you are having problems. It's also a good idea to know your test results and keep a list of the medicines you take. How can you care for yourself at home? · Watch your weight. A healthy weight helps your body use insulin properly. · Limit the amount of calories, sweets, and unhealthy fat you eat.  Ask your doctor if you should see a dietitian. A registered dietitian can help you create meal plans that fit your lifestyle. · Get at least 30 minutes of exercise on most days of the week. Exercise helps control your blood sugar. It also helps you maintain a healthy weight. Walking is a good choice. You also may want to do other activities, such as running, swimming, cycling, or playing tennis or team sports. · Do not smoke. Smoking can make prediabetes worse. If you need help quitting, talk to your doctor about stop-smoking programs and medicines. These can increase your chances of quitting for good. · If your doctor prescribed medicines, take them exactly as prescribed. Call your doctor if you think you are having a problem with your medicine. You will get more details on the specific medicines your doctor prescribes. When should you call for help? Watch closely for changes in your health, and be sure to contact your doctor if:    · You have any symptoms of diabetes. These may include:  ¨ Being thirsty more often. ¨ Urinating more. ¨ Being hungrier. ¨ Losing weight. ¨ Being very tired. ¨ Having blurry vision.     · You have a wound that will not heal.     · You have an infection that will not go away.     · You have problems with your blood pressure.     · You want more information about diabetes and how you can keep from getting it. Where can you learn more? Go to http://noa-em.info/. Enter I222 in the search box to learn more about \"Prediabetes: Care Instructions. \"  Current as of: December 7, 2017  Content Version: 11.7  © 2489-7714 [employer]. Care instructions adapted under license by [employer] (which disclaims liability or warranty for this information). If you have questions about a medical condition or this instruction, always ask your healthcare professional. [address] any warranty or liability for your use of this information.

## 2018-09-28 NOTE — PROGRESS NOTES
S: Patricia Iyer is a 59 y.o. yo female who presents for diabetes follow up. Assessment/Plan:    1. Prediabetes  -current therapy: metformin 1000mg bid  -has lost 40lbs since April 2018 (intentional)  -A1c =5.1% (down from 5.6% Jan 2018)  -reduce metformin to 1 tab 1000mg daily    2.  Left sided muscle weakness  - requesting referral for PT at [address] [place] [address]; limited left side ROM; 2/5 upper strength; 3/5 lower strength  -hospital bed d/c on 9/24/18, will need to crawl upstairs to get to bed, requesting order be sent to ; pt to provide fax number and info    RTC 3 months for DM/weight check     Bariatric surgery pt 1980's (presurgery wt 465lbs)  2014 - MI/stroke - went to ED for lung issue, had a MI while in hospital, next morning had heart cath and that night had a stroke  w/dx of HF    Current therapy: metformin 1000mg bid  Blood sugars at home:   Numbness/tingling - due to stroke     Patricia Iyer has lost 40lbs since last visit April 2018 - intentional; \"trying not to eat everything in sight\" also on topiramate for migraines which reduces appetite    BP today = 94/57; pt denies lightheaded, dizziness, doesn't get at dizzy standing up; states her BP can get as low at 70 and she feels fine    Left side weakness from stroke requesting referral for PT at [address] [place] [address] and hospital bed   Had hospital bed, but taken back on 9/24/18, now has to crawl upstairs to get to bed, requesting order be sent to ; pt to provide fax number and info    Diabetes Maintenance:  Statin: none - LDL =61; TC = 111  BP at goal: takes losartan if BP >115  ASA: 81mg daily  ACEI or ARB: cough lisinopril  PPSV23: 2016  Last Eye exam: 10/2017  Flu: last week at [address]:   Lab Results   Component Value Date/Time    Hemoglobin A1c 6.4 (H) 09/09/2017 02:40 AM    Hemoglobin A1c (POC) 5.6 01/24/2018 08:58 AM     Last lipids:   Lab Results   Component Value Date/Time    LDL, calculated 61.8 09/09/2017 02:40 AM     Last Cr:   Lab Results   Component Value Date/Time    Creatinine (POC) 0.9 02/22/2016 10:57 AM    Creatinine 0.86 03/27/2018 12:06 PM     Social history:   Nutrition: eats healthy - doesn't like salmon, but sister cooks it a lot  Physical: uses walker - has residual physical limitations d/t stroke - does exercises and niece helps her   Social: lives with sister (ever since stroke)    History   Smoking Status    Former Smoker    Packs/day: 0.25    Years: 15.00    Types: Cigarettes    Quit date: 6/13/2007   Smokeless Tobacco    Never Used     History   Alcohol Use No     History   Drug Use No          Review of Systems:  - Constitutional Symptoms: no fevers or chills  - Cardiovascular: no chest pain  - Respiratory: no shortness of breath  ROS is negative otherwise. I reviewed the following:  Past Medical History:   Diagnosis Date    Advanced care planning/counseling discussion 3/4/16    On File    Bronchitis 2/24/2014    Cervicalgia 8/18/15    Dr. Rc Aguilera    Chest pain 5/25/15    Hospitalized at [facility] 5/25/15 (lab work negative)    Chronic indwelling Dunn catheter 1/24/2018    Initially placed Jan 2018. Mx by Dr. Shruthi Harrison.  Congestive heart failure, unspecified     Last Echo 2/8/15: EF 55-60%    Constipation 6/13/2017    Enthesopathy, spinal ([facility] [address] 8/18/15    Dr. Rc Aguilera    Essential hypertension     Foot drop 8/18/15    Dr. Phan Book Heart [facility]) 2/24/2013    Was supposed to See Cardiology for possible pacemaker in november 2014- After Cardiology consult locally, no need, EF greatly improved.  Established with Dr. Michael Phillips Hiatal hernia 6/2015    3 cm hiatal hernia     Hip pain 8/18/15    Dr. Rc Aguilera    Hypercholesterolemia     Hyperglycemia 7/2015    A1c 5.9     Hyperlipidemia 6/30/2015    NMR lipoprofile- LDL P 997, LDL-c 71, HLD-C-39, TG-60, HLD-P (25.2), Small LDL-P -541, LDL size 20.6    Hypothyroid     Insomnia 6/13/2017    Lower extremity edema  Lumbar spondylosis 8/18/15    Dr. Brianne Pepe 6/2015    EGD/Colonscopy 6/15- Gastritis, internal hemorrhoids and 3 polyps    Murmur     EDDIE on CPAP     Was referred to Pulmonology - Uses CPAP    Osteoarthritis of hip 8/18/15    Dr. Ashley Campbell    Osteoarthritis, shoulder 8/18/15    Dr. Ashley Campbell    Radiculopathy, cervical 8/18/15    Dr. Ashley Campbell    Shoulder pain 8/18/15    Dr. Huy Davis Spinal stenosis of cervical region 8/18/15    Dr. Huy Davis Spinal stenosis, lumbar 8/18/15    Dr. Huy Davis Stroke [facility]) 2/25/2014    Established with Neurology, Dani Hatch NP-Just hospitalized at [facility] 2/7/15-2/10/15. CT negative, but Late effect CV accident with increased tone described on discharge summary, Carotid dopplers showed 50% stenosis bilaterally.  Vitamin D deficiency 7/2015        Current Outpatient Prescriptions   Medication Sig Dispense Refill    Blood Glucose Control, High (ONETOUCH VERIO HIGH CONTROL) soln For use with glucometer 1 Each 3    alcohol swabs (BD SINGLE USE SWABS REGULAR) padm To use when checking blood sugar 100 Pad 3    calcipotriene (DOVONEX) 0.005 % topical cream Apply  to affected area daily as needed. 60 g 11    Lancets misc For use with glucometer to check blood sugar once a day in morning before eating. Please dispense brand covered by insurance. 100 Each 3    glucose blood VI test strips (BLOOD GLUCOSE TEST) strip For use with glucometer to check blood sugar once a day in morning before eating. Please dispense brand covered by insurance. 100 Strip 3    Blood-Glucose Meter monitoring kit to check blood sugar once a day in morning before eating. Please dispense brand covered by insurance. 1 Kit 0    baclofen (LIORESAL) 10 mg tablet TAKE 1 TABLET BY MOUTH THREE TIMES DAILY 90 Tab 3    ROZEREM 8 mg tablet TAKE 1 TABLET BY MOUTH NIGHTLY.  TAKE 30 MINUTES BEFORE BEDTIME 90 Tab 2    lidocaine (LIDODERM) 5 % Apply patch to the affected area for 12 hours a day and remove for 12 hours a day. 90 Each 3    carvedilol (COREG) 6.25 mg tablet Take 1 Tab by mouth two (2) times daily (with meals). 60 Tab 5    cyanocobalamin (VITAMIN B12) 1,000 mcg/mL injection 1 mL by SubCUTAneous route every fourteen (14) days. For b12 deficiency 10 mL 2    glucose blood VI test strips (BLOOD GLUCOSE TEST) strip For use with glucometer to check blood sugar once a day in morning before eating. Please dispense brand covered by insurance. 100 Strip 11    glucose blood VI test strips (BLOOD GLUCOSE TEST) strip For use with glucometer to check blood sugar once a day in morning before eating. Please dispense VERIO FLEX STRIPS 100 Strip 11    Insulin Syringe-Needle U-100 (INSULIN SYRINGE) 1 mL 30 gauge x 5/16 syrg For use to administer b12 injections 12 Syringe 1    ergocalciferol (ERGOCALCIFEROL) 50,000 unit capsule Take 1 Cap by mouth every other day. 45 Cap 3    levothyroxine (SYNTHROID) 150 mcg tablet Take 1 tablet daily and 1/2 tablet on Sundays (6.5 tablets/week). 90 Tab 3    onabotulinumtoxinA (BOTOX) 200 unit injection 200 Units by IntraMUSCular route every three (3) months. Inject to selected muscles head and neck bilaterally every 3 months for migraine  Indications: MIGRAINE PREVENTION 200 Units 3    topiramate (TOPAMAX) 100 mg tablet Take 1 Tab by mouth two (2) times a day. 60 Tab 6    EPINEPHrine (ADRENALIN) 1 mg/mL nasal solution 0.5 mL by Nasal route.  metFORMIN (GLUCOPHAGE) 1,000 mg tablet Take 1 Tab by mouth two (2) times daily (with meals). 180 Tab 3    DULoxetine (CYMBALTA) 60 mg capsule Take 1 Cap by mouth daily. 90 Cap 3    pantoprazole (PROTONIX) 40 mg tablet Take 1 Tab by mouth daily. 90 Tab 3    furosemide (LASIX) 40 mg tablet Take 1 Tab by mouth daily. 90 Tab 3    morphine CR (MS CONTIN) 15 mg CR tablet Take 1 Tab by mouth every twelve (12) hours. Max Daily Amount: 30 mg.  (Patient taking differently: Take 15 mg by mouth daily.) 60 Tab 0    HYDROcodone-acetaminophen (NORCO) 5-325 mg per tablet Take 1 Tab by mouth every six (6) hours as needed for Pain. Max Daily Amount: 4 Tabs. 20 Tab 0    albuterol-ipratropium (DUO-NEB) 2.5 mg-0.5 mg/3 ml nebu 3 mL by Nebulization route every six (6) hours as needed. 30 Nebule 0    losartan (COZAAR) 50 mg tablet Take 1 Tab by mouth nightly. Hold for SBP<115 30 Tab 0    cyclobenzaprine (FLEXERIL) 5 mg tablet Take 5 mg by mouth two (2) times a day.  EPINEPHrine (EPIPEN 2-LAURA) 0.3 mg/0.3 mL injection 0.3 mL by IntraMUSCular route once as needed for up to 1 dose. 2 Syringe 3    albuterol (PROVENTIL HFA, VENTOLIN HFA, PROAIR HFA) 90 mcg/actuation inhaler Take 2 Puffs by inhalation every four (4) hours as needed for Wheezing or Shortness of Breath. 3 Inhaler 3    linaclotide (LINZESS) 145 mcg cap capsule Take 1 Cap by mouth Daily (before breakfast). For constipation 90 Cap 3    ferrous sulfate 325 mg (65 mg iron) tablet Take 325 mg by mouth Daily (before breakfast).  aspirin delayed-release 81 mg tablet Take 81 mg by mouth daily. Allergies   Allergen Reactions    Bees [Hymenoptera Allergenic Extract] Shortness of Breath and Swelling    Strawberry Shortness of Breath and Swelling    Lisinopril Cough       O:   Visit Vitals    BP 94/57 (BP 1 Location: Right arm, BP Patient Position: Sitting)    Pulse 88    Temp 97.8 °F (36.6 °C) (Oral)    Resp 18    Ht 5' 5\" (1.651 m)    Wt 226 lb 4.8 oz (102.6 kg)    SpO2 99%    BMI 37.66 kg/m2        GENERAL: Terry Escobar  is in no acute distress. Non-toxic. EYE: PERRLA. RESP: Unlabored without SOB. Speaking in full sentences. Breath sounds are symmetrical bilaterally. Clear to auscultation to all fields. No wheezes. No rales or rhonchi. CV: normal rate. Regular rhythm. S1, S2 audible. No murmur noted. No rubs, clicks or gallops noted. MUSC:  Intact x 4 extremities. Left side: Limited ROM upper and lower extremities.   left upper extremity: 2/5 strength; left lower extremity: 3/5 strength. No pain with movement. NEURO:  awake, alert and oriented to person, place, and time and event. Clear speech. HEME/LYMPH: pedal pulses palpable 2+. LE nonpitting peripheral edema. SKIN: Skin is warm and dry. Turgor is normal. No petechiae, no purpura, no rash. No cyanosis. No mottling, jaundice or pallor. _______________________________________________________________  Patient education was done. Advised on nutrition, physical activity, weight management, tobacco, alcohol and safety. Medication risks/benefits,  interactions and alternatives discussed with patient. Advised of frequent feet checks. Advised yearly eye exam. Reviewed warning signs of hypertension, stroke and heart attack      Patient verbalized understanding and agreed with plan of care. Patient was given an after visit summary which included current diagnoses, medications and vital signs. Follow up in 3 months. Discussed BMI and healthy weight. Encouraged patient to work to implement changes including diet high in fruits, vegetables, lean protein and good fats. Limit refined, processed carbohydrates and sugar. Encouraged regular exercise.

## 2018-09-28 NOTE — MR AVS SNAPSHOT
[address] [facility] 
 
 
 [address] 
[address] [place]  [address] 
[phone] Patient: Azra Garcia MRN: [id number] UAL:6/15/5130 Visit Information Date & Time Provider Department Dept. Phone Encounter #  
 9/28/2018  8:00 AM Brando Benites NP [facility] [phone] [id number] Follow-up Instructions Return in about 3 months (around 12/28/2018), or if symptoms worsen or fail to improve, for prediabetes/weight check. Your Appointments 11/1/2018  1:50 PM  
Follow Up with Tabatha Melchor MD  
[facility] Appt Note: f/up for diabetes $0CP yd 09/04/18  
 [address] [facility] [address]  
[phone]  
  
   
 [address] [facility] [address]  
  
    
 11/7/2018  9:20 AM  
ESTABLISHED PATIENT with Kartik Phillip MD  
[facility] ([address] [place] [address]) Appt Note: 1 yr f/u  
 [address] [facility] [address]  
[address] [facility] [address]  
  
    
 11/8/2018  4:00 PM  
New Patient with Thania Montenegro MD  
[address] [facility] [place] ([address] [place] [address]) Appt Note: Np self refd, last seen by Dr. Lisa Sky in 2017, needs to be reevaluated for EDDIE- bring machine [address] [facility] [address] [phone] [address] [place] [address]., [address] [phone]  
  
    
 12/13/2018  1:30 PM  
PROCEDURE with Nino Arce DO [facility] at [address]) Appt Note: botox inj / [address] [facility] [address]  
[phone]  
  
   
 [address] [facility]  [address] Upcoming Health Maintenance Date Due Shingrix Vaccine Age 50> (1 of 2) 5/27/2008 Influenza Age 5 to Adult 8/1/2018 MEDICARE YEARLY EXAM 8/15/2018 PAP AKA CERVICAL CYTOLOGY 11/13/2018 BREAST CANCER SCRN MAMMOGRAM 8/17/2020 COLONOSCOPY 6/22/2021 DTaP/Tdap/Td series (2 - Td) 6/13/2027 Allergies as of 9/28/2018  Review Complete On: 9/28/2018 By: Avinash Rivera NP Severity Noted Reaction Type Reactions Bees [Hymenoptera Allergenic Extract] High 10/14/2014   Systemic Shortness of Breath, Swelling [place] High 10/14/2014   Systemic Shortness of Breath, Swelling Lisinopril  10/17/2014    Cough Current Immunizations  Reviewed on 9/28/2018 Name Date Influenza Vaccine Leigha Bible) 11/13/2015 Influenza Vaccine (Quad) PF 10/20/2017, 10/11/2016 Pneumococcal Polysaccharide (PPSV-23) 8/29/2016 TB Skin Test (PPD) Intradermal 4/24/2018 Tdap 6/13/2017 Reviewed by Avinash Rivera NP on 9/28/2018 at  8:19 AM  
You Were Diagnosed With   
  
 Codes Comments Prediabetes    -  Primary ICD-10-CM: R73.03 
ICD-9-CM: 790.29 Chronic idiopathic constipation     ICD-10-CM: K59.04 
ICD-9-CM: 564.00 Left-sided muscle weakness     ICD-10-CM: M62.81 ICD-9-CM: 728.87 Vitals BP Pulse Temp Resp Height(growth percentile) Weight(growth percentile) 94/57 (BP 1 Location: Right arm, BP Patient Position: Sitting) 88 97.8 °F (36.6 °C) (Oral) 18 5' 5\" (1.651 m) 226 lb 4.8 oz (102.6 kg) SpO2 BMI OB Status Smoking Status 99% 37.66 kg/m2 Postmenopausal Former Smoker BMI and BSA Data Body Mass Index Body Surface Area  
 37.66 kg/m 2 2.17 m 2 Preferred Pharmacy Pharmacy Name Phone [address] [phone] Your Updated Medication List  
  
   
This list is accurate as of 9/28/18  8:50 AM.  Always use your most recent med list.  
  
  
  
  
 albuterol 90 mcg/actuation inhaler Commonly known as:  PROVENTIL HFA, VENTOLIN HFA, PROAIR HFA  
 Take 2 Puffs by inhalation every four (4) hours as needed for Wheezing or Shortness of Breath. albuterol-ipratropium 2.5 mg-0.5 mg/3 ml Nebu Commonly known as:  DUO-NEB  
3 mL by Nebulization route every six (6) hours as needed. alcohol swabs Padm Commonly known as:  BD Single Use Swabs Regular To use when checking blood sugar  
  
 aspirin delayed-release 81 mg tablet Take 81 mg by mouth daily. baclofen 10 mg tablet Commonly known as:  LIORESAL  
TAKE 1 TABLET BY MOUTH THREE TIMES DAILY Blood Glucose Control, High Soln Commonly known as:  ONETOUCH VERIO HIGH CONTROL For use with glucometer Blood-Glucose Meter monitoring kit  
to check blood sugar once a day in morning before eating. Please dispense brand covered by insurance. calcipotriene 0.005 % topical cream  
Commonly known as:  Darek Marlene Apply  to affected area daily as needed. carvedilol 6.25 mg tablet Commonly known as:  Jestine Pellet Take 1 Tab by mouth two (2) times daily (with meals). cyanocobalamin 1,000 mcg/mL injection Commonly known as:  VITAMIN B12  
1 mL by SubCUTAneous route every fourteen (14) days. For b12 deficiency  
  
 cyclobenzaprine 5 mg tablet Commonly known as:  FLEXERIL Take 5 mg by mouth two (2) times a day. DULoxetine 60 mg capsule Commonly known as:  CYMBALTA Take 1 Cap by mouth daily. EPINEPHrine 0.3 mg/0.3 mL injection Commonly known as:  EPIPEN 2-LAURA  
0.3 mL by IntraMUSCular route once as needed for up to 1 dose. EPINEPHrine 1 mg/mL nasal solution Commonly known as:  ADRENALIN  
0.5 mL by Nasal route.  
  
 ergocalciferol 50,000 unit capsule Commonly known as:  ERGOCALCIFEROL Take 1 Cap by mouth every other day. ferrous sulfate 325 mg (65 mg iron) tablet Take 325 mg by mouth Daily (before breakfast). furosemide 40 mg tablet Commonly known as:  LASIX Take 1 Tab by mouth daily. * glucose blood VI test strips strip Commonly known as:  blood glucose test  
For use with glucometer to check blood sugar once a day in morning before eating. Please dispense VERIO FLEX STRIPS  
  
 * glucose blood VI test strips strip Commonly known as:  blood glucose test  
For use with glucometer to check blood sugar once a day in morning before eating. Please dispense brand covered by insurance. * glucose blood VI test strips strip Commonly known as:  blood glucose test  
For use with glucometer to check blood sugar once a day in morning before eating. Please dispense brand covered by insurance. HYDROcodone-acetaminophen 5-325 mg per tablet Commonly known as:  Lorna Stinson Take 1 Tab by mouth every six (6) hours as needed for Pain. Max Daily Amount: 4 Tabs. Insulin Syringe-Needle U-100 1 mL 30 gauge x 5/16 Syrg Commonly known as:  INSULIN SYRINGE For use to administer b12 injections Lancets Misc For use with glucometer to check blood sugar once a day in morning before eating. Please dispense brand covered by insurance. levothyroxine 150 mcg tablet Commonly known as:  SYNTHROID Take 1 tablet daily and 1/2 tablet on Sundays (6.5 tablets/week). lidocaine 5 % Commonly known as:  Carissa Arnold Apply patch to the affected area for 12 hours a day and remove for 12 hours a day. linaclotide 145 mcg Cap capsule Commonly known as:  Anamoose Runner Take 1 Cap by mouth Daily (before breakfast). For constipation  
  
 losartan 50 mg tablet Commonly known as:  COZAAR Take 1 Tab by mouth nightly. Hold for SBP<115  
  
 metFORMIN 1,000 mg tablet Commonly known as:  GLUCOPHAGE Take 1 Tab by mouth two (2) times daily (with meals). morphine CR 15 mg CR tablet Commonly known as:  MS CONTIN Take 1 Tab by mouth every twelve (12) hours. Max Daily Amount: 30 mg.  
  
 onabotulinumtoxinA 200 unit injection Commonly known as:  BOTOX  
200 Units by IntraMUSCular route every three (3) months.  Inject to selected muscles head and neck bilaterally every 3 months for migraine  Indications: MIGRAINE PREVENTION  
  
 pantoprazole 40 mg tablet Commonly known as:  PROTONIX Take 1 Tab by mouth daily. ROZEREM 8 mg tablet Generic drug:  ramelteon TAKE 1 TABLET BY MOUTH NIGHTLY. TAKE 30 MINUTES BEFORE BEDTIME  
  
 topiramate 100 mg tablet Commonly known as:  TOPAMAX Take 1 Tab by mouth two (2) times a day. * Notice: This list has 3 medication(s) that are the same as other medications prescribed for you. Read the directions carefully, and ask your doctor or other care provider to review them with you. Prescriptions Sent to Pharmacy Refills  
 linaclotide (LINZESS) 145 mcg cap capsule 3 Sig: Take 1 Cap by mouth Daily (before breakfast). For constipation Class: Normal  
 Pharmacy: [facility] [place] Ph #: [phone] Route: Oral  
  
We Performed the Following AMB POC HEMOGLOBIN A1C [50052 CPT(R)] REFERRAL TO PHYSICAL THERAPY [SOJ48 Custom] Comments:  
 Please evaluate nad treat pt for left sided weakness s/p stroke Pt is established at [address] [place] [address] and would like to return there Follow-up Instructions Return in about 3 months (around 12/28/2018), or if symptoms worsen or fail to improve, for prediabetes/weight check. Referral Information Referral ID Referred By Referred To  
  
 [id number] Jolie WAGNER Not Available Visits Status Start Date End Date 1 New Request 9/28/18 9/28/19 If your referral has a status of pending review or denied, additional information will be sent to support the outcome of this decision. Patient Instructions 1) Prediabetes - current therapy: metformin 1000mg twice a day Hemoglobin A1c is a 3 month average of your blood sugar and used as a marker of diabetes.   Goal is less than 5.7%, and above 6.4% is considered diabetes. Your Hemoglobin A1c is 5.1% which means your prediabetes is under better control compared to your A1c value when it was 5.6% at your last check. Continue to work on [employer] and exercises. With the weight loss and healthy eating, your metformin dose can be decreased. Reduce the metformin to 1 pill a day and come back in 3 months to recheck the value. 2) Refill on Linzess sent in 3) Please send us information on where to send order for hospital bed (fax number and form, if you have one) 4) Referral to physical therapy Prediabetes: Care Instructions Your Care Instructions Prediabetes is a warning sign that you are at risk for getting type 2 diabetes. It means that your blood sugar is higher than it should be. The food you eat turns into sugar, which your body uses for energy. Normally, an organ called the pancreas makes insulin, which allows the sugar in your blood to get into your body's cells. But when your body can't use insulin the right way, the sugar doesn't move into cells. It stays in your blood instead. This is called insulin resistance. The buildup of sugar in the blood causes prediabetes. The good news is that lifestyle changes may help you get your blood sugar back to normal and help you avoid or delay diabetes. Follow-up care is a key part of your treatment and safety. Be sure to make and go to all appointments, and call your doctor if you are having problems. It's also a good idea to know your test results and keep a list of the medicines you take. How can you care for yourself at home? · Watch your weight. A healthy weight helps your body use insulin properly. · Limit the amount of calories, sweets, and unhealthy fat you eat. Ask your doctor if you should see a dietitian. A registered dietitian can help you create meal plans that fit your lifestyle. · Get at least 30 minutes of exercise on most days of the week.  Exercise helps control your blood sugar. It also helps you maintain a healthy weight. Walking is a good choice. You also may want to do other activities, such as running, swimming, cycling, or playing tennis or team sports. · Do not smoke. Smoking can make prediabetes worse. If you need help quitting, talk to your doctor about stop-smoking programs and medicines. These can increase your chances of quitting for good. · If your doctor prescribed medicines, take them exactly as prescribed. Call your doctor if you think you are having a problem with your medicine. You will get more details on the specific medicines your doctor prescribes. When should you call for help? Watch closely for changes in your health, and be sure to contact your doctor if: 
  · You have any symptoms of diabetes. These may include: ¨ Being thirsty more often. ¨ Urinating more. ¨ Being hungrier. ¨ Losing weight. ¨ Being very tired. ¨ Having blurry vision.  
  · You have a wound that will not heal.  
  · You have an infection that will not go away.  
  · You have problems with your blood pressure.  
  · You want more information about diabetes and how you can keep from getting it. Where can you learn more? Go to http://noa-em.info/. Enter I222 in the search box to learn more about \"Prediabetes: Care Instructions. \" Current as of: December 7, 2017 Content Version: 11.7 © 7405-7825 [employer]. Care instructions adapted under license by [employer] (which disclaims liability or warranty for this information). If you have questions about a medical condition or this instruction, always ask your healthcare professional. [place] [address] any warranty or liability for your use of this information. Introducing [facility]! Dear Laci Tovar: Thank you for requesting a Showcase account.   Our records indicate that you already have an active NuPathe account. You can access your account anytime at [email or web address]. [email or web address] Did you know that you can access your hospital and ER discharge instructions at any time in NuPathe? You can also review all of your test results from your hospital stay or ER visit. Additional Information If you have questions, please visit the Frequently Asked Questions section of the NuPathe website at [email or web address]/. Remember, NuPathe is NOT to be used for urgent needs. For medical emergencies, dial 911. Now available from your iPhone and Android! Please provide this summary of care documentation to your next provider. Your primary care clinician is listed as Wendy Rodriguez. If you have any questions after today's visit, please call [phone].

## 2018-09-28 NOTE — PROGRESS NOTES
Chief Complaint   Patient presents with    Weight Management     Rochelle Case  Identified pt with two pt identifiers(name and ). Chief Complaint   Patient presents with    Weight Management       1. Have you been to the ER, urgent care clinic since your last visit? n  Hospitalized since your last visit? n  2. Have you seen or consulted any other health care providers outside of the [address] [place] [address] since your last visit?n   Include any pap smears or colon screening. Advance Care Planning    In the event something were to happen to you and you were unable to speak on your behalf, do you have an Advance Directive/ Living Will in place stating your wishes?   NO    If yes, do we have a copy on file NO    If no, would you like information YES    Medication reconciliation up to date and corrected with patient at this time. y    Today's provider has been notified of reason for visit, vitals and flowsheets obtained on patients. y    Reviewed record in preparation for visit, huddled with provider and have obtained necessary documentation.y      Health Maintenance Due   Topic    Shingrix Vaccine Age 50> (1 of 2)    Influenza Age 5 to Adult     MEDICARE YEARLY EXAM     PAP AKA CERVICAL CYTOLOGY        Wt Readings from Last 3 Encounters:   18 226 lb 4.8 oz (102.6 kg)   18 266 lb 9.6 oz (120.9 kg)   18 268 lb (121.6 kg)     Temp Readings from Last 3 Encounters:   18 97.8 °F (36.6 °C) (Oral)   18 97.3 °F (36.3 °C) (Oral)   18 97.2 °F (36.2 °C) (Oral)     BP Readings from Last 3 Encounters:   18 94/57   18 110/70   18 97/78     Pulse Readings from Last 3 Encounters:   18 88   18 80   18 74     Vitals:    18 0814   BP: 94/57   Pulse: 88   Resp: 18   Temp: 97.8 °F (36.6 °C)   TempSrc: Oral   SpO2: 99%   Weight: 226 lb 4.8 oz (102.6 kg)   Height: 5' 5\" (1.651 m)   PainSc:   6         Learning Assessment:  :     Learning Assessment 10/13/2017 3/24/2015   PRIMARY LEARNER Patient Patient   HIGHEST LEVEL OF EDUCATION - PRIMARY LEARNER  - SOME COLLEGE   BARRIERS PRIMARY LEARNER - NONE   CO-LEARNER CAREGIVER - No   CO-LEARNER NAME - n/a   PRIMARY LANGUAGE ENGLISH ENGLISH   LEARNER PREFERENCE PRIMARY LISTENING LISTENING   ANSWERED BY patient patient   RELATIONSHIP SELF SELF       Depression Screening:  :     PHQ over the last two weeks 9/28/2018   Little interest or pleasure in doing things Not at all   Feeling down, depressed, irritable, or hopeless Not at all   Total Score PHQ 2 0       Fall Risk Assessment:  :     Fall Risk Assessment, last 12 mths 6/13/2017   Able to walk? Yes   Fall in past 12 months? Yes   Fall with injury? No   Number of falls in past 12 months 1   Fall Risk Score 1       Abuse Screening:  :     No flowsheet data found. ADL Screening:  :     ADL Assessment 9/28/2018   Feeding yourself No Help Needed   Getting from bed to chair No Help Needed   Getting dressed No Help Needed   Bathing or showering No Help Needed   Walk across the room (includes cane/walker) No Help Needed   Using the telphone No Help Needed   Taking your medications No Help Needed   Preparing meals No Help Needed   Managing money (expenses/bills) No Help Needed   Moderately strenuous housework (laundry) No Help Needed   Shopping for personal items (toiletries/medicines) No Help Needed   Shopping for groceries No Help Needed   Driving No Help Needed   Climbing a flight of stairs No Help Needed   Getting to places beyond walking distances No Help Needed                 Medication reconciliation up to date and corrected with patient at this time.

## 2018-10-02 NOTE — TELEPHONE ENCOUNTER
Writer called [facility]. Writer placed on hold. Writer sent to VM box after 5 minutes. Will try call and back again later to get fax number.

## 2018-10-02 NOTE — TELEPHONE ENCOUNTER
Subjective:   Steffanie Wilson is a 42 y.o. female presents for postop care 17 days following laparoscopic distal pancreatectomy converted to open procedure by Dr. Hetal Anaya. Pt had CT scan on 4/24/18 which showed splenic infarct. Appetite is good. Eating a regular diet without difficulty. Bowel movements are regular. However, patient is not feeling feel well.  is concerned.  notes that she has been sleeping a lot more the past 2 days, less energy. 2 days ago started having sporadic fevers of 100.5F - 101F. She would take tylenol and it would go away and return later on. No fever today; last took tylenol at 9 am but no fever at that point in the morning. Stopped taking gabapentin 2 days ago due to HA. Pt decreased amt of roxicodone 10 mg from every 3 hours to every 4 hours. Can make it rhough the night without taking pain medicine. Still in a lot of pain and hurts to ride in the car, she \"can't imagine going to the store. \"  Has rx for narcan from discharge. Drain output 120-150cc per day for the past 2 days. Advance directive not on file      Objective:     Visit Vitals    /70 (BP 1 Location: Left arm, BP Patient Position: Sitting)    Pulse 91    Temp 98.4 °F (36.9 °C) (Oral)    Resp 18    Ht 5' 3\" (1.6 m)    Wt 159 lb (72.1 kg)    SpO2 96%    BMI 28.17 kg/m2         General:  fatigued, no distress, accompanied by    Abdomen: bowel sounds active, very TTPin RUQ around side and to her back   Incision:   healing well, no drainage, no erythema, no seroma, no swelling, no dehiscence, incisions well approximated   Heart: regular rate and rhythm, S1, S2 normal, no murmur, click, rub or gallop   Lungs: clear to auscultation bilaterally     BETSY with opaque milky discharge        Assessment:     S/P laparoscopic distal pancreatectomy converted to open procedure. Fevers  R/o splenic abscess/intra-abdominal infection    Plan:   D/W and plan per Dr. Josseline Park  1.  Admit to Writer called patient to confirm printed prescriptions were to go to [facility]. Writer spoke with patient. Patient verified . Writer asked patient if [facility] was the correct place her DM supplies and cream needed to be sent to. Patient verbalized that it was the correct place. Writer verbalized understanding and appreciation. hospital.  2. CT scan today  3. check drain fluid for amylase  4. Labs  5. Pain control  6. NPO for now  7. IVF      Patient verbalized understanding and agreement. Ms. Crescencio Elizabeth has a reminder for a \"due or due soon\" health maintenance. I have asked that she contact her primary care provider for follow-up on this health maintenance.

## 2018-10-14 DIAGNOSIS — R53.83 FATIGUE, UNSPECIFIED TYPE: ICD-10-CM

## 2018-10-15 NOTE — TELEPHONE ENCOUNTER
PCP: Joan Flores.  Chioma Figueroa MD    Last appt: 9/28/2018  Future Appointments  Date Time Provider Denilson Thompson   11/1/2018 1:50 PM Shellie Perry MD RDE [address] 11/7/2018 9:20 AM Henrry Reeves  [address]   11/8/2018 4:00 PM Cameron Conley  [address]   12/13/2018 1:30 PM Mayelin Rodriguez Frames, DO [address]   12/28/2018 8:30 AM Vinh Sylvester NP [facility] ALICE SCHED       Requested Prescriptions     Pending Prescriptions Disp Refills    cyanocobalamin (VITAMIN B12) 1,000 mcg/mL injection [Pharmacy Med Name: Michael Duarte  7     Sig: INJECT 1 ML UNDER THE SKIN EVERY 14 DAYS FOR B12 DEFICIENCY       Prior labs and Blood pressures:  BP Readings from Last 3 Encounters:   09/28/18 94/57   06/28/18 110/70   04/27/18 97/78     Lab Results   Component Value Date/Time    Sodium 143 03/27/2018 12:06 PM    Potassium 4.2 03/27/2018 12:06 PM    Chloride 105 03/27/2018 12:06 PM    CO2 24 03/27/2018 12:06 PM    Anion gap 9 09/12/2017 10:06 AM    Glucose 99 03/27/2018 12:06 PM    BUN 15 03/27/2018 12:06 PM    Creatinine 0.86 03/27/2018 12:06 PM    BUN/Creatinine ratio 17 03/27/2018 12:06 PM    GFR est AA 86 03/27/2018 12:06 PM    GFR est non-AA 74 03/27/2018 12:06 PM    Calcium 9.1 03/27/2018 12:06 PM     Lab Results   Component Value Date/Time    Hemoglobin A1c 6.4 (H) 09/09/2017 02:40 AM    Hemoglobin A1c (POC) 5.1 09/28/2018 08:20 AM     Lab Results   Component Value Date/Time    Cholesterol, total 111 09/09/2017 02:40 AM    HDL Cholesterol 37 09/09/2017 02:40 AM    LDL, calculated 61.8 09/09/2017 02:40 AM    VLDL, calculated 12.2 09/09/2017 02:40 AM    Triglyceride 61 09/09/2017 02:40 AM    CHOL/HDL Ratio 3.0 09/09/2017 02:40 AM     Lab Results   Component Value Date/Time    VITAMIN D, 25-HYDROXY 121.0 (H) 03/27/2018 12:06 PM       Lab Results   Component Value Date/Time    TSH 0.438 (L) 03/27/2018 12:06 PM

## 2018-10-17 RX ORDER — CYANOCOBALAMIN 1000 UG/ML
INJECTION, SOLUTION INTRAMUSCULAR; SUBCUTANEOUS
Qty: 1 ML | Refills: 1 | Status: SHIPPED | OUTPATIENT
Start: 2018-10-17 | End: 2020-01-30 | Stop reason: SDUPTHER

## 2018-10-19 ENCOUNTER — TELEPHONE (OUTPATIENT)
Dept: FAMILY MEDICINE CLINIC | Age: 58
End: 2018-10-19

## 2018-10-19 NOTE — TELEPHONE ENCOUNTER
French Blood from [address] [place] [address] called back and stated that she given us the wrong evaluation code and it should be [phone]

## 2018-10-19 NOTE — TELEPHONE ENCOUNTER
[facility] stating that for patient's PT referral they need the evaluation code to be [phone].   P: [phone]

## 2018-11-01 ENCOUNTER — OFFICE VISIT (OUTPATIENT)
Dept: ENDOCRINOLOGY | Age: 58
End: 2018-11-01

## 2018-11-01 VITALS
WEIGHT: 226.4 LBS | RESPIRATION RATE: 14 BRPM | SYSTOLIC BLOOD PRESSURE: 101 MMHG | HEART RATE: 96 BPM | BODY MASS INDEX: 37.72 KG/M2 | OXYGEN SATURATION: 97 % | HEIGHT: 65 IN | DIASTOLIC BLOOD PRESSURE: 70 MMHG

## 2018-11-01 DIAGNOSIS — R68.89 COLD INTOLERANCE: ICD-10-CM

## 2018-11-01 DIAGNOSIS — R74.8 ALKALINE PHOSPHATASE ELEVATION: ICD-10-CM

## 2018-11-01 DIAGNOSIS — E55.9 VITAMIN D DEFICIENCY: ICD-10-CM

## 2018-11-01 DIAGNOSIS — K90.9 INTESTINAL MALABSORPTION, UNSPECIFIED TYPE: ICD-10-CM

## 2018-11-01 DIAGNOSIS — E89.0 POSTOPERATIVE HYPOTHYROIDISM: Primary | ICD-10-CM

## 2018-11-01 NOTE — LETTER
11/2/2018 Ms. Selam Rosen [address] [facility] Dear Selam Rosen: 
 
Please find your most recent results below. Resulted Orders METABOLIC PANEL, COMPREHENSIVE Result Value Ref Range Glucose 93 65 - 99 mg/dL BUN 18 8 - 27 mg/dL Creatinine 0.85 0.57 - 1.00 mg/dL GFR est non-AA 75 >59 mL/min/1.73 GFR est AA 86 >59 mL/min/1.73  
 BUN/Creatinine ratio 21 12 - 28 Sodium 141 134 - 144 mmol/L Potassium 4.3 3.5 - 5.2 mmol/L Chloride 100 96 - 106 mmol/L  
 CO2 26 20 - 29 mmol/L Calcium 9.2 8.7 - 10.3 mg/dL Protein, total 6.1 6.0 - 8.5 g/dL Albumin 3.8 3.6 - 4.8 g/dL GLOBULIN, TOTAL 2.3 1.5 - 4.5 g/dL A-G Ratio 1.7 1.2 - 2.2 Bilirubin, total <0.2 0.0 - 1.2 mg/dL Alk. phosphatase 96 39 - 117 IU/L  
 AST (SGOT) 16 0 - 40 IU/L  
 ALT (SGPT) 18 0 - 32 IU/L  
   
   
TSH 3RD GENERATION Result Value Ref Range TSH 0.038 (L) 0.450 - 4.500 uIU/mL T4, FREE Result Value Ref Range T4, Free 1.73 0.82 - 1.77 ng/dL VITAMIN D, 25 HYDROXY Result Value Ref Range VITAMIN D, 25-HYDROXY 33.9 30.0 - 100.0 ng/mL FERRITIN Result Value Ref Range Ferritin 292 (H) 15 - 150 ng/mL RECOMMENDATIONS: 
TSH (Thyroid Stimulating Hormone): This is a pituitary hormone used to assess thyroid function. It is low, indicating that current levothyroxine dose is too much for you. You need less thyroid medication with weight loss. I have sent a new prescription for levothyroxine 125 mcg to your pharmacy. We will plan on reassessing in 3-4 months Vitamin D: Your value is 35, which is at the very low end of the optimal range. It is much lower than it was several months ago at 121. I recommend taking levothyroxine 50,000 units once weekly. This was sent to your pharmacy as well. We can reassess this in 3-4 months too. GFR and creatinine are markers of kidney function.   Your values are normal.  
 
 Ferritin is an assessment of your body's iron stores. It is also elevated with inflammation. I suspect this is the case for you. Please call me if you have any questions: [phone] Sincerely, 
 
 
Yenifer Kee MD

## 2018-11-01 NOTE — PROGRESS NOTES
Live Rosenthal is a 59 y.o. female      Chief Complaint   Patient presents with    Follow-up    Diabetes     Last A1c 5.1%       1. Have you been to the ER, urgent care clinic since your last visit? Hospitalized since your last visit? No    2. Have you seen or consulted any other health care providers outside of the [facility] since your last visit? Include any pap smears or colon screening.  No

## 2018-11-01 NOTE — PATIENT INSTRUCTIONS
Thyroid:  Reassess. Likely will need less with weight loss      Low Vitamin D, High Parathyroid hormone level, Alkaline phosphatase elevated:   Will reassess the need to resume vitamin D

## 2018-11-01 NOTE — PROGRESS NOTES
History of Present Illness: Aura Frost is a 59 y.o. female presents for follow-up of hypothyroidism following surgery. She also has hypothyroidism and pre-diabetes. Of note, she is s/p gastric bypass in 1980s (pre-surgery wt was 465), and has also had CVA. Additionally, she is taking several medications for history of heart failure. This was diagnosed at the same time she had a heart attack and a stroke in 2014 . Had hospitalization 9/2017 for stroke-like sx. She continues to lose weight. Weight is down 110 lbs in last year. This weight loss is intentional. She has a goal of < 200 lb. History of Vit D Def - presumably due to poor absorption after gastric bypass. Was ergocalciferol 50,000 units daily for vitamin D deficiency. Level was high at 121 in March and vitamin D stopped. Dose had previously been increased from twice weekly with Vit D level of 11 to daily in summer 2017.   - no vitamin D since this summer 2018 - stopped after refills ran out. Hypothyroidism: post-surgical. TSH was at goal in 7/2017 on 150 mcg  TSH was lower after weight loss  Now takign 150 mcg most days and 1/2 tablet on Sundays    Hemoglobin A1c - normal last after weight loss. Still taking metformin, but decreased to once daily     Overall she is doing well. Constipation is one of her main problems. Taking Linzess. BP is low - takes losartan at night and carvedilol twice daily. No orthostatic sx. Past Medical History:   Diagnosis Date    Advanced care planning/counseling discussion 3/4/16    On File    Bronchitis 2/24/2014    Cervicalgia 8/18/15    Dr. Chyna Garcia    Chest pain 5/25/15    Hospitalized at [facility] 5/25/15 (lab work negative)    Chronic indwelling Dunn catheter 1/24/2018    Initially placed Jan 2018. Mx by Dr. Juan Arndt.      Congestive heart failure, unspecified     Last Echo 2/8/15: EF 55-60%    Constipation 6/13/2017    Enthesopathy, spinal ([facility] [address] 8/18/15    Dr. Ebony Ny hypertension     Foot drop 8/18/15    Dr. Shanice Mojica Heart attack [facility]) 2/24/2013    Was supposed to See Cardiology for possible pacemaker in november 2014- After Cardiology consult locally, no need, EF greatly improved. Established with Dr. Kaelyn Mendoza Hiatal hernia 6/2015    3 cm hiatal hernia     Hip pain 8/18/15    Dr. Orlando Fry    Hypercholesterolemia     Hyperglycemia 7/2015    A1c 5.9     Hyperlipidemia 6/30/2015    NMR lipoprofile- LDL P 997, LDL-c 71, HLD-C-39, TG-60, HLD-P (25.2), Small LDL-P -541, LDL size 20.6    Hypothyroid     Insomnia 6/13/2017    Lower extremity edema     Lumbar spondylosis 8/18/15    Dr. Patrick Mai 6/2015    EGD/Colonscopy 6/15- Gastritis, internal hemorrhoids and 3 polyps    Murmur     EDDIE on CPAP     Was referred to Pulmonology - Uses CPAP    Osteoarthritis of hip 8/18/15    Dr. Orlando Fry    Osteoarthritis, shoulder 8/18/15    Dr. Shanice Mojica Radiculopathy, cervical 8/18/15    Dr. Orlando Fry    Shoulder pain 8/18/15    Dr. Shanice Mojica Spinal stenosis of cervical region 8/18/15    Dr. Shanice Mojica Spinal stenosis, lumbar 8/18/15    Dr. Shanice Mojica Stroke [facility]) 2/25/2014    Established with Neurology, Leeroy Pantoja, NP-[facility]15. CT negative, but Late effect CV accident with increased tone described on discharge summary, Carotid dopplers showed 50% stenosis bilaterally.  Vitamin D deficiency 7/2015     Current Outpatient Medications   Medication Sig    cyanocobalamin (VITAMIN B12) 1,000 mcg/mL injection INJECT 1 ML UNDER THE SKIN EVERY 14 DAYS FOR B12 DEFICIENCY    linaclotide (LINZESS) 145 mcg cap capsule Take 1 Cap by mouth Daily (before breakfast). For constipation    metFORMIN (GLUCOPHAGE) 1,000 mg tablet Take 1 Tab by mouth daily.     Blood Glucose Control, High (ONETOUCH VERIO HIGH CONTROL) soln For use with glucometer    alcohol swabs (BD SINGLE USE SWABS REGULAR) padm To use when checking blood sugar    calcipotriene (DOVONEX) 0.005 % topical cream Apply  to affected area daily as needed.  Lancets misc For use with glucometer to check blood sugar once a day in morning before eating. Please dispense brand covered by insurance.  glucose blood VI test strips (BLOOD GLUCOSE TEST) strip For use with glucometer to check blood sugar once a day in morning before eating. Please dispense brand covered by insurance.  Blood-Glucose Meter monitoring kit to check blood sugar once a day in morning before eating. Please dispense brand covered by insurance.  baclofen (LIORESAL) 10 mg tablet TAKE 1 TABLET BY MOUTH THREE TIMES DAILY    ROZEREM 8 mg tablet TAKE 1 TABLET BY MOUTH NIGHTLY. TAKE 30 MINUTES BEFORE BEDTIME    lidocaine (LIDODERM) 5 % Apply patch to the affected area for 12 hours a day and remove for 12 hours a day.  glucose blood VI test strips (BLOOD GLUCOSE TEST) strip For use with glucometer to check blood sugar once a day in morning before eating. Please dispense brand covered by insurance.  glucose blood VI test strips (BLOOD GLUCOSE TEST) strip For use with glucometer to check blood sugar once a day in morning before eating. Please dispense VERIO FLEX STRIPS    Insulin Syringe-Needle U-100 (INSULIN SYRINGE) 1 mL 30 gauge x 5/16 syrg For use to administer b12 injections    levothyroxine (SYNTHROID) 150 mcg tablet Take 1 tablet daily and 1/2 tablet on Sundays (6.5 tablets/week).  onabotulinumtoxinA (BOTOX) 200 unit injection 200 Units by IntraMUSCular route every three (3) months. Inject to selected muscles head and neck bilaterally every 3 months for migraine  Indications: MIGRAINE PREVENTION    topiramate (TOPAMAX) 100 mg tablet Take 1 Tab by mouth two (2) times a day.  EPINEPHrine (ADRENALIN) 1 mg/mL nasal solution 0.5 mL by Nasal route.  DULoxetine (CYMBALTA) 60 mg capsule Take 1 Cap by mouth daily.  pantoprazole (PROTONIX) 40 mg tablet Take 1 Tab by mouth daily.     furosemide (LASIX) 40 mg tablet Take 1 Tab by mouth daily.  morphine CR (MS CONTIN) 15 mg CR tablet Take 1 Tab by mouth every twelve (12) hours. Max Daily Amount: 30 mg. (Patient taking differently: Take 15 mg by mouth daily.)    HYDROcodone-acetaminophen (NORCO) 5-325 mg per tablet Take 1 Tab by mouth every six (6) hours as needed for Pain. Max Daily Amount: 4 Tabs.  albuterol-ipratropium (DUO-NEB) 2.5 mg-0.5 mg/3 ml nebu 3 mL by Nebulization route every six (6) hours as needed.  losartan (COZAAR) 50 mg tablet Take 1 Tab by mouth nightly. Hold for SBP<115    cyclobenzaprine (FLEXERIL) 5 mg tablet Take 5 mg by mouth two (2) times a day.  EPINEPHrine (EPIPEN 2-LAURA) 0.3 mg/0.3 mL injection 0.3 mL by IntraMUSCular route once as needed for up to 1 dose.  albuterol (PROVENTIL HFA, VENTOLIN HFA, PROAIR HFA) 90 mcg/actuation inhaler Take 2 Puffs by inhalation every four (4) hours as needed for Wheezing or Shortness of Breath.  ferrous sulfate 325 mg (65 mg iron) tablet Take 325 mg by mouth Daily (before breakfast).  aspirin delayed-release 81 mg tablet Take 81 mg by mouth daily.  carvedilol (COREG) 6.25 mg tablet Take 1 Tab by mouth two (2) times daily (with meals). No current facility-administered medications for this visit. Allergies   Allergen Reactions    Bees [Hymenoptera Allergenic Extract] Shortness of Breath and Swelling    Strawberry Shortness of Breath and Swelling    Lisinopril Cough       Review of Systems:  - Eyes: no blurry vision or double vision  - Cardiovascular: no chest pain. + stable palpitations.    - Respiratory: no shortness of breath  - Musculoskeletal: no myalgias  - Neurological: no numbness/tingling in extremities    Physical Examination:  Visit Vitals  /70 (BP 1 Location: Right arm, BP Patient Position: Sitting)   Pulse 96   Resp 14   Ht 5' 5\" (1.651 m)   Wt 226 lb 6.4 oz (102.7 kg)   SpO2 97%   BMI 37.67 kg/m²   -   General: pleasant, no distress, uses walker   HEENT: hearing intact, EOMI, clear sclera without icterus  - Cardiovascular: regular, normal rate   - Respiratory: normal effort  - Integumentary: no edema  - Psychiatric: normal mood and affect    Data Reviewed:   Component      Latest Ref Rng & Units 9/28/2018 3/27/2018 3/27/2018 3/27/2018           8:20 AM 12:06 PM 12:06 PM 12:06 PM   Glucose      65 - 99 mg/dL   99    BUN      6 - 24 mg/dL   15    Creatinine      0.57 - 1.00 mg/dL   0.86    GFR est non-AA      >59 mL/min/1.73   74    GFR est AA      >59 mL/min/1.73   86    BUN/Creatinine ratio      9 - 23   17    Sodium      134 - 144 mmol/L   143    Potassium      3.5 - 5.2 mmol/L   4.2    Chloride      96 - 106 mmol/L   105    CO2      18 - 29 mmol/L   24    Calcium      8.7 - 10.2 mg/dL   9.1    Protein, total      6.0 - 8.5 g/dL   6.4    Albumin      3.5 - 5.5 g/dL   4.1    GLOBULIN, TOTAL      1.5 - 4.5 g/dL   2.3    A-G Ratio      1.2 - 2.2   1.8    Bilirubin, total      0.0 - 1.2 mg/dL   0.3    Alk. phosphatase      39 - 117 IU/L   114    AST      0 - 40 IU/L   24    ALT (SGPT)      0 - 32 IU/L   24    TSH      0.450 - 4.500 uIU/mL  0.438 (L)     T4, Free      0.82 - 1.77 ng/dL  1.61     VITAMIN D, 25-HYDROXY      30.0 - 100.0 ng/mL    121.0 (H)   Hemoglobin A1c (POC)      % 5.1        Component      Latest Ref Rng & Units 9/9/2017           2:40 AM   Glucose      65 - 99 mg/dL    BUN      6 - 24 mg/dL    Creatinine      0.57 - 1.00 mg/dL    GFR est non-AA      >59 mL/min/1.73    GFR est AA      >59 mL/min/1.73    BUN/Creatinine ratio      9 - 23    Sodium      134 - 144 mmol/L    Potassium      3.5 - 5.2 mmol/L    Chloride      96 - 106 mmol/L    CO2      18 - 29 mmol/L    Calcium      8.7 - 10.2 mg/dL    Protein, total      6.0 - 8.5 g/dL    Albumin      3.5 - 5.5 g/dL    GLOBULIN, TOTAL      1.5 - 4.5 g/dL    A-G Ratio      1.2 - 2.2    Bilirubin, total      0.0 - 1.2 mg/dL    Alk.  phosphatase      39 - 117 IU/L    AST      0 - 40 IU/L    ALT (SGPT)      0 - 32 IU/L    TSH      0.450 - 4.500 uIU/mL 0.28 (L)   T4, Free      0.82 - 1.77 ng/dL    VITAMIN D, 25-HYDROXY      30.0 - 100.0 ng/mL    Hemoglobin A1c (POC)      %        Assessment/Plan:   1. Postoperative hypothyroidism   Labs on 129 mcg/day (150 mcg x 6.5 tablets/week)  Adjust as needed. May need less with weight loss. 2. Vitamin D deficiency   - reassess off supplements for about 6 months. - lower needs with weight loss. 3. BMI 37.0-37.9, adult   - she has had great success with weight loss. 4. Alkaline phosphatase elevation    5. Cold intolerance - ferritin   6. Intestinal malabsorption, unspecified type  - screen for low iron = ferritin     Patient Instructions   Thyroid:  Reassess. Likely will need less with weight loss      Low Vitamin D, High Parathyroid hormone level, Alkaline phosphatase elevated: Will reassess the need to resume vitamin D              Follow-up Disposition:  Return in about 6 months (around 5/1/2019).     Copy sent to:

## 2018-11-02 LAB
25(OH)D3+25(OH)D2 SERPL-MCNC: 33.9 NG/ML (ref 30–100)
ALBUMIN SERPL-MCNC: 3.8 G/DL (ref 3.6–4.8)
ALBUMIN/GLOB SERPL: 1.7 {RATIO} (ref 1.2–2.2)
ALP SERPL-CCNC: 96 IU/L (ref 39–117)
ALT SERPL-CCNC: 18 IU/L (ref 0–32)
AST SERPL-CCNC: 16 IU/L (ref 0–40)
BILIRUB SERPL-MCNC: <0.2 MG/DL (ref 0–1.2)
BUN SERPL-MCNC: 18 MG/DL (ref 8–27)
BUN/CREAT SERPL: 21 (ref 12–28)
CALCIUM SERPL-MCNC: 9.2 MG/DL (ref 8.7–10.3)
CHLORIDE SERPL-SCNC: 100 MMOL/L (ref 96–106)
CO2 SERPL-SCNC: 26 MMOL/L (ref 20–29)
CREAT SERPL-MCNC: 0.85 MG/DL (ref 0.57–1)
FERRITIN SERPL-MCNC: 292 NG/ML (ref 15–150)
GLOBULIN SER CALC-MCNC: 2.3 G/DL (ref 1.5–4.5)
GLUCOSE SERPL-MCNC: 93 MG/DL (ref 65–99)
POTASSIUM SERPL-SCNC: 4.3 MMOL/L (ref 3.5–5.2)
PROT SERPL-MCNC: 6.1 G/DL (ref 6–8.5)
SODIUM SERPL-SCNC: 141 MMOL/L (ref 134–144)
T4 FREE SERPL-MCNC: 1.73 NG/DL (ref 0.82–1.77)
TSH SERPL DL<=0.005 MIU/L-ACNC: 0.04 UIU/ML (ref 0.45–4.5)

## 2018-11-02 RX ORDER — LEVOTHYROXINE SODIUM 125 UG/1
125 TABLET ORAL
Qty: 90 TAB | Refills: 3 | Status: SHIPPED | OUTPATIENT
Start: 2018-11-02 | End: 2019-04-23 | Stop reason: SDUPTHER

## 2018-11-02 RX ORDER — ERGOCALCIFEROL 1.25 MG/1
50000 CAPSULE ORAL
Qty: 12 CAP | Refills: 1 | Status: SHIPPED | OUTPATIENT
Start: 2018-11-02 | End: 2019-04-23 | Stop reason: SDUPTHER

## 2018-11-02 NOTE — PROGRESS NOTES
TSH low after weight loss. Currently she is taking approximately 140 mcg/day (150 mcg x 6.5). Will lower dose to 125 mcg. Recommend reassessing in 3-4 months. Vitamin D - level improved and now at very low end of the normal range. Will have her resume 50,000 units once weekly and follow in 3- 4 months. Ferritin is high, likely due to inflammation. Please call and mail lab letter.

## 2018-11-05 ENCOUNTER — TELEPHONE (OUTPATIENT)
Dept: ENDOCRINOLOGY | Age: 58
End: 2018-11-05

## 2018-11-05 NOTE — TELEPHONE ENCOUNTER
Contacted patient to inform lab results per Dr. Beau Castrejon. No answer. [facility]    Lab letter mailed to address on file.

## 2018-11-05 NOTE — TELEPHONE ENCOUNTER
----- Message from Louisa Pickard MD sent at 11/2/2018  5:14 PM EDT -----  TSH low after weight loss. Currently she is taking approximately 140 mcg/day (150 mcg x 6.5). Will lower dose to 125 mcg. Recommend reassessing in 3-4 months. Vitamin D - level improved and now at very low end of the normal range. Will have her resume 50,000 units once weekly and follow in 3- 4 months. Ferritin is high, likely due to inflammation. Please call and mail lab letter.

## 2018-11-07 ENCOUNTER — OFFICE VISIT (OUTPATIENT)
Dept: CARDIOLOGY CLINIC | Age: 58
End: 2018-11-07

## 2018-11-07 VITALS
HEART RATE: 86 BPM | OXYGEN SATURATION: 98 % | BODY MASS INDEX: 37.15 KG/M2 | RESPIRATION RATE: 18 BRPM | SYSTOLIC BLOOD PRESSURE: 110 MMHG | DIASTOLIC BLOOD PRESSURE: 70 MMHG | HEIGHT: 65 IN | WEIGHT: 223 LBS

## 2018-11-07 DIAGNOSIS — I69.359 HEMIPARESIS AND ALTERATION OF SENSATIONS AS LATE EFFECTS OF STROKE (HCC): ICD-10-CM

## 2018-11-07 DIAGNOSIS — I69.398 HEMIPARESIS AND ALTERATION OF SENSATIONS AS LATE EFFECTS OF STROKE (HCC): ICD-10-CM

## 2018-11-07 DIAGNOSIS — I42.0 DILATED CARDIOMYOPATHY (HCC): ICD-10-CM

## 2018-11-07 DIAGNOSIS — I10 ESSENTIAL HYPERTENSION: Primary | ICD-10-CM

## 2018-11-07 RX ORDER — CARVEDILOL 6.25 MG/1
6.25 TABLET ORAL 2 TIMES DAILY WITH MEALS
Qty: 60 TAB | Refills: 11 | Status: SHIPPED | OUTPATIENT
Start: 2018-11-07 | End: 2019-11-16 | Stop reason: SDUPTHER

## 2018-11-07 NOTE — PROGRESS NOTES
HISTORY OF PRESENT ILLNESS  Ionaa Joaquin is a 59 y.o. female. She has a history of hypertension and diabetic cardiomyopathy which has improved with blood pressure control and weight loss. Since she was here a year ago, she has lost another 76 pounds. She was in a wheelchair, but was using a walker now. However, she has an indwelling Dunn catheter with a bag attached to her left leg because of urinary incontinence. She has prior from her prior strokes. Follow-up         Review of Systems   Constitutional: Positive for weight loss. Genitourinary: Positive for frequency. Musculoskeletal: Positive for myalgias. All other systems reviewed and are negative. Physical Exam   Constitutional: She is oriented to person, place, and time. She appears well-nourished. HENT:   Head: Atraumatic. Eyes: Conjunctivae are normal.   Neck: Neck supple. Cardiovascular: Normal rate, regular rhythm and normal heart sounds. Exam reveals no gallop and no friction rub. No murmur heard. Pulmonary/Chest: Breath sounds normal. She has no wheezes. Abdominal: Bowel sounds are normal.   Musculoskeletal: She exhibits no edema. Neurological: She is oriented to person, place, and time. Skin: Skin is dry. Psychiatric: Her behavior is normal.   Nursing note and vitals reviewed. ASSESSMENT and PLAN  Her blood pressure is well controlled on this regimen at 110. I will continue this and plan to see her back in one year. Her weight loss is remarkable but she has certainly changed her diet. Her major problem at this time results from her prior strokes.

## 2018-12-04 NOTE — TELEPHONE ENCOUNTER
PCP: Vazquez Soto MD    Last appt: 9/28/2018  Future Appointments   Date Time Provider Denilson Thompson   12/18/2018  3:00 PM Claudean Daily, DO [address]   12/28/2018  8:30 AM Pearl Coyle NP [facility] ALICE SCHED   1/29/2019 11:00 AM Kati Gross MD [facility] ALICE SCHED   4/23/2019  9:50 AM Nestor Marino MD RDE [facility] ALICE SCHED   11/12/2019  9:00 AM Mickey Greenberg  [address]       Requested Prescriptions     Pending Prescriptions Disp Refills    furosemide (LASIX) 40 mg tablet 90 Tab 3     Sig: Take 1 Tab by mouth daily.        Prior labs and Blood pressures:  BP Readings from Last 3 Encounters:   11/07/18 110/70   11/01/18 101/70   09/28/18 94/57     Lab Results   Component Value Date/Time    Sodium 141 11/01/2018 02:15 PM    Potassium 4.3 11/01/2018 02:15 PM    Chloride 100 11/01/2018 02:15 PM    CO2 26 11/01/2018 02:15 PM    Anion gap 9 09/12/2017 10:06 AM    Glucose 93 11/01/2018 02:15 PM    BUN 18 11/01/2018 02:15 PM    Creatinine 0.85 11/01/2018 02:15 PM    BUN/Creatinine ratio 21 11/01/2018 02:15 PM    GFR est AA 86 11/01/2018 02:15 PM    GFR est non-AA 75 11/01/2018 02:15 PM    Calcium 9.2 11/01/2018 02:15 PM     Lab Results   Component Value Date/Time    Hemoglobin A1c 6.4 (H) 09/09/2017 02:40 AM    Hemoglobin A1c (POC) 5.1 09/28/2018 08:20 AM     Lab Results   Component Value Date/Time    Cholesterol, total 111 09/09/2017 02:40 AM    HDL Cholesterol 37 09/09/2017 02:40 AM    LDL, calculated 61.8 09/09/2017 02:40 AM    VLDL, calculated 12.2 09/09/2017 02:40 AM    Triglyceride 61 09/09/2017 02:40 AM    CHOL/HDL Ratio 3.0 09/09/2017 02:40 AM     Lab Results   Component Value Date/Time    VITAMIN D, 25-HYDROXY 33.9 11/01/2018 02:15 PM       Lab Results   Component Value Date/Time    TSH 0.038 (L) 11/01/2018 02:15 PM

## 2018-12-04 NOTE — TELEPHONE ENCOUNTER
Requested Prescriptions     Pending Prescriptions Disp Refills    furosemide (LASIX) 40 mg tablet 90 Tab 3     Sig: Take 1 Tab by mouth daily. Patient has an appointment 12/18/2018 scheduled to establish care.

## 2018-12-06 RX ORDER — FUROSEMIDE 40 MG/1
40 TABLET ORAL DAILY
Qty: 90 TAB | Refills: 0 | Status: SHIPPED | OUTPATIENT
Start: 2018-12-06 | End: 2018-12-28 | Stop reason: SDUPTHER

## 2018-12-18 ENCOUNTER — OFFICE VISIT (OUTPATIENT)
Dept: NEUROLOGY | Age: 58
End: 2018-12-18

## 2018-12-18 VITALS
HEART RATE: 76 BPM | RESPIRATION RATE: 18 BRPM | HEIGHT: 65 IN | DIASTOLIC BLOOD PRESSURE: 84 MMHG | OXYGEN SATURATION: 99 % | SYSTOLIC BLOOD PRESSURE: 118 MMHG | BODY MASS INDEX: 36.99 KG/M2 | WEIGHT: 222 LBS

## 2018-12-18 DIAGNOSIS — G43.719 INTRACTABLE CHRONIC MIGRAINE WITHOUT AURA AND WITHOUT STATUS MIGRAINOSUS: Primary | ICD-10-CM

## 2018-12-18 RX ORDER — TOPIRAMATE 100 MG/1
100 TABLET, FILM COATED ORAL 2 TIMES DAILY
Qty: 180 TAB | Refills: 2 | Status: SHIPPED | OUTPATIENT
Start: 2018-12-18 | End: 2018-12-31 | Stop reason: SDUPTHER

## 2018-12-18 RX ORDER — BACLOFEN 10 MG/1
TABLET ORAL
Qty: 270 TAB | Refills: 2 | Status: SHIPPED | OUTPATIENT
Start: 2018-12-18 | End: 2019-11-17 | Stop reason: SDUPTHER

## 2018-12-18 NOTE — PROGRESS NOTES
Botox Injection Note     2018     Patient:  Amy Avila   YOB: 1960  Date of Visit: 2018      Indication: patient has chronic migraine headaches greater than 15 days per month lasting more than 4 hours each. She has failed or not tolerated 2 or more medication preventatives. Written consent was signed and verified by me. Risks and benefits were discussed to include possible cosmetic asymmetry which is not permament or life threatening. Patient name and  was confirmed prior to procedure. Time out performed. Procedure:   Botox concentration: 200 units in 2 ml of preservative-free normal saline. 1:1 dilution. LOT#: [id number]  EXP:  2021    Injections and distribution as follow :      Units/site  Sites Loc Subtotal    procerus 5 1 ML 5   corrugaters 2.5 2 BL 5   frontalis 5 8 BL 40   Temporalis 10 4 BL 40   Occipitalis 10 2 BL 20   Cervical paraspinals 5 4 BL 20   Trapezius 10 4 BL 40         200 units Botox were reconstituted, 170 units injected as above and the remainder was unavoidably wasted. Patient tolerated procedure well. Return in 3 months for repeat injections.     _____________________________   Seb Hernandez DO  [address], ABPN

## 2018-12-28 ENCOUNTER — OFFICE VISIT (OUTPATIENT)
Dept: FAMILY MEDICINE CLINIC | Age: 58
End: 2018-12-28

## 2018-12-28 VITALS
DIASTOLIC BLOOD PRESSURE: 67 MMHG | RESPIRATION RATE: 20 BRPM | WEIGHT: 211.6 LBS | HEART RATE: 89 BPM | OXYGEN SATURATION: 98 % | SYSTOLIC BLOOD PRESSURE: 96 MMHG | TEMPERATURE: 97.8 F | HEIGHT: 65 IN | BODY MASS INDEX: 35.25 KG/M2

## 2018-12-28 DIAGNOSIS — I50.9 CHRONIC CONGESTIVE HEART FAILURE, UNSPECIFIED HEART FAILURE TYPE (HCC): ICD-10-CM

## 2018-12-28 DIAGNOSIS — R73.03 PREDIABETES: Primary | ICD-10-CM

## 2018-12-28 DIAGNOSIS — I69.359 HEMIPARESIS AND ALTERATION OF SENSATIONS AS LATE EFFECTS OF STROKE (HCC): ICD-10-CM

## 2018-12-28 DIAGNOSIS — I69.398 HEMIPARESIS AND ALTERATION OF SENSATIONS AS LATE EFFECTS OF STROKE (HCC): ICD-10-CM

## 2018-12-28 LAB — HBA1C MFR BLD HPLC: 5.3 %

## 2018-12-28 RX ORDER — FUROSEMIDE 40 MG/1
40 TABLET ORAL DAILY
Qty: 90 TAB | Refills: 1 | Status: SHIPPED | OUTPATIENT
Start: 2018-12-28 | End: 2019-08-03 | Stop reason: SDUPTHER

## 2018-12-28 RX ORDER — METFORMIN HYDROCHLORIDE 500 MG/1
500 TABLET ORAL DAILY
Qty: 90 TAB | Refills: 1 | Status: SHIPPED | OUTPATIENT
Start: 2018-12-28 | End: 2019-04-23

## 2018-12-28 RX ORDER — MIRTAZAPINE 15 MG/1
30 TABLET, FILM COATED ORAL
COMMUNITY
Start: 2018-12-19 | End: 2019-09-05

## 2018-12-28 NOTE — PROGRESS NOTES
S: Amy Avila is a 59 y.o. yo female who presents for diabetes follow up. Assessment/Plan:    1. Prediabetes  -A1c = 5.3% today, increase from 5.1% 9/2018  -current therapy: 1000mg metformin daily  -lost 11lbs, total of 57lbs since 4/2018  -reduce metformin dose to 500mg daily, discussed dc'ing med if possible    2.  Hemiparesis and alteration of sensations as late effects of stroke ([facility] [address]  -requesting stair chair lift, but not covered by Medicare  -referral to [facility] [address] to see if any programs help with cost    RTC 3 months for DM/med check      Current therapy: metformin 1000mg daily  + numbness/tingling d/t stroke in 2014  No frequent urination/nocturia  A few SOB at times, no activity that precipitates it, but hasn't needed to go to the ED   Takes weight every day and checks in with [facility] - has lasix if sweling  BP: 96/67    Amy Avila has lost 11lbs since last visit 9/2018, a total of 57lbs since April 2018 - all intentional weight loss   A1c = 5.1 (9/2018)  Statin: none - LDL =61; TC = 111  BP at goal: takes losartan if BP >115  ASA: 81mg daily  PPSV23: 2016  Last Eye exam: 10/2017  Flu: UTD    Last AIC:   Lab Results   Component Value Date/Time    Hemoglobin A1c 6.4 (H) 09/09/2017 02:40 AM    Hemoglobin A1c (POC) 5.1 09/28/2018 08:20 AM     Last lipids:   Lab Results   Component Value Date/Time    LDL, calculated 61.8 09/09/2017 02:40 AM     Last Cr:   Lab Results   Component Value Date/Time    Creatinine (POC) 0.9 02/22/2016 10:57 AM    Creatinine 0.85 11/01/2018 02:15 PM     Social history:   Nutrition:   Breakfast: oatmeal, eggs and garcia, yogurt and toast  Lunch: varies, if goes to [address], [address], has hot dogs baked beans and applesauce; or soups and cornbread, sandwich and salad  Dinner: at home, sister will make chicken and veggies, lasagna and salad, liver and onions, broccoli, sandwich, fish  Drinks: water, stopped soda - only when stomach is upset  Social: lives with sister    Social History     Tobacco Use   Smoking Status Former Smoker    Packs/day: 0.25    Years: 15.00    Pack years: 3.75    Types: Cigarettes    Last attempt to quit: 2007    Years since quittin.5   Smokeless Tobacco Never Used     Social History     Substance and Sexual Activity   Alcohol Use No     Social History     Substance and Sexual Activity   Drug Use No      Review of Systems:  - Constitutional Symptoms: no fevers or chills  - Cardiovascular: no chest pain  - Respiratory: no shortness of breath  ROS is negative otherwise. I reviewed the following:  Past Medical History:   Diagnosis Date    Advanced care planning/counseling discussion 3/4/16    On File    Bronchitis 2014    Cervicalgia 8/18/15    Dr. Keri Brandt    Chest pain 5/25/15    [facility]ospital[facility] at [facility] 5/25/15 (lab work negative)    Chronic indwelling Dunn catheter 2018    Initially placed 2018. Mx by Dr. Bro Grider.  Congestive heart failure, unspecified     Last Echo 2/8/15: EF 55-60%    Constipation 2017    Enthesopathy, spinal ([facility] [address] 8/18/15    Dr. Keri Brandt    Essential hypertension     Foot drop 8/18/15    Dr. Cee Real Heart attack [facility]) 2013    Was supposed to See Cardiology for possible pacemaker in 2014- After Cardiology consult locally, no need, EF greatly improved.  Established with Dr. Estelle House Hiatal hernia 2015    3 cm hiatal hernia     Hip pain 8/18/15    Dr. Keri Brandt    Hypercholesterolemia     Hyperglycemia 2015    A1c 5.9     Hyperlipidemia 2015    NMR lipoprofile- LDL P 997, LDL-c 71, HLD-C-39, TG-60, HLD-P (25.2), Small LDL-P -541, LDL size 20.6    Hypothyroid     Insomnia 2017    Lower extremity edema     Lumbar spondylosis 8/18/15    Dr. Celestina Murphy 2015    EGD/Colonscopy 6/15- Gastritis, internal hemorrhoids and 3 polyps    Murmur     EDDIE on CPAP     Was referred to Pulmonology - Uses CPAP    Osteoarthritis of hip 8/18/15    Dr. Keri Brandt    Osteoarthritis, shoulder 8/18/15    Dr. Teofilo Morales    Radiculopathy, cervical 8/18/15    Dr. Teofilo Morales    Shoulder pain 8/18/15    Dr. Jorge Cerna Spinal stenosis of cervical region 8/18/15    Dr. Jorge Cerna Spinal stenosis, lumbar 8/18/15    Dr. Jorge Cerna Stroke [facility]) 2/25/2014    Established with Neurology, Nan Montoya, NP-[facility]-2/10/15. CT negative, but Late effect CV accident with increased tone described on discharge summary, Carotid dopplers showed 50% stenosis bilaterally.  Vitamin D deficiency 7/2015        Current Outpatient Medications   Medication Sig Dispense Refill    topiramate (TOPAMAX) 100 mg tablet Take 1 Tab by mouth two (2) times a day. 180 Tab 2    baclofen (LIORESAL) 10 mg tablet TAKE 1 TABLET BY MOUTH THREE TIMES DAILY 270 Tab 2    furosemide (LASIX) 40 mg tablet Take 1 Tab by mouth daily. 90 Tab 0    carvedilol (COREG) 6.25 mg tablet Take 1 Tab by mouth two (2) times daily (with meals). 60 Tab 11    levothyroxine (SYNTHROID) 125 mcg tablet Take 1 Tab by mouth Daily (before breakfast). 90 Tab 3    ergocalciferol (ERGOCALCIFEROL) 50,000 unit capsule Take 1 Cap by mouth every seven (7) days. 12 Cap 1    cyanocobalamin (VITAMIN B12) 1,000 mcg/mL injection INJECT 1 ML UNDER THE SKIN EVERY 14 DAYS FOR B12 DEFICIENCY 1 mL 1    linaclotide (LINZESS) 145 mcg cap capsule Take 1 Cap by mouth Daily (before breakfast). For constipation 90 Cap 3    metFORMIN (GLUCOPHAGE) 1,000 mg tablet Take 1 Tab by mouth daily. 180 Tab 3    Blood Glucose Control, High (ONETOUCH VERIO HIGH CONTROL) soln For use with glucometer 1 Each 3    alcohol swabs (BD SINGLE USE SWABS REGULAR) padm To use when checking blood sugar 100 Pad 3    calcipotriene (DOVONEX) 0.005 % topical cream Apply  to affected area daily as needed. 60 g 11    Lancets misc For use with glucometer to check blood sugar once a day in morning before eating. Please dispense brand covered by insurance.  100 Each 3    ROZEREM 8 mg tablet TAKE 1 TABLET BY MOUTH NIGHTLY. TAKE 30 MINUTES BEFORE BEDTIME 90 Tab 2    lidocaine (LIDODERM) 5 % Apply patch to the affected area for 12 hours a day and remove for 12 hours a day. 90 Each 3    glucose blood VI test strips (BLOOD GLUCOSE TEST) strip For use with glucometer to check blood sugar once a day in morning before eating. Please dispense VERIO FLEX STRIPS 100 Strip 11    Insulin Syringe-Needle U-100 (INSULIN SYRINGE) 1 mL 30 gauge x 5/16 syrg For use to administer b12 injections 12 Syringe 1    onabotulinumtoxinA (BOTOX) 200 unit injection 200 Units by IntraMUSCular route every three (3) months. Inject to selected muscles head and neck bilaterally every 3 months for migraine  Indications: MIGRAINE PREVENTION 200 Units 3    DULoxetine (CYMBALTA) 60 mg capsule Take 1 Cap by mouth daily. 90 Cap 3    pantoprazole (PROTONIX) 40 mg tablet Take 1 Tab by mouth daily. 90 Tab 3    morphine CR (MS CONTIN) 15 mg CR tablet Take 1 Tab by mouth every twelve (12) hours. Max Daily Amount: 30 mg. (Patient taking differently: Take 15 mg by mouth daily.) 60 Tab 0    HYDROcodone-acetaminophen (NORCO) 5-325 mg per tablet Take 1 Tab by mouth every six (6) hours as needed for Pain. Max Daily Amount: 4 Tabs. 20 Tab 0    albuterol-ipratropium (DUO-NEB) 2.5 mg-0.5 mg/3 ml nebu 3 mL by Nebulization route every six (6) hours as needed. 30 Nebule 0    losartan (COZAAR) 50 mg tablet Take 1 Tab by mouth nightly. Hold for SBP<115 30 Tab 0    cyclobenzaprine (FLEXERIL) 5 mg tablet Take 5 mg by mouth two (2) times a day.  EPINEPHrine (EPIPEN 2-LAURA) 0.3 mg/0.3 mL injection 0.3 mL by IntraMUSCular route once as needed for up to 1 dose. 2 Syringe 3    albuterol (PROVENTIL HFA, VENTOLIN HFA, PROAIR HFA) 90 mcg/actuation inhaler Take 2 Puffs by inhalation every four (4) hours as needed for Wheezing or Shortness of Breath. 3 Inhaler 3    ferrous sulfate 325 mg (65 mg iron) tablet Take 325 mg by mouth Daily (before breakfast).       aspirin delayed-release 81 mg tablet Take 81 mg by mouth daily. Allergies   Allergen Reactions    Bees [Hymenoptera Allergenic Extract] Shortness of Breath and Swelling    Strawberry Shortness of Breath and Swelling    Lisinopril Cough       O:   Visit Vitals  BP 96/67 (BP 1 Location: Right arm, BP Patient Position: Sitting)   Pulse 89   Temp 97.8 °F (36.6 °C) (Oral)   Resp 20   Ht 5' 5\" (1.651 m)   Wt 211 lb 9.6 oz (96 kg)   SpO2 98%   BMI 35.21 kg/m²          PAIN: No complaints of pain today. GENERAL: Elo Kenny  is in no acute distress. Non-toxic. EYE: PERRLA. RESP: Unlabored without SOB. Speaking in full sentences. Breath sounds are symmetrical bilaterally. Clear to auscultation to all fields. No wheezes. No rales or rhonchi. CV: normal rate. Regular rhythm. S1, S2 audible. No murmur noted. No rubs, clicks or gallops noted. NEURO:  awake, alert and oriented to person, place, and time and event. Clear speech. Steady gait. HEME/LYMPH: pedal pulses palpable 2+. Nonpitting LE peripheral edema is noted. SKIN: Skin is warm and dry. Turgor is normal. No petechiae, no purpura, no rash. No cyanosis. No mottling, jaundice or pallor. _______________________________________________________________  Patient education was done. Advised on nutrition, physical activity, weight management, tobacco, alcohol and safety. Medication risks/benefits,  interactions and alternatives discussed with patient. Advised of frequent feet checks. Advised yearly eye exam. Reviewed warning signs of hypertension, stroke and heart attack      Patient verbalized understanding and agreed with plan of care. Patient was given an after visit summary which included current diagnoses, medications and vital signs. Follow up in 3 months.

## 2018-12-28 NOTE — PROGRESS NOTES
Leigh Bence  Identified pt with two pt identifiers(name and ). Chief Complaint   Patient presents with    Diabetes     Rm 10    Weight Loss       1. Have you been to the ER, urgent care clinic since your last visit? Hospitalized since your last visit? 2. Have you seen or consulted any other health care providers outside of the [address] [place] [address] since your last visit? Include any pap smears or colon screening. Advance Care Planning    In the event something were to happen to you and you were unable to speak on your behalf, do you have an Advance Directive/ Living Will in place stating your wishes? YES    If yes, do we have a copy on file YES    If no, would you like information NO    Medication reconciliation up to date and corrected with patient at this time. Today's provider has been notified of reason for visit, vitals and flowsheets obtained on patients. Reviewed record in preparation for visit, huddled with provider and have obtained necessary documentation.       Health Maintenance Due   Topic    Shingrix Vaccine Age 50> (1 of 2)    Influenza Age 5 to Adult     MEDICARE YEARLY EXAM     PAP AKA CERVICAL CYTOLOGY        Wt Readings from Last 3 Encounters:   18 211 lb 9.6 oz (96 kg)   18 222 lb (100.7 kg)   18 223 lb (101.2 kg)     Temp Readings from Last 3 Encounters:   18 97.8 °F (36.6 °C) (Oral)   18 97.8 °F (36.6 °C) (Oral)   18 97.3 °F (36.3 °C) (Oral)     BP Readings from Last 3 Encounters:   18 96/67   18 118/84   18 110/70     Pulse Readings from Last 3 Encounters:   18 89   18 76   18 86     Vitals:    18 0849   BP: 96/67   Pulse: 89   Resp: 20   Temp: 97.8 °F (36.6 °C)   TempSrc: Oral   SpO2: 98%   Weight: 211 lb 9.6 oz (96 kg)   Height: 5' 5\" (1.651 m)   PainSc:   4         Learning Assessment:  :     Learning Assessment 2018 10/13/2017 3/24/2015   PRIMARY LEARNER Patient Patient Patient   HIGHEST LEVEL OF EDUCATION - PRIMARY LEARNER  - - SOME COLLEGE   BARRIERS PRIMARY LEARNER - - NONE   CO-LEARNER CAREGIVER - - No   CO-LEARNER NAME - - n/a   PRIMARY LANGUAGE ENGLISH ENGLISH ENGLISH   LEARNER PREFERENCE PRIMARY DEMONSTRATION LISTENING LISTENING   ANSWERED BY patient patient patient   RELATIONSHIP SELF SELF SELF       Depression Screening:  :     PHQ over the last two weeks 12/28/2018   Little interest or pleasure in doing things Not at all   Feeling down, depressed, irritable, or hopeless Not at all   Total Score PHQ 2 0       No flowsheet data found. Fall Risk Assessment:  :     Fall Risk Assessment, last 12 mths 6/13/2017   Able to walk? Yes   Fall in past 12 months? Yes   Fall with injury? No   Number of falls in past 12 months 1   Fall Risk Score 1       Abuse Screening:  :     No flowsheet data found. ADL Screening:  :     ADL Assessment 9/28/2018   Feeding yourself No Help Needed   Getting from bed to chair No Help Needed   Getting dressed No Help Needed   Bathing or showering No Help Needed   Walk across the room (includes cane/walker) No Help Needed   Using the telphone No Help Needed   Taking your medications No Help Needed   Preparing meals No Help Needed   Managing money (expenses/bills) No Help Needed   Moderately strenuous housework (laundry) No Help Needed   Shopping for personal items (toiletries/medicines) No Help Needed   Shopping for groceries No Help Needed   Driving No Help Needed   Climbing a flight of stairs No Help Needed   Getting to places beyond walking distances No Help Needed           BMI:  Weight Metrics 12/28/2018 12/18/2018 11/7/2018 11/1/2018 9/28/2018 4/27/2018 4/24/2018   Weight 211 lb 9.6 oz 222 lb 223 lb 226 lb 6.4 oz 226 lb 4.8 oz 266 lb 9.6 oz 268 lb   BMI 35.21 kg/m2 36.94 kg/m2 37.11 kg/m2 37.67 kg/m2 37.66 kg/m2 44.36 kg/m2 43.26 kg/m2           Medication reconciliation up to date and corrected with patient at this time.

## 2018-12-28 NOTE — PATIENT INSTRUCTIONS
1) A1c = 5.3%, and increase from 5.1% in Sept 2018. Reduce metformin from 1000mg daily to 500mg. Come back in 3 months so we can recheck A1c. Goal is to stop metformin, if A1c stays in normal range. 2) Stair chair lift not covered by Medicare. [employer] installs and may have a patricio program to help with cost of chairs. [phone]. I also sent note to Owen Mccurdy who is  here and may have some ideas. Prediabetes: Care Instructions  Your Care Instructions    Prediabetes is a warning sign that you are at risk for getting type 2 diabetes. It means that your blood sugar is higher than it should be. The food you eat turns into sugar, which your body uses for energy. Normally, an organ called the pancreas makes insulin, which allows the sugar in your blood to get into your body's cells. But when your body can't use insulin the right way, the sugar doesn't move into cells. It stays in your blood instead. This is called insulin resistance. The buildup of sugar in the blood causes prediabetes. The good news is that lifestyle changes may help you get your blood sugar back to normal and help you avoid or delay diabetes. Follow-up care is a key part of your treatment and safety. Be sure to make and go to all appointments, and call your doctor if you are having problems. It's also a good idea to know your test results and keep a list of the medicines you take. How can you care for yourself at home? · Watch your weight. A healthy weight helps your body use insulin properly. · Limit the amount of calories, sweets, and unhealthy fat you eat. Ask your doctor if you should see a dietitian. A registered dietitian can help you create meal plans that fit your lifestyle. · Get at least 30 minutes of exercise on most days of the week. Exercise helps control your blood sugar. It also helps you maintain a healthy weight. Walking is a good choice.  You also may want to do other activities, such as running, swimming, cycling, or playing tennis or team sports. · Do not smoke. Smoking can make prediabetes worse. If you need help quitting, talk to your doctor about stop-smoking programs and medicines. These can increase your chances of quitting for good. · If your doctor prescribed medicines, take them exactly as prescribed. Call your doctor if you think you are having a problem with your medicine. You will get more details on the specific medicines your doctor prescribes. When should you call for help? Watch closely for changes in your health, and be sure to contact your doctor if:    · You have any symptoms of diabetes. These may include:  ? Being thirsty more often. ? Urinating more. ? Being hungrier. ? Losing weight. ? Being very tired. ? Having blurry vision.     · You have a wound that will not heal.     · You have an infection that will not go away.     · You have problems with your blood pressure.     · You want more information about diabetes and how you can keep from getting it. Where can you learn more? Go to http://noa-em.info/. Enter I222 in the search box to learn more about \"Prediabetes: Care Instructions. \"  Current as of: December 7, 2017  Content Version: 11.8  © 0258-5445 Healthwise, Incorporated. Care instructions adapted under license by [employer] (which disclaims liability or warranty for this information). If you have questions about a medical condition or this instruction, always ask your healthcare professional. [address] any warranty or liability for your use of this information.

## 2018-12-31 ENCOUNTER — TELEPHONE (OUTPATIENT)
Dept: FAMILY MEDICINE CLINIC | Age: 58
End: 2018-12-31

## 2018-12-31 NOTE — TELEPHONE ENCOUNTER
Requested Prescriptions     Pending Prescriptions Disp Refills    topiramate (TOPAMAX) 100 mg tablet 180 Tab 2     Sig: Take 1 Tab by mouth two (2) times a day.

## 2018-12-31 NOTE — TELEPHONE ENCOUNTER
Outbound call to patient. Voice message left w/contact information requesting a return call. Tommie Mackenzie of the call re: stair chair lift and insurance coverage.       LITA Steinberg

## 2019-01-02 RX ORDER — TOPIRAMATE 100 MG/1
100 TABLET, FILM COATED ORAL 2 TIMES DAILY
Qty: 180 TAB | Refills: 2 | Status: SHIPPED | OUTPATIENT
Start: 2019-01-02 | End: 2019-12-30

## 2019-01-07 ENCOUNTER — TELEPHONE (OUTPATIENT)
Dept: FAMILY MEDICINE CLINIC | Age: 59
End: 2019-01-07

## 2019-01-07 NOTE — TELEPHONE ENCOUNTER
10:30 am, Outbound call to patient. Voice message left w/contact information requesting a return call. LITA Gunn    10:28 am, Outbound call to patient. Voice message w/contact information requesting a return call.     LITA Gunn

## 2019-01-07 NOTE — TELEPHONE ENCOUNTER
Inbound call from patient. Identified self, role and nature of the call. Pt acknowledged understanding. Pt identifiers ( & address) verified per HIPAA policy. Pt referred to writer re: chair stair lift. Writer provided patient the following numbers regarding chair stair lift. Unsure if health insurance payer's will cover this expense.      -[employer] @ [phone].  -[employer] @ [phone]. -[employer] @ [phone]. [place] shows patient's primary payor ([facility] [employer] Medicare Replacement/Advantage)  2018. Inquired if patient recv'd a new insurance card \"yes, I have\". Instructed pt to bring in during next scheduled visit. Pt acknowledged understanding. Further informed pt to call in and schedule a PCP visit. Pt verbally agreed to do so.       LITA Rivas

## 2019-01-29 ENCOUNTER — OFFICE VISIT (OUTPATIENT)
Dept: SLEEP MEDICINE | Age: 59
End: 2019-01-29

## 2019-01-29 VITALS
HEART RATE: 77 BPM | OXYGEN SATURATION: 98 % | WEIGHT: 214 LBS | DIASTOLIC BLOOD PRESSURE: 75 MMHG | SYSTOLIC BLOOD PRESSURE: 109 MMHG | HEIGHT: 65 IN | BODY MASS INDEX: 35.65 KG/M2

## 2019-01-29 DIAGNOSIS — E07.9 THYROID DISEASE: ICD-10-CM

## 2019-01-29 DIAGNOSIS — Z86.73 H/O: STROKE: ICD-10-CM

## 2019-01-29 DIAGNOSIS — G47.33 OSA (OBSTRUCTIVE SLEEP APNEA): Primary | ICD-10-CM

## 2019-01-29 DIAGNOSIS — Z86.79 H/O CHF: ICD-10-CM

## 2019-01-29 NOTE — PROGRESS NOTES
[facility] [place]. [place], [address]  Tel.  [phone]  Fax. [address] [facility] [address]  [place], [address] [facility]  Tel.  [phone]  Fax. [phone] [address]  Tel.  [phone]  Fax. [phone]     Linda Sagastume is a 59 y.o. female seen for a positive airway pressure follow-up. She reports  problems using the device. She is 86% compliant over the past 90 days. The following problems are identified:    Drowsiness yes Problems exhaling no   Snoring yes Forget to put on no   Mask Comfortable no Can't fall asleep no   Dry Mouth yes Mask falls off no   Air Leaking yes Frequent awakenings yes         She admits that her sleep has worsened. She reports of waking up gasping for air on PAP therapy, she has had to sleep with extra pillows and reports of difficulty sleeping flat. AHI on download is elevated. Her weight has decreased from 333 to 214 lbs. Allergies   Allergen Reactions    Bees [Hymenoptera Allergenic Extract] Shortness of Breath and Swelling    Strawberry Shortness of Breath and Swelling    Lisinopril Cough       She has a current medication list which includes the following prescription(s): metformin, baclofen, levothyroxine, linaclotide, blood glucose control, high, alcohol swabs, lancets, glucose blood vi test strips, insulin syringe-needle u-100, topiramate, mirtazapine, furosemide, carvedilol, ergocalciferol, cyanocobalamin, calcipotriene, rozerem, lidocaine, onabotulinumtoxina, duloxetine, pantoprazole, morphine cr, hydrocodone-acetaminophen, albuterol-ipratropium, losartan, cyclobenzaprine, epinephrine, albuterol, ferrous sulfate, and aspirin delayed-release. .      She  has a past medical history of Advanced care planning/counseling discussion (3/4/16), Bronchitis (2/24/2014), Cervicalgia (8/18/15), Chest pain (5/25/15), Chronic indwelling Dunn catheter (1/24/2018), Congestive heart failure, unspecified, Constipation (6/13/2017), Enthesopathy, spinal ([facility] [address] (8/18/15), Essential hypertension, Foot drop (8/18/15), Heart attack ([facility] [address] (2/24/2013), Hiatal hernia (6/2015), Hip pain (8/18/15), Hypercholesterolemia, Hyperglycemia (7/2015), Hyperlipidemia (6/30/2015), Hypothyroid, Insomnia (6/13/2017), Lower extremity edema, Lumbar spondylosis (8/18/15), Melena (6/2015), Murmur, EDDIE on CPAP, Osteoarthritis of hip (8/18/15), Osteoarthritis, shoulder (8/18/15), Radiculopathy, cervical (8/18/15), Shoulder pain (8/18/15), Spinal stenosis of cervical region (8/18/15), Spinal stenosis, lumbar (8/18/15), Stroke ([facility] [address] (2/25/2014), and Vitamin D deficiency (7/2015). Sunset Sleepiness Score: 13   and Modified F.O.S.Q. Score Total / 2: 9      O>    Visit Vitals  /75   Pulse 77   Ht 5' 5\" (1.651 m)   Wt 214 lb (97.1 kg)   SpO2 98%   BMI 35.61 kg/m²         General:   Not in acute distress   Eyes:  Anicteric sclerae, no obvious strabismus   Nose:  No obvious nasal septum deviation    Oropharynx:   Class 4 oropharyngeal outlet, thick tongue base, uvula not seen due to low-lying soft palate, narrow tonsilo-pharyngeal pilars   Tonsils:   tonsils are not visualized due to low-lying soft palate   Neck:   midline trachea   Chest/Lungs:  Equal lung expansion, clear on auscultation    CVS:  Normal rate, regular rhythm; no JVD   Skin:  Warm to touch; no obvious rashes   Neuro:  No focal deficits ; no obvious tremor    Psych:  Normal affect,  normal countenance;           A>    ICD-10-CM ICD-9-CM    1. EDDIE (obstructive sleep apnea) G47.33 327.23 SPLIT CPAP/PSG      CANCELED: SPLIT CPAP/PSG   2. Thyroid disease E07.9 246.9    3. BMI 38.0-38.9,adult Z68.38 V85.38    4. H/O: stroke Z86.73 V12.54    5. H/O CHF Z86.79 V12.59      AHI = ?. On Resmed S9 AutoSet :  15 - 20 cmH2O. Compliant:      yes    Therapeutic Response:  Negative    P>    * Patient agrees to return for sleep testing due to weight loss and adjustment of settings.  New CPAP device to be prescribed after testing if indicated. Orders Placed This Encounter    SPLIT CPAP/PSG     Standing Status:   Future     Standing Expiration Date:   7/29/2019     Order Specific Question:   Reason for Exam     Answer:   EDDIE       * We have recommended a dedicated weight loss through appropriate diet and an exercise regiment as significant weight reduction has been shown to reduce severity of obstructive sleep apnea. * Follow-up Disposition:  Return if symptoms worsen or fail to improve. * She was asked to contact our office for any problems regarding PAP therapy. * Counseling was provided regarding the importance of regular PAP use and on proper sleep hygiene and safe driving. * Re-enforced proper and regular cleaning for the device. Thank you for allowing us to participate in your patient's medical care. Mihai Velazquez MD, FAASM  Electronically signed.  01/29/19

## 2019-01-29 NOTE — PATIENT INSTRUCTIONS
[address] [facility] [place]. [place], [address]  Tel.  [phone]  Fax. 100 [facility] 60  [place], [address] [facility]  Tel.  [phone]  Fax. [phone] [address]  Tel.  [phone]  Fax. [phone]     Sleep Apnea: After Your Visit  Your Care Instructions  Sleep apnea occurs when you frequently stop breathing for 10 seconds or longer during sleep. It can be mild to severe, based on the number of times per hour that you stop breathing or have slowed breathing. Blocked or narrowed airways in your nose, mouth, or throat can cause sleep apnea. Your airway can become blocked when your throat muscles and tongue relax during sleep. Sleep apnea is common, occurring in 1 out of 20 individuals. Individuals having any of the following characteristics should be evaluated and treated right away due to high risk and detrimental consequences from untreated sleep apnea:  1. Obesity  2. Congestive Heart failure  3. Atrial Fibrillation  4. Uncontrolled Hypertension  5. Type II Diabetes  6. Night-time Arrhythmias  7. Stroke  8. Pulmonary Hypertension  9. High-risk Driving Populations (pilots, truck drivers, etc.)  10. Patients Considering Weight-loss Surgery    How do you know you have sleep apnea? You probably have sleep apnea if you answer 'yes' to 3 or more of the following questions:  S - Have you been told that you Snore? T - Are you often Tired during the day? O - Has anyone Observed you stop breathing while sleeping? P- Do you have (or are being treated for) high blood Pressure? B - Are you obese (Body Mass Index > 35)? A - Is your Age 48years old or older? N - Is your Neck size greater than 16 inches? G - Are you male Gender? A sleep physician can prescribe a breathing device that prevents tissues in the throat from blocking your airway.  Or your doctor may recommend using a dental device (oral breathing device) to help keep your airway open. In some cases, surgery may be needed to remove enlarged tissues in the throat. Follow-up care is a key part of your treatment and safety. Be sure to make and go to all appointments, and call your doctor if you are having problems. It's also a good idea to know your test results and keep a list of the medicines you take. How can you care for yourself at home? · Lose weight, if needed. It may reduce the number of times you stop breathing or have slowed breathing. · Go to bed at the same time every night. · Sleep on your side. It may stop mild apnea. If you tend to roll onto your back, sew a pocket in the back of your pajama top. Put a tennis ball into the pocket, and stitch the pocket shut. This will help keep you from sleeping on your back. · Avoid alcohol and medicines such as sleeping pills and sedatives before bed. · Do not smoke. Smoking can make sleep apnea worse. If you need help quitting, talk to your doctor about stop-smoking programs and medicines. These can increase your chances of quitting for good. · Prop up the head of your bed 4 to 6 inches by putting bricks under the legs of the bed. · Treat breathing problems, such as a stuffy nose, caused by a cold or allergies. · Use a continuous positive airway pressure (CPAP) breathing machine if lifestyle changes do not help your apnea and your doctor recommends it. The machine keeps your airway from closing when you sleep. · If CPAP does not help you, ask your doctor whether you should try other breathing machines. A bilevel positive airway pressure machine has two types of air pressureâone for breathing in and one for breathing out. Another device raises or lowers air pressure as needed while you breathe. · If your nose feels dry or bleeds when using one of these machines, talk with your doctor about increasing moisture in the air. A humidifier may help.   · If your nose is runny or stuffy from using a breathing machine, talk with your doctor about using decongestants or a corticosteroid nasal spray. When should you call for help? Watch closely for changes in your health, and be sure to contact your doctor if:  · You still have sleep apnea even though you have made lifestyle changes. · You are thinking of trying a device such as CPAP. · You are having problems using a CPAP or similar machine. Where can you learn more? Go to [employer]. Enter S323 in the search box to learn more about \"Sleep Apnea: After Your Visit. \"   © 9676-9498 Healthwise, Incorporated. Care instructions adapted under license by Count includes the [facility] [employer] (which disclaims liability or warranty for this information). This care instruction is for use with your licensed healthcare professional. If you have questions about a medical condition or this instruction, always ask your healthcare professional. Frosty Brightly any warranty or liability for your use of this information. PROPER SLEEP HYGIENE    What to avoid  · Do not have drinks with caffeine, such as coffee or black tea, for 8 hours before bed. · Do not smoke or use other types of tobacco near bedtime. Nicotine is a stimulant and can keep you awake. · Avoid drinking alcohol late in the evening, because it can cause you to wake in the middle of the night. · Do not eat a big meal close to bedtime. If you are hungry, eat a light snack. · Do not drink a lot of water close to bedtime, because the need to urinate may wake you up during the night. · Do not read or watch TV in bed. Use the bed only for sleeping and sexual activity. What to try  · Go to bed at the same time every night, and wake up at the same time every morning. Do not take naps during the day. · Keep your bedroom quiet, dark, and cool. · Get regular exercise, but not within 3 to 4 hours of your bedtime. .  · Sleep on a comfortable pillow and mattress.   · If watching the clock makes you anxious, turn it facing away from you so you cannot see the time. · If you worry when you lie down, start a worry book. Well before bedtime, write down your worries, and then set the book and your concerns aside. · Try meditation or other relaxation techniques before you go to bed. · If you cannot fall asleep, get up and go to another room until you feel sleepy. Do something relaxing. Repeat your bedtime routine before you go to bed again. · Make your house quiet and calm about an hour before bedtime. Turn down the lights, turn off the TV, log off the computer, and turn down the volume on music. This can help you relax after a busy day. Drowsy Driving  The [address] [place] [address] Road Traffic Safety Administration cites drowsiness as a causing factor in more than 023,902 police reported crashes annually, resulting in 76,000 injuries and 1,500 deaths. Other surveys suggest 55% of people polled have driven while drowsy in the past year, 23% had fallen asleep but not crashed, 3% crashed, and 2% had and accident due to drowsy driving. Who is at risk? Young Drivers: One study of drowsy driving accidents states that 55% of the drivers were under 25 years. Of those, 75% were male. Shift Workers and Travelers: People who work overnight or travel across time zones frequently are at higher risk of experiencing Circadian Rhythm Disorders. They are trying to work and function when their body is programed to sleep. Sleep Deprived: Lack of sleep has a serious impact on your ability to pay attention or focus on a task. Consistently getting less than the average of 8 hours your body needs creates partial or cumulative sleep deprivation. Untreated Sleep Disorders: Sleep Apnea, Narcolepsy, R.L.S., and other sleep disorders (untreated) prevent a person from getting enough restful sleep. This leads to excessive daytime sleepiness and increases the risk for drowsy driving accidents by up to 7 times.   Medications / Alcohol: Even over the counter medications can cause drowsiness. Medications that impair a drivers attention should have a warning label. Alcohol naturally makes you sleepy and on its own can cause accidents. Combined with excessive drowsiness its effects are amplified. Signs of Drowsy Driving:   * You don't remember driving the last few miles   * You may drift out of your rakel   * You are unable to focus and your thoughts wander   * You may yawn more often than normal   * You have difficulty keeping your eyes open / nodding off   * Missing traffic signs, speeding, or tailgating  Prevention-   Good sleep hygiene, lifestyle and behavioral choices have the most impact on drowsy driving. There is no substitute for sleep and the average person requires 8 hours nightly. If you find yourself driving drowsy, stop and sleep. Consider the sleep hygiene tips provided during your visit as well. Medication Refill Policy: Refills for all medications require 1 week advance notice. Please have your pharmacy fax a refill request. We are unable to fax, or call in \"controled substance\" medications and you will need to pick these prescriptions up from our office. [employer] Activation    Thank you for requesting access to [employer]. Please follow the instructions below to securely access and download your online medical record. [employer] allows you to send messages to your doctor, view your test results, renew your prescriptions, schedule appointments, and more. How Do I Sign Up? 1. In your internet browser, go to [email or web address]. 2. Click on the First Time User? Click Here link in the Sign In box. You will see the New Member Sign Up page. 3. Enter your [employer] Access Code exactly as it appears below. You will not need to use this code after youve completed the sign-up process. If you do not sign up before the expiration date, you must request a new code. [employer] Access Code:  Activation code not generated  Current [employer] Status: Active (This is the date your [employer] access code will )    4. Enter the last four digits of your Social Security Number (xxxx) and Date of Birth (mm/dd/yyyy) as indicated and click Submit. You will be taken to the next sign-up page. 5. Create a PiperScoutt ID. This will be your Features[employer] login ID and cannot be changed, so think of one that is secure and easy to remember. 6. Create a Features[employer] password. You can change your password at any time. 7. Enter your Password Reset Question and Answer. This can be used at a later time if you forget your password. 8. Enter your e-mail address. You will receive e-mail notification when new information is available in [address] 9. Click Sign Up. You can now view and download portions of your medical record. 10. Click the Download Summary menu link to download a portable copy of your medical information. Additional Information    If you have questions, please call [phone]. Remember, [employer] is NOT to be used for urgent needs. For medical emergencies, dial 911.

## 2019-02-08 ENCOUNTER — TELEPHONE (OUTPATIENT)
Dept: NEUROLOGY | Age: 59
End: 2019-02-08

## 2019-02-08 NOTE — TELEPHONE ENCOUNTER
Re: Botox    Per Hodan Kelly, pt is ineligible with insurance plan we have on file. Called and s/w Juana @ pt's [address] to get pharmacy benefit info:  Member ID # [id number]  Rx BIN: [id number]  Rx PCN: MD     PA submitted via CMM to [address] Approved:  Austin Arboleda Siegel: [id number] - PA Case ID: [id number] Need help? Call us at [phone]   Outcome   Approved today   Questionnaire submitted. PA Case [id number] Status: Approved. Authorization and Notifications Completed. \"    Per approval fax from [address]:     approved. Auth # [id number]. Auth good 1/1/19 - 2/8/20. Called and s/w Daniela Alicea @ [place] (medical benefit) to see if [address] needs a PA. Per Daniela Alicea [place] is required for [id number], however pt is a [employer] member so PA needs to be setup with them. S/w Alisia @ [facility] to setup [address] PA. [facility]. She informed me that she will fax our office the PA request form. Have not received the form yet. Will complete once received.

## 2019-02-12 ENCOUNTER — TELEPHONE (OUTPATIENT)
Dept: NEUROLOGY | Age: 59
End: 2019-02-12

## 2019-02-12 NOTE — TELEPHONE ENCOUNTER
Re: Botox    [id number] PA submitted via fax to [facility]. Fax included completed PA form and clinical notes 12/18/18, 9/20/18 and 4/12/18. Fax confirmation received 2/12/19. Pending status. Will update when determination is made.

## 2019-02-12 NOTE — TELEPHONE ENCOUNTER
Blanca calling in regards to clinical  notes request that wasn't fully received. Please advise.     Best call back # [phone]

## 2019-02-12 NOTE — TELEPHONE ENCOUNTER
Fax was resent to [facility] after I noticed that only 10 of the 12 pages went through. New fax confirmation indicated 12 out of 12 pages were sent. Called the contact number provided ([phone]) and automated system said extension has not been activated. Did not leave a message.

## 2019-02-13 NOTE — TELEPHONE ENCOUNTER
Re: Botox    Approval received from Blanca/[place]. [id number] approved. Auth # [id number]. Auth good 2/12/19 - 2/12/20.  already approved. Auth good until 2/8/20. SPP is [facility]. Phone # is [phone]. Forward to nurse.

## 2019-02-27 ENCOUNTER — TELEPHONE (OUTPATIENT)
Dept: FAMILY MEDICINE CLINIC | Age: 59
End: 2019-02-27

## 2019-02-27 NOTE — TELEPHONE ENCOUNTER
Writer returned call from mo, message left. If call back, they need to speak with sleep study doctor for sleep supplys, was not seen in this office for that.

## 2019-02-27 NOTE — TELEPHONE ENCOUNTER
----- Message from Rony Cabello sent at 2/27/2019  9:42 AM EST -----  Regarding: MANOLO ArtC / telephone  Mo with [facility] is calling to find out the status of the request for CPAP supplies. Mo's contact [phone].

## 2019-03-05 ENCOUNTER — DOCUMENTATION ONLY (OUTPATIENT)
Dept: NEUROLOGY | Age: 59
End: 2019-03-05

## 2019-03-05 ENCOUNTER — OFFICE VISIT (OUTPATIENT)
Dept: FAMILY MEDICINE CLINIC | Age: 59
End: 2019-03-05

## 2019-03-05 VITALS
DIASTOLIC BLOOD PRESSURE: 82 MMHG | SYSTOLIC BLOOD PRESSURE: 126 MMHG | OXYGEN SATURATION: 97 % | WEIGHT: 207.5 LBS | RESPIRATION RATE: 18 BRPM | HEART RATE: 76 BPM | BODY MASS INDEX: 34.57 KG/M2 | HEIGHT: 65 IN | TEMPERATURE: 95.8 F

## 2019-03-05 DIAGNOSIS — R73.03 PREDIABETES: Primary | ICD-10-CM

## 2019-03-05 DIAGNOSIS — G89.29 OTHER CHRONIC PAIN: ICD-10-CM

## 2019-03-05 DIAGNOSIS — I10 ESSENTIAL HYPERTENSION: ICD-10-CM

## 2019-03-05 DIAGNOSIS — R79.89 LOW TSH LEVEL: ICD-10-CM

## 2019-03-05 DIAGNOSIS — E78.00 HIGH CHOLESTEROL: ICD-10-CM

## 2019-03-05 DIAGNOSIS — K21.9 GASTROESOPHAGEAL REFLUX DISEASE, ESOPHAGITIS PRESENCE NOT SPECIFIED: ICD-10-CM

## 2019-03-05 RX ORDER — PANTOPRAZOLE SODIUM 40 MG/1
40 TABLET, DELAYED RELEASE ORAL DAILY
Qty: 90 TAB | Refills: 3 | Status: SHIPPED | OUTPATIENT
Start: 2019-03-05 | End: 2020-01-30 | Stop reason: SDUPTHER

## 2019-03-05 RX ORDER — LOSARTAN POTASSIUM 50 MG/1
50 TABLET ORAL
Qty: 90 TAB | Refills: 1 | Status: SHIPPED | OUTPATIENT
Start: 2019-03-05 | End: 2020-11-20 | Stop reason: SDUPTHER

## 2019-03-05 RX ORDER — DULOXETIN HYDROCHLORIDE 60 MG/1
120 CAPSULE, DELAYED RELEASE ORAL DAILY
Qty: 180 CAP | Refills: 0 | Status: SHIPPED | OUTPATIENT
Start: 2019-03-05

## 2019-03-05 NOTE — PROGRESS NOTES
Annika Child  Identified pt with two pt identifiers(name and ). No chief complaint on file. 1. Have you been to the ER, urgent care clinic since your last visit? no Hospitalized since your last visit? no    2. Have you seen or consulted any other health care providers outside of the [address] [place] [address] since your last visit? Include any pap smears or colon screening. no      Advance Care Planning    In the event something were to happen to you and you were unable to speak on your behalf, do you have an Advance Directive/ Living Will in place stating your wishes? NO    If yes, do we have a copy on file NO    If no, would you like information NO    Medication reconciliation up to date and corrected with patient at this time. Today's provider has been notified of reason for visit, vitals and flowsheets obtained on patients. Reviewed record in preparation for visit, huddled with provider and have obtained necessary documentation. Health Maintenance Due   Topic    Shingrix Vaccine Age 50> (1 of 2)    MEDICARE YEARLY EXAM     PAP AKA CERVICAL CYTOLOGY        Wt Readings from Last 3 Encounters:   19 214 lb (97.1 kg)   18 211 lb 9.6 oz (96 kg)   18 222 lb (100.7 kg)     Temp Readings from Last 3 Encounters:   18 97.8 °F (36.6 °C) (Oral)   18 97.8 °F (36.6 °C) (Oral)   18 97.3 °F (36.3 °C) (Oral)     BP Readings from Last 3 Encounters:   19 109/75   18 96/67   18 118/84     Pulse Readings from Last 3 Encounters:   19 77   18 89   18 76     There were no vitals filed for this visit.       Learning Assessment:  :     Learning Assessment 2018 10/13/2017 3/24/2015   PRIMARY LEARNER Patient Patient Patient   HIGHEST LEVEL OF EDUCATION - PRIMARY LEARNER  - - SOME COLLEGE   BARRIERS PRIMARY LEARNER - - NONE   CO-LEARNER CAREGIVER - - No   CO-LEARNER NAME - - n/a   PRIMARY LANGUAGE ENGLISH ENGLISH ENGLISH   LEARNER PREFERENCE PRIMARY DEMONSTRATION LISTENING LISTENING   ANSWERED BY patient patient patient   RELATIONSHIP SELF SELF SELF       Depression Screening:  :     3 most recent PHQ Screens 12/28/2018   Little interest or pleasure in doing things Not at all   Feeling down, depressed, irritable, or hopeless Not at all   Total Score PHQ 2 0       No flowsheet data found. Fall Risk Assessment:  :     Fall Risk Assessment, last 12 mths 6/13/2017   Able to walk? Yes   Fall in past 12 months? Yes   Fall with injury? No   Number of falls in past 12 months 1   Fall Risk Score 1       Abuse Screening:  :     No flowsheet data found. ADL Screening:  :     ADL Assessment 9/28/2018   Feeding yourself No Help Needed   Getting from bed to chair No Help Needed   Getting dressed No Help Needed   Bathing or showering No Help Needed   Walk across the room (includes cane/walker) No Help Needed   Using the telphone No Help Needed   Taking your medications No Help Needed   Preparing meals No Help Needed   Managing money (expenses/bills) No Help Needed   Moderately strenuous housework (laundry) No Help Needed   Shopping for personal items (toiletries/medicines) No Help Needed   Shopping for groceries No Help Needed   Driving No Help Needed   Climbing a flight of stairs No Help Needed   Getting to places beyond walking distances No Help Needed           BMI:  Weight Metrics 1/29/2019 12/28/2018 12/18/2018 11/7/2018 11/1/2018 9/28/2018 4/27/2018   Weight 214 lb 211 lb 9.6 oz 222 lb 223 lb 226 lb 6.4 oz 226 lb 4.8 oz 266 lb 9.6 oz   BMI 35.61 kg/m2 35.21 kg/m2 36.94 kg/m2 37.11 kg/m2 37.67 kg/m2 37.66 kg/m2 44.36 kg/m2           Medication reconciliation up to date and corrected with patient at this time.

## 2019-03-05 NOTE — PATIENT INSTRUCTIONS
1) Weight loss of 7 lbs which is a total of 64lbs since April 2018. Will check the A1c today and if in normal range, will discontinue the metformin. Your TSH was low in November 2018, will check that today as well     Keep up the good work on weight loss! You stated your energy level is improving, which is great! Labs  The following blood work was ordered today. Once the tests are completed, you will receive a letter, email or phone call with the results. If you have not heard from us within 10-14 days, please call the office for the results. Complete Blood Count (CBC) helps evaluate your overall health, including hemoglobin and red blood cells that carry oxygen, white blood cells that fight infection and platelets that help with clotting. Hemoglobin A1c is a 3 month average of your blood sugar and used as a marker of your diabetes control. A normal value is less than 5.7%. Total Cholesterol is the total number of cholesterol particles in your blood, which includes triglycerides, HDL and LDL. A small amount of cholesterol is needed for the body's cells, hormones, and metabolism. Goal is less than 200. Triglycerides are the short term fats in your blood which are used for energy. Goal is less than 150. High Density Lipids (HDL) is the \"good\" cholesterol in your blood. HDL helps remove bad cholesterol from your blood. Goal is more than 40. Low Density Lipids (LDL) is the \"bad\" cholesterol in your blood. LDL can stick to your arteries, raising the risk for heart attack and stroke. Goal is less than 100      Your thyroid is responsible for secreting hormones and regulating your metabolism. Thyroid Stimulating Hormone (TSH) is a test for thyroid function. TSH is a hormone made by the pituitary gland in the brain and tells your thyroid gland to make hormones and release them into your blood.  If your thyroid isn't producing enough hormone, you may have symptoms such as unexplained weight gain, fatigue, hair loss. If your thyroid is producing too much hormone, you may have symptoms of diarrhea, heart palpitations, fatigue, unexplained weight loss. A normal TSH value is between 0.45 - 4.5. 2) Please check your blood pressure through the week at staggered times in the day. (If you check in the morning, it should be at least one hour after your morning blood pressure medications.)      Arm monitors are most accurate. If you use a wrist monitor, make sure your wrist is at heart level. You can bring your home monitor to your next visit and have it calibrated with the machine in the office to gauge your readings. Sit  with your feet uncrossed and relax for 5 minutes before taking your BP. Keep a written record of your blood pressure readings and bring it to each appointment. If your systolic (top) blood pressure is consistently greater than 140mmHg or less than [id number] of the diastolic (bottom) number is consistently greater than 90mmHg or less than 60mmHg then please schedule an office appointment. Work on healthy eating - no salt diet - and incorporating daily exercise into your routine. Cardiac symptoms that would need immediate attention include: uncomfortable pressure, squeezing, fullness or pain in the center of your chest. Pain or discomfort in one or both arms, the back, neck, jaw or stomach. Shortness of breath with or without chest discomfort, breaking out in a cold sweat, nausea or lightheadedness. Learning About Diabetes Food Guidelines  Your Care Instructions    Meal planning is important to manage diabetes. It helps keep your blood sugar at a target level (which you set with your doctor). You don't have to eat special foods. You can eat what your family eats, including sweets once in a while. But you do have to pay attention to how often you eat and how much you eat of certain foods.   You may want to work with a dietitian or a certified diabetes educator (CDE) to help you plan meals and snacks. A dietitian or CDE can also help you lose weight if that is one of your goals. What should you know about eating carbs? Managing the amount of carbohydrate (carbs) you eat is an important part of healthy meals when you have diabetes. Carbohydrate is found in many foods. · Learn which foods have carbs. And learn the amounts of carbs in different foods. ? Bread, cereal, pasta, and rice have about 15 grams of carbs in a serving. A serving is 1 slice of bread (1 ounce), ½ cup of cooked cereal, or 1/3 cup of cooked pasta or rice. ? Fruits have 15 grams of carbs in a serving. A serving is 1 small fresh fruit, such as an apple or orange; ½ of a banana; ½ cup of cooked or canned fruit; ½ cup of fruit juice; 1 cup of melon or raspberries; or 2 tablespoons of dried fruit. ? Milk and no-sugar-added yogurt have 15 grams of carbs in a serving. A serving is 1 cup of milk or 2/3 cup of no-sugar-added yogurt. ? Starchy vegetables have 15 grams of carbs in a serving. A serving is ½ cup of mashed potatoes or sweet potato; 1 cup winter squash; ½ of a small baked potato; ½ cup of cooked beans; or ½ cup cooked corn or green peas. · Learn how much carbs to eat each day and at each meal. A dietitian or CDE can teach you how to keep track of the amount of carbs you eat. This is called carbohydrate counting. · If you are not sure how to count carbohydrate grams, use the Plate Method to plan meals. It is a good, quick way to make sure that you have a balanced meal. It also helps you spread carbs throughout the day. ? Divide your plate by types of foods. Put non-starchy vegetables on half the plate, meat or other protein food on one-quarter of the plate, and a grain or starchy vegetable in the final quarter of the plate.  To this you can add a small piece of fruit and 1 cup of milk or yogurt, depending on how many carbs you are supposed to eat at a meal.  · Try to eat about the same amount of carbs at each meal. Do not \"save up\" your daily allowance of carbs to eat at one meal.  · Proteins have very little or no carbs per serving. Examples of proteins are beef, chicken, turkey, fish, eggs, tofu, cheese, cottage cheese, and peanut butter. A serving size of meat is 3 ounces, which is about the size of a deck of cards. Examples of meat substitute serving sizes (equal to 1 ounce of meat) are 1/4 cup of cottage cheese, 1 egg, 1 tablespoon of peanut butter, and ½ cup of tofu. How can you eat out and still eat healthy? · Learn to estimate the serving sizes of foods that have carbohydrate. If you measure food at home, it will be easier to estimate the amount in a serving of restaurant food. · If the meal you order has too much carbohydrate (such as potatoes, corn, or baked beans), ask to have a low-carbohydrate food instead. Ask for a salad or green vegetables. · If you use insulin, check your blood sugar before and after eating out to help you plan how much to eat in the future. · If you eat more carbohydrate at a meal than you had planned, take a walk or do other exercise. This will help lower your blood sugar. What else should you know? · Limit saturated fat, such as the fat from meat and dairy products. This is a healthy choice because people who have diabetes are at higher risk of heart disease. So choose lean cuts of meat and nonfat or low-fat dairy products. Use olive or canola oil instead of butter or shortening when cooking. · Don't skip meals. Your blood sugar may drop too low if you skip meals and take insulin or certain medicines for diabetes. · Check with your doctor before you drink alcohol. Alcohol can cause your blood sugar to drop too low. Alcohol can also cause a bad reaction if you take certain diabetes medicines. Follow-up care is a key part of your treatment and safety. Be sure to make and go to all appointments, and call your doctor if you are having problems.  It's also a good idea to know your test results and keep a list of the medicines you take. Where can you learn more? Go to http://noa-em.info/. Enter G833 in the search box to learn more about \"Learning About Diabetes Food Guidelines. \"  Current as of: July 25, 2018  Content Version: 11.9  © 9446-4615 [employer]. Care instructions adapted under license by [employer] (which disclaims liability or warranty for this information). If you have questions about a medical condition or this instruction, always ask your healthcare professional. [address] any warranty or liability for your use of this information.

## 2019-03-06 LAB
BASOPHILS # BLD AUTO: 0 X10E3/UL (ref 0–0.2)
BASOPHILS NFR BLD AUTO: 1 %
CHOLEST SERPL-MCNC: 196 MG/DL (ref 100–199)
EOSINOPHIL # BLD AUTO: 0.3 X10E3/UL (ref 0–0.4)
EOSINOPHIL NFR BLD AUTO: 6 %
ERYTHROCYTE [DISTWIDTH] IN BLOOD BY AUTOMATED COUNT: 14.8 % (ref 12.3–15.4)
EST. AVERAGE GLUCOSE BLD GHB EST-MCNC: 103 MG/DL
HBA1C MFR BLD: 5.2 % (ref 4.8–5.6)
HCT VFR BLD AUTO: 37 % (ref 34–46.6)
HDLC SERPL-MCNC: 59 MG/DL
HGB BLD-MCNC: 11.6 G/DL (ref 11.1–15.9)
IMM GRANULOCYTES # BLD AUTO: 0 X10E3/UL (ref 0–0.1)
IMM GRANULOCYTES NFR BLD AUTO: 0 %
INTERPRETATION, 910389: NORMAL
LDLC SERPL CALC-MCNC: 123 MG/DL (ref 0–99)
LYMPHOCYTES # BLD AUTO: 1.6 X10E3/UL (ref 0.7–3.1)
LYMPHOCYTES NFR BLD AUTO: 39 %
MCH RBC QN AUTO: 28.9 PG (ref 26.6–33)
MCHC RBC AUTO-ENTMCNC: 31.4 G/DL (ref 31.5–35.7)
MCV RBC AUTO: 92 FL (ref 79–97)
MONOCYTES # BLD AUTO: 0.5 X10E3/UL (ref 0.1–0.9)
MONOCYTES NFR BLD AUTO: 11 %
NEUTROPHILS # BLD AUTO: 1.8 X10E3/UL (ref 1.4–7)
NEUTROPHILS NFR BLD AUTO: 43 %
PLATELET # BLD AUTO: 292 X10E3/UL (ref 150–379)
RBC # BLD AUTO: 4.01 X10E6/UL (ref 3.77–5.28)
TRIGL SERPL-MCNC: 72 MG/DL (ref 0–149)
TSH SERPL DL<=0.005 MIU/L-ACNC: 0.67 UIU/ML (ref 0.45–4.5)
VLDLC SERPL CALC-MCNC: 14 MG/DL (ref 5–40)
WBC # BLD AUTO: 4.2 X10E3/UL (ref 3.4–10.8)

## 2019-03-14 ENCOUNTER — OFFICE VISIT (OUTPATIENT)
Dept: NEUROLOGY | Age: 59
End: 2019-03-14

## 2019-03-14 VITALS
HEIGHT: 65 IN | OXYGEN SATURATION: 98 % | SYSTOLIC BLOOD PRESSURE: 126 MMHG | HEART RATE: 68 BPM | DIASTOLIC BLOOD PRESSURE: 62 MMHG | WEIGHT: 208 LBS | RESPIRATION RATE: 18 BRPM | BODY MASS INDEX: 34.66 KG/M2

## 2019-03-14 DIAGNOSIS — G43.719 INTRACTABLE CHRONIC MIGRAINE WITHOUT AURA AND WITHOUT STATUS MIGRAINOSUS: Primary | ICD-10-CM

## 2019-03-14 NOTE — PROGRESS NOTES
Botox Injection Note     3/14/2019     Patient:  Sharif Moody   YOB: 1960  Date of Visit: 3/14/2019      Indication: patient has chronic migraine headaches greater than 15 days per month lasting more than 4 hours each. She has failed or not tolerated 2 or more medication preventatives. Written consent was signed and verified by me. Risks and benefits were discussed to include possible cosmetic asymmetry which is not permament or life threatening. Patient name and  was confirmed prior to procedure. Time out performed. Procedure:   Botox concentration: 200 units in 2 ml of preservative-free normal saline. 1:1 dilution. LOT#: [id number]  EXP:  2021    Injections and distribution as follow :      Units/site  Sites Loc Subtotal    procerus 5 1 ML 5   corrugaters 2.5 2 BL 5   frontalis 5 8 BL 40   Temporalis 10 4 BL 40   Occipitalis 10 2 BL 20   Cervical paraspinals 5 4 BL 20   Trapezius 10 4 BL 40         200 units Botox were reconstituted, 170 units injected as above and the remainder was unavoidably wasted. Patient tolerated procedure well. Return in 3 months for repeat injections.     _____________________________   Heather July, DO  [address], ABPN

## 2019-03-25 ENCOUNTER — DOCUMENTATION ONLY (OUTPATIENT)
Dept: FAMILY MEDICINE CLINIC | Age: 59
End: 2019-03-25

## 2019-03-25 NOTE — PROGRESS NOTES
Prior Auth for [facility] ([facility] [place]) has been approved. Approval ID is [id number]. Approval is active 03/12/2019 - 03/11/2020.  Patient & Pharmacy  notified

## 2019-04-05 ENCOUNTER — HOSPITAL ENCOUNTER (OUTPATIENT)
Dept: SLEEP MEDICINE | Age: 59
Discharge: HOME | End: 2019-04-05
Payer: MEDICARE

## 2019-04-05 VITALS
HEART RATE: 84 BPM | DIASTOLIC BLOOD PRESSURE: 90 MMHG | BODY MASS INDEX: 34.92 KG/M2 | HEIGHT: 65 IN | WEIGHT: 209.6 LBS | SYSTOLIC BLOOD PRESSURE: 143 MMHG | OXYGEN SATURATION: 98 %

## 2019-04-05 DIAGNOSIS — G47.33 OSA (OBSTRUCTIVE SLEEP APNEA): ICD-10-CM

## 2019-04-05 PROCEDURE — 95810 POLYSOM 6/> YRS 4/> PARAM: CPT | Performed by: INTERNAL MEDICINE

## 2019-04-11 ENCOUNTER — DOCUMENTATION ONLY (OUTPATIENT)
Dept: SLEEP MEDICINE | Age: 59
End: 2019-04-11

## 2019-04-13 ENCOUNTER — TELEPHONE (OUTPATIENT)
Dept: SLEEP MEDICINE | Age: 59
End: 2019-04-13

## 2019-04-13 NOTE — TELEPHONE ENCOUNTER
Results of sleep testing reviewed with patient who reports of significant weight loss. She agrees to follow-up in 6 months due significant comorbidity. She was made aware of the increase in N3 sleep noted on the sleep test which is indicative of chronic sleep loss and was advised to get a minimum of 7 hours of sleep per night.

## 2019-04-23 ENCOUNTER — OFFICE VISIT (OUTPATIENT)
Dept: ENDOCRINOLOGY | Age: 59
End: 2019-04-23

## 2019-04-23 VITALS
DIASTOLIC BLOOD PRESSURE: 68 MMHG | HEART RATE: 109 BPM | SYSTOLIC BLOOD PRESSURE: 99 MMHG | HEIGHT: 65 IN | BODY MASS INDEX: 35.52 KG/M2 | WEIGHT: 213.2 LBS

## 2019-04-23 DIAGNOSIS — Z98.84 S/P GASTRIC BYPASS: ICD-10-CM

## 2019-04-23 DIAGNOSIS — R79.89 LOW VITAMIN D LEVEL: ICD-10-CM

## 2019-04-23 DIAGNOSIS — R53.83 FATIGUE, UNSPECIFIED TYPE: ICD-10-CM

## 2019-04-23 DIAGNOSIS — E89.0 POSTOPERATIVE HYPOTHYROIDISM: Primary | ICD-10-CM

## 2019-04-23 RX ORDER — ERGOCALCIFEROL 1.25 MG/1
50000 CAPSULE ORAL
Qty: 12 CAP | Refills: 1 | Status: SHIPPED | OUTPATIENT
Start: 2019-04-23 | End: 2020-01-30 | Stop reason: SDUPTHER

## 2019-04-23 RX ORDER — LEVOTHYROXINE SODIUM 125 UG/1
125 TABLET ORAL
Qty: 90 TAB | Refills: 3 | Status: SHIPPED | OUTPATIENT
Start: 2019-04-23 | End: 2020-04-21 | Stop reason: SDUPTHER

## 2019-04-23 NOTE — PATIENT INSTRUCTIONS
Thyroid:  Continue 125 mcg daily. Low Vitamin D, High Parathyroid hormone level, Alkaline phosphatase elevated:  Continue vitamin D weekly. Fatigue:   May be due to Baclofen + pain medications +/- lower blood pressure

## 2019-04-23 NOTE — PROGRESS NOTES
History of Present Illness: Josafat Deng is a 59 y.o. female presents for follow-up of hypothyroidism  She also has hypothyroidism and pre-diabetes. Of note, she is s/p gastric bypass in 1980s (pre-surgery wt was 465), and has also had CVA. Additionally, she is taking several medications for history of heart failure. This was diagnosed at the same time she had a heart attack and a stroke in 2014 . Had hospitalization 9/2017 for stroke-like sx. History of Vit D Def - presumably due to poor absorption after gastric bypass. Was ergocalciferol 50,000 units daily for vitamin D deficiency. Level was high at 121 in 3/2018 and vitamin D stopped. Now taking vitamin D 50,000 units once weekly. Hypothyroidism: post-surgical.   Taking 125 mcg daily. Fatigue and arthritis are two main problems    BP is low - takes losartan at night and carvedilol twice daily. No orthostatic sx. Past Medical History:   Diagnosis Date    Advanced care planning/counseling discussion 3/4/16    On File    Bronchitis 2/24/2014    Cervicalgia 8/18/15    Dr. Mariel Putnam    Chest pain 5/25/15    Hospitalized at [facility] 5/25/15 (lab work negative)    Chronic indwelling Dunn catheter 1/24/2018    Initially placed Jan 2018. Mx by Dr. Marcel Vargas.  Congestive heart failure, unspecified     Last Echo 2/8/15: EF 55-60%    Constipation 6/13/2017    Enthesopathy, spinal ([address] 8/18/15    Dr. Mariel Putnam    Essential hypertension     Foot drop 8/18/15    Dr. Delray Frankel Heart attack [facility]) 2/24/2013    Was supposed to See Cardiology for possible pacemaker in november 2014- After Cardiology consult locally, no need, EF greatly improved.  Established with Dr. Lizzette Emerson Hiatal hernia 6/2015    3 cm hiatal hernia     Hip pain 8/18/15    Dr. Mariel Putnam    Hypercholesterolemia     Hyperglycemia 7/2015    A1c 5.9     Hyperlipidemia 6/30/2015    NMR lipoprofile- LDL P 997, LDL-c 71, HLD-C-39, TG-60, HLD-P (25.2), Small LDL-P -541, LDL size 20.6    Hypothyroid     Insomnia 6/13/2017    Lower extremity edema     Lumbar spondylosis 8/18/15    Dr. Rachel Darby 6/2015    EGD/Colonscopy 6/15- Gastritis, internal hemorrhoids and 3 polyps    Murmur     EDDIE on CPAP     Was referred to Pulmonology - Uses CPAP    Osteoarthritis of hip 8/18/15    Dr. Risa Zimmerman    Osteoarthritis, shoulder 8/18/15    Dr. Risa Zimmerman    Radiculopathy, cervical 8/18/15    Dr. Risa Zimmerman    Shoulder pain 8/18/15    Dr. Johnny Phillips Spinal stenosis of cervical region 8/18/15    Dr. Johnny Phillips Spinal stenosis, lumbar 8/18/15    Dr. Johnny Phillips Stroke [facility]) 2/25/2014    Established with Neurology, Yajaira Delaney NP-Just hospitalized at [facility] 2/7/15-2/10/15. CT negative, but Late effect CV accident with increased tone described on discharge summary, Carotid dopplers showed 50% stenosis bilaterally.  Vitamin D deficiency 7/2015     Current Outpatient Medications   Medication Sig    pantoprazole (PROTONIX) 40 mg tablet Take 1 Tab by mouth daily.  DULoxetine (CYMBALTA) 60 mg capsule Take 2 Caps by mouth daily.  losartan (COZAAR) 50 mg tablet Take 1 Tab by mouth nightly. Hold for SBP<115    topiramate (TOPAMAX) 100 mg tablet Take 1 Tab by mouth two (2) times a day.  mirtazapine (REMERON) 15 mg tablet 30 mg.    metFORMIN (GLUCOPHAGE) 500 mg tablet Take 1 Tab by mouth daily.  furosemide (LASIX) 40 mg tablet Take 1 Tab by mouth daily.  baclofen (LIORESAL) 10 mg tablet TAKE 1 TABLET BY MOUTH THREE TIMES DAILY    carvedilol (COREG) 6.25 mg tablet Take 1 Tab by mouth two (2) times daily (with meals).  levothyroxine (SYNTHROID) 125 mcg tablet Take 1 Tab by mouth Daily (before breakfast).  ergocalciferol (ERGOCALCIFEROL) 50,000 unit capsule Take 1 Cap by mouth every seven (7) days.     cyanocobalamin (VITAMIN B12) 1,000 mcg/mL injection INJECT 1 ML UNDER THE SKIN EVERY 14 DAYS FOR B12 DEFICIENCY    linaclotide (LINZESS) 145 mcg cap capsule Take 1 Cap by mouth Daily (before breakfast). For constipation    calcipotriene (DOVONEX) 0.005 % topical cream Apply  to affected area daily as needed.  lidocaine (LIDODERM) 5 % Apply patch to the affected area for 12 hours a day and remove for 12 hours a day.  onabotulinumtoxinA (BOTOX) 200 unit injection 200 Units by IntraMUSCular route every three (3) months. Inject to selected muscles head and neck bilaterally every 3 months for migraine  Indications: MIGRAINE PREVENTION    morphine CR (MS CONTIN) 15 mg CR tablet Take 1 Tab by mouth every twelve (12) hours. Max Daily Amount: 30 mg. (Patient taking differently: Take 15 mg by mouth daily.)    HYDROcodone-acetaminophen (NORCO) 5-325 mg per tablet Take 1 Tab by mouth every six (6) hours as needed for Pain. Max Daily Amount: 4 Tabs.  albuterol-ipratropium (DUO-NEB) 2.5 mg-0.5 mg/3 ml nebu 3 mL by Nebulization route every six (6) hours as needed.  cyclobenzaprine (FLEXERIL) 5 mg tablet Take 5 mg by mouth two (2) times a day.  EPINEPHrine (EPIPEN 2-LAURA) 0.3 mg/0.3 mL injection 0.3 mL by IntraMUSCular route once as needed for up to 1 dose.  albuterol (PROVENTIL HFA, VENTOLIN HFA, PROAIR HFA) 90 mcg/actuation inhaler Take 2 Puffs by inhalation every four (4) hours as needed for Wheezing or Shortness of Breath.  ferrous sulfate 325 mg (65 mg iron) tablet Take 325 mg by mouth Daily (before breakfast).  aspirin delayed-release 81 mg tablet Take 81 mg by mouth daily. No current facility-administered medications for this visit.       Allergies   Allergen Reactions    Bees [Hymenoptera Allergenic Extract] Shortness of Breath and Swelling    Strawberry Shortness of Breath and Swelling    Lisinopril Cough       Review of Systems:  - Eyes: no blurry vision or double vision  - Cardiovascular: no chest pain  - Respiratory: no shortness of breath  - Musculoskeletal: See HPI    Physical Examination:  Visit Vitals  BP 99/68 (BP 1 Location: Left arm, BP Patient Position: Sitting) Pulse (!) 109   Ht 5' 5\" (1.651 m)   Wt 213 lb 3.2 oz (96.7 kg)   BMI 35.48 kg/m²   -   - General: pleasant, no distress, uses walker   HEENT: hearing intact, EOMI, clear sclera without icterus  - Cardiovascular: regular, normal rate   - Respiratory: normal effort  - Integumentary: no edema  - Psychiatric: normal mood and affect    Data Reviewed:   Lab Results   Component Value Date/Time    Hemoglobin A1c 5.2 03/05/2019 08:56 AM      Lab Results   Component Value Date/Time    Sodium 141 11/01/2018 02:15 PM    Potassium 4.3 11/01/2018 02:15 PM    Creatinine 0.85 11/01/2018 02:15 PM        Lab Results   Component Value Date/Time    Cholesterol, total 196 03/05/2019 08:56 AM    HDL Cholesterol 59 03/05/2019 08:56 AM    LDL, calculated 123 03/05/2019 08:56 AM    Triglyceride 72 03/05/2019 08:56 AM      Lab Results   Component Value Date/Time    TSH 0.665 03/05/2019 08:56 AM    TSH 0.038 11/01/2018 02:15 PM    T4, Free 1.73 11/01/2018 02:15 PM        Assessment/Plan:   1. Postoperative hypothyroidism   125 mcg appears to be correct dose for her after she has lost her weight. Continue current dose. 2. Low vitamin D level   Continue 50,000 units once weekly. We will plan on reassessing in the future. 3. BMI 35.0-35.9,adult   She has had good success with weight loss. Hopefully she can maintain the weight loss. 4. S/P gastric bypass   She is at risk for having malabsorption of vitamins, such as vitamin D, calcium, iron, B12. She is currently taking iron and vitamin D and B12 injections. 5.      Fatigue, unspecified: She is taking multiple medications which can negatively affect her energy level, such as muscle relaxers and opiate-based pain medications. Patient Instructions   Thyroid:  Continue 125 mcg daily. Low Vitamin D, High Parathyroid hormone level, Alkaline phosphatase elevated:  Continue vitamin D weekly. Fatigue:   May be due to Baclofen + pain medications +/- lower blood pressure                Follow-up and Dispositions    · Return in about 6 months (around 10/23/2019).            Copy sent to:

## 2019-05-21 ENCOUNTER — TELEPHONE (OUTPATIENT)
Dept: FAMILY MEDICINE CLINIC | Age: 59
End: 2019-05-21

## 2019-05-21 NOTE — TELEPHONE ENCOUNTER
[id number] sent a request for Metformin Hydrochloride ER tablets (Roberto Carlos Sales). Request for 90 days supply.

## 2019-05-22 NOTE — TELEPHONE ENCOUNTER
Pt medicated per ERP orders. Awaiting xray.    Writer called patient, two patient identifiers used for patient verification, name and , writer inquired about metformin refill request, advised patient that metformin was d/c 2019, advised patient that her A1C on 3/16/2019 was 5.2 . Patient advised writer that she was just taking the ones that she had left, writer advised patient that she needs to discontinue taking metformin.

## 2019-05-24 ENCOUNTER — HOSPITAL ENCOUNTER (OUTPATIENT)
Dept: INTERVENTIONAL RADIOLOGY/VASCULAR | Age: 59
Discharge: HOME | End: 2019-05-24
Attending: UROLOGY
Payer: MEDICARE

## 2019-05-24 VITALS
TEMPERATURE: 98.6 F | HEART RATE: 65 BPM | WEIGHT: 194 LBS | RESPIRATION RATE: 14 BRPM | DIASTOLIC BLOOD PRESSURE: 80 MMHG | OXYGEN SATURATION: 100 % | BODY MASS INDEX: 31.18 KG/M2 | SYSTOLIC BLOOD PRESSURE: 114 MMHG | HEIGHT: 66 IN

## 2019-05-24 DIAGNOSIS — R33.9 URINARY RETENTION: ICD-10-CM

## 2019-05-24 PROCEDURE — C1894 INTRO/SHEATH, NON-LASER: HCPCS

## 2019-05-24 PROCEDURE — 74011000250 HC RX REV CODE- 250: Performed by: STUDENT IN AN ORGANIZED HEALTH CARE EDUCATION/TRAINING PROGRAM

## 2019-05-24 PROCEDURE — 77030005518 HC CATH URETH FOL 2W BARD -B

## 2019-05-24 PROCEDURE — 74011636320 HC RX REV CODE- 636/320: Performed by: STUDENT IN AN ORGANIZED HEALTH CARE EDUCATION/TRAINING PROGRAM

## 2019-05-24 PROCEDURE — 77030002996 HC SUT SLK J&J -A

## 2019-05-24 PROCEDURE — 77030010324 HC TBNG CNTRST MRTM -A

## 2019-05-24 PROCEDURE — 74011250636 HC RX REV CODE- 250/636: Performed by: STUDENT IN AN ORGANIZED HEALTH CARE EDUCATION/TRAINING PROGRAM

## 2019-05-24 PROCEDURE — C1769 GUIDE WIRE: HCPCS

## 2019-05-24 PROCEDURE — 77030011230 HC DIL VESL COON COOK -B

## 2019-05-24 PROCEDURE — 77030010548 HC BG WND DRN ARMD -B

## 2019-05-24 PROCEDURE — 77002 NEEDLE LOCALIZATION BY XRAY: CPT

## 2019-05-24 PROCEDURE — C1892 INTRO/SHEATH,FIXED,PEEL-AWAY: HCPCS

## 2019-05-24 PROCEDURE — 74011250636 HC RX REV CODE- 250/636

## 2019-05-24 RX ORDER — CEFAZOLIN SODIUM/WATER 2 G/20 ML
2 SYRINGE (ML) INTRAVENOUS
Status: COMPLETED | OUTPATIENT
Start: 2019-05-24 | End: 2019-05-24

## 2019-05-24 RX ORDER — CEFAZOLIN SODIUM/WATER 2 G/20 ML
SYRINGE (ML) INTRAVENOUS
Status: COMPLETED
Start: 2019-05-24 | End: 2019-05-24

## 2019-05-24 RX ORDER — FENTANYL CITRATE 50 UG/ML
100 INJECTION, SOLUTION INTRAMUSCULAR; INTRAVENOUS
Status: DISCONTINUED | OUTPATIENT
Start: 2019-05-24 | End: 2019-05-24

## 2019-05-24 RX ORDER — HEPARIN SODIUM 200 [USP'U]/100ML
400 INJECTION, SOLUTION INTRAVENOUS ONCE
Status: COMPLETED | OUTPATIENT
Start: 2019-05-24 | End: 2019-05-24

## 2019-05-24 RX ORDER — MIDAZOLAM HYDROCHLORIDE 1 MG/ML
5 INJECTION, SOLUTION INTRAMUSCULAR; INTRAVENOUS
Status: DISCONTINUED | OUTPATIENT
Start: 2019-05-24 | End: 2019-05-24

## 2019-05-24 RX ORDER — SODIUM CHLORIDE 9 MG/ML
25 INJECTION, SOLUTION INTRAVENOUS CONTINUOUS
Status: DISCONTINUED | OUTPATIENT
Start: 2019-05-24 | End: 2019-05-24

## 2019-05-24 RX ORDER — LIDOCAINE HYDROCHLORIDE 20 MG/ML
JELLY TOPICAL ONCE
Status: COMPLETED | OUTPATIENT
Start: 2019-05-24 | End: 2019-05-24

## 2019-05-24 RX ORDER — LIDOCAINE HYDROCHLORIDE 20 MG/ML
20 INJECTION, SOLUTION INFILTRATION; PERINEURAL ONCE
Status: COMPLETED | OUTPATIENT
Start: 2019-05-24 | End: 2019-05-24

## 2019-05-24 RX ADMIN — MIDAZOLAM 2 MG: 1 INJECTION INTRAMUSCULAR; INTRAVENOUS at 08:22

## 2019-05-24 RX ADMIN — Medication 2 G: at 07:59

## 2019-05-24 RX ADMIN — FENTANYL CITRATE 25 MCG: 50 INJECTION, SOLUTION INTRAMUSCULAR; INTRAVENOUS at 08:41

## 2019-05-24 RX ADMIN — MIDAZOLAM 1 MG: 1 INJECTION INTRAMUSCULAR; INTRAVENOUS at 08:33

## 2019-05-24 RX ADMIN — LIDOCAINE HYDROCHLORIDE 20 ML: 20 INJECTION, SOLUTION INFILTRATION; PERINEURAL at 08:50

## 2019-05-24 RX ADMIN — FENTANYL CITRATE 25 MCG: 50 INJECTION, SOLUTION INTRAMUSCULAR; INTRAVENOUS at 08:34

## 2019-05-24 RX ADMIN — IOPAMIDOL 20 ML: 755 INJECTION, SOLUTION INTRAVENOUS at 08:50

## 2019-05-24 RX ADMIN — FENTANYL CITRATE 50 MCG: 50 INJECTION, SOLUTION INTRAMUSCULAR; INTRAVENOUS at 08:22

## 2019-05-24 RX ADMIN — HEPARIN SODIUM IN SODIUM CHLORIDE: 200 INJECTION INTRAVENOUS at 08:50

## 2019-05-24 RX ADMIN — LIDOCAINE HYDROCHLORIDE 2 ML: 20 JELLY TOPICAL at 08:52

## 2019-05-24 RX ADMIN — FENTANYL CITRATE 25 MCG: 50 INJECTION, SOLUTION INTRAMUSCULAR; INTRAVENOUS at 08:38

## 2019-05-24 RX ADMIN — SODIUM CHLORIDE 25 ML/HR: 900 INJECTION, SOLUTION INTRAVENOUS at 08:00

## 2019-05-24 NOTE — H&P
Radiology History and Physical    Patient: Yaz Menchaca 59 y.o. female       Chief Complaint: No chief complaint on file. History of Present Illness: Urinary retention. Patient with mojica catheter. Requested to have suprapubic catheter placed    History:    Past Medical History:   Diagnosis Date    Advanced care planning/counseling discussion 3/4/16    On File    Bronchitis 2/24/2014    Cervicalgia 8/18/15    Dr. Bharat Sidhu    Chest pain 5/25/15    Hospitalized at [facility] 5/25/15 (lab work negative)    Chronic indwelling Mojica catheter 1/24/2018    Initially placed Jan 2018. Mx by Dr. Fawn Landeros.  Congestive heart failure, unspecified     Last Echo 2/8/15: EF 55-60%    Constipation 6/13/2017    Enthesopathy, spinal ([facility] [address] 8/18/15    Dr. Bharat Sidhu    Essential hypertension     Foot drop 8/18/15    Dr. Toshia Del Angel Heart attack [facility]) 2/24/2013    Was supposed to See Cardiology for possible pacemaker in november 2014- After Cardiology consult locally, no need, EF greatly improved.  Established with Dr. Oswaldo Zamudio Hiatal hernia 6/2015    3 cm hiatal hernia     Hip pain 8/18/15    Dr. Bharat Sidhu    Hypercholesterolemia     Hyperglycemia 7/2015    A1c 5.9     Hyperlipidemia 6/30/2015    NMR lipoprofile- LDL P 997, LDL-c 71, HLD-C-39, TG-60, HLD-P (25.2), Small LDL-P -541, LDL size 20.6    Hypothyroid     Insomnia 6/13/2017    Lower extremity edema     Lumbar spondylosis 8/18/15    Dr. Patsy Estrada 6/2015    EGD/Colonscopy 6/15- Gastritis, internal hemorrhoids and 3 polyps    Murmur     EDDIE on CPAP     Was referred to Pulmonology - Uses CPAP    Osteoarthritis of hip 8/18/15    Dr. Bharat Sidhu    Osteoarthritis, shoulder 8/18/15    Dr. Toshia Del Angel Radiculopathy, cervical 8/18/15    Dr. Bharat Sidhu    Shoulder pain 8/18/15    Dr. Toshia Del Angel Spinal stenosis of cervical region 8/18/15    Dr. Toshia Del Angel Spinal stenosis, lumbar 8/18/15    Dr. Toshia Del Angel Stroke [facility]) 2/25/2014    Established with Neurology, Tomma Beecham, NP-Just hospitalized at [facility] 2/7/15-2/10/15. CT negative, but Late effect CV accident with increased tone described on discharge summary, Carotid dopplers showed 50% stenosis bilaterally.  Vitamin D deficiency 2015     Family History   Problem Relation Age of Onset    Heart Disease Father 61    Hypertension Mother     Heart Disease Brother 62    Diabetes Maternal Grandmother      Social History     Socioeconomic History    Marital status: SINGLE     Spouse name: Not on file    Number of children: Not on file    Years of education: Not on file    Highest education level: Not on file   Occupational History    Not on file   Social Needs    Financial resource strain: Not on file    Food insecurity:     Worry: Not on file     Inability: Not on file    Transportation needs:     Medical: Not on file     Non-medical: Not on file   Tobacco Use    Smoking status: Former Smoker     Packs/day: 0.25     Years: 15.00     Pack years: 3.75     Types: Cigarettes     Last attempt to quit: 2007     Years since quittin.9    Smokeless tobacco: Never Used   Substance and Sexual Activity    Alcohol use: No    Drug use: No    Sexual activity: Never   Lifestyle    Physical activity:     Days per week: Not on file     Minutes per session: Not on file    Stress: Not on file   Relationships    Social connections:     Talks on phone: Not on file     Gets together: Not on file     Attends Congregational service: Not on file     Active member of club or organization: Not on file     Attends meetings of clubs or organizations: Not on file     Relationship status: Not on file    Intimate partner violence:     Fear of current or ex partner: Not on file     Emotionally abused: Not on file     Physically abused: Not on file     Forced sexual activity: Not on file   Other Topics Concern    Not on file   Social History Narrative    Not on file       Allergies:    Allergies   Allergen Reactions    Bees Seat 14A Allergenic Extract] Shortness of Breath and Swelling    Strawberry Shortness of Breath and Swelling    Lisinopril Cough       Current Medications:  Current Outpatient Medications   Medication Sig    BOTOX 200 unit injection INJECT INTO THE BILATERAL MUSCLES OF THE HEAD AND NECK EVERY 3 MONTHS FOR MIGRAINES. DISCARD UNUSED PORTIONS    ergocalciferol (ERGOCALCIFEROL) 50,000 unit capsule Take 1 Cap by mouth every seven (7) days.  levothyroxine (SYNTHROID) 125 mcg tablet Take 1 Tab by mouth Daily (before breakfast).  pantoprazole (PROTONIX) 40 mg tablet Take 1 Tab by mouth daily.  DULoxetine (CYMBALTA) 60 mg capsule Take 2 Caps by mouth daily.  losartan (COZAAR) 50 mg tablet Take 1 Tab by mouth nightly. Hold for SBP<115    topiramate (TOPAMAX) 100 mg tablet Take 1 Tab by mouth two (2) times a day.  mirtazapine (REMERON) 15 mg tablet 30 mg.    furosemide (LASIX) 40 mg tablet Take 1 Tab by mouth daily.  baclofen (LIORESAL) 10 mg tablet TAKE 1 TABLET BY MOUTH THREE TIMES DAILY    carvedilol (COREG) 6.25 mg tablet Take 1 Tab by mouth two (2) times daily (with meals).  cyanocobalamin (VITAMIN B12) 1,000 mcg/mL injection INJECT 1 ML UNDER THE SKIN EVERY 14 DAYS FOR B12 DEFICIENCY    linaclotide (LINZESS) 145 mcg cap capsule Take 1 Cap by mouth Daily (before breakfast). For constipation    calcipotriene (DOVONEX) 0.005 % topical cream Apply  to affected area daily as needed.  lidocaine (LIDODERM) 5 % Apply patch to the affected area for 12 hours a day and remove for 12 hours a day.  morphine CR (MS CONTIN) 15 mg CR tablet Take 1 Tab by mouth every twelve (12) hours. Max Daily Amount: 30 mg. (Patient taking differently: Take 15 mg by mouth daily.)    HYDROcodone-acetaminophen (NORCO) 5-325 mg per tablet Take 1 Tab by mouth every six (6) hours as needed for Pain. Max Daily Amount: 4 Tabs.     albuterol-ipratropium (DUO-NEB) 2.5 mg-0.5 mg/3 ml nebu 3 mL by Nebulization route every six (6) hours as needed.  cyclobenzaprine (FLEXERIL) 5 mg tablet Take 5 mg by mouth two (2) times a day.  EPINEPHrine (EPIPEN 2-LAURA) 0.3 mg/0.3 mL injection 0.3 mL by IntraMUSCular route once as needed for up to 1 dose.  albuterol (PROVENTIL HFA, VENTOLIN HFA, PROAIR HFA) 90 mcg/actuation inhaler Take 2 Puffs by inhalation every four (4) hours as needed for Wheezing or Shortness of Breath.  ferrous sulfate 325 mg (65 mg iron) tablet Take 325 mg by mouth Daily (before breakfast).  aspirin delayed-release 81 mg tablet Take 81 mg by mouth daily. Current Facility-Administered Medications   Medication Dose Route Frequency    0.9% sodium chloride infusion  25 mL/hr IntraVENous CONTINUOUS    fentaNYL citrate (PF) injection 100 mcg  100 mcg IntraVENous Multiple    midazolam (VERSED) injection 5 mg  5 mg IntraVENous Rad Multiple    iopamidol (ISOVUE-370) 76 % injection 100 mL  100 mL IntraVENous ONCE    heparinized saline 2 units/mL infusion 800 Units  400 mL Irrigation ONCE    sodium bicarbonate (4%) (NEUT) injection 2 mL  2 mL SubCUTAneous ONCE    lidocaine (XYLOCAINE) 20 mg/mL (2 %) injection 400 mg  20 mL SubCUTAneous ONCE    lidocaine (URO-JET/ GLYDO) 2 % jelly   Urethral ONCE        Physical Exam:  There were no vitals taken for this visit. GENERAL: alert, cooperative, no distress, appears stated age  LUNG: clear to auscultation bilaterally  HEART: regular rate and rhythm  ABD: Non tender, non distended. Alerts:    Hospital Problems  Date Reviewed: 4/23/2019    None          Laboratory:    No results for input(s): HGB, HCT, WBC, PLT, INR, BUN, CREA, K, CRCLT, HGBEXT, HCTEXT, PLTEXT in the last 72 hours. No lab exists for component: PTT, PT, INREXT      Plan of Care/Planned Procedure:  Risks, benefits, and alternatives reviewed with patient and she agrees to proceed with the procedure. Deemed appropriate or moderate sedation with versed and fentanyl.       Jaclyn Gregory MD

## 2019-05-24 NOTE — DISCHARGE INSTRUCTIONS
[facility] [address] [place] [address],[address]  Special Procedure/Radiology Department      Radiologist: Jose Armando Ekaterina    Date: May 24, 2019    Suprapubic catheter Discharge Instructions    Someone should stay with you for the next 24 hours because you did receive sedation. Increase your oral fluid intake for the next 24 hours. Resume your previous diet and follow medication reconciliation form. Do not drive a vehicle or sign any legal documents for the next 24 hours. Watch for signs of infection:  redness, swelling, drainage, pus, fever, chills, or vomiting please call your urologist right away. Use large drainage bag at night and leg bag during the day. Keep dressing clean and dry, do not get it wet. You may be sore for the next day or two. You make take Tylenol as directed on the label, for soreness. If you have increasing pain over the next few days, please call the attending physician. Follow up with your urologist, as directed    How do I clean the skin around my suprapubic catheter? The opening created for the suprapubic catheter is called a stoma. Clean the skin around your stoma every day unless your caregiver tells you differently. The following are directions for cleaning the skin around your stoma:   Gather all the items you will need:  o Warm water and soap without lotions or perfumes in it.  o Clean washcloth or sterile gauze bandage. o Clean towel.  o New gauze bandage. o Sterile (clean) medical gloves. o Trash can.  Remove the bandage and look at the site:  o Wash your hands using soap, or use a hand . Put on clean gloves. o Gently remove the bandage. Do this by supporting the skin around the stoma with one hand. With the other hand, gently remove any adhesive tape by pulling in the direction of hair growth. Throw the bandage away in the trash can. Look for problems such as redness, separation of skin, red spots and swelling.  Report any skin changes to your

## 2019-05-29 ENCOUNTER — DOCUMENTATION ONLY (OUTPATIENT)
Dept: NEUROLOGY | Age: 59
End: 2019-05-29

## 2019-06-04 RX ORDER — OMEGA-3-ACID ETHYL ESTERS 1 G/1
2 CAPSULE, LIQUID FILLED ORAL 2 TIMES DAILY
Qty: 120 CAP | Refills: 2 | Status: SHIPPED | OUTPATIENT
Start: 2019-06-04 | End: 2022-10-07 | Stop reason: ALTCHOICE

## 2019-06-06 ENCOUNTER — OFFICE VISIT (OUTPATIENT)
Dept: NEUROLOGY | Age: 59
End: 2019-06-06

## 2019-06-06 VITALS
WEIGHT: 215 LBS | BODY MASS INDEX: 34.55 KG/M2 | DIASTOLIC BLOOD PRESSURE: 70 MMHG | RESPIRATION RATE: 18 BRPM | HEIGHT: 66 IN | HEART RATE: 77 BPM | SYSTOLIC BLOOD PRESSURE: 118 MMHG | OXYGEN SATURATION: 99 %

## 2019-06-06 DIAGNOSIS — G43.719 INTRACTABLE CHRONIC MIGRAINE WITHOUT AURA AND WITHOUT STATUS MIGRAINOSUS: Primary | ICD-10-CM

## 2019-06-06 NOTE — PROGRESS NOTES
Botox Injection Note     2019     Patient:  Naomie Christina   YOB: 1960  Date of Visit: 2019      Indication: patient has chronic migraine headaches greater than 15 days per month lasting more than 4 hours each. She has failed or not tolerated 2 or more medication preventatives. Written consent was signed and verified by me. Risks and benefits were discussed to include possible cosmetic asymmetry which is not permament or life threatening. Patient name and  was confirmed prior to procedure. Time out performed. Procedure:   Botox concentration: 200 units in 2 ml of preservative-free normal saline. 1:1 dilution. LOT#: [id number]  EXP:  2021    Injections and distribution as follow :      Units/site  Sites Loc Subtotal    procerus 5 1 ML 5   corrugaters 2.5 2 BL 5   frontalis 5 8 BL 40   Temporalis 10 4 BL 40   Occipitalis 10 2 BL 20   Cervical paraspinals 5 4 BL 20   Trapezius 10 4 BL 40         200 units Botox were reconstituted, 170 units injected as above and the remainder was unavoidably wasted. Patient tolerated procedure well. Return in 3 months for repeat injections.     _____________________________   Edilia Ceron DO  [facility], ABPN

## 2019-06-06 NOTE — PROGRESS NOTES
Pt here for botox. Feels like a vice  in the back of her head. When it happens it lasts for 3-4 days at a time. Pt says she has a headache daily.

## 2019-06-21 ENCOUNTER — TELEPHONE (OUTPATIENT)
Dept: FAMILY MEDICINE CLINIC | Age: 59
End: 2019-06-21

## 2019-06-27 NOTE — MR AVS SNAPSHOT
[facility] [address] [place] [address] 
[phone] Patient: Reji Castañeda MRN: [id number]:[id number] Visit Information Date & Time Provider Department Dept. Phone Encounter #  
 6/28/2018  9:00 AM [address] [place] [address] [facility] [address] [id number] Your Appointments 7/10/2018 10:20 AM  
Follow Up with [address] [place] [address] DO [address] [facility] Appt Note: 3 month follow up KRU 4/12  
 [address] [place] [address]  
[phone]  
  
   
 [address] [facility] [address] [facility]  [id number]  
  
    
 9/28/2018  8:00 AM  
New Patient with JYOTI Mcadams (ALICE Mccrary) Appt Note: prediabetes and weight follow up; new pt prediabetes and weight follow up per dr Vane Websters [address] [id number]  
[phone]  
  
   
 [address]  
  
    
 11/7/2018  9:20 AM  
ESTABLISHED PATIENT with Adebayo Kerns MD  
[facility] (ALICE [facility]IOR) Appt Note: 1 yr f/u  
 [address]  
[address] [address] Upcoming Health Maintenance Date Due ZOSTER VACCINE AGE 60> 3/27/2018 MEDICARE YEARLY EXAM 6/14/2018 Influenza Age 5 to Adult 8/1/2018 PAP AKA CERVICAL CYTOLOGY 11/13/2018 BREAST CANCER SCRN MAMMOGRAM 8/4/2019 COLONOSCOPY 6/22/2021 DTaP/Tdap/Td series (2 - Td) 6/13/2027 Allergies as of 6/28/2018  Review Complete On: 6/28/2018 By: Ale Elias DO Rick Severity Noted Reaction Type Reactions Bees [Hymenoptera Allergenic Extract] High 10/14/2014   Systemic Shortness of Breath, Swelling Pt is looking for his test results.   [place] High 10/14/2014   Systemic Shortness of Breath, Swelling Lisinopril  10/17/2014    Cough Current Immunizations  Reviewed on 10/11/2016 Name Date Influenza Vaccine Zoe Hanottoniel) 11/13/2015 Influenza Vaccine (Quad) PF 10/20/2017, 10/11/2016 Pneumococcal Polysaccharide (PPSV-23) 8/29/2016 TB Skin Test (PPD) Intradermal 4/24/2018 Tdap 6/13/2017 Not reviewed this visit You Were Diagnosed With   
  
 Codes Comments Intractable chronic migraine without aura and without status migrainosus    -  Primary ICD-10-CM: B40.958 ICD-9-CM: 346.71 Vitals BP Resp OB Status Smoking Status 110/70 18 Postmenopausal Former Smoker Vitals History Preferred Pharmacy Pharmacy Name Phone [facility] Jaffe, Erik  [phone] Your Updated Medication List  
  
   
This list is accurate as of 6/28/18  9:04 AM.  Always use your most recent med list.  
  
  
  
  
 albuterol 90 mcg/actuation inhaler Commonly known as:  PROVENTIL HFA, VENTOLIN HFA, PROAIR HFA Take 2 Puffs by inhalation every four (4) hours as needed for Wheezing or Shortness of Breath. albuterol-ipratropium 2.5 mg-0.5 mg/3 ml Nebu Commonly known as:  DUO-NEB  
3 mL by Nebulization route every six (6) hours as needed. aspirin delayed-release 81 mg tablet Take 81 mg by mouth daily. baclofen 10 mg tablet Commonly known as:  LIORESAL Take 1 Tab by mouth three (3) times daily. carvedilol 6.25 mg tablet Commonly known as:  Janalyn Abi Take 1 Tab by mouth two (2) times daily (with meals). cyanocobalamin 1,000 mcg/mL injection Commonly known as:  VITAMIN B12  
1 mL by SubCUTAneous route every fourteen (14) days. For b12 deficiency  
  
 cyclobenzaprine 5 mg tablet Commonly known as:  FLEXERIL Take 5 mg by mouth two (2) times a day. DULoxetine 60 mg capsule Commonly known as:  CYMBALTA Take 1 Cap by mouth daily. EPINEPHrine 0.3 mg/0.3 mL injection Commonly known as:  EPIPEN 2-LAURA  
0.3 mL by IntraMUSCular route once as needed for up to 1 dose. EPINEPHrine 1 mg/mL nasal solution Commonly known as:  ADRENALIN  
0.5 mL by Nasal route.  
  
 ergocalciferol 50,000 unit capsule Commonly known as:  ERGOCALCIFEROL Take 1 Cap by mouth every other day. ferrous sulfate 325 mg (65 mg iron) tablet Take 325 mg by mouth Daily (before breakfast). furosemide 40 mg tablet Commonly known as:  LASIX Take 1 Tab by mouth daily. * glucose blood VI test strips strip Commonly known as:  blood glucose test  
For use with glucometer to check blood sugar once a day in morning before eating. Please dispense VERIO FLEX STRIPS  
  
 * glucose blood VI test strips strip Commonly known as:  blood glucose test  
For use with glucometer to check blood sugar once a day in morning before eating. Please dispense brand covered by insurance. HYDROcodone-acetaminophen 5-325 mg per tablet Commonly known as:  Jamel Nicolee Take 1 Tab by mouth every six (6) hours as needed for Pain. Max Daily Amount: 4 Tabs. Insulin Syringe-Needle U-100 1 mL 30 gauge x 5/16 Syrg Commonly known as:  INSULIN SYRINGE For use to administer b12 injections  
  
 levothyroxine 150 mcg tablet Commonly known as:  SYNTHROID Take 1 tablet daily and 1/2 tablet on Sundays (6.5 tablets/week). lidocaine 5 % Commonly known as:  Daryle Gates Apply patch to the affected area for 12 hours a day and remove for 12 hours a day. linaclotide 145 mcg Cap capsule Commonly known as:  Filemon Able Take 1 Cap by mouth Daily (before breakfast). For constipation  
  
 losartan 50 mg tablet Commonly known as:  COZAAR Take 1 Tab by mouth nightly. Hold for SBP<115  
  
 metFORMIN 1,000 mg tablet Commonly known as:  GLUCOPHAGE Take 1 Tab by mouth two (2) times daily (with meals). morphine CR 15 mg CR tablet Commonly known as:  MS CONTIN Take 1 Tab by mouth every twelve (12) hours. Max Daily Amount: 30 mg.  
  
 onabotulinumtoxinA 200 unit injection Commonly known as:  BOTOX  
200 Units by IntraMUSCular route every three (3) months. Inject to selected muscles head and neck bilaterally every 3 months for migraine  Indications: MIGRAINE PREVENTION  
  
 pantoprazole 40 mg tablet Commonly known as:  PROTONIX Take 1 Tab by mouth daily. ramelteon 8 mg tablet Commonly known as:  ROZEREM Take 1 Tab by mouth nightly. Take 30 min before bedtime  
  
 topiramate 100 mg tablet Commonly known as:  TOPAMAX Take 1 Tab by mouth two (2) times a day. * Notice: This list has 2 medication(s) that are the same as other medications prescribed for you. Read the directions carefully, and ask your doctor or other care provider to review them with you. We Performed the Following [address] [id number] CPT(R)] Patient Instructions A Healthy Lifestyle: Care Instructions Your Care Instructions A healthy lifestyle can help you feel good, stay at a healthy weight, and have plenty of energy for both work and play. A healthy lifestyle is something you can share with your whole family. A healthy lifestyle also can lower your risk for serious health problems, such as high blood pressure, heart disease, and diabetes. You can follow a few steps listed below to improve your health and the health of your family. Follow-up care is a key part of your treatment and safety. Be sure to make and go to all appointments, and call your doctor if you are having problems. It's also a good idea to know your test results and keep a list of the medicines you take. How can you care for yourself at home? · Do not eat too much sugar, fat, or fast foods. You can still have dessert and treats now and then. The goal is moderation. · Start small to improve your eating habits. Pay attention to portion sizes, drink less juice and soda pop, and eat more fruits and vegetables. ¨ Eat a healthy amount of food. A 3-ounce serving of meat, for example, is about the size of a deck of cards. Fill the rest of your plate with vegetables and whole grains. ¨ Limit the amount of soda and sports drinks you have every day. Drink more water when you are thirsty. ¨ Eat at least 5 servings of fruits and vegetables every day. It may seem like a lot, but it is not hard to reach this goal. A serving or helping is 1 piece of fruit, 1 cup of vegetables, or 2 cups of leafy, raw vegetables. Have an apple or some carrot sticks as an afternoon snack instead of a candy bar. Try to have fruits and/or vegetables at every meal. 
· Make exercise part of your daily routine. You may want to start with simple activities, such as walking, bicycling, or slow swimming. Try to be active 30 to 60 minutes every day. You do not need to do all 30 to 60 minutes all at once. For example, you can exercise 3 times a day for 10 or 20 minutes. Moderate exercise is safe for most people, but it is always a good idea to talk to your doctor before starting an exercise program. 
· Keep moving. Cecilia Bi the lawn, work in the garden, or [employer]. Take the stairs instead of the elevator at work. · If you smoke, quit. People who smoke have an increased risk for heart attack, stroke, cancer, and other lung illnesses. Quitting is hard, but there are ways to boost your chance of quitting tobacco for good. ¨ Use nicotine gum, patches, or lozenges. ¨ Ask your doctor about stop-smoking programs and medicines. ¨ Keep trying. In addition to reducing your risk of diseases in the future, you will notice some benefits soon after you stop using tobacco. If you have shortness of breath or asthma symptoms, they will likely get better within a few weeks after you quit. · Limit how much alcohol you drink. Moderate amounts of alcohol (up to 2 drinks a day for men, 1 drink a day for women) are okay. But drinking too much can lead to liver problems, high blood pressure, and other health problems. Family health If you have a family, there are many things you can do together to improve your health. · Eat meals together as a family as often as possible. · Eat healthy foods. This includes fruits, vegetables, lean meats and dairy, and whole grains. · Include your family in your fitness plan. Most people think of activities such as jogging or tennis as the way to fitness, but there are many ways you and your family can be more active. Anything that makes you breathe hard and gets your heart pumping is exercise. Here are some tips: 
¨ Walk to do errands or to take your child to school or the bus. ¨ Go for a family bike ride after dinner instead of watching TV. Where can you learn more? Go to http://noa-em.info/. Enter O663 in the search box to learn more about \"A Healthy Lifestyle: Care Instructions. \" Current as of: May 12, 2017 Content Version: 11.4 © 3493-9652 "[employer]. Care instructions adapted under license by [employer] (which disclaims liability or warranty for this information). If you have questions about a medical condition or this instruction, always ask your healthcare professional. [address] any warranty or liability for your use of this information. Introducing [facility] HE[facility] SERVICES! Dear Jeremiah Gunn: Thank you for requesting a [employer] account. Our records indicate that you already have an active [employer] account. You can access your account anytime at [email or web address] Did you know that you can access your hospital and ER discharge instructions at any time in Ak[employer]? You can also review all of your test results from your hospital stay or ER visit. Additional Information If you have questions, please visit the Frequently Asked Questions section of the [employer] website at [email or web address] Remember, [employer] is NOT to be used for urgent needs. For medical emergencies, dial 911. Now available from your iPhone and Android! Please provide this summary of care documentation to your next provider. Your primary care clinician is listed as Gabby Lopez. If you have any questions after today's visit, please call [phone].

## 2019-07-02 NOTE — TELEPHONE ENCOUNTER
Maxwell from [address] [place] [address] is calling to check the status of patient's order for incontinence supplies for patient. Maxwell re-faxed orders today if provider needed them.  Tete's Contact Information is Phone: [phone] Ext. [phone]

## 2019-07-17 NOTE — TELEPHONE ENCOUNTER
I called the [address] [place] [address] Road and spoke to Brandee Mitchell to clarify this. Results sent to patient on spotdockt

## 2019-08-05 RX ORDER — FUROSEMIDE 40 MG/1
TABLET ORAL
Qty: 90 TAB | Refills: 1 | Status: SHIPPED | OUTPATIENT
Start: 2019-08-05 | End: 2020-02-17

## 2019-08-28 ENCOUNTER — TELEPHONE (OUTPATIENT)
Dept: NEUROLOGY | Age: 59
End: 2019-08-28

## 2019-08-28 NOTE — TELEPHONE ENCOUNTER
Re:  Botox    Patient has been approved previously. Re-verified coverage today. Botox  auth 1/1/19 - 2/8/20. Botox [id number] auth 2/12/19 - 2/12/20. [facility] ph [phone]. kk      Referring to nurse for Botox shipment inquiry.

## 2019-08-29 ENCOUNTER — DOCUMENTATION ONLY (OUTPATIENT)
Dept: NEUROLOGY | Age: 59
End: 2019-08-29

## 2019-09-05 ENCOUNTER — OFFICE VISIT (OUTPATIENT)
Dept: FAMILY MEDICINE CLINIC | Age: 59
End: 2019-09-05

## 2019-09-05 ENCOUNTER — OFFICE VISIT (OUTPATIENT)
Dept: NEUROLOGY | Age: 59
End: 2019-09-05

## 2019-09-05 VITALS
TEMPERATURE: 96.5 F | DIASTOLIC BLOOD PRESSURE: 83 MMHG | HEART RATE: 64 BPM | RESPIRATION RATE: 18 BRPM | OXYGEN SATURATION: 98 % | SYSTOLIC BLOOD PRESSURE: 127 MMHG | WEIGHT: 235.8 LBS | BODY MASS INDEX: 37.9 KG/M2 | HEIGHT: 66 IN

## 2019-09-05 VITALS
HEART RATE: 65 BPM | OXYGEN SATURATION: 98 % | WEIGHT: 233 LBS | RESPIRATION RATE: 14 BRPM | DIASTOLIC BLOOD PRESSURE: 84 MMHG | SYSTOLIC BLOOD PRESSURE: 130 MMHG | BODY MASS INDEX: 37.45 KG/M2 | HEIGHT: 66 IN

## 2019-09-05 DIAGNOSIS — M79.10 MUSCLE ACHE: ICD-10-CM

## 2019-09-05 DIAGNOSIS — G43.719 INTRACTABLE CHRONIC MIGRAINE WITHOUT AURA AND WITHOUT STATUS MIGRAINOSUS: Primary | ICD-10-CM

## 2019-09-05 DIAGNOSIS — R73.03 PREDIABETES: Primary | ICD-10-CM

## 2019-09-05 DIAGNOSIS — Z23 ENCOUNTER FOR IMMUNIZATION: ICD-10-CM

## 2019-09-05 DIAGNOSIS — I10 ESSENTIAL HYPERTENSION: ICD-10-CM

## 2019-09-05 RX ORDER — TRAZODONE HYDROCHLORIDE 100 MG/1
200 TABLET ORAL DAILY
Refills: 2 | COMMUNITY
Start: 2019-08-06 | End: 2019-12-05

## 2019-09-05 RX ORDER — METHYLPREDNISOLONE 4 MG/1
TABLET ORAL
Qty: 1 DOSE PACK | Refills: 0 | Status: SHIPPED | OUTPATIENT
Start: 2019-09-05 | End: 2019-10-29

## 2019-09-05 RX ORDER — EPINEPHRINE 0.3 MG/.3ML
0.3 INJECTION SUBCUTANEOUS
Qty: 2 SYRINGE | Refills: 3 | Status: SHIPPED | OUTPATIENT
Start: 2019-09-05 | End: 2019-09-05

## 2019-09-05 NOTE — PROGRESS NOTES
Botox Injection Note     2019     Patient:  Saralyn Hashimoto   YOB: 1960  Date of Visit: 2019      Indication: patient has chronic migraine headaches greater than 15 days per month lasting more than 4 hours each. She has failed or not tolerated 2 or more medication preventatives. Written consent was signed and verified by me. Risks and benefits were discussed to include possible cosmetic asymmetry which is not permament or life threatening. Patient name and  was confirmed prior to procedure. Time out performed. Procedure:   Botox concentration: 200 units in 2 ml of preservative-free normal saline. 1:1 dilution. LOT#: [id number]  EXP:  2022    Injections and distribution as follow :      Units/site  Sites Loc Subtotal    procerus 5 1 ML 5   corrugaters 2.5 2 BL 5   frontalis 5 8 BL 40   Temporalis 10 4 BL 40   Occipitalis 10 2 BL 20   Cervical paraspinals 5 4 BL 20   Trapezius 10 4 BL 40         200 units Botox were reconstituted, 170 units injected as above and the remainder was unavoidably wasted. Patient tolerated procedure well. Return in 3 months for repeat injections.     _____________________________   Ayla [facility], DO  [address], ABPN

## 2019-09-05 NOTE — PROGRESS NOTES
Sid Davila  Identified pt with two pt identifiers(name and ). Chief Complaint   Patient presents with    Hypertension     rm 10/non fasting/diabetes/headaches       1. Have you been to the ER, urgent care clinic since your last visit? no  Hospitalized since your last visit? no    2. Have you seen or consulted any other health care providers outside of the [address] [place] [address] since your last visit? Include any pap smears or colon screening. no      Advance Care Planning    In the event something were to happen to you and you were unable to speak on your behalf, do you have an Advance Directive/ Living Will in place stating your wishes? NO    If yes, do we have a copy on file NO    If no, would you like information NO    Medication reconciliation up to date and corrected with patient at this time. Today's provider has been notified of reason for visit, vitals and flowsheets obtained on patients. Reviewed record in preparation for visit, huddled with provider and have obtained necessary documentation. Health Maintenance Due   Topic    Shingrix Vaccine Age 50> (1 of 2)    MEDICARE YEARLY EXAM     PAP AKA CERVICAL CYTOLOGY     Influenza Age 5 to Adult        Wt Readings from Last 3 Encounters:   19 215 lb (97.5 kg)   19 194 lb (88 kg)   19 213 lb 3.2 oz (96.7 kg)     Temp Readings from Last 3 Encounters:   19 98.6 °F (37 °C)   19 95.8 °F (35.4 °C) (Oral)   18 97.8 °F (36.6 °C) (Oral)     BP Readings from Last 3 Encounters:   19 118/70   19 114/80   19 99/68     Pulse Readings from Last 3 Encounters:   19 77   19 65   19 (!) 109     There were no vitals filed for this visit.       Learning Assessment:  :     Learning Assessment 2018 10/13/2017 3/24/2015   PRIMARY LEARNER Patient Patient Patient   HIGHEST LEVEL OF EDUCATION - PRIMARY LEARNER  - - SOME COLLEGE   BARRIERS PRIMARY LEARNER - - [address] CAREGIVER - - No   CO-LEARNER NAME - - n/a   PRIMARY LANGUAGE ENGLISH ENGLISH ENGLISH   LEARNER PREFERENCE PRIMARY DEMONSTRATION LISTENING LISTENING   ANSWERED BY patient patient patient   RELATIONSHIP SELF SELF SELF       Depression Screening:  :     3 most recent PHQ Screens 6/6/2019   Little interest or pleasure in doing things Several days   Feeling down, depressed, irritable, or hopeless Several days   Total Score PHQ 2 2       No flowsheet data found. Fall Risk Assessment:  :     Fall Risk Assessment, last 12 mths 6/13/2017   Able to walk? Yes   Fall in past 12 months? Yes   Fall with injury? No   Number of falls in past 12 months 1   Fall Risk Score 1       Abuse Screening:  :     Abuse Screening Questionnaire 3/5/2019   Do you ever feel afraid of your partner? N   Are you in a relationship with someone who physically or mentally threatens you? N   Is it safe for you to go home?  Y       ADL Screening:  :     ADL Assessment 9/28/2018   Feeding yourself No Help Needed   Getting from bed to chair No Help Needed   Getting dressed No Help Needed   Bathing or showering No Help Needed   Walk across the room (includes cane/walker) No Help Needed   Using the telphone No Help Needed   Taking your medications No Help Needed   Preparing meals No Help Needed   Managing money (expenses/bills) No Help Needed   Moderately strenuous housework (laundry) No Help Needed   Shopping for personal items (toiletries/medicines) No Help Needed   Shopping for groceries No Help Needed   Driving No Help Needed   Climbing a flight of stairs No Help Needed   Getting to places beyond walking distances No Help Needed           BMI:  Weight Metrics 6/6/2019 5/24/2019 4/23/2019 4/5/2019 3/14/2019 3/5/2019 1/29/2019   Weight 215 lb 194 lb 213 lb 3.2 oz 209 lb 9.6 oz 208 lb 207 lb 8 oz 214 lb   BMI 34.7 kg/m2 31.31 kg/m2 35.48 kg/m2 34.88 kg/m2 34.61 kg/m2 34.53 kg/m2 35.61 kg/m2           Medication reconciliation up to date and corrected with patient at this time.

## 2019-09-05 NOTE — PROGRESS NOTES
S: Bora Diana is a 62 y.o. BLACK OR  female who presents for HTN/blood pressure follow up. Assessment/Plan:    1. Prediabetes  -A1c - 5.2% (3/2019), taken off metformin  -has gained 20lbs in past 3 months d/t calorie intake; advised to keep food journal  - HEMOGLOBIN A1C WITH EAG    2. Essential hypertension  -current therapy: lasix 40mg + losartan 50mg  -BP= 127/83  -advised to monitor BP at home and keep log; goal<140/90 make OV if consistently above goal or <110/60  -will check potassium level today    3. Encounter for immunization  - INFLUENZA VIRUS VAC QUAD,SPLIT,PRESV FREE SYRINGE IM  - IA IMMUNIZ ADMIN,1 SINGLE/COMB VAC/TOXOID    RTC 3-6 months, depending on lab values      Bora Diana has gained 20lbs since 2019 - \"eating everything under the sun\" since being taken off metformin   Current therapy: lasix 40mg + losartan 50mg  Taking as prescribed  BP today: 127/83  No chest pain or discomfort, elevated heart rate,  palpitations or edema in extremities  occ SOB   +cough, with sporadic congestion   No Fatigue  occ Dizziness - no pattern - when gets up     Review of Systems:  - Constitutional symptoms: no fevers/chills  - Respiratory: no SOB, difficulty or pain with breathing, wheezes, or cough. - Gastrointestinal: no dysphagia or abdominal pain  ROS is negative otherwise.     Social History:  Nutrition: eating a lot - healthy and unhealthy   Physical: ride bike at home   Social: lives with sister    Social History     Tobacco Use   Smoking Status Former Smoker    Packs/day: 0.25    Years: 15.00    Pack years: 3.75    Types: Cigarettes    Last attempt to quit: 2007    Years since quittin.2   Smokeless Tobacco Never Used     Social History     Substance and Sexual Activity   Alcohol Use No     Social History     Substance and Sexual Activity   Drug Use No       3 most recent PHQ Screens 2019   Little interest or pleasure in doing things Not at all   Feeling down, depressed, irritable, or hopeless Not at all   Total Score PHQ 2 0       I reviewed the following:  Past Medical History:   Diagnosis Date    Advanced care planning/counseling discussion 3/4/16    On File    Bronchitis 2/24/2014    Cervicalgia 8/18/15    Dr. Tone Marino    Chest pain 5/25/15    Hospitalized at [facility] 5/25/15 (lab work negative)    Chronic indwelling Dunn catheter 1/24/2018    Initially placed Jan 2018. Mx by Dr. Shane Tolentino.  Congestive heart failure, unspecified     Last Echo 2/8/15: EF 55-60%    Constipation 6/13/2017    Enthesopathy, spinal ([address] 8/18/15    Dr. Tone Marino    Essential hypertension     Foot drop 8/18/15    Dr. Nahed Mccord Heart attack [facility]) 2/24/2013    Was supposed to See Cardiology for possible pacemaker in november 2014- After Cardiology consult locally, no need, EF greatly improved. Established with Dr. Diony Saleh Hiatal hernia 6/2015    3 cm hiatal hernia     Hip pain 8/18/15    Dr. Tone Marino    Hypercholesterolemia     Hyperglycemia 7/2015    A1c 5.9     Hyperlipidemia 6/30/2015    NMR lipoprofile- LDL P 997, LDL-c 71, HLD-C-39, TG-60, HLD-P (25.2), Small LDL-P -541, LDL size 20.6    Hypothyroid     Insomnia 6/13/2017    Lower extremity edema     Lumbar spondylosis 8/18/15    Dr. Danyelle Phelan 6/2015    EGD/Colonscopy 6/15- Gastritis, internal hemorrhoids and 3 polyps    Murmur     EDDIE on CPAP     Was referred to Pulmonology - Uses CPAP    Osteoarthritis of hip 8/18/15    Dr. Tone Marino    Osteoarthritis, shoulder 8/18/15    Dr. Nahed Mccord Radiculopathy, cervical 8/18/15    Dr. Tone Marino    Shoulder pain 8/18/15    Dr. Nahed Mccord Spinal stenosis of cervical region 8/18/15    Dr. Nahed Mccord Spinal stenosis, lumbar 8/18/15    Dr. Nahed Mccord Stroke [facility]) 2/25/2014    Established with Neurology, [facility], NP-Just hospitalized at [facility] 2/7/15-2/10/15.  CT negative, but Late effect CV accident with increased tone described on discharge summary, Carotid dopplers showed 50% stenosis bilaterally.  Vitamin D deficiency 7/2015       Current Outpatient Medications   Medication Sig Dispense Refill    furosemide (LASIX) 40 mg tablet TAKE 1 TABLET BY MOUTH ONCE DAILY 90 Tab 1    omega-3 acid ethyl esters (LOVAZA) 1 gram capsule Take 2 Caps by mouth two (2) times a day. 120 Cap 2    BOTOX 200 unit injection INJECT INTO THE BILATERAL MUSCLES OF THE HEAD AND NECK EVERY 3 MONTHS FOR MIGRAINES. DISCARD UNUSED PORTIONS 200 Units 3    ergocalciferol (ERGOCALCIFEROL) 50,000 unit capsule Take 1 Cap by mouth every seven (7) days. 12 Cap 1    levothyroxine (SYNTHROID) 125 mcg tablet Take 1 Tab by mouth Daily (before breakfast). 90 Tab 3    pantoprazole (PROTONIX) 40 mg tablet Take 1 Tab by mouth daily. 90 Tab 3    DULoxetine (CYMBALTA) 60 mg capsule Take 2 Caps by mouth daily. 180 Cap 0    losartan (COZAAR) 50 mg tablet Take 1 Tab by mouth nightly. Hold for SBP<115 90 Tab 1    topiramate (TOPAMAX) 100 mg tablet Take 1 Tab by mouth two (2) times a day. 180 Tab 2    baclofen (LIORESAL) 10 mg tablet TAKE 1 TABLET BY MOUTH THREE TIMES DAILY 270 Tab 2    carvedilol (COREG) 6.25 mg tablet Take 1 Tab by mouth two (2) times daily (with meals). 60 Tab 11    cyanocobalamin (VITAMIN B12) 1,000 mcg/mL injection INJECT 1 ML UNDER THE SKIN EVERY 14 DAYS FOR B12 DEFICIENCY 1 mL 1    linaclotide (LINZESS) 145 mcg cap capsule Take 1 Cap by mouth Daily (before breakfast). For constipation 90 Cap 3    calcipotriene (DOVONEX) 0.005 % topical cream Apply  to affected area daily as needed. 60 g 11    lidocaine (LIDODERM) 5 % Apply patch to the affected area for 12 hours a day and remove for 12 hours a day. 90 Each 3    morphine CR (MS CONTIN) 15 mg CR tablet Take 1 Tab by mouth every twelve (12) hours. Max Daily Amount: 30 mg.  (Patient taking differently: Take 15 mg by mouth daily.) 60 Tab 0    HYDROcodone-acetaminophen (NORCO) 5-325 mg per tablet Take 1 Tab by mouth every six (6) hours as needed for Pain. Max Daily Amount: 4 Tabs. 20 Tab 0    albuterol-ipratropium (DUO-NEB) 2.5 mg-0.5 mg/3 ml nebu 3 mL by Nebulization route every six (6) hours as needed. 30 Nebule 0    EPINEPHrine (EPIPEN 2-LAURA) 0.3 mg/0.3 mL injection 0.3 mL by IntraMUSCular route once as needed for up to 1 dose. 2 Syringe 3    albuterol (PROVENTIL HFA, VENTOLIN HFA, PROAIR HFA) 90 mcg/actuation inhaler Take 2 Puffs by inhalation every four (4) hours as needed for Wheezing or Shortness of Breath. 3 Inhaler 3    ferrous sulfate 325 mg (65 mg iron) tablet Take 325 mg by mouth Daily (before breakfast).  aspirin delayed-release 81 mg tablet Take 81 mg by mouth daily.  traZODone (DESYREL) 100 mg tablet TAKE 1 TO 1 & 1 2 (ONE TO ONE & ONE HALF) TABLETS BY MOUTH AT BEDTIME FOR SLEEP  2    mirtazapine (REMERON) 15 mg tablet 30 mg.      cyclobenzaprine (FLEXERIL) 5 mg tablet Take 5 mg by mouth two (2) times a day. Allergies   Allergen Reactions    Bees [Hymenoptera Allergenic Extract] Shortness of Breath and Swelling    Strawberry Shortness of Breath and Swelling    Lisinopril Cough       O: VS:   Visit Vitals  /83 (BP 1 Location: Right arm, BP Patient Position: Sitting)   Pulse 64   Temp 96.5 °F (35.8 °C) (Oral)   Resp 18   Ht 5' 6\" (1.676 m)   Wt 235 lb 12.8 oz (107 kg)   SpO2 98%   BMI 38.06 kg/m²     Data Reviewed:   · A1C:      Lab Results   Component Value Date/Time    Hemoglobin A1c 5.2 03/05/2019 08:56 AM    Hemoglobin A1c (POC) 5.3 12/28/2018 09:15 AM     · Lipids:  Lab Results   Component Value Date/Time    Cholesterol, total 196 03/05/2019 08:56 AM    HDL Cholesterol 59 03/05/2019 08:56 AM    LDL, calculated 123 (H) 03/05/2019 08:56 AM    VLDL, calculated 14 03/05/2019 08:56 AM    Triglyceride 72 03/05/2019 08:56 AM    CHOL/HDL Ratio 3.0 09/09/2017 02:40 AM       GENERAL: Jing Warroad is in no acute distress. EYE: PERRLA. EOMs intact. Sclera anicteric without injection. No drainage or discharge. EARS: Hearing intact bilaterally. External ear canals normal without evidence of blood or swelling. Bilateral TM's intact, pearly grey with landmarks visible. No erythema or effusion. NOSE: Patent. Nasal turbinates pink. No erythema. No discharge. MOUTH: mucous membranes pink and moist. Posterior pharynx normal with cobblestone appearance. No erythema, white exudate or obstruction. NECK: supple. Midline trachea. No cervical adenopathy noted. RESP: Unlabored without SOB. Speaking in full sentences. Breath sounds are symmetrical bilaterally. Clear to auscultation to all fields. No wheezes. No rales or rhonchi. CV: Normal rate. Regular rhythm. S1, S2 audible. No murmur noted. No rubs, clicks or gallops noted. NEURO:  awake, alert and oriented to person, place, and time and event. Clear speech. Steady gait. HEME/LYMPH: Pedal pulses palpable 2+ x 4 extremities. Left LE mild peripheral edema is noted. SKIN: Skin is warm and dry. Turgor is normal. No petechiae, no purpura, no rash. No cyanosis. No mottling, jaundice or pallor. ____________________________________________________________________  Reviewed medications and side effects. Reviewed warning signs of hypertension, stroke and heart attack. Advised on nutrition, physical activity, weight management, tobacco, and alcohol.     Patient verbalized understanding and agreed to plan of care. Follow up in 3-6 months as directed.

## 2019-09-05 NOTE — PATIENT INSTRUCTIONS
1) Blood pressure looks good 127/83  Please check your blood pressure through the week at staggered times in the day. (If you check in the morning, it should be at least one hour after your morning blood pressure medications.)      Arm monitors are most accurate. If you use a wrist monitor, make sure your wrist is at heart level. You can bring your home monitor to your next visit and have it calibrated with the machine in the office to gauge your readings. Sit  with your feet uncrossed and relax for 5 minutes before taking your BP. Keep a written record of your blood pressure readings and bring it to each appointment. If your systolic (top) blood pressure is consistently greater than 140mmHg or less than 243PPAZ of the diastolic (bottom) number is consistently greater than 90mmHg or less than 60mmHg then please schedule an office appointment. Work on healthy eating - no salt diet, more potassium (helps flush out sodium,making healthier heart and arteries) - and incorporating daily exercise into your routine. Cardiac symptoms that would need immediate attention include: uncomfortable pressure, squeezing, fullness or pain in the center of your chest. Pain or discomfort in one or both arms, the back, neck, jaw or stomach. Shortness of breath with or without chest discomfort, breaking out in a cold sweat, nausea or lightheadedness. 2) Weight gain of 20lbs since June. Try and substitute an activity - like going for a walk - instead of eating snacks. 3) Labs  The following blood work was ordered today. Once the tests are completed, you will receive a letter, email or phone call with the results. If you have not heard from us within 10-14 days, please call the office for the results. Potassium is a mineral that helps keep the right mix of fluids in your body. It also helps your nerves and muscles work properly. Potassium is found in milk, meats, and all fresh foods, including fruits and vegetables. Most adults need about 5 grams of potassium a day. The foods you eat should supply all the potassium you need. Some health conditions, such as kidney problems and stomach problems with vomiting and diarrhea, can cause a loss of potassium. Some medicines, such as water pills (diuretics), can cause low potassium. Hemoglobin A1c is a 3 month average of your blood sugar and used as a marker of your diabetes control. A normal value is less than 5.7%. 4) Flu vaccine today         Eating Healthy Foods: Care Instructions  Your Care Instructions    Eating healthy foods can help lower your risk for disease. Healthy food gives you energy and keeps your heart strong, your brain active, your muscles working, and your bones strong. A healthy diet includes a variety of foods from the basic food groups: grains, vegetables, fruits, milk and milk products, and meat and beans. Some people may eat more of their favorite foods from only one food group and, as a result, miss getting the nutrients they need. So, it is important to pay attention not only to what you eat but also to what you are missing from your diet. You can eat a healthy, balanced diet by making a few small changes. Follow-up care is a key part of your treatment and safety. Be sure to make and go to all appointments, and call your doctor if you are having problems. It's also a good idea to know your test results and keep a list of the medicines you take. How can you care for yourself at home? Look at what you eat  · Keep a food diary for a week or two and record everything you eat or drink. Track the number of servings you eat from each food group. · For a balanced diet every day, eat a variety of:  ? 6 or more ounce-equivalents of grains, such as cereals, breads, crackers, rice, or pasta, every day.  An ounce-equivalent is 1 slice of bread, 1 cup of ready-to-eat cereal, or ½ cup of cooked rice, cooked pasta, or cooked cereal.  ? 2½ cups of vegetables, especially:  § Dark-green vegetables such as broccoli and spinach. § Orange vegetables such as carrots and sweet potatoes. § Dry beans (such as bagley and kidney beans) and peas (such as lentils). ? 2 cups of fresh, frozen, or canned fruit. A small apple or 1 banana or orange equals 1 cup. ? 3 cups of nonfat or low-fat milk, yogurt, or other milk products. ? 5½ ounces of meat and beans, such as chicken, fish, lean meat, beans, nuts, and seeds. One egg, 1 tablespoon of peanut butter, ½ ounce nuts or seeds, or ¼ cup of cooked beans equals 1 ounce of meat. · Learn how to read food labels for serving sizes and ingredients. Fast-food and convenience-food meals often contain few or no fruits or vegetables. Make sure you eat some fruits and vegetables to make the meal more nutritious. · Look at your food diary. For each food group, add up what you have eaten and then divide the total by the number of days. This will give you an idea of how much you are eating from each food group. See if you can find some ways to change your diet to make it more healthy. Start small  · Do not try to make dramatic changes to your diet all at once. You might feel that you are missing out on your favorite foods and then be more likely to fail. · Start slowly, and gradually change your habits. Try some of the following:  ? Use whole wheat bread instead of white bread. ? Use nonfat or low-fat milk instead of whole milk. ? Eat brown rice instead of white rice, and eat whole wheat pasta instead of white-flour pasta. ? Try low-fat cheeses and low-fat yogurt. ? Add more fruits and vegetables to meals and have them for snacks. ? Add lettuce, tomato, cucumber, and onion to sandwiches. ? Add fruit to yogurt and cereal.  Enjoy food  · You can still eat your favorite foods. You just may need to eat less of them. If your favorite foods are high in fat, salt, and sugar, limit how often you eat them, but do not cut them out entirely.   · Eat a wide variety of foods. Make healthy choices when eating out  · The type of restaurant you choose can help you make healthy choices. Even fast-food chains are now offering more low-fat or healthier choices on the menu. · Choose smaller portions, or take half of your meal home. · When eating out, try:  ? A veggie pizza with a whole wheat crust or grilled chicken (instead of sausage or pepperoni). ? Pasta with roasted vegetables, grilled chicken, or marinara sauce instead of cream sauce. ? A vegetable wrap or grilled chicken wrap. ? Broiled or poached food instead of fried or breaded items. Make healthy choices easy  · Buy packaged, prewashed, ready-to-eat fresh vegetables and fruits, such as baby carrots, salad mixes, and chopped or shredded broccoli and cauliflower. · Buy packaged, presliced fruits, such as melon or pineapple. · Choose 100% fruit or vegetable juice instead of soda. Limit juice intake to 4 to 6 oz (½ to ¾ cup) a day. · Blend low-fat yogurt, fruit juice, and canned or frozen fruit to make a smoothie for breakfast or a snack. Where can you learn more? Go to http://noa-em.info/. Enter T756 in the search box to learn more about \"Eating Healthy Foods: Care Instructions. \"  Current as of: November 7, 2018  Content Version: 12.1  © 7065-6243 [employer]. Care instructions adapted under license by [employer] (which disclaims liability or warranty for this information). If you have questions about a medical condition or this instruction, always ask your healthcare professional. [place] [address] any warranty or liability for your use of this information. Vaccine Information Statement    Influenza (Flu) Vaccine (Inactivated or Recombinant): What You Need to Know    Many Vaccine Information Statements are available in [place] and other languages.  See www.immunize.org/vis  Hojas de información sobre vacunas están disponibles en español y en muchos otros idiomas. Visite www.immunize.org/vis    1. Why get vaccinated? Influenza vaccine can prevent influenza (flu). Flu is a contagious disease that spreads around the [place] every year, usually between October and May. Anyone can get the flu, but it is more dangerous for some people. Infants and young children, people 72years of age and older, pregnant women, and people with certain health conditions or a weakened immune system are at greatest risk of flu complications. Pneumonia, bronchitis, sinus infections and ear infections are examples of flu-related complications. If you have a medical condition, such as heart disease, cancer or diabetes, flu can make it worse. Flu can cause fever and chills, sore throat, muscle aches, fatigue, cough, headache, and runny or stuffy nose. Some people may have vomiting and diarrhea, though this is more common in children than adults. Each year thousands of people in the [facility] die from flu, and many more are hospitalized. Flu vaccine prevents millions of illnesses and flu-related visits to the doctor each year. 2. Influenza vaccines     CDC recommends everyone 10months of age and older get vaccinated every flu season. Children 6 months through 6years of age may need 2 doses during a single flu season. Everyone else needs only 1 dose each flu season. It takes about 2 weeks for protection to develop after vaccination. There are many flu viruses, and they are always changing. Each year a new flu vaccine is made to protect against three or four viruses that are likely to cause disease in the upcoming flu season. Even when the vaccine doesnt exactly match these viruses, it may still provide some protection. Influenza vaccine does not cause flu. Influenza vaccine may be given at the same time as other vaccines.     3. Talk with your health care provider    Tell your vaccine provider if the person getting the vaccine:   Has had an allergic reaction after a previous dose of influenza vaccine, or has any severe, life-threatening allergies.  Has ever had Guillain-Barré Syndrome (also called GBS). In some cases, your health care provider may decide to postpone influenza vaccination to a future visit. People with minor illnesses, such as a cold, may be vaccinated. People who are moderately or severely ill should usually wait until they recover before getting influenza vaccine. Your health care provider can give you more information. 4. Risks of a reaction     Soreness, redness, and swelling where shot is given, fever, muscle aches, and headache can happen after influenza vaccine.  There may be a very small increased risk of Guillain-Barré Syndrome (GBS) after inactivated influenza vaccine (the flu shot). Moises Schaumann children who get the flu shot along with pneumococcal vaccine (PCV13), and/or DTaP vaccine at the same time might be slightly more likely to have a seizure caused by fever. Tell your health care provider if a child who is getting flu vaccine has ever had a seizure. People sometimes faint after medical procedures, including vaccination. Tell your provider if you feel dizzy or have vision changes or ringing in the ears. As with any medicine, there is a very remote chance of a vaccine causing a severe allergic reaction, other serious injury, or death. 5. What if there is a serious problem? An allergic reaction could occur after the vaccinated person leaves the clinic. If you see signs of a severe allergic reaction (hives, swelling of the face and throat, difficulty breathing, a fast heartbeat, dizziness, or weakness), call 9-1-1 and get the person to the nearest hospital.    For other signs that concern you, call your health care provider. Adverse reactions should be reported to the Vaccine Adverse Event Reporting System (VAERS).  Your health care provider will usually file this report, or you can do it yourself. Visit the VAERS website at www.vaers. hhs.gov or call 6-407.757.2031. VAERS is only for reporting reactions, and VAERS staff do not give medical advice. 6. The National Vaccine Injury Compensation Program    The [facility] Vaccine Injury Compensation Program (VICP) is a federal program that was created to compensate people who may have been injured by certain vaccines. Visit the VICP website at www.[facility].gov/vaccinecompensation or call 2-952.570.3069 to learn about the program and about filing a claim. There is a time limit to file a claim for compensation. 7. How can I learn more?  Ask your health care provider.  Call your local or state health department.  Contact the Centers for Disease Control and Prevention (CDC):  - Call 0-949.495.7763 (1-800-CDC-INFO) or  - Visit CDCs influenza website at www.cdc.gov/flu    Vaccine Information Statement (Interim)  Inactivated Influenza Vaccine   8/15/2019  42 RAMYA Choi Falling [id number]   Department of Health and Human Services  Centers for Disease Control and Prevention    Office Use Only

## 2019-09-06 LAB
EST. AVERAGE GLUCOSE BLD GHB EST-MCNC: 108 MG/DL
HBA1C MFR BLD: 5.4 % (ref 4.8–5.6)
POTASSIUM SERPL-SCNC: 4.5 MMOL/L (ref 3.5–5.2)

## 2019-09-23 ENCOUNTER — HOSPITAL ENCOUNTER (OUTPATIENT)
Dept: MAMMOGRAPHY | Age: 59
Discharge: HOME | End: 2019-09-23
Attending: NURSE PRACTITIONER
Payer: MEDICARE

## 2019-09-23 DIAGNOSIS — Z12.39 BREAST SCREENING: ICD-10-CM

## 2019-09-23 PROCEDURE — 77067 SCR MAMMO BI INCL CAD: CPT

## 2019-09-28 DIAGNOSIS — K59.04 CHRONIC IDIOPATHIC CONSTIPATION: ICD-10-CM

## 2019-09-29 NOTE — TELEPHONE ENCOUNTER
PCP: Orlando Dent NP    Last appt: 9/5/2019  Future Appointments   Date Time Provider Denilson Thompson   10/8/2019  9:20 AM Mumtaz Gunter MD [facility] ALICE SCHED   10/29/2019  9:50 AM Flaco Bhagat MD [facility] [address]   11/12/2019  9:00 AM Blanka Trevino  [address]   12/5/2019  8:30 AM Orlando Dent NP [facility] ALICE SCHED   12/5/2019 10:30 AM Jovanni Drew Mc,  [facility]       Requested Prescriptions     Pending Prescriptions Disp Refills    LINZESS 145 mcg cap capsule [Pharmacy Med Name: Achille Lundborg 145MCG      CAP] 90 Cap 3     Sig: TAKE 1 CAPSULE BY MOUTH ONCE DAILY BEFORE BREAKFAST FOR CONSTIPATION       Prior labs and Blood pressures:  BP Readings from Last 3 Encounters:   09/05/19 130/84   09/05/19 127/83   06/06/19 118/70     Lab Results   Component Value Date/Time    Sodium 141 11/01/2018 02:15 PM    Potassium 4.5 09/05/2019 12:02 PM    Chloride 100 11/01/2018 02:15 PM    CO2 26 11/01/2018 02:15 PM    Anion gap 9 09/12/2017 10:06 AM    Glucose 93 11/01/2018 02:15 PM    BUN 18 11/01/2018 02:15 PM    Creatinine 0.85 11/01/2018 02:15 PM    BUN/Creatinine ratio 21 11/01/2018 02:15 PM    GFR est AA 86 11/01/2018 02:15 PM    GFR est non-AA 75 11/01/2018 02:15 PM    Calcium 9.2 11/01/2018 02:15 PM     Lab Results   Component Value Date/Time    Hemoglobin A1c 5.4 09/05/2019 12:02 PM    Hemoglobin A1c (POC) 5.3 12/28/2018 09:15 AM     Lab Results   Component Value Date/Time    Cholesterol, total 196 03/05/2019 08:56 AM    HDL Cholesterol 59 03/05/2019 08:56 AM    LDL, calculated 123 (H) 03/05/2019 08:56 AM    VLDL, calculated 14 03/05/2019 08:56 AM    Triglyceride 72 03/05/2019 08:56 AM    CHOL/HDL Ratio 3.0 09/09/2017 02:40 AM     Lab Results   Component Value Date/Time    VITAMIN D, 25-HYDROXY 33.9 11/01/2018 02:15 PM       Lab Results   Component Value Date/Time    TSH 0.665 03/05/2019 08:56 AM    TSH 0.038 (L) 11/01/2018 02:15 PM

## 2019-09-30 RX ORDER — LINACLOTIDE 145 UG/1
CAPSULE, GELATIN COATED ORAL
Qty: 90 CAP | Refills: 3 | Status: SHIPPED | OUTPATIENT
Start: 2019-09-30 | End: 2020-11-16

## 2019-10-08 ENCOUNTER — TELEPHONE (OUTPATIENT)
Dept: FAMILY MEDICINE CLINIC | Age: 59
End: 2019-10-08

## 2019-10-08 ENCOUNTER — OFFICE VISIT (OUTPATIENT)
Dept: SLEEP MEDICINE | Age: 59
End: 2019-10-08

## 2019-10-08 VITALS
OXYGEN SATURATION: 100 % | SYSTOLIC BLOOD PRESSURE: 120 MMHG | WEIGHT: 234 LBS | DIASTOLIC BLOOD PRESSURE: 84 MMHG | BODY MASS INDEX: 37.61 KG/M2 | HEIGHT: 66 IN | HEART RATE: 79 BPM

## 2019-10-08 DIAGNOSIS — I10 ESSENTIAL HYPERTENSION: ICD-10-CM

## 2019-10-08 DIAGNOSIS — R06.83 SNORING: ICD-10-CM

## 2019-10-08 DIAGNOSIS — Z86.59 H/O: DEPRESSION: ICD-10-CM

## 2019-10-08 DIAGNOSIS — G47.33 OSA (OBSTRUCTIVE SLEEP APNEA): Primary | ICD-10-CM

## 2019-10-08 DIAGNOSIS — I10 ESSENTIAL HYPERTENSION: Primary | ICD-10-CM

## 2019-10-08 NOTE — PATIENT INSTRUCTIONS
[facility] [place]. [place], [address]  Tel.  [phone]  Fax. 100 [facility] [address]  [place], [address] [facility]  Tel.  [phone]  Fax. [phone] [address]  Tel.  [phone]  Fax. [phone]     Sleep Apnea: After Your Visit  Your Care Instructions  Sleep apnea occurs when you frequently stop breathing for 10 seconds or longer during sleep. It can be mild to severe, based on the number of times per hour that you stop breathing or have slowed breathing. Blocked or narrowed airways in your nose, mouth, or throat can cause sleep apnea. Your airway can become blocked when your throat muscles and tongue relax during sleep. Sleep apnea is common, occurring in 1 out of 20 individuals. Individuals having any of the following characteristics should be evaluated and treated right away due to high risk and detrimental consequences from untreated sleep apnea:  1. Obesity  2. Congestive Heart failure  3. Atrial Fibrillation  4. Uncontrolled Hypertension  5. Type II Diabetes  6. Night-time Arrhythmias  7. Stroke  8. Pulmonary Hypertension  9. High-risk Driving Populations (pilots, truck drivers, etc.)  10. Patients Considering Weight-loss Surgery    How do you know you have sleep apnea? You probably have sleep apnea if you answer 'yes' to 3 or more of the following questions:  S - Have you been told that you Snore? T - Are you often Tired during the day? O - Has anyone Observed you stop breathing while sleeping? P- Do you have (or are being treated for) high blood Pressure? B - Are you obese (Body Mass Index > 35)? A - Is your Age 48years old or older? N - Is your Neck size greater than 16 inches? G - Are you male Gender? A sleep physician can prescribe a breathing device that prevents tissues in the throat from blocking your airway.  Or your doctor may recommend using a dental device (oral breathing device) to help keep your airway open. In some cases, surgery may be needed to remove enlarged tissues in the throat. Follow-up care is a key part of your treatment and safety. Be sure to make and go to all appointments, and call your doctor if you are having problems. It's also a good idea to know your test results and keep a list of the medicines you take. How can you care for yourself at home? · Lose weight, if needed. It may reduce the number of times you stop breathing or have slowed breathing. · Go to bed at the same time every night. · Sleep on your side. It may stop mild apnea. If you tend to roll onto your back, sew a pocket in the back of your pajama top. Put a tennis ball into the pocket, and stitch the pocket shut. This will help keep you from sleeping on your back. · Avoid alcohol and medicines such as sleeping pills and sedatives before bed. · Do not smoke. Smoking can make sleep apnea worse. If you need help quitting, talk to your doctor about stop-smoking programs and medicines. These can increase your chances of quitting for good. · Prop up the head of your bed 4 to 6 inches by putting bricks under the legs of the bed. · Treat breathing problems, such as a stuffy nose, caused by a cold or allergies. · Use a continuous positive airway pressure (CPAP) breathing machine if lifestyle changes do not help your apnea and your doctor recommends it. The machine keeps your airway from closing when you sleep. · If CPAP does not help you, ask your doctor whether you should try other breathing machines. A bilevel positive airway pressure machine has two types of air pressureâone for breathing in and one for breathing out. Another device raises or lowers air pressure as needed while you breathe. · If your nose feels dry or bleeds when using one of these machines, talk with your doctor about increasing moisture in the air. A humidifier may help.   · If your nose is runny or stuffy from using a breathing machine, talk with your doctor about using decongestants or a corticosteroid nasal spray. When should you call for help? Watch closely for changes in your health, and be sure to contact your doctor if:  · You still have sleep apnea even though you have made lifestyle changes. · You are thinking of trying a device such as CPAP. · You are having problems using a CPAP or similar machine. Where can you learn more? Go to ipDatatel. Enter U986 in the search box to learn more about \"Sleep Apnea: After Your Visit. \"   © 4252-6427 Healthwise, Incorporated. Care instructions adapted under license by [employer] (which disclaims liability or warranty for this information). This care instruction is for use with your licensed healthcare professional. If you have questions about a medical condition or this instruction, always ask your healthcare professional. Sima Daveys any warranty or liability for your use of this information. PROPER SLEEP HYGIENE    What to avoid  · Do not have drinks with caffeine, such as coffee or black tea, for 8 hours before bed. · Do not smoke or use other types of tobacco near bedtime. Nicotine is a stimulant and can keep you awake. · Avoid drinking alcohol late in the evening, because it can cause you to wake in the middle of the night. · Do not eat a big meal close to bedtime. If you are hungry, eat a light snack. · Do not drink a lot of water close to bedtime, because the need to urinate may wake you up during the night. · Do not read or watch TV in bed. Use the bed only for sleeping and sexual activity. What to try  · Go to bed at the same time every night, and wake up at the same time every morning. Do not take naps during the day. · Keep your bedroom quiet, dark, and cool. · Get regular exercise, but not within 3 to 4 hours of your bedtime. .  · Sleep on a comfortable pillow and mattress.   · If watching the clock makes you anxious, turn it facing away from you so you cannot see the time. · If you worry when you lie down, start a worry book. Well before bedtime, write down your worries, and then set the book and your concerns aside. · Try meditation or other relaxation techniques before you go to bed. · If you cannot fall asleep, get up and go to another room until you feel sleepy. Do something relaxing. Repeat your bedtime routine before you go to bed again. · Make your house quiet and calm about an hour before bedtime. Turn down the lights, turn off the TV, log off the computer, and turn down the volume on music. This can help you relax after a busy day. Drowsy Driving  The [address] [place] [address] Road Traffic Safety Administration cites drowsiness as a causing factor in more than 004,513 police reported crashes annually, resulting in 76,000 injuries and 1,500 deaths. Other surveys suggest 55% of people polled have driven while drowsy in the past year, 23% had fallen asleep but not crashed, 3% crashed, and 2% had and accident due to drowsy driving. Who is at risk? Young Drivers: One study of drowsy driving accidents states that 55% of the drivers were under 25 years. Of those, 75% were male. Shift Workers and Travelers: People who work overnight or travel across time zones frequently are at higher risk of experiencing Circadian Rhythm Disorders. They are trying to work and function when their body is programed to sleep. Sleep Deprived: Lack of sleep has a serious impact on your ability to pay attention or focus on a task. Consistently getting less than the average of 8 hours your body needs creates partial or cumulative sleep deprivation. Untreated Sleep Disorders: Sleep Apnea, Narcolepsy, R.L.S., and other sleep disorders (untreated) prevent a person from getting enough restful sleep. This leads to excessive daytime sleepiness and increases the risk for drowsy driving accidents by up to 7 times.   Medications / Alcohol: Even over the counter medications can cause drowsiness. Medications that impair a drivers attention should have a warning label. Alcohol naturally makes you sleepy and on its own can cause accidents. Combined with excessive drowsiness its effects are amplified. Signs of Drowsy Driving:   * You don't remember driving the last few miles   * You may drift out of your rakel   * You are unable to focus and your thoughts wander   * You may yawn more often than normal   * You have difficulty keeping your eyes open / nodding off   * Missing traffic signs, speeding, or tailgating  Prevention-   Good sleep hygiene, lifestyle and behavioral choices have the most impact on drowsy driving. There is no substitute for sleep and the average person requires 8 hours nightly. If you find yourself driving drowsy, stop and sleep. Consider the sleep hygiene tips provided during your visit as well. Medication Refill Policy: Refills for all medications require 1 week advance notice. Please have your pharmacy fax a refill request. We are unable to fax, or call in \"controled substance\" medications and you will need to pick these prescriptions up from our office. [employer] Activation    Thank you for requesting access to [employer]. Please follow the instructions below to securely access and download your online medical record. [employer] allows you to send messages to your doctor, view your test results, renew your prescriptions, schedule appointments, and more. How Do I Sign Up? 1. In your internet browser, go to [email or web address]. 2. Click on the First Time User? Click Here link in the Sign In box. You will see the New Member Sign Up page. 3. Enter your [employer] Access Code exactly as it appears below. You will not need to use this code after youve completed the sign-up process. If you do not sign up before the expiration date, you must request a new code. [employer] Access Code:  Activation code not generated  Current [employer] Status: Active (This is the date your [employer] access code will )    4. Enter the last four digits of your Social Security Number (xxxx) and Date of Birth (mm/dd/yyyy) as indicated and click Submit. You will be taken to the next sign-up page. 5. Create a [employer] ID. This will be your [employer] login ID and cannot be changed, so think of one that is secure and easy to remember. 6. Create a [employer] password. You can change your password at any time. 7. Enter your Password Reset Question and Answer. This can be used at a later time if you forget your password. 8. Enter your e-mail address. You will receive e-mail notification when new information is available in [address] 9. Click Sign Up. You can now view and download portions of your medical record. 10. Click the Download Summary menu link to download a portable copy of your medical information. Additional Information    If you have questions, please call [phone]. Remember, [employer] is NOT to be used for urgent needs. For medical emergencies, dial 911.

## 2019-10-08 NOTE — PROGRESS NOTES
Ms. Kati Arboleda was last evaluated on 1-29-19, she reported of waking up gasping for air on PAP therapy, she has had to sleep with extra pillows and reports of difficulty sleeping flat. AHI on download was elevated. Her weight has decreased from 333 to 214 lbs.     Polysomnogram was performed 04-05-19 and the results of the study were explained to the patient. Please refer to interpretation report for further details. Apnea/Hypopnea index of 0.6 which indicates no significant apnea, moderate snoring was present. She has since gained about 10% of her weight and continues to have snoring, awakening in the middle of the night because of no specific reason, due to gasping for air and feels tired an groggy on waking. \"I was more comfortable with CPAP therapy\" she stated today. Visit Vitals  /84   Pulse 79   Ht 5' 6\" (1.676 m)   Wt 234 lb (106.1 kg)   SpO2 100%   BMI 37.77 kg/m²         General:   Not in acute distress   Eyes:  Anicteric sclerae, no obvious strabismus   Nose:  No obvious nasal septum deviation    Oropharynx:   Class 4 oropharyngeal outlet, thick tongue base, enlarged and boggy uvula, low-lying soft palate, narrow tonsilo-pharyngeal pilars   Tonsils:   tonsils are not visualized   Neck:    midline trachea   Chest/Lungs:  Equal lung expansion, clear on auscultation    CVS:  Normal rate, regular rhythm; no JVD   Skin:  Warm to touch; no obvious rashes   Neuro:  No focal deficits ; no obvious tremor    Psych:  Normal affect,  normal countenance;           Plan:     * The patient currently has a Moderate Risk for having sleep apnea. STOP-BANG score 5.  * Sleep testing was ordered for initial evaluation. * She was provided information on sleep apnea including coresponding risk factors and the importance of proper treatment. * Treatment options if indicated were reviewed today. Patient agrees to a trial of PAP therapy (new device) if indicated.   * Counseling was provided regarding proper sleep hygiene (including effect of light on sleep), paradoxical intention, stimulus control, sleep environment safety and safe driving. * Effect of sleep disturbance on weight was reviewed. We have recommended a dedicated weight loss through appropriate diet and an exercise regiment as significant weight reduction has been shown to reduce severity of obstructive sleep apnea. * Patient agrees to telephone [phone]  follow-up by myself or lead sleep technologist shortly after sleep study to review results and plan final management.     (patient has given permission for a message to be left regarding test results and further management if patient cannot be cannot be reached directly). Thank you for allowing us to participate in your patient's medical care. We'll keep you updated on these investigations. Office visit exceeded 25 minutes with counseling and direction of care taking up more than 50% of the allotted time. Anthony Tyson MD, [facility]  Electronically signed.

## 2019-10-15 ENCOUNTER — TELEPHONE (OUTPATIENT)
Dept: FAMILY MEDICINE CLINIC | Age: 59
End: 2019-10-15

## 2019-10-15 NOTE — TELEPHONE ENCOUNTER
----- Message from Beryl Perez sent at 10/15/2019 10:53 AM EDT -----  Regarding: Marina Naylor telephone   General Message/Vendor Calls    Caller's first and last name: Lydia Arnold adult        Reason for call: requesting most recent physical and TB screening       Callback required yes/no and why: yes       Best contact number(s): [phone]        Details to clarify the request:      Beryl Perez

## 2019-10-16 NOTE — TELEPHONE ENCOUNTER
Writer printed off most recent office note and PPD screening and faxed to number provided in previous message.

## 2019-10-16 NOTE — TELEPHONE ENCOUNTER
Writer called [facility] regarding information needed for patient. Writer spoke with Bud Rodriguez. Patient's name and  verified. Writer notified Bud Jennifer that most recent wellness exam was in 2017, and do not see where another has been performed as of yet. Stated that patient been into see PCP in September and can send that note with the most recent PPD screening. Bud Rodriguez verbalized understanding and appreciation; stated that most recent note from PCP visit is fine. Gave fax # [phone] for writer to fax information to. Writer verbalized understanding and appreciation.

## 2019-10-16 NOTE — TELEPHONE ENCOUNTER
Writer called patient regarding records request from adult . Writer spoke with patient. Patient verified . Writer received verbal permission via telephone call to release most recent CPE and TB screening test in chart to [employer] for the adult  she attends.

## 2019-10-18 ENCOUNTER — TELEPHONE (OUTPATIENT)
Dept: SLEEP MEDICINE | Age: 59
End: 2019-10-18

## 2019-10-29 ENCOUNTER — OFFICE VISIT (OUTPATIENT)
Dept: SLEEP MEDICINE | Age: 59
End: 2019-10-29

## 2019-10-29 ENCOUNTER — OFFICE VISIT (OUTPATIENT)
Dept: ENDOCRINOLOGY | Age: 59
End: 2019-10-29

## 2019-10-29 VITALS
BODY MASS INDEX: 37.99 KG/M2 | DIASTOLIC BLOOD PRESSURE: 86 MMHG | HEIGHT: 66 IN | HEART RATE: 68 BPM | SYSTOLIC BLOOD PRESSURE: 131 MMHG | WEIGHT: 236.4 LBS

## 2019-10-29 DIAGNOSIS — R53.83 FATIGUE, UNSPECIFIED TYPE: ICD-10-CM

## 2019-10-29 DIAGNOSIS — E89.0 POSTOPERATIVE HYPOTHYROIDISM: Primary | ICD-10-CM

## 2019-10-29 DIAGNOSIS — E55.9 VITAMIN D DEFICIENCY: ICD-10-CM

## 2019-10-29 DIAGNOSIS — G47.33 OSA (OBSTRUCTIVE SLEEP APNEA): Primary | ICD-10-CM

## 2019-10-29 DIAGNOSIS — Z98.84 S/P GASTRIC BYPASS: ICD-10-CM

## 2019-10-29 NOTE — PROGRESS NOTES
History of Present Illness: Steph Elias is a 62 y.o. female presents for follow-up of hypothyroidism. She also has hypothyroidism and history of pre-diabetes. Of note, she is s/p gastric bypass in 1980s (pre-surgery wt was 465), and has also had CVA. Additionally, she is taking several medications for history of heart failure. This was diagnosed at the same time she had a heart attack and a stroke in 2014 . Had hospitalization 9/2017 for stroke-like sx. Currently, her main problems are fatigue, headaches and arthritis. History of Vit D Def - presumably due to poor absorption after gastric bypass. Was ergocalciferol 50,000 units daily for vitamin D deficiency. Level was high at 121 in 3/2018 and vitamin D stopped. Now taking vitamin D 50,000 units once weekly. Level was acceptable in fall 2018    Hypothyroidism: post-surgical.   Taking 125 mcg daily. TSH normal with last check. Dose was decreased over the years due to weight loss    Fatigue and arthritis are two main problems. These are currently stable. Past Medical History:   Diagnosis Date    Advanced care planning/counseling discussion 3/4/16    On File    Bronchitis 2/24/2014    Cervicalgia 8/18/15    Dr. Handy Laughlin    Chest pain 5/25/15    [facility] (lab work negative)    Chronic indwelling Dunn catheter 1/24/2018    Initially placed Jan 2018. Mx by Dr. Freda Mar.  Congestive heart failure, unspecified     Last Echo 2/8/15: EF 55-60%    Constipation 6/13/2017    Enthesopathy, spinal ([address] 8/18/15    Dr. Handy Laughlin    Essential hypertension     Foot drop 8/18/15    Dr. Ginny Coronel [facility]) 2/24/2013    Was supposed to See Cardiology for possible pacemaker in november 2014- After Cardiology consult locally, no need, EF greatly improved.  Established with Dr. Tabatha Beltran Hiatal hernia 6/2015    3 cm hiatal hernia     Hip pain 8/18/15    Dr. Handy Laughlin    Hypercholesterolemia     Hyperglycemia 7/2015 A1c 5.9     Hyperlipidemia 6/30/2015    NMR lipoprofile- LDL P 997, LDL-c 71, HLD-C-39, TG-60, HLD-P (25.2), Small LDL-P -541, LDL size 20.6    Hypothyroid     Insomnia 6/13/2017    Lower extremity edema     Lumbar spondylosis 8/18/15    Dr. Jeane Chang 6/2015    EGD/Colonscopy 6/15- Gastritis, internal hemorrhoids and 3 polyps    Murmur     EDDIE on CPAP     Was referred to Pulmonology - Uses CPAP    Osteoarthritis of hip 8/18/15    Dr. 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 Michael Baeza    Radiculopathy, cervical 8/18/15    Dr. Michael Baeza    Shoulder pain 8/18/15    Dr. Yoandy Green Spinal stenosis of cervical region 8/18/15    Dr. Yoandy Green Spinal stenosis, lumbar 8/18/15    Dr. Yoandy Green Stroke [facility]) 2/25/2014    Established with Neurology, Rj Botello NP-Just hospitalized at [facility] 2/7/15-2/10/15. CT negative, but Late effect CV accident with increased tone described on discharge summary, Carotid dopplers showed 50% stenosis bilaterally.  Vitamin D deficiency 7/2015     Current Outpatient Medications   Medication Sig    LINZESS 145 mcg cap capsule TAKE 1 CAPSULE BY MOUTH ONCE DAILY BEFORE BREAKFAST FOR CONSTIPATION    traZODone (DESYREL) 100 mg tablet TAKE 1 TO 1 & 1 2 (ONE TO ONE & ONE HALF) TABLETS BY MOUTH AT BEDTIME FOR SLEEP    furosemide (LASIX) 40 mg tablet TAKE 1 TABLET BY MOUTH ONCE DAILY    omega-3 acid ethyl esters (LOVAZA) 1 gram capsule Take 2 Caps by mouth two (2) times a day.  BOTOX 200 unit injection INJECT INTO THE BILATERAL MUSCLES OF THE HEAD AND NECK EVERY 3 MONTHS FOR MIGRAINES. DISCARD UNUSED PORTIONS    levothyroxine (SYNTHROID) 125 mcg tablet Take 1 Tab by mouth Daily (before breakfast).  pantoprazole (PROTONIX) 40 mg tablet Take 1 Tab by mouth daily.  DULoxetine (CYMBALTA) 60 mg capsule Take 2 Caps by mouth daily.  losartan (COZAAR) 50 mg tablet Take 1 Tab by mouth nightly.  Hold for SBP<115    topiramate (TOPAMAX) 100 mg tablet Take 1 Tab by mouth two (2) times a day.  baclofen (LIORESAL) 10 mg tablet TAKE 1 TABLET BY MOUTH THREE TIMES DAILY    carvedilol (COREG) 6.25 mg tablet Take 1 Tab by mouth two (2) times daily (with meals).  cyanocobalamin (VITAMIN B12) 1,000 mcg/mL injection INJECT 1 ML UNDER THE SKIN EVERY 14 DAYS FOR B12 DEFICIENCY    calcipotriene (DOVONEX) 0.005 % topical cream Apply  to affected area daily as needed.  cyclobenzaprine (FLEXERIL) 5 mg tablet Take 5 mg by mouth two (2) times a day.  ferrous sulfate 325 mg (65 mg iron) tablet Take 325 mg by mouth Daily (before breakfast).  aspirin delayed-release 81 mg tablet Take 81 mg by mouth daily.  methylPREDNISolone (MEDROL DOSEPACK) 4 mg tablet Per package directions    ergocalciferol (ERGOCALCIFEROL) 50,000 unit capsule Take 1 Cap by mouth every seven (7) days.  lidocaine (LIDODERM) 5 % Apply patch to the affected area for 12 hours a day and remove for 12 hours a day.  morphine CR (MS CONTIN) 15 mg CR tablet Take 1 Tab by mouth every twelve (12) hours. Max Daily Amount: 30 mg. (Patient taking differently: Take 15 mg by mouth daily.)    HYDROcodone-acetaminophen (NORCO) 5-325 mg per tablet Take 1 Tab by mouth every six (6) hours as needed for Pain. Max Daily Amount: 4 Tabs.  albuterol-ipratropium (DUO-NEB) 2.5 mg-0.5 mg/3 ml nebu 3 mL by Nebulization route every six (6) hours as needed.  albuterol (PROVENTIL HFA, VENTOLIN HFA, PROAIR HFA) 90 mcg/actuation inhaler Take 2 Puffs by inhalation every four (4) hours as needed for Wheezing or Shortness of Breath. No current facility-administered medications for this visit.       Allergies   Allergen Reactions    Bees [Hymenoptera Allergenic Extract] Shortness of Breath and Swelling    Strawberry Shortness of Breath and Swelling    Lisinopril Cough       Review of Systems:  - Eyes: no blurry vision or double vision  - Cardiovascular: no chest pain  - Respiratory: no shortness of breath  - Musculoskeletal: See HPI    Physical Examination:  Visit Vitals  /86 (BP 1 Location: Right arm, BP Patient Position: Sitting)   Pulse 68   Ht 5' 6\" (1.676 m)   Wt 236 lb 6.4 oz (107.2 kg)   BMI 38.16 kg/m²   -   - General: pleasant, no distress, uses a walker   HEENT: hearing intact, EOMI, clear sclera without icterus  - Cardiovascular: regular, normal rate   - Respiratory: normal effort  - Integumentary: no edema  - Psychiatric: normal mood and affect    Data Reviewed:   Lab Results   Component Value Date/Time    Hemoglobin A1c 5.4 09/05/2019 12:02 PM      Lab Results   Component Value Date/Time    Sodium 141 11/01/2018 02:15 PM    Potassium 4.5 09/05/2019 12:02 PM    Creatinine 0.85 11/01/2018 02:15 PM        Lab Results   Component Value Date/Time    Cholesterol, total 196 03/05/2019 08:56 AM    HDL Cholesterol 59 03/05/2019 08:56 AM    LDL, calculated 123 03/05/2019 08:56 AM    Triglyceride 72 03/05/2019 08:56 AM      Lab Results   Component Value Date/Time    TSH 0.665 03/05/2019 08:56 AM    TSH 0.038 11/01/2018 02:15 PM    T4, Free 1.73 11/01/2018 02:15 PM        Assessment/Plan:   1. Postoperative hypothyroidism   Suspect medication needs will be stable that her weight loss is stabilized. Continue levothyroxine 125 mcg.   2. S/P gastric bypass   Addressing nutritional deficiencies   3. Vitamin D deficiency   Continue 50,000 units once weekly   4. Fatigue, unspecified type   Encouraged her to consider medication related side effects  Please see her that she is following with sick medicine. Encouraged her to follow with them. 5. BMI 38.0-38.9,adult   She has had some considerable success losing weight over the years. Encouraged her to continue to make efforts with diet and exercise     Patient Instructions   Thyroid:  Continue 125 mcg daily.      Vitamin D  Continue 50,000 units weekly

## 2019-10-29 NOTE — PROGRESS NOTES
[address] [facility] [place]. [place], [address]  Tel.  [phone]  Fax. 100 [facility] 60  1001 [facility] [address], [address] [facility]  Tel.  [phone]  Fax. [phone] [address]   Tel.  [phone]  Fax. [phone]       Ashley Moore is a 62 y.o. female seen today to receive a home sleep testing unit (HST). · Patient was educated on proper hookup and operation of the HST. · Instruction forms and documentation were reviewed and signed. · The patient demonstrated good understanding of the HST.    O>    There were no vitals taken for this visit. A>  No diagnosis found. P>  · General information regarding operations and maintenance of the device was provided. · She was provided information on sleep apnea including coresponding risk factors and the importance of proper treatment. · Follow-up appointment was made to return the HST. She will be contacted once the results have been reviewed. · She was asked to contact our office for any problems regarding her home sleep test study.       Elisabeth Parker,RRT,RPSGT, CSE

## 2019-10-30 ENCOUNTER — TELEPHONE (OUTPATIENT)
Dept: SLEEP MEDICINE | Age: 59
End: 2019-10-30

## 2019-10-31 NOTE — TELEPHONE ENCOUNTER
Reviewed sleep study results with patient. She expressed understanding and is willing to proceed with a repeat HSAT. She will  the device [facility] sleep center.     Francie Parker,RRT,RPSGT, CSE

## 2019-11-01 ENCOUNTER — HOSPITAL ENCOUNTER (OUTPATIENT)
Dept: SLEEP MEDICINE | Age: 59
Discharge: HOME | End: 2019-11-01
Payer: MEDICARE

## 2019-11-01 PROCEDURE — 95806 SLEEP STUDY UNATT&RESP EFFT: CPT | Performed by: INTERNAL MEDICINE

## 2019-11-05 ENCOUNTER — TELEPHONE (OUTPATIENT)
Dept: SLEEP MEDICINE | Age: 59
End: 2019-11-05

## 2019-11-05 DIAGNOSIS — G47.33 OSA (OBSTRUCTIVE SLEEP APNEA): Primary | ICD-10-CM

## 2019-11-05 NOTE — TELEPHONE ENCOUNTER
Bennie Romero is to be contacted by lead sleep technologist regarding results of Sleep Testing which was indicative of an average AHI of 6.3 per hour with an SpO2 bruna of 83% and SpO2 of < 88% being 0.6 minutes. An APAP prescription has been written and patient will be contacted by office staff regarding follow-up  in 2-3 months after initiation of therapy. Encounter Diagnosis   Name Primary?  EDDIE (obstructive sleep apnea) Yes       Orders Placed This Encounter    AMB SUPPLY ORDER     Diagnosis: Obstructive Sleep Apnea ICD-10 Code (G47.33)    Positive Airway Pressure Therapy: Duration of need: 99 months. ResMed APAP Device with Heated Humidifer [id number] / [id number]. Minimum Pressure: 4 cmH2O, Maximum Pressure: 20 cmH2O.  Nasal Cushion (Replace) 2 per month.  Nasal Interface Mask 1 every 3 months.  Headgear 1 every 6 months.  Filter(s) Disposable 2 per month.  Filter(s) Non-Disposable 1 every 6 months. [address] for Locked Rigoberto (Replace) 1 every 6 months.  Tubing with heating element 1 every 3 months. Perform Mask Fitting per patient preference and comfort - replace as above. Maria Teresa Oshea MD, FAA; NPI: [id number]  Electronically signed. 11/05/19

## 2019-11-06 NOTE — TELEPHONE ENCOUNTER
Reviewed sleep study results with patient. She expressed understanding and is willing to proceed with a trial of APAP. Fax DME order & Schedule 1st adherence visit in 60 to 90 days.      Donna Parker,RRT,RPSGT, CSE

## 2019-11-07 ENCOUNTER — DOCUMENTATION ONLY (OUTPATIENT)
Dept: SLEEP MEDICINE | Age: 59
End: 2019-11-07

## 2019-11-11 ENCOUNTER — DOCUMENTATION ONLY (OUTPATIENT)
Dept: SLEEP MEDICINE | Age: 59
End: 2019-11-11

## 2019-11-12 ENCOUNTER — OFFICE VISIT (OUTPATIENT)
Dept: CARDIOLOGY CLINIC | Age: 59
End: 2019-11-12

## 2019-11-12 VITALS
WEIGHT: 239.8 LBS | BODY MASS INDEX: 38.54 KG/M2 | SYSTOLIC BLOOD PRESSURE: 116 MMHG | HEART RATE: 70 BPM | DIASTOLIC BLOOD PRESSURE: 76 MMHG | RESPIRATION RATE: 12 BRPM | HEIGHT: 66 IN | OXYGEN SATURATION: 97 %

## 2019-11-12 DIAGNOSIS — I42.0 DILATED CARDIOMYOPATHY (HCC): ICD-10-CM

## 2019-11-12 DIAGNOSIS — R53.1 LEFT-SIDED WEAKNESS: ICD-10-CM

## 2019-11-12 DIAGNOSIS — I25.10 CORONARY ARTERY DISEASE INVOLVING NATIVE CORONARY ARTERY OF NATIVE HEART WITHOUT ANGINA PECTORIS: ICD-10-CM

## 2019-11-12 DIAGNOSIS — I69.398 HEMIPARESIS AND ALTERATION OF SENSATIONS AS LATE EFFECTS OF STROKE (HCC): ICD-10-CM

## 2019-11-12 DIAGNOSIS — I49.3 VENTRICULAR ECTOPIC ACTIVITY: ICD-10-CM

## 2019-11-12 DIAGNOSIS — R07.9 CHEST PAIN, UNSPECIFIED TYPE: Primary | ICD-10-CM

## 2019-11-12 DIAGNOSIS — I69.359 HEMIPARESIS AND ALTERATION OF SENSATIONS AS LATE EFFECTS OF STROKE (HCC): ICD-10-CM

## 2019-11-12 DIAGNOSIS — I63.9 CEREBROVASCULAR ACCIDENT (CVA), UNSPECIFIED MECHANISM (HCC): Chronic | ICD-10-CM

## 2019-11-12 NOTE — PROGRESS NOTES
HISTORY OF PRESENT ILLNESS  Diane Holt is a 62 y.o. female. She is seen after an absence of a year. She has had cardiac catheterization done in [place] almost six years ago that showed a weak heart with EF < 30% and a 10% or less narrowing in several other coronary arteries. She had a stroke following cardiac catheterization and has residual left sided weakness. She has been having chest pain almost on a daily basis that can occur at any time. She just will lie down and it will eventually go away. Her echocardiogram two years ago showed that her heart function had returned to normal. She lives with her sister. She uses a walker. An EKG done in the office today is unremarkable.        HPI  Patient Active Problem List   Diagnosis Code    Coronary artery disease involving native coronary artery of native heart without angina pectoris I25.10    Bronchitis J40    High cholesterol E78.00    History of cardiomyopathy Z86.79    SOB (shortness of breath) R06.02    Neck pain, bilateral M54.2    Shoulder pain, bilateral M25.511, M25.512    Muscle spasticity M62.838    Hemiparesis and alteration of sensations as late effects of stroke ([facility]) I69.359, I69.398    Head pain, chronic R51, G89.29    Expressive aphasia R47.01    Heart palpitations R00.2    Ventricular ectopic activity I49.3    Stroke ([facility]) I63.9    Thyroid disease E07.9    Hypercholesterolemia E78.00    EDDIE on CPAP G47.33, Z99.89    Essential hypertension I10    Congestive heart failure ([facility]) I50.9    Chest pain R07.9    Melena K92.1    Hiatal hernia K44.9    Postoperative hypothyroidism E89.0    Chronic pain G89.29    Prediabetes R73.03    Constipation K59.00    Insomnia G47.00    Left-sided weakness R53.1    Vitamin D deficiency E55.9    Obesity, morbid ([facility]) E66.01    Chronic indwelling Dunn catheter Z96.0    Dermatitis L30.9     Current Outpatient Medications   Medication Sig Dispense Refill    LINZESS 145 mcg cap capsule TAKE 1 CAPSULE BY MOUTH ONCE DAILY BEFORE BREAKFAST FOR CONSTIPATION 90 Cap 3    traZODone (DESYREL) 100 mg tablet Take 200 mg by mouth daily. At bedtime  2    furosemide (LASIX) 40 mg tablet TAKE 1 TABLET BY MOUTH ONCE DAILY 90 Tab 1    omega-3 acid ethyl esters (LOVAZA) 1 gram capsule Take 2 Caps by mouth two (2) times a day. 120 Cap 2    BOTOX 200 unit injection INJECT INTO THE BILATERAL MUSCLES OF THE HEAD AND NECK EVERY 3 MONTHS FOR MIGRAINES. DISCARD UNUSED PORTIONS 200 Units 3    ergocalciferol (ERGOCALCIFEROL) 50,000 unit capsule Take 1 Cap by mouth every seven (7) days. 12 Cap 1    levothyroxine (SYNTHROID) 125 mcg tablet Take 1 Tab by mouth Daily (before breakfast). 90 Tab 3    pantoprazole (PROTONIX) 40 mg tablet Take 1 Tab by mouth daily. 90 Tab 3    DULoxetine (CYMBALTA) 60 mg capsule Take 2 Caps by mouth daily. 180 Cap 0    losartan (COZAAR) 50 mg tablet Take 1 Tab by mouth nightly. Hold for SBP<115 90 Tab 1    topiramate (TOPAMAX) 100 mg tablet Take 1 Tab by mouth two (2) times a day. 180 Tab 2    baclofen (LIORESAL) 10 mg tablet TAKE 1 TABLET BY MOUTH THREE TIMES DAILY 270 Tab 2    carvedilol (COREG) 6.25 mg tablet Take 1 Tab by mouth two (2) times daily (with meals). 60 Tab 11    cyanocobalamin (VITAMIN B12) 1,000 mcg/mL injection INJECT 1 ML UNDER THE SKIN EVERY 14 DAYS FOR B12 DEFICIENCY 1 mL 1    calcipotriene (DOVONEX) 0.005 % topical cream Apply  to affected area daily as needed. 60 g 11    lidocaine (LIDODERM) 5 % Apply patch to the affected area for 12 hours a day and remove for 12 hours a day. 90 Each 3    HYDROcodone-acetaminophen (NORCO) 5-325 mg per tablet Take 1 Tab by mouth every six (6) hours as needed for Pain. Max Daily Amount: 4 Tabs. 20 Tab 0    albuterol-ipratropium (DUO-NEB) 2.5 mg-0.5 mg/3 ml nebu 3 mL by Nebulization route every six (6) hours as needed.  30 Nebule 0    albuterol (PROVENTIL HFA, VENTOLIN HFA, PROAIR HFA) 90 mcg/actuation inhaler Take 2 Puffs by inhalation every four (4) hours as needed for Wheezing or Shortness of Breath. 3 Inhaler 3    ferrous sulfate 325 mg (65 mg iron) tablet Take 325 mg by mouth Daily (before breakfast).  aspirin delayed-release 81 mg tablet Take 81 mg by mouth daily. Past Medical History:   Diagnosis Date    Advanced care planning/counseling discussion 3/4/16    On File    Bronchitis 2/24/2014    Cervicalgia 8/18/15    Dr. Shaye Pineda    Chest pain 5/25/15    Hospitalized at [facility] 5/25/15 (lab work negative)    Chronic indwelling Dunn catheter 1/24/2018    Initially placed Jan 2018. Mx by Dr. Vibha Diallo.  Congestive heart failure, unspecified     Last Echo 2/8/15: EF 55-60%    Constipation 6/13/2017    Enthesopathy, spinal ([facility] 8/18/15    Dr. Shaye Pineda    Essential hypertension     Foot drop 8/18/15    Dr. Beryl Lainez Heart attack [facility]) 2/24/2013    Was supposed to See Cardiology for possible pacemaker in november 2014- After Cardiology consult locally, no need, EF greatly improved.  Established with Dr. Kendrick Mai Hiatal hernia 6/2015    3 cm hiatal hernia     Hip pain 8/18/15    Dr. Shaye Pineda    Hypercholesterolemia     Hyperglycemia 7/2015    A1c 5.9     Hyperlipidemia 6/30/2015    NMR lipoprofile- LDL P 997, LDL-c 71, HLD-C-39, TG-60, HLD-P (25.2), Small LDL-P -541, LDL size 20.6    Hypothyroid     Insomnia 6/13/2017    Lower extremity edema     Lumbar spondylosis 8/18/15    Dr. Leanne Oneill 6/2015    EGD/Colonscopy 6/15- Gastritis, internal hemorrhoids and 3 polyps    Murmur     EDDIE on CPAP     Was referred to Pulmonology - Uses CPAP    Osteoarthritis of hip 8/18/15    Dr. Shaye Pineda    Osteoarthritis, shoulder 8/18/15    Dr. Shaye Pienda    Radiculopathy, cervical 8/18/15    Dr. Shaye Pineda    Shoulder pain 8/18/15    Dr. Beryl Lainez Spinal stenosis of cervical region 8/18/15    Dr. Beryl Lainez Spinal stenosis, lumbar 8/18/15    Dr. Beryl Lainez Stroke [facility]) 2/25/2014    Established with Neurology, MANOLO WestonJust hospitalized at [facility] 2/7/15-2/10/15. CT negative, but Late effect CV accident with increased tone described on discharge summary, Carotid dopplers showed 50% stenosis bilaterally.  Vitamin D deficiency 7/2015     Past Surgical History:   Procedure Laterality Date    COLONOSCOPY N/A 6/22/2016    COLONOSCOPY performed by Rubén Hamilton MD at [facility] ENDOSCOPY    HX BRE[facility] BI[facility]SY Right 8/11/15    [facility]    HX CHOLECYSTECTOMY      HX COLONOSCOPY  6/2015    Dr. Khadra De León- 3 complete polypectomies, Internal hemorrhoids, difficult study due to spasm. Repeat in 1 year    HX ENDOSCOPY  6/2015    mild gastritis, 3cm hiatal hernia     HX GASTRIC BYPASS  6/1989    HX HEART CATHETERIZATION  2/2014    HX ORTHOPAEDIC      Right middle finger distal amputation    HX PARTIAL THYROIDECTOMY  ~1990    HX THYROIDECTOMY Left 1985    90% of one side of the thyroid removed    IR ASP BLADDER SUPRA CATH  5/24/2019       Review of Systems   Cardiovascular: Positive for chest pain. Neurological: Positive for focal weakness. All other systems reviewed and are negative. Visit Vitals  /76 (BP 1 Location: Left arm, BP Patient Position: Sitting) Comment (BP 1 Location): left   Pulse 70   Resp 12   Ht 5' 6\" (1.676 m)   Wt 239 lb 12.8 oz (108.8 kg)   SpO2 97%   BMI 38.70 kg/m²       Physical Exam   Constitutional: She appears well-nourished. HENT:   Head: Atraumatic. Eyes: Conjunctivae are normal.   Neck: Neck supple. Cardiovascular: Normal rate, regular rhythm and normal heart sounds. Exam reveals no gallop and no friction rub. No murmur heard. Pulmonary/Chest: Breath sounds normal. She has no wheezes. She has no rales. She exhibits tenderness. Abdominal: Bowel sounds are normal. There is no tenderness. Musculoskeletal: She exhibits no edema. Neurological: She is alert. Skin: Skin is dry. Nursing note and vitals reviewed.       ASSESSMENT and PLAN  She does have chest pain to palpation but states that this does not reproduce her symptom. I doubt that she has significant coronary disease given her prior catheterization result but it has been almost six years. She is a difficult historian. I will have her return therefore for a [place] [address] and we will call her regarding the results and any potential need for follow up. Otherwise, I will see her back in six months.

## 2019-11-18 RX ORDER — BACLOFEN 10 MG/1
TABLET ORAL
Qty: 270 TAB | Refills: 0 | Status: SHIPPED | OUTPATIENT
Start: 2019-11-18 | End: 2021-07-23

## 2019-11-22 ENCOUNTER — DOCUMENTATION ONLY (OUTPATIENT)
Dept: NEUROLOGY | Age: 59
End: 2019-11-22

## 2019-11-22 NOTE — PROGRESS NOTES
botox arrived in office today from [address] [place] [address].     Lot #  [id number]   Exp:  5/2022     appt is 12/5/19

## 2019-12-03 ENCOUNTER — TELEPHONE (OUTPATIENT)
Dept: SLEEP MEDICINE | Age: 59
End: 2019-12-03

## 2019-12-03 NOTE — TELEPHONE ENCOUNTER
Patient called stating she hasn't received her PAP Order , yet. Called [facility] Hanna Mallory) waiting on a Prior Auth from Nic. Will call [facility] to check the status.

## 2019-12-05 ENCOUNTER — OFFICE VISIT (OUTPATIENT)
Dept: NEUROLOGY | Age: 59
End: 2019-12-05

## 2019-12-05 ENCOUNTER — OFFICE VISIT (OUTPATIENT)
Dept: FAMILY MEDICINE CLINIC | Age: 59
End: 2019-12-05

## 2019-12-05 VITALS
RESPIRATION RATE: 16 BRPM | DIASTOLIC BLOOD PRESSURE: 75 MMHG | HEIGHT: 66 IN | SYSTOLIC BLOOD PRESSURE: 114 MMHG | HEART RATE: 63 BPM | BODY MASS INDEX: 39.53 KG/M2 | WEIGHT: 246 LBS

## 2019-12-05 VITALS
BODY MASS INDEX: 39.6 KG/M2 | WEIGHT: 246.4 LBS | DIASTOLIC BLOOD PRESSURE: 75 MMHG | HEIGHT: 66 IN | SYSTOLIC BLOOD PRESSURE: 114 MMHG | HEART RATE: 63 BPM

## 2019-12-05 DIAGNOSIS — R06.02 SOB (SHORTNESS OF BREATH): Primary | ICD-10-CM

## 2019-12-05 DIAGNOSIS — E78.00 HIGH CHOLESTEROL: ICD-10-CM

## 2019-12-05 DIAGNOSIS — R53.83 FATIGUE, UNSPECIFIED TYPE: ICD-10-CM

## 2019-12-05 DIAGNOSIS — R73.03 PREDIABETES: ICD-10-CM

## 2019-12-05 DIAGNOSIS — G43.719 INTRACTABLE CHRONIC MIGRAINE WITHOUT AURA AND WITHOUT STATUS MIGRAINOSUS: Primary | ICD-10-CM

## 2019-12-05 PROBLEM — E66.9 OBESITY (BMI 30-39.9): Status: ACTIVE | Noted: 2019-12-05

## 2019-12-05 RX ORDER — TRAZODONE HYDROCHLORIDE 100 MG/1
100 TABLET ORAL
Qty: 30 TAB | Refills: 1 | Status: SHIPPED | OUTPATIENT
Start: 2019-12-05 | End: 2020-11-20

## 2019-12-05 RX ORDER — METFORMIN HYDROCHLORIDE 500 MG/1
500 TABLET, EXTENDED RELEASE ORAL
Qty: 90 TAB | Refills: 1 | Status: SHIPPED | OUTPATIENT
Start: 2019-12-05 | End: 2020-06-12 | Stop reason: SDUPTHER

## 2019-12-05 RX ORDER — OXYCODONE AND ACETAMINOPHEN 7.5; 325 MG/1; MG/1
TABLET ORAL
Refills: 0 | COMMUNITY
Start: 2019-11-16 | End: 2021-07-23 | Stop reason: DRUGHIGH

## 2019-12-05 RX ORDER — MIRTAZAPINE 30 MG/1
TABLET, FILM COATED ORAL
Refills: 2 | COMMUNITY
Start: 2019-11-16 | End: 2019-12-05 | Stop reason: ALTCHOICE

## 2019-12-05 NOTE — PROGRESS NOTES
SSascha Malik is a 62 y.o. female who presents for form completion    Assessment/Plan:     1. SOB (shortness of breath)  -past dx of COPD, no medications currently  - REFERRAL TO PULMONARY DISEASE    2. Fatigue, unspecified type  -possible overmedication  -will reduce trazodone, stop mirtazapine and see if solmence/fatigue improve  - CBC WITH AUTOMATED DIFF  - METABOLIC PANEL, COMPREHENSIVE    3. Depression/Anxiety  -current therapy: mirtazapine 25mg + trazodone 200mg + cymblata 120mg  -PHQ = 10, MARY = 7  -stop mirtazapine, decrease trazodone to 100mg nightly  -consider stopping trazodone and starting amitriptyline 50mg to dep/insomnia/migraine HA (pt to talk to Dr. Sierra Melendez at today's appt about migraine relief)     4. Prediabetes  -no current therapy - pt requesting to restart metformin as helped with weight   -gained 13lbs since 9/2019  -restart: metformin 500mg with dinner   - HEMOGLOBIN A1C WITH EAG    5. Form completion  -[employer] Form ([phone])[employer] forms (fax: [phone]) completed and sent copy to pt via mail. RTC 6 weeks dep/med check        HPI:  [address] form needed  Dr. Kelly Silverman - endocrinology - managing Vit D, thyroid and parathyroid  No dx of COPD in chart, but +SOB - states she can get SOB anytime - not just with exercise - could be watching TV     Gained 13lbs since 9/2019 - pt states she eats too much \"anything I see\" - states metformin helped reduced appetite and would like to go back on it    Goes to [address] - does some exercise there - needs form completed including TB questionnaire  Pt denies night sweats, cough, exposure to TB    +pain in back but more in shoulders     Botox for migraine KEBEDE - sees Dr. Sierra Melendez (has appt today) - advised to discuss amovig for migraines    Concerned that pt appears drowsy. Possible med SE. Advised to stop mirtazapine.    Advised pt to reduce trazodone to 100mg night.  Consider stopping trazodone and starting amitriptyline to help with migraines as well as insomnia and depression - pt to discuss with Dr. Kathie Wilhelm today  Pain mgmt writes opioids     PHQ-9 Score: 10  Over the past 2 weeks, how often have you been bothered by any of the following problems? (Not at all = 0; several days = 1; More than 1/2 the days = 2; nearly every day = 3)  1) Little interest or pleasure in doing things: 1   2) Feeling down, depressed or hopeless:0  3) Trouble falling asleep, staying asleep or sleeping too much:1  4) Feeling tired or having little energy:2  5) Poor appetite or overeatin  6) Feeling bad about yourself - or that you're a failure or have let yourself or your family down:0  7) Trouble concentrating on things, such as reading the newspaper or watching TV: 2  8) Moving or speaking so slowly that other people could have noticed. Or, the opposite - being so fidgety or restless that you have been moving around a lot more than usual:2  9) Thoughts that you would be better off dead or of hurting yourself in some way:0    GAD7 score: 7  Feeling nervous, anxious or on edge:   1  Not being able to stop or control worryin  Worrying too much about different things:1  Trouble relaxin  Being so restless that it is hard to sit still:  1  Becoming easily annoyed or irritable:  1  Feeling afraid as if something awful might happen: 1  If any of the above were scored more than 0, how difficult have these problems made it for you to do your work, take care of things at home, or get along with other people?      Somewhat difficult             Social History:  Nutrition:  Eating more food - not sure why but eats whatever she sees; feels metformin prevented her from eating a lot  Physical: exercise bike at home     Social History     Tobacco Use   Smoking Status Former Smoker    Packs/day: 0.25    Years: 15.00    Pack years: 3.75    Types: Cigarettes    Last attempt to quit: 2007  Years since quittin.4   Smokeless Tobacco Never Used     Social History     Substance and Sexual Activity   Alcohol Use No     Social History     Substance and Sexual Activity   Drug Use No        Review of Systems:  - Constitutional Symptoms: no fevers, chills, weight loss  - Eyes: no blurry vision or double vision  - Cardiovascular: no chest pain or palpitations  - Respiratory: no cough +shortness of breath  - Gastrointestinal: no dysphagia or abdominal pain - hx of bypass  - Integumentary: no rashes  ROS is negative otherwise. I reviewed the following:  Past Medical History:   Diagnosis Date    Advanced care planning/counseling discussion 3/4/16    On File    Bronchitis 2014    Cervicalgia 8/18/15    Dr. Abner Figueroa    Chest pain 5/25/15    Hospitalized at [facility] 5/25/15 (lab work negative)    Chronic indwelling Dunn catheter 2018    Initially placed 2018. Mx by Dr. Irvin Day.  Congestive heart failure, unspecified     Last Echo 2/8/15: EF 55-60%    Constipation 2017    Enthesopathy, spinal ([facility] [address] 8/18/15    Dr. Abner Figueroa    Essential hypertension     Foot drop 8/18/15    Dr. Tierney Mclean Heart attack [facility]) 2013    Was supposed to See Cardiology for possible pacemaker in 2014- After Cardiology consult locally, no need, EF greatly improved.  Established with Dr. Netta Cornejo Hiatal hernia 2015    3 cm hiatal hernia     Hip pain 8/18/15    Dr. Abner Figueroa    Hypercholesterolemia     Hyperglycemia 2015    A1c 5.9     Hyperlipidemia 2015    NMR lipoprofile- LDL P 997, LDL-c 71, HLD-C-39, TG-60, HLD-P (25.2), Small LDL-P -541, LDL size 20.6    Hypothyroid     Insomnia 2017    Lower extremity edema     Lumbar spondylosis 8/18/15    Dr. Rosalia Uriostegui 2015    EGD/Colonscopy 6/15- Gastritis, internal hemorrhoids and 3 polyps    Murmur     EDDIE on CPAP     Was referred to Pulmonology - Uses CPAP    Osteoarthritis of hip 8/18/15    Dr. Tierney Mclean Osteoarthritis, shoulder 8/18/15    Dr. José Luis Iyer    Radiculopathy, cervical 8/18/15    Dr. José Luis Iyer    Shoulder pain 8/18/15    Dr. Eufemia Hoyt Spinal stenosis of cervical region 8/18/15    Dr. Eufemia Hoyt Spinal stenosis, lumbar 8/18/15    Dr. Eufemia Hoyt Stroke [facility]) 2/25/2014    Established with Neurology, Deanna Singh NP-[facility]15. CT negative, but Late effect CV accident with increased tone described on discharge summary, Carotid dopplers showed 50% stenosis bilaterally.  Vitamin D deficiency 7/2015       Current Outpatient Medications   Medication Sig Dispense Refill    mirtazapine (REMERON) 30 mg tablet TAKE 1 TABLET BY MOUTH ONCE DAILY BEFORE BEDTIME  2    oxyCODONE-acetaminophen (PERCOCET 7.5) 7.5-325 mg per tablet TAKE 1 TABLET BY MOUTH 4 TIMES DAILY  0    carvedilol (COREG) 6.25 mg tablet TAKE 1 TABLET BY MOUTH TWICE DAILY WITH MEALS 60 Tab 5    baclofen (LIORESAL) 10 mg tablet TAKE 1 TABLET BY MOUTH THREE TIMES DAILY 270 Tab 0    LINZESS 145 mcg cap capsule TAKE 1 CAPSULE BY MOUTH ONCE DAILY BEFORE BREAKFAST FOR CONSTIPATION 90 Cap 3    traZODone (DESYREL) 100 mg tablet Take 200 mg by mouth daily. At bedtime  2    furosemide (LASIX) 40 mg tablet TAKE 1 TABLET BY MOUTH ONCE DAILY 90 Tab 1    omega-3 acid ethyl esters (LOVAZA) 1 gram capsule Take 2 Caps by mouth two (2) times a day. 120 Cap 2    BOTOX 200 unit injection INJECT INTO THE BILATERAL MUSCLES OF THE HEAD AND NECK EVERY 3 MONTHS FOR MIGRAINES. DISCARD UNUSED PORTIONS 200 Units 3    ergocalciferol (ERGOCALCIFEROL) 50,000 unit capsule Take 1 Cap by mouth every seven (7) days. 12 Cap 1    levothyroxine (SYNTHROID) 125 mcg tablet Take 1 Tab by mouth Daily (before breakfast). 90 Tab 3    pantoprazole (PROTONIX) 40 mg tablet Take 1 Tab by mouth daily. 90 Tab 3    DULoxetine (CYMBALTA) 60 mg capsule Take 2 Caps by mouth daily. 180 Cap 0    losartan (COZAAR) 50 mg tablet Take 1 Tab by mouth nightly.  Hold for SBP<115 90 Tab 1    topiramate (TOPAMAX) 100 mg tablet Take 1 Tab by mouth two (2) times a day. 180 Tab 2    cyanocobalamin (VITAMIN B12) 1,000 mcg/mL injection INJECT 1 ML UNDER THE SKIN EVERY 14 DAYS FOR B12 DEFICIENCY 1 mL 1    calcipotriene (DOVONEX) 0.005 % topical cream Apply  to affected area daily as needed. 60 g 11    lidocaine (LIDODERM) 5 % Apply patch to the affected area for 12 hours a day and remove for 12 hours a day. 90 Each 3    albuterol-ipratropium (DUO-NEB) 2.5 mg-0.5 mg/3 ml nebu 3 mL by Nebulization route every six (6) hours as needed. 30 Nebule 0    albuterol (PROVENTIL HFA, VENTOLIN HFA, PROAIR HFA) 90 mcg/actuation inhaler Take 2 Puffs by inhalation every four (4) hours as needed for Wheezing or Shortness of Breath. 3 Inhaler 3    ferrous sulfate 325 mg (65 mg iron) tablet Take 325 mg by mouth Daily (before breakfast).  aspirin delayed-release 81 mg tablet Take 81 mg by mouth daily.  HYDROcodone-acetaminophen (NORCO) 5-325 mg per tablet Take 1 Tab by mouth every six (6) hours as needed for Pain. Max Daily Amount: 4 Tabs. 20 Tab 0       Allergies   Allergen Reactions    Bees [Hymenoptera Allergenic Extract] Shortness of Breath and Swelling    Strawberry Shortness of Breath and Swelling    Lisinopril Cough        O: VS:   Visit Vitals  /75 (BP 1 Location: Left arm, BP Patient Position: Sitting)   Pulse 63   Ht 5' 6\" (1.676 m)   Wt 246 lb 6.4 oz (111.8 kg)   BMI 39.77 kg/m²         GENERAL: Jo-Ann Membreno is in no acute distress. Non-toxic. Well nourished. HEAD:  Normocephalic. Atraumatic. Non tender sinuses x 4. RESP: Breath sounds are symmetrical bilaterally. Unlabored without SOB. Speaking in full sentences. Clear to auscultation bilaterally anteriorly and posteriorly. No wheezes. No rales or rhonchi. CV: normal rate. Regular rhythm. S1, S2 audible. No murmur noted. No rubs, clicks or gallops noted. MUSC:  Intact x 4 extremities.   Decreased ROM x 4 extremities. No pain with movement. HEME/LYMPH: peripheral pulses palpable 2+ x 4 extremities. No peripheral edema is noted. Brace on left LE  PSYCH: appropriate behavior, dress and thought processes. Good eye contact. Appears drowsy. ___________________________________________________________________  I spent >35 minutes face to face with patient with >50% of time spent in counseling and coordinating care. Patient education was done. Advised on nutrition, physical activity, weight management, tobacco, alcohol and safety. Counseling included discussion of diagnosis, differentials, treatment options, prescribed treatment, warning signs and follow up. Medication risks/benefits, interactions and alternatives discussed with patient.      Patient verbalized understanding and agreed to plan of care. Patient was given an after visit summary which included current diagnoses, medications and vital signs. Follow up as directed.

## 2019-12-05 NOTE — PROGRESS NOTES
Tha Reynoso is a 62 y.o. female  Chief Complaint   Patient presents with    Diabetes     3 month follow up    Weight Management    Hypertension     Visit Vitals  /75 (BP 1 Location: Left arm, BP Patient Position: Sitting)   Pulse 63   Ht 5' 6\" (1.676 m)   Wt 246 lb 6.4 oz (111.8 kg)   BMI 39.77 kg/m²     1. Have you been to the ER, urgent care clinic since your last visit? Hospitalized since your last visit? NO    2. Have you seen or consulted any other health care providers outside of the [address] [place] [address] since your last visit? Include any pap smears or colon screening. [facility] on [address] [place] [address] 12/3/19. Dr. Lolita Hand- November 2019 for depression.

## 2019-12-05 NOTE — PROGRESS NOTES
Botox Injection Note     2019     Patient:  Kamar Malik   YOB: 1960  Date of Visit: 2019      Indication: patient has chronic migraine headaches greater than 15 days per month lasting more than 4 hours each. She has failed or not tolerated 2 or more medication preventatives. Written consent was signed and verified by me. Risks and benefits were discussed to include possible cosmetic asymmetry which is not permament or life threatening. Patient name and  was confirmed prior to procedure. Time out performed. Procedure:   Botox concentration: 200 units in 2 ml of preservative-free normal saline. 1:1 dilution. LOT#: [id number]  EXP:  2022    Injections and distribution as follow :      Units/site  Sites Loc Subtotal    procerus 5 1 ML 5   corrugaters 2.5 2 BL 5   frontalis 5 8 BL 40   Temporalis 10 4 BL 40   Occipitalis 10 2 BL 20   Cervical paraspinals 5 4 BL 20   Trapezius 10 4 BL 40         200 units Botox were reconstituted, 170 units injected as above and the remainder was unavoidably wasted. Patient tolerated procedure well. Return in 3 months for repeat injections.     _____________________________   Jeremiah Rojas DO  [facility], ABPN

## 2019-12-05 NOTE — PATIENT INSTRUCTIONS
1) Healthy Weight Body Mass Index is a noninvasive way to screen for weight and body fat. This is a mathematical calculation based on your height and weight. A healthy BMI is between 20% -24.9%. Your BMI is 39 %. There is a relationship between high BMI and various healthy problems, such as osteoarthritis, muscle pain, increased risk of cancer, diabetes, heart disease, stroke, hypertension, high cholesterol, sleep apnea, breathing problems, depression, which is why a healthy BMI is so important. In terms of weight loss, a 5-10% reduction in body weight over 3-6 months is a reasonable goal.  There would likely be improvements in risk for disease such as diabetes and heart disease, with this amount of weight loss. In order to lose weight, try reducing your daily intake of calories by decreasing portion size of food and increase exercise to help reduce weight. Eat 3-5 small meals per day instead of 3 large meals. A good tip to losing weight : DON'T EAT OR DRINK ANYTHING AFTER DINNER! Choose lean meats for protein source which include chicken, pork, and turkey. The recommended serving size for protein is a 2-3 oz serving (the size of your fist), and 1-1.5 oz of carbohydrate per meal (about 1 cup). Increase servings of fruits and vegetables. Limit processed carbohydrates, (i.e. most breads, crackers, pasta, chips, rice, breaded or battered food, etc). If you choose to eat carbohydrates, whole wheat, (instead of white) is a healthier option for bread, rice, and crackers. Avoid fried foods. Limit sugar. Do your best to avoid all sweetened beverages, such as alcohol, juice, sodas or sweet teas, drink water, unsweet tea, diet soda, or crystal light as options instead. (Don't drink more than 2 of the 12oz artificially sweetened drinks per day as these can increase hunger and make it more difficult to lose weight. The daily goal for water intake should be at least 64 oz/day for most people. Daily exercise will also help with weight loss and overall health. A minimum of 150 minutes a week of moderate exercise is recommended (30 minutes per day). Make exercise a routine part of your day - for example, park in spaces far away from [facility], take stairs instead of elevator, if sitting for long periods, get up from chair and walk every hour. Recruit a friend or relative to exercise with you and keep you on schedule. It is much easier to exercise with a vikas who will make sure you work out each day! Home DVDs can be a great way to work out, if you will do them (otherwise, they aren't worth the money!) Nominum is a eight week mixed martial arts (MMA) based workout. It has 5 main workout DVDs, each one is 45 minutes consisting of a 10 minute warm-up, five 5 minute workouts and a 6 minute stretching/cool down. It is easy to follow, with options to modify each move and you only need hand weights and some space to do the work out. Meal planning smart phone applications like Humacyte can help plan healthy meals. Get 7-8 hours of sleep each night. For reliable dietary information, go to www. EATRIGHT.org. 
 
You may wish to consider seeing the nutritionist at [facility] ([phone]), [facility] ([phone]) or [place] ([phone]). Alternatively, you can dial [employer] at [phone] (Monday - Friday 9am - 5pm) for a complimentary (free) conversation about your diet. Meal plans, grocery list and tips for healthy eating can be sent to you upon request.  
 
Free smart phone application to help manage weight loss: MyFitnessPal = tracks food intake, exercise and weight. Daily nutritional summary. Meal  2) Amovig is a new medication to help with migraines. Talk to you neurology about this if botox isn't keeping migraines away   I would like to stop the trazodone and try amitriptyline since this medication can help with depression, insomnia and migraine headaches. Ask your neurologist about this as well. Also find out about stopping topiramate. 3)  You seem to have low energy and are very tired. I would like to reduce some of your medication to see if you get more energy. You are currently taking 3 medications for depression - cymbalta, mirtazapine and trazodone. Stop taking mirtazapine. Reduce trazodone to 100mg at bedtime. Please let me know how you feel with this reduced amount of medication. 4) Referral to pulmonology to assess shortness of breath 5) Will restart 1 tab of 500mg metformin. 6) Will complete Short Fuze forms and MindBodyGreen forms and mail a copy to you. 7) Labs The following blood work was ordered today. Once the tests are completed, you will receive a letter, email or phone call with the results. If you have not heard from us within 10-14 days, please call the office for the results. Complete Blood Count (CBC) helps evaluate your overall health, including hemoglobin and red blood cells that carry oxygen, white blood cells that fight infection and platelets that help with clotting. Complete Metabolic Panel (CMP) assesses electrolytes, kidney and liver function.  (A Basic Metabolic Panel (BMP) does not include liver function.) Electrolytes include sodium, potassium, calcium, and chloride. These are necessary for cell function and an imbalance can cause serious problems. BUN and creatinine are markers of kidney function. ALT and AST are markers of liver function. Hemoglobin A1c is a 3 month average of your blood sugar and used as a marker of your diabetes control. A normal value is less than 5.7%. Total Cholesterol is the total number of cholesterol particles in your blood, which includes triglycerides, HDL and LDL. A small amount of cholesterol is needed for the body's cells, hormones, and metabolism. Goal is less than 200. Triglycerides are the short term fats in your blood which are used for energy. Goal is less than 150. High Density Lipids (HDL) is the \"good\" cholesterol in your blood. HDL helps remove bad cholesterol from your blood. Goal is more than 40. Low Density Lipids (LDL) is the \"bad\" cholesterol in your blood. LDL can stick to your arteries, raising the risk for heart attack and stroke. Goal is less than 100 Learning About the [address] Diet What is the Mediterranean diet? The Mediterranean diet is a style of eating rather than a diet plan. It features foods eaten in [place], [place], [place] and [place], and other countries along the [facility]. It emphasizes eating foods like fish, fruits, vegetables, beans, high-fiber breads and whole grains, nuts, and olive oil. This style of eating includes limited red meat, cheese, and sweets. Why choose the Mediterranean diet? A Mediterranean-style diet may improve heart health. It contains more fat than other heart-healthy diets. But the fats are mainly from nuts, unsaturated oils (such as fish oils and olive oil), and certain nut or seed oils (such as canola, soybean, or flaxseed oil). These fats may help protect the heart and blood vessels. How can you get started on the Mediterranean diet? Here are some things you can do to switch to a more Mediterranean way of eating. What to eat · Eat a variety of fruits and vegetables each day, such as grapes, blueberries, tomatoes, broccoli, peppers, figs, olives, spinach, eggplant, beans, lentils, and chickpeas. · Eat a variety of whole-grain foods each day, such as oats, brown rice, and whole wheat bread, pasta, and couscous. · Eat fish at least 2 times a week. Try tuna, salmon, mackerel, lake trout, herring, or sardines. · Eat moderate amounts of low-fat dairy products, such as milk, cheese, or yogurt. · Eat moderate amounts of poultry and eggs. · Choose healthy (unsaturated) fats, such as nuts, olive oil, and certain nut or seed oils like canola, soybean, and flaxseed. · Limit unhealthy (saturated) fats, such as butter, palm oil, and coconut oil. And limit fats found in animal products, such as meat and dairy products made with whole milk. Try to eat red meat only a few times a month in very small amounts. · Limit sweets and desserts to only a few times a week. This includes sugar-sweetened drinks like soda. The Mediterranean diet may also include red wine with your meal1 glass each day for women and up to 2 glasses a day for men. Tips for eating at home · Use herbs, spices, garlic, lemon zest, and citrus juice instead of salt to add flavor to foods. · Add avocado slices to your sandwich instead of garcia. · Have fish for lunch or dinner instead of red meat. Brush the fish with olive oil, and broil or grill it. · Sprinkle your salad with seeds or nuts instead of cheese. · Cook with olive or canola oil instead of butter or oils that are high in saturated fat. · Switch from 2% milk or whole milk to 1% or fat-free milk. · Dip raw vegetables in a vinaigrette dressing or hummus instead of dips made from mayonnaise or sour cream. 
· Have a piece of fruit for dessert instead of a piece of cake. Try baked apples, or have some dried fruit. Tips for eating out · Try broiled, grilled, baked, or poached fish instead of having it fried or breaded. · Ask your  to have your meals prepared with olive oil instead of butter. · Order dishes made with marinara sauce or sauces made from olive oil. Avoid sauces made from cream or mayonnaise. · Choose whole-grain breads, whole wheat pasta and pizza crust, brown rice, beans, and lentils. · Cut back on butter or margarine on bread. Instead, you can dip your bread in a small amount of olive oil. · Ask for a side salad or grilled vegetables instead of french fries or chips. Where can you learn more? Go to http://noa-em.info/. Enter [phone] in the search box to learn more about \"Learning About the Mediterranean Diet. \" Current as of: November 7, 2018 Content Version: 12.2 © 8025-8688 [employer], [employer]orporated. Care instructions adapted under license by [employer]enSa (which disclaims liability or warranty for this information). If you have questions about a medical condition or this instruction, always ask your healthcare professional. [place] [address] any warranty or liability for your use of this information.

## 2019-12-07 ENCOUNTER — DOCUMENTATION ONLY (OUTPATIENT)
Dept: SLEEP MEDICINE | Age: 59
End: 2019-12-07

## 2019-12-09 ENCOUNTER — TELEPHONE (OUTPATIENT)
Dept: NEUROLOGY | Age: 59
End: 2019-12-09

## 2019-12-09 NOTE — TELEPHONE ENCOUNTER
I cannot locate anywhere in the patient's chart that either of these medications have been prescribed.

## 2019-12-10 NOTE — TELEPHONE ENCOUNTER
She is not on either one. We gave her paperwork to decide which one she wants. A PA is required for both of them. Please pick the one you want her to have.

## 2019-12-11 LAB
ALBUMIN SERPL-MCNC: 3.9 G/DL (ref 3.6–4.8)
ALBUMIN/GLOB SERPL: 1.6 {RATIO} (ref 1.2–2.2)
ALP SERPL-CCNC: 139 IU/L (ref 39–117)
ALT SERPL-CCNC: 77 IU/L (ref 0–32)
AST SERPL-CCNC: 57 IU/L (ref 0–40)
BASOPHILS # BLD AUTO: 0.1 X10E3/UL (ref 0–0.2)
BASOPHILS NFR BLD AUTO: 1 %
BILIRUB SERPL-MCNC: 0.4 MG/DL (ref 0–1.2)
BUN SERPL-MCNC: 30 MG/DL (ref 8–27)
BUN/CREAT SERPL: 22 (ref 12–28)
CALCIUM SERPL-MCNC: 8.7 MG/DL (ref 8.7–10.3)
CHLORIDE SERPL-SCNC: 102 MMOL/L (ref 96–106)
CHOLEST SERPL-MCNC: 194 MG/DL (ref 100–199)
CO2 SERPL-SCNC: 24 MMOL/L (ref 20–29)
CREAT SERPL-MCNC: 1.35 MG/DL (ref 0.57–1)
EOSINOPHIL # BLD AUTO: 0.2 X10E3/UL (ref 0–0.4)
EOSINOPHIL NFR BLD AUTO: 5 %
ERYTHROCYTE [DISTWIDTH] IN BLOOD BY AUTOMATED COUNT: 12.7 % (ref 12.3–15.4)
EST. AVERAGE GLUCOSE BLD GHB EST-MCNC: 111 MG/DL
GLOBULIN SER CALC-MCNC: 2.5 G/DL (ref 1.5–4.5)
GLUCOSE SERPL-MCNC: 89 MG/DL (ref 65–99)
HBA1C MFR BLD: 5.5 % (ref 4.8–5.6)
HCT VFR BLD AUTO: 37.3 % (ref 34–46.6)
HDLC SERPL-MCNC: 64 MG/DL
HGB BLD-MCNC: 12.1 G/DL (ref 11.1–15.9)
IMM GRANULOCYTES # BLD AUTO: 0 X10E3/UL (ref 0–0.1)
IMM GRANULOCYTES NFR BLD AUTO: 0 %
INTERPRETATION, 910389: NORMAL
INTERPRETATION: NORMAL
LDLC SERPL CALC-MCNC: 118 MG/DL (ref 0–99)
LYMPHOCYTES # BLD AUTO: 1.8 X10E3/UL (ref 0.7–3.1)
LYMPHOCYTES NFR BLD AUTO: 43 %
MCH RBC QN AUTO: 29.2 PG (ref 26.6–33)
MCHC RBC AUTO-ENTMCNC: 32.4 G/DL (ref 31.5–35.7)
MCV RBC AUTO: 90 FL (ref 79–97)
MONOCYTES # BLD AUTO: 0.7 X10E3/UL (ref 0.1–0.9)
MONOCYTES NFR BLD AUTO: 16 %
NEUTROPHILS # BLD AUTO: 1.4 X10E3/UL (ref 1.4–7)
NEUTROPHILS NFR BLD AUTO: 35 %
PDF IMAGE, 910387: NORMAL
PLATELET # BLD AUTO: 194 X10E3/UL (ref 150–450)
POTASSIUM SERPL-SCNC: 4.7 MMOL/L (ref 3.5–5.2)
PROT SERPL-MCNC: 6.4 G/DL (ref 6–8.5)
RBC # BLD AUTO: 4.14 X10E6/UL (ref 3.77–5.28)
SODIUM SERPL-SCNC: 141 MMOL/L (ref 134–144)
TRIGL SERPL-MCNC: 59 MG/DL (ref 0–149)
VLDLC SERPL CALC-MCNC: 12 MG/DL (ref 5–40)
WBC # BLD AUTO: 4.1 X10E3/UL (ref 3.4–10.8)

## 2019-12-12 DIAGNOSIS — E78.00 HIGH CHOLESTEROL: Primary | ICD-10-CM

## 2019-12-12 DIAGNOSIS — R94.4 DECREASED GFR: ICD-10-CM

## 2019-12-12 RX ORDER — ATORVASTATIN CALCIUM 40 MG/1
40 TABLET, FILM COATED ORAL DAILY
Qty: 90 TAB | Refills: 1 | Status: SHIPPED | OUTPATIENT
Start: 2019-12-12 | End: 2020-07-09

## 2019-12-12 NOTE — PROGRESS NOTES
Elevated lfts, BUN and Cr and low GFR; advised pt to speak to Dr. Shilpa Frederick about pain meds, pt very sleepy and c/o fatigue at [address] Advised to decrease NSAIDs and recheck BMP in 1 month. LDL - 118, hx of stroke, start atorvastatin 40mg; recheck XOL and lfts in 3 months. Result ltr mailed.

## 2019-12-16 ENCOUNTER — TELEPHONE (OUTPATIENT)
Dept: CARDIOLOGY CLINIC | Age: 59
End: 2019-12-16

## 2019-12-16 NOTE — TELEPHONE ENCOUNTER
Called patient. Verified patient's identity with two identifiers. Notified patient of results and Dr. Kelli Peñaloza message. Patient will keep scheduled appointment and denied further questions or concerns.

## 2019-12-16 NOTE — TELEPHONE ENCOUNTER
----- Message from Lance Crenshaw MD sent at 12/16/2019  9:40 AM EST -----  Stress test OK can keep appt.  For 5 mos.  ----- Message -----  From: Adeel Diana MD  Sent: 12/13/2019   4:20 PM EST  To: Lance Crenshaw MD

## 2019-12-30 RX ORDER — TOPIRAMATE 100 MG/1
TABLET, FILM COATED ORAL
Qty: 180 TAB | Refills: 0 | Status: SHIPPED | OUTPATIENT
Start: 2019-12-30 | End: 2020-03-23

## 2019-12-31 ENCOUNTER — TELEPHONE (OUTPATIENT)
Dept: NEUROLOGY | Age: 59
End: 2019-12-31

## 2019-12-31 NOTE — TELEPHONE ENCOUNTER
Re: Emgality Approved    Approval rec'd from [id number] [facility]    Ref#: [id number]  10/01/2019-12/30/2020    Fax sent to [facility]

## 2020-01-02 ENCOUNTER — DOCUMENTATION ONLY (OUTPATIENT)
Dept: NEUROLOGY | Age: 60
End: 2020-01-02

## 2020-01-02 NOTE — PROGRESS NOTES
Pt stopped by to get instructions on how to give herself her Emgality. I walked her through the procedure. She did an excellent job of administering herself the medication. The lot# on her med was [id number]  And it expires on 5/2021.

## 2020-01-12 DIAGNOSIS — R94.4 DECREASED GFR: ICD-10-CM

## 2020-01-18 LAB
BUN SERPL-MCNC: 22 MG/DL (ref 8–27)
BUN/CREAT SERPL: 22 (ref 12–28)
CHLORIDE SERPL-SCNC: 106 MMOL/L (ref 96–106)
CO2 SERPL-SCNC: 24 MMOL/L (ref 20–29)
CREAT SERPL-MCNC: 1.01 MG/DL (ref 0.57–1)
GLUCOSE SERPL-MCNC: 105 MG/DL (ref 65–99)
POTASSIUM SERPL-SCNC: 4.4 MMOL/L (ref 3.5–5.2)
SODIUM SERPL-SCNC: 141 MMOL/L (ref 134–144)

## 2020-01-30 ENCOUNTER — OFFICE VISIT (OUTPATIENT)
Dept: FAMILY MEDICINE CLINIC | Age: 60
End: 2020-01-30

## 2020-01-30 VITALS
OXYGEN SATURATION: 97 % | HEART RATE: 65 BPM | SYSTOLIC BLOOD PRESSURE: 114 MMHG | DIASTOLIC BLOOD PRESSURE: 65 MMHG | WEIGHT: 243.9 LBS | RESPIRATION RATE: 16 BRPM | HEIGHT: 65 IN | TEMPERATURE: 96.7 F | BODY MASS INDEX: 40.64 KG/M2

## 2020-01-30 DIAGNOSIS — I69.359 HEMIPLEGIA, DOMINANT SIDE S/P CVA (CEREBROVASCULAR ACCIDENT) (HCC): Primary | ICD-10-CM

## 2020-01-30 DIAGNOSIS — K21.9 GASTROESOPHAGEAL REFLUX DISEASE, ESOPHAGITIS PRESENCE NOT SPECIFIED: ICD-10-CM

## 2020-01-30 DIAGNOSIS — R53.83 FATIGUE, UNSPECIFIED TYPE: ICD-10-CM

## 2020-01-30 RX ORDER — ERGOCALCIFEROL 1.25 MG/1
50000 CAPSULE ORAL
Qty: 12 CAP | Refills: 1 | Status: SHIPPED | OUTPATIENT
Start: 2020-01-30 | End: 2020-11-23 | Stop reason: SDUPTHER

## 2020-01-30 RX ORDER — MIRTAZAPINE 30 MG/1
TABLET, FILM COATED ORAL
COMMUNITY
Start: 2020-01-11 | End: 2020-01-30

## 2020-01-30 RX ORDER — DICLOFENAC SODIUM 10 MG/G
GEL TOPICAL
COMMUNITY
Start: 2019-12-12

## 2020-01-30 RX ORDER — BLOOD-GLUCOSE METER
KIT MISCELLANEOUS
Refills: 3 | COMMUNITY
Start: 2019-12-03

## 2020-01-30 RX ORDER — PANTOPRAZOLE SODIUM 40 MG/1
40 TABLET, DELAYED RELEASE ORAL DAILY
Qty: 90 TAB | Refills: 3 | Status: SHIPPED | OUTPATIENT
Start: 2020-01-30 | End: 2021-05-11 | Stop reason: SDUPTHER

## 2020-01-30 RX ORDER — LANCETS 28 GAUGE
EACH MISCELLANEOUS
COMMUNITY
Start: 2020-01-21

## 2020-01-30 RX ORDER — CYANOCOBALAMIN 1000 UG/ML
INJECTION, SOLUTION INTRAMUSCULAR; SUBCUTANEOUS
Qty: 1 ML | Refills: 1
Start: 2020-01-30 | End: 2020-04-30 | Stop reason: SDUPTHER

## 2020-01-30 RX ORDER — HYDROXYZINE 50 MG/1
TABLET, FILM COATED ORAL
COMMUNITY
Start: 2020-01-18 | End: 2022-02-11 | Stop reason: SDUPTHER

## 2020-01-30 RX ORDER — CICLOPIROX 80 MG/ML
SOLUTION TOPICAL
COMMUNITY
Start: 2020-01-17

## 2020-01-30 RX ORDER — BLOOD-GLUCOSE METER
KIT MISCELLANEOUS
Refills: 0 | COMMUNITY
Start: 2019-12-03

## 2020-01-30 NOTE — PROGRESS NOTES
Room 10    Identified pt with two pt identifiers(name and ). Reviewed record in preparation for visit and have obtained necessary documentation. All patient medications has been reviewed. Chief Complaint   Patient presents with    Weight Management    Depression    Labs    Shoulder Pain     left shoulder. Health Maintenance Due   Topic    Shingrix Vaccine Age 50> (1 of 2)    MEDICARE YEARLY EXAM     PAP AKA CERVICAL CYTOLOGY        Vitals:    20 0944   BP: 114/65   Pulse: 65   Resp: 16   Temp: 96.7 °F (35.9 °C)   TempSrc: Oral   SpO2: 97%   Weight: 243 lb 14.4 oz (110.6 kg)   Height: 5' 4.69\" (1.643 m)   PainSc:   6   PainLoc: Shoulder       Wt Readings from Last 3 Encounters:   20 243 lb 14.4 oz (110.6 kg)   19 246 lb (111.6 kg)   19 246 lb (111.6 kg)     Temp Readings from Last 3 Encounters:   20 96.7 °F (35.9 °C) (Oral)   19 96.5 °F (35.8 °C) (Oral)   19 98.6 °F (37 °C)     BP Readings from Last 3 Encounters:   20 114/65   19 114/75   19 114/75     Pulse Readings from Last 3 Encounters:   20 65   19 63   19 63       Lab Results   Component Value Date/Time    Hemoglobin A1c 5.5 12/10/2019 08:23 AM    Hemoglobin A1c (POC) 5.3 2018 09:15 AM       Coordination of Care Questionnaire:   1) Have you been to an emergency room, urgent care, or hospitalized since your last visit?   no       2. Have seen or consulted any other health care provider since your last visit? YES  19 Neurology  3) Do you have an Advanced Directive/ Living Will in place? YES  If yes, do we have a copy on file YES  If no, would you like information NO    Patient is accompanied by self I have received verbal consent from Patricia Iyer to discuss any/all medical information while they are present in the room.

## 2020-01-30 NOTE — PATIENT INSTRUCTIONS
1)  Clinical depression is a medical condition that goes beyond everyday sadness. Depression may cause serious, long-lasting symptoms and often disrupts a persons ability to perform routine tasks. The disorder is the most common psychiatric disorder worldwide. In the [place], about one in six people experiences a bout of clinical depression in their lifetime. There are multiple therapies available to help with depression including psychotherapy, exercise (aerobic exercise and yoga are highly recommended), proper diet and sleep as well as medications. Research shows that psychotherapy and antidepressants may be the best therapies for depression. Please look for a therapist.  Check with your insurance company for referrals for providers in the area that will be covered under your plan. · Keep the numbers for these national suicide hotlines: 8-492-465-TALK (6-882.422.8241) and 6-427-KGEUQWK (6-510.910.9759). If you or someone you know talks about suicide or feeling hopeless, get help right away. ·  
Steps you can do on your own to feel better: 
Deep breathing exercises: Deep breathing triggers a relaxation response, helping to change from the \"fight-or-flight\" response anxiety brings on. Inhale slowly to a count of 4, starting at your belly and then moving into your chest.  Gently hold your breath for 4 counts, then slowly exhale to 4 counts. \"Clench\" exercise - clench various zones in your body for 30 seconds, then an overall body clench Exercise can help many people feel less anxious. Regular cardiovascular exercise (such as fast walking,) release endorphins - the \"feel good\" hormone in our body - and can reduce anxiety. Proper sleep:  Sleep is important to overall health. Not getting enough sleep can cause fatigue, inattention, and irritabililty, causing anxiety levels to increase.  
Healthy Diet: a healthy diet rich in whole grains, vegetables and fruits is healthier than simple carbohydrates found in processed foods. Skipping meals can cause blood sugar to drop and cause jittery feelings that could worsening underlying anxiety. It also a good idea to cut down on or stop drinking coffee and other sources of caffeine. Caffeine can make anxiety worse. Find \"Me\" time - it is important to take some time just to focus on you and help alleviate stress in daily life. This could be daily exercise, walking the dog, sitting in a quiet place without distraction, meditation, etc.  
 
Medical treatments include: ? Psychotherapy  Psychotherapy involves meeting with a mental health counselor to talk about your feelings, relationships, and worries. Therapy can help you find new ways of thinking about your situation so that you feel less anxious. In therapy, you might also learn new skills to reduce anxiety. You can go to Psychology Today's website to find a counselor. · Go to www. Brand.net. OneSpot 
· Enter your zip code. · Click on your insurance carrier (usually on left side of screen). · Then click any other parameters you desire. This will result in a list of providers. Click on any provider to learn more about them or see the contact information. Please choose a provider, call them, and schedule an appointment. ?Medicines  Medicines used to treat depression and can relieve anxiety. Some people have psychotherapy and take medicines at the same time. Phone apps that can help with anxiety: 
CALM - Use the principles of mindfulness and meditation to ease your mind and keep anxiety at [facility] [address]. The Niko Niko interface is just the beginning. Once it opens, there are relaxing sounds and sleep stories. Enjoy guided meditations at various lengths to help with everything from building self-esteem to calming anxiety. VINNIE - Self-help for Anxiety Management - range of self-help methods.  Helps you understand what causes your anxiety, monitor your anxious thoughts and behavior over time and manage your anxiety through self-help exercises and private reflection. SIMPLE HABIT - guided meditation for anxiety relief BOOSTERBUDDY This kate offers a novel way for teens and young adults to improve their resilience and work toward being healthier both physically and emotionally. 7 CUPS 
7 Cups uses trained, volunteer, active listeners to provide free, anonymous, and confidential emotional support to people needing help coping with acute stressors and long-term mental health issues. [address] The MoodTools and FearTools apps provide people with quick resources for tracking their cognitive distortions, activities, and safety plan in case of an emergency. SLEEPBOT SleepBot is a quick and simple way for people to log their sleep and improve their sleep hygiene. [address] UP? [facility] KATE The [facility] Up? kate helps people monitor their mood and uses principles of CBT and acceptance commitment therapy (ACT) to help people reframe their thoughts and cognitive distortions. [address] [facility]: [phone] Crisis: [phone] [address] [facility]: [phone] Crisis: [phone] [address] [facility]: [phone] Crisis: [phone] 2) Rx for walker and rollator given 3) Check with doctor who told you to restrict fluid to see if this is still a concern. Dehydration can cause dizziness and blood pressure inconsistencies. Low Blood Pressure: Care Instructions Your Care Instructions Blood pressure is a measurement of the force of the blood against the walls of the blood vessels during and after each beat of the heart. Low blood pressure is also called hypotension.  It means that your blood pressure is much lower than normal. Some people, especially young, slim women, may have slightly low blood pressure without symptoms. But in many people, low blood pressure can cause symptoms such as feeling dizzy or lightheaded. When your blood pressure is too low, your heart, brain, and other organs do not get enough blood. Low blood pressure can be caused by many things, including heart problems and some medicines. Diabetes that is not under control can cause your blood pressure to drop. And so can a severe allergic reaction or infection. Another cause is dehydration, which is when your body loses too much fluid. Treatment for low blood pressure depends on the cause. Follow-up care is a key part of your treatment and safety. Be sure to make and go to all appointments, and call your doctor if you are having problems. It's also a good idea to know your test results and keep a list of the medicines you take. How can you care for yourself at home? · Drink plenty of fluids, enough so that your urine is light yellow or clear like water. If you have kidney, heart, or liver disease and have to limit fluids, talk with your doctor before you increase the amount of fluids you drink. · Be safe with medicines. Call your doctor if you think you are having a problem with your medicine. You will get more details on the specific medicines your doctor prescribes. · Stand up or get out of bed very slowly to allow your body to adjust. 
· Get plenty of rest. 
· Do not smoke. Smoking increases your risk of heart attack. If you need help quitting, talk to your doctor about stop-smoking programs and medicines. These can increase your chances of quitting for good. · Limit alcohol to 2 drinks a day for men and 1 drink a day for women. Alcohol may interfere with your medicine. In addition, alcohol can make your low blood pressure worse by causing your body to lose water. When should you call for help? Call 911 anytime you think you may need emergency care. For example, call if:   · You passed out (lost consciousness).  
 Call your doctor now or seek immediate medical care if: 
  · You are dizzy or lightheaded, or you feel like you may faint.  
 Watch closely for changes in your health, and be sure to contact your doctor if you have any problems. Where can you learn more? Go to http://noa-em.info/. Enter C304 in the search box to learn more about \"Low Blood Pressure: Care Instructions. \" Current as of: April 9, 2019 Content Version: 12.2 © 8044-3668 Electronic Brailler, Incorporated. Care instructions adapted under license by StartupDigest (which disclaims liability or warranty for this information). If you have questions about a medical condition or this instruction, always ask your healthcare professional. [address] any warranty or liability for your use of this information.

## 2020-01-30 NOTE — PROGRESS NOTES
S: Shea Oneal is a 62 y.o. female who presents for depression/anxiety follow up    Assessment/Plan:    1. Depression/Anxiety  -current therapy: trazodone 100mg + cymbalta 120mg (2 tabs 60mg)   -PHQ = MARY = (P = 10, MARY = 7 @OV 12/5/19)  -pt to talk to pain mgmt 2/6/20 about stopping cymbalta and starting venalfaxine instead    2. Hemiplegia, dominant side S/P CVA (cerebrovascular accident) ([facility] [address]  -FMLA form requested for pt's sister, Ophelia Trevino (pt of Dr. Brittney Youssef who helps with pt's care) - advised form submitted was incorrect one, gave pt copy of FMLA for family member; pt will drop it off for completion once sister signs/completes her section  - miscellaneous medical supply misc; Rollator Dx:I69.359  Dispense: 1 Each; Refill: 0  - miscellaneous medical supply misc; Walker Dx: G26.257  Dispense: 1 Each; Refill: 0    3. Hx of Gastric bypass  - pantoprazole (PROTONIX) 40 mg tablet;   -Vit D 50,000 q7days  -Vit B 1,000mcg/mL inj    4.  Dizziness, labile BP   -BP today = 114/65, p=65; sawa Dr. Mckenzie Lyman (cards 12/12/19, exam= wnl)   -advised to f/u with physician who put her on fluid restriction    RTC 3 months for MWV/dep check      HPI:  Gastric bypass 1980's; hypothyroid (post surgical)  \"Not so good\" - has days when she is dizzy -   BP at home 140/100 and then will go to 90/50 in a few minutes; not related to blood sugar; has eaten; no pattern - can happen at any time of the day  BP - 114/65 today   Went to cards 12/12/19 - ECHO - was fine, Dr. Mckenzie Lyman told her it wasn't her heart causing issue  When dizzy, will sit still and will go away [place] quickly\"  Pt thinks it could be anxiety related    Asking about FMLA for Ophelia Trevino (sister who needs to leave work to come help pt)     Walker and rollator are broken -  Requesting new rx     On fluid restriction - 33oz - not sure who put her on it  Advised her to find out if Dr. Mckenzie Lyman or Dr. Jose Antonio Emery did and verify that she is supposed to be on restrictions    Current therapy: trazodone 100mg + cymbalta 120mg (2 tabs 60mg)   Not sure it is doing anything     No anxiety  + excessive fatigue  No panic attacks  + sleep disturbance - left shoulder pain, get up to urinate in night (as an indwelling catheter)    No  crying spells   No delusions, hallucinations or SI/HI    No suicidal ideation. No homicidal ideation. 3 most recent PHQ Screens 2019   Little interest or pleasure in doing things Not at all   Feeling down, depressed, irritable, or hopeless Not at all   Total Score PHQ 2 0     PHQ-9 Score: 6  Over the past 2 weeks, how often have you been bothered by any of the following problems? (Not at all = 0; several days = 1; More than 1/2 the days = 2; nearly every day = 3)  1) Little interest or pleasure in doing things:  1  2) Feeling down, depressed or hopeless:0  3) Trouble falling asleep, staying asleep or sleeping too much:0  4) Feeling tired or having little energy:1  5) Poor appetite or overeating:3  6) Feeling bad about yourself - or that you're a failure or have let yourself or your family down:0  7) Trouble concentrating on things, such as reading the newspaper or watching TV: 0  8) Moving or speaking so slowly that other people could have noticed. Or, the opposite - being so fidgety or restless that you have been moving around a lot more than usual:1  9) Thoughts that you would be better off dead or of hurting yourself in some way:0    GAD7 score: 6  Feeling nervous, anxious or on edge:   1  Not being able to stop or control worryin  Worrying too much about different things:1  Trouble relaxin  Being so restless that it is hard to sit still:  1  Becoming easily annoyed or irritable:  1  Feeling afraid as if something awful might happen: 0  If any of the above were scored more than 0, how difficult have these problems made it for you to do your work, take care of things at home, or get along with other people?  Not at all Social History:    Social History     Tobacco Use   Smoking Status Former Smoker    Packs/day: 0.25    Years: 15.00    Pack years: 3.75    Types: Cigarettes    Last attempt to quit: 2007    Years since quittin.6   Smokeless Tobacco Never Used     Social History     Substance and Sexual Activity   Alcohol Use No     Social History     Substance and Sexual Activity   Drug Use No        Review of Systems:  - Constitutional Symptoms: no fevers, chills, weight loss  - Cardiovascular:  no palpitations, chest pain  - Respiratory: no cough or shortness of breath    I reviewed the following:  Past Medical History:   Diagnosis Date    Advanced care planning/counseling discussion 3/4/16    On File    Bronchitis 2014    Cervicalgia 8/18/15    Dr. Sandra Maria    Chest pain 5/25/15    Hospitalized at [facility] 5/25/15 (lab work negative)    Chronic indwelling Dunn catheter 2018    Initially placed 2018. Mx by Dr. Ronald James.  Congestive heart failure, unspecified     Last Echo 2/8/15: EF 55-60%    Constipation 2017    Enthesopathy, spinal ([address] 8/18/15    Dr. Sandra Maria    Essential hypertension     Foot drop 8/18/15    Dr. Vides Delay Heart attack [facility]) 2013    Was supposed to See Cardiology for possible pacemaker in 2014- After Cardiology consult locally, no need, EF greatly improved.  Established with Dr. Milton Mclean Hiatal hernia 2015    3 cm hiatal hernia     Hip pain 8/18/15    Dr. Sandra Maria    Hypercholesterolemia     Hyperglycemia 2015    A1c 5.9     Hyperlipidemia 2015    NMR lipoprofile- LDL P 997, LDL-c 71, HLD-C-39, TG-60, HLD-P (25.2), Small LDL-P -541, LDL size 20.6    Hypothyroid     Insomnia 2017    Lower extremity edema     Lumbar spondylosis 8/18/15    Dr. Tamara Naik 2015    EGD/Colonscopy 6/15- Gastritis, internal hemorrhoids and 3 polyps    Murmur     EDDIE on CPAP     Was referred to Pulmonology - Uses CPAP    Osteoarthritis of hip 8/18/15    Dr. Randell Oliveira    Osteoarthritis, shoulder 8/18/15    Dr. Randell Oliveira    Radiculopathy, cervical 8/18/15    Dr. Randell Oliveira    Shoulder pain 8/18/15    Dr. Omar Foreman Spinal stenosis of cervical region 8/18/15    Dr. Omar Foreman Spinal stenosis, lumbar 8/18/15    Dr. Omar Foreman Stroke [facility]) 2/25/2014    Established with Neurology, Kathi Gallagher, NP-Just hospitalized at [facility] 2/7/15-2/10/15. CT negative, but Late effect CV accident with increased tone described on discharge summary, Carotid dopplers showed 50% stenosis bilaterally.  Vitamin D deficiency 7/2015       Current Outpatient Medications   Medication Sig Dispense Refill    topiramate (TOPAMAX) 100 mg tablet TAKE 1 TABLET BY MOUTH TWICE DAILY 180 Tab 0    galcanezumab-gnlm (EMGALITY SYRINGE) 120 mg/mL syrg 1 Syringe by SubCUTAneous route every thirty (30) days. Start 30 days after the loading dose 1 Syringe 11    atorvastatin (LIPITOR) 40 mg tablet Take 1 Tab by mouth daily. 90 Tab 1    oxyCODONE-acetaminophen (PERCOCET 7.5) 7.5-325 mg per tablet TAKE 1 TABLET BY MOUTH 4 TIMES DAILY  0    metFORMIN ER (GLUCOPHAGE XR) 500 mg tablet Take 1 Tab by mouth daily (with dinner). 90 Tab 1    traZODone (DESYREL) 100 mg tablet Take 1 Tab by mouth nightly. At bedtime 30 Tab 1    carvedilol (COREG) 6.25 mg tablet TAKE 1 TABLET BY MOUTH TWICE DAILY WITH MEALS 60 Tab 5    baclofen (LIORESAL) 10 mg tablet TAKE 1 TABLET BY MOUTH THREE TIMES DAILY 270 Tab 0    LINZESS 145 mcg cap capsule TAKE 1 CAPSULE BY MOUTH ONCE DAILY BEFORE BREAKFAST FOR CONSTIPATION 90 Cap 3    furosemide (LASIX) 40 mg tablet TAKE 1 TABLET BY MOUTH ONCE DAILY 90 Tab 1    omega-3 acid ethyl esters (LOVAZA) 1 gram capsule Take 2 Caps by mouth two (2) times a day. 120 Cap 2    BOTOX 200 unit injection INJECT INTO THE BILATERAL MUSCLES OF THE HEAD AND NECK EVERY 3 MONTHS FOR MIGRAINES.  DISCARD UNUSED PORTIONS 200 Units 3    ergocalciferol (ERGOCALCIFEROL) 50,000 unit capsule Take 1 Cap by mouth every seven (7) days. 12 Cap 1    levothyroxine (SYNTHROID) 125 mcg tablet Take 1 Tab by mouth Daily (before breakfast). 90 Tab 3    pantoprazole (PROTONIX) 40 mg tablet Take 1 Tab by mouth daily. 90 Tab 3    DULoxetine (CYMBALTA) 60 mg capsule Take 2 Caps by mouth daily. 180 Cap 0    losartan (COZAAR) 50 mg tablet Take 1 Tab by mouth nightly. Hold for SBP<115 90 Tab 1    cyanocobalamin (VITAMIN B12) 1,000 mcg/mL injection INJECT 1 ML UNDER THE SKIN EVERY 14 DAYS FOR B12 DEFICIENCY 1 mL 1    calcipotriene (DOVONEX) 0.005 % topical cream Apply  to affected area daily as needed. 60 g 11    lidocaine (LIDODERM) 5 % Apply patch to the affected area for 12 hours a day and remove for 12 hours a day. 90 Each 3    HYDROcodone-acetaminophen (NORCO) 5-325 mg per tablet Take 1 Tab by mouth every six (6) hours as needed for Pain. Max Daily Amount: 4 Tabs. 20 Tab 0    albuterol-ipratropium (DUO-NEB) 2.5 mg-0.5 mg/3 ml nebu 3 mL by Nebulization route every six (6) hours as needed. 30 Nebule 0    albuterol (PROVENTIL HFA, VENTOLIN HFA, PROAIR HFA) 90 mcg/actuation inhaler Take 2 Puffs by inhalation every four (4) hours as needed for Wheezing or Shortness of Breath. 3 Inhaler 3    ferrous sulfate 325 mg (65 mg iron) tablet Take 325 mg by mouth Daily (before breakfast).  aspirin delayed-release 81 mg tablet Take 81 mg by mouth daily. Allergies   Allergen Reactions    Bees [Hymenoptera Allergenic Extract] Shortness of Breath and Swelling    Strawberry Shortness of Breath and Swelling    Lisinopril Cough       O: VS:   Visit Vitals  /65 (BP 1 Location: Left arm, BP Patient Position: Sitting)   Pulse 65   Temp 96.7 °F (35.9 °C) (Oral)   Resp 16   Ht 5' 4.69\" (1.643 m)   Wt 243 lb 14.4 oz (110.6 kg)   SpO2 97%   BMI 40.98 kg/m²       GENERAL: Rochelle Case is in no acute distress. Non-toxic. Well nourished. Appropriately groomed. Sitting in wheelchair   RESP: Breath sounds are symmetrical bilaterally. Unlabored without SOB. Speaking in full sentences. Clear to auscultation bilaterally anteriorly and posteriorly. No wheezes. No rales or rhonchi. CV: normal rate. Regular rhythm. S1, S2 audible. No murmur noted. No rubs, clicks or gallops noted. PSYCH: appropriate behavior, dress and thought processes. Good eye contact. Clear and coherent speech. Full affect. Good insight.   ____________________________________________________________________  I spent >25 minutes face to face with patient with >50% of time spent in counseling and coordinating care. Patient education was done. Advised on nutrition, physical activity, weight management, tobacco, alcohol and safety, including suicide hotline. Counseling included discussion of diagnosis, differentials, treatment options, prescribed treatment, warning signs and follow up. Medication risks/benefits, costs interactions and alternatives discussed with patient.       Patient agreed to plan of care and verbalized understanding. Patient was given an after visit summary which included current diagnoses, medications and vital signs.

## 2020-02-03 ENCOUNTER — TELEPHONE (OUTPATIENT)
Dept: FAMILY MEDICINE CLINIC | Age: 60
End: 2020-02-03

## 2020-02-03 NOTE — TELEPHONE ENCOUNTER
----- Message from Ayaka Chaney sent at 2/3/2020  1:27 PM EST -----  Regarding: Dr. Mira Manriquez Message/Vendor Calls    Caller's first and last name:Pt       Reason for call: Stated her Emgality injection has to be sent through [facility].       Callback required yes/no and why:Yes      Best contact number(s):[phone]      Details to clarify the request:      Ayaka Chaney

## 2020-02-03 NOTE — TELEPHONE ENCOUNTER
----- Message from Elmo Zhourosenda sent at 2/3/2020 10:13 AM EST -----  Regarding: Krzysztof/Telephone  Caller's first and last name:    Reason for call: Request DME orders be faxed    Callback required yes/no and why: Yes- to confirm orders were faxed    Best contact number(s): [phone]    Details to clarify the request: Pt stated she was given  orders for a walker and rollator.  Pt stated she was informed that they needed this order to be faxed to [facility]

## 2020-02-04 NOTE — TELEPHONE ENCOUNTER
Faxed orders for DME to [facility] @ fx# ([phone]; fax confirmation received. Pt notified. that orders were faxed

## 2020-02-10 ENCOUNTER — TELEPHONE (OUTPATIENT)
Dept: NEUROLOGY | Age: 60
End: 2020-02-10

## 2020-02-10 NOTE — TELEPHONE ENCOUNTER
Re: [facility] called my line requesting that rx be called in or sent to them (90 day supply) since patient's rx benefits have now changed and patient no longer uses [facility]. [facility] ph: [phone]    Patient is currently out of medication and would like to first to  a 90 day supply from her local [facility] and will then begin receiving the medication from [facility] thereafter. Per Leonid Cox on file is still current through 20    Requested Prescriptions     Pending Prescriptions Disp Refills    galcanezumab-gnlm (EMGALITY SYRINGE) 120 mg/mL syrg 1 Syringe 11     Si Syringe by SubCUTAneous route every thirty (30) days.  Start 30 days after the loading dose

## 2020-02-20 ENCOUNTER — TELEPHONE (OUTPATIENT)
Dept: NEUROLOGY | Age: 60
End: 2020-02-20

## 2020-02-20 NOTE — TELEPHONE ENCOUNTER
Re: Botox      pending approval via [facility]  [id number] pending approval via fax sent to [facility]     Will update once determination has been made.

## 2020-02-24 NOTE — TELEPHONE ENCOUNTER
Re: Botox approved     Abigail Aylin [id number]-----(UGAL update once auth dates are rec'd)   [facility] 37 auth [id number]-----00/20/20-02/19/21      [facility] ph [phone]

## 2020-02-27 ENCOUNTER — DOCUMENTATION ONLY (OUTPATIENT)
Dept: SLEEP MEDICINE | Age: 60
End: 2020-02-27

## 2020-02-27 ENCOUNTER — OFFICE VISIT (OUTPATIENT)
Dept: SLEEP MEDICINE | Age: 60
End: 2020-02-27

## 2020-02-27 VITALS
HEART RATE: 73 BPM | DIASTOLIC BLOOD PRESSURE: 84 MMHG | WEIGHT: 237.6 LBS | TEMPERATURE: 97.7 F | SYSTOLIC BLOOD PRESSURE: 123 MMHG | OXYGEN SATURATION: 99 % | HEIGHT: 64 IN | BODY MASS INDEX: 40.56 KG/M2

## 2020-02-27 DIAGNOSIS — E07.9 THYROID DISEASE: ICD-10-CM

## 2020-02-27 DIAGNOSIS — Z86.59 H/O: DEPRESSION: ICD-10-CM

## 2020-02-27 DIAGNOSIS — I10 ESSENTIAL HYPERTENSION: ICD-10-CM

## 2020-02-27 DIAGNOSIS — G47.33 OSA (OBSTRUCTIVE SLEEP APNEA): Primary | ICD-10-CM

## 2020-02-27 NOTE — PROGRESS NOTES
Pressure changed in lab via modem to APAP 6-15cm per NPJs orders. Mask education and conversation done and she would like to switch to a Res-Med Airfit P10 for her (fit pack). She was instructed to call us if problems persist and we will switch again if needed.

## 2020-02-27 NOTE — PROGRESS NOTES
[address] [facility]. [place], [address]   Tel.  [phone]   Fax. [address] [facility] [address]   [place], [address] [facility]   Tel.  [phone]   Fax. [phone] [address]    Tel.  [phone]   Fax. [phone]       Subjective:   Rosalind Gilbert is a 62 y.o. female seen for a positive airway pressure follow-up, last seen by Dr. Rolanda Thakkar on 10/8/2019, prior notes reviewed in detail. Home sleep test 11/2019 showed AHI of 6.3/hr with a lowest SaO2 of 84%. She  is here today for first adherence on a new device. She reports problems using the device. The following concerns reviewed:    Drowsiness no Problems exhaling no   Snoring no Forget to put on no   Mask Comfortable no Can't fall asleep no   Dry Mouth no Mask falls off no   Air Leaking no Frequent awakenings yes       She admits that her sleep has improved on PAP therapy using F& P nasal mask and heated tubing. She does not like this nasal mask, it is too much mask on her face and uncomfortable. She is interested in nasal pillows. Review of device download indicated:  Auto pressure: 4-20 cmH2O; Max Pressure: 13.5 cmH2O;  95th percentile Pressure: 12.7 cmH2O   95th Percentile Leak: 30.0 L/Min    % Used Days >= 4 hours: 73.  Avg hours used:  8. Therapy Apnea Index averaged over PAP use: 1.9 /hr which reflects improved sleep breathing condition.     [place] Sleepiness Score: 14 and Modified F.O.S.Q. Score Total / 2: 12.5     Allergies   Allergen Reactions    Bees [Hymenoptera Allergenic Extract] Shortness of Breath and Swelling    Strawberry Shortness of Breath and Swelling    Lisinopril Cough       She has a current medication list which includes the following prescription(s): furosemide, galcanezumab-gnlm, cyanocobalamin, ergocalciferol, freestyle lite strips, freestyle lite meter, ciclopirox, diclofenac, hydroxyzine hcl, freestyle lancets, pantoprazole, miscellaneous medical supply, miscellaneous medical supply, topiramate, atorvastatin, oxycodone-acetaminophen, metformin er, trazodone, carvedilol, baclofen, linzess, omega-3 acid ethyl esters, botox, levothyroxine, duloxetine, losartan, calcipotriene, lidocaine, hydrocodone-acetaminophen, albuterol-ipratropium, albuterol, ferrous sulfate, and aspirin delayed-release. .      She  has a past medical history of Advanced care planning/counseling discussion (3/4/16), Bronchitis (2/24/2014), Cervicalgia (8/18/15), Chest pain (5/25/15), Chronic indwelling Dunn catheter (1/24/2018), Congestive heart failure, unspecified, Constipation (6/13/2017), Enthesopathy, spinal ([facility] [address] (8/18/15), Essential hypertension, Foot drop (8/18/15), Heart attack ([facility] [address] (2/24/2013), Hiatal hernia (6/2015), Hip pain (8/18/15), Hypercholesterolemia, Hyperglycemia (7/2015), Hyperlipidemia (6/30/2015), Hypothyroid, Insomnia (6/13/2017), Lower extremity edema, Lumbar spondylosis (8/18/15), Melena (6/2015), Murmur, EDDIE on CPAP, Osteoarthritis of hip (8/18/15), Osteoarthritis, shoulder (8/18/15), Radiculopathy, cervical (8/18/15), Shoulder pain (8/18/15), Spinal stenosis of cervical region (8/18/15), Spinal stenosis, lumbar (8/18/15), Stroke ([facility] [address] (2/25/2014), and Vitamin D deficiency (7/2015). Sleep Review of Systems: notable for Negative difficulty falling asleep; Positive awakenings at night; Positive early morning headaches, she has migraines; Negative memory problems; Negative concentration issues;  Negative chest pain; Negative shortness of breath; Negative significant joint pain at night; Negative significant muscle pain at night; Negative rashes or itching; Negative heartburn; Negative significant mood issues; 7 afternoon naps per week    Objective:     Visit Vitals  /84   Pulse 73   Temp 97.7 °F (36.5 °C)   Ht 5' 4\" (1.626 m)   Wt 237 lb 9.6 oz (107.8 kg)   SpO2 99%   BMI 40.78 kg/m²          General:   Alert, oriented, not in acute distress   Eyes: Anicteric Sclerae; no obvious strabismus   Nose:  No obvious nasal septum deviation    Oropharynx:   Mallampati score 4, thick tongue base, uvula not seen due to low-lying soft palate, narrow tonsilo-pharyngeal pilars   Neck:   Midline trachea   Chest/Lungs:  Symmetrical lung expansion, clear lung fields on auscultation    CVS:  Normal rate, regular rhythm,  no JVD   Extremities:  No obvious rashes, no edema    Neuro:  No focal deficits; No obvious tremor    Psych:  Normal affect,  normal countenance       Assessment:       ICD-10-CM ICD-9-CM    1. EDDIE (obstructive sleep apnea) G47.33 327.23 AMB SUPPLY ORDER      AMB SUPPLY ORDER   2. Essential hypertension I10 401.9    3. H/O: depression Z86.59 V11.8    4. Thyroid disease E07.9 246.9    5. Adult BMI 40.0-44.9 kg/sq m (HCC) Z68.41 V85.41        AHI = 6.3(11/2019). On APAP :  4-20 cmH2O. She is adherent with PAP therapy and PAP continues to benefit patient and remains necessary for control of her sleep apnea. Plan:     Follow-up and Dispositions    · Return in about 1 year (around 2/27/2021). * Change pressure setting to APAP 6-15 cmH2O    * Tech to assess size of nasal pillow needed so we can note on order    Orders Placed This Encounter    AMB SUPPLY ORDER     Diagnosis: (G47.33) EDDIE (obstructive sleep apnea)  (primary encounter diagnosis)     Pressure change APAP to 6-15 cmH2O. Changes made in sleep lab. CONRAD Hubbard; NPI: [id number]    Electronically signed. Date:- 02/27/20    AMB SUPPLY ORDER     Diagnosis: (G47.33) EDDIE (obstructive sleep apnea)  (primary encounter diagnosis)     Replacement Supplies for Positive Airway Pressure Therapy Device:   Duration of need: 99 months.  Nasal Pillows (Replace) 2 per month. ResMed P10 For Her size pack   Nasal Interface Mask 1 every 3 months.  Pos Airway pressure chin strap   Headgear 1 every 6 months.      Tubing with heating element 1 every 3 months.  Filter(s) Disposable 2 per month.  Filter(s) Non-Disposable 1 every 6 months. .   [facility] for Humidifier (Replace) 1 every 6 months. CONRAD Jimenez; NPI: [id number]    Electronically signed. Date:- 02/27/20       * Counseling was provided regarding the importance of regular PAP use with emphasis on ensuring sufficient total sleep time, proper sleep hygiene, and safe driving. * Re-enforced proper and regular cleaning for the device. * She was asked to contact our office for any problems regarding PAP therapy. 2. Hypertension -  continue on her current regimen, she will continue to monitor her BP and follow up with her PMD for reevaluation/adjustment of medications if warranted. I have reviewed the relationship between hypertension as it relates to sleep-disordered breathing. 3. H/O Depression - continue on her current regimen. 4. Thyroid Disorder - continue on her current regimen. 5. Recommended a dedicated weight loss program through appropriate diet and exercise regimen as significant weight reduction has been shown to reduce severity of obstructive sleep apnea. Patient's phone number [phone] (home) [phone] (cell) were reviewed and confirmed for accuracy. She gives permission for messages regarding results and appointments to be left at that number. CONRAD Jimenez, [address] [place] [address]  Electronically signed.  02/27/20

## 2020-02-27 NOTE — PATIENT INSTRUCTIONS
217 [facility] [place]. [place], [address]  Tel.  [phone]  Fax. 100 [facility] 60  [place], [address] [facility]  Tel.  [phone]  Fax. [phone] [address]  Tel.  [phone]  Fax. [phone]     Learning About CPAP for Sleep Apnea  What is CPAP? CPAP is a small machine that you use at home every night while you sleep. It increases air pressure in your throat to keep your airway open. When you have sleep apnea, this can help you sleep better so you feel much better. CPAP stands for \"continuous positive airway pressure. \"  The CPAP machine will have one of the following:  · A mask that covers your nose and mouth  · Prongs that fit into your nose  · A mask that covers your nose only, the most common type. This type is called NCPAP. The N stands for \"nasal.\"  Why is it done? CPAP is usually the best treatment for obstructive sleep apnea. It is the first treatment choice and the most widely used. Your doctor may suggest CPAP if you have:  · Moderate to severe sleep apnea. · Sleep apnea and coronary artery disease (CAD) or heart failure. How does it help? · CPAP can help you have more normal sleep, so you feel less sleepy and more alert during the daytime. · CPAP may help keep heart failure or other heart problems from getting worse. · NCPAP may help lower your blood pressure. · If you use CPAP, your bed partner may also sleep better because you are not snoring or restless. What are the side effects? Some people who use CPAP have:  · A dry or stuffy nose and a sore throat. · Irritated skin on the face. · Sore eyes. · Bloating. If you have any of these problems, work with your doctor to fix them. Here are some things you can try:  · Be sure the mask or nasal prongs fit well. · See if your doctor can adjust the pressure of your CPAP. · If your nose is dry, try a humidifier.   · If your nose is runny or stuffy, try decongestant medicine or a steroid nasal spray. If these things do not help, you might try a different type of machine. Some machines have air pressure that adjusts on its own. Others have air pressures that are different when you breathe in than when you breathe out. This may reduce discomfort caused by too much pressure in your nose. Where can you learn more? Go to [employer]  Enter Lisbet Stovall in the search box to learn more about \"Learning About CPAP for Sleep Apnea. \"   © 0985-7319 Healthwise, Incorporated. Care instructions adapted under license by [facility] (which disclaims liability or warranty for this information). This care instruction is for use with your licensed healthcare professional. If you have questions about a medical condition or this instruction, always ask your healthcare professional. [address] any warranty or liability for your use of this information. Content Version: 8.5.70809; Last Revised: January 11, 2010  PROPER SLEEP HYGIENE    What to avoid  · Do not have drinks with caffeine, such as coffee or black tea, for 8 hours before bed. · Do not smoke or use other types of tobacco near bedtime. Nicotine is a stimulant and can keep you awake. · Avoid drinking alcohol late in the evening, because it can cause you to wake in the middle of the night. · Do not eat a big meal close to bedtime. If you are hungry, eat a light snack. · Do not drink a lot of water close to bedtime, because the need to urinate may wake you up during the night. · Do not read or watch TV in bed. Use the bed only for sleeping and sexual activity. What to try  · Go to bed at the same time every night, and wake up at the same time every morning. Do not take naps during the day. · Keep your bedroom quiet, dark, and cool. · Get regular exercise, but not within 3 to 4 hours of your bedtime. .  · Sleep on a comfortable pillow and mattress.   · If watching the clock makes you anxious, turn it facing away from you so you cannot see the time. · If you worry when you lie down, start a worry book. Well before bedtime, write down your worries, and then set the book and your concerns aside. · Try meditation or other relaxation techniques before you go to bed. · If you cannot fall asleep, get up and go to another room until you feel sleepy. Do something relaxing. Repeat your bedtime routine before you go to bed again. · Make your house quiet and calm about an hour before bedtime. Turn down the lights, turn off the TV, log off the computer, and turn down the volume on music. This can help you relax after a busy day. Drowsy Driving: The [employer] cites drowsiness as a causing factor in more than 180,331 police reported crashes annually, resulting in 76,000 injuries and 1,500 deaths. Other surveys suggest 55% of people polled have driven while drowsy in the past year, 23% had fallen asleep but not crashed, 3% crashed, and 2% had and accident due to drowsy driving. Who is at risk? Young Drivers: One study of drowsy driving accidents states that 55% of the drivers were under 25 years. Of those, 75% were male. Shift Workers and Travelers: People who work overnight or travel across time zones frequently are at higher risk of experiencing Circadian Rhythm Disorders. They are trying to work and function when their body is programed to sleep. Sleep Deprived: Lack of sleep has a serious impact on your ability to pay attention or focus on a task. Consistently getting less than the average of 8 hours your body needs creates partial or cumulative sleep deprivation. Untreated Sleep Disorders: Sleep Apnea, Narcolepsy, R.L.S., and other sleep disorders (untreated) prevent a person from getting enough restful sleep. This leads to excessive daytime sleepiness and increases the risk for drowsy driving accidents by up to 7 times.   Medications / Alcohol: Even over the counter medications can cause drowsiness. Medications that impair a drivers attention should have a warning label. Alcohol naturally makes you sleepy and on its own can cause accidents. Combined with excessive drowsiness its effects are amplified. Signs of Drowsy Driving:   * You don't remember driving the last few miles   * You may drift out of your rakel   * You are unable to focus and your thoughts wander   * You may yawn more often than normal   * You have difficulty keeping your eyes open / nodding off   * Missing traffic signs, speeding, or tailgating  Prevention-   Good sleep hygiene, lifestyle and behavioral choices have the most impact on drowsy driving. There is no substitute for sleep and the average person requires 8 hours nightly. If you find yourself driving drowsy, stop and sleep. Consider the sleep hygiene tips provided during your visit as well. Medication Refill Policy: Refills for all medications require 1 week advance notice. Please have your pharmacy fax a refill request. We are unable to fax, or call in \"controled substance\" medications and you will need to pick these prescriptions up from our office. [employer] Activation    Thank you for requesting access to [employer]. Please follow the instructions below to securely access and download your online medical record. Dealstruck allows you to send messages to your doctor, view your test results, renew your prescriptions, schedule appointments, and more. How Do I Sign Up? 1. In your internet browser, go to [email or web address]. 2. Click on the First Time User? Click Here link in the Sign In box. You will see the New Member Sign Up page. 3. Enter your [employer]ck Access Code exactly as it appears below. You will not need to use this code after youve completed the sign-up process. If you do not sign up before the expiration date, you must request a new code. [employer]ck Access Code:  Activation code not generated  Current EzFlop - A First of Its Kind Flip Flop Status: Active (This is the date your EzFlop - A First of Its Kind Flip Flop access code will )    4. Enter the last four digits of your Social Security Number (xxxx) and Date of Birth (mm/dd/yyyy) as indicated and click Submit. You will be taken to the next sign-up page. 5. Create a "[employer]t ID. This will be your "[employer]t login ID and cannot be changed, so think of one that is secure and easy to remember. 6. Create a Ez[employer]op - A First of Its Kind Flip Flop password. You can change your password at any time. 7. Enter your Password Reset Question and Answer. This can be used at a later time if you forget your password. 8. Enter your e-mail address. You will receive e-mail notification when new information is available in [address] 9. Click Sign Up. You can now view and download portions of your medical record. 10. Click the Download Summary menu link to download a portable copy of your medical information. Additional Information    If you have questions, please call [phone]. Remember, EzFlop - A First of Its Kind Flip Flop is NOT to be used for urgent needs. For medical emergencies, dial 911.

## 2020-03-05 ENCOUNTER — OFFICE VISIT (OUTPATIENT)
Dept: NEUROLOGY | Age: 60
End: 2020-03-05

## 2020-03-05 VITALS
HEART RATE: 80 BPM | DIASTOLIC BLOOD PRESSURE: 60 MMHG | SYSTOLIC BLOOD PRESSURE: 102 MMHG | HEIGHT: 64 IN | WEIGHT: 247 LBS | RESPIRATION RATE: 18 BRPM | BODY MASS INDEX: 42.17 KG/M2 | OXYGEN SATURATION: 98 %

## 2020-03-05 DIAGNOSIS — G43.719 INTRACTABLE CHRONIC MIGRAINE WITHOUT AURA AND WITHOUT STATUS MIGRAINOSUS: Primary | ICD-10-CM

## 2020-03-05 NOTE — PROGRESS NOTES
Botox Injection Note     3/5/2020     Patient:  Brittney Briceno   YOB: 1960  Date of Visit: 3/5/2020      Indication: patient has chronic migraine headaches greater than 15 days per month lasting more than 4 hours each. She has failed or not tolerated 2 or more medication preventatives. Written consent was signed and verified by me. Risks and benefits were discussed to include possible cosmetic asymmetry which is not permament or life threatening. Patient name and  was confirmed prior to procedure. Time out performed. Procedure:   Botox concentration: 200 units in 2 ml of preservative-free normal saline. 1:1 dilution. LOT#: [id number]  EXP:  2022    Injections and distribution as follow :      Units/site  Sites Loc Subtotal    procerus 5 1 ML 5   corrugaters 2.5 2 BL 5   frontalis 5 8 BL 40   Temporalis 10 4 BL 40   Occipitalis 10 2 BL 20   Cervical paraspinals 5 4 BL 20   Trapezius 10 4 BL 40         200 units Botox were reconstituted, 170 units injected as above and the remainder was unavoidably wasted. Patient tolerated procedure well. Return in 3 months for repeat injections.     _____________________________   Austin Butler DO  [address], ABPN

## 2020-03-12 DIAGNOSIS — E78.00 HIGH CHOLESTEROL: ICD-10-CM

## 2020-04-02 NOTE — Clinical Note
Can you call patient and provide any information on programs that help with costs of getting a stair chair lift? (Can send her a [employer] message). Thanks! Critical Care

## 2020-04-03 NOTE — TELEPHONE ENCOUNTER
Requested Prescriptions     Pending Prescriptions Disp Refills    topiramate (TOPAMAX) 100 mg tablet 180 Tab 1     Pt. Is completely out of medication.  Pls call

## 2020-04-04 RX ORDER — TOPIRAMATE 100 MG/1
TABLET, FILM COATED ORAL
Qty: 180 TAB | Refills: 0 | Status: SHIPPED | OUTPATIENT
Start: 2020-04-04 | End: 2020-04-06 | Stop reason: SDUPTHER

## 2020-04-06 RX ORDER — TOPIRAMATE 100 MG/1
TABLET, FILM COATED ORAL
Qty: 180 TAB | Refills: 3 | Status: SHIPPED | OUTPATIENT
Start: 2020-04-06 | End: 2020-12-03 | Stop reason: SDUPTHER

## 2020-04-08 ENCOUNTER — TELEPHONE (OUTPATIENT)
Dept: FAMILY MEDICINE CLINIC | Age: 60
End: 2020-04-08

## 2020-04-08 NOTE — TELEPHONE ENCOUNTER
----- Message from Nathalie Kristofer sent at 4/8/2020  3:23 PM EDT -----  Regarding: Sayra Hernandez NP/Refill  Contact: [phone]  Caller (if not patient): n/a  Relationship of caller (if not patient): n/a  Best contact number(s): [phone]  Name of medication and dosage if known: pantoprazole   Is patient out of this medication (yes/no): yes  Pharmacy name: n/a  Pharmacy listed in chart? (yes/no): yes  Pharmacy phone number: n/a  Date of last visit: n/a  Details to clarify the request: n/a

## 2020-04-21 NOTE — TELEPHONE ENCOUNTER
----- Message from Noé Arvizu sent at 4/21/2020 12:40 PM EDT -----  Regarding: Dr. Jesse Harrison Medication Refill    Caller (if not patient):      Relationship of caller (if not patient):      Best contact number(s): [phone]      Name of medication and dosage if known: Levothyroxine 125mg, Furosemide 40mg       Is patient out of this medication (yes/no):  No      Pharmacy name: [facility] listed in chart? (yes/no): Yes     Pharmacy phone number: [phone]      Details to clarify the request:      Noé Arvizu

## 2020-04-21 NOTE — TELEPHONE ENCOUNTER
PCP: Linus Tafoya NP    Last appt: 1/30/2020  Future Appointments   Date Time Provider Denilson Thompson   4/30/2020  8:30 AM Linus Tafoya NP [facility] SCHED   5/12/2020  8:40 AM Jenni Shea  [address]   5/28/2020 10:00 AM Hernán Drew, DO [facility] SCHED       Requested Prescriptions     Pending Prescriptions Disp Refills    levothyroxine (SYNTHROID) 125 mcg tablet 90 Tab 3     Sig: Take 1 Tab by mouth Daily (before breakfast).        Pharmacy verified: Y     Prior labs and Blood pressures:  BP Readings from Last 3 Encounters:   03/05/20 102/60   02/27/20 123/84   01/30/20 114/65     Lab Results   Component Value Date/Time    Sodium 141 01/17/2020 03:32 PM    Potassium 4.4 01/17/2020 03:32 PM    Chloride 106 01/17/2020 03:32 PM    CO2 24 01/17/2020 03:32 PM    Anion gap 9 09/12/2017 10:06 AM    Glucose 105 (H) 01/17/2020 03:32 PM    BUN 22 01/17/2020 03:32 PM    Creatinine 1.01 (H) 01/17/2020 03:32 PM    BUN/Creatinine ratio 22 01/17/2020 03:32 PM    GFR est AA 69 01/17/2020 03:32 PM    GFR est non-AA 60 01/17/2020 03:32 PM    Calcium 8.7 12/10/2019 08:23 AM     Lab Results   Component Value Date/Time    Hemoglobin A1c 5.5 12/10/2019 08:23 AM    Hemoglobin A1c (POC) 5.3 12/28/2018 09:15 AM     Lab Results   Component Value Date/Time    Cholesterol, total 194 12/10/2019 08:23 AM    HDL Cholesterol 64 12/10/2019 08:23 AM    LDL, calculated 118 (H) 12/10/2019 08:23 AM    VLDL, calculated 12 12/10/2019 08:23 AM    Triglyceride 59 12/10/2019 08:23 AM    CHOL/HDL Ratio 3.0 09/09/2017 02:40 AM     Lab Results   Component Value Date/Time    VITAMIN D, 25-HYDROXY 33.9 11/01/2018 02:15 PM       Lab Results   Component Value Date/Time    TSH 0.665 03/05/2019 08:56 AM    TSH 0.038 (L) 11/01/2018 02:15 PM

## 2020-04-23 RX ORDER — LEVOTHYROXINE SODIUM 125 UG/1
125 TABLET ORAL
Qty: 90 TAB | Refills: 0 | Status: SHIPPED | OUTPATIENT
Start: 2020-04-23 | End: 2020-08-13

## 2020-04-28 ENCOUNTER — TELEPHONE (OUTPATIENT)
Dept: CARDIOLOGY CLINIC | Age: 60
End: 2020-04-28

## 2020-04-28 NOTE — TELEPHONE ENCOUNTER
On 4/22/20 [facility] called patient to r/s 5/12 appointment with Dr. Liza Oliveira due to him covering hospital. Need to offer virtual visit or phone call visit. Patient can push appointment out a couple of months if she prefers.

## 2020-04-30 ENCOUNTER — VIRTUAL VISIT (OUTPATIENT)
Dept: FAMILY MEDICINE CLINIC | Age: 60
End: 2020-04-30

## 2020-04-30 DIAGNOSIS — I63.9 CEREBROVASCULAR ACCIDENT (CVA), UNSPECIFIED MECHANISM (HCC): ICD-10-CM

## 2020-04-30 DIAGNOSIS — E07.9 THYROID DISEASE: ICD-10-CM

## 2020-04-30 DIAGNOSIS — E78.00 HYPERCHOLESTEROLEMIA: ICD-10-CM

## 2020-04-30 DIAGNOSIS — I69.359 HEMIPLEGIA, DOMINANT SIDE S/P CVA (CEREBROVASCULAR ACCIDENT) (HCC): ICD-10-CM

## 2020-04-30 DIAGNOSIS — Z78.0 POSTMENOPAUSAL: ICD-10-CM

## 2020-04-30 DIAGNOSIS — R53.83 FATIGUE, UNSPECIFIED TYPE: ICD-10-CM

## 2020-04-30 DIAGNOSIS — R73.03 PREDIABETES: ICD-10-CM

## 2020-04-30 DIAGNOSIS — Z11.59 ENCOUNTER FOR HEPATITIS C SCREENING TEST FOR LOW RISK PATIENT: ICD-10-CM

## 2020-04-30 DIAGNOSIS — F41.9 ANXIETY: ICD-10-CM

## 2020-04-30 DIAGNOSIS — Z00.00 MEDICARE ANNUAL WELLNESS VISIT, SUBSEQUENT: Primary | ICD-10-CM

## 2020-04-30 RX ORDER — CYANOCOBALAMIN 1000 UG/ML
INJECTION, SOLUTION INTRAMUSCULAR; SUBCUTANEOUS
Qty: 1 ML | Refills: 5 | Status: SHIPPED | OUTPATIENT
Start: 2020-04-30 | End: 2020-06-02 | Stop reason: SDUPTHER

## 2020-04-30 NOTE — PATIENT INSTRUCTIONS
1)  
Schedule of Personalized Health Plan The best way to stay healthy is to live a healthy lifestyle. A healthy lifestyle includes regular exercise, eating a well-balanced diet, keeping a healthy weight and not smoking. Regular physical exams and screening tests are another important way to take care of yourself. Preventive exams provided by health care providers can find health problems early when treatment works best and can keep you from getting certain diseases or illnesses. Preventive services include exams, lab tests, screening tests, shots, and learning information to help you take care of your own health. The CDC recommends pneumonia vaccines for anyone 72 years and older. These vaccines are usually only needed once in a lifetime unless your healthcare provider decides differently. The 2 pneumonia shots available presently are PCV 13 (Prevnar 13) and PPSV23 (Pneumovax 23). Adults 72 years or older who have not previously received PCV13, should receive a dose of PCV13 first, followed 1 year later by a dose of PPSV23. All people over 65 should have a yearly flu vaccine. People over 65 are at medium to high risk for Hepatitis B. Hepatitis B is transmitted through body fluids with a common source being sexual activity or IV drug use. Three shots are needed for complete protection. The CDC recommends the herpes zoster (shingles) vaccine for all adults 61 and older, regardless if a prior episode of zoster has been reported. In addition to your physical exam, some screening tests are recommended: 
 
Osteoporosis screening -There are no signs or symptoms of osteoporosis (weakening of bones). You might not know you have the disease until you break a bone. Thats why its so important to get a bone density test to measure your bone strength.  Bone mass measurement is taken with a Dexa scan and is recommended every two years after 72years old or if you have certain risk factors, such as personal history of vertebral fracture or chronic steroid medication use. Diabetes Mellitus screening is recommended every year. This is a blood test, called a hemoglobin A1c, which measures the average blood sugar over a 3 month period. Glaucoma is an eye disease caused by high pressure in the eye. An eye exam is recommended every year. Cardiovascular screening tests that check your cholesterol and other blood fat (lipid) levels are recommended every five years or yearly if you are on medications for cardiovascular disease. Colorectal Cancer screening tests help to find pre-cancerous polyps (growths in the colon) so they can be removed before they turn into cancer. Screening tests are recommended starting at age 48 or earlier if you have a certain risk factors, such as a family history of colon cancer. Breast Cancer screening is done with a mammogram, a low dose x-ray that looks at breast tissue. It is recommended by the [facility] that women 54 years and older get a mammogram every 2 years. After the age of 76, recommendations are based on life expectancy. Cervical Cancer screening is done by a PAP smear during a pelvic exam.  The American College of Obstetricians and Gynecologists states that screening can be discontinued after 72years old if the person has had adequate negative prior screening results and no abnormal history (abnormal = CIN2 or higher level). Here is a list of your current Health Maintenance items including a date when each one is due next: 
Health Maintenance Topic Date Due  Shingrix Vaccine Age 50> (1 of 2) 05/27/2008  Medicare Yearly Exam  08/15/2018  PAP AKA CERVICAL CYTOLOGY  11/13/2018  Influenza Age 5 to Adult  08/01/2020  A1C test (Diabetic or Prediabetic)  12/10/2020  Lipid Screen  12/10/2020  Colonoscopy  06/22/2021  Breast Cancer Screen Mammogram  09/23/2021  DTaP/Tdap/Td series (2 - Td) 06/13/2027  Hepatitis C Screening  Completed  Pneumococcal 0-64 years  Completed You have an [address] on file, signed 7/6/2015 and naming Gary Ellsworth as your agent. 2. Osteoporosis screening -There are no signs or symptoms of osteoporosis (weakening of bones). You might not know you have the disease until you break a bone. Thats why its so important to get a bone density test to measure your bone strength. Bone mass measurement is taken with a Dexa scan and is recommended every two years after 72years old or if you have certain risk factors, such as personal history of vertebral fracture or chronic steroid medication use. The Dexa Scan compares your bone density to women of the same age (Z score) as well as a healthy 27year old (T score). The lower the score (more negative), the lower your bone density. A T score of -2.5 or lower indicates osteoporosis (bone loss). A T-score between -1.0 to -2.5 indicates osteopenia (bone thinning). A T-score above -1.0 is normal.   
 
3. Labs The following blood work has been ordered. If our clinic is not open in June, have lab work    done at open [facility]. Total Cholesterol is the total number of cholesterol particles in your blood, which includes triglycerides, HDL and LDL. A small amount of cholesterol is needed for the body's cells, hormones, and metabolism. Goal is less than 200. Triglycerides are the short term fats in your blood which are used for energy. Goal is less than 150. High Density Lipids (HDL) is the \"good\" cholesterol in your blood. HDL helps remove bad cholesterol from your blood. Goal is more than 40. Low Density Lipids (LDL) is the \"bad\" cholesterol in your blood. LDL can stick to your arteries, raising the risk for heart attack and stroke. Goal is less than 100 Your thyroid is responsible for secreting hormones and regulating your metabolism. Thyroid Stimulating Hormone (TSH) is a test for thyroid function. TSH is a hormone made by the pituitary gland in the brain and tells your thyroid gland to make hormones and release them into your blood. If your thyroid isn't producing enough hormone, you may have symptoms such as unexplained weight gain, fatigue, hair loss. If your thyroid is producing too much hormone, you may have symptoms of diarrhea, heart palpitations, fatigue, unexplained weight loss. A normal TSH value is between 0.45 - 4.5. Hepatitis C is caused by a virus that attacks the liver and causes inflammation and can lead to serious liver damage. Hepatitis C is spread through contaminated blood. This disease is now curable with medications, and the [facility] Preventive Services Task Force (USPSTF) recommends screening one time for anyone born between 4206-3890 as well as high risk populations, (risk factors include: IV drug use, HIV positive, tattoo or piercings from unclean instruments, etc) 4) Shingles Vaccine - Shingrix Herpes Zoster (Shingles) Herpes zoster (shingles) is a painful rash caused by the same virus that causes chickenpox. After an episode of chickenpox, the virus retreats to cells of the nervous system, where it can reside quietly for decades. However, later in life, the varicella-zoster virus can become active again. When it reactivates, it causes shingles. Shingles can affect people of all ages. It is particularly common in adults over age 48 years. It is also more common in individuals of all ages with conditions that weaken the immune system. Pain is usually the first sign of shingles. Other symptoms include: fever, chills, headache, numbness, tingling and/or burning pain and a skin rash. The rash can appear anywhere on your body, but most commonly on the torso.   It is usually on only 1 side of your body, in a band or beltlike pattern. During the first several days, small, painful blisters appear in the area. Scarring may occur where the blisters appear and there may be continued sensitivity and pain in that patch of skin for months after the infection. Treatment:  
There is no cure for shingles - it is usually self-limiting. However, anti-viral medications can reduce the spread of the rash, speed healing and decrease the risk of complications. Supportive Treatment:  
1)  Tylenol or ibuprofen can be used to reduce pain 2) Colloidal oatmeal bath or wet cool compresses may help with itching. Make a solution of cool water and cornstarch, baking soda, or Aveeno Oatmeal.  Apply the solution as a compress to the area. This will soothe the skin. 3) Wash the skin with soap and water to keep rash free of infection. 4) Reduce stress - exercise, yoga, healthy diet THE BLISTER FLUID IS CONTAGIOUS TO ANYONE WHO HAS NOT ALREADY HAD CHICKEN POX. Stay home from work or school until all blisters have formed a crust (usually 2 weeks after the illness begins) and you are no longer contagious. Prevention: The CDC recommends everyone over the age of 61 receive the shingles vaccine. This is recommended for people who have already had a shingles outbreak as it could prevent future occurrences of the disease. According to the CDC, it is also recommended for people born before [birth year] in the [address] [place] [address] who have not had varicella (chicken pox) as they are considered immune. [facility] is a vaccine indicated for prevention of herpes zoster (shingles) in adults aged 48 years and older and is the preferred vaccine for preventing shingles and related complications The Center for Disease Control and Prevention recommended Shingrix for the following: 
- healthy adults aged 48 years and older to prevent shingles and related complications 
- adults who previously received the current shingles vaccine (Zostavax®) to prevent shingles and related complications Two doses (0.5 mL each) are needed for full efficacy. The vaccines are administered intramuscularly, with the second one administered 2-6 months after the initial vaccine. 4) Due for Prevnar 13  Vaccine - can give this to you at next face-to-face visit. Due for Pap smear. 5) COVID-19 is caused by a coronavirus called SARS-CoV-2. Coronaviruses are a large family of viruses that are common in people and may different species of animals, including camels, cattle, cats, and bats. Rarely, animal coronaviruses can infect people and then spread between people. This occurred with MERS-CoV and SARS-CoV, and now with the virus that causes COVID-19. The COVID-19 virus is thought to spread mainly from person to person, most commonly through respiratory droplets produced when an infected person coughs or sneezes. These droplets can land in the mouths or noses of people who are nearby or possibly be inhaled into the lungs. Spread is more likely when people are in close contact with one another (within about 6 feet). All the surfaces where these respiratory droplets land are infectious for about 3-5 days on average; therefore, everything that is associated with infected people will be contaminated and potentially infectious. Information from the ongoing COVID-19 pandemic suggest that this virus is spreading more efficiently than influenza, but not as efficiently as measles, which is highly contagious. Primary symptoms = fever, cough, and/or shortness of breath. Some people report fatigue, muscle aches, headache and loss of smell/taste Symptoms may appear 2-14 days after exposure. People are thought to be infectious 2 days before showing symptoms. If you think you have been exposed to COVID 19, have travelled to a high risk area or have symptoms, the [place] [address] has a COVID 19 hotline to answer questions (ph: [phone]). [facility]'s [email or web address] hotline number is 591-840-4800. Tips for preventing spread of infection: 
 
1) NO HANDSHAKING! Use ONLY your knuckle to touch light switches. elevator buttons, etc.. Lift the gasoline dispenser with a paper towel or use a disposable glove. Open doors with your closed fist or hip - do not grasp the handle with your hand, unless there is no other way to open the door. Especially important on bathroom and post office/commercial doors. 2) Social distancing - keep 6 feet away from others when out in public. If someone is experiencing symptoms of COVID19, they need to self quarantine for 14 days to prevent spreading the infection to others. 3) Clean and disinfect frequently touched surfaces daily, such as tables, doorknobs, light switches, remotes, phones. 4) Do not share personal household items, such as dishes, drinking glasses, cups, utensils, towels or bedding with other people who show signs of infection. 5) Wash your hands with soap for 10-20 seconds and/or use a greater than 60% alcohol-based hand  whenever you return home from ANY activity that involves locations where other people have been. 6) Avoid touching your eyes, nose and mouth with unwashed hands. Even though only N95 masks actually stop the virus from passing through,  non-N95 masks are used to prevent you from touching your face and inoculating yourself. 7) If possible, cough or sneeze into a disposable tissue and discard. Use your elbow only if you have to. The clothing on your elbow will contain infectious virus that can be passed on. 8) Zinc lozenges have scientific evidence that show they are effective in blocking coronavirus (and most other viruses) from multiplying in your throat and nasopharynx. Use as directed several times each day when you begin to feel any \"cold-like\" symptoms beginning.  It is best to lie down and let the lozenge dissolve in the back of your throat and nasopharynx. Cold-Eeze lozenges is one brand available, but there are other brands available. 9) CDC recommends wearing cloth face coverings in public settings where other social distancing measures are difficult to maintain (e.g., grocery stores and pharmacies) especially in areas of significant community-based transmission. CDC also advises the use of simple cloth face coverings to slow the spread of the virus and help people who may have the virus and do not know it from transmitting it to others. Cloth face coverings fashioned from household items or made at home from common materials at low cost can be used as an additional, voluntary public health measure. Please note: The cloth face coverings recommended by the CDC are not surgical or N-95 masks. Those are critical supplies that must continue to be reserved for health care workers and other medical first responders, as recommended by current CDC guidance. KEEP IN MIND: THERE IS NO TREATMENT FOR THIS INFECTION. People who are mildly ill and think they may have the infection, should keep away from other people (self quarantine) for 14 days and care for themselves at home. (Rest, drink plenty of fluids, stay home.) If you are SICK,  then you should wear of face mask to help prevent the spread of the virus. As of 4/13/2020, people can get tested for COVID 19 at the [facility] flu clinics on a walk-in basis, depending on availability of testing kits/supplies and current guidelines. This policy is subject to change daily. If you have confirmed or suspected COVID 19 and have been directed to isolate at home, you can stop home isolation if: 
1) you have had no fever for at least 3 full days (72 hours) without the use of medications AND 2) no respiratory symptoms in the 3 days (no shortness of breath, coughing reduced) AND 3) at least 7 days have passed since your symptoms first appeared Updated: 4/18/20 Learning About the [address] [facility] [place] Diet What is the Mediterranean diet? The Mediterranean diet is a style of eating rather than a diet plan. It features foods eaten in [place], [place], [place] and [place], and other countries along the [facility]. It emphasizes eating foods like fish, fruits, vegetables, beans, high-fiber breads and whole grains, nuts, and olive oil. This style of eating includes limited red meat, cheese, and sweets. Why choose the Mediterranean diet? A Mediterranean-style diet may improve heart health. It contains more fat than other heart-healthy diets. But the fats are mainly from nuts, unsaturated oils (such as fish oils and olive oil), and certain nut or seed oils (such as canola, soybean, or flaxseed oil). These fats may help protect the heart and blood vessels. How can you get started on the Mediterranean diet? Here are some things you can do to switch to a more Mediterranean way of eating. What to eat · Eat a variety of fruits and vegetables each day, such as grapes, blueberries, tomatoes, broccoli, peppers, figs, olives, spinach, eggplant, beans, lentils, and chickpeas. · Eat a variety of whole-grain foods each day, such as oats, brown rice, and whole wheat bread, pasta, and couscous. · Eat fish at least 2 times a week. Try tuna, salmon, mackerel, lake trout, herring, or sardines. · Eat moderate amounts of low-fat dairy products, such as milk, cheese, or yogurt. · Eat moderate amounts of poultry and eggs. · Choose healthy (unsaturated) fats, such as nuts, olive oil, and certain nut or seed oils like canola, soybean, and flaxseed. · Limit unhealthy (saturated) fats, such as butter, palm oil, and coconut oil. And limit fats found in animal products, such as meat and dairy products made with whole milk. Try to eat red meat only a few times a month in very small amounts. · Limit sweets and desserts to only a few times a week. This includes sugar-sweetened drinks like soda. The Mediterranean diet may also include red wine with your meal1 glass each day for women and up to 2 glasses a day for men. Tips for eating at home · Use herbs, spices, garlic, lemon zest, and citrus juice instead of salt to add flavor to foods. · Add avocado slices to your sandwich instead of garcia. · Have fish for lunch or dinner instead of red meat. Brush the fish with olive oil, and broil or grill it. · Sprinkle your salad with seeds or nuts instead of cheese. · Cook with olive or canola oil instead of butter or oils that are high in saturated fat. · Switch from 2% milk or whole milk to 1% or fat-free milk. · Dip raw vegetables in a vinaigrette dressing or hummus instead of dips made from mayonnaise or sour cream. 
· Have a piece of fruit for dessert instead of a piece of cake. Try baked apples, or have some dried fruit. Tips for eating out · Try broiled, grilled, baked, or poached fish instead of having it fried or breaded. · Ask your  to have your meals prepared with olive oil instead of butter. · Order dishes made with marinara sauce or sauces made from olive oil. Avoid sauces made from cream or mayonnaise. · Choose whole-grain breads, whole wheat pasta and pizza crust, brown rice, beans, and lentils. · Cut back on butter or margarine on bread. Instead, you can dip your bread in a small amount of olive oil. · Ask for a side salad or grilled vegetables instead of french fries or chips. Where can you learn more? Go to http://noa-em.info/ Enter 194-600-4237 in the search box to learn more about \"Learning About the Mediterranean Diet. \" Current as of: August 21, 2019Content Version: 12.4 © 9308-6889 [employer]. Care instructions adapted under license by [employer] (which disclaims liability or warranty for this information).  If you have questions about a medical condition or this instruction, always ask your healthcare professional. [place] [address] any warranty or liability for your use of this information.

## 2020-04-30 NOTE — PROGRESS NOTES
Chief Complaint   Patient presents with    Follow-up     [address]     1. Have you been to the ER, urgent care clinic since your last visit? Hospitalized since your last visit? No    2. Have you seen or consulted any other health care providers outside of the [address] [place] [address] since your last visit? Include any pap smears or colon screening.  No    Completed Depression screening and MWV questionnaire

## 2020-04-30 NOTE — PROGRESS NOTES
Assessment/Plan: Virtual Visit    1. Medicare annual wellness visit, subsequent  -discussed healthy diet and increasing daily exercise  -labs done 12/2019, due to for lipids check  -HM: had Shingrix at [facility]; colonoscopy due 2021, needs Prevnar 13, PAP, Dexa - ordered     2. Hemiplegia, dominant side s/p CVA  -current therapy: asa 81mg  -using rollator    3. Chronic pain / muscle spasticity  -current therapy:  baclofen 10mg tid + diclofenac gel + percocet q6hr prn + lidocaine 5% patches + calcipotriene topical +   -sees pain mgmt, Dr. Maci Mahan     4. Hx of Gastric bypass  -current therapy:  pantoprazole 40mg + Vit D 50,000 q7days + Vit B 1,000mcg/mL inj o19kjed    5. Essential hypertension, CHF  -current therpay: losartan 50mg +carvedilol 6.25mg + lasix 40mg daily    6. . Hypercholesterolemia  -current therapy: atorvastatin 40mg   -LIPID due - will send lab order if clinic doesn't reopen in June     5. Thyroid disease  -current therapy: levothyroxine 125mcg  -TSH = 0.665 (3/2019); TSH ordered    6. Prediabetes  -current therapy: metformin 500mg daily   -A1c = 5.5% (12/2019)    7. Anxiety/Depression  -current therapy: trazodone 100mg + duloxetine 120mg (2 tabs 60mg) + hydroxyzine 5mg prn  -PHQ = 3, MARY = 7 (P =6 MARY = 6 @OV 1/30/20; P = 10, MARY = 7 @OV 12/5/19)  -stressors include mother in hospice, not able to visit d/t COVID19 restrictions     8. Migraines/ HA  -current therapy: emgality [id number] + topiramate 100mg bid     21 or more minutes were spent on the digital evaluation and management of this patient.    RTC when clinic reopens in June for fasting labs/follow up (lab slips mailed w/AVS in case clinic isn't reopened, pt to get labs drawn in June)                 Date of visit: 4/30/2020       This is an Subsequent Medicare Annual Wellness Visit (AWV), (Performed more than 12 months after effective date of Medicare Part B enrollment and 12 months after last preventive visit)    I have reviewed the patient's medical history in detail and updated the computerized patient record. Francisco Hinds is a 62 y.o. female   History obtained from: the patient. Patient lives: independently    Cognitive Impairment concerns: no    History     Patient Active Problem List   Diagnosis Code    Coronary artery disease involving native coronary artery of native heart without angina pectoris I25.10    Bronchitis J40    High cholesterol E78.00    History of cardiomyopathy Z86.79    SOB (shortness of breath) R06.02    Neck pain, bilateral M54.2    Shoulder pain, bilateral M25.511, M25.512    Muscle spasticity M62.838    Hemiparesis and alteration of sensations as late effects of stroke (HCC) I69.359, I69.398    Head pain, chronic R51, G89.29    Expressive aphasia R47.01    Heart palpitations R00.2    Ventricular ectopic activity I49.3    Stroke ([facility] [address] I63.9    Thyroid disease E07.9    Hypercholesterolemia E78.00    EDDIE on CPAP G47.33, Z99.89    Essential hypertension I10    Congestive heart failure (HCC) I50.9    Chest pain R07.9    Melena K92.1    Hiatal hernia K44.9    Postoperative hypothyroidism E89.0    Chronic pain G89.29    Prediabetes R73.03    Constipation K59.00    Insomnia G47.00    Left-sided weakness R53.1    Vitamin D deficiency E55.9    Obesity, morbid (HCC) E66.01    Chronic indwelling Dunn catheter Z96.0    Dermatitis L30.9    Obesity (BMI 30-39. 9) E66.9     Past Medical History:   Diagnosis Date    Advanced care planning/counseling discussion 3/4/16    On File    Bronchitis 2/24/2014    Cervicalgia 8/18/15    Dr. Adama Lei    Chest pain 5/25/15    Hospitalized at [facility] 5/25/15 (lab work negative)    Chronic indwelling Dunn catheter 1/24/2018    Initially placed Jan 2018. Mx by Dr. Michelle Genao.      Congestive heart failure, unspecified     Last Echo 2/8/15: EF 55-60%    Constipation 6/13/2017    Enthesopathy, spinal ([facility] [address] 8/18/15    Dr. Liane Lopez hypertension     Foot drop 8/18/15    Dr. Fam Mehta Heart attack [facility]) 2/24/2013    Was supposed to See Cardiology for possible pacemaker in november 2014- After Cardiology consult locally, no need, EF greatly improved. Established with Dr. Chau Lesser Hiatal hernia 6/2015    3 cm hiatal hernia     Hip pain 8/18/15    Dr. Jamila Olson    Hypercholesterolemia     Hyperglycemia 7/2015    A1c 5.9     Hyperlipidemia 6/30/2015    NMR lipoprofile- LDL P 997, LDL-c 71, HLD-C-39, TG-60, HLD-P (25.2), Small LDL-P -541, LDL size 20.6    Hypothyroid     Insomnia 6/13/2017    Lower extremity edema     Lumbar spondylosis 8/18/15    Dr. Jeronimo Iyer 6/2015    EGD/Colonscopy 6/15- Gastritis, internal hemorrhoids and 3 polyps    Murmur     EDDIE on CPAP     Was referred to Pulmonology - Uses CPAP    Osteoarthritis of hip 8/18/15    Dr. Jamila Olson    Osteoarthritis, shoulder 8/18/15    Dr. Fam Mehta Radiculopathy, cervical 8/18/15    Dr. Jamila Olson    Shoulder pain 8/18/15    Dr. Fam Mehta Spinal stenosis of cervical region 8/18/15    Dr. Fam Mehta Spinal stenosis, lumbar 8/18/15    Dr. Fam Mehta Stroke [facility]) 2/25/2014    Established with Neurology, Doreen Teran NP-[facility]. CT negative, but Late effect CV accident with increased tone described on discharge summary, Carotid dopplers showed 50% stenosis bilaterally.  Vitamin D deficiency 7/2015      Past Surgical History:   Procedure Laterality Date    COLONOSCOPY N/A 6/22/2016    COLONOSCOPY performed by Francesco Rubalcava MD at [facility] ENDOSCOPY    HX BREAST BIOPSY Right 8/11/15    [facility]    HX CHOLECYSTECTOMY      HX COLONOSCOPY  6/2015    Dr. Arianna Larson- 3 complete polypectomies, Internal hemorrhoids, difficult study due to spasm.  Repeat in 1 year    HX ENDOSCOPY  6/2015    mild gastritis, 3cm hiatal hernia     HX GASTRIC BYPASS  6/1989    HX HEART CATHETERIZATION  2/2014    HX ORTHOPAEDIC      Right middle finger distal amputation    HX PARTIAL THYROIDECTOMY  ~1990    HX THYROIDECTOMY Left 1985    90% of one side of the thyroid removed    IR ASP BLADDER SUPRA CATH  5/24/2019     Allergies   Allergen Reactions    Bees [Hymenoptera Allergenic Extract] Shortness of Breath and Swelling    Strawberry Shortness of Breath and Swelling    Lisinopril Cough     Current Outpatient Medications   Medication Sig Dispense Refill    levothyroxine (SYNTHROID) 125 mcg tablet Take 1 Tab by mouth Daily (before breakfast). 90 Tab 0    topiramate (TOPAMAX) 100 mg tablet Take 1 tablet by mouth twice daily 180 Tab 3    onabotulinumtoxinA (BOTOX) 200 unit injection INJECT INTO THE BILATERAL MUSCLES OF THE HEAD AND NECK EVERY 3 MONTHS FOR MIGRAINES. DISCARD UNUSED PORTIONS 200 Units 3    furosemide (LASIX) 40 mg tablet TAKE 1 TABLET BY MOUTH ONCE DAILY 90 Tab 1    galcanezumab-gnlm (EMGALITY SYRINGE) 120 mg/mL syrg 1 Syringe by SubCUTAneous route every thirty (30) days. Start 30 days after the loading dose 1 Syringe 11    cyanocobalamin (VITAMIN B12) 1,000 mcg/mL injection INJECT 1 ML UNDER THE SKIN EVERY 14 DAYS FOR B12 DEFICIENCY 1 mL 1    ergocalciferol (ERGOCALCIFEROL) 1,250 mcg (50,000 unit) capsule Take 1 Cap by mouth every seven (7) days. 12 Cap 1    FREESTYLE LITE STRIPS strip USE STRIP TO CHECK GLUCOSE TWICE DAILY  3    FREESTYLE LITE METER monitoring kit USE TO CHECK GLUCOSE ONCE DAILY  0    ciclopirox (PENLAC) 8 % solution APPLY TO AFFECTED TOE NAILS DAILY      diclofenac (VOLTAREN) 1 % gel APPLY TOPICALLY TO AFFECTED AREA ONCE DAILY      hydroxyzine HCL (ATARAX) 50 mg tablet TAKE 1 TABLET BY MOUTH TWICE DAILY AS NEEDED FOR ANXIETY      FREESTYLE LANCETS 28 gauge misc USE TO CHECK GLUCOSE TWICE DAILY      pantoprazole (PROTONIX) 40 mg tablet Take 1 Tab by mouth daily. 90 Tab 3    atorvastatin (LIPITOR) 40 mg tablet Take 1 Tab by mouth daily.  90 Tab 1    oxyCODONE-acetaminophen (PERCOCET 7.5) 7.5-325 mg per tablet TAKE 1 TABLET BY MOUTH 4 TIMES DAILY 0    metFORMIN ER (GLUCOPHAGE XR) 500 mg tablet Take 1 Tab by mouth daily (with dinner). 90 Tab 1    traZODone (DESYREL) 100 mg tablet Take 1 Tab by mouth nightly. At bedtime 30 Tab 1    carvedilol (COREG) 6.25 mg tablet TAKE 1 TABLET BY MOUTH TWICE DAILY WITH MEALS 60 Tab 5    baclofen (LIORESAL) 10 mg tablet TAKE 1 TABLET BY MOUTH THREE TIMES DAILY 270 Tab 0    LINZESS 145 mcg cap capsule TAKE 1 CAPSULE BY MOUTH ONCE DAILY BEFORE BREAKFAST FOR CONSTIPATION 90 Cap 3    omega-3 acid ethyl esters (LOVAZA) 1 gram capsule Take 2 Caps by mouth two (2) times a day. 120 Cap 2    DULoxetine (CYMBALTA) 60 mg capsule Take 2 Caps by mouth daily. 180 Cap 0    losartan (COZAAR) 50 mg tablet Take 1 Tab by mouth nightly. Hold for SBP<115 90 Tab 1    calcipotriene (DOVONEX) 0.005 % topical cream Apply  to affected area daily as needed. 60 g 11    HYDROcodone-acetaminophen (NORCO) 5-325 mg per tablet Take 1 Tab by mouth every six (6) hours as needed for Pain. Max Daily Amount: 4 Tabs. 20 Tab 0    albuterol-ipratropium (DUO-NEB) 2.5 mg-0.5 mg/3 ml nebu 3 mL by Nebulization route every six (6) hours as needed. 30 Nebule 0    albuterol (PROVENTIL HFA, VENTOLIN HFA, PROAIR HFA) 90 mcg/actuation inhaler Take 2 Puffs by inhalation every four (4) hours as needed for Wheezing or Shortness of Breath. 3 Inhaler 3    ferrous sulfate 325 mg (65 mg iron) tablet Take 325 mg by mouth Daily (before breakfast).  aspirin delayed-release 81 mg tablet Take 81 mg by mouth daily.  miscellaneous medical supply misc Rollator Dx:I69.359 1 Each 0    miscellaneous medical supply [facility] Walker Dx: I69.359 1 Each 0    lidocaine (LIDODERM) 5 % Apply patch to the affected area for 12 hours a day and remove for 12 hours a day.  80 Each 3     Family History   Problem Relation Age of Onset    Heart Disease Father 61    Hypertension Mother     Heart Disease Brother 62    Diabetes Maternal Grandmother      Social History     Tobacco Use    Smoking status: Former Smoker     Packs/day: 0.25     Years: 15.00     Pack years: 3.75     Types: Cigarettes     Last attempt to quit: 2007     Years since quittin.8    Smokeless tobacco: Never Used   Substance Use Topics    Alcohol use: No          Specialists/Care Team   Minda Valdes has established care with the following healthcare providers:  Patient Care Team:  Alyce Lara NP as PCP - General (Nurse Practitioner)  Alyce L[facility] as [facility]  Danyel Paul MD (Cardiology)  Pedro Long NP (Nurse Practitioner)  Sue Cuellar MD as Physician (Physical Medicine and Rehab)  Ailyn Leigh MD (Sleep Medicine)  Joselo Griffin MD as Consulting Provider (Endocrinology)  Jeffery Abdullahi as   Jose Mcdaniel MD as Physician (Female Pelvic Medicine and Reconstructive Surgery)    Health Risk Assessment     Demographics   female  62 y.o. General Health Questions   -During the past 4 weeks: How would you rate your health in general? Good  How much have you been bothered by bodily pain? moderately  Has your physical and emotional health limited your social activities with family or friends? no     Emotional Health Questions     3 most recent PHQ Screens 3/5/2020   PHQ Not Done -   Little interest or pleasure in doing things Not at all   Feeling down, depressed, irritable, or hopeless Not at all   Total Score PHQ 2 0     Health Habits   Please describe your diet habits: needs improvement  Do you get 5 servings of fruits or vegetables daily? no  Do you exercise regularly?  no    Alcohol and Tobacco Risk Factor Screening:     Social History     Tobacco Use   Smoking Status Former Smoker    Packs/day: 0.25    Years: 15.00    Pack years: 3.75    Types: Cigarettes    Last attempt to quit: 2007    Years since quittin.8   Smokeless Tobacco Never Used     Social History     Substance and Sexual Activity   Alcohol Use No Activities of Daily Living and Functional Status   Do you need help with eating, walking, dressing, bathing, toileting, the phone, transportation, shopping, preparing meals, housework, laundry, or medications:  yes  -Do you need help managing money? no  -In the past four weeks, was someone available to help you if you needed and wanted help with anything? yes  -Are you confident are you that you can control and manage most of your health problems? yes  -Have you been given information to help you keep track of your medications? yes  -How often do you have trouble taking your medications as prescribed? never    Hearing Loss   Have you noticed any hearing difficulties? yes    Fall Risk and Home Safety   How often have you been bothered by feeling dizzy when standing up? occasionally 1-2 days  Have you fallen 2 or more times in the past year? No - fell one time getting up to urinate (2/2020)- had taken dov 300mg - got lightheaded and fell. Didn't hurt herself. Advised to speak to pain management and see if dov can be replaced   Does your home have good lighting, grab bars in the bathroom, handrails on the stairs, good lighting? yes  Does you home have throw rugs on floor or clutter you can trip over? no  Do you have smoke detectors and check them regularly? yes  Do you have difficulties driving a car? no  Do you always fasten your seat belt when you are in a car? yes  Fall Risk Assessment:    Fall Risk Assessment, last 12 mths 9/5/2019   Able to walk? Yes   Fall in past 12 months? No   Fall with injury? -   Number of falls in past 12 months -   Fall Risk Score -         Abuse Screen   Patient is not abused    Evaluation of Cognitive Function   Mood/affect:  neutral  Orientation: Person, Place, Time and Situation  Appearance: age appropriate  Family member/caregiver input: none    Physical Examination   There were no vitals filed for this visit. There is no height or weight on file to calculate BMI.    No exam data present  Patient's timed Up & Go test steady or shorter than 30 seconds? Virtual visit, not done      Advice/Referrals/Counseling (as indicated)   Education and counseling provided for any problems identified above:   Screenings   Vaccines  Annual Flu shot  Healthy eating  Daily exercise    Preventive Services       (Preventive care checklist to be included in patient instructions)  Discussed today Done Previously Not Needed       Glaucoma screening   x 2018 - q3yr  Colorectal cancer screening (colonoscopy)   x   Osteoporosis Screening (DEXA scan)    9/2019  Breast cancer Screening (Mammogram)   x 2015  Cervical cancer Screening (Pap smear)     x Prostate cancer Screening    12/2019  Cardiovascular Screening (Lipid panel)    12/2019  Diabetes screening test (Hgb A1c)     x Abdominal ultrasound for AAA (65-75 male smokers)     x Low-dose CT for lung cancer screening   x   Flu vaccine     x Hepatitis B vaccine (if at risk)     x 2016  Pneumococcal 23  Prevnar 13    6/2017  Tdap vaccine   x Done at [facility] vaccine   x   Screening for Hepatitis C (b [id number])     Discussion of Advance Directive     Patient was offered the opportunity to discuss advance care planning:  yes     Does patient have an Advance Directive:  yes  If yes, name of Health Care Agent: Sandra New  Date directive signed by Pt: 7/6/2015       Directive Witnessed:  YES   If no, did you provide information on [facility]ctions? yes       Assessment/Plan   Z00.00    Dep/Anxiety  Current therapy: cymbalta 60mg (2 tabs) + trazodone 100mg   -stressors include mother in hospice, not able to visit d/t COVID19 restrictions   Never spoke to pain mgmt about switching from cymbalta to effexor (at last visit, she didn't think cymbalta was working for her)   Pain mgmt added dov 300mg tid prn - feels much more sleepier, so not taking it often. Had fall 6 weeks ago at night - getting up to urinate; discussed fall precautions.  Advised to speak to pain mgmt about replacing dov    +anxiety - mother just went into hospice recently (uterine cancer)   + excessive fatigue  No panic attacks  No sleep disturbance  No  crying spells   No delusions or hallucinations   Not going to counseling    No suicidal ideation. No homicidal ideation. PHQ-9 Score: 3  Over the past 2 weeks, how often have you been bothered by any of the following problems? (Not at all = 0; several days = 1; More than 1/2 the days = 2; nearly every day = 3)  1) Little interest or pleasure in doing things:  0  2) Feeling down, depressed or hopeless: 0  3) Trouble falling asleep, staying asleep or sleeping too much:0  4) Feeling tired or having little energy:1  5) Poor appetite or overeatin  6) Feeling bad about yourself - or that you're a failure or have let yourself or your family down:0  7) Trouble concentrating on things, such as reading the newspaper or watching TV: 0  8) Moving or speaking so slowly that other people could have noticed. Or, the opposite - being so fidgety or restless that you have been moving around a lot more than usual:1  9) Thoughts that you would be better off dead or of hurting yourself in some way:0    GAD7 score: 7  Feeling nervous, anxious or on edge:   1  Not being able to stop or control worryin  Worrying too much about different things:1  Trouble relaxin  Being so restless that it is hard to sit still:  1  Becoming easily annoyed or irritable:  1  Feeling afraid as if something awful might happen: 1  If any of the above were scored more than 0, how difficult have these problems made it for you to do your work, take care of things at home, or get along with other people?  Not at all      Social History:  Nutrition: hx of bariatric, tries to follow diet   Physical: not much   Social: mom is now in hospice - for past 6 weeks - able to talk to her daily      checked: Reviewed patient's prescription monitoring report at time of visit and there are no discrepancies. Review of Systems:  - Constitutional Symptoms: no fevers, chills  - Cardiovascular:  no palpitations, chest pain  - Respiratory: no cough or shortness of breath    No LMP recorded. Patient is postmenopausal.    O: VS: There were no vitals taken for this visit. GENERAL: Edwige Alberto is in no acute distress. Non-toxic. Well nourished. Well developed. Appropriately groomed. PSYCH: appropriate behavior, dress and thought processes. Good eye contact. Coherent speech.   ____________________________________________________________________  Patient education was done. Advised on nutrition, physical activity, weight management, tobacco, alcohol and safety, including suicide hotline. Counseling included discussion of diagnosis, differentials, treatment options, prescribed treatment, warning signs and follow up. Medication risks/benefits, costs interactions and alternatives discussed with patient.       Patient agreed to plan of care and verbalized understanding. Patient was given an after visit summary which included current diagnoses, medications and vital signs. Edwige Alberto is a 62 y.o. female who was seen by synchronous (real-time) audio-video technology on 4/30/2020. Consent: Edwige Alberto, who was seen by synchronous (real-time) audio-video technology, and/or her healthcare decision maker, is aware that this patient-initiated, Telehealth encounter on 4/30/2020 is a billable service, with coverage as determined by her insurance carrier. She is aware that she may receive a bill and has provided verbal consent to proceed: Yes. Edwige Alberto is a 62 y.o. female who was evaluated by a video visit encounter for concerns as above. Patient identification was verified prior to start of the visit. A caregiver was present when appropriate.  Due to this being a TeleHealth encounter (During ZTSZY-29 public health emergency), evaluation of the following organ systems was limited: Vitals/Constitutional/EENT/Resp/CV/GI//MS/Neuro/Skin/Heme-Lymph-Imm. Pursuant to the emergency declaration under the [facility] waiver authority and the [employer] Act, this Virtual  Visit was conducted, with patient's (and/or legal guardian's) consent, to reduce the patient's risk of exposure to COVID-19 and provide necessary medical care. Services were provided through a video synchronous discussion virtually to substitute for in-person clinic visit. Patient and provider were located at their individual homes.       Jas Collier NP

## 2020-05-04 ENCOUNTER — VIRTUAL VISIT (OUTPATIENT)
Dept: CARDIOLOGY CLINIC | Age: 60
End: 2020-05-04

## 2020-05-04 VITALS — BODY MASS INDEX: 36.05 KG/M2 | DIASTOLIC BLOOD PRESSURE: 72 MMHG | WEIGHT: 210 LBS | SYSTOLIC BLOOD PRESSURE: 117 MMHG

## 2020-05-04 DIAGNOSIS — I63.9 CEREBROVASCULAR ACCIDENT (CVA), UNSPECIFIED MECHANISM (HCC): ICD-10-CM

## 2020-05-04 DIAGNOSIS — I42.0 DILATED CARDIOMYOPATHY (HCC): ICD-10-CM

## 2020-05-04 DIAGNOSIS — I49.3 VENTRICULAR ECTOPIC ACTIVITY: ICD-10-CM

## 2020-05-04 DIAGNOSIS — I69.359 HEMIPARESIS AND ALTERATION OF SENSATIONS AS LATE EFFECTS OF STROKE (HCC): ICD-10-CM

## 2020-05-04 DIAGNOSIS — R53.1 LEFT-SIDED WEAKNESS: ICD-10-CM

## 2020-05-04 DIAGNOSIS — R07.9 CHEST PAIN, UNSPECIFIED TYPE: Primary | ICD-10-CM

## 2020-05-04 DIAGNOSIS — I25.10 CORONARY ARTERY DISEASE INVOLVING NATIVE CORONARY ARTERY OF NATIVE HEART WITHOUT ANGINA PECTORIS: ICD-10-CM

## 2020-05-04 DIAGNOSIS — I69.398 HEMIPARESIS AND ALTERATION OF SENSATIONS AS LATE EFFECTS OF STROKE (HCC): ICD-10-CM

## 2020-05-04 DIAGNOSIS — I10 ESSENTIAL HYPERTENSION: ICD-10-CM

## 2020-05-04 RX ORDER — GABAPENTIN 100 MG/1
100 CAPSULE ORAL
COMMUNITY

## 2020-05-04 NOTE — PROGRESS NOTES
Chief Complaint   Patient presents with    Follow-up     Complaints of sporadic chest pain/shortness of breath/swelling/dizziness.       Visit Vitals  /72   Wt 210 lb (95.3 kg)   BMI 36.05 kg/m²

## 2020-05-04 NOTE — PROGRESS NOTES
HISTORY OF PRESENT ILLNESS  Bettye Guzman is a 62 y.o. female. She had a virtual visit by telephone today because of the viral pandemic. 25 minutes were spent altogether with this encounter. She still has chest pain. When I saw her in the office several months ago I ordered a Lexiscan Myoview test that showed ejection fraction of 44% with possible prior inferior apical infarction but no ischemia. Years ago she had a heart catheterization at [facility] and had a stroke following the procedure but no significant coronary disease. She does continue to have chest pain worse with taking a deep breath. She takes a proton pump inhibitor and also a narcotic for this chronically her chest pain occurs about 2 times a week and can last 1/2-hour at a time. She is eating differently and is lost about 25 pounds.   HPI  Patient Active Problem List   Diagnosis Code    Coronary artery disease involving native coronary artery of native heart without angina pectoris I25.10    Bronchitis J40    High cholesterol E78.00    History of cardiomyopathy Z86.79    SOB (shortness of breath) R06.02    Neck pain, bilateral M54.2    Shoulder pain, bilateral M25.511, M25.512    Muscle spasticity M62.838    Hemiparesis and alteration of sensations as late effects of stroke ([facility]) I69.359, I69.398    Head pain, chronic R51, G89.29    Expressive aphasia R47.01    Heart palpitations R00.2    Ventricular ectopic activity I49.3    Stroke ([facility]) I63.9    Thyroid disease E07.9    Hypercholesterolemia E78.00    EDDIE on CPAP G47.33, Z99.89    Essential hypertension I10    Congestive heart failure ([facility]) I50.9    Chest pain R07.9    Melena K92.1    Hiatal hernia K44.9    Postoperative hypothyroidism E89.0    Chronic pain G89.29    Prediabetes R73.03    Constipation K59.00    Insomnia G47.00    Left-sided weakness R53.1    Vitamin D deficiency E55.9    Obesity, morbid ([facility]) E66.01    Chronic indwelling Dunn catheter Z96.0    Dermatitis L30.9    Obesity (BMI 30-39. 9) E66.9    Anxiety F41.9     Current Outpatient Medications   Medication Sig Dispense Refill    gabapentin (NEURONTIN) 300 mg capsule Take 300 mg by mouth three (3) times daily as needed for Pain.  cyanocobalamin (VITAMIN B12) 1,000 mcg/mL injection INJECT 1 ML UNDER THE SKIN EVERY 14 DAYS FOR B12 DEFICIENCY 1 mL 5    levothyroxine (SYNTHROID) 125 mcg tablet Take 1 Tab by mouth Daily (before breakfast). 90 Tab 0    topiramate (TOPAMAX) 100 mg tablet Take 1 tablet by mouth twice daily 180 Tab 3    onabotulinumtoxinA (BOTOX) 200 unit injection INJECT INTO THE BILATERAL MUSCLES OF THE HEAD AND NECK EVERY 3 MONTHS FOR MIGRAINES. DISCARD UNUSED PORTIONS 200 Units 3    furosemide (LASIX) 40 mg tablet TAKE 1 TABLET BY MOUTH ONCE DAILY 90 Tab 1    galcanezumab-gnlm (EMGALITY SYRINGE) 120 mg/mL syrg 1 Syringe by SubCUTAneous route every thirty (30) days. Start 30 days after the loading dose 1 Syringe 11    ergocalciferol (ERGOCALCIFEROL) 1,250 mcg (50,000 unit) capsule Take 1 Cap by mouth every seven (7) days. 12 Cap 1    FREESTYLE LITE STRIPS strip USE STRIP TO CHECK GLUCOSE TWICE DAILY  3    FREESTYLE LITE METER monitoring kit USE TO CHECK GLUCOSE ONCE DAILY  0    ciclopirox (PENLAC) 8 % solution APPLY TO AFFECTED TOE NAILS DAILY      diclofenac (VOLTAREN) 1 % gel APPLY TOPICALLY TO AFFECTED AREA ONCE DAILY      hydroxyzine HCL (ATARAX) 50 mg tablet TAKE 1 TABLET BY MOUTH TWICE DAILY AS NEEDED FOR ANXIETY      FREESTYLE LANCETS 28 gauge misc USE TO CHECK GLUCOSE TWICE DAILY      pantoprazole (PROTONIX) 40 mg tablet Take 1 Tab by mouth daily. 90 Tab 3    miscellaneous medical supply misc Rollator Dx:I69.359 1 Each 0    miscellaneous medical supply [facility] Walker Dx: I69.359 1 Each 0    atorvastatin (LIPITOR) 40 mg tablet Take 1 Tab by mouth daily.  90 Tab 1    oxyCODONE-acetaminophen (PERCOCET 7.5) 7.5-325 mg per tablet TAKE 1 TABLET BY MOUTH 4 TIMES DAILY  0    metFORMIN ER (GLUCOPHAGE XR) 500 mg tablet Take 1 Tab by mouth daily (with dinner). 90 Tab 1    traZODone (DESYREL) 100 mg tablet Take 1 Tab by mouth nightly. At bedtime 30 Tab 1    carvedilol (COREG) 6.25 mg tablet TAKE 1 TABLET BY MOUTH TWICE DAILY WITH MEALS 60 Tab 5    baclofen (LIORESAL) 10 mg tablet TAKE 1 TABLET BY MOUTH THREE TIMES DAILY 270 Tab 0    LINZESS 145 mcg cap capsule TAKE 1 CAPSULE BY MOUTH ONCE DAILY BEFORE BREAKFAST FOR CONSTIPATION 90 Cap 3    omega-3 acid ethyl esters (LOVAZA) 1 gram capsule Take 2 Caps by mouth two (2) times a day. 120 Cap 2    DULoxetine (CYMBALTA) 60 mg capsule Take 2 Caps by mouth daily. 180 Cap 0    losartan (COZAAR) 50 mg tablet Take 1 Tab by mouth nightly. Hold for SBP<115 90 Tab 1    calcipotriene (DOVONEX) 0.005 % topical cream Apply  to affected area daily as needed. 60 g 11    albuterol-ipratropium (DUO-NEB) 2.5 mg-0.5 mg/3 ml nebu 3 mL by Nebulization route every six (6) hours as needed. 30 Nebule 0    albuterol (PROVENTIL HFA, VENTOLIN HFA, PROAIR HFA) 90 mcg/actuation inhaler Take 2 Puffs by inhalation every four (4) hours as needed for Wheezing or Shortness of Breath. 3 Inhaler 3    ferrous sulfate 325 mg (65 mg iron) tablet Take 325 mg by mouth Daily (before breakfast).  aspirin delayed-release 81 mg tablet Take 81 mg by mouth daily. Past Medical History:   Diagnosis Date    Advanced care planning/counseling discussion 3/4/16    On File    Bronchitis 2/24/2014    Cervicalgia 8/18/15    Dr. Caroline Ward    Chest pain 5/25/15    Hospitalized at [facility] 5/25/15 (lab work negative)    Chronic indwelling Dunn catheter 1/24/2018    Initially placed Jan 2018. Mx by Dr. Miguel Ortiz.      Congestive heart failure, unspecified     Last Echo 2/8/15: EF 55-60%    Constipation 6/13/2017    Enthesopathy, spinal ([address] 8/18/15    Dr. Caroline Ward    Essential hypertension     Foot drop 8/18/15    Dr. Keke Moe Heart attack [facility]) 2/24/2013 Was supposed to See Cardiology for possible pacemaker in november 2014- After Cardiology consult locally, no need, EF greatly improved. Established with Dr. Melina Peters Hiatal hernia 6/2015    3 cm hiatal hernia     Hip pain 8/18/15    Dr. Adama Lei    Hypercholesterolemia     Hyperglycemia 7/2015    A1c 5.9     Hyperlipidemia 6/30/2015    NMR lipoprofile- LDL P 997, LDL-c 71, HLD-C-39, TG-60, HLD-P (25.2), Small LDL-P -541, LDL size 20.6    Hypothyroid     Insomnia 6/13/2017    Lower extremity edema     Lumbar spondylosis 8/18/15    Dr. Zimmer Peaks 6/2015    EGD/Colonscopy 6/15- Gastritis, internal hemorrhoids and 3 polyps    Murmur     EDDIE on CPAP     Was referred to Pulmonology - Uses CPAP    Osteoarthritis of hip 8/18/15    Dr. Adama Lei    Osteoarthritis, shoulder 8/18/15    Dr. Kenyon Nicholson Radiculopathy, cervical 8/18/15    Dr. Adama Lei    Shoulder pain 8/18/15    Dr. Kenyon Nicholson Spinal stenosis of cervical region 8/18/15    Dr. Kenyon Nicholson Spinal stenosis, lumbar 8/18/15    Dr. Kenyon Nicholson Stroke [facility]) 2/25/2014    Established with Neurology, Edie Sommers, NP-Just hospitalized at [facility] 2/7/15-2/10/15. CT negative, but Late effect CV accident with increased tone described on discharge summary, Carotid dopplers showed 50% stenosis bilaterally.  Vitamin D deficiency 7/2015     Past Surgical History:   Procedure Laterality Date    COLONOSCOPY N/A 6/22/2016    COLONOSCOPY performed by Corona Pandey MD at [facility] ENDOSCOPY    HX BREAST BIOPSY Right 8/11/15    Stereo Bx - [facility]--- Benign    HX CHOLECYSTECTOMY      HX COLONOSCOPY  6/2015    Dr. Donovan Darby- 3 complete polypectomies, Internal hemorrhoids, difficult study due to spasm.  Repeat in 1 year    HX ENDOSCOPY  6/2015    mild gastritis, 3cm hiatal hernia     HX GASTRIC BYPASS  6/1989    HX HEART CATHETERIZATION  2/2014    HX ORTHOPAEDIC      Right middle finger distal amputation    HX PARTIAL THYROIDECTOMY  ~1990    HX THYROIDECTOMY Left 1985    90% of one side of the thyroid removed    IR ASP BLADDER SUPRA CATH  5/24/2019       Review of Systems   Constitutional: Positive for weight loss. Cardiovascular: Positive for chest pain. Neurological: Positive for focal weakness. All other systems reviewed and are negative. Visit Vitals  /72   Wt 210 lb (95.3 kg)   BMI 36.05 kg/m²       Physical Exam   Constitutional: She is oriented to person, place, and time. She appears well-nourished. No distress. HENT:   Head: Normocephalic. Pulmonary/Chest: Effort normal. No respiratory distress. Neurological: She is oriented to person, place, and time. Skin: No rash noted. Psychiatric: Her behavior is normal.   Nursing note and vitals reviewed. ASSESSMENT and PLAN  Overall she is doing well and I congratulated her on her weight loss. I am not overly concerned about her chest pain since it seems to be worse with taking a breath and also she has a history of the negative catheterization causing a stroke and the stress test done more recently that showed no ischemia. She does have chronic pain. I will continue her current regimen and plan to see her in 6 months time.

## 2020-05-05 ENCOUNTER — TELEPHONE (OUTPATIENT)
Dept: NEUROLOGY | Age: 60
End: 2020-05-05

## 2020-05-05 NOTE — TELEPHONE ENCOUNTER
Per my notes, patient has been approved for both botox and Emgality. Seeing that patient has an upcoming botox appt, that would make sense that [facility] is calling to get permission to ship the botox.

## 2020-05-05 NOTE — TELEPHONE ENCOUNTER
Sherman Waddell, patient stated she is on Emgality and Botox. Does insurance allow this? Can you please look into this matter at your convenience.  Thanks

## 2020-05-05 NOTE — TELEPHONE ENCOUNTER
Epifanio Jarrell w/ [facility] calling stating pt called to schedule medication. When they told her it was for botox she stated that it wasn't for Botox but for another medication. They are calling to make sure it is botox the pt should be getting.  Please call back

## 2020-05-19 ENCOUNTER — DOCUMENTATION ONLY (OUTPATIENT)
Dept: NEUROLOGY | Age: 60
End: 2020-05-19

## 2020-05-19 ENCOUNTER — TELEPHONE (OUTPATIENT)
Dept: NEUROLOGY | Age: 60
End: 2020-05-19

## 2020-05-22 ENCOUNTER — HOSPITAL ENCOUNTER (OUTPATIENT)
Dept: MAMMOGRAPHY | Age: 60
Discharge: HOME | End: 2020-05-22
Attending: NURSE PRACTITIONER
Payer: MEDICARE

## 2020-05-22 DIAGNOSIS — Z78.0 POSTMENOPAUSAL: ICD-10-CM

## 2020-05-22 PROCEDURE — 77080 DXA BONE DENSITY AXIAL: CPT

## 2020-05-28 ENCOUNTER — OFFICE VISIT (OUTPATIENT)
Dept: NEUROLOGY | Age: 60
End: 2020-05-28

## 2020-05-28 VITALS
OXYGEN SATURATION: 98 % | RESPIRATION RATE: 18 BRPM | HEART RATE: 79 BPM | SYSTOLIC BLOOD PRESSURE: 118 MMHG | DIASTOLIC BLOOD PRESSURE: 72 MMHG

## 2020-05-28 DIAGNOSIS — G43.719 INTRACTABLE CHRONIC MIGRAINE WITHOUT AURA AND WITHOUT STATUS MIGRAINOSUS: Primary | ICD-10-CM

## 2020-05-28 RX ORDER — GALCANEZUMAB 120 MG/ML
1 INJECTION, SOLUTION SUBCUTANEOUS
Qty: 1 SYRINGE | Refills: 11 | Status: SHIPPED | OUTPATIENT
Start: 2020-05-28 | End: 2021-03-18 | Stop reason: SDUPTHER

## 2020-05-28 NOTE — PROGRESS NOTES
Botox Injection Note     2020     Patient:  Davi Mao   YOB: 1960  Date of Visit: 2020      Indication: patient has chronic migraine headaches greater than 15 days per month lasting more than 4 hours each. She has failed or not tolerated 2 or more medication preventatives. Written consent was signed and verified by me. Risks and benefits were discussed to include possible cosmetic asymmetry which is not permament or life threatening. Patient name and  was confirmed prior to procedure. Time out performed. Procedure:   Botox concentration: 200 units in 2 ml of preservative-free normal saline. 1:1 dilution. LOT#: [id number]  EXP:  2022    Injections and distribution as follow :      Units/site  Sites Loc Subtotal    procerus 5 1 ML 5   corrugaters 2.5 2 BL 5   frontalis 5 8 BL 40   Temporalis 10 4 BL 40   Occipitalis 10 2 BL 20   Cervical paraspinals 5 4 BL 20   Trapezius 10 4 BL 40         200 units Botox were reconstituted, 170 units injected as above and the remainder was unavoidably wasted. Patient tolerated procedure well. Return in 3 months for repeat injections.     _____________________________   Allison Kocher, DO  [facility], ABPN

## 2020-06-02 ENCOUNTER — TELEPHONE (OUTPATIENT)
Dept: FAMILY MEDICINE CLINIC | Age: 60
End: 2020-06-02

## 2020-06-02 DIAGNOSIS — R53.83 FATIGUE, UNSPECIFIED TYPE: ICD-10-CM

## 2020-06-02 DIAGNOSIS — E53.9 VITAMIN B DEFICIENCY: Primary | ICD-10-CM

## 2020-06-02 RX ORDER — CYANOCOBALAMIN 1000 UG/ML
INJECTION, SOLUTION INTRAMUSCULAR; SUBCUTANEOUS
Qty: 6 ML | Refills: 0 | Status: SHIPPED | OUTPATIENT
Start: 2020-06-02 | End: 2020-06-23

## 2020-06-02 NOTE — TELEPHONE ENCOUNTER
Spoke with patient informed her that the script for 6 doses (3 months) have been sent to the pharmacy on file.  Patient stated Thom Ohara is out of town at her mother's,(mom on Hospice), Patient also stated she will call and give name of [facility] where she is to fax lab slip

## 2020-06-12 RX ORDER — METFORMIN HYDROCHLORIDE 500 MG/1
500 TABLET, EXTENDED RELEASE ORAL
Qty: 90 TAB | Refills: 1 | Status: SHIPPED | OUTPATIENT
Start: 2020-06-12 | End: 2020-07-24 | Stop reason: SDUPTHER

## 2020-06-12 NOTE — TELEPHONE ENCOUNTER
----- Message from Suri Ellison sent at 6/12/2020  3:41 PM EDT -----  Regarding: Joanna Blankenship/Telephone  Patient would like to get the results from her Bone Density test and also her B-12. Patient would also like to have a refill of her Metformin sent to [facility]

## 2020-06-12 NOTE — TELEPHONE ENCOUNTER
Called pt to review Bone density, letter. Left voice message for call back to office. Also, mailed letter and Labs for Vitamin B12.

## 2020-06-17 ENCOUNTER — TELEPHONE (OUTPATIENT)
Dept: FAMILY MEDICINE CLINIC | Age: 60
End: 2020-06-17

## 2020-06-17 NOTE — TELEPHONE ENCOUNTER
----- Message from Monster Naylor sent at 5/29/2020  2:09 PM EDT -----  Regarding: JYOTI Blankenship/refill  Contact: [phone]  Reason for call: Pt would like to speak to her pcp in regards to to her B12 injections. Pt would like have 6 vials of B12 injection sent to her pharmacy instead of one at a time. That she goes to the pharmacy less frequently. Pt would also needs a refill of syringe for her B12 injection. Pharmacy: [address] [place] [address] Insurance number: [phone]    Additional Info: Pt will be leaving town this weekend and would like like to have there refill before she leaves.      Last appt:  Thursday, April 30, 2020 08:30 AM

## 2020-06-19 ENCOUNTER — TELEPHONE (OUTPATIENT)
Dept: FAMILY MEDICINE CLINIC | Age: 60
End: 2020-06-19

## 2020-06-19 NOTE — TELEPHONE ENCOUNTER
Returned phone call to Andrea Ly with [facility] at [phone], no message. Left a voice message for a call back to the office.

## 2020-06-19 NOTE — TELEPHONE ENCOUNTER
Yi with [facility] would like a call back. she wanted to know if the doctor would follow this patient.   Brook Guerreros number is [phone]

## 2020-06-19 NOTE — TELEPHONE ENCOUNTER
Returned phone call to Ronel Molina with at [facility] and she states that the pt is going to be discharged from [facility] on 6/23/20 and would like a verbal O.K. for pt to start PT and OT for Encephalopathy.  PCP please advise

## 2020-06-22 NOTE — TELEPHONE ENCOUNTER
Returned phone call to Vince Cao with [facility] to inform that Joeyared Oakleyfanny has given the verbal O.K for pt to start PT and OT. Verified understanding.

## 2020-06-23 ENCOUNTER — VIRTUAL VISIT (OUTPATIENT)
Dept: FAMILY MEDICINE CLINIC | Age: 60
End: 2020-06-23

## 2020-06-23 DIAGNOSIS — I10 ESSENTIAL HYPERTENSION: ICD-10-CM

## 2020-06-23 DIAGNOSIS — R53.83 FATIGUE, UNSPECIFIED TYPE: ICD-10-CM

## 2020-06-23 DIAGNOSIS — Z76.89 ENCOUNTER FOR SUPPORT AND COORDINATION OF TRANSITION OF CARE: Primary | ICD-10-CM

## 2020-06-23 DIAGNOSIS — E53.9 VITAMIN B DEFICIENCY: ICD-10-CM
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
## 2020-06-23 DIAGNOSIS — R68.89 OTHER GENERAL SYMPTOMS AND SIGNS: ICD-10-CM

## 2020-06-23 RX ORDER — CYANOCOBALAMIN 1000 UG/ML
INJECTION, SOLUTION INTRAMUSCULAR; SUBCUTANEOUS
Qty: 1 ML | Refills: 2 | Status: SHIPPED | OUTPATIENT
Start: 2020-06-23 | End: 2020-11-20 | Stop reason: SDUPTHER

## 2020-06-23 RX ORDER — CARVEDILOL 6.25 MG/1
TABLET ORAL
Qty: 60 TAB | Refills: 5 | Status: SHIPPED | OUTPATIENT
Start: 2020-06-23 | End: 2020-12-28

## 2020-06-23 NOTE — TELEPHONE ENCOUNTER
Requested Prescriptions     Signed Prescriptions Disp Refills    carvediloL (COREG) 6.25 mg tablet 60 Tab 5     Sig: TAKE 1 TABLET BY MOUTH TWICE DAILY WITH MEALS     Authorizing Provider: Vince Francis     Ordering User: Bertin Atkins Locket verbal order

## 2020-06-23 NOTE — PATIENT INSTRUCTIONS
1) Talk to pain management today about what meds are necessary. We have had discussions in past about too much pain medication causing you to be sleepy and prone to falls. 2) [facility] will call you and let you know if home health will go to your day program.  Otherwise, I will refer you to physical therapy to help with conditioning. 3) Vit B Vitamin B12 is a nutrient that helps keep the body's nerves and blood cells healthy. It is used to make the genetic material in your cells, called DNA. Vitamin B12 deficiency causes tiredness, weakness, constipation, loss of appetite, weight loss, and megaloblastic anemia. Vitamin B12 is found in food sources, such as fish, meat, poultry, eggs, milk, and other dairy products, (beef liver and clams are the best sources.)  Some breakfast cereals, nutritional yeasts and other food products are fortified with vitamin B12. B12 is a water soluble vitamin, so B12 not needed by the body is excreted in urine. Normal range is 232-1245 pg/mL. Your Vitamin B12 is 447 which is normal. 
 
Please reduce your Vit B12 injections to ONCE a MONTH. Will recheck your level in 4-8 weeks. Preventing Falls: Care Instructions Your Care Instructions Getting around your home safely can be a challenge if you have injuries or health problems that make it easy for you to fall. Loose rugs and furniture in walkways are among the dangers for many older people who have problems walking or who have poor eyesight. People who have conditions such as arthritis, osteoporosis, or dementia also have to be careful not to fall. You can make your home safer with a few simple measures. Follow-up care is a key part of your treatment and safety. Be sure to make and go to all appointments, and call your doctor if you are having problems. It's also a good idea to know your test results and keep a list of the medicines you take. How can you care for yourself at home? Taking care of yourself · You may get dizzy if you do not drink enough water. To prevent dehydration, drink plenty of fluids, enough so that your urine is light yellow or clear like water. Choose water and other caffeine-free clear liquids. If you have kidney, heart, or liver disease and have to limit fluids, talk with your doctor before you increase the amount of fluids you drink. · Exercise regularly to improve your strength, muscle tone, and balance. Walk if you can. Swimming may be a good choice if you cannot walk easily. · Have your vision and hearing checked each year or any time you notice a change. If you have trouble seeing and hearing, you might not be able to avoid objects and could lose your balance. · Know the side effects of the medicines you take. Ask your doctor or pharmacist whether the medicines you take can affect your balance. Sleeping pills or sedatives can affect your balance. · Limit the amount of alcohol you drink. Alcohol can impair your balance and other senses. · Ask your doctor whether calluses or corns on your feet need to be removed. If you wear loose-fitting shoes because of calluses or corns, you can lose your balance and fall. · Talk to your doctor if you have numbness in your feet. Preventing falls at home · Remove raised doorway thresholds, throw rugs, and clutter. Repair loose carpet or raised areas in the floor. · Move furniture and electrical cords to keep them out of walking paths. · Use nonskid floor wax, and wipe up spills right away, especially on ceramic tile floors. · If you use a walker or cane, put rubber tips on it. If you use crutches, clean the bottoms of them regularly with an abrasive pad, such as steel wool. · Keep your house well lit, especially [facility], and outside walkways. Use night-lights in areas such as hallways and bathrooms.  Add extra light switches or use remote switches (such as switches that go on or off when you clap your hands) to make it easier to turn lights on if you have to get up during the night. · Install sturdy handrails on stairways. · Move items in your cabinets so that the things you use a lot are on the lower shelves (about waist level). · Keep a cordless phone and a flashlight with new batteries by your bed. If possible, put a phone in each of the main rooms of your house, or carry a cell phone in case you fall and cannot reach a phone. Or, you can wear a device around your neck or wrist. You push a button that sends a signal for help. · Wear low-heeled shoes that fit well and give your feet good support. Use footwear with nonskid soles. Check the heels and soles of your shoes for wear. Repair or replace worn heels or soles. · Do not wear socks without shoes on wood floors. · Walk on the grass when the sidewalks are slippery. If you live in an area that gets snow and ice in the winter, sprinkle salt on slippery steps and sidewalks. Preventing falls in the bath · Install grab bars and nonskid mats inside and outside your shower or tub and near the toilet and sinks. · Use shower chairs and bath benches. · Use a hand-held shower head that will allow you to sit while showering. · Get into a tub or shower by putting the weaker leg in first. Get out of a tub or shower with your strong side first. 
· Repair loose toilet seats and consider installing a raised toilet seat to make getting on and off the toilet easier. · Keep your bathroom door unlocked while you are in the shower. Where can you learn more? Go to http://noa-em.info/. Enter [phone] in the search box to learn more about \"Preventing Falls: Care Instructions. \" Current as of: March 16, 2018 Content Version: 11.8 © 5695-3925 [employer].  Care instructions adapted under license by [employer] (which disclaims liability or warranty for this information). If you have questions about a medical condition or this instruction, always ask your healthcare professional. [place] [address] any warranty or liability for your use of this information.

## 2020-06-23 NOTE — PROGRESS NOTES
Date of visit: 6/23/2020   Subjective:      History obtained from:  the patient. Keo Lion is a 56 y.o. female who presents today for face-to face transition of care. Assessment/Plan: Virtual Visit    1. Encounter for support and coordination of transition of care   GLF x2  -no hospital notes available for review  -pt reports hospital dx with too much pain meds causing falls; all pain meds were d/c and pt has pain management appt today at [phone] for review and restart of meds   -pt requesting [place] come to her day support program, but advised I didn't think HH would do that; Kendy Garciak to f/up and if not, will put in referral to PT    2. Vitamin B deficiency  -Vit B = 447 (6/5/20)  -advised to reduce vit b inj from 2x month to monthly and will recheck levels in 1-2 months  - cyanocobalamin (VITAMIN B12) 1,000 mcg/mL injection;  [id number]  -Vit B FUTURE     21 or more minutes were spent on the digital evaluation and management of this patient. RTC 3 months for f/up   Transition of Care documentation  No hospital notes to review - went to [address] out of bed Monday night and on Tuesday morning she fell again. Didn't hit head    Face to Face Encounter    Patients Name: Keo Lion    YOB: 1960    Primary Diagnosis:     Date of Face to Face:   6/23/2020                                  Face to Face Encounter findings are related to primary reason for home care:   no. [facility] visit d/t covid 19 pandemic. Pt riding in a van during     1.  I certify that the patient needs intermittent care as follows: physical therapy: strengthening, stretching/ROM, gait/stair training and balance training  occupational therapy:  ADL safety (ie. cooking, bathing, dressing) and ROM    2. I certify that this patient is homebound, that is: 1) patient requires the use of a walker device, special transportation, or assistance of another to leave the home; or 2) patient's condition makes leaving the home medically contraindicated; and 3) patient has a normal inability to leave the home and leaving the home requires considerable and taxing effort. Patient may leave the home for infrequent and short duration for medical reasons, and occasional absences for non-medical reasons. Homebound status is due to the following functional limitations: Patient's ambulation limited secondary to severe pain and requires the use of an assistive device and the assistance of a caregiver for safe completion. Patient with strength and ROM deficits limiting ambulation endurance requiring the use of an assistive device and the assistance of a caregiver. Patient deemed temporarily homebound secondary to increased risk for infection when leaving home and going out into the community. 3. I certify that this patient is under my care and that I, or a nurse practitioner or [facility], or clinical nurse specialist, or certified nurse midwife, working with me, had a Face-to-Face Encounter that meets the physician Face-to-Face Encounter requirements. The following are the clinical findings from the [address] [place] [address] encounter that support the need for skilled services and is a summary of the encounter:     See attached progess note      Elizabeth Gillespie NP  6/23/2020      THE FOLLOWING TO BE COMPLETED BY THE COMMUNITY PHYSICIAN:    I concur with the findings described above from the [facility] encounter that this patient is homebound and in need of a skilled service. Date admitted to hospital: 6/16/20  Date of discharge: 6/18/20      Hospital Course:    Found that she had too much pain medication  She had tremors and thought it was low blood sugar and would eat a turkey sandwich and some candy  \"Leg would give away\"   Going to pain management today at 1245pm     Home health instituted yes.        Concerns for today:   Migraines worry pt- sees neuro and got botox inj, advised to talk to neuro    Social history:   Nutrition: eating ok Physical: using walker, feels a bit weak, but not like before   Social: going to day program    Social History     Tobacco Use   Smoking Status Former Smoker    Packs/day: 0.25    Years: 15.00    Pack years: 3.75    Types: Cigarettes    Last attempt to quit: 2007    Years since quittin.0   Smokeless Tobacco Never Used     Social History     Substance and Sexual Activity   Alcohol Use No     Social History     Substance and Sexual Activity   Drug Use No       3 most recent PHQ Screens 3/5/2020   PHQ Not Done -   Little interest or pleasure in doing things Not at all   Feeling down, depressed, irritable, or hopeless Not at all   Total Score PHQ 2 0        I reviewed the following:   Patient Active Problem List    Diagnosis Date Noted    Anxiety 2020    Obesity (BMI 30-39.9) 2019    Dermatitis 2018    Chronic indwelling Dunn catheter 2018    Obesity, morbid ([facility] 2018    Vitamin D deficiency 2017    Left-sided weakness 2017    Constipation 2017    Insomnia 2017    Prediabetes 10/28/2016    Chronic pain 10/27/2016    Postoperative hypothyroidism 2016    Thyroid disease     Hypercholesterolemia     EDDIE on CPAP     Essential hypertension     Congestive heart failure ([address]     Melena 2015    Hiatal hernia 2015    Chest pain 2015    Heart palpitations 2015    Ventricular ectopic activity 2015    Neck pain, bilateral 2015    Shoulder pain, bilateral 2015    Muscle spasticity 2015    Hemiparesis and alteration of sensations as late effects of stroke ([address] 2015    Head pain, chronic 2015    Expressive aphasia 2015    History of cardiomyopathy 10/17/2014    SOB (shortness of breath) 10/17/2014    High cholesterol 10/14/2014    Bronchitis     Stroke ([address] 2014    Coronary artery disease involving native coronary artery of native heart without angina pectoris 02/24/2014     Current Outpatient Medications   Medication Sig Dispense Refill    metFORMIN ER (GLUCOPHAGE XR) 500 mg tablet Take 1 Tab by mouth daily (with dinner). 90 Tab 1    cyanocobalamin (VITAMIN B12) 1,000 mcg/mL injection INJECT 1 ML UNDER THE SKIN EVERY 14 DAYS FOR B12 DEFICIENCY 6 mL 0    Syringe, Disposable, 1 mL syrg Use bimonthly for cyanocobalamin subcutaneous injection. 25 Syringe 0    Needle, Disp, 25 G 25 gauge x 3/4\" ndle Use bimonthly for cyanocobalamin subcutaneous injection. 1 Box 0    galcanezumab-gnlm (Emgality Syringe) 120 mg/mL syrg 1 Syringe by SubCUTAneous route every thirty (30) days. Start 30 days after the loading dose 1 Syringe 11    gabapentin (NEURONTIN) 300 mg capsule Take 300 mg by mouth three (3) times daily as needed for Pain.  levothyroxine (SYNTHROID) 125 mcg tablet Take 1 Tab by mouth Daily (before breakfast). 90 Tab 0    topiramate (TOPAMAX) 100 mg tablet Take 1 tablet by mouth twice daily 180 Tab 3    onabotulinumtoxinA (BOTOX) 200 unit injection INJECT INTO THE BILATERAL MUSCLES OF THE HEAD AND NECK EVERY 3 MONTHS FOR MIGRAINES. DISCARD UNUSED PORTIONS 200 Units 3    furosemide (LASIX) 40 mg tablet TAKE 1 TABLET BY MOUTH ONCE DAILY 90 Tab 1    ergocalciferol (ERGOCALCIFEROL) 1,250 mcg (50,000 unit) capsule Take 1 Cap by mouth every seven (7) days. 12 Cap 1    FREESTYLE LITE STRIPS strip USE STRIP TO CHECK GLUCOSE TWICE DAILY  3    FREESTYLE LITE METER monitoring kit USE TO CHECK GLUCOSE ONCE DAILY  0    ciclopirox (PENLAC) 8 % solution APPLY TO AFFECTED TOE NAILS DAILY      diclofenac (VOLTAREN) 1 % gel APPLY TOPICALLY TO AFFECTED AREA ONCE DAILY      hydroxyzine HCL (ATARAX) 50 mg tablet TAKE 1 TABLET BY MOUTH TWICE DAILY AS NEEDED FOR ANXIETY      FREESTYLE LANCETS 28 gauge misc USE TO CHECK GLUCOSE TWICE DAILY      pantoprazole (PROTONIX) 40 mg tablet Take 1 Tab by mouth daily.  90 Tab 3    miscellaneous medical supply misc Rollator Dx:I69.359 1 Each 0    miscellaneous medical supply [facility] Dx: I69.359 1 Each 0    atorvastatin (LIPITOR) 40 mg tablet Take 1 Tab by mouth daily. 90 Tab 1    oxyCODONE-acetaminophen (PERCOCET 7.5) 7.5-325 mg per tablet TAKE 1 TABLET BY MOUTH 4 TIMES DAILY  0    traZODone (DESYREL) 100 mg tablet Take 1 Tab by mouth nightly. At bedtime 30 Tab 1    carvedilol (COREG) 6.25 mg tablet TAKE 1 TABLET BY MOUTH TWICE DAILY WITH MEALS 60 Tab 5    baclofen (LIORESAL) 10 mg tablet TAKE 1 TABLET BY MOUTH THREE TIMES DAILY 270 Tab 0    LINZESS 145 mcg cap capsule TAKE 1 CAPSULE BY MOUTH ONCE DAILY BEFORE BREAKFAST FOR CONSTIPATION 90 Cap 3    omega-3 acid ethyl esters (LOVAZA) 1 gram capsule Take 2 Caps by mouth two (2) times a day. 120 Cap 2    DULoxetine (CYMBALTA) 60 mg capsule Take 2 Caps by mouth daily. 180 Cap 0    losartan (COZAAR) 50 mg tablet Take 1 Tab by mouth nightly. Hold for SBP<115 90 Tab 1    calcipotriene (DOVONEX) 0.005 % topical cream Apply  to affected area daily as needed. 60 g 11    albuterol-ipratropium (DUO-NEB) 2.5 mg-0.5 mg/3 ml nebu 3 mL by Nebulization route every six (6) hours as needed. 30 Nebule 0    albuterol (PROVENTIL HFA, VENTOLIN HFA, PROAIR HFA) 90 mcg/actuation inhaler Take 2 Puffs by inhalation every four (4) hours as needed for Wheezing or Shortness of Breath. 3 Inhaler 3    ferrous sulfate 325 mg (65 mg iron) tablet Take 325 mg by mouth Daily (before breakfast).  aspirin delayed-release 81 mg tablet Take 81 mg by mouth daily.        Allergies   Allergen Reactions    Bees [Hymenoptera Allergenic Extract] Shortness of Breath and Swelling    Strawberry Shortness of Breath and Swelling    Lisinopril Cough     Past Medical History:   Diagnosis Date    Advanced care planning/counseling discussion 3/4/16    On File    Bronchitis 2/24/2014    Cervicalgia 8/18/15    Dr. Negra Hou    Chest pain 5/25/15    Hospitalized at [facility] 5/25/15 (lab work negative)    Chronic indwelling Dunn catheter 1/24/2018    Initially placed Jan 2018. Mx by Dr. Rody Sandoval.  Congestive heart failure, unspecified     Last Echo 2/8/15: EF 55-60%    Constipation 6/13/2017    Enthesopathy, spinal ([address] 8/18/15    Dr. Ricci Slater    Essential hypertension     Foot drop 8/18/15    Dr. Kiran Fraire Heart attack [facility]) 2/24/2013    Was supposed to See Cardiology for possible pacemaker in november 2014- After Cardiology consult locally, no need, EF greatly improved. Established with Dr. Phil Diego Hiatal hernia 6/2015    3 cm hiatal hernia     Hip pain 8/18/15    Dr. Ricci Slater    Hypercholesterolemia     Hyperglycemia 7/2015    A1c 5.9     Hyperlipidemia 6/30/2015    NMR lipoprofile- LDL P 997, LDL-c 71, HLD-C-39, TG-60, HLD-P (25.2), Small LDL-P -541, LDL size 20.6    Hypothyroid     Insomnia 6/13/2017    Lower extremity edema     Lumbar spondylosis 8/18/15    Dr. Raquel Morton 6/2015    EGD/Colonscopy 6/15- Gastritis, internal hemorrhoids and 3 polyps    Murmur     EDDIE on CPAP     Was referred to Pulmonology - Uses CPAP    Osteoarthritis of hip 8/18/15    Dr. Ricci Slater    Osteoarthritis, shoulder 8/18/15    Dr. Kiran Fraire Radiculopathy, cervical 8/18/15    Dr. Ricci Slater    Shoulder pain 8/18/15    Dr. Kiran Fraire Spinal stenosis of cervical region 8/18/15    Dr. Kiran Fraire Spinal stenosis, lumbar 8/18/15    Dr. Kiran Fraire Stroke [facility]) 2/25/2014    Established with Neurology, Akosua Reynolds NP-[facility]15. CT negative, but Late effect CV accident with increased tone described on discharge summary, Carotid dopplers showed 50% stenosis bilaterally.      Vitamin D deficiency 7/2015     Past Surgical History:   Procedure Laterality Date    COLONOSCOPY N/A 6/22/2016    COL[facility]e [facility] ENDOSCOPY    HX BREAST BIOPSY Right 8/11/15    Stereo Bx - [facility]--- Benign    HX CHOLECYSTECTOMY      HX COLONOSCOPY  6/2015    Dr. Adan- 3 complete polypectomies, Internal hemorrhoids, difficult study due to spasm. Repeat in 1 year    HX ENDOSCOPY  2015    mild gastritis, 3cm hiatal hernia     HX GASTRIC BYPASS  1989    HX HEART CATHETERIZATION  2014    HX ORTHOPAEDIC      Right middle finger distal amputation    HX PARTIAL THYROIDECTOMY  ~    HX THYROIDECTOMY Left     90% of one side of the thyroid removed    IR ASP BLADDER SUPRA CATH  2019     Family History   Problem Relation Age of Onset    Heart Disease Father 61    Broken Bones Father         Hip/fell    Hypertension Mother     Heart Disease Brother 62    Broken Bones Brother         Hip/fell    Diabetes Maternal Grandmother      Social History     Tobacco Use    Smoking status: Former Smoker     Packs/day: 0.25     Years: 15.00     Pack years: 3.75     Types: Cigarettes     Last attempt to quit: 2007     Years since quittin.0    Smokeless tobacco: Never Used   Substance Use Topics    Alcohol use: No      Social History     Social History Narrative    Not on file        Review of Systems  - Constitutional Symptoms: no fevers, chills,   - Cardiovascular: no chest pain or palpitations  - Respiratory: no cough or shortness of breath  - Gastrointestinal: no dysphagia or abdominal pain; no issues with bowel movements  - Integumentary: no rashes, PU's  - : no issues urination   ROS is negative otherwise. 3 most recent PHQ Screens 3/5/2020   PHQ Not Done -   Little interest or pleasure in doing things Not at all   Feeling down, depressed, irritable, or hopeless Not at all   Total Score PHQ 2 0        Objective: There were no vitals filed for this visit. GENERAL: Vincent Bauman is in no acute distress. Non-toxic. Well nourished. Well developed. Appropriately groomed. PSYCH: appropriate behavior, dress and thought processes. Good eye contact. Clear and coherent speech. Full affect. Good insight.        Lab Results   Component Value Date/Time    Hemoglobin A1c 5.5 12/10/2019 08:23 AM    Hemoglobin A1c (POC) 5.3 12/28/2018 09:15 AM     Lab Results   Component Value Date/Time    Cholesterol, total 194 12/10/2019 08:23 AM    HDL Cholesterol 64 12/10/2019 08:23 AM    LDL, calculated 118 (H) 12/10/2019 08:23 AM    VLDL, calculated 12 12/10/2019 08:23 AM    Triglyceride 59 12/10/2019 08:23 AM    CHOL/HDL Ratio 3.0 09/09/2017 02:40 AM     Lab Results   Component Value Date/Time    Sodium 141 01/17/2020 03:32 PM    Potassium 4.4 01/17/2020 03:32 PM    Chloride 106 01/17/2020 03:32 PM    CO2 24 01/17/2020 03:32 PM    Anion gap 9 09/12/2017 10:06 AM    Glucose 105 (H) 01/17/2020 03:32 PM    BUN 22 01/17/2020 03:32 PM    Creatinine 1.01 (H) 01/17/2020 03:32 PM    BUN/Creatinine ratio 22 01/17/2020 03:32 PM    GFR est AA 69 01/17/2020 03:32 PM    GFR est non-AA 60 01/17/2020 03:32 PM    Calcium 8.7 12/10/2019 08:23 AM    Bilirubin, total 0.4 12/10/2019 08:23 AM    Alk. phosphatase 139 (H) 12/10/2019 08:23 AM    Protein, total 6.4 12/10/2019 08:23 AM    Albumin 3.9 12/10/2019 08:23 AM    Globulin 3.7 09/08/2017 11:44 AM    A-G Ratio 1.6 12/10/2019 08:23 AM    ALT (SGPT) 77 (H) 12/10/2019 08:23 AM     Lab Results   Component Value Date/Time    WBC 4.1 12/10/2019 08:23 AM    Hemoglobin (POC) 14.3 02/22/2016 10:57 AM    HGB 12.1 12/10/2019 08:23 AM    Hematocrit (POC) 42 02/22/2016 10:57 AM    HCT 37.3 12/10/2019 08:23 AM    PLATELET 734 36/38/3102 08:23 AM    MCV 90 12/10/2019 08:23 AM     Lab Results   Component Value Date/Time    TSH 0.665 03/05/2019 08:56 AM    TSH 0.038 (L) 11/01/2018 02:15 PM    Triiodothyronine (T3), free 3.8 05/02/2016 09:14 AM    T4, Free 1.73 11/01/2018 02:15 PM     Lab Results   Component Value Date/Time    VITAMIN D, 25-HYDROXY 33.9 11/01/2018 02:15 PM       _____________________________________________________________________  I have discussed the diagnosis with the patient and the intended plan as seen in the above orders.   The patient has received an after-visit summary and questions were answered concerning future plans. I have discussed medication side effects and warnings with the patient as well. Informed pt to return to the office if new symptoms arise. Urgent consultation with the nearest Emergency Department is strongly recommended if condition worsens. Dilip Irene is a 56 y.o. female who was seen by synchronous (real-time) audio-video technology on 6/23/2020. Consent: Dilip Irene, who was seen by synchronous (real-time) audio-video technology, and/or her healthcare decision maker, is aware that this patient-initiated, Telehealth encounter on 6/23/2020 is a billable service, with coverage as determined by her insurance carrier. She is aware that she may receive a bill and has provided verbal consent to proceed: Yes. Dilip Irene is a 56 y.o. female who was evaluated by a video visit encounter for concerns as above. Patient identification was verified prior to start of the visit. A caregiver was present when appropriate. Due to this being a TeleHealth encounter (During XSV-42 public health emergency), evaluation of the following organ systems was limited: Vitals/Constitutional/EENT/Resp/CV/GI//MS/Neuro/Skin/Heme-Lymph-Imm. Pursuant to the emergency declaration under the [facility] waiver authority and the [employer] Act, this Virtual  Visit was conducted, with patient's (and/or legal guardian's) consent, to reduce the patient's risk of exposure to COVID-19 and provide necessary medical care. Services were provided through a video synchronous discussion virtually to substitute for in-person clinic visit. Patient and provider were located at their individual homes.       Karman Al NP

## 2020-06-23 NOTE — PROGRESS NOTES
Godwin Edwards is a 56 y.o. female    HIPAA verified by two patient identifiers. Chief Complaint   Patient presents with    Transitions Of Care     16-19      1. Have you been to the ER, urgent care clinic since your last visit? Hospitalized since your last visit? Yes, [facility] 6/16-6/19    2. Have you seen or consulted any other health care providers outside of the [address] [place] [address] since your last visit? Include any pap smears or colon screening.  No    Patient-Reported Vitals 6/23/2020   Patient-Reported Systolic  706   Patient-Reported Diastolic 72       Pain Scale: /10  Pain Location:

## 2020-06-26 ENCOUNTER — TELEPHONE (OUTPATIENT)
Dept: FAMILY MEDICINE CLINIC | Age: 60
End: 2020-06-26

## 2020-06-26 NOTE — TELEPHONE ENCOUNTER
Called Yi with [address] [facility] to ask if they will go with pt to her day program. William De Guzman states that unfortunately they have to be home bound and will not be able to assist.PCP states that she will put in referral to PT.

## 2020-07-06 DIAGNOSIS — E78.00 HIGH CHOLESTEROL: ICD-10-CM

## 2020-07-08 NOTE — TELEPHONE ENCOUNTER
Please make sure the family is aware that JYOTI Valdovinossandra Vivideyanira is out of the office this week and will follow up with them upon her return to the office.

## 2020-07-09 RX ORDER — ATORVASTATIN CALCIUM 40 MG/1
TABLET, FILM COATED ORAL
Qty: 90 TAB | Refills: 0 | Status: SHIPPED | OUTPATIENT
Start: 2020-07-09 | End: 2020-10-19

## 2020-07-23 DIAGNOSIS — R68.89 OTHER GENERAL SYMPTOMS AND SIGNS: ICD-10-CM

## 2020-07-23 DIAGNOSIS — E53.9 VITAMIN B DEFICIENCY: ICD-10-CM

## 2020-07-24 DIAGNOSIS — E53.9 VITAMIN B DEFICIENCY: ICD-10-CM

## 2020-07-24 DIAGNOSIS — R53.83 FATIGUE, UNSPECIFIED TYPE: ICD-10-CM

## 2020-07-24 RX ORDER — METFORMIN HYDROCHLORIDE 500 MG/1
500 TABLET, EXTENDED RELEASE ORAL
Qty: 90 TAB | Refills: 1 | Status: SHIPPED | OUTPATIENT
Start: 2020-07-24 | End: 2020-12-28

## 2020-08-04 ENCOUNTER — TELEPHONE (OUTPATIENT)
Dept: NEUROLOGY | Facility: CLINIC | Age: 60
End: 2020-08-04

## 2020-08-17 RX ORDER — FUROSEMIDE 40 MG/1
TABLET ORAL
Qty: 90 TAB | Refills: 0 | Status: SHIPPED | OUTPATIENT
Start: 2020-08-17 | End: 2020-11-09

## 2020-08-27 ENCOUNTER — DOCUMENTATION ONLY (OUTPATIENT)
Dept: NEUROLOGY | Facility: CLINIC | Age: 60
End: 2020-08-27

## 2020-09-04 ENCOUNTER — OFFICE VISIT (OUTPATIENT)
Dept: NEUROLOGY | Facility: CLINIC | Age: 60
End: 2020-09-04
Payer: MEDICARE

## 2020-09-04 VITALS
HEART RATE: 82 BPM | DIASTOLIC BLOOD PRESSURE: 88 MMHG | SYSTOLIC BLOOD PRESSURE: 152 MMHG | BODY MASS INDEX: 40.46 KG/M2 | WEIGHT: 237 LBS | OXYGEN SATURATION: 98 % | HEIGHT: 64 IN

## 2020-09-04 DIAGNOSIS — G43.719 INTRACTABLE CHRONIC MIGRAINE WITHOUT AURA AND WITHOUT STATUS MIGRAINOSUS: Primary | ICD-10-CM

## 2020-09-04 PROCEDURE — 64615 CHEMODENERV MUSC MIGRAINE: CPT | Performed by: PSYCHIATRY & NEUROLOGY

## 2020-09-04 NOTE — PROGRESS NOTES
Botox Injection Note     2020     Patient:  Jo-Ann Membreno   YOB: 1960  Date of Visit: 2020      Indication: patient has chronic migraine headaches greater than 15 days per month lasting more than 4 hours each. She has failed or not tolerated 2 or more medication preventatives. Written consent was signed and verified by me. Risks and benefits were discussed to include possible cosmetic asymmetry which is not permament or life threatening. Patient name and  was confirmed prior to procedure. Time out performed. Procedure:   Botox concentration: 200 units in 2 ml of preservative-free normal saline. 1:1 dilution. LOT#: [id number]  EXP:  2023    Injections and distribution as follow :      Units/site  Sites Loc Subtotal    procerus 5 1 ML 5   corrugaters 2.5 2 BL 5   frontalis 5 8 BL 40   Temporalis 10 4 BL 40   Occipitalis 10 2 BL 20   Cervical paraspinals 5 4 BL 20   Trapezius 10 4 BL 40         200 units Botox were reconstituted, 170 units injected as above and the remainder was unavoidably wasted. Patient tolerated procedure well. Return in 3 months for repeat injections.     _____________________________   Jackson Chao DO  [address], ABPN

## 2020-09-04 NOTE — PROGRESS NOTES
Chief Complaint   Patient presents with    Head Pain     follow up, here for BOTOX     Visit Vitals  /88 (BP 1 Location: Right arm, BP Patient Position: Sitting)   Pulse 82   Ht 5' 4\" (1.626 m)   Wt 107.5 kg (237 lb)   SpO2 98%   BMI 40.68 kg/m²

## 2020-09-26 NOTE — TELEPHONE ENCOUNTER
Physician has arrived. Re: Botox    Approval received from [employer] for [id number] back on 8/15/18. Found approval letter in Media. Today is the first I am seeing it, as I have been waiting to hear back from [facility] since then. [id number] approved. Auth # [id number]. Auth good 8/15/18 - 8/15/19. 3 visits/treatments. [facility] still good until 5/22/19. [facility] is [facility]. Phone # [phone]. Forward to nurse.

## 2020-10-16 ENCOUNTER — HOSPITAL ENCOUNTER (OUTPATIENT)
Dept: MAMMOGRAPHY | Age: 60
Discharge: HOME | End: 2020-10-16
Attending: NURSE PRACTITIONER
Payer: MEDICARE

## 2020-10-16 DIAGNOSIS — Z12.31 VISIT FOR SCREENING MAMMOGRAM: ICD-10-CM

## 2020-10-16 PROCEDURE — 77067 SCR MAMMO BI INCL CAD: CPT

## 2020-11-02 ENCOUNTER — OFFICE VISIT (OUTPATIENT)
Dept: CARDIOLOGY CLINIC | Age: 60
End: 2020-11-02
Payer: MEDICARE

## 2020-11-02 VITALS
BODY MASS INDEX: 43.22 KG/M2 | HEART RATE: 63 BPM | RESPIRATION RATE: 18 BRPM | WEIGHT: 251.8 LBS | SYSTOLIC BLOOD PRESSURE: 122 MMHG | DIASTOLIC BLOOD PRESSURE: 78 MMHG

## 2020-11-02 DIAGNOSIS — R53.1 LEFT-SIDED WEAKNESS: ICD-10-CM

## 2020-11-02 DIAGNOSIS — I63.9 CEREBROVASCULAR ACCIDENT (CVA), UNSPECIFIED MECHANISM (HCC): ICD-10-CM

## 2020-11-02 DIAGNOSIS — I50.9 CHRONIC CONGESTIVE HEART FAILURE, UNSPECIFIED HEART FAILURE TYPE (HCC): ICD-10-CM

## 2020-11-02 DIAGNOSIS — I10 ESSENTIAL HYPERTENSION: Primary | ICD-10-CM

## 2020-11-02 DIAGNOSIS — I25.5 ISCHEMIC CARDIOMYOPATHY: ICD-10-CM

## 2020-11-02 DIAGNOSIS — I69.398 HEMIPARESIS AND ALTERATION OF SENSATIONS AS LATE EFFECTS OF STROKE (HCC): ICD-10-CM

## 2020-11-02 DIAGNOSIS — I49.3 VENTRICULAR ECTOPIC ACTIVITY: ICD-10-CM

## 2020-11-02 DIAGNOSIS — R07.9 CHEST PAIN, UNSPECIFIED TYPE: ICD-10-CM

## 2020-11-02 DIAGNOSIS — I69.359 HEMIPARESIS AND ALTERATION OF SENSATIONS AS LATE EFFECTS OF STROKE (HCC): ICD-10-CM

## 2020-11-02 DIAGNOSIS — I25.10 CORONARY ARTERY DISEASE INVOLVING NATIVE CORONARY ARTERY OF NATIVE HEART WITHOUT ANGINA PECTORIS: ICD-10-CM

## 2020-11-02 PROCEDURE — G8417 CALC BMI ABV UP PARAM F/U: HCPCS | Performed by: SPECIALIST

## 2020-11-02 PROCEDURE — 3017F COLORECTAL CA SCREEN DOC REV: CPT | Performed by: SPECIALIST

## 2020-11-02 PROCEDURE — G8754 DIAS BP LESS 90: HCPCS | Performed by: SPECIALIST

## 2020-11-02 PROCEDURE — 99214 OFFICE O/P EST MOD 30 MIN: CPT | Performed by: SPECIALIST

## 2020-11-02 PROCEDURE — G9899 SCRN MAM PERF RSLTS DOC: HCPCS | Performed by: SPECIALIST

## 2020-11-02 PROCEDURE — 93000 ELECTROCARDIOGRAM COMPLETE: CPT | Performed by: SPECIALIST

## 2020-11-02 PROCEDURE — G8432 DEP SCR NOT DOC, RNG: HCPCS | Performed by: SPECIALIST

## 2020-11-02 PROCEDURE — G8427 DOCREV CUR MEDS BY ELIG CLIN: HCPCS | Performed by: SPECIALIST

## 2020-11-02 PROCEDURE — G8752 SYS BP LESS 140: HCPCS | Performed by: SPECIALIST

## 2020-11-02 NOTE — PROGRESS NOTES
Room: 10    Identified pt with two pt identifiers(name and ). Reviewed record in preparation for visit and have obtained necessary documentation. All patient medications has been reviewed. Chief Complaint   Patient presents with    Follow-up     HTN and chest pain        Vitals:    20 0836   BP: 122/78   Pulse: 63   Resp: 18   Weight: 251 lb 12.8 oz (114.2 kg)   PainSc:   4       4. Have you been to the ER, urgent care clinic since your last visit? Hospitalized since your last visit? No    5. Have you seen or consulted any other health care providers outside of the [address] [place] [address] since your last visit? Include any pap smears or colon screening. No      8. Do you have an Advanced Directive/ Living Will in place? YES  If yes, do we have a copy on file NO  If no, would you like information NO    Patient is accompanied by self I have received verbal consent from Nat Massey to discuss any/all medical information while they are present in the room.

## 2020-11-02 NOTE — PROGRESS NOTES
HISTORY OF PRESENT ILLNESS  Marlen Harrison is a 56 y.o. female. She has a history of cardiac catheterization years ago showing normal coronary arteries in [place] with resultant stroke following the catheterization. She has continued to have occasional chest pain. When I saw her 6 months ago a stress test showed ejection fraction of 44% with fixed inferior defect and no ischemia. This was felt to have occurred during her catheterization. She had lost a lot of weight but her mother  in September and she has been eating more and is gained 40 pounds. When she has the occasional chest pains her blood pressure will go up. HPI  Patient Active Problem List   Diagnosis Code    Coronary artery disease involving native coronary artery of native heart without angina pectoris I25.10    Bronchitis J40    High cholesterol E78.00    History of cardiomyopathy Z86.79    SOB (shortness of breath) R06.02    Neck pain, bilateral M54.2    Shoulder pain, bilateral M25.511, M25.512    Muscle spasticity M62.838    Hemiparesis and alteration of sensations as late effects of stroke ([facility]) I69.359, I69.398    Head pain, chronic R51.9, G89.29    Expressive aphasia R47.01    Heart palpitations R00.2    Ventricular ectopic activity I49.3    Stroke ([facility]) I63.9    Thyroid disease E07.9    Hypercholesterolemia E78.00    EDDIE on CPAP G47.33, Z99.89    Essential hypertension I10    Congestive heart failure ([facility]) I50.9    Chest pain R07.9    Melena K92.1    Hiatal hernia K44.9    Postoperative hypothyroidism E89.0    Chronic pain G89.29    Prediabetes R73.03    Constipation K59.00    Insomnia G47.00    Left-sided weakness R53.1    Vitamin D deficiency E55.9    Obesity, morbid (HCC) E66.01    Chronic indwelling Dunn catheter Z97.8    Dermatitis L30.9    Obesity (BMI 30-39. 9) E66.9    Anxiety F41.9     Current Outpatient Medications   Medication Sig Dispense Refill    atorvastatin (LIPITOR) 40 mg tablet Take 1 tablet by mouth once daily 90 Tab 1    furosemide (LASIX) 40 mg tablet Take 1 tablet by mouth once daily 90 Tab 0    Euthyrox 125 mcg tablet TAKE 1 TABLET BY MOUTH ONCE DAILY BEFORE BREAKFAST 90 Tab 0    metFORMIN ER (GLUCOPHAGE XR) 500 mg tablet Take 1 Tab by mouth daily (with dinner). 90 Tab 1    cyanocobalamin (VITAMIN B12) 1,000 mcg/mL injection INJECT 1 ML UNDER THE SKIN EVERY 30 DAYS FOR B12 DEFICIENCY 1 mL 2    carvediloL (COREG) 6.25 mg tablet TAKE 1 TABLET BY MOUTH TWICE DAILY WITH MEALS 60 Tab 5    Needle, Disp, 25 G 25 gauge x 3/4\" ndle Use bimonthly for cyanocobalamin subcutaneous injection. 1 Box 0    galcanezumab-gnlm (Emgality Syringe) 120 mg/mL syrg 1 Syringe by SubCUTAneous route every thirty (30) days. Start 30 days after the loading dose 1 Syringe 11    gabapentin (NEURONTIN) 300 mg capsule Take 100 mg by mouth three (3) times daily as needed for Pain.  topiramate (TOPAMAX) 100 mg tablet Take 1 tablet by mouth twice daily 180 Tab 3    onabotulinumtoxinA (BOTOX) 200 unit injection INJECT INTO THE BILATERAL MUSCLES OF THE HEAD AND NECK EVERY 3 MONTHS FOR MIGRAINES. DISCARD UNUSED PORTIONS 200 Units 3    ergocalciferol (ERGOCALCIFEROL) 1,250 mcg (50,000 unit) capsule Take 1 Cap by mouth every seven (7) days. 12 Cap 1    FREESTYLE LITE STRIPS strip USE STRIP TO CHECK GLUCOSE TWICE DAILY  3    FREESTYLE LITE METER monitoring kit USE TO CHECK GLUCOSE ONCE DAILY  0    ciclopirox (PENLAC) 8 % solution APPLY TO AFFECTED TOE NAILS DAILY      diclofenac (VOLTAREN) 1 % gel APPLY TOPICALLY TO AFFECTED AREA ONCE DAILY      hydroxyzine HCL (ATARAX) 50 mg tablet TAKE 1 TABLET BY MOUTH TWICE DAILY AS NEEDED FOR ANXIETY      FREESTYLE LANCETS 28 gauge misc USE TO CHECK GLUCOSE TWICE DAILY      pantoprazole (PROTONIX) 40 mg tablet Take 1 Tab by mouth daily.  90 Tab 3    miscellaneous medical supply misc Rollator Dx:I69.359 1 Each 0    miscellaneous medical supply misc Walker Dx: A36.739 1 Each 0    oxyCODONE-acetaminophen (PERCOCET 7.5) 7.5-325 mg per tablet TAKE 1 TABLET BY MOUTH 4 TIMES DAILY  0    traZODone (DESYREL) 100 mg tablet Take 1 Tab by mouth nightly. At bedtime 30 Tab 1    baclofen (LIORESAL) 10 mg tablet TAKE 1 TABLET BY MOUTH THREE TIMES DAILY (Patient taking differently: Take 20 mg by mouth three (3) times daily. TAKE 1 TABLET BY MOUTH THREE TIMES DAILY) 270 Tab 0    LINZESS 145 mcg cap capsule TAKE 1 CAPSULE BY MOUTH ONCE DAILY BEFORE BREAKFAST FOR CONSTIPATION 90 Cap 3    DULoxetine (CYMBALTA) 60 mg capsule Take 2 Caps by mouth daily. 180 Cap 0    losartan (COZAAR) 50 mg tablet Take 1 Tab by mouth nightly. Hold for SBP<115 90 Tab 1    calcipotriene (DOVONEX) 0.005 % topical cream Apply  to affected area daily as needed. 60 g 11    albuterol-ipratropium (DUO-NEB) 2.5 mg-0.5 mg/3 ml nebu 3 mL by Nebulization route every six (6) hours as needed. 30 Nebule 0    albuterol (PROVENTIL HFA, VENTOLIN HFA, PROAIR HFA) 90 mcg/actuation inhaler Take 2 Puffs by inhalation every four (4) hours as needed for Wheezing or Shortness of Breath. 3 Inhaler 3    ferrous sulfate 325 mg (65 mg iron) tablet Take 325 mg by mouth Daily (before breakfast).  aspirin delayed-release 81 mg tablet Take 81 mg by mouth daily.  Syringe, Disposable, 1 mL syrg Use bimonthly for cyanocobalamin subcutaneous injection. 25 Syringe 0    omega-3 acid ethyl esters (LOVAZA) 1 gram capsule Take 2 Caps by mouth two (2) times a day. 120 Cap 2     Past Medical History:   Diagnosis Date    Advanced care planning/counseling discussion 3/4/16    On File    Bronchitis 2/24/2014    Cervicalgia 8/18/15    Dr. Pa Ramey    Chest pain 5/25/15    Hospitalized at [facility] 5/25/15 (lab work negative)    Chronic indwelling Dunn catheter 1/24/2018    Initially placed Jan 2018. Mx by Dr. Eulogio Olivera.      Congestive heart failure, unspecified     Last Echo 2/8/15: EF 55-60%    Constipation 6/13/2017    Enthesopathy, spinal ([facility] [address] 8/18/15    Dr. Gloria Angel    Essential hypertension     Foot drop 8/18/15    Dr. Crawford Bloch Heart attack [facility]) 2/24/2013    Was supposed to See Cardiology for possible pacemaker in november 2014- After Cardiology consult locally, no need, EF greatly improved. Established with Dr. Jun Delgadillo Hiatal hernia 6/2015    3 cm hiatal hernia     Hip pain 8/18/15    Dr. Gloria Angel    Hypercholesterolemia     Hyperglycemia 7/2015    A1c 5.9     Hyperlipidemia 6/30/2015    NMR lipoprofile- LDL P 997, LDL-c 71, HLD-C-39, TG-60, HLD-P (25.2), Small LDL-P -541, LDL size 20.6    Hypothyroid     Insomnia 6/13/2017    Lower extremity edema     Lumbar spondylosis 8/18/15    Dr. Dodd Rm 6/2015    EGD/Colonscopy 6/15- Gastritis, internal hemorrhoids and 3 polyps    Murmur     EDDIE on CPAP     Was referred to Pulmonology - Uses CPAP    Osteoarthritis of hip 8/18/15    Dr. Gloria Angel    Osteoarthritis, shoulder 8/18/15    Dr. Crawford Bloch Radiculopathy, cervical 8/18/15    Dr. Gloria Angel    Shoulder pain 8/18/15    Dr. Crawford Bloch Spinal stenosis of cervical region 8/18/15    Dr. Crawford Bloch Spinal stenosis, lumbar 8/18/15    Dr. Crawford Bloch Stroke [facility]) 2/25/2014    Established with Neurology, [facility], NP-Just hospitalized at [facility] 2/7/15-2/10/15. CT negative, but Late effect CV accident with increased tone described on discharge summary, Carotid dopplers showed 50% stenosis bilaterally.  Vitamin D deficiency 7/2015     Past Surgical History:   Procedure Laterality Date    COLONOSCOPY N/A 6/22/2016    COLONOSCOPY performed by Micheline Bailey MD at [facility] ENDOSCOPY    HX BREAST BIOPSY Right 8/11/15    Stereo Bx - [facility]--- Benign    HX CHOLECYSTECTOMY      HX COLONOSCOPY  6/2015    Dr. Bro Garnica- 3 complete polypectomies, Internal hemorrhoids, difficult study due to spasm.  Repeat in 1 year    HX ENDOSCOPY  6/2015    mild gastritis, 3cm hiatal hernia     HX GASTRIC BYPASS  6/1989    HX HEART CATHETERIZATION  2/2014  HX ORTHOPAEDIC      Right middle finger distal amputation    HX PARTIAL THYROIDECTOMY  ~1990    HX THYROIDECTOMY Left 1985    90% of one side of the thyroid removed    IR ASP BLADDER SUPRA CATH  5/24/2019       Review of Systems   Cardiovascular: Positive for chest pain. Neurological: Positive for sensory change and focal weakness. All other systems reviewed and are negative. Visit Vitals  /78 (BP 1 Location: Left arm, BP Patient Position: Sitting)   Pulse 63   Resp 18   Wt 251 lb 12.8 oz (114.2 kg)   LMP  (LMP Unknown)   BMI 43.22 kg/m²       Physical Exam   Constitutional: She is oriented to person, place, and time. She appears well-nourished. HENT:   Head: Atraumatic. Eyes: Conjunctivae are normal.   Neck: Neck supple. Cardiovascular: Normal rate, regular rhythm and normal heart sounds. Exam reveals no gallop and no friction rub. No murmur heard. Pulmonary/Chest: Breath sounds normal. She has no wheezes. She has no rales. Abdominal: Bowel sounds are normal.   Musculoskeletal:         General: No edema. Neurological: She is oriented to person, place, and time. Skin: Skin is dry. Psychiatric: Her behavior is normal.   Nursing note and vitals reviewed. ASSESSMENT and PLAN  She is stable with good control of her blood pressure. I believe that her episodic chest pains are probably due to esophageal spasm. I talked to her about trying not to gain any more weight. I will see her back in 6 months.

## 2020-11-20 ENCOUNTER — OFFICE VISIT (OUTPATIENT)
Dept: FAMILY MEDICINE CLINIC | Age: 60
End: 2020-11-20
Payer: MEDICARE

## 2020-11-20 VITALS
WEIGHT: 251.6 LBS | SYSTOLIC BLOOD PRESSURE: 120 MMHG | RESPIRATION RATE: 17 BRPM | HEART RATE: 64 BPM | DIASTOLIC BLOOD PRESSURE: 81 MMHG | OXYGEN SATURATION: 98 % | BODY MASS INDEX: 42.95 KG/M2 | TEMPERATURE: 97 F | HEIGHT: 64 IN

## 2020-11-20 DIAGNOSIS — R53.83 FATIGUE, UNSPECIFIED TYPE: ICD-10-CM

## 2020-11-20 DIAGNOSIS — E78.00 HYPERCHOLESTEROLEMIA: ICD-10-CM

## 2020-11-20 DIAGNOSIS — E53.9 VITAMIN B DEFICIENCY: ICD-10-CM

## 2020-11-20 DIAGNOSIS — I10 ESSENTIAL HYPERTENSION: ICD-10-CM

## 2020-11-20 DIAGNOSIS — Z11.59 ENCOUNTER FOR HEPATITIS C SCREENING TEST FOR LOW RISK PATIENT: ICD-10-CM

## 2020-11-20 DIAGNOSIS — H91.91 DECREASED HEARING OF RIGHT EAR: Primary | ICD-10-CM

## 2020-11-20 DIAGNOSIS — E55.9 VITAMIN D DEFICIENCY: ICD-10-CM

## 2020-11-20 DIAGNOSIS — Z79.899 TAKING A STATIN MEDICATION: ICD-10-CM

## 2020-11-20 DIAGNOSIS — R25.2 LEG CRAMP: ICD-10-CM

## 2020-11-20 PROCEDURE — G8752 SYS BP LESS 140: HCPCS | Performed by: NURSE PRACTITIONER

## 2020-11-20 PROCEDURE — G8417 CALC BMI ABV UP PARAM F/U: HCPCS | Performed by: NURSE PRACTITIONER

## 2020-11-20 PROCEDURE — G9899 SCRN MAM PERF RSLTS DOC: HCPCS | Performed by: NURSE PRACTITIONER

## 2020-11-20 PROCEDURE — G8432 DEP SCR NOT DOC, RNG: HCPCS | Performed by: NURSE PRACTITIONER

## 2020-11-20 PROCEDURE — G8754 DIAS BP LESS 90: HCPCS | Performed by: NURSE PRACTITIONER

## 2020-11-20 PROCEDURE — 99213 OFFICE O/P EST LOW 20 MIN: CPT | Performed by: NURSE PRACTITIONER

## 2020-11-20 PROCEDURE — 3017F COLORECTAL CA SCREEN DOC REV: CPT | Performed by: NURSE PRACTITIONER

## 2020-11-20 PROCEDURE — G8427 DOCREV CUR MEDS BY ELIG CLIN: HCPCS | Performed by: NURSE PRACTITIONER

## 2020-11-20 RX ORDER — CYANOCOBALAMIN 1000 UG/ML
INJECTION, SOLUTION INTRAMUSCULAR; SUBCUTANEOUS
Qty: 10 ML | Refills: 1
Start: 2020-11-20 | End: 2022-02-11 | Stop reason: SDUPTHER

## 2020-11-20 RX ORDER — FLUTICASONE PROPIONATE 50 MCG
2 SPRAY, SUSPENSION (ML) NASAL DAILY
Qty: 1 BOTTLE | Refills: 0 | Status: SHIPPED | OUTPATIENT
Start: 2020-11-20 | End: 2022-10-07 | Stop reason: ALTCHOICE

## 2020-11-20 RX ORDER — LOSARTAN POTASSIUM 50 MG/1
50 TABLET ORAL
Qty: 90 TAB | Refills: 1 | Status: SHIPPED | OUTPATIENT
Start: 2020-11-20 | End: 2021-05-11 | Stop reason: SDUPTHER

## 2020-11-20 NOTE — PROGRESS NOTES
Lamont Avery is a 56 y.o. female    HIPAA verified by two patient identifiers. Chief Complaint   Patient presents with    Medication Refill     All except pain meds      1. Have you been to the ER, urgent care clinic since your last visit? Hospitalized since your last visit? No    2. Have you seen or consulted any other health care providers outside of the [address] [place] [address] since your last visit? Include any pap smears or colon screening.  No    Visit Vitals  /81 (BP 1 Location: Left arm, BP Patient Position: Sitting)   Pulse 64   Temp 97 °F (36.1 °C) (Temporal)   Resp 17   Ht 5' 4\" (1.626 m)   Wt 251 lb 9.6 oz (114.1 kg)   LMP  (LMP Unknown)   SpO2 98%   BMI 43.19 kg/m²       Pain Scale: 0 - No pain/10  Pain Location:

## 2020-11-20 NOTE — PATIENT INSTRUCTIONS
1) Refill on meds    2) Labs  The following blood work was ordered today. Once the tests are completed, you will receive a letter, email or phone call with the results. If you have not heard from us within 10-14 days, please call the office for the results. Vitamin D is important for absorbing calcium and promoting bone growth. If you are low in Vitamin D, you may experience fatigue, back or bone pain, hair loss, muscle pain, slow wound healing, depression. Your body can make Vitamin D after exposure to sunlight or through foods like fatty fish (tuna, mackerel, salmon), food with Vitamin D added (dairy products, orange juice and cereals) and cheese, egg yolks and liver. Vitamin D decreases with age and in the winter time when the sun is not strong. A deficiency in Vitamin D has been linked to certain cancers (breast, prostate, colon) as well as heart disease, depression and weight gain. Vitamin B12 is a nutrient that helps keep the body's nerves and blood cells healthy. It is used to make the genetic material in your cells, called DNA. Vitamin B12 deficiency causes tiredness, weakness, constipation, loss of appetite, weight loss, and megaloblastic anemia. Vitamin B12 is found in food sources, such as fish, meat, poultry, eggs, milk, and other dairy products, (beef liver and clams are the best sources.)  Some breakfast cereals, nutritional yeasts and other food products are fortified with vitamin B12. B12 is a water soluble vitamin, so B12 not needed by the body is excreted in urine. Complete Metabolic Panel (CMP) assesses electrolytes, kidney and liver function.  (A Basic Metabolic Panel (BMP) does not include liver function.)  Electrolytes include sodium, potassium, calcium, and chloride. These are necessary for cell function and an imbalance can cause serious problems. BUN and creatinine are markers of kidney function. ALT and AST are markers of liver function.         Total Cholesterol is the total number of cholesterol particles in your blood, which includes triglycerides, HDL and LDL. A small amount of cholesterol is needed for the body's cells, hormones, and metabolism. Goal is less than 200. Triglycerides are the short term fats in your blood which are used for energy. Goal is less than 150. High Density Lipids (HDL) is the \"good\" cholesterol in your blood. HDL helps remove bad cholesterol from your blood. Goal is more than 40. Low Density Lipids (LDL) is the \"bad\" cholesterol in your blood. LDL can stick to your arteries, raising the risk for heart attack and stroke. Goal is less than 100    Your thyroid is responsible for secreting hormones and regulating your metabolism. Thyroid Stimulating Hormone (TSH) is a test for thyroid function. TSH is a hormone made by the pituitary gland in the brain and tells your thyroid gland to make hormones and release them into your blood. If your thyroid isn't producing enough hormone, you may have symptoms such as unexplained weight gain, fatigue, hair loss. If your thyroid is producing too much hormone, you may have symptoms of diarrhea, heart palpitations, fatigue, unexplained weight loss. A normal TSH value is between 0.45 - 4.5. Hepatitis C is caused by a virus that attacks the liver and causes inflammation and can lead to serious liver damage. Hepatitis C is spread through contaminated blood.  This disease is now curable with medications, and the [place] Preventive Services Task Force (USPSTF) recommends screening one time for anyone born between [birth year] as well as high risk populations, (risk factors include: IV drug use, HIV positive, tattoo or piercings from unclean instruments, etc)      3) referral to ENT for ear evaluation    4) Letter given to pt at her request listing diagnoses and needing multiple healthcare visits         Vitamin B12 Deficiency: Care Instructions  Overview    A vitamin B12 deficiency means that your body doesn't have enough of this vitamin. You need vitamin B12 to keep red blood cells and nerve cells healthy. Not enough B12 can cause anemia. It can also damage nerves and cause trouble with memory and thinking. Many things can cause low levels of vitamin B12. They include:  · Not getting enough of this vitamin through food. · An autoimmune problem, like pernicious anemia. · Weight-loss surgery, like gastric bypass. · Long-term use of heartburn medicines. Low levels of B12 may not cause symptoms. But symptoms may include fatigue, depression, and thinking or memory problems. You may have tingling in your hands or feet and changes in the way you walk. Treatment depends on the reason for low vitamin B12. Eating more foods rich in B12 may be enough. Or you might take the vitamin as a pill, as shots, or as nasal spray. How can you care for yourself? · Take vitamin B12 as your doctor recommends. · Go to your appointments if you are getting B12 shots. · Eat more foods rich in vitamin B12. Examples are:  ? Animal products. These include meat, seafood, milk products, poultry, and eggs. ? Foods that have B12 added. These are called fortified foods. They include soy products, nutritional yeast, and dry cereals. · Work with a nutritionist or dietitian if you need help getting more vitamin B12 from food. · Talk to your doctor about stopping medicines if they are adding to your B12 deficiency. When should you call for help? Call 911 anytime you think you may need emergency care. For example, call if:    · You passed out (lost consciousness). Call your doctor now or seek immediate medical care if:    · You are dizzy or lightheaded, or you feel like you may faint. Watch closely for changes in your health. Be sure to call your doctor if:    · You are confused or can't think clearly.     · You don't get better as expected. Where can you learn more?   Go to http://www.gray.com/  Enter B352 in the search box to learn more about \"Vitamin B12 Deficiency: Care Instructions. \"  Current as of: November 8, 2019               Content Version: 12.6  © 6452-5844 [employer]. Care instructions adapted under license by [employer] (which disclaims liability or warranty for this information). If you have questions about a medical condition or this instruction, always ask your healthcare professional. [address] any warranty or liability for your use of this information. Hypothyroidism: Care Instructions  Your Care Instructions     When you have hypothyroidism, your body doesn't make enough thyroid hormone. This hormone helps your body use energy. If your thyroid level is low, you may feel tired, be constipated, have an increase in your blood pressure, or have dry skin or memory problems. You may also get cold easily, even when it is warm. Women with low thyroid levels may have heavy menstrual periods. A blood test to find your thyroid-stimulating hormone (TSH) level is used to check for hypothyroidism. A high TSH level may mean that you have it. The treatment for hypothyroidism is thyroid hormone pills. You should start to feel better in 1 to 2 weeks. Most people need treatment for the rest of their lives. You will need regular visits with your doctor to make sure you are doing well and that you have the right dose of medicine. Follow-up care is a key part of your treatment and safety. Be sure to make and go to all appointments, and call your doctor if you are having problems. It's also a good idea to know your test results and keep a list of the medicines you take. How can you care for yourself at home? · Take your thyroid hormone medicine exactly as prescribed. Call your doctor if you think you are having a problem with your medicine.  Most people do not have side effects if they take the right amount of medicine regularly. ? Take the medicine 30 minutes before breakfast, and do not take it with calcium, vitamins, or iron. ? Do not take extra doses of your thyroid medicine. It will not help you get better any faster, and it may cause side effects. ? If you forget to take a dose, do NOT take a double dose of medicine. Take your usual dose the next day. · Tell your doctor about all prescription, herbal, or over-the-counter products you take. · Take care of yourself. Eat a healthy diet, get enough sleep, and get regular exercise. When should you call for help? Call 911 anytime you think you may need emergency care. For example, call if:    · You passed out (lost consciousness).     · You have severe trouble breathing.     · You have a very slow heartbeat (less than 60 beats a minute).     · You have a low body temperature (95°F or below). Call your doctor now or seek immediate medical care if:    · You feel tired, sluggish, or weak.     · You have trouble remembering things or concentrating.     · You do not begin to feel better 2 weeks after starting your medicine. Watch closely for changes in your health, and be sure to contact your doctor if you have any problems. Where can you learn more? Go to http://www.gray.com/  Enter B877 in the search box to learn more about \"Hypothyroidism: Care Instructions. \"  Current as of: March 31, 2020               Content Version: 12.6  © 0192-6023 [employer]. Care instructions adapted under license by [employer] (which disclaims liability or warranty for this information). If you have questions about a medical condition or this instruction, always ask your healthcare professional. [address] any warranty or liability for your use of this information.

## 2020-11-20 NOTE — PROGRESS NOTES
S: Blas Jimenez is a 56 y.o. female who presents for medication check and refill. Assessment/Plan:    1. Essential hypertension  -current therapy: losartan 50mg + carvedilol 6.25mg bid  -BP today: 120/81  -advised to monitor BP at home and keep log; goal<140/90 make OV if consistently above goal or <110/60    2. Hypthyroidism  -current therapy: Euthyrox 125mcg  - TSH     3. Vitamin B deficiency  -current therapy: cyanocobalamin (VITAMIN B12) 1,000 mcg/mL injection; INJECT 1 ML UNDER THE SKIN EVERY 30 DAYS FOR B12 DEFICIENCY  Indications: inadequate vitamin B12  Dispense: 10 mL; Refill: 1  - VITAMIN B12    4. Decreased hearing of right ear  - REFERRAL TO ENT-OTOLARYNGOLOGY    5. Hypercholesterolemia  -current therapy: atorvastatin 40mg   - LIPID PANEL    6. Forms   -pt brings ADA form for sister Angie Vazquez) however could not complete for sister  -provided letter at pt's request listing her dx and that she would need multiple healthcare appts     Labs today, RTC pending results      Mother was in hospice -  in Sept; couldn't go to see her until she was transitioning d/t covid    euthyrox 125mcg    protonix - taking in am     Pt states she is taking statin, just never returned for lipids/lft check    Pt brings form for sister for completion to allow sister to not be in school (teacher). Advised, I couldn't do this as pt has not been seen by me and form pertains to sister's health, not pt's. (FMLA completed in 2020 for sister d/t pt's conditions)  Pt requesting letter with her dx and fact that she will have multiple healthcare visits for her sister, Jerilyn Gunn.       Social history:   Nutrition:  Had apple, mac and cheese   Physical: exercising   Social: living with her sister, Jerilyn Gunn  Occupation: disability     Social History     Tobacco Use   Smoking Status Former Smoker    Packs/day: 0.25    Years: 15.00    Pack years: 3.75    Types: Cigarettes    Last attempt to quit: 2007    Years since quittin.4   Smokeless Tobacco Never Used     Social History     Substance and Sexual Activity   Alcohol Use No     Social History     Substance and Sexual Activity   Drug Use No       Review of Systems:  - Constitutional Symptoms: no fevers, chills, weight loss  - Cardiovascular: no chest pain or palpitations  - Respiratory: no cough or shortness of breath  - Gastrointestinal: no dysphagia or abdominal pain  -   3 most recent PHQ Screens 2020   PHQ Not Done Active Diagnosis of Depression or Bipolar Disorder   Little interest or pleasure in doing things -   Feeling down, depressed, irritable, or hopeless -   Total Score PHQ 2 -       I reviewed the following:  Current Outpatient Medications   Medication Sig Dispense Refill    Linzess 145 mcg cap capsule TAKE 1 CAPSULE BY MOUTH ONCE DAILY BEFORE BREAKFAST FOR CONSTIPATION 90 Cap 0    furosemide (LASIX) 40 mg tablet Take 1 tablet by mouth once daily 90 Tab 0    Euthyrox 125 mcg tablet TAKE 1 TABLET BY MOUTH ONCE DAILY BEFORE BREAKFAST 90 Tab 0    atorvastatin (LIPITOR) 40 mg tablet Take 1 tablet by mouth once daily 90 Tab 1    metFORMIN ER (GLUCOPHAGE XR) 500 mg tablet Take 1 Tab by mouth daily (with dinner). 90 Tab 1    cyanocobalamin (VITAMIN B12) 1,000 mcg/mL injection INJECT 1 ML UNDER THE SKIN EVERY 30 DAYS FOR B12 DEFICIENCY 1 mL 2    carvediloL (COREG) 6.25 mg tablet TAKE 1 TABLET BY MOUTH TWICE DAILY WITH MEALS 60 Tab 5    Syringe, Disposable, 1 mL syrg Use bimonthly for cyanocobalamin subcutaneous injection. 25 Syringe 0    Needle, Disp, 25 G 25 gauge x 3/4\" ndle Use bimonthly for cyanocobalamin subcutaneous injection. 1 Box 0    galcanezumab-gnlm (Emgality Syringe) 120 mg/mL syrg 1 Syringe by SubCUTAneous route every thirty (30) days. Start 30 days after the loading dose 1 Syringe 11    gabapentin (NEURONTIN) 300 mg capsule Take 100 mg by mouth three (3) times daily as needed for Pain.       topiramate (TOPAMAX) 100 mg tablet Take 1 tablet by mouth twice daily 180 Tab 3    onabotulinumtoxinA (BOTOX) 200 unit injection INJECT INTO THE BILATERAL MUSCLES OF THE HEAD AND NECK EVERY 3 MONTHS FOR MIGRAINES. DISCARD UNUSED PORTIONS 200 Units 3    ergocalciferol (ERGOCALCIFEROL) 1,250 mcg (50,000 unit) capsule Take 1 Cap by mouth every seven (7) days. 12 Cap 1    FREESTYLE LITE STRIPS strip USE STRIP TO CHECK GLUCOSE TWICE DAILY  3    FREESTYLE LITE METER monitoring kit USE TO CHECK GLUCOSE ONCE DAILY  0    ciclopirox (PENLAC) 8 % solution APPLY TO AFFECTED TOE NAILS DAILY      diclofenac (VOLTAREN) 1 % gel APPLY TOPICALLY TO AFFECTED AREA ONCE DAILY      hydroxyzine HCL (ATARAX) 50 mg tablet TAKE 1 TABLET BY MOUTH TWICE DAILY AS NEEDED FOR ANXIETY      FREESTYLE LANCETS 28 gauge [facility] USE TO CHECK GLUCOSE TWICE DAILY      pantoprazole (PROTONIX) 40 mg tablet Take 1 Tab by mouth daily. 90 Tab 3    miscellaneous medical supply misc Rollator Dx:I69.359 1 Each 0    miscellaneous medical supply [facility] Walker Dx: I69.359 1 Each 0    oxyCODONE-acetaminophen (PERCOCET 7.5) 7.5-325 mg per tablet TAKE 1 TABLET BY MOUTH 4 TIMES DAILY  0    traZODone (DESYREL) 100 mg tablet Take 1 Tab by mouth nightly. At bedtime 30 Tab 1    baclofen (LIORESAL) 10 mg tablet TAKE 1 TABLET BY MOUTH THREE TIMES DAILY (Patient taking differently: Take 20 mg by mouth three (3) times daily. TAKE 1 TABLET BY MOUTH THREE TIMES DAILY) 270 Tab 0    omega-3 acid ethyl esters (LOVAZA) 1 gram capsule Take 2 Caps by mouth two (2) times a day. 120 Cap 2    DULoxetine (CYMBALTA) 60 mg capsule Take 2 Caps by mouth daily. 180 Cap 0    losartan (COZAAR) 50 mg tablet Take 1 Tab by mouth nightly. Hold for SBP<115 90 Tab 1    calcipotriene (DOVONEX) 0.005 % topical cream Apply  to affected area daily as needed. 60 g 11    albuterol-ipratropium (DUO-NEB) 2.5 mg-0.5 mg/3 ml nebu 3 mL by Nebulization route every six (6) hours as needed.  30 Nebule 0    albuterol (PROVENTIL HFA, VENTOLIN HFA, PROAIR HFA) 90 mcg/actuation inhaler Take 2 Puffs by inhalation every four (4) hours as needed for Wheezing or Shortness of Breath. 3 Inhaler 3    ferrous sulfate 325 mg (65 mg iron) tablet Take 325 mg by mouth Daily (before breakfast).  aspirin delayed-release 81 mg tablet Take 81 mg by mouth daily. Past Medical History:   Diagnosis Date    Advanced care planning/counseling discussion 3/4/16    On File    Bronchitis 2/24/2014    Cervicalgia 8/18/15    Dr. Marely Magana    Chest pain 5/25/15    Hospitalized at [facility] 5/25/15 (lab work negative)    Chronic indwelling Dunn catheter 1/24/2018    Initially placed Jan 2018. Mx by Dr. Rina Arreola.  Congestive heart failure, unspecified     Last Echo 2/8/15: EF 55-60%    Constipation 6/13/2017    Enthesopathy, spinal ([facility] [address] 8/18/15    Dr. Marely Magana    Essential hypertension     Foot drop 8/18/15    Dr. Sandi Harmon Heart attack [facility]) 2/24/2013    Was supposed to See Cardiology for possible pacemaker in november 2014- After Cardiology consult locally, no need, EF greatly improved.  Established with Dr. Bennie Mosqueda Hiatal hernia 6/2015    3 cm hiatal hernia     Hip pain 8/18/15    Dr. Marely Magana    Hypercholesterolemia     Hyperglycemia 7/2015    A1c 5.9     Hyperlipidemia 6/30/2015    NMR lipoprofile- LDL P 997, LDL-c 71, HLD-C-39, TG-60, HLD-P (25.2), Small LDL-P -541, LDL size 20.6    Hypothyroid     Insomnia 6/13/2017    Lower extremity edema     Lumbar spondylosis 8/18/15    Dr. George Landry 6/2015    EGD/Colonscopy 6/15- Gastritis, internal hemorrhoids and 3 polyps    Murmur     EDDIE on CPAP     Was referred to Pulmonology - Uses CPAP    Osteoarthritis of hip 8/18/15    Dr. Marely Magana    Osteoarthritis, shoulder 8/18/15    Dr. Marely Magana    Radiculopathy, cervical 8/18/15    Dr. Marely Magana    Shoulder pain 8/18/15    Dr. Sandi Harmon Spinal stenosis of cervical region 8/18/15    Dr. Sandi Harmon Spinal stenosis, lumbar 8/18/15    Dr. Sandi Harmon Stroke [facility]) 2/25/2014 Established with Neurology, Erica Soto NP-Just hospitalized at [facility] 2/7/15-2/10/15. CT negative, but Late effect CV accident with increased tone described on discharge summary, Carotid dopplers showed 50% stenosis bilaterally.  Vitamin D deficiency 7/2015       Allergies   Allergen Reactions    Bees [Hymenoptera Allergenic Extract] Shortness of Breath and Swelling    Strawberry Shortness of Breath and Swelling    Lisinopril Cough       O: VS:   Visit Vitals  /81 (BP 1 Location: Left arm, BP Patient Position: Sitting)   Pulse 64   Temp 97 °F (36.1 °C) (Temporal)   Resp 17   Ht 5' 4\" (1.626 m)   Wt 251 lb 9.6 oz (114.1 kg)   LMP  (LMP Unknown)   SpO2 98%   BMI 43.19 kg/m²        GENERAL: Mulugeta Bevel is in no acute distress. Non-toxic. Well nourished. Well developed. Appropriately groomed. HEAD:  Normocephalic. Atraumatic. EYE: PERRLA. RESP: Breath sounds are symmetrical bilaterally. Unlabored without SOB. Speaking in full sentences. Clear to auscultation bilaterally anteriorly and posteriorly. No wheezes. No rales or rhonchi. CV: normal rate. Regular rhythm. S1, S2 audible. No murmur noted. No rubs, clicks or gallops noted. PSYCH: appropriate behavior, dress and thought processes. Good eye contact. Clear and coherent speech. Full affect. Good insight.   _______________________________________________________________  Patient education was done. Advised on nutrition, physical activity, weight management,  and safety). Counseling included discussion of diagnosis, differentials, treatment options, prescribed treatment, warning signs and follow up. Medication risks/benefits,interactions and alternatives discussed with patient.      Patient verbalized understanding and agreed to plan of care. Patient was given an after visit summary which included current diagnoses, medications and vital signs. Follow up as directed.

## 2020-11-20 NOTE — LETTER
11/20/2020 3:03 PM 
 
Ms. Felix Marte [address] [facility] Re: Felix Marte To Whom It May Concern: 
 
 Felix Marte  Has bene diagnosed with heart attack, congestive heart failure, and history of a stroke. She will require multiple healthcare visits, ranging from every one to three months,  throughout her lifetime. If there are questions or concerns, please have the patient contact our office. Thank you for your assistance in this matter. Sincerely, Lisha Chowdhury NP

## 2020-11-21 LAB
25(OH)D3 SERPL-MCNC: 28.6 NG/ML (ref 30–100)
ALBUMIN SERPL-MCNC: 3.9 G/DL (ref 3.5–5)
ALBUMIN/GLOB SERPL: 1.3 {RATIO} (ref 1.1–2.2)
ALP SERPL-CCNC: 173 U/L (ref 45–117)
ALT SERPL-CCNC: 67 U/L (ref 12–78)
ANION GAP SERPL CALC-SCNC: 8 MMOL/L (ref 5–15)
AST SERPL-CCNC: 47 U/L (ref 15–37)
BILIRUB SERPL-MCNC: 0.4 MG/DL (ref 0.2–1)
BUN SERPL-MCNC: 23 MG/DL (ref 6–20)
BUN/CREAT SERPL: 21 (ref 12–20)
CALCIUM SERPL-MCNC: 9.1 MG/DL (ref 8.5–10.1)
CHLORIDE SERPL-SCNC: 106 MMOL/L (ref 97–108)
CHOLEST SERPL-MCNC: 156 MG/DL
CO2 SERPL-SCNC: 28 MMOL/L (ref 21–32)
CREAT SERPL-MCNC: 1.09 MG/DL (ref 0.55–1.02)
GLOBULIN SER CALC-MCNC: 3.1 G/DL (ref 2–4)
GLUCOSE SERPL-MCNC: 89 MG/DL (ref 65–100)
HCV AB SERPL QL IA: NONREACTIVE
HCV COMMENT,HCGAC: NORMAL
HDLC SERPL-MCNC: 72 MG/DL
HDLC SERPL: 2.2 {RATIO} (ref 0–5)
LDLC SERPL CALC-MCNC: 69.4 MG/DL (ref 0–100)
LIPID PROFILE,FLP: NORMAL
POTASSIUM SERPL-SCNC: 4.3 MMOL/L (ref 3.5–5.1)
PROT SERPL-MCNC: 7 G/DL (ref 6.4–8.2)
SODIUM SERPL-SCNC: 142 MMOL/L (ref 136–145)
TRIGL SERPL-MCNC: 73 MG/DL (ref ?–150)
TSH SERPL DL<=0.05 MIU/L-ACNC: 0.64 UIU/ML (ref 0.36–3.74)
VIT B12 SERPL-MCNC: 444 PG/ML (ref 193–986)
VLDLC SERPL CALC-MCNC: 14.6 MG/DL

## 2020-11-23 RX ORDER — ERGOCALCIFEROL 1.25 MG/1
50000 CAPSULE ORAL
Qty: 12 CAP | Refills: 0 | Status: SHIPPED | OUTPATIENT
Start: 2020-11-23 | End: 2021-07-23

## 2020-12-03 ENCOUNTER — OFFICE VISIT (OUTPATIENT)
Dept: NEUROLOGY | Age: 60
End: 2020-12-03
Payer: MEDICARE

## 2020-12-03 VITALS
HEART RATE: 80 BPM | BODY MASS INDEX: 42.85 KG/M2 | SYSTOLIC BLOOD PRESSURE: 102 MMHG | OXYGEN SATURATION: 97 % | RESPIRATION RATE: 20 BRPM | HEIGHT: 64 IN | DIASTOLIC BLOOD PRESSURE: 60 MMHG | WEIGHT: 251 LBS

## 2020-12-03 DIAGNOSIS — G43.719 INTRACTABLE CHRONIC MIGRAINE WITHOUT AURA AND WITHOUT STATUS MIGRAINOSUS: Primary | ICD-10-CM

## 2020-12-03 PROCEDURE — 64615 CHEMODENERV MUSC MIGRAINE: CPT | Performed by: PSYCHIATRY & NEUROLOGY

## 2020-12-03 RX ORDER — TOPIRAMATE 100 MG/1
TABLET, FILM COATED ORAL
Qty: 180 TAB | Refills: 3 | Status: SHIPPED | OUTPATIENT
Start: 2020-12-03 | End: 2021-12-06 | Stop reason: SDUPTHER

## 2020-12-03 NOTE — PROGRESS NOTES
Botox Injection Note     12/3/2020     Patient:  Mulugeta Powell   YOB: 1960  Date of Visit: 12/3/2020      Indication: patient has chronic migraine headaches greater than 15 days per month lasting more than 4 hours each. She has failed or not tolerated 2 or more medication preventatives. Written consent was signed and verified by me. Risks and benefits were discussed to include possible cosmetic asymmetry which is not permament or life threatening. Patient name and  was confirmed prior to procedure. Time out performed. Procedure:   Botox concentration: 200 units in 2 ml of preservative-free normal saline. 1:1 dilution. LOT#: [id number]  EXP:  2023    Injections and distribution as follow :      Units/site  Sites Loc Subtotal    procerus 5 1 ML 5   corrugaters 2.5 2 BL 5   frontalis 5 8 BL 40   Temporalis 10 4 BL 40   Occipitalis 10 2 BL 20   Cervical paraspinals 5 4 BL 20   Trapezius 10 4 BL 40         200 units Botox were reconstituted, 170 units injected as above and the remainder was unavoidably wasted. Patient tolerated procedure well. Return in 3 months for repeat injections.     _____________________________   Jessica Jamison,   [address], ABPN

## 2020-12-27 DIAGNOSIS — I10 ESSENTIAL HYPERTENSION: ICD-10-CM

## 2020-12-28 RX ORDER — METFORMIN HYDROCHLORIDE 500 MG/1
TABLET, EXTENDED RELEASE ORAL
Qty: 90 TAB | Refills: 0 | Status: SHIPPED | OUTPATIENT
Start: 2020-12-28 | End: 2021-03-19 | Stop reason: SDUPTHER

## 2020-12-28 RX ORDER — CARVEDILOL 6.25 MG/1
TABLET ORAL
Qty: 60 TAB | Refills: 5 | Status: SHIPPED | OUTPATIENT
Start: 2020-12-28 | End: 2021-07-08

## 2020-12-28 NOTE — TELEPHONE ENCOUNTER
Requested Prescriptions     Signed Prescriptions Disp Refills    carvediloL (COREG) 6.25 mg tablet 60 Tab 5     Sig: TAKE 1 TABLET BY MOUTH TWICE DAILY WITH MEALS     Authorizing Provider: Heraclio Pickering     Ordering User: Angela Iglesias    Per Dr. Liz Sifuentes verbal order

## 2020-12-29 ENCOUNTER — TELEPHONE (OUTPATIENT)
Dept: FAMILY MEDICINE CLINIC | Age: 60
End: 2020-12-29

## 2020-12-29 NOTE — TELEPHONE ENCOUNTER
Informed pt forms will faxed to adult day support center at [phone] and have been mailed to her as well, copy placed on [facility] desk.

## 2021-01-06 ENCOUNTER — TELEPHONE (OUTPATIENT)
Dept: CARDIOLOGY CLINIC | Age: 61
End: 2021-01-06

## 2021-01-06 NOTE — TELEPHONE ENCOUNTER
Disability papers on your desk for review. I'm sure they were sent to her pcp also. Not sure how you want to complete?     Future Appointments   Date Time Provider Denilson Thompson   2/25/2021  9:30 AM DO RONAN Nichols BS AMB   2/25/2021 10:30 AM JYOTI Garcia BS AMB   5/5/2021 11:40 AM MD YESENIA Davis BS AMB

## 2021-01-12 ENCOUNTER — TELEPHONE (OUTPATIENT)
Dept: NEUROLOGY | Age: 61
End: 2021-01-12

## 2021-01-25 ENCOUNTER — TELEPHONE (OUTPATIENT)
Dept: NEUROLOGY | Age: 61
End: 2021-01-25

## 2021-02-11 ENCOUNTER — TELEPHONE (OUTPATIENT)
Dept: NEUROLOGY | Age: 61
End: 2021-02-11

## 2021-02-11 ENCOUNTER — DOCUMENTATION ONLY (OUTPATIENT)
Dept: NEUROLOGY | Age: 61
End: 2021-02-11

## 2021-02-11 RX ORDER — ONABOTULINUMTOXINA 200 [USP'U]/1
INJECTION, POWDER, LYOPHILIZED, FOR SOLUTION INTRADERMAL; INTRAMUSCULAR
Qty: 200 UNITS | Refills: 3 | Status: SHIPPED | OUTPATIENT
Start: 2021-02-11 | End: 2021-05-10

## 2021-02-11 NOTE — TELEPHONE ENCOUNTER
Re: Botox approved    Per [facility],  is available w/o authorization.   [facility] and they confirmed that PA is already on file effective  01/01/20-12/31/21 (PA # [id number])    [id number] approved from [facility]   Effective 02/11/21-02/11/22  PA # [id number]  (call made to [facility] to check on status)

## 2021-03-05 ENCOUNTER — TELEPHONE (OUTPATIENT)
Dept: FAMILY MEDICINE CLINIC | Age: 61
End: 2021-03-05

## 2021-03-05 NOTE — TELEPHONE ENCOUNTER
Called, left vm in regards to disability forms for pt to return call to office. LPN received forms from [employer] [place] to call pt to establish care with new PCP for form completion.

## 2021-03-09 ENCOUNTER — DOCUMENTATION ONLY (OUTPATIENT)
Dept: NEUROLOGY | Age: 61
End: 2021-03-09

## 2021-03-09 NOTE — TELEPHONE ENCOUNTER
----- Message from Michelle Hoang sent at 3/9/2021  1:54 PM EST -----  Regarding: Dr Shobha Coleman telephone  Patient return call    Caller's first and last name and relationship (if not the patient): Pt      Best contact number(s):[phone]      Whose call is being returned:Cristal? Details to clarify the request: Pt is requesting a call back and advised she received a message to call.  Pt advised that she is unaware of request concerning disability papers and unsure of reason for call      Michelle Hoang

## 2021-03-09 NOTE — TELEPHONE ENCOUNTER
Notified pt she needs to establish with new PCP to have [address] renewal forms filled out. Pt states she has an upcoming appointment with JENNIFER Mejia at [facility] on 3/19/2021. Informed pt will fax paperwork to his office, pt voiced understanding. Fax send, confirmation received.

## 2021-03-11 ENCOUNTER — TELEPHONE (OUTPATIENT)
Dept: NEUROLOGY | Age: 61
End: 2021-03-11

## 2021-03-11 NOTE — TELEPHONE ENCOUNTER
Pt calling stating transportation couldn't get her to her appt. She is wondering if it can be done tomorrow.  Please call

## 2021-03-18 ENCOUNTER — OFFICE VISIT (OUTPATIENT)
Dept: NEUROLOGY | Age: 61
End: 2021-03-18
Payer: MEDICARE

## 2021-03-18 VITALS
SYSTOLIC BLOOD PRESSURE: 110 MMHG | OXYGEN SATURATION: 97 % | DIASTOLIC BLOOD PRESSURE: 72 MMHG | HEART RATE: 68 BPM | HEIGHT: 64 IN | BODY MASS INDEX: 42.51 KG/M2 | WEIGHT: 249 LBS

## 2021-03-18 DIAGNOSIS — G43.719 INTRACTABLE CHRONIC MIGRAINE WITHOUT AURA AND WITHOUT STATUS MIGRAINOSUS: Primary | ICD-10-CM

## 2021-03-18 PROCEDURE — 64615 CHEMODENERV MUSC MIGRAINE: CPT | Performed by: PSYCHIATRY & NEUROLOGY

## 2021-03-18 RX ORDER — GALCANEZUMAB 120 MG/ML
1 INJECTION, SOLUTION SUBCUTANEOUS
Qty: 1 SYRINGE | Refills: 11 | Status: SHIPPED | OUTPATIENT
Start: 2021-03-18 | End: 2022-02-21 | Stop reason: SDUPTHER

## 2021-03-18 NOTE — PROGRESS NOTES
Botox Injection Note     3/18/2021     Patient:  Analisa Bashir   YOB: 1960  Date of Visit: 3/18/2021      Indication: patient has chronic migraine headaches greater than 15 days per month lasting more than 4 hours each. She has failed or not tolerated 2 or more medication preventatives. Written consent was signed and verified by me. Risks and benefits were discussed to include possible cosmetic asymmetry which is not permament or life threatening. Patient name and  was confirmed prior to procedure. Time out performed. Procedure:   Botox concentration: 200 units in 2 ml of preservative-free normal saline. 1:1 dilution. LOT#: [id number]  EXP:  10 2023    Injections and distribution as follow :      Units/site  Sites Loc Subtotal    procerus 5 1 ML 5   corrugaters 2.5 2 BL 5   frontalis 5 8 BL 40   Temporalis 10 4 BL 40   Occipitalis 10 2 BL 20   Cervical paraspinals 5 4 BL 20   Trapezius 10 4 BL 40         200 units Botox were reconstituted, 170 units injected as above and the remainder was unavoidably wasted. Patient tolerated procedure well. Return in 3 months for repeat injections.     _____________________________   Mari English, DO  [address], ABPN

## 2021-03-19 ENCOUNTER — OFFICE VISIT (OUTPATIENT)
Dept: INTERNAL MEDICINE CLINIC | Age: 61
End: 2021-03-19
Payer: MEDICARE

## 2021-03-19 VITALS
BODY MASS INDEX: 42.68 KG/M2 | RESPIRATION RATE: 16 BRPM | OXYGEN SATURATION: 98 % | HEART RATE: 70 BPM | TEMPERATURE: 98 F | DIASTOLIC BLOOD PRESSURE: 81 MMHG | HEIGHT: 64 IN | WEIGHT: 250 LBS | SYSTOLIC BLOOD PRESSURE: 127 MMHG

## 2021-03-19 DIAGNOSIS — Z91.030 BEE STING ALLERGY: ICD-10-CM

## 2021-03-19 DIAGNOSIS — E78.00 HYPERCHOLESTEROLEMIA: ICD-10-CM

## 2021-03-19 DIAGNOSIS — I50.9 CHRONIC CONGESTIVE HEART FAILURE, UNSPECIFIED HEART FAILURE TYPE (HCC): ICD-10-CM

## 2021-03-19 DIAGNOSIS — E55.9 VITAMIN D DEFICIENCY: ICD-10-CM

## 2021-03-19 DIAGNOSIS — E66.01 OBESITY, MORBID (HCC): ICD-10-CM

## 2021-03-19 DIAGNOSIS — E78.00 HIGH CHOLESTEROL: ICD-10-CM

## 2021-03-19 DIAGNOSIS — Z99.89 OSA ON CPAP: ICD-10-CM

## 2021-03-19 DIAGNOSIS — I69.398 HEMIPARESIS AND ALTERATION OF SENSATIONS AS LATE EFFECTS OF STROKE (HCC): ICD-10-CM

## 2021-03-19 DIAGNOSIS — F41.9 ANXIETY: ICD-10-CM

## 2021-03-19 DIAGNOSIS — R73.03 PREDIABETES: ICD-10-CM

## 2021-03-19 DIAGNOSIS — I69.359 HEMIPARESIS AND ALTERATION OF SENSATIONS AS LATE EFFECTS OF STROKE (HCC): ICD-10-CM

## 2021-03-19 DIAGNOSIS — G47.33 OSA ON CPAP: ICD-10-CM

## 2021-03-19 DIAGNOSIS — I63.9 CEREBROVASCULAR ACCIDENT (CVA), UNSPECIFIED MECHANISM (HCC): Primary | Chronic | ICD-10-CM

## 2021-03-19 DIAGNOSIS — I10 ESSENTIAL HYPERTENSION: ICD-10-CM

## 2021-03-19 DIAGNOSIS — I49.3 VENTRICULAR ECTOPIC ACTIVITY: ICD-10-CM

## 2021-03-19 DIAGNOSIS — R53.1 LEFT-SIDED WEAKNESS: ICD-10-CM

## 2021-03-19 DIAGNOSIS — E89.0 POSTOPERATIVE HYPOTHYROIDISM: Chronic | ICD-10-CM

## 2021-03-19 PROCEDURE — G8417 CALC BMI ABV UP PARAM F/U: HCPCS | Performed by: PHYSICIAN ASSISTANT

## 2021-03-19 PROCEDURE — G9899 SCRN MAM PERF RSLTS DOC: HCPCS | Performed by: PHYSICIAN ASSISTANT

## 2021-03-19 PROCEDURE — G8432 DEP SCR NOT DOC, RNG: HCPCS | Performed by: PHYSICIAN ASSISTANT

## 2021-03-19 PROCEDURE — G8754 DIAS BP LESS 90: HCPCS | Performed by: PHYSICIAN ASSISTANT

## 2021-03-19 PROCEDURE — G8752 SYS BP LESS 140: HCPCS | Performed by: PHYSICIAN ASSISTANT

## 2021-03-19 PROCEDURE — 99214 OFFICE O/P EST MOD 30 MIN: CPT | Performed by: PHYSICIAN ASSISTANT

## 2021-03-19 PROCEDURE — G8427 DOCREV CUR MEDS BY ELIG CLIN: HCPCS | Performed by: PHYSICIAN ASSISTANT

## 2021-03-19 PROCEDURE — 3017F COLORECTAL CA SCREEN DOC REV: CPT | Performed by: PHYSICIAN ASSISTANT

## 2021-03-19 RX ORDER — METFORMIN HYDROCHLORIDE 500 MG/1
TABLET, EXTENDED RELEASE ORAL
Qty: 90 TAB | Refills: 0 | Status: SHIPPED | OUTPATIENT
Start: 2021-03-19 | End: 2021-06-22 | Stop reason: SDUPTHER

## 2021-03-19 RX ORDER — PREGABALIN 75 MG/1
75 CAPSULE ORAL
COMMUNITY
Start: 2021-02-12 | End: 2021-07-23

## 2021-03-19 RX ORDER — ATORVASTATIN CALCIUM 40 MG/1
TABLET, FILM COATED ORAL
Qty: 90 TAB | Refills: 1 | Status: SHIPPED | OUTPATIENT
Start: 2021-03-19 | End: 2021-11-24 | Stop reason: SDUPTHER

## 2021-03-19 RX ORDER — EPINEPHRINE 0.3 MG/.3ML
0.3 INJECTION SUBCUTANEOUS
Status: SHIPPED | OUTPATIENT
Start: 2021-03-19 | End: 2021-03-20

## 2021-03-19 NOTE — PROGRESS NOTES
Jaimie Zarate  Identified pt with two pt identifiers(name and ). Chief Complaint   Patient presents with   Floydrosenda Ada Establish Care       Reviewed record In preparation for visit and have obtained necessary documentation. 1. Have you been to the ER, urgent care clinic or hospitalized since your last visit? No     2. Have you seen or consulted any other health care providers outside of the [address] [place] [address] since your last visit? Include any pap smears or colon screening. No    Patient has an advance directive. Vitals reviewed with provider. Health Maintenance reviewed:     Health Maintenance Due   Topic    COVID-19 Vaccine (1)    PAP AKA CERVICAL CYTOLOGY     A1C test (Diabetic or Prediabetic)    tion  Wt Readings from Last 3 Encounters:   21 250 lb (113.4 kg)   21 249 lb (112.9 kg)   20 251 lb (113.9 kg)   ship with someone who physically or mentally threatens you? N N N   Is it safe for you to go home?  Chely Machado                            No Help Needed            Pulse Readings from Last 3 Encounters:   21 70   21 68   20 80       N  Vitals:    21 1401   BP: 127/81   Pulse: 70   Resp: 16   Temp: 98 °F (36.7 °C)   TempSrc: Oral   SpO2: 98%   Weight: 250 lb (113.4 kg)   Height: 5' 4\" (1.626 m)   PainSc:   6   o Help Needed    No Help Needed    No Help Needed   Driving No H  Learning Assessment 2019 2018 10/13/2017 3/24/2015   PRIMARY LEARNER Patient Patient Patient Patient   HIGHEST LEVEL OF EDUCATION - PRIMARY LEARNER  - - - SOME COLLEGE   BARRIERS PRIMARY LEARNER - - - NONE   CO-LEARNER CAREGIVER - - - No   CO-LEARNER NAME - - - n/a   PRIMARY LANGUAGE ENGLISH ENGLISH ENGLISH ENGLISH   LEARNER PREFERENCE PRIMARY DEMONSTRATION DEMONSTRATION LISTENING LISTENING   ANSWERED BY patient patient patient patient   RELATIONSHIP SELF SELF SELF SELF    worried about falling 0   [place]  3 most recent PHQ Screens 3/18/2021   PHQ Not Done Active Diagnosis of Depression or Bipolar Disorder   Little interest or pleasure in doing things Several days   Feeling down, depressed, irritable, or hopeless Several days   Total Score PHQ 2 2

## 2021-03-19 NOTE — PROGRESS NOTES
Teofilo Ponce is a 56y.o. year old female seen in clinic today for   Chief Complaint   Patient presents with   [address]       she is here today to establish care. She has a hx of CVA, MI, HTN, IFG, HLD, Migraines. In 2014 she reports being seen in the hospital for bronchitis and being found to have an MI while in ED. While undergoing cardiac cath she had a CVA. She has residual L sided muscle weakness. She uses a brace for her L leg to aid in bracing her muscles and uses a walker. Her L arm has residual weakness and she does daily exercises to \"wake it up. \" She has some chronic numbness on the L side as well. She has incontinence and uses pull ups daily. She is followed by Neurology and is getting q 3 month botox injections which improve her headaches but do cause some fatigue. She is followed by Dr. Lelon Spatz for Cardiology. She notes she has chronic SOB and chest pains but cardiology does not feel it is cardiac. She takes Cholesterol medication and her lipids are at goal.  She uses Metformin Daily and her A1C is 5.4 last check. She is on thyroid replacement therapy. she specifically denies any  fevers, chills, N/V/D, urinary symptoms or other bowel changes. Current Outpatient Medications on File Prior to Visit   Medication Sig Dispense Refill    galcanezumab-gnlm (Emgality Syringe) 120 mg/mL syrg 1 Syringe by SubCUTAneous route every thirty (30) days. Start 30 days after the loading dose 1 Syringe 11    onabotulinumtoxinA (Botox) 200 unit injection INJECT INTO THE BILATERAL MUSCLES OF THE HEAD AND NECK EVERY 3 MONTHS FOR MIGRAINES. DISCARD UNUSED PORTIONS 200 Units 3    carvediloL (COREG) 6.25 mg tablet TAKE 1 TABLET BY MOUTH TWICE DAILY WITH MEALS 60 Tab 5    topiramate (TOPAMAX) 100 mg tablet Take 1 tablet by mouth twice daily 180 Tab 3    ergocalciferol (ERGOCALCIFEROL) 1,250 mcg (50,000 unit) capsule Take 1 Cap by mouth every seven (7) days.  12 Cap 0    losartan (COZAAR) 50 mg tablet Take 1 Tab by mouth nightly. Hold for SBP<115 90 Tab 1    cyanocobalamin (VITAMIN B12) 1,000 mcg/mL injection INJECT 1 ML UNDER THE SKIN EVERY 30 DAYS FOR B12 DEFICIENCY  Indications: inadequate vitamin B12 10 mL 1    Syringe, Disposable, 1 mL syrg Use bimonthly for cyanocobalamin subcutaneous injection. 25 Syringe 0    Needle, Disp, 25 G 25 gauge x 3/4\" ndle Use bimonthly for cyanocobalamin subcutaneous injection. 1 Box 0    fluticasone propionate (FLONASE) 50 mcg/actuation nasal spray 2 Sprays by Both Nostrils route daily. 1 Bottle 0    Linzess 145 mcg cap capsule TAKE 1 CAPSULE BY MOUTH ONCE DAILY BEFORE BREAKFAST FOR CONSTIPATION 90 Cap 0    furosemide (LASIX) 40 mg tablet Take 1 tablet by mouth once daily 90 Tab 0    Euthyrox 125 mcg tablet TAKE 1 TABLET BY MOUTH ONCE DAILY BEFORE BREAKFAST 90 Tab 0    gabapentin (NEURONTIN) 300 mg capsule Take 100 mg by mouth three (3) times daily as needed for Pain.  FREESTYLE LITE STRIPS strip USE STRIP TO CHECK GLUCOSE TWICE DAILY  3    FREESTYLE LITE METER monitoring kit USE TO CHECK GLUCOSE ONCE DAILY  0    ciclopirox (PENLAC) 8 % solution APPLY TO AFFECTED TOE NAILS DAILY      diclofenac (VOLTAREN) 1 % gel APPLY TOPICALLY TO AFFECTED AREA ONCE DAILY      hydroxyzine HCL (ATARAX) 50 mg tablet TAKE 1 TABLET BY MOUTH TWICE DAILY AS NEEDED FOR ANXIETY      FREESTYLE LANCETS 28 gauge [facility] USE TO CHECK GLUCOSE TWICE DAILY      pantoprazole (PROTONIX) 40 mg tablet Take 1 Tab by mouth daily. 90 Tab 3    miscellaneous medical supply misc Rollator Dx:I69.359 1 Each 0    miscellaneous medical supply [facility] Walker Dx: I69.359 1 Each 0    oxyCODONE-acetaminophen (PERCOCET 7.5) 7.5-325 mg per tablet TAKE 1 TABLET BY MOUTH 4 TIMES DAILY  0    baclofen (LIORESAL) 10 mg tablet TAKE 1 TABLET BY MOUTH THREE TIMES DAILY (Patient taking differently: Take 20 mg by mouth three (3) times daily.  TAKE 1 TABLET BY MOUTH THREE TIMES DAILY) 270 Tab 0    DULoxetine (CYMBALTA) 60 mg capsule Take 2 Caps by mouth daily. 180 Cap 0    calcipotriene (DOVONEX) 0.005 % topical cream Apply  to affected area daily as needed. 60 g 11    albuterol-ipratropium (DUO-NEB) 2.5 mg-0.5 mg/3 ml nebu 3 mL by Nebulization route every six (6) hours as needed. 30 Nebule 0    albuterol (PROVENTIL HFA, VENTOLIN HFA, PROAIR HFA) 90 mcg/actuation inhaler Take 2 Puffs by inhalation every four (4) hours as needed for Wheezing or Shortness of Breath. 3 Inhaler 3    ferrous sulfate 325 mg (65 mg iron) tablet Take 325 mg by mouth Daily (before breakfast).  aspirin delayed-release 81 mg tablet Take 81 mg by mouth daily.  pregabalin (LYRICA) 75 mg capsule       [DISCONTINUED] metFORMIN ER (GLUCOPHAGE XR) 500 mg tablet TAKE 1 TABLET BY MOUTH ONCE DAILY WITH SUPPER 90 Tab 0    [DISCONTINUED] atorvastatin (LIPITOR) 40 mg tablet Take 1 tablet by mouth once daily 90 Tab 1    omega-3 acid ethyl esters (LOVAZA) 1 gram capsule Take 2 Caps by mouth two (2) times a day. 120 Cap 2     No current facility-administered medications on file prior to visit. Allergies   Allergen Reactions    Bees [Hymenoptera Allergenic Extract] Shortness of Breath and Swelling    Strawberry Shortness of Breath and Swelling    Lisinopril Cough     Past Medical History:   Diagnosis Date    Advanced care planning/counseling discussion 3/4/16    On File    Bronchitis 2/24/2014    Cervicalgia 8/18/15    Dr. Savana Murphy    Chest pain 5/25/15    Hospital[facility] at [facility] 5/25/15 (lab work negative)    Chronic indwelling Dunn catheter 1/24/2018    Initially placed Jan 2018. Mx by Dr. Ned Ramírez.      Congestive heart failure, unspecified     Last Echo 2/8/15: EF 55-60%    Constipation 6/13/2017    Enthesopathy, spinal ([address] 8/18/15    Dr. Savana Murphy    Essential hypertension     Foot drop 8/18/15    Dr. Alexi Brannon Heart attack [facility]) 2/24/2013    Was supposed to See Cardiology for possible pacemaker in november 2014- After Cardiology consult locally, no need, EF greatly improved. Established with Dr. Rosette Kocher Hiatal hernia 6/2015    3 cm hiatal hernia     Hip pain 8/18/15    Dr. Germania Shukla    Hypercholesterolemia     Hyperglycemia 7/2015    A1c 5.9     Hyperlipidemia 6/30/2015    NMR lipoprofile- LDL P 997, LDL-c 71, HLD-C-39, TG-60, HLD-P (25.2), Small LDL-P -541, LDL size 20.6    Hypothyroid     Insomnia 6/13/2017    Lower extremity edema     Lumbar spondylosis 8/18/15    Dr. Geneva Starkey 6/2015    EGD/Colonscopy 6/15- Gastritis, internal hemorrhoids and 3 polyps    Murmur     EDDIE on CPAP     Was referred to Pulmonology - Uses CPAP    Osteoarthritis of hip 8/18/15    Dr. Germania Shukla    Osteoarthritis, shoulder 8/18/15    Dr. Marian Patel Radiculopathy, cervical 8/18/15    Dr. Germania Shukla    Shoulder pain 8/18/15    Dr. Marian Patel Spinal stenosis of cervical region 8/18/15    Dr. Marian Patel Spinal stenosis, lumbar 8/18/15    Dr. Marian Patel Stroke [facility]) 2/25/2014    Established with Neurology, Rodolfo Garcia NP-Just hospitalized at [facility] 2/7/15-2/10/15. CT negative, but Late effect CV accident with increased tone described on discharge summary, Carotid dopplers showed 50% stenosis bilaterally.  Vitamin D deficiency 7/2015      Past Surgical History:   Procedure Laterality Date    COLONOSCOPY N/A 6/22/2016    COLONOSCOPY performed by Michelle Javed [facility] at [facility] ENDOSCOPY    HX BREAST BIOPSY Right 8/11/15    Stereo Bx - [facility]--- Benign    HX CHOLECYSTECTOMY      HX COLONOSCOPY  6/2015    Dr. Yesika Henderson- 3 complete polypectomies, Internal hemorrhoids, difficult study due to spasm.  Repeat in 1 year    HX ENDOSCOPY  6/2015    mild gastritis, 3cm hiatal hernia     HX GASTRIC BYPASS  6/1989    HX HEART CATHETERIZATION  2/2014    HX ORTHOPAEDIC      Right middle finger distal amputation    HX PARTIAL THYROIDECTOMY  ~1990    HX THYROIDECTOMY Left 1985    90% of one side of the thyroid removed    IR ASP BLADDER SUPRA CATH  5/24/2019 Family History   Problem Relation Age of Onset    Heart Disease Father 61    Broken Bones Father         Hip/fell    Hypertension Mother     Heart Disease Brother 62    Broken Bones Brother         Hip/fell    Diabetes Maternal Grandmother         Social History     Socioeconomic History    Marital status: SINGLE     Spouse name: Not on file    Number of children: Not on file    Years of education: Not on file    Highest education level: Not on file   Occupational History    Not on file   Social Needs    Financial resource strain: Not on file    Food insecurity     Worry: Not on file     Inability: Not on file    Transportation needs     Medical: Not on file     Non-medical: Not on file   Tobacco Use    Smoking status: Former Smoker     Packs/day: 0.25     Years: 15.00     Pack years: 3.75     Types: Cigarettes     Quit date: 2007     Years since quittin.7    Smokeless tobacco: Never Used   Substance and Sexual Activity    Alcohol use: No    Drug use: No    Sexual activity: Not Currently   Lifestyle    Physical activity     Days per week: Not on file     Minutes per session: Not on file    Stress: Not on file   Relationships    Social connections     Talks on phone: Not on file     Gets together: Not on file     Attends Yazidism service: Not on file     Active member of club or organization: Not on file     Attends meetings of clubs or organizations: Not on file     Relationship status: Not on file    Intimate partner violence     Fear of current or ex partner: Not on file     Emotionally abused: Not on file     Physically abused: Not on file     Forced sexual activity: Not on file   Other Topics Concern    Not on file   Social History Narrative    Not on file           Visit Vitals  /81 (BP 1 Location: Left arm, BP Patient Position: Sitting, BP Cuff Size: Adult)   Pulse 70   Temp 98 °F (36.7 °C) (Oral)   Resp 16   Ht 5' 4\" (1.626 m)   Wt 250 lb (113.4 kg)   LMP  (LMP Unknown)   SpO2 98%   BMI 42.91 kg/m²       Review of Systems   Constitutional: Negative for chills, fever, malaise/fatigue and weight loss. HENT: Negative for ear pain, hearing loss and sore throat. Respiratory: Positive for shortness of breath (chronic, intermittent). Negative for cough. Cardiovascular: Positive for chest pain (chronic, has been evaluated by Cardiology). Negative for leg swelling. Gastrointestinal: Negative for abdominal pain, blood in stool, constipation, diarrhea, heartburn, melena, nausea and vomiting. Genitourinary: Negative for dysuria and hematuria. Musculoskeletal: Negative for back pain and myalgias. Skin: Negative for rash. Neurological: Negative for weakness and headaches. Psychiatric/Behavioral: Negative for depression. Physical Exam  Vitals signs and nursing note reviewed. Constitutional:       General: She is not in acute distress. Appearance: Normal appearance. She is obese. HENT:      Head: Normocephalic and atraumatic. Right Ear: Tympanic membrane, ear canal and external ear normal.      Left Ear: Tympanic membrane, ear canal and external ear normal.      Nose: Nose normal.      Mouth/Throat:      Mouth: Mucous membranes are moist.      Pharynx: Oropharynx is clear. Eyes:      Conjunctiva/sclera: Conjunctivae normal.      Pupils: Pupils are equal, round, and reactive to light. Neck:      Musculoskeletal: Normal range of motion and neck supple. No neck rigidity. Vascular: No carotid bruit. Cardiovascular:      Rate and Rhythm: Normal rate and regular rhythm. Pulses: Normal pulses. Heart sounds: Normal heart sounds. Pulmonary:      Effort: Pulmonary effort is normal.      Breath sounds: Normal breath sounds. No wheezing, rhonchi or rales. Abdominal:      General: Abdomen is flat. Bowel sounds are normal. There is no distension. Palpations: There is no mass. Tenderness: There is no abdominal tenderness.  There is no guarding or rebound. Musculoskeletal: Normal range of motion. Skin:     General: Skin is warm and dry. Capillary Refill: Capillary refill takes less than 2 seconds. Neurological:      Mental Status: She is alert and oriented to person, place, and time. Cranial Nerves: No cranial nerve deficit, dysarthria or facial asymmetry. Sensory: No sensory deficit. Motor: Weakness (LUE 4/5, LLE 3/5 strength) present. No abnormal muscle tone or seizure activity. Gait: Gait abnormal (using walking). Comments: Brace to L ankle   Psychiatric:         Mood and Affect: Mood normal.         Behavior: Behavior normal.         Thought Content: Thought content normal.          No results found for this or any previous visit (from the past 24 hour(s)). ASSESSMENT AND PLAN  Diagnoses and all orders for this visit:    1. Cerebrovascular accident (CVA), unspecified mechanism ([address]  Chronic L sided weakness. Continue close follow up. BP control, statin, BB, Lasix  2. Chronic congestive heart failure, unspecified heart failure type (HCC)  Lasix, BB in place. Followed by Cardiology. EF 50-55%  3. Essential hypertension  At goal  4. Ventricular ectopic activity  Still feels random heart beats, on carvedilol. RRR on exam  5. Postoperative hypothyroidism  Treatment: Euthyrox 125 mc  6. Hemiparesis and alteration of sensations as late effects of stroke (HCC)  Continue L ankle brace and exercises  7. Left-sided weakness   Same as above  8. EDDIE on CPAP  Compliant  9. High cholesterol  -     atorvastatin (LIPITOR) 40 mg tablet; Take 1 tablet by mouth once daily  Check labs next visit, compliant with meds  10. Hypercholesterolemia  -     atorvastatin (LIPITOR) 40 mg tablet; Take 1 tablet by mouth once daily  Same as above  11. Obesity, morbid (HCC)  -     metFORMIN ER (GLUCOPHAGE XR) 500 mg tablet; TAKE 1 TABLET BY MOUTH ONCE DAILY WITH SUPPER  Unable to exercise. She is eating \"what she wants. \" I advised portion control  12. Vitamin D deficiency  Using 50,000 unit weekly caps  13. Anxiety  At baseline, stable  14. Prediabetes  -     metFORMIN ER (GLUCOPHAGE XR) 500 mg tablet; TAKE 1 TABLET BY MOUTH ONCE DAILY WITH SUPPER  A1C 5.4, continue low dose metformin. Check A1C next visit  15. Bee sting allergy  -     EPINEPHrine (EPIPEN) injection 0.3 mg  Given Rx         I have discussed the diagnosis with the patient and the intended plan as seen in the above orders. Patient is in agreement. The patient has received an after-visit summary and questions were answered concerning future plans. I have discussed medication side effects and warnings with the patient as well.     Angel Luis aCin PA-C

## 2021-03-25 ENCOUNTER — DOCUMENTATION ONLY (OUTPATIENT)
Dept: INTERNAL MEDICINE CLINIC | Age: 61
End: 2021-03-25

## 2021-03-29 ENCOUNTER — TELEPHONE (OUTPATIENT)
Dept: INTERNAL MEDICINE CLINIC | Age: 61
End: 2021-03-29

## 2021-03-29 NOTE — TELEPHONE ENCOUNTER
Pharmacy called and has questions about 2 prescriptions. Please give pharmacy a call back.     BCB#[phone]

## 2021-04-06 ENCOUNTER — TELEPHONE (OUTPATIENT)
Dept: INTERNAL MEDICINE CLINIC | Age: 61
End: 2021-04-06

## 2021-04-06 NOTE — TELEPHONE ENCOUNTER
----- Message from Isac Harry sent at 4/6/2021 12:14 PM EDT -----  Regarding: Dr. Dave Raymundo  Medication Refill    Caller (if not patient):Inessa MEDINA      Relationship of caller (if not patient) Pharmacy      Best contact number(s):[phone]      Name of medication and dosage if known Diabetes Supplies   Is patient out of this medication (yes/no):      Pharmacy name: [address] listed in chart? (yes/no): No  Pharmacy phone number:N/A      Details to clarify the request: Need Authorization for supplies fax was sent on 3/29/2021 was confirmed received by Summer Conklin on 4/1/2021 Reference number [id number]      Isac Harry

## 2021-04-08 NOTE — TELEPHONE ENCOUNTER
----- Message from Nohemy Nick sent at 4/8/2021  9:02 AM EDT -----  Regarding: Dr. Nigel Ch  General Message/Vendor Calls    Caller's first and last name: [place] from [facility]      Reason for call: Needs a verbal approval for diabetes medication.        Callback required yes/no and why: yes, to approve      Best contact number(s): [phone]      Details to clarify the request: Reference# [id number]      Nohemy Nick

## 2021-04-15 NOTE — TELEPHONE ENCOUNTER
Pt called back and wants the meter thru [employer]. States she checks twice a day but A1C on our chart was 5.5 on 12/10/2019. Per form if A1C below 7.0 needs to know why testing more often. Form placed on providers desk for signature.

## 2021-04-15 NOTE — TELEPHONE ENCOUNTER
Metformin was filled at [facility] on [address] [facility] on 3/19/2021. Message left for pt to call back about a glucometer.

## 2021-05-05 ENCOUNTER — OFFICE VISIT (OUTPATIENT)
Dept: CARDIOLOGY CLINIC | Age: 61
End: 2021-05-05
Payer: MEDICARE

## 2021-05-05 VITALS
HEIGHT: 64 IN | HEART RATE: 74 BPM | DIASTOLIC BLOOD PRESSURE: 80 MMHG | SYSTOLIC BLOOD PRESSURE: 110 MMHG | WEIGHT: 253 LBS | RESPIRATION RATE: 16 BRPM | BODY MASS INDEX: 43.19 KG/M2 | OXYGEN SATURATION: 99 %

## 2021-05-05 DIAGNOSIS — I10 ESSENTIAL HYPERTENSION: ICD-10-CM

## 2021-05-05 DIAGNOSIS — R07.9 CHEST PAIN, UNSPECIFIED TYPE: ICD-10-CM

## 2021-05-05 DIAGNOSIS — I25.10 CORONARY ARTERY DISEASE INVOLVING NATIVE CORONARY ARTERY OF NATIVE HEART WITHOUT ANGINA PECTORIS: ICD-10-CM

## 2021-05-05 DIAGNOSIS — R53.1 LEFT-SIDED WEAKNESS: ICD-10-CM

## 2021-05-05 DIAGNOSIS — I69.359 HEMIPARESIS AND ALTERATION OF SENSATIONS AS LATE EFFECTS OF STROKE (HCC): ICD-10-CM

## 2021-05-05 DIAGNOSIS — K21.9 GASTROESOPHAGEAL REFLUX DISEASE, UNSPECIFIED WHETHER ESOPHAGITIS PRESENT: ICD-10-CM

## 2021-05-05 DIAGNOSIS — I42.0 DILATED CARDIOMYOPATHY (HCC): Primary | ICD-10-CM

## 2021-05-05 DIAGNOSIS — I50.9 CHRONIC CONGESTIVE HEART FAILURE, UNSPECIFIED HEART FAILURE TYPE (HCC): ICD-10-CM

## 2021-05-05 DIAGNOSIS — E66.01 OBESITY, MORBID (HCC): ICD-10-CM

## 2021-05-05 DIAGNOSIS — I69.398 HEMIPARESIS AND ALTERATION OF SENSATIONS AS LATE EFFECTS OF STROKE (HCC): ICD-10-CM

## 2021-05-05 PROCEDURE — G8510 SCR DEP NEG, NO PLAN REQD: HCPCS | Performed by: SPECIALIST

## 2021-05-05 PROCEDURE — G8752 SYS BP LESS 140: HCPCS | Performed by: SPECIALIST

## 2021-05-05 PROCEDURE — 99214 OFFICE O/P EST MOD 30 MIN: CPT | Performed by: SPECIALIST

## 2021-05-05 PROCEDURE — G8427 DOCREV CUR MEDS BY ELIG CLIN: HCPCS | Performed by: SPECIALIST

## 2021-05-05 PROCEDURE — 3017F COLORECTAL CA SCREEN DOC REV: CPT | Performed by: SPECIALIST

## 2021-05-05 PROCEDURE — G8754 DIAS BP LESS 90: HCPCS | Performed by: SPECIALIST

## 2021-05-05 PROCEDURE — G8417 CALC BMI ABV UP PARAM F/U: HCPCS | Performed by: SPECIALIST

## 2021-05-05 PROCEDURE — G9899 SCRN MAM PERF RSLTS DOC: HCPCS | Performed by: SPECIALIST

## 2021-05-05 RX ORDER — LANCING DEVICE
EACH MISCELLANEOUS
COMMUNITY
Start: 2021-04-20 | End: 2021-06-21

## 2021-05-05 RX ORDER — TRAZODONE HYDROCHLORIDE 100 MG/1
TABLET ORAL
COMMUNITY
Start: 2021-04-07 | End: 2021-07-23 | Stop reason: DRUGHIGH

## 2021-05-05 RX ORDER — ISOPROPYL ALCOHOL 0.75 G/1
SWAB TOPICAL
COMMUNITY
Start: 2021-04-20

## 2021-05-05 NOTE — PROGRESS NOTES
HISTORY OF PRESENT ILLNESS  Aleah Yarbrough is a 56 y.o. female. She has morbid obesity and a history of stroke causing left-sided hemiparesis. She had a possible heart attack in [place] 7 years ago and underwent cardiac catheterization. She was told she had normal coronary arteries. However about 8 hours later she had a sudden onset of left hemiparesis. She now uses a walker. She has occasional chest pain but also occasional tightness in her arms. She can wake up with chest tightness and then may hear a \"slushy sound\" in her chest.  She has gastroesophageal reflux and may have a hiatal hernia. She will notice that her hands are more swollen and will take an extra fluid pill. She did have a Lexiscan Myoview test done in December 2019 that showed ejection fraction 44% with prior inferior wall infarction. Her catheterization was done from the right radial artery in [place] and she no longer has a right radial pulse. She uses a walker.   HPI  Patient Active Problem List   Diagnosis Code    Coronary artery disease involving native coronary artery of native heart without angina pectoris I25.10    Bronchitis J40    High cholesterol E78.00    History of cardiomyopathy Z86.79    SOB (shortness of breath) R06.02    Neck pain, bilateral M54.2    Shoulder pain, bilateral M25.511, M25.512    Muscle spasticity M62.838    Hemiparesis and alteration of sensations as late effects of stroke ([facility]) I69.359, I69.398    Head pain, chronic R51.9, G89.29    Expressive aphasia R47.01    Heart palpitations R00.2    Ventricular ectopic activity I49.3    Stroke ([facility]) I63.9    Thyroid disease E07.9    Hypercholesterolemia E78.00    EDDIE on CPAP G47.33, Z99.89    Essential hypertension I10    Congestive heart failure ([facility]) I50.9    Chest pain R07.9    Melena K92.1    Hiatal hernia K44.9    Postoperative hypothyroidism E89.0    Chronic pain G89.29    Prediabetes R73.03    Constipation K59.00    Insomnia G47.00    Left-sided weakness R53.1    Vitamin D deficiency E55.9    Obesity, morbid (HCC) E66.01    Chronic indwelling Dunn catheter Z97.8    Dermatitis L30.9    Obesity (BMI 30-39. 9) E66.9    Anxiety F41.9     Current Outpatient Medications   Medication Sig Dispense Refill    Easy Touch Lancing Device misc Check Blood sugar ONCE daily      traZODone (DESYREL) 100 mg tablet TAKE 1 TO 2 TABLETS BY MOUTH AT BEDTIME AS NEEDED FOR SLEEP      atorvastatin (LIPITOR) 40 mg tablet Take 1 tablet by mouth once daily 90 Tab 1    metFORMIN ER (GLUCOPHAGE XR) 500 mg tablet TAKE 1 TABLET BY MOUTH ONCE DAILY WITH SUPPER 90 Tab 0    galcanezumab-gnlm (Emgality Syringe) 120 mg/mL syrg 1 Syringe by SubCUTAneous route every thirty (30) days. Start 30 days after the loading dose 1 Syringe 11    onabotulinumtoxinA (Botox) 200 unit injection INJECT INTO THE BILATERAL MUSCLES OF THE HEAD AND NECK EVERY 3 MONTHS FOR MIGRAINES. DISCARD UNUSED PORTIONS 200 Units 3    carvediloL (COREG) 6.25 mg tablet TAKE 1 TABLET BY MOUTH TWICE DAILY WITH MEALS 60 Tab 5    topiramate (TOPAMAX) 100 mg tablet Take 1 tablet by mouth twice daily 180 Tab 3    ergocalciferol (ERGOCALCIFEROL) 1,250 mcg (50,000 unit) capsule Take 1 Cap by mouth every seven (7) days. 12 Cap 0    losartan (COZAAR) 50 mg tablet Take 1 Tab by mouth nightly. Hold for SBP<115 90 Tab 1    cyanocobalamin (VITAMIN B12) 1,000 mcg/mL injection INJECT 1 ML UNDER THE SKIN EVERY 30 DAYS FOR B12 DEFICIENCY  Indications: inadequate vitamin B12 10 mL 1    Syringe, Disposable, 1 mL syrg Use bimonthly for cyanocobalamin subcutaneous injection. 25 Syringe 0    Needle, Disp, 25 G 25 gauge x 3/4\" ndle Use bimonthly for cyanocobalamin subcutaneous injection. 1 Box 0    fluticasone propionate (FLONASE) 50 mcg/actuation nasal spray 2 Sprays by Both Nostrils route daily.  1 Bottle 0    Linzess 145 mcg cap capsule TAKE 1 CAPSULE BY MOUTH ONCE DAILY BEFORE BREAKFAST FOR CONSTIPATION 90 Cap 0    furosemide (LASIX) 40 mg tablet Take 1 tablet by mouth once daily 90 Tab 0    Euthyrox 125 mcg tablet TAKE 1 TABLET BY MOUTH ONCE DAILY BEFORE BREAKFAST 90 Tab 0    gabapentin (NEURONTIN) 300 mg capsule Take 100 mg by mouth three (3) times daily as needed for Pain.  FREESTYLE LITE STRIPS strip USE STRIP TO CHECK GLUCOSE TWICE DAILY  3    FREESTYLE LITE METER monitoring kit USE TO CHECK GLUCOSE ONCE DAILY  0    ciclopirox (PENLAC) 8 % solution APPLY TO AFFECTED TOE NAILS DAILY      diclofenac (VOLTAREN) 1 % gel APPLY TOPICALLY TO AFFECTED AREA ONCE DAILY      hydroxyzine HCL (ATARAX) 50 mg tablet TAKE 1 TABLET BY MOUTH TWICE DAILY AS NEEDED FOR ANXIETY      FREESTYLE LANCETS 28 gauge [facility] USE TO CHECK GLUCOSE TWICE DAILY      pantoprazole (PROTONIX) 40 mg tablet Take 1 Tab by mouth daily. 90 Tab 3    miscellaneous medical supply misc Rollator Dx:I69.359 1 Each 0    miscellaneous medical supply [facility] Walker Dx: I69.359 1 Each 0    oxyCODONE-acetaminophen (PERCOCET 7.5) 7.5-325 mg per tablet TAKE 1 TABLET BY MOUTH 4 TIMES DAILY  0    baclofen (LIORESAL) 10 mg tablet TAKE 1 TABLET BY MOUTH THREE TIMES DAILY (Patient taking differently: Take 20 mg by mouth three (3) times daily. TAKE 1 TABLET BY MOUTH THREE TIMES DAILY) 270 Tab 0    omega-3 acid ethyl esters (LOVAZA) 1 gram capsule Take 2 Caps by mouth two (2) times a day. 120 Cap 2    DULoxetine (CYMBALTA) 60 mg capsule Take 2 Caps by mouth daily. 180 Cap 0    calcipotriene (DOVONEX) 0.005 % topical cream Apply  to affected area daily as needed. 60 g 11    albuterol-ipratropium (DUO-NEB) 2.5 mg-0.5 mg/3 ml nebu 3 mL by Nebulization route every six (6) hours as needed. 30 Nebule 0    albuterol (PROVENTIL HFA, VENTOLIN HFA, PROAIR HFA) 90 mcg/actuation inhaler Take 2 Puffs by inhalation every four (4) hours as needed for Wheezing or Shortness of Breath.  3 Inhaler 3    ferrous sulfate 325 mg (65 mg iron) tablet Take 325 mg by mouth Daily (before breakfast).  aspirin delayed-release 81 mg tablet Take 81 mg by mouth daily.  BD Single Use Swabs Regular padm Check Blood sugar ONCE daily      pregabalin (LYRICA) 75 mg capsule        Past Medical History:   Diagnosis Date    Advanced care planning/counseling discussion 3/4/16    On File    Bronchitis 2/24/2014    Cervicalgia 8/18/15    Dr. Emily Yo    Chest pain 5/25/15    [facility] (lab work negative)    Chronic indwelling Dunn catheter 1/24/2018    Initially placed Jan 2018. Mx by Dr. Ellen Kenyon.  Congestive heart failure, unspecified     Last Echo 2/8/15: EF 55-60%    Constipation 6/13/2017    Enthesopathy, spinal ([address] 8/18/15    Dr. Emily Yo    Essential hypertension     Foot drop 8/18/15    Dr. David Rosas Heart attack [facility]) 2/24/2013    Was supposed to See Cardiology for possible pacemaker in november 2014- After Cardiology consult locally, no need, EF greatly improved. Established with Dr. Sarah Love Hiatal hernia 6/2015    3 cm hiatal hernia     Hip pain 8/18/15    Dr. Emily Yo    Hypercholesterolemia     Hyperglycemia 7/2015    A1c 5.9     Hyperlipidemia 6/30/2015    NMR lipoprofile- LDL P 997, LDL-c 71, HLD-C-39, TG-60, HLD-P (25.2), Small LDL-P -541, LDL size 20.6    Hypothyroid     Insomnia 6/13/2017    Lower extremity edema     Lumbar spondylosis 8/18/15    Dr. Villa Marie 6/2015    EGD/Colonscopy 6/15- Gastritis, internal hemorrhoids and 3 polyps    Murmur     EDDIE on CPAP     Was referred to Pulmonology - Uses CPAP    Osteoarthritis of hip 8/18/15    Dr. Emily Yo    Osteoarthritis, shoulder 8/18/15    Dr. David Rosas Radiculopathy, cervical 8/18/15    Dr. Emily Yo    Shoulder pain 8/18/15    Dr. David Rosas Spinal stenosis of cervical region 8/18/15    Dr. David Rosas Spinal stenosis, lumbar 8/18/15    Dr. David Rosas Stroke [facility]) 2/25/2014    Established with Neurology, Trinidad Lindsay NP-[facility] hospitalized [facility] 2/7/15-2/10/15.  CT negative, but Late effect CV accident with increased tone described on discharge summary, Carotid dopplers showed 50% stenosis bilaterally.  Vitamin D deficiency 7/2015     Past Surgical History:   Procedure Laterality Date    COLONOSCOPY N/A 6/22/2016    COLONOSCOPY performed by Chiquis Bernal MD at [facility] ENDOSCOPY    HX BREAST BIOPSY Right 8/11/15    [facility]    HX CHOLECYSTECTOMY      HX COLONOSCOPY  6/2015    Dr. Shanon Genao- 3 complete polypectomies, Internal hemorrhoids, difficult study due to spasm. Repeat in 1 year    HX ENDOSCOPY  6/2015    mild gastritis, 3cm hiatal hernia     HX GASTRIC BYPASS  6/1989    HX HEART CATHETERIZATION  2/2014    HX ORTHOPAEDIC      Right middle finger distal amputation    HX PARTIAL THYROIDECTOMY  ~1990    HX THYROIDECTOMY Left 1985    90% of one side of the thyroid removed    IR ASP BLADDER SUPRA CATH  5/24/2019       Review of Systems   Cardiovascular: Positive for chest pain. Neurological: Positive for focal weakness. All other systems reviewed and are negative. Visit Vitals  /80 (BP 1 Location: Right upper arm, BP Patient Position: Sitting, BP Cuff Size: Adult)   Pulse 74   Resp 16   Ht 5' 4\" (1.626 m)   Wt 253 lb (114.8 kg)   LMP  (LMP Unknown)   SpO2 99%   BMI 43.43 kg/m²       Physical Exam   Constitutional: She appears well-nourished. HENT:   Head: Atraumatic. Eyes: Conjunctivae are normal.   Neck: Neck supple. Cardiovascular: Normal rate, regular rhythm and normal heart sounds. Exam reveals no gallop and no friction rub. No murmur heard. Pulmonary/Chest: Breath sounds normal. She has no wheezes. She has no rales. Abdominal: Bowel sounds are normal.   Musculoskeletal:         General: No edema. Neurological: She is alert. Skin: Skin is dry. Psychiatric: Her behavior is normal.   Nursing note and vitals reviewed.       ASSESSMENT and PLAN  She is a difficult historian it is unclear whether the discomfort in her chest and arms is due to esophageal reflux and spasm or due to coronary disease. I will have her return for a repeat Lexiscan Myoview test and call her regarding the results. If it is unchanged and I will see her in 6 months. If there is a new abnormality then she might need cardiac catheterization and it seems she would be willing to undergo this again despite what happened previously. The right radial artery can no longer be used unfortunately.

## 2021-05-11 DIAGNOSIS — I10 ESSENTIAL HYPERTENSION: ICD-10-CM

## 2021-05-11 DIAGNOSIS — K21.9 GASTROESOPHAGEAL REFLUX DISEASE: ICD-10-CM

## 2021-05-11 NOTE — TELEPHONE ENCOUNTER
Requested Prescriptions     Pending Prescriptions Disp Refills    losartan (COZAAR) 50 mg tablet 90 Tab 1     Sig: Take 1 Tab by mouth nightly.  Hold for SBP<115     pantoprazole (PROTONIX) 40 mg tablet    Patient needs both medications refilled, pantoprazole will not let me add to the refill list.

## 2021-05-11 NOTE — TELEPHONE ENCOUNTER
PCP: Petty Bee PA-C     Last appt: 3/19/2021   Future Appointments   Date Time Provider Denilson Thompson   5/17/2021  1:30 PM ERYN ARCOS BS AMB   5/19/2021  1:30 PM ERYN ARCOS BS AMB   6/17/2021  9:00 AM Vanessa Drew DO NEUSM BS AMB   6/21/2021  8:30 AM MAKEDA Bedoya BS AMB   11/8/2021 10:20 AM MD YESENIA Hilliard BS AMB        Requested Prescriptions     Pending Prescriptions Disp Refills    losartan (COZAAR) 50 mg tablet 90 Tab 3     Sig: Take 1 Tab by mouth nightly. Hold for SBP<115    pantoprazole (PROTONIX) 40 mg tablet 90 Tab 3     Sig: Take 1 Tab by mouth daily.

## 2021-05-12 RX ORDER — LOSARTAN POTASSIUM 50 MG/1
50 TABLET ORAL
Qty: 90 TAB | Refills: 3 | Status: SHIPPED | OUTPATIENT
Start: 2021-05-12 | End: 2022-06-10 | Stop reason: SDUPTHER

## 2021-05-12 RX ORDER — PANTOPRAZOLE SODIUM 40 MG/1
40 TABLET, DELAYED RELEASE ORAL DAILY
Qty: 90 TAB | Refills: 3 | Status: SHIPPED | OUTPATIENT
Start: 2021-05-12 | End: 2022-06-10 | Stop reason: SDUPTHER

## 2021-05-17 ENCOUNTER — ANCILLARY PROCEDURE (OUTPATIENT)
Dept: CARDIOLOGY CLINIC | Age: 61
End: 2021-05-17
Payer: MEDICARE

## 2021-05-17 VITALS
WEIGHT: 253 LBS | DIASTOLIC BLOOD PRESSURE: 68 MMHG | SYSTOLIC BLOOD PRESSURE: 118 MMHG | HEIGHT: 64 IN | BODY MASS INDEX: 43.19 KG/M2

## 2021-05-17 DIAGNOSIS — I25.10 CORONARY ARTERY DISEASE INVOLVING NATIVE CORONARY ARTERY OF NATIVE HEART WITHOUT ANGINA PECTORIS: ICD-10-CM

## 2021-05-17 DIAGNOSIS — I49.3 VENTRICULAR ECTOPIC ACTIVITY: ICD-10-CM

## 2021-05-17 DIAGNOSIS — E78.00 HIGH CHOLESTEROL: ICD-10-CM

## 2021-05-17 DIAGNOSIS — Z86.79 HISTORY OF CARDIOMYOPATHY: ICD-10-CM

## 2021-05-17 DIAGNOSIS — R73.03 PREDIABETES: ICD-10-CM

## 2021-05-17 DIAGNOSIS — R06.02 SOB (SHORTNESS OF BREATH): ICD-10-CM

## 2021-05-17 DIAGNOSIS — I50.9 CHRONIC CONGESTIVE HEART FAILURE, UNSPECIFIED HEART FAILURE TYPE (HCC): ICD-10-CM

## 2021-05-17 DIAGNOSIS — I10 ESSENTIAL HYPERTENSION: ICD-10-CM

## 2021-05-17 DIAGNOSIS — E78.00 HYPERCHOLESTEROLEMIA: ICD-10-CM

## 2021-05-17 DIAGNOSIS — E66.01 OBESITY, MORBID (HCC): ICD-10-CM

## 2021-05-17 DIAGNOSIS — R00.2 HEART PALPITATIONS: ICD-10-CM

## 2021-05-17 DIAGNOSIS — R07.9 CHEST PAIN, UNSPECIFIED TYPE: ICD-10-CM

## 2021-05-17 PROCEDURE — A9500 TC99M SESTAMIBI: HCPCS | Performed by: SPECIALIST

## 2021-05-17 PROCEDURE — 93015 CV STRESS TEST SUPVJ I&R: CPT | Performed by: SPECIALIST

## 2021-05-17 PROCEDURE — 78452 HT MUSCLE IMAGE SPECT MULT: CPT | Performed by: SPECIALIST

## 2021-05-17 RX ORDER — TETRAKIS(2-METHOXYISOBUTYLISOCYANIDE)COPPER(I) TETRAFLUOROBORATE 1 MG/ML
40 INJECTION, POWDER, LYOPHILIZED, FOR SOLUTION INTRAVENOUS ONCE
Status: COMPLETED | OUTPATIENT
Start: 2021-05-17 | End: 2021-05-17

## 2021-05-17 RX ADMIN — TETRAKIS(2-METHOXYISOBUTYLISOCYANIDE)COPPER(I) TETRAFLUOROBORATE 26.1 MILLICURIE: 1 INJECTION, POWDER, LYOPHILIZED, FOR SOLUTION INTRAVENOUS at 13:50

## 2021-05-19 ENCOUNTER — APPOINTMENT (OUTPATIENT)
Dept: CARDIOLOGY CLINIC | Age: 61
End: 2021-05-19

## 2021-05-19 RX ORDER — TETRAKIS(2-METHOXYISOBUTYLISOCYANIDE)COPPER(I) TETRAFLUOROBORATE 1 MG/ML
40 INJECTION, POWDER, LYOPHILIZED, FOR SOLUTION INTRAVENOUS ONCE
Status: COMPLETED | OUTPATIENT
Start: 2021-05-19 | End: 2021-05-19

## 2021-05-19 RX ADMIN — TETRAKIS(2-METHOXYISOBUTYLISOCYANIDE)COPPER(I) TETRAFLUOROBORATE 25.9 MILLICURIE: 1 INJECTION, POWDER, LYOPHILIZED, FOR SOLUTION INTRAVENOUS at 13:50

## 2021-05-20 LAB
STRESS BASELINE DIAS BP: 68 MMHG
STRESS BASELINE HR: 76 BPM
STRESS BASELINE SYS BP: 118 MMHG
STRESS O2 SAT PEAK: 99 %
STRESS O2 SAT REST: 99 %
STRESS PEAK DIAS BP: 66 MMHG
STRESS PEAK SYS BP: 106 MMHG
STRESS PERCENT HR ACHIEVED: 65 %
STRESS POST PEAK HR: 103 BPM
STRESS RATE PRESSURE PRODUCT: NORMAL BPM*MMHG
STRESS ST DEPRESSION: 0 MM
STRESS ST ELEVATION: 0 MM
STRESS TARGET HR: 158 BPM

## 2021-05-21 ENCOUNTER — TELEPHONE (OUTPATIENT)
Dept: CARDIOLOGY CLINIC | Age: 61
End: 2021-05-21

## 2021-05-21 NOTE — TELEPHONE ENCOUNTER
Called patient. Verified patient's identity with two identifiers. Notified patient of results and Dr. Ebony Wright message. She is scheduled for 6 mo f/u already and will call if she needs anything sooner. Patient verbalized understanding and denied further questions or concerns.

## 2021-05-21 NOTE — TELEPHONE ENCOUNTER
----- Message from Gurvinder Jarvis MD sent at 5/21/2021  9:53 AM EDT -----  Stress test OK can see in 6 months.   Chest discomfort does not seem to be coming from her heart.  ----- Message -----  From: Gordon Cabrera MD  Sent: 5/20/2021   6:26 PM EDT  To: Gurvinder Jarvis MD

## 2021-05-28 ENCOUNTER — TELEPHONE (OUTPATIENT)
Dept: INTERNAL MEDICINE CLINIC | Age: 61
End: 2021-05-28

## 2021-05-28 DIAGNOSIS — R42 DIZZINESS: Primary | ICD-10-CM

## 2021-05-28 NOTE — TELEPHONE ENCOUNTER
Pt need referral for [facility] with Shannan Ortez. She is going for dizziness, has appt today at 1. Dx codes are Remi, [address] [facility], [id number], [address] [facility], [address]. Fax number [phone].  For 6 visit

## 2021-06-17 ENCOUNTER — OFFICE VISIT (OUTPATIENT)
Dept: NEUROLOGY | Age: 61
End: 2021-06-17
Payer: MEDICARE

## 2021-06-17 VITALS
WEIGHT: 264 LBS | SYSTOLIC BLOOD PRESSURE: 128 MMHG | DIASTOLIC BLOOD PRESSURE: 88 MMHG | OXYGEN SATURATION: 98 % | BODY MASS INDEX: 45.07 KG/M2 | HEIGHT: 64 IN | HEART RATE: 72 BPM

## 2021-06-17 DIAGNOSIS — G43.719 INTRACTABLE CHRONIC MIGRAINE WITHOUT AURA AND WITHOUT STATUS MIGRAINOSUS: Primary | ICD-10-CM

## 2021-06-17 PROCEDURE — 64615 CHEMODENERV MUSC MIGRAINE: CPT | Performed by: PSYCHIATRY & NEUROLOGY

## 2021-06-17 NOTE — PROGRESS NOTES
Chief Complaint   Patient presents with    Procedure     botox     Visit Vitals  /88 (BP 1 Location: Left upper arm, BP Patient Position: Sitting)   Pulse 72   Ht 5' 4\" (1.626 m)   Wt 264 lb (119.7 kg)   SpO2 98%   BMI 45.32 kg/m²

## 2021-06-17 NOTE — PROGRESS NOTES
Botox Injection Note     2021     Patient:  Anand Fallon   YOB: 1960  Date of Visit: 2021      Indication: patient has chronic migraine headaches greater than 15 days per month lasting more than 4 hours each. She has failed or not tolerated 2 or more medication preventatives. Written consent was signed and verified by me. Risks and benefits were discussed to include possible cosmetic asymmetry which is not permament or life threatening. Patient name and  was confirmed prior to procedure. Time out performed. Procedure:   Botox concentration: 200 units in 2 ml of preservative-free normal saline. 1:1 dilution. LOT#: [id number]  EXP:  2023    Injections and distribution as follow :      Units/site  Sites Loc Subtotal    procerus 5 1 ML 5   corrugaters 2.5 2 BL 5   frontalis 5 8 BL 40   Temporalis 10 4 BL 40   Occipitalis 10 2 BL 20   Cervical paraspinals 5 4 BL 20   Trapezius 10 4 BL 40         200 units Botox were reconstituted, 170 units injected as above and the remainder was unavoidably wasted. Patient tolerated procedure well. Return in 3 months for repeat injections.     _____________________________   José Miguel Sylvester DO  [address], ABPN

## 2021-06-21 ENCOUNTER — OFFICE VISIT (OUTPATIENT)
Dept: INTERNAL MEDICINE CLINIC | Age: 61
End: 2021-06-21
Payer: MEDICARE

## 2021-06-21 VITALS
WEIGHT: 248 LBS | OXYGEN SATURATION: 99 % | RESPIRATION RATE: 12 BRPM | SYSTOLIC BLOOD PRESSURE: 110 MMHG | HEIGHT: 64 IN | DIASTOLIC BLOOD PRESSURE: 76 MMHG | BODY MASS INDEX: 42.34 KG/M2 | HEART RATE: 69 BPM | TEMPERATURE: 98 F

## 2021-06-21 DIAGNOSIS — E55.9 VITAMIN D DEFICIENCY: ICD-10-CM

## 2021-06-21 DIAGNOSIS — Z00.00 MEDICARE ANNUAL WELLNESS VISIT, SUBSEQUENT: Primary | ICD-10-CM

## 2021-06-21 DIAGNOSIS — I25.10 CORONARY ARTERY DISEASE INVOLVING NATIVE CORONARY ARTERY OF NATIVE HEART WITHOUT ANGINA PECTORIS: ICD-10-CM

## 2021-06-21 DIAGNOSIS — Z99.89 OSA ON CPAP: ICD-10-CM

## 2021-06-21 DIAGNOSIS — R73.03 PREDIABETES: ICD-10-CM

## 2021-06-21 DIAGNOSIS — I69.398 HEMIPARESIS AND ALTERATION OF SENSATIONS AS LATE EFFECTS OF STROKE (HCC): ICD-10-CM

## 2021-06-21 DIAGNOSIS — Z12.11 COLON CANCER SCREENING: ICD-10-CM

## 2021-06-21 DIAGNOSIS — E78.00 HIGH CHOLESTEROL: ICD-10-CM

## 2021-06-21 DIAGNOSIS — I50.9 CHRONIC CONGESTIVE HEART FAILURE, UNSPECIFIED HEART FAILURE TYPE (HCC): ICD-10-CM

## 2021-06-21 DIAGNOSIS — I63.9 CEREBROVASCULAR ACCIDENT (CVA), UNSPECIFIED MECHANISM (HCC): Chronic | ICD-10-CM

## 2021-06-21 DIAGNOSIS — R53.1 LEFT-SIDED WEAKNESS: ICD-10-CM

## 2021-06-21 DIAGNOSIS — I69.359 HEMIPARESIS AND ALTERATION OF SENSATIONS AS LATE EFFECTS OF STROKE (HCC): ICD-10-CM

## 2021-06-21 DIAGNOSIS — G47.33 OSA ON CPAP: ICD-10-CM

## 2021-06-21 DIAGNOSIS — I10 ESSENTIAL HYPERTENSION: ICD-10-CM

## 2021-06-21 DIAGNOSIS — E89.0 POSTOPERATIVE HYPOTHYROIDISM: Chronic | ICD-10-CM

## 2021-06-21 DIAGNOSIS — D50.9 IRON DEFICIENCY ANEMIA, UNSPECIFIED IRON DEFICIENCY ANEMIA TYPE: ICD-10-CM

## 2021-06-21 PROCEDURE — G8427 DOCREV CUR MEDS BY ELIG CLIN: HCPCS | Performed by: PHYSICIAN ASSISTANT

## 2021-06-21 PROCEDURE — 99214 OFFICE O/P EST MOD 30 MIN: CPT | Performed by: PHYSICIAN ASSISTANT

## 2021-06-21 PROCEDURE — G8417 CALC BMI ABV UP PARAM F/U: HCPCS | Performed by: PHYSICIAN ASSISTANT

## 2021-06-21 PROCEDURE — G8752 SYS BP LESS 140: HCPCS | Performed by: PHYSICIAN ASSISTANT

## 2021-06-21 PROCEDURE — 3017F COLORECTAL CA SCREEN DOC REV: CPT | Performed by: PHYSICIAN ASSISTANT

## 2021-06-21 PROCEDURE — G8754 DIAS BP LESS 90: HCPCS | Performed by: PHYSICIAN ASSISTANT

## 2021-06-21 PROCEDURE — G0439 PPPS, SUBSEQ VISIT: HCPCS | Performed by: PHYSICIAN ASSISTANT

## 2021-06-21 PROCEDURE — G8432 DEP SCR NOT DOC, RNG: HCPCS | Performed by: PHYSICIAN ASSISTANT

## 2021-06-21 PROCEDURE — G9899 SCRN MAM PERF RSLTS DOC: HCPCS | Performed by: PHYSICIAN ASSISTANT

## 2021-06-21 NOTE — PATIENT INSTRUCTIONS
Medicare Wellness Visit, Female The best way to live healthy is to have a lifestyle where you eat a well-balanced diet, exercise regularly, limit alcohol use, and quit all forms of tobacco/nicotine, if applicable. Regular preventive services are another way to keep healthy. Preventive services (vaccines, screening tests, monitoring & exams) can help personalize your care plan, which helps you manage your own care. Screening tests can find health problems at the earliest stages, when they are easiest to treat. [facility] follows the current, evidence-based guidelines published by the [facility] Mandujano ([facility]) when recommending preventive services for our patients. Because we follow these guidelines, sometimes recommendations change over time as research supports it. (For example, mammograms used to be recommended annually. Even though Medicare will still pay for an annual mammogram, the newer guidelines recommend a mammogram every two years for women of average risk). Of course, you and your doctor may decide to screen more often for some diseases, based on your risk and your co-morbidities (chronic disease you are already diagnosed with). Preventive services for you include: - Medicare offers their members a free annual wellness visit, which is time for you and your primary care provider to discuss and plan for your preventive service needs. Take advantage of this benefit every year! 
-All adults over the age of 72 should receive the recommended pneumonia vaccines. Current USPSTF guidelines recommend a series of two vaccines for the best pneumonia protection.  
-All adults should have a flu vaccine yearly and a tetanus vaccine every 10 years.  
-All adults age 48 and older should receive the shingles vaccines (series of two vaccines).      
-All adults age 38-68 who are overweight should have a diabetes screening test once every three years.  
-All adults born between [birth year] and 1967 should be screened once for Hepatitis C. 
-Other screening tests and preventive services for persons with diabetes include: an eye exam to screen for diabetic retinopathy, a kidney function test, a foot exam, and stricter control over your cholesterol.  
-Cardiovascular screening for adults with routine risk involves an electrocardiogram (ECG) at intervals determined by your doctor.  
-Colorectal cancer screenings should be done for adults age 54-65 with no increased risk factors for colorectal cancer. There are a number of acceptable methods of screening for this type of cancer. Each test has its own benefits and drawbacks. Discuss with your doctor what is most appropriate for you during your annual wellness visit. The different tests include: colonoscopy (considered the best screening method), a fecal occult blood test, a fecal DNA test, and sigmoidoscopy. 
 
-A bone mass density test is recommended when a woman turns 65 to screen for osteoporosis. This test is only recommended one time, as a screening. Some providers will use this same test as a disease monitoring tool if you already have osteoporosis. -Breast cancer screenings are recommended every other year for women of normal risk, age 54-69. 
-Cervical cancer screenings for women over age 72 are only recommended with certain risk factors. Here is a list of your current Health Maintenance items (your personalized list of preventive services) with a due date: 
Health Maintenance Due Topic Date Due  
 COVID-19 Vaccine (1) Never done  Pap Test  11/13/2018  Hemoglobin A1C    12/10/2020 [address] [place] [address] Annual Well Visit  05/01/2021  Colorectal Screening  06/22/2021

## 2021-06-21 NOTE — PROGRESS NOTES
Pj Nick  Identified pt with two pt identifiers(name and ). Chief Complaint   Patient presents with    Annual Wellness Visit    Hypertension    Cholesterol Problem    Coronary Artery Disease       Reviewed record In preparation for visit and have obtained necessary documentation. 1. Have you been to the ER, urgent care clinic or hospitalized since your last visit? No     2. Have you seen or consulted any other health care providers outside of the [address] [place] [address] since your last visit? Include any pap smears or colon screening. No    Vitals reviewed with provider.     Health Maintenance reviewed:     Health Maintenance Due   Topic    PAP AKA CERVICAL CYTOLOGY     A1C test (Diabetic or Prediabetic)     Medicare Yearly Exam     Colorectal Cancer Screening Combo           Wt Readings from Last 3 Encounters:   21 248 lb (112.5 kg)   21 264 lb (119.7 kg)   21 253 lb (114.8 kg)        Temp Readings from Last 3 Encounters:   21 98 °F (36.7 °C) (Oral)   21 98 °F (36.7 °C) (Oral)   20 97 °F (36.1 °C) (Temporal)        BP Readings from Last 3 Encounters:   21 110/76   21 128/88   21 118/68        Pulse Readings from Last 3 Encounters:   21 69   21 72   21 74        Vitals:    21 0852   BP: 110/76   Pulse: 69   Resp: 12   Temp: 98 °F (36.7 °C)   TempSrc: Oral   SpO2: 99%   Weight: 248 lb (112.5 kg)   Height: 5' 4\" (1.626 m)   PainSc:   4   PainLoc: Generalized          Learning Assessment:   :       Learning Assessment 2019 2018 10/13/2017 3/24/2015   PRIMARY LEARNER Patient Patient Patient Patient   HIGHEST LEVEL OF EDUCATION - PRIMARY LEARNER  - - - SOME COLLEGE   BARRIERS PRIMARY LEARNER - - - NONE   CO-LEARNER CAREGIVER - - - No   CO-LEARNER NAME - - - n/a   PRIMARY LANGUAGE ENGLISH ENGLISH ENGLISH ENGLISH   LEARNER PREFERENCE PRIMARY DEMONSTRATION DEMONSTRATION LISTENING LISTENING   ANSWERED BY patient patient patient patient   RELATIONSHIP SELF SELF SELF SELF        Depression Screening:   :       3 most recent PHQ Screens 6/21/2021   PHQ Not Done -   Little interest or pleasure in doing things Not at all   Feeling down, depressed, irritable, or hopeless Not at all   Total Score PHQ 2 0        Fall Risk Assessment:   :       Fall Risk Assessment, last 12 mths 3/18/2021   Able to walk? Yes   Fall in past 12 months? 0   Do you feel unsteady? 0   Are you worried about falling 0   Number of falls in past 12 months -   Fall with injury? -        Abuse Screening:   :       Abuse Screening Questionnaire 6/21/2021 1/30/2020 9/5/2019 3/5/2019   Do you ever feel afraid of your partner? N N N N   Are you in a relationship with someone who physically or mentally threatens you? N N N N   Is it safe for you to go home?  Y Y Y Y        ADL Screening:   :       ADL Assessment 6/21/2021   Feeding yourself No Help Needed   Getting from bed to chair Help Needed   Getting dressed Help Needed   Bathing or showering Help Needed   Walk across the room (includes cane/walker) No Help Needed   Using the telphone No Help Needed   Taking your medications Help Needed   Preparing meals Help Needed   Managing money (expenses/bills) Help Needed   Moderately strenuous housework (laundry) Help Needed   Shopping for personal items (toiletries/medicines) Help Needed   Shopping for groceries Help Needed   Driving Help Needed   Climbing a flight of stairs Help Needed   Getting to places beyond walking distances Help Needed 34

## 2021-06-21 NOTE — PROGRESS NOTES
This is the Subsequent Medicare Annual Wellness Exam, performed 12 months or more after the Initial AWV or the last Subsequent AWV    I have reviewed the patient's medical history in detail and updated the computerized patient record. Assessment/Plan   Education and counseling provided:  Are appropriate based on today's review and evaluation  End-of-Life planning (with patient's consent)  Screening Pap and pelvic (covered once every 2 years)  Colorectal cancer screening tests    1. Medicare annual wellness visit, subsequent  2. Cerebrovascular accident (CVA), unspecified mechanism ([facility]  3. Coronary artery disease involving native coronary artery of native heart without angina pectoris  4. Essential hypertension  5. Chronic congestive heart failure, unspecified heart failure type ([address]  6. Postoperative hypothyroidism  7. Hemiparesis and alteration of sensations as late effects of stroke ([facility]  8. Left-sided weakness  9. EDDIE on CPAP  10. High cholesterol  11. Vitamin D deficiency  12. Prediabetes  13. Colon cancer screening  -     REFERRAL TO GASTROENTEROLOGY       Depression Risk Factor Screening     3 most recent PHQ Screens 6/21/2021   PHQ Not Done -   Little interest or pleasure in doing things Not at all   Feeling down, depressed, irritable, or hopeless Not at all   Total Score PHQ 2 0       Alcohol Risk Screen    Do you average more than 1 drink per night or more than 7 drinks a week:  No    On any one occasion in the past three months have you have had more than 3 drinks containing alcohol:  No        Functional Ability and Level of Safety    Hearing: Hearing is good. Whisper Test done with normal results. Activities of Daily Living: The home contains: handrails and grab bars  Patient needs help with:  preparing meals      Ambulation: with difficulty, uses a walker     Fall Risk:  Fall Risk Assessment, last 12 mths 3/18/2021   Able to walk? Yes   Fall in past 12 months? 0   Do you feel unsteady? 0   Are you worried about falling 0   Number of falls in past 12 months -   Fall with injury?  -      Abuse Screen:  Patient is not abused       Cognitive Screening    Has your family/caregiver stated any concerns about your memory: yes - short term     Cognitive Screening: Normal - Mini Cog Test    Health Maintenance Due     Health Maintenance Due   Topic Date Due    PAP AKA CERVICAL CYTOLOGY  11/13/2018    A1C test (Diabetic or Prediabetic)  12/10/2020    Medicare Yearly Exam  05/01/2021    Colorectal Cancer Screening Combo  06/22/2021       Patient Care Team   Patient Care Team:  Loki Marques PA-C as PCP - General (Physician Assistant)  Loki Marques PA-C as PCP - [facility] Empaneled Provider  Shanice Delgadillo MD (Cardiology)  Sanjeev Turcios NP (Nurse Practitioner)  Stalin Vuong MD as Physician (Physical Medicine and Rehabilitation)  Estela Prado MD (Sleep Medicine)  Oh Diggs MD as Consulting Provider (Endocrinology)  Celestina Penn as   Matthew Vaughn MD as Physician (Female Pelvic Medicine and Reconstructive Surgery)  Dianna Prado MD as Physician (Gastroenterology)    History     Patient Active Problem List   Diagnosis Code    Coronary artery disease involving native coronary artery of native heart without angina pectoris I25.10    Bronchitis J40    High cholesterol E78.00    History of cardiomyopathy Z86.79    SOB (shortness of breath) R06.02    Neck pain, bilateral M54.2    Shoulder pain, bilateral M25.511, M25.512    Muscle spasticity M62.838    Hemiparesis and alteration of sensations as late effects of stroke ([address] I69.359, I69.398    Head pain, chronic R51.9, G89.29    Expressive aphasia R47.01    Heart palpitations R00.2    Ventricular ectopic activity I49.3    Stroke ([address] I63.9    Thyroid disease E07.9    Hypercholesterolemia E78.00    EDDIE on CPAP G47.33, Z99.89    Essential hypertension I10    Congestive heart failure ([address] I50.9    Chest pain R07.9    Melena K92.1    Hiatal hernia K44.9    Postoperative hypothyroidism E89.0    Chronic pain G89.29    Prediabetes R73.03    Constipation K59.00    Insomnia G47.00    Left-sided weakness R53.1    Vitamin D deficiency E55.9    Obesity, morbid (HCC) E66.01    Chronic indwelling Dunn catheter Z97.8    Dermatitis L30.9    Obesity (BMI 30-39. 9) E66.9    Anxiety F41.9     Past Medical History:   Diagnosis Date    Advanced care planning/counseling discussion 3/4/16    On File    Bronchitis 2/24/2014    Cervicalgia 8/18/15    Dr. Emily Yo    Chest pain 5/25/15    Hospitalized at [facility] 5/25/15 (lab work negative)    Chronic indwelling Dunn catheter 1/24/2018    Initially placed Jan 2018. Mx by Dr. Ellen Kenyon.  Congestive heart failure, unspecified     Last Echo 2/8/15: EF 55-60%    Constipation 6/13/2017    Enthesopathy, spinal ([address] 8/18/15    Dr. Emily Yo    Essential hypertension     Foot drop 8/18/15    Dr. Hernandez [facility] Heart attack [facility]) 2/24/2013    Was supposed to See Cardiology for possible pacemaker in november 2014- After Cardiology consult locally, no need, EF greatly improved.  Established with Dr. Sarah Love Hiatal hernia 6/2015    3 cm hiatal hernia     Hip pain 8/18/15    Dr. Emily Yo    Hypercholesterolemia     Hyperglycemia 7/2015    A1c 5.9     Hyperlipidemia 6/30/2015    NMR lipoprofile- LDL P 997, LDL-c 71, HLD-C-39, TG-60, HLD-P (25.2), Small LDL-P -541, LDL size 20.6    Hypothyroid     Insomnia 6/13/2017    Lower extremity edema     Lumbar spondylosis 8/18/15    Dr. Villa Marie 6/2015    EGD/Colonscopy 6/15- Gastritis, internal hemorrhoids and 3 polyps    Murmur     EDDIE on CPAP     Was referred to Pulmonology - Uses CPAP    Osteoarthritis of hip 8/18/15    Dr. Emily Yo    Osteoarthritis, shoulder 8/18/15    Dr. Emily Yo    Radiculopathy, cervical 8/18/15    Dr. Emily Yo    Shoulder pain 8/18/15    Dr. David Rosas Spinal stenosis of cervical region 8/18/15 Dr. Cindy Villavicencio Spinal stenosis, lumbar 8/18/15    Dr. Cindy Villavicencio Stroke [facility]) 2/25/2014    Established with Neurology, Da Mario NP-Just hospitalized [facility] 2/7/15-2/10/15. CT negative, but Late effect CV accident with increased tone described on discharge summary, Carotid dopplers showed 50% stenosis bilaterally.  Vitamin D deficiency 7/2015      Past Surgical History:   Procedure Laterality Date    COLONOSCOPY N/A 6/22/2016    COLONOSCOPY [facility] Feliberto Vanegas MD at [facility] ENDOSCOPY    HX BREAST BIOPSY Right 8/11/15    Stereo Bx - [facility]--- Benign    HX CHOLECYSTECTOMY      HX COLONOSCOPY  6/2015    Dr. Corrales Band- 3 complete polypectomies, Internal hemorrhoids, difficult study due to spasm. Repeat in 1 year    HX ENDOSCOPY  6/2015    mild gastritis, 3cm hiatal hernia     HX GASTRIC BYPASS  6/1989    HX HEART CATHETERIZATION  2/2014    HX ORTHOPAEDIC      Right middle finger distal amputation    HX PARTIAL THYROIDECTOMY  ~1990    HX THYROIDECTOMY Left 1985    90% of one side of the thyroid removed    IR ASP BLADDER SUPRA CATH  5/24/2019     Current Outpatient Medications   Medication Sig Dispense Refill    Botox 200 unit injection INJECT INTO THE BILATERAL MUSCLES OF THE HEAD AND NECK BY PRESCRIBER IN OFFICE FOR MIGRAINES EVERY 3 MONTHS. DISCARD UNUSED PORTION 1 Vial 3    losartan (COZAAR) 50 mg tablet Take 1 Tab by mouth nightly. Hold for SBP<115 90 Tab 3    pantoprazole (PROTONIX) 40 mg tablet Take 1 Tab by mouth daily. 90 Tab 3    BD Single Use Swabs Regular padm Check Blood sugar ONCE daily      traZODone (DESYREL) 100 mg tablet TAKE 1 TO 2 TABLETS BY MOUTH AT BEDTIME AS NEEDED FOR SLEEP      pregabalin (LYRICA) 75 mg capsule Take 75 mg by mouth nightly.       atorvastatin (LIPITOR) 40 mg tablet Take 1 tablet by mouth once daily 90 Tab 1    metFORMIN ER (GLUCOPHAGE XR) 500 mg tablet TAKE 1 TABLET BY MOUTH ONCE DAILY WITH SUPPER 90 Tab 0    galcanezumab-gnlm (Emgality Syringe) 120 mg/mL syrg 1 Syringe by SubCUTAneous route every thirty (30) days. Start 30 days after the loading dose 1 Syringe 11    carvediloL (COREG) 6.25 mg tablet TAKE 1 TABLET BY MOUTH TWICE DAILY WITH MEALS 60 Tab 5    topiramate (TOPAMAX) 100 mg tablet Take 1 tablet by mouth twice daily 180 Tab 3    ergocalciferol (ERGOCALCIFEROL) 1,250 mcg (50,000 unit) capsule Take 1 Cap by mouth every seven (7) days. 12 Cap 0    cyanocobalamin (VITAMIN B12) 1,000 mcg/mL injection INJECT 1 ML UNDER THE SKIN EVERY 30 DAYS FOR B12 DEFICIENCY  Indications: inadequate vitamin B12 (Patient taking differently: INJECT 1 ML UNDER THE SKIN EVERY 14 DAYS FOR B12 DEFICIENCY  Indications: inadequate vitamin B12) 10 mL 1    Syringe, Disposable, 1 mL syrg Use bimonthly for cyanocobalamin subcutaneous injection. 25 Syringe 0    Needle, Disp, 25 G 25 gauge x 3/4\" ndle Use bimonthly for cyanocobalamin subcutaneous injection. 1 Box 0    fluticasone propionate (FLONASE) 50 mcg/actuation nasal spray 2 Sprays by Both Nostrils route daily. 1 Bottle 0    Linzess 145 mcg cap capsule TAKE 1 CAPSULE BY MOUTH ONCE DAILY BEFORE BREAKFAST FOR CONSTIPATION 90 Cap 0    furosemide (LASIX) 40 mg tablet Take 1 tablet by mouth once daily 90 Tab 0    Euthyrox 125 mcg tablet TAKE 1 TABLET BY MOUTH ONCE DAILY BEFORE BREAKFAST 90 Tab 0    gabapentin (NEURONTIN) 100 mg capsule Take 100 mg by mouth three (3) times daily as needed for Pain.       FREESTYLE LITE STRIPS strip USE STRIP TO CHECK GLUCOSE TWICE DAILY  3    FREESTYLE LITE METER monitoring kit USE TO CHECK GLUCOSE ONCE DAILY  0    ciclopirox (PENLAC) 8 % solution APPLY TO AFFECTED TOE NAILS DAILY      diclofenac (VOLTAREN) 1 % gel APPLY TOPICALLY TO AFFECTED AREA ONCE DAILY      hydroxyzine HCL (ATARAX) 50 mg tablet TAKE 1 TABLET BY MOUTH TWICE DAILY AS NEEDED FOR ANXIETY      FREESTYLE LANCETS 28 gauge [place] USE TO CHECK GLUCOSE TWICE DAILY      miscellaneous medical supply misc Rollator Dx:I69.359 1 Each 0    miscellaneous medical supply [facility] Walker Dx: I69.359 1 Each 0    oxyCODONE-acetaminophen (PERCOCET 7.5) 7.5-325 mg per tablet TAKE 1 TABLET BY MOUTH 4 TIMES DAILY  0    baclofen (LIORESAL) 10 mg tablet TAKE 1 TABLET BY MOUTH THREE TIMES DAILY (Patient taking differently: Take 20 mg by mouth three (3) times daily. TAKE 1 TABLET BY MOUTH THREE TIMES DAILY) 270 Tab 0    omega-3 acid ethyl esters (LOVAZA) 1 gram capsule Take 2 Caps by mouth two (2) times a day. 120 Cap 2    DULoxetine (CYMBALTA) 60 mg capsule Take 2 Caps by mouth daily. 180 Cap 0    calcipotriene (DOVONEX) 0.005 % topical cream Apply  to affected area daily as needed. 60 g 11    albuterol-ipratropium (DUO-NEB) 2.5 mg-0.5 mg/3 ml nebu 3 mL by Nebulization route every six (6) hours as needed. 30 Nebule 0    albuterol (PROVENTIL HFA, VENTOLIN HFA, PROAIR HFA) 90 mcg/actuation inhaler Take 2 Puffs by inhalation every four (4) hours as needed for Wheezing or Shortness of Breath. 3 Inhaler 3    ferrous sulfate 325 mg (65 mg iron) tablet Take 325 mg by mouth Daily (before breakfast).  aspirin delayed-release 81 mg tablet Take 81 mg by mouth daily.       Easy Touch Lancing Device misc Check Blood sugar ONCE daily       Allergies   Allergen Reactions    Bees [Hymenoptera Allergenic Extract] Shortness of Breath and Swelling    Strawberry Shortness of Breath and Swelling    Lisinopril Cough       Family History   Problem Relation Age of Onset    Heart Disease Father 61    Broken Bones Father         Hip/fell    Hypertension Mother     Heart Disease Brother 62    Broken Bones Brother         Hip/fell    Diabetes Maternal Grandmother      Social History     Tobacco Use    Smoking status: Former Smoker     Packs/day: 0.25     Years: 15.00     Pack years: 3.75     Types: Cigarettes     Quit date: 2007     Years since quittin.0    Smokeless tobacco: Never Used   Substance Use Topics    Alcohol use: No         Kadie Chang PA-C Zana Watt is a 59y.o. year old female seen in clinic today for   Chief Complaint   Patient presents with    Annual Wellness Visit    Hypertension    Cholesterol Problem    Coronary Artery Disease       she is here today to follow up for HTN, CVA, CAD, HLD    She reports recently seeing her neurologist and had botox injection. She states she continues to have headaches but they are improved. She continues to have issues with daily fatigue but is on chronic pain management with oxycodone and gabapentin daily. Her last check had B12 and vitamin D low. She reports she continues to have chest pains but has had a full work up with Cardiology who reports the pain is chronic and not cardiac related. She denies any SOB and no new edema. She continues to take Losartan, Lipitor, Lasix 40 mg every day. she specifically denies any CP, SOB, HA. Dizziness, fevers, chills, N/V/D, urinary symptoms or other bowel changes. Current Outpatient Medications on File Prior to Visit   Medication Sig Dispense Refill    Botox 200 unit injection INJECT INTO THE BILATERAL MUSCLES OF THE HEAD AND NECK BY PRESCRIBER IN OFFICE FOR MIGRAINES EVERY 3 MONTHS. DISCARD UNUSED PORTION 1 Vial 3    losartan (COZAAR) 50 mg tablet Take 1 Tab by mouth nightly. Hold for SBP<115 90 Tab 3    pantoprazole (PROTONIX) 40 mg tablet Take 1 Tab by mouth daily. 90 Tab 3    BD Single Use Swabs Regular padm Check Blood sugar ONCE daily      traZODone (DESYREL) 100 mg tablet TAKE 1 TO 2 TABLETS BY MOUTH AT BEDTIME AS NEEDED FOR SLEEP      pregabalin (LYRICA) 75 mg capsule Take 75 mg by mouth nightly.  atorvastatin (LIPITOR) 40 mg tablet Take 1 tablet by mouth once daily 90 Tab 1    metFORMIN ER (GLUCOPHAGE XR) 500 mg tablet TAKE 1 TABLET BY MOUTH ONCE DAILY WITH SUPPER 90 Tab 0    galcanezumab-gnlm (Emgality Syringe) 120 mg/mL syrg 1 Syringe by SubCUTAneous route every thirty (30) days.  Start 30 days after the loading dose 1 Syringe 11    carvediloL (COREG) 6.25 mg tablet TAKE 1 TABLET BY MOUTH TWICE DAILY WITH MEALS 60 Tab 5    topiramate (TOPAMAX) 100 mg tablet Take 1 tablet by mouth twice daily 180 Tab 3    ergocalciferol (ERGOCALCIFEROL) 1,250 mcg (50,000 unit) capsule Take 1 Cap by mouth every seven (7) days. 12 Cap 0    cyanocobalamin (VITAMIN B12) 1,000 mcg/mL injection INJECT 1 ML UNDER THE SKIN EVERY 30 DAYS FOR B12 DEFICIENCY  Indications: inadequate vitamin B12 (Patient taking differently: INJECT 1 ML UNDER THE SKIN EVERY 14 DAYS FOR B12 DEFICIENCY  Indications: inadequate vitamin B12) 10 mL 1    Syringe, Disposable, 1 mL syrg Use bimonthly for cyanocobalamin subcutaneous injection. 25 Syringe 0    Needle, Disp, 25 G 25 gauge x 3/4\" ndle Use bimonthly for cyanocobalamin subcutaneous injection. 1 Box 0    fluticasone propionate (FLONASE) 50 mcg/actuation nasal spray 2 Sprays by Both Nostrils route daily. 1 Bottle 0    Linzess 145 mcg cap capsule TAKE 1 CAPSULE BY MOUTH ONCE DAILY BEFORE BREAKFAST FOR CONSTIPATION 90 Cap 0    furosemide (LASIX) 40 mg tablet Take 1 tablet by mouth once daily 90 Tab 0    Euthyrox 125 mcg tablet TAKE 1 TABLET BY MOUTH ONCE DAILY BEFORE BREAKFAST 90 Tab 0    gabapentin (NEURONTIN) 100 mg capsule Take 100 mg by mouth three (3) times daily as needed for Pain.       FREESTYLE LITE STRIPS strip USE STRIP TO CHECK GLUCOSE TWICE DAILY  3    FREESTYLE LITE METER monitoring kit USE TO CHECK GLUCOSE ONCE DAILY  0    ciclopirox (PENLAC) 8 % solution APPLY TO AFFECTED TOE NAILS DAILY      diclofenac (VOLTAREN) 1 % gel APPLY TOPICALLY TO AFFECTED AREA ONCE DAILY      hydroxyzine HCL (ATARAX) 50 mg tablet TAKE 1 TABLET BY MOUTH TWICE DAILY AS NEEDED FOR ANXIETY      FREESTYLE LANCETS 28 gauge [facility] USE TO CHECK GLUCOSE TWICE DAILY      miscellaneous medical supply misc Rollator Dx:I69.359 1 Each 0    miscellaneous medical supply [facility] Walker Dx: V32.443 1 Each 0    oxyCODONE-acetaminophen (PERCOCET 7.5) 7.5-325 mg per tablet TAKE 1 TABLET BY MOUTH 4 TIMES DAILY  0    baclofen (LIORESAL) 10 mg tablet TAKE 1 TABLET BY MOUTH THREE TIMES DAILY (Patient taking differently: Take 20 mg by mouth three (3) times daily. TAKE 1 TABLET BY MOUTH THREE TIMES DAILY) 270 Tab 0    omega-3 acid ethyl esters (LOVAZA) 1 gram capsule Take 2 Caps by mouth two (2) times a day. 120 Cap 2    DULoxetine (CYMBALTA) 60 mg capsule Take 2 Caps by mouth daily. 180 Cap 0    calcipotriene (DOVONEX) 0.005 % topical cream Apply  to affected area daily as needed. 60 g 11    albuterol-ipratropium (DUO-NEB) 2.5 mg-0.5 mg/3 ml nebu 3 mL by Nebulization route every six (6) hours as needed. 30 Nebule 0    albuterol (PROVENTIL HFA, VENTOLIN HFA, PROAIR HFA) 90 mcg/actuation inhaler Take 2 Puffs by inhalation every four (4) hours as needed for Wheezing or Shortness of Breath. 3 Inhaler 3    ferrous sulfate 325 mg (65 mg iron) tablet Take 325 mg by mouth Daily (before breakfast).  aspirin delayed-release 81 mg tablet Take 81 mg by mouth daily.  Easy Touch Lancing Device misc Check Blood sugar ONCE daily       No current facility-administered medications on file prior to visit. Allergies   Allergen Reactions    Bees [Hymenoptera Allergenic Extract] Shortness of Breath and Swelling    Strawberry Shortness of Breath and Swelling    Lisinopril Cough     Past Medical History:   Diagnosis Date    Advanced care planning/counseling discussion 3/4/16    On File    Bronchitis 2/24/2014    Cervicalgia 8/18/15    Dr. Luis Manuel Elkins    Chest pain 5/25/15    Hospitalized at [facility] 5/25/15 (lab work negative)    Chronic indwelling Dunn catheter 1/24/2018    Initially placed Jan 2018. Mx by Dr. Jamie Rico.      Congestive heart failure, unspecified     Last Echo 2/8/15: EF 55-60%    Constipation 6/13/2017    Enthesopathy, spinal ([address] 8/18/15    Dr. Luis Manuel Elkins    Essential hypertension     Foot drop 8/18/15    Dr. Yuan Arellano attack ([address] 2/24/2013    Was supposed to See Cardiology for possible pacemaker in november 2014- After Cardiology consult locally, no need, EF greatly improved. Established with Dr. Herve Briones Hiatal hernia 6/2015    3 cm hiatal hernia     Hip pain 8/18/15    Dr. Rona Aponte    Hypercholesterolemia     Hyperglycemia 7/2015    A1c 5.9     Hyperlipidemia 6/30/2015    NMR lipoprofile- LDL P 997, LDL-c 71, HLD-C-39, TG-60, HLD-P (25.2), Small LDL-P -541, LDL size 20.6    Hypothyroid     Insomnia 6/13/2017    Lower extremity edema     Lumbar spondylosis 8/18/15    Dr. Wolf Backbone 6/2015    EGD/Colonscopy 6/15- Gastritis, internal hemorrhoids and 3 polyps    Murmur     EDDIE on CPAP     Was referred to Pulmonology - Uses CPAP    Osteoarthritis of hip 8/18/15    Dr. Rona Aponte    Osteoarthritis, shoulder 8/18/15    Dr. Billy Bee Radiculopathy, cervical 8/18/15    Dr. Rona Aponte    Shoulder pain 8/18/15    Dr. Billy Bee Spinal stenosis of cervical region 8/18/15    Dr. Billy Bee Spinal stenosis, lumbar 8/18/15    Dr. Billy Bee Stroke [facility]) 2/25/2014    Established with Neurology, Lily Shields NP-Just hospitalized at [facility] 2/7/15-2/10/15. CT negative, but Late effect CV accident with increased tone described on discharge summary, Carotid dopplers showed 50% stenosis bilaterally.  Vitamin D deficiency 7/2015      Past Surgical History:   Procedure Laterality Date    COLONOSCOPY N/A 6/22/2016    COLONOSCOPY performed by Karlos Mac MD at [facility] ENDOSCOPY    HX BREAST BIOPSY Right 8/11/15    [facility]    HX CHOLECYSTECTOMY      HX COLONOSCOPY  6/2015    Dr. Shilpa Hardy- 3 complete polypectomies, Internal hemorrhoids, difficult study due to spasm.  Repeat in 1 year    HX ENDOSCOPY  6/2015    mild gastritis, 3cm hiatal hernia     HX GASTRIC BYPASS  6/1989    HX HEART CATHETERIZATION  2/2014    HX ORTHOPAEDIC      Right middle finger distal amputation    HX PARTIAL THYROIDECTOMY  ~1990    HX THYROIDECTOMY Left 1985    90% of one side of the thyroid removed    IR ASP BLADDER SUPRA CATH  2019        Family History   Problem Relation Age of Onset    Heart Disease Father 61    Broken Bones Father         Hip/fell    Hypertension Mother     Heart Disease Brother 62    Broken Bones Brother         Hip/fell    Diabetes Maternal Grandmother         Social History     Socioeconomic History    Marital status: SINGLE     Spouse name: Not on file    Number of children: Not on file    Years of education: Not on file    Highest education level: Not on file   Occupational History    Not on file   Tobacco Use    Smoking status: Former Smoker     Packs/day: 0.25     Years: 15.00     Pack years: 3.75     Types: Cigarettes     Quit date: 2007     Years since quittin.0    Smokeless tobacco: Never Used   Vaping Use    Vaping Use: Never used   Substance and Sexual Activity    Alcohol use: No    Drug use: No    Sexual activity: Not Currently   Other Topics Concern    Not on file   Social History Narrative    Not on file     Social Determinants of Health     Financial Resource Strain:     Difficulty of Paying Living Expenses:    Food Insecurity:     Worried About Running Out of Food in the Last Year:     Ran Out of Food in the Last Year:    Transportation Needs:     Lack of Transportation (Medical):      Lack of Transportation (Non-Medical):    Physical Activity:     Days of Exercise per Week:     Minutes of Exercise per Session:    Stress:     Feeling of Stress :    Social Connections:     Frequency of Communication with Friends and Family:     Frequency of Social Gatherings with Friends and Family:     Attends Evangelical Services:     Active Member of Clubs or Organizations:     Attends Club or Organization Meetings:     Marital Status:    Intimate Partner Violence:     Fear of Current or Ex-Partner:     Emotionally Abused:     Physically Abused:     Sexually Abused:            Visit Vitals  /76 (BP 1 Location: Left upper arm, BP Patient Position: Sitting)   Pulse 69   Temp 98 °F (36.7 °C) (Oral)   Resp 12   Ht 5' 4\" (1.626 m)   Wt 248 lb (112.5 kg)   LMP  (LMP Unknown)   SpO2 99%   BMI 42.57 kg/m²       Review of Systems   Constitutional: Negative for chills, fever, malaise/fatigue and weight loss. HENT: Negative for ear pain, hearing loss and sore throat. Respiratory: Negative for cough and shortness of breath. Cardiovascular: Negative for chest pain and leg swelling. Gastrointestinal: Negative for abdominal pain, blood in stool, constipation, diarrhea, heartburn, melena, nausea and vomiting. Genitourinary: Negative for dysuria and hematuria. Musculoskeletal: Positive for myalgias. Negative for back pain. Skin: Negative for rash. Neurological: Positive for headaches. Negative for weakness. Psychiatric/Behavioral: Negative for depression. Physical Exam  Vitals and nursing note reviewed. Constitutional:       General: She is not in acute distress. Appearance: Normal appearance. She is obese. HENT:      Head: Normocephalic and atraumatic. Right Ear: Tympanic membrane, ear canal and external ear normal.      Left Ear: Tympanic membrane, ear canal and external ear normal.      Nose: Nose normal.      Mouth/Throat:      Mouth: Mucous membranes are moist.      Pharynx: Oropharynx is clear. Eyes:      Conjunctiva/sclera: Conjunctivae normal.      Pupils: Pupils are equal, round, and reactive to light. Neck:      Vascular: No carotid bruit. Cardiovascular:      Rate and Rhythm: Normal rate and regular rhythm. Pulses: Normal pulses. Heart sounds: Normal heart sounds. Pulmonary:      Effort: Pulmonary effort is normal.      Breath sounds: Normal breath sounds. No wheezing, rhonchi or rales. Abdominal:      General: Abdomen is flat. Bowel sounds are normal. There is no distension. Palpations: There is no mass. Tenderness:  There is no abdominal tenderness. There is no guarding or rebound. Musculoskeletal:         General: Normal range of motion. Cervical back: Normal range of motion and neck supple. No rigidity. Skin:     General: Skin is warm and dry. Capillary Refill: Capillary refill takes less than 2 seconds. Neurological:      Mental Status: She is alert and oriented to person, place, and time. Cranial Nerves: No cranial nerve deficit, dysarthria or facial asymmetry. Sensory: No sensory deficit. Motor: Weakness (LUE 4/5, LLE 3/5 strength) present. No abnormal muscle tone or seizure activity. Gait: Gait abnormal (using walking). Comments: Brace to L ankle   Psychiatric:         Mood and Affect: Mood normal.         Behavior: Behavior normal.         Thought Content: Thought content normal.          No results found for this or any previous visit (from the past 24 hour(s)). ASSESSMENT AND PLAN  Diagnoses and all orders for this visit:    1. Medicare annual wellness visit, subsequent    2. Cerebrovascular accident (CVA), unspecified mechanism ([facility] [address]  Continue 81 ASA, Statin and good BP control  3. Coronary artery disease involving native coronary artery of native heart without angina pectoris  Has been worked up by Cardiology, no active disease. Continues to have non cardiac chest pains  4. Essential hypertension  -     METABOLIC PANEL, COMPREHENSIVE; Future  At goal, check kidney function  5. Chronic congestive heart failure, unspecified heart failure type (HCC)  Stable, continue Lasix  6. Postoperative hypothyroidism  -     THYROID CASCADE PROFILE; Future  Recheck thyroid  7. Hemiparesis and alteration of sensations as late effects of stroke ([facility] [address]  -     REFERRAL TO PHYSICAL THERAPY  Wishes to have PT start to increase strength in LLE  8. Left-sided weakness  -     REFERRAL TO PHYSICAL THERAPY    9. EDDIE on CPAP  Compliant most nights. \"Still fights it\"   10.  High cholesterol  -     LIPID PANEL; Future    11. Vitamin D deficiency  -     VITAMIN D, 25 HYDROXY; Future  Was low last check. 12. Prediabetes  -     HEMOGLOBIN A1C WITH EAG; Future  Recheck A1C  13. Colon cancer screening  -     REFERRAL TO GASTROENTEROLOGY    14. Iron deficiency anemia, unspecified iron deficiency anemia type  -     CBC WITH AUTOMATED DIFF; Future  Recheck anemia levels      Follow up in 1 month for pap smear and to go over labs    I have discussed the diagnosis with the patient and the intended plan as seen in the above orders. Patient is in agreement. The patient has received an after-visit summary and questions were answered concerning future plans. I have discussed medication side effects and warnings with the patient as well.     Hermann Bell PA-C

## 2021-06-22 DIAGNOSIS — R73.03 PREDIABETES: ICD-10-CM

## 2021-06-22 DIAGNOSIS — E66.01 OBESITY, MORBID (HCC): ICD-10-CM

## 2021-06-22 RX ORDER — METFORMIN HYDROCHLORIDE 500 MG/1
TABLET, EXTENDED RELEASE ORAL
Qty: 90 TABLET | Refills: 0 | Status: SHIPPED | OUTPATIENT
Start: 2021-06-22 | End: 2021-09-21 | Stop reason: SDUPTHER

## 2021-06-22 NOTE — TELEPHONE ENCOUNTER
Requested Prescriptions     Pending Prescriptions Disp Refills    metFORMIN ER (GLUCOPHAGE XR) 500 mg tablet 90 Tablet 0     Sig: TAKE 1 TABLET BY MOUTH ONCE DAILY WITH SUPPER

## 2021-06-22 NOTE — TELEPHONE ENCOUNTER
PCP: Priyank Lord PA-C     Last appt: 6/21/2021   Future Appointments   Date Time Provider Denilson Thompson   7/23/2021  8:30 AM MAKEDA Gordon BS AMB   9/16/2021 10:00 AM Vanessa Drew DO NEUSM BS AMB   9/21/2021  8:30 AM MAKEDA Gordon BS AMB   11/8/2021 10:20 AM MD YESENIA Babcock BS AMB        Requested Prescriptions     Pending Prescriptions Disp Refills    metFORMIN ER (GLUCOPHAGE XR) 500 mg tablet 90 Tablet 0     Sig: TAKE 1 TABLET BY MOUTH ONCE DAILY WITH SUPPER

## 2021-07-07 DIAGNOSIS — I10 ESSENTIAL HYPERTENSION: ICD-10-CM

## 2021-07-08 RX ORDER — CARVEDILOL 6.25 MG/1
TABLET ORAL
Qty: 60 TABLET | Refills: 5 | Status: SHIPPED | OUTPATIENT
Start: 2021-07-08 | End: 2021-11-01

## 2021-07-08 NOTE — TELEPHONE ENCOUNTER
Requested Prescriptions     Signed Prescriptions Disp Refills    carvediloL (COREG) 6.25 mg tablet 60 Tablet 5     Sig: TAKE 1 TABLET BY MOUTH TWICE DAILY WITH MEALS     Authorizing Provider: Janay Aquino     Ordering User: Julian Thornton    Per Dr. Grisel Mathew verbal order

## 2021-07-09 LAB
CREATININE, EXTERNAL: 1
HBA1C MFR BLD HPLC: 5.8 %
LDL-C, EXTERNAL: 59

## 2021-07-12 ENCOUNTER — DOCUMENTATION ONLY (OUTPATIENT)
Dept: INTERNAL MEDICINE CLINIC | Age: 61
End: 2021-07-12

## 2021-07-22 NOTE — PROGRESS NOTES
Connie Ny is a 59y.o. year old female seen in clinic today for   Chief Complaint   Patient presents with    Well Woman       she is here today to complete pap smear. She is unsure of last pap smear. She has never had an abnormal pap smear to her knowledge. She has some intermittent bleeding around the indwelling mojica catheter site but no vaginal bleeding or known vaginal discharge, itching or pain. Her PT is requesting she get a 30 inch rolling walker to help with her posture and pain in her back. Current Outpatient Medications on File Prior to Visit   Medication Sig Dispense Refill    traZODone (DESYREL) 300 mg tablet Take 300 mg by mouth nightly.  oxyCODONE-acetaminophen (PERCOCET 10)  mg per tablet TAKE 1 TABLET BY MOUTH EVERY 12 HOURS AS NEEDED FOR CHRONIC PAIN      baclofen (LIORESAL) 20 mg tablet TAKE 1 TABLET BY MOUTH EVERY 8 HOURS AS NEEDED FOR MUSCLE SPASM(S)      carvediloL (COREG) 6.25 mg tablet TAKE 1 TABLET BY MOUTH TWICE DAILY WITH MEALS 60 Tablet 5    metFORMIN ER (GLUCOPHAGE XR) 500 mg tablet TAKE 1 TABLET BY MOUTH ONCE DAILY WITH SUPPER 90 Tablet 0    Botox 200 unit injection INJECT INTO THE BILATERAL MUSCLES OF THE HEAD AND NECK BY PRESCRIBER IN OFFICE FOR MIGRAINES EVERY 3 MONTHS. DISCARD UNUSED PORTION 1 Vial 3    losartan (COZAAR) 50 mg tablet Take 1 Tab by mouth nightly. Hold for SBP<115 90 Tab 3    pantoprazole (PROTONIX) 40 mg tablet Take 1 Tab by mouth daily. 90 Tab 3    BD Single Use Swabs Regular padm Check Blood sugar ONCE daily      atorvastatin (LIPITOR) 40 mg tablet Take 1 tablet by mouth once daily 90 Tab 1    galcanezumab-gnlm (Emgality Syringe) 120 mg/mL syrg 1 Syringe by SubCUTAneous route every thirty (30) days.  Start 30 days after the loading dose 1 Syringe 11    topiramate (TOPAMAX) 100 mg tablet Take 1 tablet by mouth twice daily 180 Tab 3    cyanocobalamin (VITAMIN B12) 1,000 mcg/mL injection INJECT 1 ML UNDER THE SKIN EVERY 30 DAYS FOR B12 DEFICIENCY  Indications: inadequate vitamin B12 (Patient taking differently: INJECT 1 ML UNDER THE SKIN EVERY 14 DAYS FOR B12 DEFICIENCY  Indications: inadequate vitamin B12) 10 mL 1    Syringe, Disposable, 1 mL syrg Use bimonthly for cyanocobalamin subcutaneous injection. 25 Syringe 0    Needle, Disp, 25 G 25 gauge x 3/4\" ndle Use bimonthly for cyanocobalamin subcutaneous injection. 1 Box 0    fluticasone propionate (FLONASE) 50 mcg/actuation nasal spray 2 Sprays by Both Nostrils route daily. 1 Bottle 0    Linzess 145 mcg cap capsule TAKE 1 CAPSULE BY MOUTH ONCE DAILY BEFORE BREAKFAST FOR CONSTIPATION 90 Cap 0    furosemide (LASIX) 40 mg tablet Take 1 tablet by mouth once daily 90 Tab 0    Euthyrox 125 mcg tablet TAKE 1 TABLET BY MOUTH ONCE DAILY BEFORE BREAKFAST 90 Tab 0    gabapentin (NEURONTIN) 100 mg capsule Take 100 mg by mouth three (3) times daily as needed for Pain.  FREESTYLE LITE STRIPS strip USE STRIP TO CHECK GLUCOSE TWICE DAILY  3    FREESTYLE LITE METER monitoring kit USE TO CHECK GLUCOSE ONCE DAILY  0    ciclopirox (PENLAC) 8 % solution APPLY TO AFFECTED TOE NAILS DAILY      diclofenac (VOLTAREN) 1 % gel APPLY TOPICALLY TO AFFECTED AREA ONCE DAILY      hydroxyzine HCL (ATARAX) 50 mg tablet TAKE 1 TABLET BY MOUTH TWICE DAILY AS NEEDED FOR ANXIETY      FREESTYLE LANCETS 28 gauge [facility] USE TO CHECK GLUCOSE TWICE DAILY      miscellaneous medical supply misc Rollator Dx:I69.359 1 Each 0    miscellaneous medical supply [facility] Walker Dx: I69.359 1 Each 0    omega-3 acid ethyl esters (LOVAZA) 1 gram capsule Take 2 Caps by mouth two (2) times a day. 120 Cap 2    DULoxetine (CYMBALTA) 60 mg capsule Take 2 Caps by mouth daily. 180 Cap 0    calcipotriene (DOVONEX) 0.005 % topical cream Apply  to affected area daily as needed. 60 g 11    albuterol-ipratropium (DUO-NEB) 2.5 mg-0.5 mg/3 ml nebu 3 mL by Nebulization route every six (6) hours as needed.  30 Nebule 0    albuterol (PROVENTIL HFA, VENTOLIN HFA, PROAIR HFA) 90 mcg/actuation inhaler Take 2 Puffs by inhalation every four (4) hours as needed for Wheezing or Shortness of Breath. 3 Inhaler 3    ferrous sulfate 325 mg (65 mg iron) tablet Take 325 mg by mouth Daily (before breakfast).  aspirin delayed-release 81 mg tablet Take 81 mg by mouth daily.  traZODone (DESYREL) 100 mg tablet TAKE 1 TO 2 TABLETS BY MOUTH AT BEDTIME AS NEEDED FOR SLEEP      pregabalin (LYRICA) 75 mg capsule Take 75 mg by mouth nightly. (Patient not taking: Reported on 7/23/2021)      [DISCONTINUED] ergocalciferol (ERGOCALCIFEROL) 1,250 mcg (50,000 unit) capsule Take 1 Cap by mouth every seven (7) days. 12 Cap 0    oxyCODONE-acetaminophen (PERCOCET 7.5) 7.5-325 mg per tablet TAKE 1 TABLET BY MOUTH 4 TIMES DAILY  0    [DISCONTINUED] baclofen (LIORESAL) 10 mg tablet TAKE 1 TABLET BY MOUTH THREE TIMES DAILY (Patient taking differently: Take 20 mg by mouth three (3) times daily. TAKE 1 TABLET BY MOUTH THREE TIMES DAILY) 270 Tab 0     No current facility-administered medications on file prior to visit. Allergies   Allergen Reactions    Bees [Hymenoptera Allergenic Extract] Shortness of Breath and Swelling    Strawberry Shortness of Breath and Swelling    Lisinopril Cough     Past Medical History:   Diagnosis Date    Advanced care planning/counseling discussion 3/4/16    On File    Bronchitis 2/24/2014    Cervicalgia 8/18/15    Dr. Te Dia    Chest pain 5/25/15    Hospitalized at [facility] 5/25/15 (lab work negative)    Chronic indwelling Dunn catheter 1/24/2018    Initially placed Jan 2018. Mx by Dr. Song Dear.      Congestive heart failure, unspecified     Last Echo 2/8/15: EF 55-60%    Constipation 6/13/2017    Enthesopathy, spinal ([address] 8/18/15    Dr. Te Dia    Essential hypertension     Foot drop 8/18/15    Dr. Win Downs Heart attack [facility]) 2/24/2013    Was supposed to See Cardiology for possible pacemaker in november 2014- After Cardiology consult locally, no need, EF greatly improved. Established with Dr. Emmanuel Wheeler Hiatal hernia 6/2015    3 cm hiatal hernia     Hip pain 8/18/15    Dr. Jaci Jones    Hypercholesterolemia     Hyperglycemia 7/2015    A1c 5.9     Hyperlipidemia 6/30/2015    NMR lipoprofile- LDL P 997, LDL-c 71, HLD-C-39, TG-60, HLD-P (25.2), Small LDL-P -541, LDL size 20.6    Hypothyroid     Insomnia 6/13/2017    Lower extremity edema     Lumbar spondylosis 8/18/15    Dr. Higinio Stein 6/2015    EGD/Colonscopy 6/15- Gastritis, internal hemorrhoids and 3 polyps    Murmur     EDDIE on CPAP     Was referred to Pulmonology - Uses CPAP    Osteoarthritis of hip 8/18/15    Dr. Jaci Jones    Osteoarthritis, shoulder 8/18/15    Dr. Lizzette Arias Radiculopathy, cervical 8/18/15    Dr. Jaci Jones    Shoulder pain 8/18/15    Dr. Lizzette Arias Spinal stenosis of cervical region 8/18/15    Dr. Lizzette Arias Spinal stenosis, lumbar 8/18/15    Dr. Lizzette Arias Stroke [facility]) 2/25/2014    Established with Neurology, Markel Canavan, NP-Just hospitalized at [facility] 2/7/15-2/10/15. CT negative, but Late effect CV accident with increased tone described on discharge summary, Carotid dopplers showed 50% stenosis bilaterally.  Vitamin D deficiency 7/2015      Past Surgical History:   Procedure Laterality Date    COLONOSCOPY N/A 6/22/2016    COLONOSCOPY performed by Rubén Jason MD at \[facility]"" ENDOSCOPY    HX BREAST BIOPSY Right 8/11/15    Stereo Bx - \[facility]\""C--- Benign    HX CHOLECYSTECTOMY      HX COLONOSCOPY  6/2015    Dr. Beltran Guard- 3 complete polypectomies, Internal hemorrhoids, difficult study due to spasm.  Repeat in 1 year    HX ENDOSCOPY  6/2015    mild gastritis, 3cm hiatal hernia     HX GASTRIC BYPASS  6/1989    HX HEART CATHETERIZATION  2/2014    HX ORTHOPAEDIC      Right middle finger distal amputation    HX PARTIAL THYROIDECTOMY  ~1990    HX THYROIDECTOMY Left 1985    90% of one side of the thyroid removed    IR ASP BLADDER SUPRA CATH  5/24/2019        Family History Problem Relation Age of Onset    Heart Disease Father 61    Broken Bones Father         Hip/fell    Hypertension Mother     Heart Disease Brother 62    Broken Bones Brother         Hip/fell    Diabetes Maternal Grandmother         Social History     Socioeconomic History    Marital status: SINGLE     Spouse name: Not on file    Number of children: Not on file    Years of education: Not on file    Highest education level: Not on file   Occupational History    Not on file   Tobacco Use    Smoking status: Former Smoker     Packs/day: 0.25     Years: 15.00     Pack years: 3.75     Types: Cigarettes     Quit date: 2007     Years since quittin.1    Smokeless tobacco: Never Used   Vaping Use    Vaping Use: Never used   Substance and Sexual Activity    Alcohol use: No    Drug use: No    Sexual activity: Not Currently   Other Topics Concern    Not on file   Social History Narrative    Not on file     Social Determinants of Health     Financial Resource Strain:     Difficulty of Paying Living Expenses:    Food Insecurity:     Worried About Running Out of Food in the Last Year:     Ran Out of Food in the Last Year:    Transportation Needs:     Lack of Transportation (Medical):      Lack of Transportation (Non-Medical):    Physical Activity:     Days of Exercise per Week:     Minutes of Exercise per Session:    Stress:     Feeling of Stress :    Social Connections:     Frequency of Communication with Friends and Family:     Frequency of Social Gatherings with Friends and Family:     Attends [employer] Services:     Active Member of Clubs or Organizations:     Attends Club or Organization Meetings:     Marital Status:    Intimate Partner Violence:     Fear of Current or Ex-Partner:     Emotionally Abused:     Physically Abused:     Sexually Abused:            Visit Vitals  /80 (BP 1 Location: Left upper arm, BP Patient Position: Sitting)   Pulse 67   Temp 98.1 °F (36.7 °C) (Oral)   Resp 12   Ht 5' 4\" (1.626 m)   Wt 247 lb (112 kg)   LMP  (LMP Unknown)   SpO2 95%   BMI 42.40 kg/m²       Review of Systems   Constitutional: Negative for chills and fever. HENT: Negative. Respiratory: Negative for shortness of breath. Cardiovascular: Negative for chest pain. Gastrointestinal: Negative for abdominal pain. Genitourinary: Negative for flank pain. Skin: Negative for rash. Neurological: Negative for headaches. Physical Exam  Vitals and nursing note reviewed. Exam conducted with a chaperone present. Constitutional:       Appearance: Normal appearance. She is obese. HENT:      Head: Normocephalic and atraumatic. Nose: Nose normal.      Mouth/Throat:      Mouth: Mucous membranes are moist.   Eyes:      Conjunctiva/sclera: Conjunctivae normal.   Cardiovascular:      Rate and Rhythm: Normal rate. Pulmonary:      Effort: Pulmonary effort is normal.   Genitourinary:     Exam position: Knee-chest position. Pubic Area: No rash. Vagina: No foreign body. No erythema or tenderness. Cervix: No discharge, erythema or cervical bleeding. Uterus: Normal. Not tender. Adnexa: Right adnexa normal.        Right: No mass or tenderness. Left: No mass or tenderness. Musculoskeletal:      Cervical back: Normal range of motion and neck supple. Skin:     General: Skin is warm. Neurological:      Mental Status: She is alert. Psychiatric:         Mood and Affect: Mood normal.          No results found for this or any previous visit (from the past 24 hour(s)). ASSESSMENT AND PLAN  Diagnoses and all orders for this visit:    1. Cervical cancer screening  -     PAP IG, APTIMA HPV AND RFX 16/18,45 ([id number]); Future  Normal exam, good specimen  2. Vitamin D deficiency  -     cholecalciferol (VITAMIN D3) (5000 Units /125 mcg) capsule; Take 1 Capsule by mouth daily. Change from 50 k weekly to 5000 units daily  3. Well woman exam    4. Cerebrovascular accident (CVA), unspecified mechanism ([facility] [address]  -     AMB SUPPLY ORDER    5. Essential hypertension  At goal  6. Hemiparesis and alteration of sensations as late effects of stroke (HCC)  -     AMB SUPPLY ORDER  Given order to [facility] for new rolling walker 30 inch         I have discussed the diagnosis with the patient and the intended plan as seen in the above orders. Patient is in agreement. The patient has received an after-visit summary and questions were answered concerning future plans. I have discussed medication side effects and warnings with the patient as well.     Cindy Guzman PA-C

## 2021-07-23 ENCOUNTER — OFFICE VISIT (OUTPATIENT)
Dept: INTERNAL MEDICINE CLINIC | Age: 61
End: 2021-07-23
Payer: MEDICARE

## 2021-07-23 VITALS
BODY MASS INDEX: 42.17 KG/M2 | TEMPERATURE: 98.1 F | OXYGEN SATURATION: 95 % | WEIGHT: 247 LBS | HEART RATE: 67 BPM | HEIGHT: 64 IN | SYSTOLIC BLOOD PRESSURE: 115 MMHG | RESPIRATION RATE: 12 BRPM | DIASTOLIC BLOOD PRESSURE: 80 MMHG

## 2021-07-23 DIAGNOSIS — E55.9 VITAMIN D DEFICIENCY: ICD-10-CM

## 2021-07-23 DIAGNOSIS — I63.9 CEREBROVASCULAR ACCIDENT (CVA), UNSPECIFIED MECHANISM (HCC): Chronic | ICD-10-CM

## 2021-07-23 DIAGNOSIS — Z01.419 WELL WOMAN EXAM: ICD-10-CM

## 2021-07-23 DIAGNOSIS — Z12.4 CERVICAL CANCER SCREENING: Primary | ICD-10-CM

## 2021-07-23 DIAGNOSIS — I10 ESSENTIAL HYPERTENSION: ICD-10-CM

## 2021-07-23 DIAGNOSIS — I69.398 HEMIPARESIS AND ALTERATION OF SENSATIONS AS LATE EFFECTS OF STROKE (HCC): ICD-10-CM

## 2021-07-23 DIAGNOSIS — I69.359 HEMIPARESIS AND ALTERATION OF SENSATIONS AS LATE EFFECTS OF STROKE (HCC): ICD-10-CM

## 2021-07-23 PROCEDURE — 3017F COLORECTAL CA SCREEN DOC REV: CPT | Performed by: PHYSICIAN ASSISTANT

## 2021-07-23 PROCEDURE — G8754 DIAS BP LESS 90: HCPCS | Performed by: PHYSICIAN ASSISTANT

## 2021-07-23 PROCEDURE — G8752 SYS BP LESS 140: HCPCS | Performed by: PHYSICIAN ASSISTANT

## 2021-07-23 PROCEDURE — G8417 CALC BMI ABV UP PARAM F/U: HCPCS | Performed by: PHYSICIAN ASSISTANT

## 2021-07-23 PROCEDURE — 99213 OFFICE O/P EST LOW 20 MIN: CPT | Performed by: PHYSICIAN ASSISTANT

## 2021-07-23 PROCEDURE — G9899 SCRN MAM PERF RSLTS DOC: HCPCS | Performed by: PHYSICIAN ASSISTANT

## 2021-07-23 PROCEDURE — G8432 DEP SCR NOT DOC, RNG: HCPCS | Performed by: PHYSICIAN ASSISTANT

## 2021-07-23 PROCEDURE — G8427 DOCREV CUR MEDS BY ELIG CLIN: HCPCS | Performed by: PHYSICIAN ASSISTANT

## 2021-07-23 RX ORDER — CHOLECALCIFEROL (VITAMIN D3) 125 MCG
5000 CAPSULE ORAL DAILY
Qty: 90 CAPSULE | Refills: 3 | Status: SHIPPED | OUTPATIENT
Start: 2021-07-23 | End: 2022-06-10

## 2021-07-23 RX ORDER — OXYCODONE AND ACETAMINOPHEN 10; 325 MG/1; MG/1
TABLET ORAL
COMMUNITY
Start: 2021-07-12 | End: 2022-01-04

## 2021-07-23 RX ORDER — BACLOFEN 20 MG/1
TABLET ORAL
COMMUNITY
Start: 2021-07-12

## 2021-07-23 RX ORDER — TRAZODONE HYDROCHLORIDE 300 MG/1
300 TABLET ORAL
COMMUNITY
Start: 2021-07-09 | End: 2022-01-04

## 2021-07-23 NOTE — PROGRESS NOTES
Roque Mena  Identified pt with two pt identifiers(name and ). Chief Complaint   Patient presents with    Well Woman       Reviewed record In preparation for visit and have obtained necessary documentation. 1. Have you been to the ER, urgent care clinic or hospitalized since your last visit? No     2. Have you seen or consulted any other health care providers outside of the [address] [place] [address] since your last visit? Include any pap smears or colon screening. No    Vitals reviewed with provider. Health Maintenance reviewed: scheduled for colonoscopy next month at [facility]. Health Maintenance Due   Topic    PAP AKA CERVICAL CYTOLOGY     Colorectal Cancer Screening Combo         Wt Readings from Last 3 Encounters:   21 248 lb (112.5 kg)   21 264 lb (119.7 kg)   21 253 lb (114.8 kg)      Temp Readings from Last 3 Encounters:   21 98 °F (36.7 °C) (Oral)   21 98 °F (36.7 °C) (Oral)   20 97 °F (36.1 °C) (Temporal)      BP Readings from Last 3 Encounters:   21 110/76   21 128/88   21 118/68      Pulse Readings from Last 3 Encounters:   21 69   21 72   21 74      There were no vitals filed for this visit.        Learning Assessment:   :     Learning Assessment 2019 2018 10/13/2017 3/24/2015   PRIMARY LEARNER Patient Patient Patient Patient   HIGHEST LEVEL OF EDUCATION - PRIMARY LEARNER  - - - SOME COLLEGE   BARRIERS PRIMARY LEARNER - - - NONE   CO-LEARNER CAREGIVER - - - No   CO-LEARNER NAME - - - n/a   PRIMARY LANGUAGE ENGLISH ENGLISH ENGLISH ENGLISH   LEARNER PREFERENCE PRIMARY DEMONSTRATION DEMONSTRATION LISTENING LISTENING   ANSWERED BY patient patient patient patient   RELATIONSHIP SELF SELF SELF SELF        Depression Screening:   :     3 most recent PHQ Screens 2021   PHQ Not Done -   Little interest or pleasure in doing things Not at all   Feeling down, depressed, irritable, or hopeless Not at all   Total Score PHQ 2 0        Fall Risk Assessment:   :     Fall Risk Assessment, last 12 mths 3/18/2021   Able to walk? Yes   Fall in past 12 months? 0   Do you feel unsteady? 0   Are you worried about falling 0   Number of falls in past 12 months -   Fall with injury? -        Abuse Screening:   :     Abuse Screening Questionnaire 6/21/2021 1/30/2020 9/5/2019 3/5/2019   Do you ever feel afraid of your partner? N N N N   Are you in a relationship with someone who physically or mentally threatens you? N N N N   Is it safe for you to go home?  Y Y Y Y        ADL Screening:   :     ADL Assessment 6/21/2021   Feeding yourself No Help Needed   Getting from bed to chair Help Needed   Getting dressed Help Needed   Bathing or showering Help Needed   Walk across the room (includes cane/walker) No Help Needed   Using the telphone No Help Needed   Taking your medications Help Needed   Preparing meals Help Needed   Managing money (expenses/bills) Help Needed   Moderately strenuous housework (laundry) Help Needed   Shopping for personal items (toiletries/medicines) Help Needed   Shopping for groceries Help Needed   Driving Help Needed   Climbing a flight of stairs Help Needed   Getting to places beyond walking distances Help Needed

## 2021-07-27 LAB
CYTOLOGIST CVX/VAG CYTO: NORMAL
CYTOLOGY CVX/VAG DOC CYTO: NORMAL
CYTOLOGY CVX/VAG DOC THIN PREP: NORMAL
HPV I/H RISK 4 DNA CVX QL PROBE+SIG AMP: NEGATIVE
Lab: NORMAL
OTHER STN SPEC: NORMAL
STAT OF ADQ CVX/VAG CYTO-IMP: NORMAL

## 2021-08-11 ENCOUNTER — TELEPHONE (OUTPATIENT)
Dept: INTERNAL MEDICINE CLINIC | Age: 61
End: 2021-08-11

## 2021-08-11 NOTE — TELEPHONE ENCOUNTER
Pt called to notify provider that colonoscopy is scheduled for 8/18/21. Return call to [phone] as needed.

## 2021-09-16 ENCOUNTER — OFFICE VISIT (OUTPATIENT)
Dept: NEUROLOGY | Age: 61
End: 2021-09-16
Payer: MEDICARE

## 2021-09-16 VITALS
HEART RATE: 84 BPM | OXYGEN SATURATION: 98 % | HEIGHT: 64 IN | WEIGHT: 245 LBS | DIASTOLIC BLOOD PRESSURE: 88 MMHG | BODY MASS INDEX: 41.83 KG/M2 | SYSTOLIC BLOOD PRESSURE: 138 MMHG

## 2021-09-16 DIAGNOSIS — G43.719 INTRACTABLE CHRONIC MIGRAINE WITHOUT AURA AND WITHOUT STATUS MIGRAINOSUS: Primary | ICD-10-CM

## 2021-09-16 PROCEDURE — 64615 CHEMODENERV MUSC MIGRAINE: CPT | Performed by: PSYCHIATRY & NEUROLOGY

## 2021-09-16 RX ORDER — IPRATROPIUM BROMIDE 0.5 MG/2.5ML
SOLUTION RESPIRATORY (INHALATION)
COMMUNITY
Start: 2021-08-05 | End: 2022-01-04

## 2021-09-16 NOTE — PROGRESS NOTES
Botox Injection Note     2021     Patient:  Jennifer Kruger   YOB: 1960  Date of Visit: 2021      Indication: patient has chronic migraine headaches greater than 15 days per month lasting more than 4 hours each. She has failed or not tolerated 2 or more medication preventatives. Written consent was signed and verified by me. Risks and benefits were discussed to include possible cosmetic asymmetry which is not permament or life threatening. Patient name and  was confirmed prior to procedure. Time out performed. Procedure:   Botox concentration: 200 units in 2 ml of preservative-free normal saline. 1:1 dilution. LOT#: [id number]  EXP:  2024    Injections and distribution as follow :      Units/site  Sites Loc Subtotal    procerus 5 1 ML 5   corrugaters 2.5 2 BL 5   frontalis 5 8 BL 40   Temporalis 10 4 BL 40   Occipitalis 10 2 BL 20   Cervical paraspinals 5 4 BL 20   Trapezius 10 4 BL 40         200 units Botox were reconstituted, 170 units injected as above and the remainder was unavoidably wasted. Patient tolerated procedure well. Return in 3 months for repeat injections.     _____________________________   Issa Zambrano,   [address], ABPN

## 2021-09-16 NOTE — PROGRESS NOTES
Chief Complaint   Patient presents with    Procedure     BOTOX     Visit Vitals  /88 (BP 1 Location: Right upper arm, BP Patient Position: Sitting)   Pulse 84   Ht 5' 4\" (1.626 m)   Wt 245 lb (111.1 kg)   SpO2 98%   BMI 42.05 kg/m²

## 2021-09-20 NOTE — TELEPHONE ENCOUNTER
Requested Prescriptions     Pending Prescriptions Disp Refills    carvedilol (COREG) 6.25 mg tablet       Sig: Take  by mouth two (2) times daily (with meals).      Last OV 11/1/17  Next OV 11/7/18    Pharmacy verified    Thank you, FREDY .

## 2021-09-20 NOTE — PROGRESS NOTES
Angelito Andrade is a 59y.o. year old female seen in clinic today for   Chief Complaint   Patient presents with    Hypertension    Cholesterol Problem       she is here today to follow up for HTN, HLD, Prediabetes, hx of CAD and CVA with L sided weakness. On chronic opiates and gabapentin via pain management. Hypertension: Controlled with carvedilol, losartan, lasix  Compliant with medications? always  Compliant with diet? Yes, low salt  Compliant with exercise? Working on home therapy for strengthening  Average blood pressure readings outside of office. Can get low in the 25'T systolic when she doesn't eat or drink much. But can also fluctuate back to 130-140    Denies any HA, dizziness, CP, SOB, edema, visual changes    She continues to see pain management. She continues to struggle with sleep with trazodone 300 mg but notes it sometimes works. She is not able to us Ambien, which worked in the past, due to chronic opiate use. She has had no GERD or stomach issues, reporting daily BM's without blood or constipation. No urinary changes. She continues to have \"scary\" chest pains. She has been worked up completely by Dr. Armando Sousa who deemed her heart to be healthy. She has follow up with Dr. Carey Hernandez later in the fall. She had her colonoscopy canceled due to issues with a ride and is planning to have it done later this year. She had to cancel PT because of payments. We decided to send her back to PT for BL LE extremity muscle strengthening sp CVA with L sided hemiplegia but she had already done 2 months of PT for dizziness and insurance would not cover it.       she specifically denies any CP, SOB, HA. Dizziness, fevers, chills, N/V/D, urinary symptoms or other bowel changes. Current Outpatient Medications on File Prior to Visit   Medication Sig Dispense Refill    ipratropium (ATROVENT) 0.02 % soln       traZODone (DESYREL) 300 mg tablet Take 300 mg by mouth nightly.       oxyCODONE-acetaminophen (PERCOCET 10)  mg per tablet TAKE 1 TABLET BY MOUTH EVERY 12 HOURS AS NEEDED FOR CHRONIC PAIN      baclofen (LIORESAL) 20 mg tablet TAKE 1 TABLET BY MOUTH EVERY 8 HOURS AS NEEDED FOR MUSCLE SPASM(S)      carvediloL (COREG) 6.25 mg tablet TAKE 1 TABLET BY MOUTH TWICE DAILY WITH MEALS 60 Tablet 5    Botox 200 unit injection INJECT INTO THE BILATERAL MUSCLES OF THE HEAD AND NECK BY PRESCRIBER IN OFFICE FOR MIGRAINES EVERY 3 MONTHS. DISCARD UNUSED PORTION 1 Vial 3    losartan (COZAAR) 50 mg tablet Take 1 Tab by mouth nightly. Hold for SBP<115 90 Tab 3    pantoprazole (PROTONIX) 40 mg tablet Take 1 Tab by mouth daily. 90 Tab 3    BD Single Use Swabs Regular padm Check Blood sugar ONCE daily      atorvastatin (LIPITOR) 40 mg tablet Take 1 tablet by mouth once daily 90 Tab 1    galcanezumab-gnlm (Emgality Syringe) 120 mg/mL syrg 1 Syringe by SubCUTAneous route every thirty (30) days. Start 30 days after the loading dose 1 Syringe 11    topiramate (TOPAMAX) 100 mg tablet Take 1 tablet by mouth twice daily 180 Tab 3    cyanocobalamin (VITAMIN B12) 1,000 mcg/mL injection INJECT 1 ML UNDER THE SKIN EVERY 30 DAYS FOR B12 DEFICIENCY  Indications: inadequate vitamin B12 (Patient taking differently: INJECT 1 ML UNDER THE SKIN EVERY 14 DAYS FOR B12 DEFICIENCY  Indications: inadequate vitamin B12) 10 mL 1    Syringe, Disposable, 1 mL syrg Use bimonthly for cyanocobalamin subcutaneous injection. 25 Syringe 0    Needle, Disp, 25 G 25 gauge x 3/4\" ndle Use bimonthly for cyanocobalamin subcutaneous injection. 1 Box 0    fluticasone propionate (FLONASE) 50 mcg/actuation nasal spray 2 Sprays by Both Nostrils route daily.  1 Bottle 0    Linzess 145 mcg cap capsule TAKE 1 CAPSULE BY MOUTH ONCE DAILY BEFORE BREAKFAST FOR CONSTIPATION 90 Cap 0    furosemide (LASIX) 40 mg tablet Take 1 tablet by mouth once daily 90 Tab 0    gabapentin (NEURONTIN) 100 mg capsule Take 100 mg by mouth three (3) times daily as needed for Pain.  FREESTYLE LITE STRIPS strip USE STRIP TO CHECK GLUCOSE TWICE DAILY  3    FREESTYLE LITE METER monitoring kit USE TO CHECK GLUCOSE ONCE DAILY  0    ciclopirox (PENLAC) 8 % solution APPLY TO AFFECTED TOE NAILS DAILY      diclofenac (VOLTAREN) 1 % gel APPLY TOPICALLY TO AFFECTED AREA ONCE DAILY      hydroxyzine HCL (ATARAX) 50 mg tablet TAKE 1 TABLET BY MOUTH TWICE DAILY AS NEEDED FOR ANXIETY      FREESTYLE LANCETS 28 gauge misc USE TO CHECK GLUCOSE TWICE DAILY      miscellaneous medical supply misc Rollator Dx:I69.359 1 Each 0    miscellaneous medical supply [facility] Walker Dx: I69.359 1 Each 0    omega-3 acid ethyl esters (LOVAZA) 1 gram capsule Take 2 Caps by mouth two (2) times a day. 120 Cap 2    DULoxetine (CYMBALTA) 60 mg capsule Take 2 Caps by mouth daily. 180 Cap 0    calcipotriene (DOVONEX) 0.005 % topical cream Apply  to affected area daily as needed. 60 g 11    albuterol-ipratropium (DUO-NEB) 2.5 mg-0.5 mg/3 ml nebu 3 mL by Nebulization route every six (6) hours as needed. 30 Nebule 0    albuterol (PROVENTIL HFA, VENTOLIN HFA, PROAIR HFA) 90 mcg/actuation inhaler Take 2 Puffs by inhalation every four (4) hours as needed for Wheezing or Shortness of Breath. 3 Inhaler 3    ferrous sulfate 325 mg (65 mg iron) tablet Take 325 mg by mouth Daily (before breakfast).  aspirin delayed-release 81 mg tablet Take 81 mg by mouth daily.  cholecalciferol (VITAMIN D3) (5000 Units /125 mcg) capsule Take 1 Capsule by mouth daily. (Patient not taking: Reported on 9/21/2021) 90 Capsule 3    [DISCONTINUED] metFORMIN ER (GLUCOPHAGE XR) 500 mg tablet TAKE 1 TABLET BY MOUTH ONCE DAILY WITH SUPPER 90 Tablet 0    [DISCONTINUED] Euthyrox 125 mcg tablet TAKE 1 TABLET BY MOUTH ONCE DAILY BEFORE BREAKFAST 90 Tab 0     No current facility-administered medications on file prior to visit.          Allergies   Allergen Reactions    Bees [Hymenoptera Allergenic Extract] Shortness of Breath and Swelling    Strawberry Shortness of Breath and Swelling    Lisinopril Cough     Past Medical History:   Diagnosis Date    Advanced care planning/counseling discussion 3/4/16    On File    Bronchitis 2/24/2014    Cervicalgia 8/18/15    Dr. Jose Chaudhari    Chest pain 5/25/15    Hospitalized at [facility] 5/25/15 (lab work negative)    Chronic indwelling Dunn catheter 1/24/2018    Initially placed Jan 2018. Mx by Dr. Tanner Tello.  Congestive heart failure, unspecified     Last Echo 2/8/15: EF 55-60%    Constipation 6/13/2017    Enthesopathy, spinal ([address] 8/18/15    Dr. Jose Chaudhari    Essential hypertension     Foot drop 8/18/15    Dr. Caro Manuel Heart attack [facility]) 2/24/2013    Was supposed to See Cardiology for possible pacemaker in november 2014- After Cardiology consult locally, no need, EF greatly improved. Established with Dr. Maritza Nava Hiatal hernia 6/2015    3 cm hiatal hernia     Hip pain 8/18/15    Dr. Jose Chaudhari    Hypercholesterolemia     Hyperglycemia 7/2015    A1c 5.9     Hyperlipidemia 6/30/2015    NMR lipoprofile- LDL P 997, LDL-c 71, HLD-C-39, TG-60, HLD-P (25.2), Small LDL-P -541, LDL size 20.6    Hypothyroid     Insomnia 6/13/2017    Lower extremity edema     Lumbar spondylosis 8/18/15    Dr. Ashley Soto 6/2015    EGD/Colonscopy 6/15- Gastritis, internal hemorrhoids and 3 polyps    Murmur     EDDIE on CPAP     Was referred to Pulmonology - Uses CPAP    Osteoarthritis of hip 8/18/15    Dr. Jose Chaudhari    Osteoarthritis, shoulder 8/18/15    Dr. Caro Manuel Radiculopathy, cervical 8/18/15    Dr. Jose Chaudhari    Shoulder pain 8/18/15    Dr. Caro Manuel Spinal stenosis of cervical region 8/18/15    Dr. Caro Manuel Spinal stenosis, lumbar 8/18/15    Dr. Caro Manuel Stroke [facility]) 2/25/2014    Established with Neurology, Shelli Chino NP-Just hospitalized at [facility] 2/7/15-2/10/15. CT negative, but Late effect CV accident with increased tone described on discharge summary, Carotid dopplers showed 50% stenosis bilaterally.  Vitamin D deficiency 2015      Past Surgical History:   Procedure Laterality Date    COLONOSCOPY N/A 2016    COLONOSCOPY performed by Pako Malone MD at [facility] ENDOSCOPY    HX BREAST BIOPSY Right 8/11/15    Stereo Bx - [facility]--- Benign    HX CHOLECYSTECTOMY      HX COLONOSCOPY  2015    Dr. Smitha Meehan- 3 complete polypectomies, Internal hemorrhoids, difficult study due to spasm.  Repeat in 1 year    HX ENDOSCOPY  2015    mild gastritis, 3cm hiatal hernia     HX GASTRIC BYPASS  1989    HX HEART CATHETERIZATION  2014    HX ORTHOPAEDIC      Right middle finger distal amputation    HX PARTIAL THYROIDECTOMY  ~    HX THYROIDECTOMY Left     90% of one side of the thyroid removed    IR ASP BLADDER SUPRA CATH  2019        Family History   Problem Relation Age of Onset    Heart Disease Father 61    Broken Bones Father         Hip/fell    Hypertension Mother     Heart Disease Brother 62    Broken Bones Brother         Hip/fell    Diabetes Maternal Grandmother         Social History     Socioeconomic History    Marital status: SINGLE     Spouse name: Not on file    Number of children: Not on file    Years of education: Not on file    Highest education level: Not on file   Occupational History    Not on file   Tobacco Use    Smoking status: Former Smoker     Packs/day: 0.25     Years: 15.00     Pack years: 3.75     Types: Cigarettes     Quit date: 2007     Years since quittin.2    Smokeless tobacco: Never Used   Vaping Use    Vaping Use: Never used   Substance and Sexual Activity    Alcohol use: No    Drug use: No    Sexual activity: Not Currently   Other Topics Concern    Not on file   Social History Narrative    Not on file     Social Determinants of Health     Financial Resource Strain:     Difficulty of Paying Living Expenses:    Food Insecurity:     Worried About Running Out of Food in the Last Year:     Gurjit of Food in the Last Year:    Transportation Needs:     Lack of Transportation (Medical):  Lack of Transportation (Non-Medical):    Physical Activity:     Days of Exercise per Week:     Minutes of Exercise per Session:    Stress:     Feeling of Stress :    Social Connections:     Frequency of Communication with Friends and Family:     Frequency of Social Gatherings with Friends and Family:     Attends [employer] Services:     Active Member of Clubs or Organizations:     Attends Club or Organization Meetings:     Marital Status:    Intimate Partner Violence:     Fear of Current or Ex-Partner:     Emotionally Abused:     Physically Abused:     Sexually Abused:            Visit Vitals  /73 (BP 1 Location: Left upper arm, BP Patient Position: Sitting)   Pulse 72   Temp 98.4 °F (36.9 °C) (Oral)   Resp 12   Ht 5' 4\" (1.626 m)   Wt 246 lb (111.6 kg)   LMP  (LMP Unknown)   SpO2 98%   BMI 42.23 kg/m²       Review of Systems   Constitutional: Negative for chills, fever, malaise/fatigue and weight loss. HENT: Negative for ear pain, hearing loss and sore throat. Respiratory: Negative for cough and shortness of breath. Cardiovascular: Negative for palpitations and leg swelling. Gastrointestinal: Negative for abdominal pain, blood in stool, constipation, diarrhea, heartburn, melena, nausea and vomiting. Genitourinary: Negative for dysuria and hematuria. Musculoskeletal: Negative for falls. Skin: Negative for rash. Neurological: Negative for sensory change, speech change, focal weakness and headaches. Psychiatric/Behavioral: Negative for depression. Physical Exam  Vitals and nursing note reviewed. Constitutional:       Appearance: Normal appearance. She is obese. Comments: Ambulating with walker   HENT:      Head: Normocephalic and atraumatic.       Right Ear: External ear normal.      Left Ear: External ear normal.      Nose: Nose normal.      Mouth/Throat:      Mouth: Mucous membranes are moist.      Pharynx: Oropharynx is clear. Eyes:      Conjunctiva/sclera: Conjunctivae normal.      Pupils: Pupils are equal, round, and reactive to light. Neck:      Vascular: No carotid bruit. Cardiovascular:      Rate and Rhythm: Normal rate and regular rhythm. Pulses: Normal pulses. Heart sounds: Normal heart sounds. Pulmonary:      Effort: Pulmonary effort is normal.      Breath sounds: Normal breath sounds. No wheezing, rhonchi or rales. Abdominal:      General: Abdomen is flat. Bowel sounds are normal. There is no distension. Musculoskeletal:         General: Normal range of motion. Cervical back: Normal range of motion and neck supple. No rigidity. Right lower leg: No edema. Left lower leg: No edema. Skin:     General: Skin is warm and dry. Capillary Refill: Capillary refill takes less than 2 seconds. Neurological:      General: No focal deficit present. Mental Status: She is alert and oriented to person, place, and time. Sensory: No sensory deficit. Motor: Weakness (4.5/5 strength to LUE, 4/5 Strength to LLE) present. Psychiatric:         Mood and Affect: Mood normal.         Behavior: Behavior normal.         Thought Content: Thought content normal.          No results found for this or any previous visit (from the past 24 hour(s)). ASSESSMENT AND PLAN  Diagnoses and all orders for this visit:    1. Cerebrovascular accident (CVA), unspecified mechanism ([address]  L sided hemiparesis secondary  2. Coronary artery disease involving native coronary artery of native heart without angina pectoris  ASA 81, BB and Losartan  3. Chronic congestive heart failure, unspecified heart failure type (HCC)  Continue Lasix, Lungs clear, no edema  4. Essential hypertension  At goal, monitor for low BP. Will cut Losartan to 25 if needed   5. Postoperative hypothyroidism  Needs to have TSH checked next lab draw  6.  Hemiparesis and alteration of sensations as late effects of stroke ([address]  Will re-order PT in December for 2022 PT  7. EDDIE on CPAP  Compliant    9. Hypercholesterolemia  Last LDL below 60, continu Lovaza, 40 mg Lipitor  10. Prediabetes  -     metFORMIN ER (GLUCOPHAGE XR) 500 mg tablet; TAKE 1 TABLET BY MOUTH ONCE DAILY WITH SUPPER  A1C 5.8 in July 11. Obesity, morbid (HCC)  -     metFORMIN ER (GLUCOPHAGE XR) 500 mg tablet; TAKE 1 TABLET BY MOUTH ONCE DAILY WITH SUPPER  Continue metformin, recommend smaller portions  12. Needs flu shot  -     INFLUENZA VIRUS VAC QUAD,SPLIT,PRESV FREE SYRINGE IM  Given today  Other orders  -     levothyroxine (Euthyrox) 125 mcg tablet; Take 1 Tablet by mouth Daily (before breakfast). I have discussed the diagnosis with the patient and the intended plan as seen in the above orders. Patient is in agreement. The patient has received an after-visit summary and questions were answered concerning future plans. I have discussed medication side effects and warnings with the patient as well.     Berkley Dancer, PA-C

## 2021-09-21 ENCOUNTER — OFFICE VISIT (OUTPATIENT)
Dept: INTERNAL MEDICINE CLINIC | Age: 61
End: 2021-09-21
Payer: MEDICARE

## 2021-09-21 VITALS
HEART RATE: 72 BPM | RESPIRATION RATE: 12 BRPM | DIASTOLIC BLOOD PRESSURE: 73 MMHG | SYSTOLIC BLOOD PRESSURE: 105 MMHG | TEMPERATURE: 98.4 F | WEIGHT: 246 LBS | OXYGEN SATURATION: 98 % | HEIGHT: 64 IN | BODY MASS INDEX: 42 KG/M2

## 2021-09-21 DIAGNOSIS — E78.00 HYPERCHOLESTEROLEMIA: ICD-10-CM

## 2021-09-21 DIAGNOSIS — I25.10 CORONARY ARTERY DISEASE INVOLVING NATIVE CORONARY ARTERY OF NATIVE HEART WITHOUT ANGINA PECTORIS: ICD-10-CM

## 2021-09-21 DIAGNOSIS — E66.01 OBESITY, MORBID (HCC): ICD-10-CM

## 2021-09-21 DIAGNOSIS — I69.398 HEMIPARESIS AND ALTERATION OF SENSATIONS AS LATE EFFECTS OF STROKE (HCC): ICD-10-CM

## 2021-09-21 DIAGNOSIS — I50.9 CHRONIC CONGESTIVE HEART FAILURE, UNSPECIFIED HEART FAILURE TYPE (HCC): ICD-10-CM

## 2021-09-21 DIAGNOSIS — R73.03 PREDIABETES: ICD-10-CM

## 2021-09-21 DIAGNOSIS — I69.359 HEMIPARESIS AND ALTERATION OF SENSATIONS AS LATE EFFECTS OF STROKE (HCC): ICD-10-CM

## 2021-09-21 DIAGNOSIS — E89.0 POSTOPERATIVE HYPOTHYROIDISM: Chronic | ICD-10-CM

## 2021-09-21 DIAGNOSIS — I63.9 CEREBROVASCULAR ACCIDENT (CVA), UNSPECIFIED MECHANISM (HCC): Primary | Chronic | ICD-10-CM

## 2021-09-21 DIAGNOSIS — I10 ESSENTIAL HYPERTENSION: ICD-10-CM

## 2021-09-21 DIAGNOSIS — Z23 NEEDS FLU SHOT: ICD-10-CM

## 2021-09-21 DIAGNOSIS — E78.00 HIGH CHOLESTEROL: ICD-10-CM

## 2021-09-21 DIAGNOSIS — G47.33 OSA ON CPAP: ICD-10-CM

## 2021-09-21 DIAGNOSIS — Z99.89 OSA ON CPAP: ICD-10-CM

## 2021-09-21 PROCEDURE — G8427 DOCREV CUR MEDS BY ELIG CLIN: HCPCS | Performed by: INTERNAL MEDICINE

## 2021-09-21 PROCEDURE — 90686 IIV4 VACC NO PRSV 0.5 ML IM: CPT | Performed by: INTERNAL MEDICINE

## 2021-09-21 PROCEDURE — 3017F COLORECTAL CA SCREEN DOC REV: CPT | Performed by: INTERNAL MEDICINE

## 2021-09-21 PROCEDURE — G9899 SCRN MAM PERF RSLTS DOC: HCPCS | Performed by: INTERNAL MEDICINE

## 2021-09-21 PROCEDURE — G8432 DEP SCR NOT DOC, RNG: HCPCS | Performed by: INTERNAL MEDICINE

## 2021-09-21 PROCEDURE — G0008 ADMIN INFLUENZA VIRUS VAC: HCPCS | Performed by: INTERNAL MEDICINE

## 2021-09-21 PROCEDURE — 99214 OFFICE O/P EST MOD 30 MIN: CPT | Performed by: INTERNAL MEDICINE

## 2021-09-21 PROCEDURE — G8754 DIAS BP LESS 90: HCPCS | Performed by: INTERNAL MEDICINE

## 2021-09-21 PROCEDURE — G8417 CALC BMI ABV UP PARAM F/U: HCPCS | Performed by: INTERNAL MEDICINE

## 2021-09-21 PROCEDURE — G8752 SYS BP LESS 140: HCPCS | Performed by: INTERNAL MEDICINE

## 2021-09-21 RX ORDER — LEVOTHYROXINE SODIUM 125 UG/1
125 TABLET ORAL
Qty: 90 TABLET | Refills: 0 | Status: SHIPPED | OUTPATIENT
Start: 2021-09-21 | End: 2022-02-03 | Stop reason: SDUPTHER

## 2021-09-21 RX ORDER — METFORMIN HYDROCHLORIDE 500 MG/1
TABLET, EXTENDED RELEASE ORAL
Qty: 90 TABLET | Refills: 0 | Status: SHIPPED | OUTPATIENT
Start: 2021-09-21 | End: 2021-09-21 | Stop reason: SDUPTHER

## 2021-09-21 NOTE — TELEPHONE ENCOUNTER
PCP: Aki De Paz PA-C     Last appt: 9/21/2021   Future Appointments   Date Time Provider Denilson Thompson   11/8/2021 10:20 AM MD YESENIA Lux BS AMB   12/16/2021 10:00 AM Vanessa Drew DO NEUSM BS AMB   12/20/2021  8:30 AM Aki De Paz PA-C [facility] BS AMB        Requested Prescriptions     Pending Prescriptions Disp Refills    metFORMIN ER (GLUCOPHAGE XR) 500 mg tablet 90 Tablet 3     Sig: TAKE 1 TABLET BY MOUTH ONCE DAILY WITH SUPPER

## 2021-09-21 NOTE — PATIENT INSTRUCTIONS
Vaccine Information Statement    Influenza (Flu) Vaccine (Inactivated or Recombinant): What You Need to Know    Many vaccine information statements are available in [place] and other languages. See www.immunize.org/vis. Hojas de información sobre vacunas están disponibles en español y en muchos otros idiomas. Visite www.immunize.org/vis. 1. Why get vaccinated? Influenza vaccine can prevent influenza (flu). Flu is a contagious disease that spreads around the [facility] every year, usually between October and May. Anyone can get the flu, but it is more dangerous for some people. Infants and young children, people 72 years and older, pregnant people, and people with certain health conditions or a weakened immune system are at greatest risk of flu complications. Pneumonia, bronchitis, sinus infections, and ear infections are examples of flu-related complications. If you have a medical condition, such as heart disease, cancer, or diabetes, flu can make it worse. Flu can cause fever and chills, sore throat, muscle aches, fatigue, cough, headache, and runny or stuffy nose. Some people may have vomiting and diarrhea, though this is more common in children than adults. In an average year, thousands of people in the [facility] die from flu, and many more are hospitalized. Flu vaccine prevents millions of illnesses and flu-related visits to the doctor each year. 2. Influenza vaccines     CDC recommends everyone 6 months and older get vaccinated every flu season. Children 6 months through 6years of age may need 2 doses during a single flu season. Everyone else needs only 1 dose each flu season. It takes about 2 weeks for protection to develop after vaccination. There are many flu viruses, and they are always changing. Each year a new flu vaccine is made to protect against the influenza viruses believed to be likely to cause disease in the upcoming flu season.  Even when the vaccine doesnt exactly match these viruses, it may still provide some protection. Influenza vaccine does not cause flu. Influenza vaccine may be given at the same time as other vaccines. 3. Talk with your health care provider    Tell your vaccination provider if the person getting the vaccine:   Has had an allergic reaction after a previous dose of influenza vaccine, or has any severe, life-threatening allergies    Has ever had Guillain-Barré Syndrome (also called GBS)    In some cases, your health care provider may decide to postpone influenza vaccination until a future visit. Influenza vaccine can be administered at any time during pregnancy. People who are or will be pregnant during influenza season should receive inactivated influenza vaccine. People with minor illnesses, such as a cold, may be vaccinated. People who are moderately or severely ill should usually wait until they recover before getting influenza vaccine. Your health care provider can give you more information. 4. Risks of a vaccine reaction     Soreness, redness, and swelling where the shot is given, fever, muscle aches, and headache can happen after influenza vaccination.  There may be a very small increased risk of Guillain-Barré Syndrome (GBS) after inactivated influenza vaccine (the flu shot). Amari Vasquez children who get the flu shot along with pneumococcal vaccine (PCV13) and/or DTaP vaccine at the same time might be slightly more likely to have a seizure caused by fever. Tell your health care provider if a child who is getting flu vaccine has ever had a seizure. People sometimes faint after medical procedures, including vaccination. Tell your provider if you feel dizzy or have vision changes or ringing in the ears. As with any medicine, there is a very remote chance of a vaccine causing a severe allergic reaction, other serious injury, or death. 5. What if there is a serious problem?     An allergic reaction could occur after the vaccinated person leaves the clinic. If you see signs of a severe allergic reaction (hives, swelling of the face and throat, difficulty breathing, a fast heartbeat, dizziness, or weakness), call 9-1-1 and get the person to the nearest hospital.    For other signs that concern you, call your health care provider. Adverse reactions should be reported to the Vaccine Adverse Event Reporting System (VAERS). Your health care provider will usually file this report, or you can do it yourself. Visit the VAERS website at www.vaers. [facility].gov or call 2-451.318.5069. VAERS is only for reporting reactions, and VAERS staff members do not give medical advice. 6. The National Vaccine Injury Compensation Program    The [facility] Vaccine Injury Compensation Program (VICP) is a federal program that was created to compensate people who may have been injured by certain vaccines. Claims regarding alleged injury or death due to vaccination have a time limit for filing, which may be as short as two years. Visit the VICP website at www.[facility].gov/vaccinecompensation or call 2-296.227.2216 to learn about the program and about filing a claim. 7. How can I learn more?  Ask your health care provider.  Call your local or state health department.  Visit the website of the Food and Drug Administration (FDA) for vaccine package inserts and additional information at www.fda.gov/vaccines-blood-biologics/vaccines.  Contact the Centers for Disease Control and Prevention (CDC):  - Call 6-260.885.9045 (1-800-CDC-INFO) or  - Visit CDCs influenza website at www.cdc.gov/flu. Vaccine Information Statement   Inactivated Influenza Vaccine   8/6/2021  42 RAMYA Lobo Evelia [id number]   Department of Health and Human Services  Centers for Disease Control and Prevention    Office Use Only        Will re-order physical therapy for leg strengthening at next visit. Continue working at home on home exercises.       Muscle Conditioning: Exercises  Introduction  Here are some examples of exercises for muscle conditioning. Start each exercise slowly. Ease off the exercise if you start to have pain. Your doctor or physical therapist will tell you when you can start these exercises and which ones will work best for you. How to do the exercises  Wall push-ups    When you can do this exercise against a wall comfortably (without your muscles feeling tired), you can try it against a counter. Start with 5 repetitions again and work up to 8 to 12. You can then slowly progress to the end of a couch or a sturdy chair, and finally to the floor. 1. Stand facing a wall, about 12 to 18 inches away. 2. Place your hands on the wall at shoulder height. 3. Slowly bend your elbows and bring your face toward the wall, moving your hips and shoulders forward together. 4. Push slowly back to the starting position. 5. Start with 5 repetitions and work up to 8 to 12. 6. Rest for a minute, and repeat the exercise. Knee extension    If this exercise becomes easy, you can add a light weight around your ankle or tie an elastic resistance band to a chair leg and one ankle. 1. While sitting in a chair, straighten one leg and hold while you slowly count to 5. Be sure you do not lock your knee. 2. Repeat 8 to 12 times. 3. Rest for a minute, and repeat the exercise. 4. Do the same exercise with the other leg. Side-lying leg lift    If this exercise becomes easy, you can add a light weight around your ankle or tie an elastic resistance band to both ankles. 1. Lie on your side, with your legs extended. Keep your hips straight up and down during this exercise. Do not let your top hip rock toward the back. Support your head with your hand, and place the other hand on the floor near your waist.  2. Slowly raise your upper leg until it is about in line with your shoulder. Keep your toes pointed forward. 3. Slowly lower your leg to the starting position.   4. Repeat 8 to 12 times.  5. Rest for a minute, and repeat the exercise. 6. Turn to your other side and do the same exercise with your other leg. Shallow standing knee bends    1. Stand with your hands lightly resting on a counter or chair in front of you with your feet shoulder-width apart. 2. Slowly bend your knees so that you squat down just like you were going to sit in a chair. Make sure your knees do not go in front of your toes. 3. Lower yourself about 6 inches. Your heels should remain on the floor at all times. 4. Rise slowly to a standing position. 5. Repeat 8 to 12 times. 6. Rest for a minute, and repeat the exercise. Follow-up care is a key part of your treatment and safety. Be sure to make and go to all appointments, and call your doctor if you are having problems. It's also a good idea to know your test results and keep a list of the medicines you take. Where can you learn more? Go to http://www.gray.com/  Enter P608 in the search box to learn more about \"Muscle Conditioning: Exercises. \"  Current as of: May 12, 2021               Content Version: 13.0  © 9790-4668 Healthwise, [employer]. Care instructions adapted under license by [employer] (which disclaims liability or warranty for this information). If you have questions about a medical condition or this instruction, always ask your healthcare professional. [place] [address] any warranty or liability for your use of this information.

## 2021-09-21 NOTE — TELEPHONE ENCOUNTER
Pt called to request a refill of   Requested Prescriptions     Pending Prescriptions Disp Refills    metFORMIN ER (GLUCOPHAGE XR) 500 mg tablet 90 Tablet 0     Sig: TAKE 1 TABLET BY MOUTH ONCE DAILY WITH SUPPER     Be called into [facility] on file.

## 2021-09-21 NOTE — PROGRESS NOTES
[facility]  Identified pt with two pt identifiers(name and ). Chief Complaint   Patient presents with    Hypertension    Cholesterol Problem       Reviewed record In preparation for visit and have obtained necessary documentation. Advanced directive / living will on file. 1. Have you been to the ER, urgent care clinic or hospitalized since your last visit? No     2. Have you seen or consulted any other health care providers outside of the [address] [place] [address] since your last visit? Include any pap smears or colon screening. No    Vitals reviewed with provider. Health Maintenance reviewed: pt had to cancel her colonoscopy but will call back to reschedule. After verbal order read back of [facility], patient received Flu Shot in left deltoid. Jesse Pyle 47: [id number] Lot: [id number] 22. Patient tolerated procedure without complaints and received VIS.       Health Maintenance Due   Topic    Colorectal Cancer Screening Combo     Flu Vaccine (1)          Wt Readings from Last 3 Encounters:   21 246 lb (111.6 kg)   21 245 lb (111.1 kg)   21 247 lb (112 kg)        Temp Readings from Last 3 Encounters:   21 98.4 °F (36.9 °C) (Oral)   21 98.1 °F (36.7 °C) (Oral)   21 98 °F (36.7 °C) (Oral)        BP Readings from Last 3 Encounters:   21 105/73   21 138/88   21 115/80        Pulse Readings from Last 3 Encounters:   21 72   21 84   21 67        Vitals:    21 0811   BP: 105/73   Pulse: 72   Resp: 12   Temp: 98.4 °F (36.9 °C)   TempSrc: Oral   SpO2: 98%   Weight: 246 lb (111.6 kg)   Height: 5' 4\" (1.626 m)   PainSc:   4   PainLoc: Shoulder          Learning Assessment:   :       Learning Assessment 2019 2018 10/13/2017 3/24/2015   PRIMARY LEARNER Patient Patient Patient Patient   HIGHEST LEVEL OF EDUCATION - PRIMARY LEARNER  - - - SOME COLLEGE   BARRIERS PRIMARY LEARNER - - - NONE   CO-LEARNER CAREGIVER - - - No CO-LEARNER NAME - - - n/a   PRIMARY LANGUAGE ENGLISH ENGLISH ENGLISH ENGLISH   LEARNER PREFERENCE PRIMARY DEMONSTRATION DEMONSTRATION LISTENING LISTENING   ANSWERED BY patient patient patient patient   RELATIONSHIP SELF SELF SELF SELF        Depression Screening:   :       3 most recent PHQ Screens 9/21/2021   PHQ Not Done -   Little interest or pleasure in doing things Not at all   Feeling down, depressed, irritable, or hopeless Several days   Total Score PHQ 2 1        Fall Risk Assessment:   :       Fall Risk Assessment, last 12 mths 3/18/2021   Able to walk? Yes   Fall in past 12 months? 0   Do you feel unsteady? 0   Are you worried about falling 0   Number of falls in past 12 months -   Fall with injury? -        Abuse Screening:   :       Abuse Screening Questionnaire 6/21/2021 1/30/2020 9/5/2019 3/5/2019   Do you ever feel afraid of your partner? N N N N   Are you in a relationship with someone who physically or mentally threatens you? N N N N   Is it safe for you to go home?  Y Y Y Y        ADL Screening:   :       ADL Assessment 6/21/2021   Feeding yourself No Help Needed   Getting from bed to chair Help Needed   Getting dressed Help Needed   Bathing or showering Help Needed   Walk across the room (includes cane/walker) No Help Needed   Using the telphone No Help Needed   Taking your medications Help Needed   Preparing meals Help Needed   Managing money (expenses/bills) Help Needed   Moderately strenuous housework (laundry) Help Needed   Shopping for personal items (toiletries/medicines) Help Needed   Shopping for groceries Help Needed   Driving Help Needed   Climbing a flight of stairs Help Needed   Getting to places beyond walking distances Help Needed

## 2021-09-22 RX ORDER — METFORMIN HYDROCHLORIDE 500 MG/1
TABLET, EXTENDED RELEASE ORAL
Qty: 90 TABLET | Refills: 3 | Status: SHIPPED | OUTPATIENT
Start: 2021-09-22 | End: 2022-02-15 | Stop reason: SDUPTHER

## 2021-09-24 ENCOUNTER — TRANSCRIBE ORDER (OUTPATIENT)
Dept: SCHEDULING | Age: 61
End: 2021-09-24

## 2021-09-24 DIAGNOSIS — Z12.31 SCREENING MAMMOGRAM FOR HIGH-RISK PATIENT: Primary | ICD-10-CM

## 2021-10-11 ENCOUNTER — TELEPHONE (OUTPATIENT)
Dept: INTERNAL MEDICINE CLINIC | Age: 61
End: 2021-10-11

## 2021-10-11 DIAGNOSIS — R32 URINARY INCONTINENCE, UNSPECIFIED TYPE: Primary | ICD-10-CM

## 2021-10-11 NOTE — TELEPHONE ENCOUNTER
Patient called from specialist office. Need an insurance referral.  Patient was not aware that she needed a referral. Dr. Mio Phillips MD . Urology. Monthly catheter change. [phone], fax [phone]. Diagnosis- incontinence. They had patient to reschedule appointment.

## 2021-10-18 ENCOUNTER — HOSPITAL ENCOUNTER (OUTPATIENT)
Dept: MAMMOGRAPHY | Age: 61
Discharge: HOME | End: 2021-10-18
Attending: PHYSICIAN ASSISTANT
Payer: MEDICARE

## 2021-10-18 DIAGNOSIS — Z12.31 SCREENING MAMMOGRAM FOR HIGH-RISK PATIENT: ICD-10-CM

## 2021-10-18 PROCEDURE — 77067 SCR MAMMO BI INCL CAD: CPT

## 2021-11-01 ENCOUNTER — OFFICE VISIT (OUTPATIENT)
Dept: CARDIOLOGY CLINIC | Age: 61
End: 2021-11-01
Payer: MEDICARE

## 2021-11-01 VITALS
WEIGHT: 250 LBS | SYSTOLIC BLOOD PRESSURE: 130 MMHG | DIASTOLIC BLOOD PRESSURE: 88 MMHG | RESPIRATION RATE: 18 BRPM | HEART RATE: 70 BPM | BODY MASS INDEX: 42.68 KG/M2 | OXYGEN SATURATION: 98 % | HEIGHT: 64 IN

## 2021-11-01 DIAGNOSIS — E78.00 HYPERCHOLESTEROLEMIA: ICD-10-CM

## 2021-11-01 DIAGNOSIS — I63.9 CEREBROVASCULAR ACCIDENT (CVA), UNSPECIFIED MECHANISM (HCC): ICD-10-CM

## 2021-11-01 DIAGNOSIS — I25.10 CORONARY ARTERY DISEASE INVOLVING NATIVE CORONARY ARTERY OF NATIVE HEART WITHOUT ANGINA PECTORIS: ICD-10-CM

## 2021-11-01 DIAGNOSIS — R73.03 PREDIABETES: ICD-10-CM

## 2021-11-01 DIAGNOSIS — I69.398 HEMIPARESIS AND ALTERATION OF SENSATIONS AS LATE EFFECTS OF STROKE (HCC): ICD-10-CM

## 2021-11-01 DIAGNOSIS — I10 ESSENTIAL HYPERTENSION: ICD-10-CM

## 2021-11-01 DIAGNOSIS — I50.9 CHRONIC CONGESTIVE HEART FAILURE, UNSPECIFIED HEART FAILURE TYPE (HCC): ICD-10-CM

## 2021-11-01 DIAGNOSIS — I69.359 HEMIPARESIS AND ALTERATION OF SENSATIONS AS LATE EFFECTS OF STROKE (HCC): ICD-10-CM

## 2021-11-01 DIAGNOSIS — R07.9 CHEST PAIN, UNSPECIFIED TYPE: Primary | ICD-10-CM

## 2021-11-01 DIAGNOSIS — I25.5 ISCHEMIC CARDIOMYOPATHY: ICD-10-CM

## 2021-11-01 DIAGNOSIS — I49.3 VENTRICULAR ECTOPIC ACTIVITY: ICD-10-CM

## 2021-11-01 DIAGNOSIS — R53.1 LEFT-SIDED WEAKNESS: ICD-10-CM

## 2021-11-01 DIAGNOSIS — E78.00 HIGH CHOLESTEROL: ICD-10-CM

## 2021-11-01 DIAGNOSIS — Z86.79 HISTORY OF CARDIOMYOPATHY: ICD-10-CM

## 2021-11-01 DIAGNOSIS — R06.02 SOB (SHORTNESS OF BREATH): ICD-10-CM

## 2021-11-01 DIAGNOSIS — E66.01 OBESITY, MORBID (HCC): ICD-10-CM

## 2021-11-01 DIAGNOSIS — R00.2 HEART PALPITATIONS: ICD-10-CM

## 2021-11-01 PROCEDURE — G8427 DOCREV CUR MEDS BY ELIG CLIN: HCPCS | Performed by: SPECIALIST

## 2021-11-01 PROCEDURE — G8754 DIAS BP LESS 90: HCPCS | Performed by: SPECIALIST

## 2021-11-01 PROCEDURE — 3017F COLORECTAL CA SCREEN DOC REV: CPT | Performed by: SPECIALIST

## 2021-11-01 PROCEDURE — 99214 OFFICE O/P EST MOD 30 MIN: CPT | Performed by: SPECIALIST

## 2021-11-01 PROCEDURE — G8417 CALC BMI ABV UP PARAM F/U: HCPCS | Performed by: SPECIALIST

## 2021-11-01 PROCEDURE — G8752 SYS BP LESS 140: HCPCS | Performed by: SPECIALIST

## 2021-11-01 PROCEDURE — G9899 SCRN MAM PERF RSLTS DOC: HCPCS | Performed by: SPECIALIST

## 2021-11-01 PROCEDURE — G8510 SCR DEP NEG, NO PLAN REQD: HCPCS | Performed by: SPECIALIST

## 2021-11-01 RX ORDER — NEBULIZER
EACH MISCELLANEOUS
COMMUNITY
Start: 2021-10-14 | End: 2022-01-04

## 2021-11-01 RX ORDER — POLYETHYLENE GLYCOL 3350, SODIUM CHLORIDE, SODIUM BICARBONATE, POTASSIUM CHLORIDE 420; 11.2; 5.72; 1.48 G/4L; G/4L; G/4L; G/4L
POWDER, FOR SOLUTION ORAL
COMMUNITY
Start: 2021-10-28 | End: 2022-01-04

## 2021-11-01 RX ORDER — CARVEDILOL 12.5 MG/1
12.5 TABLET ORAL 2 TIMES DAILY WITH MEALS
Qty: 180 TABLET | Refills: 3 | Status: SHIPPED | OUTPATIENT
Start: 2021-11-01 | End: 2022-08-04 | Stop reason: SDUPTHER

## 2021-11-01 NOTE — PROGRESS NOTES
HISTORY OF PRESENT ILLNESS  Estrada Barbosa is a 59 y.o. female. She has morbid obesity and hypertension and a history of stroke occurring 7 years ago in [place] following cardiac catheterization which revealed reduced left ventricular function but only minimal coronary narrowings. She was complaining of occasional chest pains I last saw her and she had a Lexiscan Myoview test that showed a prior inferior infarct which was seen in the past but no ischemia with an ejection fraction of 47%. She still has occasional chest pains but they make it better. Her weight is down 3 pounds. Her blood pressure is controlled the office today but she tells me at home it can go as high as 220/140 and as low as 109/60. She uses a walker for ambulation. She no longer has a right radial pulse after the catheterization 7 years ago.   HPI  Patient Active Problem List   Diagnosis Code    Coronary artery disease involving native coronary artery of native heart without angina pectoris I25.10    Bronchitis J40    High cholesterol E78.00    History of cardiomyopathy Z86.79    SOB (shortness of breath) R06.02    Neck pain, bilateral M54.2    Shoulder pain, bilateral M25.511, M25.512    Muscle spasticity M62.838    Hemiparesis and alteration of sensations as late effects of stroke ([facility]) I69.359, I69.398    Head pain, chronic R51.9, G89.29    Expressive aphasia R47.01    Heart palpitations R00.2    Ventricular ectopic activity I49.3    Stroke ([facility]) I63.9    Thyroid disease E07.9    Hypercholesterolemia E78.00    EDDIE on CPAP G47.33, Z99.89    Essential hypertension I10    Congestive heart failure ([facility]) I50.9    Chest pain R07.9    Melena K92.1    Hiatal hernia K44.9    Postoperative hypothyroidism E89.0    Chronic pain G89.29    Prediabetes R73.03    Constipation K59.00    Insomnia G47.00    Left-sided weakness R53.1    Vitamin D deficiency E55.9    Obesity, morbid ([facility]) E66.01    Chronic indwelling Dunn catheter Z97.8    Dermatitis L30.9    Obesity (BMI 30-39. 9) E66.9    Anxiety F41.9     Current Outpatient Medications   Medication Sig Dispense Refill    Sidestream misc       peg-electrolyte soln (NULYTELY) 420 gram solution       carvediloL (COREG) 12.5 mg tablet Take 1 Tablet by mouth two (2) times daily (with meals). 180 Tablet 3    metFORMIN ER (GLUCOPHAGE XR) 500 mg tablet TAKE 1 TABLET BY MOUTH ONCE DAILY WITH SUPPER 90 Tablet 3    levothyroxine (Euthyrox) 125 mcg tablet Take 1 Tablet by mouth Daily (before breakfast). 90 Tablet 0    ipratropium (ATROVENT) 0.02 % soln       traZODone (DESYREL) 300 mg tablet Take 300 mg by mouth nightly.  oxyCODONE-acetaminophen (PERCOCET 10)  mg per tablet TAKE 1 TABLET BY MOUTH EVERY 12 HOURS AS NEEDED FOR CHRONIC PAIN      baclofen (LIORESAL) 20 mg tablet TAKE 1 TABLET BY MOUTH EVERY 8 HOURS AS NEEDED FOR MUSCLE SPASM(S)      cholecalciferol (VITAMIN D3) (5000 Units /125 mcg) capsule Take 1 Capsule by mouth daily. 90 Capsule 3    Botox 200 unit injection INJECT INTO THE BILATERAL MUSCLES OF THE HEAD AND NECK BY PRESCRIBER IN OFFICE FOR MIGRAINES EVERY 3 MONTHS. DISCARD UNUSED PORTION 1 Vial 3    losartan (COZAAR) 50 mg tablet Take 1 Tab by mouth nightly. Hold for SBP<115 90 Tab 3    pantoprazole (PROTONIX) 40 mg tablet Take 1 Tab by mouth daily. 90 Tab 3    BD Single Use Swabs Regular padm Check Blood sugar ONCE daily      atorvastatin (LIPITOR) 40 mg tablet Take 1 tablet by mouth once daily 90 Tab 1    galcanezumab-gnlm (Emgality Syringe) 120 mg/mL syrg 1 Syringe by SubCUTAneous route every thirty (30) days.  Start 30 days after the loading dose 1 Syringe 11    topiramate (TOPAMAX) 100 mg tablet Take 1 tablet by mouth twice daily 180 Tab 3    cyanocobalamin (VITAMIN B12) 1,000 mcg/mL injection INJECT 1 ML UNDER THE SKIN EVERY 30 DAYS FOR B12 DEFICIENCY  Indications: inadequate vitamin B12 (Patient taking differently: INJECT 1 ML UNDER THE SKIN EVERY 14 DAYS FOR B12 DEFICIENCY  Indications: inadequate vitamin B12) 10 mL 1    Syringe, Disposable, 1 mL syrg Use bimonthly for cyanocobalamin subcutaneous injection. 25 Syringe 0    Needle, Disp, 25 G 25 gauge x 3/4\" ndle Use bimonthly for cyanocobalamin subcutaneous injection. 1 Box 0    fluticasone propionate (FLONASE) 50 mcg/actuation nasal spray 2 Sprays by Both Nostrils route daily. 1 Bottle 0    Linzess 145 mcg cap capsule TAKE 1 CAPSULE BY MOUTH ONCE DAILY BEFORE BREAKFAST FOR CONSTIPATION 90 Cap 0    furosemide (LASIX) 40 mg tablet Take 1 tablet by mouth once daily 90 Tab 0    gabapentin (NEURONTIN) 100 mg capsule Take 100 mg by mouth three (3) times daily as needed for Pain.  FREESTYLE LITE STRIPS strip USE STRIP TO CHECK GLUCOSE TWICE DAILY  3    FREESTYLE LITE METER monitoring kit USE TO CHECK GLUCOSE ONCE DAILY  0    ciclopirox (PENLAC) 8 % solution APPLY TO AFFECTED TOE NAILS DAILY      diclofenac (VOLTAREN) 1 % gel APPLY TOPICALLY TO AFFECTED AREA ONCE DAILY      hydroxyzine HCL (ATARAX) 50 mg tablet TAKE 1 TABLET BY MOUTH TWICE DAILY AS NEEDED FOR ANXIETY      FREESTYLE LANCETS 28 gauge [facility] USE TO CHECK GLUCOSE TWICE DAILY      miscellaneous medical supply misc Rollator Dx:I69.359 1 Each 0    miscellaneous medical supply [facility] Walker Dx: I69.359 1 Each 0    omega-3 acid ethyl esters (LOVAZA) 1 gram capsule Take 2 Caps by mouth two (2) times a day. 120 Cap 2    DULoxetine (CYMBALTA) 60 mg capsule Take 2 Caps by mouth daily. 180 Cap 0    calcipotriene (DOVONEX) 0.005 % topical cream Apply  to affected area daily as needed. 60 g 11    albuterol-ipratropium (DUO-NEB) 2.5 mg-0.5 mg/3 ml nebu 3 mL by Nebulization route every six (6) hours as needed. 30 Nebule 0    albuterol (PROVENTIL HFA, VENTOLIN HFA, PROAIR HFA) 90 mcg/actuation inhaler Take 2 Puffs by inhalation every four (4) hours as needed for Wheezing or Shortness of Breath.  3 Inhaler 3    ferrous sulfate 325 mg (65 mg iron) tablet Take 325 mg by mouth Daily (before breakfast).  aspirin delayed-release 81 mg tablet Take 81 mg by mouth daily. Past Medical History:   Diagnosis Date    Advanced care planning/counseling discussion 3/4/16    On File    Bronchitis 2/24/2014    Cervicalgia 8/18/15    Dr. Aftab Trent    Chest pain 5/25/15    Hospitalized at [facility] 5/25/15 (lab work negative)    Chronic indwelling Dunn catheter 1/24/2018    Initially placed Jan 2018. Mx by Dr. Pauline Greco.  Congestive heart failure, unspecified     Last Echo 2/8/15: EF 55-60%    Constipation 6/13/2017    Enthesopathy, spinal ([facility] [address] 8/18/15    Dr. Aftab Trent    Essential hypertension     Foot drop 8/18/15    Dr. Ashvin Linares Heart attack [facility]) 2/24/2013    Was supposed to See Cardiology for possible pacemaker in november 2014- After Cardiology consult locally, no need, EF greatly improved. Established with Dr. Robson Sabillon Hiatal hernia 6/2015    3 cm hiatal hernia     Hip pain 8/18/15    Dr. Aftab Trent    Hypercholesterolemia     Hyperglycemia 7/2015    A1c 5.9     Hyperlipidemia 6/30/2015    NMR lipoprofile- LDL P 997, LDL-c 71, HLD-C-39, TG-60, HLD-P (25.2), Small LDL-P -541, LDL size 20.6    Hypothyroid     Insomnia 6/13/2017    Lower extremity edema     Lumbar spondylosis 8/18/15    Dr. Cheli Hauser 6/2015    EGD/Colonscopy 6/15- Gastritis, internal hemorrhoids and 3 polyps    Menopause     Murmur     EDDIE on CPAP     Was referred to Pulmonology - Uses CPAP    Osteoarthritis of hip 8/18/15    Dr. Afatb Trent    Osteoarthritis, shoulder 8/18/15    Dr. Ashvin Linares Radiculopathy, cervical 8/18/15    Dr. Aftab Trent    Shoulder pain 8/18/15    Dr. Ashvin Linares Spinal stenosis of cervical region 8/18/15    Dr. Ashvin Linares Spinal stenosis, lumbar 8/18/15    Dr. Ashvin Linares Stroke [facility]) 2/25/2014    Established with Neurology, Marian Scruggs NP-Just hospitalized at [facility] 2/7/15-2/10/15.  CT negative, but Late effect CV accident with increased tone described on discharge summary, Carotid dopplers showed 50% stenosis bilaterally.  Vitamin D deficiency 7/2015     Past Surgical History:   Procedure Laterality Date    COLONOSCOPY N/A 6/22/2016    COL[facility]COPY performed [facility] Tracy Galo [facility] at [facility] ENDOSCOPY    HX BREAST BIOPSY Right 8/11/15    Stereo Bx - [facility]--- Benign    HX CHOLECYSTECTOMY      HX COLONOSCOPY  6/2015    Dr. Leatha Epley- 3 complete polypectomies, Internal hemorrhoids, difficult study due to spasm. Repeat in 1 year    HX ENDOSCOPY  6/2015    mild gastritis, 3cm hiatal hernia     HX GASTRIC BYPASS  6/1989    HX HEART CATHETERIZATION  2/2014    HX ORTHOPAEDIC      Right middle finger distal amputation    HX PARTIAL THYROIDECTOMY  ~1990    HX THYROIDECTOMY Left 1985    90% of one side of the thyroid removed    IR ASP BLADDER SUPRA CATH  5/24/2019       Review of Systems   Eyes: Positive for blurred vision. Cardiovascular: Positive for chest pain. Neurological: Positive for dizziness and focal weakness. All other systems reviewed and are negative. Visit Vitals  /88 (BP 1 Location: Left upper arm, BP Patient Position: Sitting, BP Cuff Size: Adult)   Pulse 70   Resp 18   Ht 5' 4\" (1.626 m)   Wt 250 lb (113.4 kg)   LMP  (LMP Unknown)   SpO2 98%   BMI 42.91 kg/m²       Physical Exam  Constitutional:       Appearance: She is obese. HENT:      Head: Normocephalic. Right Ear: External ear normal.      Left Ear: External ear normal.      Nose: Nose normal.      Mouth/Throat:      Mouth: Mucous membranes are moist.   Eyes:      Extraocular Movements: Extraocular movements intact. Cardiovascular:      Rate and Rhythm: Normal rate and regular rhythm. Heart sounds: Normal heart sounds. Pulmonary:      Breath sounds: Normal breath sounds. Abdominal:      Palpations: Abdomen is soft. Musculoskeletal:         General: No deformity. Cervical back: Normal range of motion. Skin:     General: Skin is warm.    Neurological:      Mental Status: She is alert and oriented to person, place, and time. Psychiatric:         Behavior: Behavior normal.         ASSESSMENT and PLAN  It seems unusual that she is having such wide swings in her blood pressure at home. It is unclear what to do about this. I will start by increasing her carvedilol from 6.25 up to 12.5 mg twice daily. It sounds like she is having symptomatic periods of high blood pressure much more commonly than low blood pressure. I will continue her other medications unchanged and plan to see her back in 3 months.

## 2021-11-24 DIAGNOSIS — E78.00 HIGH CHOLESTEROL: ICD-10-CM

## 2021-11-24 DIAGNOSIS — K59.04 CHRONIC IDIOPATHIC CONSTIPATION: ICD-10-CM

## 2021-11-24 DIAGNOSIS — E78.00 HYPERCHOLESTEROLEMIA: ICD-10-CM

## 2021-11-24 RX ORDER — FUROSEMIDE 40 MG/1
40 TABLET ORAL DAILY
Qty: 90 TABLET | Refills: 0 | Status: SHIPPED | OUTPATIENT
Start: 2021-11-24 | End: 2022-02-01 | Stop reason: SDUPTHER

## 2021-11-24 RX ORDER — ATORVASTATIN CALCIUM 40 MG/1
TABLET, FILM COATED ORAL
Qty: 90 TABLET | Refills: 0 | Status: SHIPPED | OUTPATIENT
Start: 2021-11-24 | End: 2022-02-01 | Stop reason: SDUPTHER

## 2021-11-24 NOTE — TELEPHONE ENCOUNTER
Requested Prescriptions     Pending Prescriptions Disp Refills    linaCLOtide (Linzess) 145 mcg cap capsule 90 Capsule 0    furosemide (LASIX) 40 mg tablet 90 Tablet 0     Sig: Take 1 Tablet by mouth daily.     atorvastatin (LIPITOR) 40 mg tablet 90 Tablet 1     Sig: Take 1 tablet by mouth once daily

## 2021-11-24 NOTE — TELEPHONE ENCOUNTER
PCP: Elif Montague PA-C     Last appt: 9/21/2021   Future Appointments   Date Time Provider Denilson Thompson   12/16/2021 10:00 AM Dorian Drew DO NEUSM BS AMB   12/30/2021 10:00 AM MAKEDA Sarabia BS AMB   2/1/2022  9:40 AM MD YESENIA Le BS AMB        Requested Prescriptions     Pending Prescriptions Disp Refills    linaCLOtide (Linzess) 145 mcg cap capsule 90 Capsule 0    furosemide (LASIX) 40 mg tablet 90 Tablet 0     Sig: Take 1 Tablet by mouth daily.     atorvastatin (LIPITOR) 40 mg tablet 90 Tablet 1     Sig: Take 1 tablet by mouth once daily

## 2021-12-06 NOTE — TELEPHONE ENCOUNTER
Requested Prescriptions     Pending Prescriptions Disp Refills    topiramate (TOPAMAX) 100 mg tablet 180 Tablet 3     Sig: Take 1 tablet by mouth twice daily

## 2021-12-07 RX ORDER — TOPIRAMATE 100 MG/1
TABLET, FILM COATED ORAL
Qty: 180 TABLET | Refills: 3 | Status: SHIPPED | OUTPATIENT
Start: 2021-12-07 | End: 2022-10-13 | Stop reason: SDUPTHER

## 2021-12-16 ENCOUNTER — OFFICE VISIT (OUTPATIENT)
Dept: NEUROLOGY | Age: 61
End: 2021-12-16
Payer: MEDICARE

## 2021-12-16 VITALS
HEART RATE: 88 BPM | DIASTOLIC BLOOD PRESSURE: 88 MMHG | HEIGHT: 64 IN | OXYGEN SATURATION: 97 % | BODY MASS INDEX: 42.34 KG/M2 | SYSTOLIC BLOOD PRESSURE: 136 MMHG | WEIGHT: 248 LBS

## 2021-12-16 DIAGNOSIS — G43.719 INTRACTABLE CHRONIC MIGRAINE WITHOUT AURA AND WITHOUT STATUS MIGRAINOSUS: Primary | ICD-10-CM

## 2021-12-16 PROCEDURE — 64615 CHEMODENERV MUSC MIGRAINE: CPT | Performed by: PSYCHIATRY & NEUROLOGY

## 2021-12-16 RX ORDER — MONTELUKAST SODIUM 10 MG/1
10 TABLET ORAL DAILY
COMMUNITY
Start: 2021-12-14

## 2021-12-16 NOTE — PROGRESS NOTES
Botox Injection Note     2021     Patient:  Mila Cao   YOB: 1960  Date of Visit: 2021      Indication: patient has chronic migraine headaches greater than 15 days per month lasting more than 4 hours each. She has failed or not tolerated 2 or more medication preventatives. Written consent was signed and verified by me. Risks and benefits were discussed to include possible cosmetic asymmetry which is not permament or life threatening. Patient name and  was confirmed prior to procedure. Time out performed. Procedure:   Botox concentration: 200 units in 2 ml of preservative-free normal saline. 1:1 dilution. LOT#: [id number]  EXP:  2024    Injections and distribution as follow :      Units/site  Sites Loc Subtotal    procerus 5 1 ML 5   corrugaters 2.5 2 BL 5   frontalis 5 8 BL 40   Temporalis 10 4 BL 40   Occipitalis 10 2 BL 20   Cervical paraspinals 5 4 BL 20   Trapezius 10 4 BL 40         200 units Botox were reconstituted, 170 units injected as above and the remainder was unavoidably wasted. Patient tolerated procedure well. Return in 3 months for repeat injections.     _____________________________   Uyen Calderon DO  [address], ABPN

## 2021-12-19 ENCOUNTER — APPOINTMENT (OUTPATIENT)
Dept: CT IMAGING | Age: 61
DRG: 699 | End: 2021-12-19
Attending: STUDENT IN AN ORGANIZED HEALTH CARE EDUCATION/TRAINING PROGRAM
Payer: MEDICARE

## 2021-12-19 ENCOUNTER — HOSPITAL ENCOUNTER (INPATIENT)
Age: 61
LOS: 16 days | Discharge: NURSING, REHAB OR LONG-TERM CARE | DRG: 699 | End: 2022-01-04
Attending: STUDENT IN AN ORGANIZED HEALTH CARE EDUCATION/TRAINING PROGRAM | Admitting: FAMILY MEDICINE
Payer: MEDICARE

## 2021-12-19 DIAGNOSIS — R41.0 CONFUSION: ICD-10-CM

## 2021-12-19 DIAGNOSIS — N39.0 URINARY TRACT INFECTION WITHOUT HEMATURIA, SITE UNSPECIFIED: Primary | ICD-10-CM

## 2021-12-19 DIAGNOSIS — R29.6 FREQUENT FALLS: ICD-10-CM

## 2021-12-19 PROBLEM — N30.00 ACUTE CYSTITIS: Status: ACTIVE | Noted: 2021-12-19

## 2021-12-19 PROBLEM — G93.40 ENCEPHALOPATHY: Status: ACTIVE | Noted: 2021-12-19

## 2021-12-19 LAB
ALBUMIN SERPL-MCNC: 3.7 G/DL (ref 3.5–5)
ALBUMIN/GLOB SERPL: 1 {RATIO} (ref 1.1–2.2)
ALP SERPL-CCNC: 121 U/L (ref 45–117)
ALT SERPL-CCNC: 36 U/L (ref 12–78)
ANION GAP SERPL CALC-SCNC: 4 MMOL/L (ref 5–15)
APPEARANCE UR: ABNORMAL
AST SERPL-CCNC: 46 U/L (ref 15–37)
ATRIAL RATE: 68 BPM
BACTERIA URNS QL MICRO: ABNORMAL /HPF
BASOPHILS # BLD: 0 K/UL (ref 0–0.1)
BASOPHILS NFR BLD: 1 % (ref 0–1)
BILIRUB SERPL-MCNC: 0.6 MG/DL (ref 0.2–1)
BILIRUB UR QL: NEGATIVE
BUN SERPL-MCNC: 39 MG/DL (ref 6–20)
BUN/CREAT SERPL: 29 (ref 12–20)
CALCIUM SERPL-MCNC: 9.1 MG/DL (ref 8.5–10.1)
CALCULATED P AXIS, ECG09: 47 DEGREES
CALCULATED R AXIS, ECG10: -8 DEGREES
CALCULATED T AXIS, ECG11: 16 DEGREES
CHLORIDE SERPL-SCNC: 112 MMOL/L (ref 97–108)
CO2 SERPL-SCNC: 23 MMOL/L (ref 21–32)
COLOR UR: ABNORMAL
COMMENT, HOLDF: NORMAL
CREAT SERPL-MCNC: 1.33 MG/DL (ref 0.55–1.02)
DIAGNOSIS, 93000: NORMAL
DIFFERENTIAL METHOD BLD: NORMAL
EOSINOPHIL # BLD: 0.2 K/UL (ref 0–0.4)
EOSINOPHIL NFR BLD: 3 % (ref 0–7)
EPITH CASTS URNS QL MICRO: ABNORMAL /LPF
ERYTHROCYTE [DISTWIDTH] IN BLOOD BY AUTOMATED COUNT: 13.3 % (ref 11.5–14.5)
GLOBULIN SER CALC-MCNC: 3.7 G/DL (ref 2–4)
GLUCOSE SERPL-MCNC: 92 MG/DL (ref 65–100)
GLUCOSE UR STRIP.AUTO-MCNC: NEGATIVE MG/DL
HCT VFR BLD AUTO: 41.4 % (ref 35–47)
HGB BLD-MCNC: 13.3 G/DL (ref 11.5–16)
HGB UR QL STRIP: ABNORMAL
HYALINE CASTS URNS QL MICRO: ABNORMAL /LPF (ref 0–5)
IMM GRANULOCYTES # BLD AUTO: 0 K/UL (ref 0–0.04)
IMM GRANULOCYTES NFR BLD AUTO: 0 % (ref 0–0.5)
KETONES UR QL STRIP.AUTO: NEGATIVE MG/DL
LEUKOCYTE ESTERASE UR QL STRIP.AUTO: ABNORMAL
LYMPHOCYTES # BLD: 2.2 K/UL (ref 0.8–3.5)
LYMPHOCYTES NFR BLD: 41 % (ref 12–49)
MCH RBC QN AUTO: 28.5 PG (ref 26–34)
MCHC RBC AUTO-ENTMCNC: 32.1 G/DL (ref 30–36.5)
MCV RBC AUTO: 88.7 FL (ref 80–99)
MONOCYTES # BLD: 0.7 K/UL (ref 0–1)
MONOCYTES NFR BLD: 12 % (ref 5–13)
NEUTS SEG # BLD: 2.2 K/UL (ref 1.8–8)
NEUTS SEG NFR BLD: 43 % (ref 32–75)
NITRITE UR QL STRIP.AUTO: POSITIVE
NRBC # BLD: 0 K/UL (ref 0–0.01)
NRBC BLD-RTO: 0 PER 100 WBC
P-R INTERVAL, ECG05: 176 MS
PH UR STRIP: 6 [PH] (ref 5–8)
PLATELET # BLD AUTO: 222 K/UL (ref 150–400)
PMV BLD AUTO: 9.9 FL (ref 8.9–12.9)
POTASSIUM SERPL-SCNC: 4.4 MMOL/L (ref 3.5–5.1)
PROT SERPL-MCNC: 7.4 G/DL (ref 6.4–8.2)
PROT UR STRIP-MCNC: ABNORMAL MG/DL
Q-T INTERVAL, ECG07: 412 MS
QRS DURATION, ECG06: 86 MS
QTC CALCULATION (BEZET), ECG08: 438 MS
RBC # BLD AUTO: 4.67 M/UL (ref 3.8–5.2)
RBC #/AREA URNS HPF: ABNORMAL /HPF (ref 0–5)
SAMPLES BEING HELD,HOLD: NORMAL
SODIUM SERPL-SCNC: 139 MMOL/L (ref 136–145)
SP GR UR REFRACTOMETRY: 1.02 (ref 1–1.03)
TROPONIN-HIGH SENSITIVITY: 23 NG/L (ref 0–51)
UR CULT HOLD, URHOLD: NORMAL
UROBILINOGEN UR QL STRIP.AUTO: 1 EU/DL (ref 0.2–1)
VENTRICULAR RATE, ECG03: 68 BPM
WBC # BLD AUTO: 5.3 K/UL (ref 3.6–11)
WBC URNS QL MICRO: ABNORMAL /HPF (ref 0–4)

## 2021-12-19 PROCEDURE — 87186 SC STD MICRODIL/AGAR DIL: CPT

## 2021-12-19 PROCEDURE — 80053 COMPREHEN METABOLIC PANEL: CPT

## 2021-12-19 PROCEDURE — 74011250636 HC RX REV CODE- 250/636: Performed by: STUDENT IN AN ORGANIZED HEALTH CARE EDUCATION/TRAINING PROGRAM

## 2021-12-19 PROCEDURE — 70450 CT HEAD/BRAIN W/O DYE: CPT

## 2021-12-19 PROCEDURE — 81001 URINALYSIS AUTO W/SCOPE: CPT

## 2021-12-19 PROCEDURE — 74011250637 HC RX REV CODE- 250/637: Performed by: STUDENT IN AN ORGANIZED HEALTH CARE EDUCATION/TRAINING PROGRAM

## 2021-12-19 PROCEDURE — 93005 ELECTROCARDIOGRAM TRACING: CPT

## 2021-12-19 PROCEDURE — 99284 EMERGENCY DEPT VISIT MOD MDM: CPT

## 2021-12-19 PROCEDURE — 96374 THER/PROPH/DIAG INJ IV PUSH: CPT

## 2021-12-19 PROCEDURE — 87086 URINE CULTURE/COLONY COUNT: CPT

## 2021-12-19 PROCEDURE — 51702 INSERT TEMP BLADDER CATH: CPT

## 2021-12-19 PROCEDURE — 94760 N-INVAS EAR/PLS OXIMETRY 1: CPT

## 2021-12-19 PROCEDURE — 84484 ASSAY OF TROPONIN QUANT: CPT

## 2021-12-19 PROCEDURE — 85025 COMPLETE CBC W/AUTO DIFF WBC: CPT

## 2021-12-19 PROCEDURE — 74011000250 HC RX REV CODE- 250: Performed by: STUDENT IN AN ORGANIZED HEALTH CARE EDUCATION/TRAINING PROGRAM

## 2021-12-19 PROCEDURE — 65270000029 HC RM PRIVATE

## 2021-12-19 PROCEDURE — 74011250637 HC RX REV CODE- 250/637: Performed by: FAMILY MEDICINE

## 2021-12-19 PROCEDURE — 87040 BLOOD CULTURE FOR BACTERIA: CPT

## 2021-12-19 PROCEDURE — 87077 CULTURE AEROBIC IDENTIFY: CPT

## 2021-12-19 RX ORDER — ACETAMINOPHEN 650 MG/1
650 SUPPOSITORY RECTAL
Status: DISCONTINUED | OUTPATIENT
Start: 2021-12-19 | End: 2022-01-04 | Stop reason: HOSPADM

## 2021-12-19 RX ORDER — ONDANSETRON 4 MG/1
4 TABLET, ORALLY DISINTEGRATING ORAL
Status: DISCONTINUED | OUTPATIENT
Start: 2021-12-19 | End: 2022-01-04 | Stop reason: HOSPADM

## 2021-12-19 RX ORDER — GABAPENTIN 100 MG/1
100 CAPSULE ORAL
Status: DISCONTINUED | OUTPATIENT
Start: 2021-12-19 | End: 2022-01-04 | Stop reason: HOSPADM

## 2021-12-19 RX ORDER — ACETAMINOPHEN 325 MG/1
650 TABLET ORAL
Status: DISCONTINUED | OUTPATIENT
Start: 2021-12-19 | End: 2022-01-04 | Stop reason: HOSPADM

## 2021-12-19 RX ORDER — ENOXAPARIN SODIUM 100 MG/ML
40 INJECTION SUBCUTANEOUS DAILY
Status: DISCONTINUED | OUTPATIENT
Start: 2021-12-20 | End: 2022-01-04 | Stop reason: HOSPADM

## 2021-12-19 RX ORDER — FUROSEMIDE 40 MG/1
40 TABLET ORAL DAILY
Status: DISCONTINUED | OUTPATIENT
Start: 2021-12-20 | End: 2022-01-04 | Stop reason: HOSPADM

## 2021-12-19 RX ORDER — ONDANSETRON 2 MG/ML
4 INJECTION INTRAMUSCULAR; INTRAVENOUS
Status: DISCONTINUED | OUTPATIENT
Start: 2021-12-19 | End: 2022-01-04 | Stop reason: HOSPADM

## 2021-12-19 RX ORDER — PANTOPRAZOLE SODIUM 40 MG/1
40 TABLET, DELAYED RELEASE ORAL DAILY
Status: DISCONTINUED | OUTPATIENT
Start: 2021-12-20 | End: 2022-01-04 | Stop reason: HOSPADM

## 2021-12-19 RX ORDER — LANOLIN ALCOHOL/MO/W.PET/CERES
325 CREAM (GRAM) TOPICAL
Status: DISCONTINUED | OUTPATIENT
Start: 2021-12-20 | End: 2022-01-04 | Stop reason: HOSPADM

## 2021-12-19 RX ORDER — CARVEDILOL 12.5 MG/1
12.5 TABLET ORAL 2 TIMES DAILY WITH MEALS
Status: DISCONTINUED | OUTPATIENT
Start: 2021-12-20 | End: 2022-01-04 | Stop reason: HOSPADM

## 2021-12-19 RX ORDER — IPRATROPIUM BROMIDE AND ALBUTEROL SULFATE 2.5; .5 MG/3ML; MG/3ML
3 SOLUTION RESPIRATORY (INHALATION)
Status: DISCONTINUED | OUTPATIENT
Start: 2021-12-19 | End: 2022-01-04 | Stop reason: HOSPADM

## 2021-12-19 RX ORDER — SODIUM CHLORIDE 0.9 % (FLUSH) 0.9 %
5-40 SYRINGE (ML) INJECTION AS NEEDED
Status: DISCONTINUED | OUTPATIENT
Start: 2021-12-19 | End: 2022-01-04 | Stop reason: HOSPADM

## 2021-12-19 RX ORDER — DULOXETIN HYDROCHLORIDE 60 MG/1
120 CAPSULE, DELAYED RELEASE ORAL DAILY
Status: DISCONTINUED | OUTPATIENT
Start: 2021-12-20 | End: 2022-01-04 | Stop reason: HOSPADM

## 2021-12-19 RX ORDER — ACETAMINOPHEN 325 MG/1
650 TABLET ORAL
Status: COMPLETED | OUTPATIENT
Start: 2021-12-19 | End: 2021-12-19

## 2021-12-19 RX ORDER — ATORVASTATIN CALCIUM 40 MG/1
40 TABLET, FILM COATED ORAL
Status: DISCONTINUED | OUTPATIENT
Start: 2021-12-19 | End: 2022-01-04 | Stop reason: HOSPADM

## 2021-12-19 RX ORDER — MONTELUKAST SODIUM 10 MG/1
10 TABLET ORAL
Status: DISCONTINUED | OUTPATIENT
Start: 2021-12-19 | End: 2022-01-04 | Stop reason: HOSPADM

## 2021-12-19 RX ORDER — LIDOCAINE HYDROCHLORIDE 20 MG/ML
JELLY TOPICAL
Status: COMPLETED | OUTPATIENT
Start: 2021-12-19 | End: 2021-12-19

## 2021-12-19 RX ORDER — LEVOTHYROXINE SODIUM 125 UG/1
125 TABLET ORAL
Status: DISCONTINUED | OUTPATIENT
Start: 2021-12-20 | End: 2022-01-04 | Stop reason: HOSPADM

## 2021-12-19 RX ORDER — LOSARTAN POTASSIUM 50 MG/1
50 TABLET ORAL
Status: DISCONTINUED | OUTPATIENT
Start: 2021-12-19 | End: 2022-01-04 | Stop reason: HOSPADM

## 2021-12-19 RX ORDER — SODIUM CHLORIDE 0.9 % (FLUSH) 0.9 %
5-40 SYRINGE (ML) INJECTION EVERY 8 HOURS
Status: DISCONTINUED | OUTPATIENT
Start: 2021-12-19 | End: 2022-01-04 | Stop reason: HOSPADM

## 2021-12-19 RX ADMIN — LIDOCAINE HYDROCHLORIDE 1 ML: 20 JELLY TOPICAL at 16:31

## 2021-12-19 RX ADMIN — CEFTRIAXONE SODIUM 1 G: 1 INJECTION, POWDER, FOR SOLUTION INTRAMUSCULAR; INTRAVENOUS at 17:04

## 2021-12-19 RX ADMIN — GABAPENTIN 100 MG: 100 CAPSULE ORAL at 21:36

## 2021-12-19 RX ADMIN — ATORVASTATIN CALCIUM 40 MG: 40 TABLET, FILM COATED ORAL at 21:36

## 2021-12-19 RX ADMIN — MONTELUKAST 10 MG: 10 TABLET, FILM COATED ORAL at 22:48

## 2021-12-19 RX ADMIN — LOSARTAN POTASSIUM 50 MG: 50 TABLET, FILM COATED ORAL at 21:37

## 2021-12-19 RX ADMIN — ACETAMINOPHEN 650 MG: 325 TABLET ORAL at 18:44

## 2021-12-19 NOTE — ED PROVIDER NOTES
24-year-old female history of coronary disease, hiatal hernia, EDDIE, hyperlipidemia and indwelling Dunn for urinary incontinence presenting today secondary to confusion and frequent falls. She is here with her sister who helps with history. Apparently patient has fallen multiple times over the past week, states at least 3 times. She denies injury. She says that one of the episodes of falling she does not fully remember what the others she does. She says that she just gets weak and falls. She is weak on her left side from a prior stroke but this is unchanged. Her sister says that yesterday she was seeming very confused, repeating herself often throughout the day, which seems to be improving today. Patient denies any abdominal pain, headache, neck pain, chest pain, shortness of breath, fever, vomiting, diarrhea or any other complaints at this time           Past Medical History:   Diagnosis Date    Advanced care planning/counseling discussion 3/4/16    On File    Bronchitis 2/24/2014    Cervicalgia 8/18/15    Dr. Naun Goldsmith    Chest pain 5/25/15    Hospitalized at [facility] 5/25/15 (lab work negative)    Chronic indwelling Dunn catheter 1/24/2018    Initially placed Jan 2018. Mx by Dr. Camilla Alberto.  Congestive heart failure, unspecified     Last Echo 2/8/15: EF 55-60%    Constipation 6/13/2017    Enthesopathy, spinal ([facility] [address] 8/18/15    Dr. Naun Goldsmith    Essential hypertension     Foot drop 8/18/15    Dr. Digna Crocker Heart attack [facility]) 2/24/2013    Was supposed to See Cardiology for possible pacemaker in november 2014- After Cardiology consult locally, no need, EF greatly improved.  Established with Dr. Taylor Even Hiatal hernia 6/2015    3 cm hiatal hernia     Hip pain 8/18/15    Dr. Naun Goldsmith    Hypercholesterolemia     Hyperglycemia 7/2015    A1c 5.9     Hyperlipidemia 6/30/2015    NMR lipoprofile- LDL P 997, LDL-c 71, HLD-C-39, TG-60, HLD-P (25.2), Small LDL-P -541, LDL size 20.6    Hypothyroid     Insomnia 6/13/2017    Lower extremity edema     Lumbar spondylosis 8/18/15    Dr. Ana Valadez 6/2015    EGD/Colonscopy 6/15- Gastritis, internal hemorrhoids and 3 polyps    Menopause     Murmur     EDDIE on CPAP     Was referred to Pulmonology - Uses CPAP    Osteoarthritis of hip 8/18/15    Dr. Sindy Méndez    Osteoarthritis, shoulder 8/18/15    Dr. Sindy Méndez    Radiculopathy, cervical 8/18/15    Dr. Sindy Méndez    Shoulder pain 8/18/15    Dr. Shin Donohue Spinal stenosis of cervical region 8/18/15    Dr. Shin Donohue Spinal stenosis, lumbar 8/18/15    Dr. Shin Donohue Stroke [facility]) 2/25/2014    Established with Neurology, Leroy Silverman NP-Just hospitalized at [facility] 2/7/15-2/10/15. CT negative, but Late effect CV accident with increased tone described on discharge summary, Carotid dopplers showed 50% stenosis bilaterally.  Vitamin D deficiency 7/2015       Past Surgical History:   Procedure Laterality Date    COLONOSCOPY N/A 6/22/2016    COLONOSCOPY performed by Shanice Pennington MD at [facility]"" ENDOSCOPY    HX BREAST BIOPSY Right 8/11/15    Stereo Bx - [facility]--- Benign    HX CHOLECYSTECTOMY      HX COLONOSCOPY  6/2015    Dr. Andrea Bruce- 3 complete polypectomies, Internal hemorrhoids, difficult study due to spasm.  Repeat in 1 year    HX ENDOSCOPY  6/2015    mild gastritis, 3cm hiatal hernia     HX GASTRIC BYPASS  6/1989    HX HEART CATHETERIZATION  2/2014    HX ORTHOPAEDIC      Right middle finger distal amputation    HX PARTIAL THYROIDECTOMY  ~1990    HX THYROIDECTOMY Left 1985    90% of one side of the thyroid removed    IR ASP BLADDER SUPRA CATH  5/24/2019         Family History:   Problem Relation Age of Onset    Heart Disease Father 61    Broken Bones Father         Hip/fell    Hypertension Mother     Heart Disease Brother 62    Broken Bones Brother         Hip/fell    Diabetes Maternal Grandmother        Social History     Socioeconomic History    Marital status: SINGLE     Spouse name: Not on file    Number of children: Not on file    Years of education: Not on file    Highest education level: Not on file   Occupational History    Not on file   Tobacco Use    Smoking status: Former Smoker     Packs/day: 0.25     Years: 15.00     Pack years: 3.75     Types: Cigarettes     Quit date: 2007     Years since quittin.5    Smokeless tobacco: Never Used   Vaping Use    Vaping Use: Never used   Substance and Sexual Activity    Alcohol use: No    Drug use: No    Sexual activity: Not Currently   Other Topics Concern    Not on file   Social History Narrative    Not on file     Social Determinants of Health     Financial Resource Strain:     Difficulty of Paying Living Expenses: Not on file   Food Insecurity:     Worried About Running Out of Food in the Last Year: Not on file    Gurjit of Food in the Last Year: Not on file   Transportation Needs:     Lack of Transportation (Medical): Not on file    Lack of Transportation (Non-Medical):  Not on file   Physical Activity:     Days of Exercise per Week: Not on file    Minutes of Exercise per Session: Not on file   Stress:     Feeling of Stress : Not on file   Social Connections:     Frequency of Communication with Friends and Family: Not on file    Frequency of Social Gatherings with Friends and Family: Not on file    Attends Confucianist Services: Not on file    Active Member of [address] [place] [address] or Organizations: Not on file    Attends Club or Organization Meetings: Not on file    Marital Status: Not on file   Intimate Partner Violence:     Fear of Current or Ex-Partner: Not on file    Emotionally Abused: Not on file    Physically Abused: Not on file    Sexually Abused: Not on file   Housing Stability:     Unable to Pay for Housing in the Last Year: Not on file    Number of [place] in the Last Year: Not on file    Unstable Housing in the Last Year: Not on file         ALLERGIES: Bees [hymenoptera allergenic extract], Strawberry, and Lisinopril    Review of Systems Constitutional: Negative for chills and fever. HENT: Negative for congestion and rhinorrhea. Eyes: Negative for redness and visual disturbance. Respiratory: Negative for cough and shortness of breath. Cardiovascular: Negative for chest pain and leg swelling. Gastrointestinal: Negative for abdominal pain, diarrhea, nausea and vomiting. Genitourinary: Negative for dysuria, flank pain, frequency, hematuria and urgency. Musculoskeletal: Negative for arthralgias, back pain, myalgias and neck pain. Skin: Negative for rash and wound. Allergic/Immunologic: Negative for immunocompromised state. Neurological: Positive for weakness. Negative for dizziness and headaches. Psychiatric/Behavioral: Positive for confusion. Vitals:    12/19/21 1333   BP: (!) 167/99   Pulse: 90   Resp: 20   Temp: 97.8 °F (36.6 °C)   SpO2: 95%            Physical Exam  Vitals and nursing note reviewed. Constitutional:       General: She is not in acute distress. Appearance: She is well-developed. She is not diaphoretic. HENT:      Head: Normocephalic. Mouth/Throat:      Pharynx: No oropharyngeal exudate. Eyes:      General:         Right eye: No discharge. Left eye: No discharge. Pupils: Pupils are equal, round, and reactive to light. Cardiovascular:      Rate and Rhythm: Normal rate and regular rhythm. Heart sounds: Normal heart sounds. No murmur heard. No friction rub. No gallop. Pulmonary:      Effort: Pulmonary effort is normal. No respiratory distress. Breath sounds: Normal breath sounds. No stridor. No wheezing or rales. Abdominal:      General: Bowel sounds are normal. There is no distension. Palpations: Abdomen is soft. Tenderness: There is no abdominal tenderness. There is no guarding or rebound. Comments: Suprapubic catheter noted   Musculoskeletal:         General: No deformity. Normal range of motion.       Cervical back: Normal range of motion and neck supple. Comments: Left lower extremity in a brace   Skin:     General: Skin is warm and dry. Capillary Refill: Capillary refill takes less than 2 seconds. Findings: No rash. Neurological:      Mental Status: She is alert and oriented to person, place, and time. Comments: Slight weakness to left arm and leg  No facial asymmetry  Speech is clear     Psychiatric:         Behavior: Behavior normal.          MDM       Procedures    No leukocytosis or anemia  Creatinine slightly elevated at 1.33 with otherwise acceptable electrolytes  Troponin mildly elevated  UA consistent with UTI      CT head negative    IV ceftriaxone ordered    Patient is 27-year-old female presenting today with frequent falls and confusion. Sister reports that there is significant confusion yesterday however I do not appreciate any at this time. She is mildly weak on the left side compared to the right but this is chronic from prior stroke. CT head shows no acute process. She does not appear clinically dehydrated. Her vital signs are stable. She does have a UTI in the setting of chronic Dunn which could be contributing to her confusion/encephalopathy noted by her sister and weakness/falls. Will admit patient for further management. Perfect Serve Consult for Admission  6:52 PM    ED Room Number: YG59/66  Patient Name and age:  Wally Doyle 59 y.o.  female  Working Diagnosis:   1. Urinary tract infection without hematuria, site unspecified    2. Confusion    3. Frequent falls        COVID-19 Suspicion:  no  Sepsis present:  no  Reassessment needed: no  Code Status:  Full Code  Readmission: no  Isolation Requirements:  no  Recommended Level of Care:  med/surg  Department:[facility] Adult ED - 21   Other:  59 y.o. female here with confusion (yesterday), frequent falls, UTI.        Mitul Meraz DO

## 2021-12-19 NOTE — ED TRIAGE NOTES
Pt arrives with sister for AMS and multiple falls. Per sister, pt has been repeating herself and lethargic. Sister reports finding pt on floor yesterday after a fall. Pt unsure of what is causing her falls.

## 2021-12-20 LAB
ANION GAP SERPL CALC-SCNC: 3 MMOL/L (ref 5–15)
BASOPHILS # BLD: 0.1 K/UL (ref 0–0.1)
BASOPHILS NFR BLD: 1 % (ref 0–1)
BUN SERPL-MCNC: 33 MG/DL (ref 6–20)
BUN/CREAT SERPL: 27 (ref 12–20)
CALCIUM SERPL-MCNC: 8.7 MG/DL (ref 8.5–10.1)
CHLORIDE SERPL-SCNC: 114 MMOL/L (ref 97–108)
CO2 SERPL-SCNC: 24 MMOL/L (ref 21–32)
CREAT SERPL-MCNC: 1.22 MG/DL (ref 0.55–1.02)
DIFFERENTIAL METHOD BLD: NORMAL
EOSINOPHIL # BLD: 0.2 K/UL (ref 0–0.4)
EOSINOPHIL NFR BLD: 3 % (ref 0–7)
ERYTHROCYTE [DISTWIDTH] IN BLOOD BY AUTOMATED COUNT: 13.3 % (ref 11.5–14.5)
GLUCOSE SERPL-MCNC: 143 MG/DL (ref 65–100)
HCT VFR BLD AUTO: 38.5 % (ref 35–47)
HGB BLD-MCNC: 12.4 G/DL (ref 11.5–16)
IMM GRANULOCYTES # BLD AUTO: 0 K/UL (ref 0–0.04)
IMM GRANULOCYTES NFR BLD AUTO: 0 % (ref 0–0.5)
LYMPHOCYTES # BLD: 3 K/UL (ref 0.8–3.5)
LYMPHOCYTES NFR BLD: 44 % (ref 12–49)
MCH RBC QN AUTO: 28.4 PG (ref 26–34)
MCHC RBC AUTO-ENTMCNC: 32.2 G/DL (ref 30–36.5)
MCV RBC AUTO: 88.3 FL (ref 80–99)
MONOCYTES # BLD: 0.8 K/UL (ref 0–1)
MONOCYTES NFR BLD: 12 % (ref 5–13)
NEUTS SEG # BLD: 2.7 K/UL (ref 1.8–8)
NEUTS SEG NFR BLD: 40 % (ref 32–75)
NRBC # BLD: 0 K/UL (ref 0–0.01)
NRBC BLD-RTO: 0 PER 100 WBC
PLATELET # BLD AUTO: 202 K/UL (ref 150–400)
PMV BLD AUTO: 9.7 FL (ref 8.9–12.9)
POTASSIUM SERPL-SCNC: 4.2 MMOL/L (ref 3.5–5.1)
RBC # BLD AUTO: 4.36 M/UL (ref 3.8–5.2)
SODIUM SERPL-SCNC: 141 MMOL/L (ref 136–145)
WBC # BLD AUTO: 6.8 K/UL (ref 3.6–11)

## 2021-12-20 PROCEDURE — 74011250636 HC RX REV CODE- 250/636: Performed by: INTERNAL MEDICINE

## 2021-12-20 PROCEDURE — 97535 SELF CARE MNGMENT TRAINING: CPT

## 2021-12-20 PROCEDURE — 76937 US GUIDE VASCULAR ACCESS: CPT

## 2021-12-20 PROCEDURE — 36415 COLL VENOUS BLD VENIPUNCTURE: CPT

## 2021-12-20 PROCEDURE — 80048 BASIC METABOLIC PNL TOTAL CA: CPT

## 2021-12-20 PROCEDURE — 77030020847 HC STATLOK BARD -A

## 2021-12-20 PROCEDURE — 97161 PT EVAL LOW COMPLEX 20 MIN: CPT

## 2021-12-20 PROCEDURE — 85025 COMPLETE CBC W/AUTO DIFF WBC: CPT

## 2021-12-20 PROCEDURE — 74011250637 HC RX REV CODE- 250/637: Performed by: NURSE PRACTITIONER

## 2021-12-20 PROCEDURE — 97530 THERAPEUTIC ACTIVITIES: CPT

## 2021-12-20 PROCEDURE — 65270000032 HC RM SEMIPRIVATE

## 2021-12-20 PROCEDURE — 74011250637 HC RX REV CODE- 250/637: Performed by: FAMILY MEDICINE

## 2021-12-20 PROCEDURE — C1751 CATH, INF, PER/CENT/MIDLINE: HCPCS

## 2021-12-20 PROCEDURE — 74011250636 HC RX REV CODE- 250/636: Performed by: FAMILY MEDICINE

## 2021-12-20 PROCEDURE — 77030020365 HC SOL INJ SOD CL 0.9% 50ML

## 2021-12-20 PROCEDURE — 97165 OT EVAL LOW COMPLEX 30 MIN: CPT

## 2021-12-20 PROCEDURE — 74011000250 HC RX REV CODE- 250: Performed by: INTERNAL MEDICINE

## 2021-12-20 RX ORDER — HYDROXYZINE 50 MG/1
50 TABLET, FILM COATED ORAL ONCE
Status: COMPLETED | OUTPATIENT
Start: 2021-12-20 | End: 2021-12-20

## 2021-12-20 RX ORDER — TOPIRAMATE 100 MG/1
100 TABLET, FILM COATED ORAL ONCE
Status: COMPLETED | OUTPATIENT
Start: 2021-12-20 | End: 2021-12-20

## 2021-12-20 RX ADMIN — ATORVASTATIN CALCIUM 40 MG: 40 TABLET, FILM COATED ORAL at 20:17

## 2021-12-20 RX ADMIN — LOSARTAN POTASSIUM 50 MG: 50 TABLET, FILM COATED ORAL at 20:17

## 2021-12-20 RX ADMIN — DULOXETINE HYDROCHLORIDE 120 MG: 60 CAPSULE, DELAYED RELEASE ORAL at 08:01

## 2021-12-20 RX ADMIN — ENOXAPARIN SODIUM 40 MG: 100 INJECTION SUBCUTANEOUS at 08:01

## 2021-12-20 RX ADMIN — HYDROXYZINE HYDROCHLORIDE 50 MG: 50 TABLET ORAL at 23:05

## 2021-12-20 RX ADMIN — PANTOPRAZOLE SODIUM 40 MG: 40 TABLET, DELAYED RELEASE ORAL at 08:01

## 2021-12-20 RX ADMIN — FUROSEMIDE 40 MG: 40 TABLET ORAL at 08:01

## 2021-12-20 RX ADMIN — CARVEDILOL 12.5 MG: 12.5 TABLET, FILM COATED ORAL at 07:12

## 2021-12-20 RX ADMIN — ACETAMINOPHEN 650 MG: 325 TABLET ORAL at 20:17

## 2021-12-20 RX ADMIN — FERROUS SULFATE TAB 325 MG (65 MG ELEMENTAL FE) 325 MG: 325 (65 FE) TAB at 07:12

## 2021-12-20 RX ADMIN — ACETAMINOPHEN 650 MG: 325 TABLET ORAL at 06:16

## 2021-12-20 RX ADMIN — TOPIRAMATE 100 MG: 100 TABLET, FILM COATED ORAL at 23:05

## 2021-12-20 RX ADMIN — LEVOTHYROXINE SODIUM 125 MCG: 0.12 TABLET ORAL at 07:12

## 2021-12-20 RX ADMIN — CEFTRIAXONE SODIUM 1 G: 1 INJECTION, POWDER, FOR SOLUTION INTRAMUSCULAR; INTRAVENOUS at 17:33

## 2021-12-20 RX ADMIN — MONTELUKAST 10 MG: 10 TABLET, FILM COATED ORAL at 20:17

## 2021-12-20 RX ADMIN — GABAPENTIN 100 MG: 100 CAPSULE ORAL at 20:17

## 2021-12-20 NOTE — PROGRESS NOTES
0638-Two RNs attempted to place IV following IV infiltration. Attempts were unsuccessful, RN will notify MD.    0641-MD notified, no new orders at this time.

## 2021-12-20 NOTE — H&P
[address] [facility] Adult  Hospitalist Group  History and Physical    Primary Care Provider: Mohan Shepherd PA-C  Date of Service:  12/19/2021    Subjective:     Mariano Olmos is a 59 y.o. female with a pmhx HFpEF, HTN, DM II, asthma, GERD, hypothyroidism, past CVA with neurogenic bladder and chronic indwelling Dunn, and dyslipidemia who presents with confusion, and several falls, and is being admitted for acute cystitis. In the ED, VSS. UA is positive for nitrites, leukocyte esterase, large blood, and 4+ bacteria. Labs are significant for creatinine 1.33, alk phos 121. CT head with no acute intracranial process. In the ED, her Dunn was exchanged, and she received ceftriaxone, and Tylenol. Review of Systems:    All other review of systems was negative except for that written in the History of Present Illness. Past Medical History:   Diagnosis Date    Advanced care planning/counseling discussion 3/4/16    On File    Bronchitis 2/24/2014    Cervicalgia 8/18/15    Dr. Daria Cabrera    Chest pain 5/25/15    Hospitalized at [facility] 5/25/15 (lab work negative)    Chronic indwelling Dunn catheter 1/24/2018    Initially placed Jan 2018. Mx by Dr. Jose Miguel Nunez.  Congestive heart failure, unspecified     Last Echo 2/8/15: EF 55-60%    Constipation 6/13/2017    Enthesopathy, spinal ([facility] [address] 8/18/15    Dr. Daria Cabrera    Essential hypertension     Foot drop 8/18/15    Dr. Tico Olvera Heart attack [facility]) 2/24/2013    Was supposed to See Cardiology for possible pacemaker in november 2014- After Cardiology consult locally, no need, EF greatly improved.  Established with Dr. Yaima Cody Hiatal hernia 6/2015    3 cm hiatal hernia     Hip pain 8/18/15    Dr. Daria Cabrera    Hypercholesterolemia     Hyperglycemia 7/2015    A1c 5.9     Hyperlipidemia 6/30/2015    NMR lipoprofile- LDL P 997, LDL-c 71, HLD-C-39, TG-60, HLD-P (25.2), Small LDL-P -541, LDL size 20.6    Hypothyroid     Insomnia 6/13/2017    Lower extremity edema     Lumbar spondylosis 8/18/15    Dr. Laura Dugan 6/2015    EGD/Colonscopy 6/15- Gastritis, internal hemorrhoids and 3 polyps    Menopause     Murmur     EDDIE on CPAP     Was referred to Pulmonology - Uses CPAP    Osteoarthritis of hip 8/18/15    Dr. Joyce Mack    Osteoarthritis, shoulder 8/18/15    Dr. Joyce Mack    Radiculopathy, cervical 8/18/15    Dr. Joyce Mack    Shoulder pain 8/18/15    Dr. George Ross Spinal stenosis of cervical region 8/18/15    Dr. George Ross Spinal stenosis, lumbar 8/18/15    Dr. George Ross Stroke [facility]) 2/25/2014    Established with Neurology, Wyatt Mason NP-Just hospitalized at [facility] 2/7/15-2/10/15. CT negative, but Late effect CV accident with increased tone described on discharge summary, Carotid dopplers showed 50% stenosis bilaterally.  Vitamin D deficiency 7/2015      Past Surgical History:   Procedure Laterality Date    COLONOSCOPY N/A 6/22/2016    COLONOS[facility]quez [facility]COPY    HX BREAST BIOPSY Right 8/11/15    Stereo Bx - [facility]--- Benign    HX CHOLECYSTECTOMY      HX COLONOSCOPY  6/2015    Dr. Mamadou De Jesus- 3 complete polypectomies, Internal hemorrhoids, difficult study due to spasm. Repeat in 1 year    HX ENDOSCOPY  6/2015    mild gastritis, 3cm hiatal hernia     HX GASTRIC BYPASS  6/1989    HX HEART CATHETERIZATION  2/2014    HX ORTHOPAEDIC      Right middle finger distal amputation    HX PARTIAL THYROIDECTOMY  ~1990    HX THYROIDECTOMY Left 1985    90% of one side of the thyroid removed    IR ASP BLADDER SUPRA CATH  5/24/2019     Prior to Admission medications    Medication Sig Start Date End Date Taking?  Authorizing Provider   montelukast (SINGULAIR) 10 mg tablet  12/14/21   Provider, Historical   topiramate (TOPAMAX) 100 mg tablet Take 1 tablet by mouth twice daily 12/7/21   Vanessa Drew DO   linaCLOtide (Linzess) 145 mcg cap capsule TAKE 1 CAPSULE BY MOUTH ONCE DAILY BEFORE BREAKFAST FOR CONSTIPATION 11/24/21   Mookie MD Mayelin   furosemide (LASIX) 40 mg tablet Take 1 Tablet by mouth daily. 11/24/21   Jennifer Khan MD   atorvastatin (LIPITOR) 40 mg tablet Take 1 tablet by mouth once daily 11/24/21   Jennifer Khan MD   Sidestream misc  10/14/21   Provider, Historical   peg-electrolyte soln (NULYTELY) 420 gram solution  10/28/21   Provider, Historical   carvediloL (COREG) 12.5 mg tablet Take 1 Tablet by mouth two (2) times daily (with meals). 11/1/21   Roberto Carlos Chavez MD   metFORMIN ER (GLUCOPHAGE XR) 500 mg tablet TAKE 1 TABLET BY MOUTH ONCE DAILY WITH SUPPER 9/22/21   Yolie Gibson PA-C   levothyroxine (Euthyrox) 125 mcg tablet Take 1 Tablet by mouth Daily (before breakfast). 9/21/21   Yolie Gibson PA-C   ipratropium (ATROVENT) 0.02 % soln  8/5/21   Provider, Historical   traZODone (DESYREL) 300 mg tablet Take 300 mg by mouth nightly. 7/9/21   Provider, Historical   oxyCODONE-acetaminophen (PERCOCET 10)  mg per tablet TAKE 1 TABLET BY MOUTH EVERY 12 HOURS AS NEEDED FOR CHRONIC PAIN 7/12/21   Provider, Historical   baclofen (LIORESAL) 20 mg tablet TAKE 1 TABLET BY MOUTH EVERY 8 HOURS AS NEEDED FOR MUSCLE SPASM(S) 7/12/21   Provider, Historical   cholecalciferol (VITAMIN D3) (5000 Units /125 mcg) capsule Take 1 Capsule by mouth daily. 7/23/21   Yolie Gibson PA-C   Botox 200 unit injection INJECT INTO THE BILATERAL MUSCLES OF THE HEAD AND NECK BY PRESCRIBER IN OFFICE FOR MIGRAINES EVERY 3 MONTHS. DISCARD UNUSED PORTION 5/17/21   Vanessa Drew,    losartan (COZAAR) 50 mg tablet Take 1 Tab by mouth nightly. Hold for SBP<115 5/12/21   Yolie Gibson PA-C   pantoprazole (PROTONIX) 40 mg tablet Take 1 Tab by mouth daily. 5/12/21   Yolie Gibson PA-C   BD Single Use Swabs Regular padm Check Blood sugar ONCE daily 4/20/21   Provider, Historical   galcanezumab-gnlm (Emgality Syringe) 120 mg/mL syrg 1 Syringe by SubCUTAneous route every thirty (30) days.  Start 30 days after the loading dose 3/18/21 Vanessa Drew K, DO   cyanocobalamin (VITAMIN B12) 1,000 mcg/mL injection INJECT 1 ML UNDER THE SKIN EVERY 30 DAYS FOR B12 DEFICIENCY  Indications: inadequate vitamin B12  Patient taking differently: INJECT 1 ML UNDER THE SKIN EVERY 14 DAYS FOR B12 DEFICIENCY  Indications: inadequate vitamin B12 11/20/20   Bakari Cooper NP   Syringe, Disposable, 1 mL syrg Use bimonthly for cyanocobalamin subcutaneous injection. 11/20/20   Bakari Cooper NP   Needle, Disp, 25 G 25 gauge x 3/4\" ndle Use bimonthly for cyanocobalamin subcutaneous injection. 11/20/20   Bakari Cooper NP   fluticasone propionate (FLONASE) 50 mcg/actuation nasal spray 2 Sprays by Both Nostrils route daily. 11/20/20   Bakari Cooper NP   gabapentin (NEURONTIN) 100 mg capsule Take 100 mg by mouth three (3) times daily as needed for Pain. Provider, Historical   FREESTYLE LITE STRIPS strip USE STRIP TO CHECK GLUCOSE TWICE DAILY 12/3/19   Provider, Historical   FREESTYLE LITE METER monitoring kit USE TO CHECK GLUCOSE ONCE DAILY 12/3/19   Provider, Historical   ciclopirox (PENLAC) 8 % solution APPLY TO AFFECTED TOE NAILS DAILY 1/17/20   Provider, Historical   diclofenac (VOLTAREN) 1 % gel APPLY TOPICALLY TO AFFECTED AREA ONCE DAILY 12/12/19   Provider, Historical   hydroxyzine HCL (ATARAX) 50 mg tablet TAKE 1 TABLET BY MOUTH TWICE DAILY AS NEEDED FOR ANXIETY 1/18/20   Provider, Historical   FREESTYLE LANCETS 28 gauge misc USE TO CHECK GLUCOSE TWICE DAILY 1/21/20   Provider, Historical   miscellaneous medical supply misc Rollator Dx:I69.359 1/30/20   Bakari Cooper NP   miscellaneous medical supply [facility] Walker Dx: R16.117 1/30/20   Bakari Cooper NP   omega-3 acid ethyl esters (LOVAZA) 1 gram capsule Take 2 Caps by mouth two (2) times a day. 6/4/19   Bakari Cooper NP   DULoxetine (CYMBALTA) 60 mg capsule Take 2 Caps by mouth daily.  3/5/19   Bakari Cooper NP   calcipotriene (DOVONEX) 0.005 % topical cream Apply to affected area daily as needed. 9/24/18   Britton Brink MD   albuterol-ipratropium (DUO-NEB) 2.5 mg-0.5 mg/3 ml nebu 3 mL by Nebulization route every six (6) hours as needed. 9/12/17   Elliott Mejia MD   albuterol (PROVENTIL HFA, VENTOLIN HFA, PROAIR HFA) 90 mcg/actuation inhaler Take 2 Puffs by inhalation every four (4) hours as needed for Wheezing or Shortness of Breath. 10/11/16   Britton Brink MD   ferrous sulfate 325 mg (65 mg iron) tablet Take 325 mg by mouth Daily (before breakfast). Provider, Historical   aspirin delayed-release 81 mg tablet Take 81 mg by mouth daily. Provider, Historical     Allergies   Allergen Reactions    Bees [Hymenoptera Allergenic Extract] Shortness of Breath and Swelling    Strawberry Shortness of Breath and Swelling    Lisinopril Cough      Family History   Problem Relation Age of Onset    Heart Disease Father 61    Broken Bones Father         Hip/fell    Hypertension Mother     Heart Disease Brother 62    Broken Bones Brother         Hip/fell    Diabetes Maternal Grandmother         SOCIAL HISTORY:  Patient resides at Home with her sister  Patient ambulates with walker, and cane  Smoking history:none  Alcohol history: none    Objective:       Physical Exam:   Visit Vitals  BP (!) 147/85   Pulse 71   Temp 97.8 °F (36.6 °C)   Resp 22   LMP  (LMP Unknown)   SpO2 100%     General:  Alert, cooperative, no distress, appears stated age. Head:  Normocephalic, without obvious abnormality, atraumatic. Eyes:  Conjunctivae/corneas clear. EOMs intact. Nose: Nares normal. Septum midline. Throat: Lips, mucosa, and tongue normal.    Neck: Supple, symmetrical, trachea midline. Back:   Symmetric, no curvature. ROM normal.   Lungs:   Clear to auscultation bilaterally. Chest wall:  No tenderness or deformity. Heart:  Regular rate and rhythm, S1, S2 normal, no murmur, click, rub or gallop. Abdomen:   Soft, non-tender.  Bowel sounds normal. Extremities: Extremities normal, atraumatic, no cyanosis or edema. Pulses: 2+radial pulses   Skin: Skin color, texture, turgor normal. No rashes or lesions. Neurologic: Diffuse generalized weakness       ECG:  NSR, poor R wave progression    Data Review: All diagnostic labs and studies have been reviewed. Assessment:     Active Problems:    Acute cystitis (12/19/2021)      Encephalopathy (12/19/2021)        Plan:     #Acute cystitis  #Hx Chronic indwelling Dunn s/p CVA  Continue ceftriaxone  Follow urine culture  Dunn exchange done in the hospital     #Multiple falls  Patient with generalized weakness  Patient uses cane, and walker at home, but has had multiple falls. She lives with her sister.   PT/OT/case management    #HTN  -continue losartan, and carvedilol    #HFrEF,NYHA II  -continue lasix    #GERD  -continue PPI    #Hypothyroidism  -continue levothyroxine    #Asthma  -continue prn duoneb and singulair    #Hx CVA  -continue statin     #Depression  -continue cymbalta    FEN: cardiac  dvt ppx: lovenox  MPOA: Nadia Teran (sister)  Code: full    FUNCTIONAL STATUS PRIOR TO HOSPITALIZATION Ambulatory with Use of Assistive Devices /multiple falls    Signed By: Radha Egan MD     December 19, 2021

## 2021-12-20 NOTE — ROUTINE PROCESS
TRANSFER - OUT REPORT:    Verbal report given to Heather Marr RN(name) on Kiana Moore  being transferred to (unit) for routine progression of care       Report consisted of patients Situation, Background, Assessment and   Recommendations(SBAR). Information from the following report(s) SBAR, ED Summary, Intake/Output, MAR and Recent Results was reviewed with the receiving nurse. Lines:   Peripheral IV 12/19/21 Left Antecubital (Active)        Opportunity for questions and clarification was provided.       Patient transported with:   [employer]

## 2021-12-20 NOTE — PROGRESS NOTES
Problem: Self Care Deficits Care Plan (Adult)  Goal: *Therapy Goal (Edit Goal, Insert Text)  Description: FUNCTIONAL STATUS PRIOR TO ADMISSION: Patient was modified independent (except distant supervision when showering) and using a SB quad cane for functional mobility. HOME SUPPORT: The patient lived with sister who assists as needed. Occupational Therapy  Initiated 12/20/2021  1. Patient will perform grooming in stand with modified independence within 7 day(s). 2.  Patient will perform sponge bathing with modified independence within 7 day(s). 3.  Patient will perform lower body dressing with modified independence within 7 day(s). 4.  Patient will perform toilet transfers with modified independence within 7 day(s). 5.  Patient will perform all aspects of toileting with modified independence within 7 day(s). Outcome: Not Met   OCCUPATIONAL THERAPY EVALUATION  Patient: Hansel Kimbrough (62 y.o. female)  Date: 12/20/2021  Primary Diagnosis: Acute cystitis [N30.00]  Encephalopathy [G93.40]        Precautions:        ASSESSMENT  Based on the objective data described below, the patient presents with minor decline in ADL task and mobility performance due to general weakness/fatigue, decreased standing tolerance. Patient has history of CVA with LUE/LLE weakness- uses AFO on left foot, is L hand dominant. She goes to adult day center 4 days per week. She would like to return home if able, though she has had multiple falls this week. Patient owns all needed DME/AE. Family assist is available. Rehab vs HH/outpatient pending progress    Current Level of Function Impacting Discharge (ADLs/self-care): CGA- supervision     Functional Outcome Measure: The patient scored Total: 50/100 on the Barthel Index outcome measure which is indicative of being partially dependent in basic self-care.        Other factors to consider for discharge: CGA to supervision during tasks in stand- using RW     Patient will benefit from skilled therapy intervention to address the above noted impairments. PLAN :  Recommendations and Planned Interventions: self care training, functional mobility training, therapeutic exercise, balance training, therapeutic activities, endurance activities, patient education, home safety training, and family training/education    Frequency/Duration: Patient will be followed by occupational therapy 5 times a week to address goals. Recommendation for discharge: (in order for the patient to meet his/her long term goals)  To be determined: Rehab vs HH pending progress  **If patient progresses to safe return home, she would like to attend day center. Traditional HH restricts outings per patient, Part B based services may be good option for therapy services     This discharge recommendation:  Has been made in collaboration with the attending provider and/or case management    IF patient discharges home will need the following DME: patient owns DME required for discharge       SUBJECTIVE:   Patient pleasant and cooperative    OBJECTIVE DATA SUMMARY:   HISTORY:   Past Medical History:   Diagnosis Date    Advanced care planning/counseling discussion 3/4/16    On File    Bronchitis 2/24/2014    Cervicalgia 8/18/15    Dr. Chato Mcneil    Chest pain 5/25/15    Hospitalized at [facility] 5/25/15 (lab work negative)    Chronic indwelling Dunn catheter 1/24/2018    Initially placed Jan 2018. Mx by Dr. Austin Hamlin. Congestive heart failure, unspecified     Last Echo 2/8/15: EF 55-60%    Constipation 6/13/2017    Enthesopathy, spinal ([facility] [address] 8/18/15    Dr. Josef Richmond hypertension     Foot drop 8/18/15    Dr. Karen Singletary attack [facility]) 2/24/2013    Was supposed to See Cardiology for possible pacemaker in november 2014- After Cardiology consult locally, no need, EF greatly improved.  Established with Dr. Stanford Corrales     Hiatal hernia 6/2015    3 cm hiatal hernia     Hip pain 8/18/15    Dr. Chato Mcneil    Hypercholesterolemia Hyperglycemia 7/2015    A1c 5.9     Hyperlipidemia 6/30/2015    NMR lipoprofile- LDL P 997, LDL-c 71, HLD-C-39, TG-60, HLD-P (25.2), Small LDL-P -541, LDL size 20.6    Hypothyroid     Insomnia 6/13/2017    Lower extremity edema     Lumbar spondylosis 8/18/15    Dr. Vinay Morales 6/2015    EGD/Colonscopy 6/15- Gastritis, internal hemorrhoids and 3 polyps    Menopause     Murmur     EDDIE on CPAP     Was referred to Pulmonology - Uses CPAP    Osteoarthritis of hip 8/18/15    Dr. Ying Pardo    Osteoarthritis, shoulder 8/18/15    Dr. Ying Pardo    Radiculopathy, cervical 8/18/15    Dr. Ying Pardo    Shoulder pain 8/18/15    Dr. Ying Pardo    Spinal stenosis of cervical region 8/18/15    Dr. Ying Pardo    Spinal stenosis, lumbar 8/18/15    Dr. Ying Pardo    Stroke [facility]) 2/25/2014    Established with Neurology, Danica Bull, JYOTI-Just hospitalized at [facility] 2/7/15-2/10/15. CT negative, but Late effect CV accident with increased tone described on discharge summary, Carotid dopplers showed 50% stenosis bilaterally. Vitamin D deficiency 7/2015     Past Surgical History:   Procedure Laterality Date    COLONOSCOPY N/A 6/22/2016    COLONOSCOPY performed by Deny Montez MD at \[facility]\"" ENDOSCOPY    HX BREAST BIOPSY Right 8/11/15    Stereo Bx - \[facility]\""C--- Benign    HX CHOLECYSTECTOMY      HX COLONOSCOPY  6/2015    Dr. Sanchez Mcghee- 3 complete polypectomies, Internal hemorrhoids, difficult study due to spasm.  Repeat in 1 year    HX ENDOSCOPY  6/2015    mild gastritis, 3cm hiatal hernia     HX GASTRIC BYPASS  6/1989    HX HEART CATHETERIZATION  2/2014    HX ORTHOPAEDIC      Right middle finger distal amputation    HX PARTIAL THYROIDECTOMY  ~1990    HX THYROIDECTOMY Left 1985    90% of one side of the thyroid removed    IR ASP BLADDER SUPRA CATH  5/24/2019       Expanded or extensive additional review of patient history:     Home Situation  Home Environment: Private residence  # Steps to Enter: 3  Rails to Enter: Yes  One/Two Story Residence: Two story  # of Interior Steps: 15  Living Alone: No  Support Systems: Other Family Member(s)  Current DME Used/Available at Home: Cane, quad,Shower chair,Tub transfer bench,Walker,Walker, rollator,Wheelchair (L foot AFO)  Tub or Shower Type: Other (comment) (tub and walk in available)    Hand dominance: Right    EXAMINATION OF PERFORMANCE DEFICITS:  Cognitive/Behavioral Status:  Neurologic State: Alert  Orientation Level: Oriented X4  Cognition: Appropriate decision making; Follows commands           Hearing: Auditory  Auditory Impairment: None    Vision/Perceptual:                      Corrective Lenses: Glasses    Range of Motion:    AROM: Generally decreased, functional           Strength:    Strength: Generally decreased, functional (LLE weaker than RLE)        Coordination:  Coordination: Generally decreased, functional  Fine Motor Skills-Upper: Left Intact; Right Intact    Gross Motor Skills-Upper: Left Intact; Right Intact    Tone & Sensation:       Sensation: Impaired           Balance:  Sitting: Impaired; Without support  Sitting - Static: Good (unsupported)  Sitting - Dynamic: Good (unsupported)  Standing: Impaired; With support  Standing - Static: Constant support; Fair  Standing - Dynamic : Constant support; Fair    Functional Mobility and Transfers for ADLs:  Bed Mobility:  Supine to Sit: Additional time;Stand-by assistance;Bed Modified  Sit to Supine: Bed Modified;Stand-by assistance; Additional time    Transfers:  Sit to Stand: Stand-by assistance  Stand to Sit: Stand-by assistance    ADL Assessment:  Feeding: Setup    Oral Facial Hygiene/Grooming: Setup (seated)    Bathing: Contact guard assistance    Upper Body Dressing: Supervision    Lower Body Dressing: Contact guard assistance    Toileting: Contact guard assistance                ADL Intervention and task modifications:        Functional Measure:    Barthel Index:  Bathin  Bladder: 5  Bowels: 10  Groomin  Dressin  Feedin  Mobility: 0  Stairs: 5  Toilet Use: 5  Transfer (Bed to Chair and Back): 10  Total: 50/100      The Barthel ADL Index: Guidelines  1. The index should be used as a record of what a patient does, not as a record of what a patient could do. 2. The main aim is to establish degree of independence from any help, physical or verbal, however minor and for whatever reason. 3. The need for supervision renders the patient not independent. 4. A patient's performance should be established using the best available evidence. Asking the patient, friends/relatives and nurses are the usual sources, but direct observation and common sense are also important. However direct testing is not needed. 5. Usually the patient's performance over the preceding 24-48 hours is important, but occasionally longer periods will be relevant. 6. Middle categories imply that the patient supplies over 50 per cent of the effort. 7. Use of aids to be independent is allowed. Score Interpretation (from [address] [address])    Independent   60-79 Minimally independent   40-59 Partially dependent   20-39 Very dependent   <20 Totally dependent     -Miki Joseph., Barthel, D.W. (1965). Functional evaluation: the Barthel Index. [address] [facility] ([address] (1997). The Barthel activities of daily living index: self-reporting versus actual performance in the old (> or = 75 years). Journal of [address] [place] [address] 45(7), 14 [employer], J.CRISTAL, Josefina Lo., Tracy Barragan (1999). Measuring the change in disability after inpatient rehabilitation; comparison of the responsiveness of the Barthel Index and Functional [place] Measure. Journal of Neurology, Neurosurgery, and Psychiatry, 66(4), 797-834. Franny Johnson, N.BENNY.A, JES Guthrie.ADAM, & Yoanna Kirk M.A. (2004) Assessment of post-stroke quality of life in cost-effectiveness studies: The usefulness of the Barthel Index and the EuroQoL-5D.  [facility], 13, 096-67 Occupational Therapy Evaluation Charge Determination   History Examination Decision-Making   MEDIUM Complexity : Expanded review of history including physical, cognitive and psychosocial  history  LOW Complexity : 1-3 performance deficits relating to physical, cognitive , or psychosocial skils that result in activity limitations and / or participation restrictions  LOW Complexity : No comorbidities that affect functional and no verbal or physical assistance needed to complete eval tasks       Based on the above components, the patient evaluation is determined to be of the following complexity level: LOW   Pain Rating:  none    Activity Tolerance:   Fair and requires rest breaks    After treatment patient left in no apparent distress:    Supine in bed, Call bell within reach, Bed / chair alarm activated, and Side rails x 3    COMMUNICATION/EDUCATION:   The patients plan of care was discussed with: Registered nurse. Home safety education was provided and the patient/caregiver indicated understanding. and Patient/family have participated as able in goal setting and plan of care. This patients plan of care is appropriate for delegation to [facility].     Thank you for this referral.  Manuel Phan, OT  Time Calculation: 34 mins

## 2021-12-20 NOTE — PROGRESS NOTES
Midline Insertion and Progress Note    PRE-PROCEDURE VERIFICATION  Correct Procedure: yes  Correct Site:  yes  Temperature: Temp: 98.1 °F (36.7 °C), Temperature Source: Temp Source: Oral  Recent Labs     12/20/21  0327   BUN 33*   CREA 1.22*      WBC 6.8     Allergies: Bees [hymenoptera allergenic extract], Strawberry, and Lisinopril    PROCEDURE DETAIL  A single lumen midline IV catheter was started for desire for reliable access. The following documentation is in addition to the Midline properties in the lines/airways flowsheet :  Xylocaine 1% used intradermally  Lot #: [id number]  Catheter Total Length: 15 (cm)  External Catheter Length: 0 (cm)  Circumference: 46 (cm)  Vein Selection for Midline :left basilic  Complication Related to Insertion: none    Line is okay to use.

## 2021-12-20 NOTE — PROGRESS NOTES
Problem: Mobility Impaired (Adult and Pediatric)  Goal: *Acute Goals and Plan of Care (Insert Text)  Description: FUNCTIONAL STATUS PRIOR TO ADMISSION: Patient was modified independent using a rolling walker or WB quad cane for functional mobility. Has had multiple falls over the past week (more than 4). HOME SUPPORT PRIOR TO ADMISSION: The patient lived with sister who assists as needed. She goes to adult day care 3 days/week. Physical Therapy Goals  Initiated 12/20/2021  1. Patient will move from supine to sit and sit to supine  and roll side to side in bed with modified independence within 7 day(s). 2.  Patient will transfer from bed to chair and chair to bed with modified independence using the least restrictive device within 7 day(s). 3.  Patient will perform sit to stand with modified independence within 7 day(s). 4.  Patient will ambulate with supervision/set-up for 50 feet with the least restrictive device within 7 day(s). 5.  Patient will ascend/descend 15 stairs with 1 handrail(s) with minimal assistance/contact guard assist within 7 day(s). Outcome: Progressing Towards Goal   PHYSICAL THERAPY EVALUATION  Patient: Courtney Vincent (15 y.o. female)  Date: 12/20/2021  Primary Diagnosis: Acute cystitis [N30.00]  Encephalopathy [G93.40]        Precautions: fall       ASSESSMENT  Based on the objective data described below, the patient presents with impaired mobility follow admission for acute cystitis. She is most pleasant and cooperative and motivated to mobilize. She reports multiple falls prior to admission and no recollection of why she fell. She has many pre-existing conditions which affect her mobility but she also presents wanting to maintain her mobility and going to the adult day center. Today ambulated short distance with quad cane but feel will do better with walker especially when mobilizing with staff to bathroom. (walker left in room).   Discussed with patient and feel she could benefit from brief rehab stay to maximize her mobility especially in light of her falls. .    Current Level of Function Impacting Discharge (mobility/balance): min assist to stand by assist; very slow which is a safety concern for mobilizing in home in case of emergency    Functional Outcome Measure: The patient scored 0 on the Timed Up and GO (unable to complete without assist) outcome measure which is indicative of high fall risk. Other factors to consider for discharge: below her baseline; can't have home health and go to day center at same time? Patient will benefit from skilled therapy intervention to address the above noted impairments. PLAN :  Recommendations and Planned Interventions: bed mobility training, transfer training, gait training, therapeutic exercises, patient and family training/education, and therapeutic activities      Frequency/Duration: Patient will be followed by physical therapy:  5 times a week to address goals. Recommendation for discharge: (in order for the patient to meet his/her long term goals)  Therapy up to 5 days/week in SNF setting vs IPR (has been to SNF 4 years ago)    This discharge recommendation:  Has not yet been discussed the attending provider and/or case management    IF patient discharges home will need the following DME: patient owns DME required for discharge         SUBJECTIVE:   Patient stated I can't get therapy at home and go to the day center. Donavan Smith    OBJECTIVE DATA SUMMARY:   HISTORY:    Past Medical History:   Diagnosis Date    Advanced care planning/counseling discussion 3/4/16    On File    Bronchitis 2/24/2014    Cervicalgia 8/18/15    Dr. Cyndie Gibson    Chest pain 5/25/15    [facility] 5/25/15 (lab work negative)    Chronic indwelling Dunn catheter 1/24/2018    Initially placed Jan 2018. Mx by Dr. Adali Villarreal.      Congestive heart failure, unspecified     Last Echo 2/8/15: EF 55-60%    Constipation 6/13/2017 Enthesopathy, spinal ([facility] [address] 8/18/15    Dr. Mliagros Sofia hypertension     Foot drop 8/18/15    Dr. Eng Anastasia attack [facility]) 2/24/2013    Was supposed to See Cardiology for possible pacemaker in november 2014- After Cardiology consult locally, no need, EF greatly improved. Established with Dr. Erik Laughlin     Hiatal hernia 6/2015    3 cm hiatal hernia     Hip pain 8/18/15    Dr. Yordan Barraza    Hypercholesterolemia     Hyperglycemia 7/2015    A1c 5.9     Hyperlipidemia 6/30/2015    NMR lipoprofile- LDL P 997, LDL-c 71, HLD-C-39, TG-60, HLD-P (25.2), Small LDL-P -541, LDL size 20.6    Hypothyroid     Insomnia 6/13/2017    Lower extremity edema     Lumbar spondylosis 8/18/15    Dr. Niecy Rosario 6/2015    EGD/Colonscopy 6/15- Gastritis, internal hemorrhoids and 3 polyps    Menopause     Murmur     EDDIE on CPAP     Was referred to Pulmonology - Uses CPAP    Osteoarthritis of hip 8/18/15    Dr. Jacquelyn Klein, shoulder 8/18/15    Dr. Yordan Barraza    Radiculopathy, cervical 8/18/15    Dr. Yordan Barraza    Shoulder pain 8/18/15    Dr. Yordan Barraza    Spinal stenosis of cervical region 8/18/15    Dr. Yordan Barraza    Spinal stenosis, lumbar 8/18/15    Dr. Yordan Barraza    Stroke [facility]) 2/25/2014    Established with Neurology, Harry Avila, JYOTI-Just hospitalized at [facility] 2/7/15-2/10/15. CT negative, but Late effect CV accident with increased tone described on discharge summary, Carotid dopplers showed 50% stenosis bilaterally. Vitamin D deficiency 7/2015     Past Surgical History:   Procedure Laterality Date    COLONOSCOPY N/A 6/22/2016    COLONOSCOPY performed by Claudette Root, MD at [facility] ENDOSCOPY    HX BREAST BIOPSY Right 8/11/15    [facility]    HX CHOLECYSTECTOMY      HX COLONOSCOPY  6/2015    Dr. Yanna Silverman- 3 complete polypectomies, Internal hemorrhoids, difficult study due to spasm.  Repeat in 1 year    HX ENDOSCOPY  6/2015    mild gastritis, 3cm hiatal hernia     HX GASTRIC BYPASS  6/1989    HX HEART CATHETERIZATION  2/2014    HX ORTHOPAEDIC      Right middle finger distal amputation    HX PARTIAL THYROIDECTOMY  ~1990    HX THYROIDECTOMY Left 1985    90% of one side of the thyroid removed    IR ASP BLADDER SUPRA CATH  5/24/2019       Personal factors and/or comorbidities impacting plan of care:     Home Situation  Home Environment: Private residence  # Steps to Enter: 3  Rails to Enter: Yes  One/Two Story Residence: Two story  # of Interior Steps: 15  Living Alone: No  Support Systems: Other Family Member(s)  Current DME Used/Available at Home: [id number], rollator,Walker,Wheelchair    EXAMINATION/PRESENTATION/DECISION MAKING:   Critical Behavior:   Alert; appropriate           Hearing: Auditory  Auditory Impairment: None  Range Of Motion:  AROM: Generally decreased, functional            Strength:    Strength: Generally decreased, functional (LLE weaker than RLE)         Tone & Sensation:       Sensation: Impaired               Coordination:  Coordination: Generally decreased, functional  Vision:      Functional Mobility:  Bed Mobility:     Supine to Sit: Additional time;Stand-by assistance;Bed Modified  Sit to Supine: Bed Modified;Stand-by assistance; Additional time     Transfers:  Sit to Stand: Stand-by assistance  Stand to Sit: Stand-by assistance                       Balance:   Sitting: Impaired; Without support  Sitting - Static: Good (unsupported)  Sitting - Dynamic: Good (unsupported)  Standing: Impaired; With support  Standing - Static: Constant support; Fair  Standing - Dynamic : Constant support; Fair  Ambulation/Gait Training:  Distance (ft): 15 Feet (ft)  Assistive Device: Gait belt;Cane, quad  Ambulation - Level of Assistance: Minimal assistance     Gait Description (WDL): Exceptions to WDL  Gait Abnormalities: Decreased step clearance              Speed/Brenda: Pace decreased (<100 feet/min); Shuffled  Step Length: Right shortened;Left shortened   Functional Measure:  Timed up and go:    Timed Get Up And Go Test:  (unable without assisst and greater than 1 min.)       < than 10 seconds=Normal  Greater then 13.5 seconds (in elderly)=Increased fall risk   Sydney SIMENTAL, Kojo Viveros. Predicting the probability for falls in community dwelling older adults using the Timed Up and Go Test. Phys Ther. 2000;80:896-903. Physical Therapy Evaluation Charge Determination   History Examination Presentation Decision-Making   HIGH Complexity :3+ comorbidities / personal factors will impact the outcome/ POC  LOW Complexity : 1-2 Standardized tests and measures addressing body structure, function, activity limitation and / or participation in recreation  LOW Complexity : Stable, uncomplicated        Based on the above components, the patient evaluation is determined to be of the following complexity level: LOW     Activity Tolerance:   Fair    After treatment patient left in no apparent distress:   Supine in bed, Call bell within reach, Bed / chair alarm activated, and Side rails x 3    COMMUNICATION/EDUCATION:   The patients plan of care was discussed with: Registered nurse. Fall prevention education was provided and the patient/caregiver indicated understanding., Patient/family have participated as able in goal setting and plan of care. , and Patient/family agree to work toward stated goals and plan of care.     Thank you for this referral.  Musa Emerson, PT   Time Calculation: 31 mins

## 2021-12-20 NOTE — PROGRESS NOTES
[facility]                                                                                          Hospitalist Progress Note  Sunil Villareal MD  Answering service: [phone] from in house phone        Date of Service:  2021  NAME:  Racquel Siddiqui  :  1960  MRN:  [id number]      Admission Summary:   Racquel Siddiqui is a 59 y.o. female with a pmhx HFpEF, HTN, DM II, asthma, GERD, hypothyroidism, past CVA with neurogenic bladder and chronic indwelling Dunn, and dyslipidemia who presents with confusion, and several falls, and is being admitted for acute cystitis.       In the ED, VSS. UA is positive for nitrites, leukocyte esterase, large blood, and 4+ bacteria. Labs are significant for creatinine 1.33, alk phos 121. CT head with no acute intracranial process.     In the ED, her Dunn was exchanged, and she received ceftriaxone, and Tylenol.       Interval history / Subjective:   Patient denies any subjective fever chills, continues to report generalized weakness and ambulatory dysfunction     Assessment & Plan:     #Acute cystitis  #Hx Chronic indwelling Dunn s/p CVA  Continue ceftriaxone  Follow urine culture  Dnun exchange done in the hospital      #Multiple falls  Patient with generalized weakness  Patient uses cane, and walker at home, but has had multiple falls. She lives with her sister.   PT/OT/case management     #HTN  -continue losartan, and carvedilol     #HFrEF,NYHA II  -continue lasix     #GERD  -continue PPI     #Hypothyroidism  -continue levothyroxine     #Asthma  -continue prn duoneb and singulair     #Hx CVA  -continue statin     #Depression  -continue cymbalta     FEN: cardiac  dvt ppx: lovenox  MPOA: [place] Hiss (sister)  Code: full     Hospital Problems  Date Reviewed: 2021          Codes Class Noted POA    Acute cystitis ICD-10-CM: N30.00  ICD-9-CM: 595.0  2021 Unknown        Encephalopathy ICD-10-CM: G93.40  ICD-9-CM: 348.30  12/19/2021 Unknown                Review of Systems:   A comprehensive review of systems was negative except for that written in the HPI. Vital Signs:    Last 24hrs VS reviewed since prior progress note. Most recent are:  Visit Vitals  /88   Pulse 66   Temp 98.4 °F (36.9 °C)   Resp 16   Ht 5' 4\" (1.626 m)   Wt 114.7 kg (252 lb 13.9 oz)   SpO2 100%   BMI 43.40 kg/m²         Intake/Output Summary (Last 24 hours) at 12/20/2021 1208  Last data filed at 12/20/2021 1110  Gross per 24 hour   Intake    Output 2025 ml   Net -2025 ml        Physical Examination:     I had a face to face encounter with this patient and independently examined them on 12/20/2021 as outlined below:          Constitutional:  No acute distress, cooperative, pleasant    ENT:  Oral mucosa moist, oropharynx benign. Resp:  CTA bilaterally. No wheezing/rhonchi/rales. No accessory muscle use   CV:  Regular rhythm, normal rate, no murmurs, gallops, rubs    GI:  Soft, non distended, non tender. normoactive bowel sounds, no hepatosplenomegaly     Musculoskeletal:  No edema, warm, 2+ pulses throughout    Neurologic:  Moves all extremities. AAOx3, CN II-XII reviewed            Data Review:    Review and/or order of clinical lab test      Labs:     Recent Labs     12/20/21  0327 12/19/21  1504   WBC 6.8 5.3   HGB 12.4 13.3   HCT 38.5 41.4    222     Recent Labs     12/20/21  0327 12/19/21  1504    139   K 4.2 4.4   * 112*   CO2 24 23   BUN 33* 39*   CREA 1.22* 1.33*   * 92   CA 8.7 9.1     Recent Labs     12/19/21  1504   ALT 36   *   TBILI 0.6   TP 7.4   ALB 3.7   GLOB 3.7     No results for input(s): INR, PTP, APTT, INREXT in the last 72 hours. No results for input(s): FE, TIBC, PSAT, FERR in the last 72 hours. No results found for: FOL, RBCF   No results for input(s): PH, PCO2, PO2 in the last 72 hours. No results for input(s): CPK, CKNDX, TROIQ in the last 72 hours.     No lab exists for component: CPKMB  Lab Results   Component Value Date/Time    Cholesterol, total 156 11/20/2020 03:38 PM    HDL Cholesterol 72 11/20/2020 03:38 PM    LDL, calculated 69.4 11/20/2020 03:38 PM    Triglyceride 73 11/20/2020 03:38 PM    CHOL/HDL Ratio 2.2 11/20/2020 03:38 PM     Lab Results   Component Value Date/Time    Glucose (POC) 101 (H) 09/12/2017 04:49 PM    Glucose (POC) 112 (H) 09/12/2017 11:27 AM    Glucose (POC) 81 09/12/2017 06:46 AM    Glucose (POC) 105 (H) 09/11/2017 10:02 PM    Glucose (POC) 99 09/11/2017 04:56 PM     Lab Results   Component Value Date/Time    Color YELLOW/STRAW 12/19/2021 03:00 PM    Appearance CLOUDY (A) 12/19/2021 03:00 PM    Specific gravity 1.017 12/19/2021 03:00 PM    pH (UA) 6.0 12/19/2021 03:00 PM    Protein TRACE (A) 12/19/2021 03:00 PM    Glucose Negative 12/19/2021 03:00 PM    Ketone Negative 12/19/2021 03:00 PM    Bilirubin Negative 12/19/2021 03:00 PM    Urobilinogen 1.0 12/19/2021 03:00 PM    Nitrites Positive (A) 12/19/2021 03:00 PM    Leukocyte Esterase MODERATE (A) 12/19/2021 03:00 PM    Epithelial cells FEW 12/19/2021 03:00 PM    Bacteria 4+ (A) 12/19/2021 03:00 PM    WBC 20-50 12/19/2021 03:00 PM    RBC  12/19/2021 03:00 PM         Medications Reviewed:     Current Facility-Administered Medications   Medication Dose Route Frequency    sodium chloride (NS) flush 5-40 mL  5-40 mL IntraVENous Q8H    sodium chloride (NS) flush 5-40 mL  5-40 mL IntraVENous PRN    acetaminophen (TYLENOL) tablet 650 mg  650 mg Oral Q6H PRN    Or    acetaminophen (TYLENOL) suppository 650 mg  650 mg Rectal Q6H PRN    ondansetron (ZOFRAN ODT) tablet 4 mg  4 mg Oral Q8H PRN    Or    ondansetron (ZOFRAN) injection 4 mg  4 mg IntraVENous Q6H PRN    enoxaparin (LOVENOX) injection 40 mg  40 mg SubCUTAneous DAILY    albuterol-ipratropium (DUO-NEB) 2.5 MG-0.5 MG/3 ML  3 mL Nebulization Q6H PRN    atorvastatin (LIPITOR) tablet 40 mg  40 mg Oral QHS    carvediloL (COREG) tablet 12.5 mg  12.5 mg Oral BID WITH MEALS    DULoxetine (CYMBALTA) capsule 120 mg  120 mg Oral DAILY    furosemide (LASIX) tablet 40 mg  40 mg Oral DAILY    levothyroxine (SYNTHROID) tablet 125 mcg  125 mcg Oral ACB    . PHARMACY TO SUBSTITUTE PER PROTOCOL (Reordered from: linaCLOtide (Linzess) 145 mcg cap capsule)    Per Protocol    losartan (COZAAR) tablet 50 mg  50 mg Oral QHS    pantoprazole (PROTONIX) tablet 40 mg  40 mg Oral DAILY    ferrous sulfate tablet 325 mg  325 mg Oral ACB    montelukast (SINGULAIR) tablet 10 mg  10 mg Oral QHS    gabapentin (NEURONTIN) capsule 100 mg  100 mg Oral TID PRN     ______________________________________________________________________  EXPECTED LENGTH OF STAY: - - -  ACTUAL LENGTH OF STAY:          Brian Armas MD

## 2021-12-21 LAB
ANION GAP SERPL CALC-SCNC: 5 MMOL/L (ref 5–15)
BACTERIA SPEC CULT: ABNORMAL
BASOPHILS # BLD: 0 K/UL (ref 0–0.1)
BASOPHILS NFR BLD: 1 % (ref 0–1)
BUN SERPL-MCNC: 25 MG/DL (ref 6–20)
BUN/CREAT SERPL: 24 (ref 12–20)
CALCIUM SERPL-MCNC: 8.7 MG/DL (ref 8.5–10.1)
CC UR VC: ABNORMAL
CHLORIDE SERPL-SCNC: 113 MMOL/L (ref 97–108)
CO2 SERPL-SCNC: 24 MMOL/L (ref 21–32)
CREAT SERPL-MCNC: 1.04 MG/DL (ref 0.55–1.02)
DIFFERENTIAL METHOD BLD: ABNORMAL
EOSINOPHIL # BLD: 0.2 K/UL (ref 0–0.4)
EOSINOPHIL NFR BLD: 4 % (ref 0–7)
ERYTHROCYTE [DISTWIDTH] IN BLOOD BY AUTOMATED COUNT: 13.2 % (ref 11.5–14.5)
GLUCOSE SERPL-MCNC: 92 MG/DL (ref 65–100)
HCT VFR BLD AUTO: 35.9 % (ref 35–47)
HGB BLD-MCNC: 11.7 G/DL (ref 11.5–16)
IMM GRANULOCYTES # BLD AUTO: 0 K/UL (ref 0–0.04)
IMM GRANULOCYTES NFR BLD AUTO: 0 % (ref 0–0.5)
LYMPHOCYTES # BLD: 2.9 K/UL (ref 0.8–3.5)
LYMPHOCYTES NFR BLD: 49 % (ref 12–49)
MCH RBC QN AUTO: 28.5 PG (ref 26–34)
MCHC RBC AUTO-ENTMCNC: 32.6 G/DL (ref 30–36.5)
MCV RBC AUTO: 87.6 FL (ref 80–99)
MONOCYTES # BLD: 0.8 K/UL (ref 0–1)
MONOCYTES NFR BLD: 14 % (ref 5–13)
NEUTS SEG # BLD: 1.8 K/UL (ref 1.8–8)
NEUTS SEG NFR BLD: 32 % (ref 32–75)
NRBC # BLD: 0 K/UL (ref 0–0.01)
NRBC BLD-RTO: 0 PER 100 WBC
PLATELET # BLD AUTO: 183 K/UL (ref 150–400)
PMV BLD AUTO: 9.6 FL (ref 8.9–12.9)
POTASSIUM SERPL-SCNC: 3.8 MMOL/L (ref 3.5–5.1)
RBC # BLD AUTO: 4.1 M/UL (ref 3.8–5.2)
SERVICE CMNT-IMP: ABNORMAL
SODIUM SERPL-SCNC: 142 MMOL/L (ref 136–145)
WBC # BLD AUTO: 5.8 K/UL (ref 3.6–11)

## 2021-12-21 PROCEDURE — 85025 COMPLETE CBC W/AUTO DIFF WBC: CPT

## 2021-12-21 PROCEDURE — 94760 N-INVAS EAR/PLS OXIMETRY 1: CPT

## 2021-12-21 PROCEDURE — 65270000032 HC RM SEMIPRIVATE

## 2021-12-21 PROCEDURE — 74011250636 HC RX REV CODE- 250/636: Performed by: FAMILY MEDICINE

## 2021-12-21 PROCEDURE — 80048 BASIC METABOLIC PNL TOTAL CA: CPT

## 2021-12-21 PROCEDURE — 74011250637 HC RX REV CODE- 250/637: Performed by: FAMILY MEDICINE

## 2021-12-21 PROCEDURE — 97530 THERAPEUTIC ACTIVITIES: CPT

## 2021-12-21 PROCEDURE — 97116 GAIT TRAINING THERAPY: CPT

## 2021-12-21 PROCEDURE — 36415 COLL VENOUS BLD VENIPUNCTURE: CPT

## 2021-12-21 RX ADMIN — CARVEDILOL 12.5 MG: 12.5 TABLET, FILM COATED ORAL at 16:03

## 2021-12-21 RX ADMIN — GABAPENTIN 100 MG: 100 CAPSULE ORAL at 09:05

## 2021-12-21 RX ADMIN — FERROUS SULFATE TAB 325 MG (65 MG ELEMENTAL FE) 325 MG: 325 (65 FE) TAB at 06:41

## 2021-12-21 RX ADMIN — GABAPENTIN 100 MG: 100 CAPSULE ORAL at 20:31

## 2021-12-21 RX ADMIN — SODIUM CHLORIDE, PRESERVATIVE FREE 10 ML: 5 INJECTION INTRAVENOUS at 14:00

## 2021-12-21 RX ADMIN — DULOXETINE HYDROCHLORIDE 120 MG: 60 CAPSULE, DELAYED RELEASE ORAL at 09:00

## 2021-12-21 RX ADMIN — ACETAMINOPHEN 650 MG: 325 TABLET ORAL at 09:05

## 2021-12-21 RX ADMIN — LOSARTAN POTASSIUM 50 MG: 50 TABLET, FILM COATED ORAL at 20:31

## 2021-12-21 RX ADMIN — CARVEDILOL 12.5 MG: 12.5 TABLET, FILM COATED ORAL at 08:59

## 2021-12-21 RX ADMIN — MONTELUKAST 10 MG: 10 TABLET, FILM COATED ORAL at 20:31

## 2021-12-21 RX ADMIN — FUROSEMIDE 40 MG: 40 TABLET ORAL at 08:59

## 2021-12-21 RX ADMIN — LEVOTHYROXINE SODIUM 125 MCG: 0.12 TABLET ORAL at 06:41

## 2021-12-21 RX ADMIN — ENOXAPARIN SODIUM 40 MG: 100 INJECTION SUBCUTANEOUS at 09:00

## 2021-12-21 RX ADMIN — PANTOPRAZOLE SODIUM 40 MG: 40 TABLET, DELAYED RELEASE ORAL at 08:59

## 2021-12-21 RX ADMIN — ATORVASTATIN CALCIUM 40 MG: 40 TABLET, FILM COATED ORAL at 20:31

## 2021-12-21 NOTE — PROGRESS NOTES
Problem: Mobility Impaired (Adult and Pediatric)  Goal: *Acute Goals and Plan of Care (Insert Text)  Description: FUNCTIONAL STATUS PRIOR TO ADMISSION: Patient was modified independent using a rolling walker or WB quad cane for functional mobility. Has had multiple falls over the past week (more than 4). HOME SUPPORT PRIOR TO ADMISSION: The patient lived with sister who assists as needed. She goes to adult day care 3 days/week. Physical Therapy Goals  Initiated 12/20/2021  1. Patient will move from supine to sit and sit to supine  and roll side to side in bed with modified independence within 7 day(s). 2.  Patient will transfer from bed to chair and chair to bed with modified independence using the least restrictive device within 7 day(s). 3.  Patient will perform sit to stand with modified independence within 7 day(s). 4.  Patient will ambulate with supervision/set-up for 50 feet with the least restrictive device within 7 day(s). 5.  Patient will ascend/descend 15 stairs with 1 handrail(s) with minimal assistance/contact guard assist within 7 day(s). Outcome: Progressing Towards Goal   PHYSICAL THERAPY TREATMENT  Patient: Racquel Siddiqui (5 y.o. female)  Date: 12/21/2021  Diagnosis: Acute cystitis [N30.00]  Encephalopathy [G93.40] <principal problem not specified>       Precautions:    Chart, physical therapy assessment, plan of care and goals were reviewed. ASSESSMENT  Patient continues with skilled PT services and is slowly progressing towards goals. Pt received resting in bed and agreeable to work with therapy. Pt transfers to EOB with supervision and donned AFO on RLE with good dynamic balance. Pt stood and ambulated with a rolling walker with decreased radhika, step to gait pattern, with decreased step clearance for a short distance. Pt able to increase her distance using a rolling walker versus her quad cane today.  Pt remained up in chair with alarm activated and table set up in front. Pt's sister was present at beginning of session and expressed her interest in pt going to rehab. Pt interested in rehab stay as well and demonstrated good motivation to increase her strength and mobility throughout session. Current Level of Function Impacting Discharge (mobility/balance):bed mobility supervision,transfers and ambulation with rolling walker x 60 feet with CGA    Other factors to consider for discharge: fall risk, pt with history of multiple falls, history of CVA          PLAN :  Patient continues to benefit from skilled intervention to address the above impairments. Continue treatment per established plan of care. to address goals. Recommendation for discharge: (in order for the patient to meet his/her long term goals)  Therapy 3 hours per day 5-7 days per week, if not then SNF    This discharge recommendation:  Has been made in collaboration with the attending provider and/or case management    IF patient discharges home will need the following DME: patient owns DME required for discharge       SUBJECTIVE:   Patient stated I need a new one(AFO).   Note straps on AFO wearing out. OBJECTIVE DATA SUMMARY:   Critical Behavior:  Neurologic State: Alert  Orientation Level: Oriented X4  Cognition: Follows commands     Functional Mobility Training:  Bed Mobility:     Supine to Sit: Supervision              Transfers:  Sit to Stand: Contact guard assistance;Assist x1  Stand to Sit: Contact guard assistance;Assist x1                             Balance:  Sitting: Intact  Standing: Impaired  Standing - Static: Constant support;Good  Standing - Dynamic : Constant support; Fair  Ambulation/Gait Training:  Distance (ft): 60 Feet (ft)  Assistive Device: Gait belt;Brace/Splint; Walker, rolling (AFO RLE)  Ambulation - Level of Assistance: Contact guard assistance;Assist x1        Gait Abnormalities: Foot drop; Step to gait; Decreased step clearance              Speed/Brenda: Pace decreased (<100 feet/min)  Step Length: Right shortened;Left shortened                Pain Rating:  Complained of chronic L shoulder pain     Activity Tolerance:   Fair and requires rest breaks    After treatment patient left in no apparent distress:   Sitting in chair, Call bell within reach, and Bed / chair alarm activated    COMMUNICATION/COLLABORATION:   The patients plan of care was discussed with: Registered nurse.      Carmen Shipman   Time Calculation: 24 mins

## 2021-12-21 NOTE — PROGRESS NOTES
[facility] [facility] Adult  Hospitalist Group                                                                                          Hospitalist Progress Note  Merissa Chung MD  Answering service: [phone] from in house phone        Date of Service:  2021  NAME:  Ted Cuevas  :  1960  MRN:  [id number]      Admission Summary:   Ted Cuevas is a 59 y.o. female with a pmhx HFpEF, HTN, DM II, asthma, GERD, hypothyroidism, past CVA with neurogenic bladder and chronic indwelling Dunn, and dyslipidemia who presents with confusion, and several falls, and is being admitted for acute cystitis.       In the ED, VSS. UA is positive for nitrites, leukocyte esterase, large blood, and 4+ bacteria. Labs are significant for creatinine 1.33, alk phos 121. CT head with no acute intracranial process.     In the ED, her Dunn was exchanged, and she received ceftriaxone, and Tylenol.       Interval history / Subjective:   Patient denies any subjective fever chills, continues to report generalized weakness and ambulatory dysfunction  PT is recommending SNF  Case management working on SNF placement     Assessment & Plan:     #Acute cystitis due to E. coli  #Hx Chronic indwelling Dunn s/p CVA  Continue ceftriaxone  Urine culture grew E. coli sensitive to ceftriaxone . We will stop ceftriaxone today(day 3)  Dunn exchange done in the hospital      #Multiple falls  Patient with generalized weakness  Patient uses cane, and walker at home, but has had multiple falls. She lives with her sister.   PT/OT/case management  -PT OT recommended SNF, case management working on placement  -Can be discharged once SNF placement completed     #HTN  -continue losartan, and carvedilol     #HFrEF,NYHA II  -continue lasix     #GERD  -continue PPI     #Hypothyroidism  -continue levothyroxine     #Asthma  -continue prn duoneb and singulair     #Hx CVA  -continue statin     #Depression  -continue cymbalta     FEN: cardiac  dvt ppx: lovenox  MPOA: Marlin Valenzuela (sister)  Code: full     Hospital Problems  Date Reviewed: 11/1/2021          Codes Class Noted POA    Acute cystitis ICD-10-CM: N30.00  ICD-9-CM: 595.0  12/19/2021 Unknown        Encephalopathy ICD-10-CM: G93.40  ICD-9-CM: 348.30  12/19/2021 Unknown                Review of Systems:   A comprehensive review of systems was negative except for that written in the HPI. Vital Signs:    Last 24hrs VS reviewed since prior progress note. Most recent are:  Visit Vitals  /79   Pulse 79   Temp 98.6 °F (37 °C)   Resp 20   Ht 5' 4\" (1.626 m)   Wt 112.6 kg (248 lb 3.8 oz)   SpO2 96%   BMI 42.61 kg/m²         Intake/Output Summary (Last 24 hours) at 12/21/2021 1406  Last data filed at 12/21/2021 0225  Gross per 24 hour   Intake    Output 900 ml   Net -900 ml        Physical Examination:     I had a face to face encounter with this patient and independently examined them on 12/21/2021 as outlined below:          Constitutional:  No acute distress, cooperative, pleasant    ENT:  Oral mucosa moist, oropharynx benign. Resp:  CTA bilaterally. No wheezing/rhonchi/rales. No accessory muscle use   CV:  Regular rhythm, normal rate, no murmurs, gallops, rubs    GI:  Soft, non distended, non tender. normoactive bowel sounds, no hepatosplenomegaly     Musculoskeletal:  No edema, warm, 2+ pulses throughout    Neurologic:  Moves all extremities.   AAOx3, CN II-XII reviewed            Data Review:    Review and/or order of clinical lab test      Labs:     Recent Labs     12/21/21 0429 12/20/21 0327   WBC 5.8 6.8   HGB 11.7 12.4   HCT 35.9 38.5    202     Recent Labs     12/21/21  0429 12/20/21 0327 12/19/21  1504    141 139   K 3.8 4.2 4.4   * 114* 112*   CO2 24 24 23   BUN 25* 33* 39*   CREA 1.04* 1.22* 1.33*   GLU 92 143* 92   CA 8.7 8.7 9.1     Recent Labs     12/19/21  1504   ALT 36   *   TBILI 0.6   TP 7.4   ALB 3.7   GLOB 3.7 No results for input(s): INR, PTP, APTT, INREXT, INREXT in the last 72 hours. No results for input(s): FE, TIBC, PSAT, FERR in the last 72 hours. No results found for: FOL, RBCF   No results for input(s): PH, PCO2, PO2 in the last 72 hours. No results for input(s): CPK, CKNDX, TROIQ in the last 72 hours.     No lab exists for component: CPKMB  Lab Results   Component Value Date/Time    Cholesterol, total 156 11/20/2020 03:38 PM    HDL Cholesterol 72 11/20/2020 03:38 PM    LDL, calculated 69.4 11/20/2020 03:38 PM    Triglyceride 73 11/20/2020 03:38 PM    CHOL/HDL Ratio 2.2 11/20/2020 03:38 PM     Lab Results   Component Value Date/Time    Glucose (POC) 101 (H) 09/12/2017 04:49 PM    Glucose (POC) 112 (H) 09/12/2017 11:27 AM    Glucose (POC) 81 09/12/2017 06:46 AM    Glucose (POC) 105 (H) 09/11/2017 10:02 PM    Glucose (POC) 99 09/11/2017 04:56 PM     Lab Results   Component Value Date/Time    Color YELLOW/STRAW 12/19/2021 03:00 PM    Appearance CLOUDY (A) 12/19/2021 03:00 PM    Specific gravity 1.017 12/19/2021 03:00 PM    pH (UA) 6.0 12/19/2021 03:00 PM    Protein TRACE (A) 12/19/2021 03:00 PM    Glucose Negative 12/19/2021 03:00 PM    Ketone Negative 12/19/2021 03:00 PM    Bilirubin Negative 12/19/2021 03:00 PM    Urobilinogen 1.0 12/19/2021 03:00 PM    Nitrites Positive (A) 12/19/2021 03:00 PM    Leukocyte Esterase MODERATE (A) 12/19/2021 03:00 PM    Epithelial cells FEW 12/19/2021 03:00 PM    Bacteria 4+ (A) 12/19/2021 03:00 PM    WBC 20-50 12/19/2021 03:00 PM    RBC  12/19/2021 03:00 PM         Medications Reviewed:     Current Facility-Administered Medications   Medication Dose Route Frequency    cefTRIAXone (ROCEPHIN) 1 g in sterile water (preservative free) 10 mL IV syringe  1 g IntraVENous Q24H    sodium chloride (NS) flush 5-40 mL  5-40 mL IntraVENous Q8H    sodium chloride (NS) flush 5-40 mL  5-40 mL IntraVENous PRN    acetaminophen (TYLENOL) tablet 650 mg  650 mg Oral Q6H PRN    Or    acetaminophen (TYLENOL) suppository 650 mg  650 mg Rectal Q6H PRN    ondansetron (ZOFRAN ODT) tablet 4 mg  4 mg Oral Q8H PRN    Or    ondansetron (ZOFRAN) injection 4 mg  4 mg IntraVENous Q6H PRN    enoxaparin (LOVENOX) injection 40 mg  40 mg SubCUTAneous DAILY    albuterol-ipratropium (DUO-NEB) 2.5 MG-0.5 MG/3 ML  3 mL Nebulization Q6H PRN    atorvastatin (LIPITOR) tablet 40 mg  40 mg Oral QHS    carvediloL (COREG) tablet 12.5 mg  12.5 mg Oral BID WITH MEALS    DULoxetine (CYMBALTA) capsule 120 mg  120 mg Oral DAILY    furosemide (LASIX) tablet 40 mg  40 mg Oral DAILY    levothyroxine (SYNTHROID) tablet 125 mcg  125 mcg Oral ACB    . PHARMACY TO SUBSTITUTE PER PROTOCOL (Reordered from: linaCLOtide (Linzess) 145 mcg cap capsule)    Per Protocol    losartan (COZAAR) tablet 50 mg  50 mg Oral QHS    pantoprazole (PROTONIX) tablet 40 mg  40 mg Oral DAILY    ferrous sulfate tablet 325 mg  325 mg Oral ACB    montelukast (SINGULAIR) tablet 10 mg  10 mg Oral QHS    gabapentin (NEURONTIN) capsule 100 mg  100 mg Oral TID PRN     ______________________________________________________________________  EXPECTED LENGTH OF STAY: 3d 7h  ACTUAL LENGTH OF STAY:          2                 Lauri Hill MD

## 2021-12-21 NOTE — PROGRESS NOTES
Transition of Care Plan:     RUR:   12% GLOS:  TBD     LOS:    2  Disposition:    TBD, PT/OT recommending SNF vs home with home health  **IF SNF BENEFIT, WILL NEED AUTH**  Follow up appointments: TBD  DME needed: All equipment in the home  Transportation at Discharge: TBD  IM Medicare letter:  Given 12/19/21  Caregiver Contact/NOK:   Sister Gerald Guerrero [phone]  Plan of Care:      · Multiple falls, 3 within week  · UTI, Acute cystitis, Encephalopathy  · OT/PT Consulted  · IV Ceftriaxone, mojica exchanged      Reason for Admission:  Multiple falls within week                   RUR Score:          11%           Plan for utilizing home health:     SNF vs HH, will verify SNF benefits    PCP: First and Last name:  Sofy Bardales PA-C     Name of Practice:   Internal [facility]   Are you a current patient: Yes/No:    Approximate date of last visit:    Can you participate in a virtual visit with your PCP:                     Current Advanced Directive/Advance Care Plan: Full Code    Healthcare Decision Maker:   Click here to complete [address] including selection of the Healthcare Decision Maker Relationship (ie \"Primary\")             Primary Decision Maker: Durell Shone - Sister - [phone]    Secondary Decision Maker: Meeatshane Dickson - [phone]                  Transition of Care Plan:                      CM met with patient, we discussed discharge planning. She lives with sister in two story home. She attends [employer]/[facility], [facility] [phone]. Discussed PT/OT recommendations, she has [facility] SN[facility] the [facility] [employer]. She is agreeable to SNF if covered under her policy. Freedom of choice offered  Referral sent to Raimundo.     Home Situation  Home Environment: Private residence  # Steps to Enter: 3  One/Two Story Residence:  Two story home  Living Alone: No  Current DME Used/Available at Home: [address], [place], wheelchair, and rolling walker  Tub or Shower Type: Tub/Shower combination    Transition of Care Plan:     The Plan for Transition of Care is related to the following treatment goals: Discharge to SNF    The Patient  was provided with a choice of provider and agrees  with the discharge plan. Yes [x] No []    A Freedom of choice list was provided with basic dialogue that supports the patient's individualized plan of care/goals and shares the quality data associated with the providers. Yes [x] No []      Care Management Interventions  PCP Verified by CM:  Yes ([facility])  Last Visit to PCP: 09/15/21  Palliative Care Criteria Met (RRAT>21 & CHF Dx)?: No  Transition of Care Consult (CM Consult): SNF,Other (CM Consulted for Skilled nursing facility placement)  MyChart Signup: Yes (ACTIVE)  Discharge Durable Medical Equipment: No  Health Maintenance Reviewed: Yes  Physical Therapy Consult: Yes  Occupational Therapy Consult: Yes  Speech Therapy Consult: No  Support Systems: Other Family Member(s) (Lives with sister, attends [employer])  The Patient and/or Patient Representative was Provided with a Choice of Provider and Agrees with the Discharge Plan?: Yes  Name of the Patient Representative Who was Provided with a Choice of Provider and Agrees with the Discharge Plan: Neal Mo  Freedom of Choice List was Provided with Basic Dialogue that Supports the Patient's Individualized Plan of Care/Goals, Treatment Preferences and Shares the Quality Data Associated with the Providers?: Yes   Resource Information Provided?: No  Discharge Location  Discharge Placement: Skilled nursing facility      [place], [facility], BSN, [facility] Care Management  [phone]

## 2021-12-21 NOTE — PROGRESS NOTES
800:    Bedside shift change report given to Annabelle (oncoming nurse) by Corwin Fofana (offgoing nurse). Report included the following information SBAR, Kardex, Intake/Output, MAR and Recent Results.

## 2021-12-22 LAB
ANION GAP SERPL CALC-SCNC: 5 MMOL/L (ref 5–15)
BASOPHILS # BLD: 0.1 K/UL (ref 0–0.1)
BASOPHILS NFR BLD: 1 % (ref 0–1)
BUN SERPL-MCNC: 19 MG/DL (ref 6–20)
BUN/CREAT SERPL: 19 (ref 12–20)
CALCIUM SERPL-MCNC: 8.3 MG/DL (ref 8.5–10.1)
CHLORIDE SERPL-SCNC: 112 MMOL/L (ref 97–108)
CO2 SERPL-SCNC: 25 MMOL/L (ref 21–32)
CREAT SERPL-MCNC: 1 MG/DL (ref 0.55–1.02)
DIFFERENTIAL METHOD BLD: ABNORMAL
EOSINOPHIL # BLD: 0.2 K/UL (ref 0–0.4)
EOSINOPHIL NFR BLD: 4 % (ref 0–7)
ERYTHROCYTE [DISTWIDTH] IN BLOOD BY AUTOMATED COUNT: 13.2 % (ref 11.5–14.5)
GLUCOSE SERPL-MCNC: 89 MG/DL (ref 65–100)
HCT VFR BLD AUTO: 36 % (ref 35–47)
HGB BLD-MCNC: 11.7 G/DL (ref 11.5–16)
IMM GRANULOCYTES # BLD AUTO: 0 K/UL (ref 0–0.04)
IMM GRANULOCYTES NFR BLD AUTO: 0 % (ref 0–0.5)
LYMPHOCYTES # BLD: 3.1 K/UL (ref 0.8–3.5)
LYMPHOCYTES NFR BLD: 53 % (ref 12–49)
MCH RBC QN AUTO: 28.5 PG (ref 26–34)
MCHC RBC AUTO-ENTMCNC: 32.5 G/DL (ref 30–36.5)
MCV RBC AUTO: 87.8 FL (ref 80–99)
MONOCYTES # BLD: 0.7 K/UL (ref 0–1)
MONOCYTES NFR BLD: 13 % (ref 5–13)
NEUTS SEG # BLD: 1.6 K/UL (ref 1.8–8)
NEUTS SEG NFR BLD: 29 % (ref 32–75)
NRBC # BLD: 0 K/UL (ref 0–0.01)
NRBC BLD-RTO: 0 PER 100 WBC
PLATELET # BLD AUTO: 178 K/UL (ref 150–400)
PMV BLD AUTO: 9.7 FL (ref 8.9–12.9)
POTASSIUM SERPL-SCNC: 3.8 MMOL/L (ref 3.5–5.1)
RBC # BLD AUTO: 4.1 M/UL (ref 3.8–5.2)
SODIUM SERPL-SCNC: 142 MMOL/L (ref 136–145)
WBC # BLD AUTO: 5.7 K/UL (ref 3.6–11)

## 2021-12-22 PROCEDURE — 74011250636 HC RX REV CODE- 250/636: Performed by: FAMILY MEDICINE

## 2021-12-22 PROCEDURE — 74011000250 HC RX REV CODE- 250: Performed by: STUDENT IN AN ORGANIZED HEALTH CARE EDUCATION/TRAINING PROGRAM

## 2021-12-22 PROCEDURE — 97530 THERAPEUTIC ACTIVITIES: CPT

## 2021-12-22 PROCEDURE — 97116 GAIT TRAINING THERAPY: CPT

## 2021-12-22 PROCEDURE — 74011250637 HC RX REV CODE- 250/637: Performed by: FAMILY MEDICINE

## 2021-12-22 PROCEDURE — 94760 N-INVAS EAR/PLS OXIMETRY 1: CPT

## 2021-12-22 PROCEDURE — 80048 BASIC METABOLIC PNL TOTAL CA: CPT

## 2021-12-22 PROCEDURE — 74011250636 HC RX REV CODE- 250/636: Performed by: STUDENT IN AN ORGANIZED HEALTH CARE EDUCATION/TRAINING PROGRAM

## 2021-12-22 PROCEDURE — 65270000032 HC RM SEMIPRIVATE

## 2021-12-22 PROCEDURE — 36415 COLL VENOUS BLD VENIPUNCTURE: CPT

## 2021-12-22 PROCEDURE — 85025 COMPLETE CBC W/AUTO DIFF WBC: CPT

## 2021-12-22 RX ADMIN — LOSARTAN POTASSIUM 50 MG: 50 TABLET, FILM COATED ORAL at 23:04

## 2021-12-22 RX ADMIN — LEVOTHYROXINE SODIUM 125 MCG: 0.12 TABLET ORAL at 06:44

## 2021-12-22 RX ADMIN — FUROSEMIDE 40 MG: 40 TABLET ORAL at 10:41

## 2021-12-22 RX ADMIN — ATORVASTATIN CALCIUM 40 MG: 40 TABLET, FILM COATED ORAL at 23:04

## 2021-12-22 RX ADMIN — MONTELUKAST 10 MG: 10 TABLET, FILM COATED ORAL at 23:04

## 2021-12-22 RX ADMIN — CARVEDILOL 12.5 MG: 12.5 TABLET, FILM COATED ORAL at 10:44

## 2021-12-22 RX ADMIN — ENOXAPARIN SODIUM 40 MG: 100 INJECTION SUBCUTANEOUS at 10:41

## 2021-12-22 RX ADMIN — CARVEDILOL 12.5 MG: 12.5 TABLET, FILM COATED ORAL at 16:57

## 2021-12-22 RX ADMIN — CEFTRIAXONE SODIUM 1 G: 1 INJECTION, POWDER, FOR SOLUTION INTRAMUSCULAR; INTRAVENOUS at 13:50

## 2021-12-22 RX ADMIN — GABAPENTIN 100 MG: 100 CAPSULE ORAL at 23:07

## 2021-12-22 RX ADMIN — PANTOPRAZOLE SODIUM 40 MG: 40 TABLET, DELAYED RELEASE ORAL at 10:41

## 2021-12-22 RX ADMIN — FERROUS SULFATE TAB 325 MG (65 MG ELEMENTAL FE) 325 MG: 325 (65 FE) TAB at 06:44

## 2021-12-22 RX ADMIN — SODIUM CHLORIDE, PRESERVATIVE FREE 10 ML: 5 INJECTION INTRAVENOUS at 22:00

## 2021-12-22 RX ADMIN — DULOXETINE HYDROCHLORIDE 120 MG: 60 CAPSULE, DELAYED RELEASE ORAL at 10:41

## 2021-12-22 RX ADMIN — SODIUM CHLORIDE, PRESERVATIVE FREE 10 ML: 5 INJECTION INTRAVENOUS at 13:53

## 2021-12-22 NOTE — PROGRESS NOTES
Problem: Mobility Impaired (Adult and Pediatric)  Goal: *Acute Goals and Plan of Care (Insert Text)  Description: FUNCTIONAL STATUS PRIOR TO ADMISSION: Patient was modified independent using a rolling walker or WB quad cane for functional mobility. Has had multiple falls over the past week (more than 4). HOME SUPPORT PRIOR TO ADMISSION: The patient lived with sister who assists as needed. She goes to adult day care 3 days/week. Physical Therapy Goals  Initiated 12/20/2021  1. Patient will move from supine to sit and sit to supine  and roll side to side in bed with modified independence within 7 day(s). 2.  Patient will transfer from bed to chair and chair to bed with modified independence using the least restrictive device within 7 day(s). 3.  Patient will perform sit to stand with modified independence within 7 day(s). 4.  Patient will ambulate with supervision/set-up for 50 feet with the least restrictive device within 7 day(s). 5.  Patient will ascend/descend 15 stairs with 1 handrail(s) with minimal assistance/contact guard assist within 7 day(s). Outcome: Progressing Towards Goal     PHYSICAL THERAPY TREATMENT  Patient: Byron Harris (9 y.o. female)  Date: 12/22/2021  Diagnosis: Acute cystitis [N30.00]  Encephalopathy [G93.40] <principal problem not specified>       Precautions:    Chart, physical therapy assessment, plan of care and goals were reviewed. ASSESSMENT  Patient continues with skilled PT services and is progressing towards goals. Pt received supine in bed and willing to work with therapy. Pt continues to be limited by L sided weakness from previous CVA, noted decreased strength with hip flexion and knee ext with amb this session. Pt able to tolerate bed mobility to R side EOB with no assistance  needed, followed by putting on her shoes and AFO with no assistance with good ROM and adaptive skills with LLE.  Pt continued with STS to RW, followed by gait training in hallway and back to room ~60' with min A due to weak hip flexion and needing manual assistance for increased step clearance. Upon return to bed, pt needed min A for sit>sup with LE only. Pt completed session with call bell within reach an all needs met at the time. RN notified of session. Current Level of Function Impacting Discharge (mobility/balance): supervison/min with bed mobility, CGA/min A  for STS and amb up to 61' with RW    Other factors to consider for discharge: fall risk         PLAN :  Patient continues to benefit from skilled intervention to address the above impairments. Continue treatment per established plan of care. to address goals. Recommendation for discharge: (in order for the patient to meet his/her long term goals)  Therapy 3 hours per day 5-7 days per week / VS SNF    This discharge recommendation:  Has not yet been discussed the attending provider and/or case management    IF patient discharges home will need the following DME: to be determined (TBD)       SUBJECTIVE:   Patient stated Queen Abram had to learn some tricks.     OBJECTIVE DATA SUMMARY:   Critical Behavior:  Neurologic State: Alert  Orientation Level: Oriented X4  Cognition: Appropriate decision making,Appropriate for age attention/concentration,Appropriate safety awareness     Functional Mobility Training:  Bed Mobility:  Rolling: Supervision  Supine to Sit: Supervision  Sit to Supine: Minimum assistance  Scooting: Supervision     Transfers:  Sit to Stand: Contact guard assistance; Additional time  Stand to Sit: Contact guard assistance; Additional time     Balance:  Sitting: Intact  Standing: Impaired  Standing - Static: Constant support;Good  Standing - Dynamic : Constant support;Good    Ambulation/Gait Training:  Distance (ft): 60 Feet (ft)  Assistive Device: Orthotic device;Gait belt;Walker, rolling (AFO ON LUE)  Ambulation - Level of Assistance: Contact guard assistance;Minimal assistance; Additional time  Gait Abnormalities: Decreased step clearance; Foot drop  Speed/Brenda: Pace decreased (<100 feet/min)  Step Length: Left shortened;Right shortened      Therapeutic Exercises:   Reviewed supine LE ex for LLE hip and knee strengthening  Pain Ratin/10 chronic    Activity Tolerance:   Fair    After treatment patient left in no apparent distress:   Supine in bed and Call bell within reach    COMMUNICATION/COLLABORATION:   The patients plan of care was discussed with: Registered nurse.      Angela Mtz PTA   Time Calculation: 24 mins

## 2021-12-22 NOTE — PROGRESS NOTES
Physician Progress Note      Evon Rai  CSN #:                  [id number]  :                       1960  ADMIT DATE:       2021 2:07 PM  [address] [place] DATE:  RESPONDING  PROVIDER #:        Radha Ko MD          QUERY TEXT:    Pt admitted with confusion. Pt noted to have Encephalopathy documented in H&P. Pt is also documented to be A&Ox3 in ED Provider, H&P, and daily Attending Progress Notes. Encephalopathy is only mentioned in the H&P. If possible, please document in the progress notes and discharge summary if you are evaluating and / or treating any of the following or indicate that the diagnosis of Encephalopathy has been ruled out. The medical record reflects the following:  Risk Factors: 61F being treated for UTI  Clinical Indicators:   Frederick Guerrero MD  presents with confusion  Neurologic:  Moves all extremities. AAOx3, CN II-XII reviewed     H&P Yareli Contreras MD  Assessment:  Active Problems:  Acute cystitis  Encephalopathy     ED Provider Note Kt ANGUIANO  presenting today secondary to confusion and frequent falls  Her sister says that yesterday she was seeming very confused, repeating herself often throughout the day, which seems to be improving today  Neurological:  Mental Status: She is alert and oriented to person, place, and time    Treatment: IV abx and Dunn exchange for treatment of UTI    Thank you    Wilfredo MAHMOOD RN [facility]  Clinical Documentation Improvement Specialist  [phone] provided:  -- Encephalopathy ruled out  -- Encephalopathy due to, Please specify cause. -- Other - I will add my own diagnosis  -- Disagree - Not applicable / Not valid  -- Disagree - Clinically unable to determine / Unknown  -- Refer to Clinical Documentation Reviewer    PROVIDER RESPONSE TEXT:    Patient has encephalopathy due to CAUTI. Now resolved.     Query created by: Kate Alvarez on 2021 9:52 AM      QUERY TEXT:    Patient admitted with BMI 42.95. If possible, please document in progress notes and discharge summary if you are evaluating and /or treating any of the following: The medical record reflects the following:  Risk Factors: 61F pmhx CVA  Clinical Indicators:    5'4\"  113.5 kg  BMI 42.95      Treatment: Activity as tolerated, PT/OT with reccs for SNF placement, Adult Diet regular 4 carb choices low fat low chol high fiber 2gm Na    Thank you    Rubin Edouard BSN RN CRCR  Clinical Documentation Improvement Specialist  [phone]  Options provided:  -- Obesity  -- Morbid obesity  -- Severe obesity  -- Overweight  -- BMI not clinically significant  -- Other - I will add my own diagnosis  -- Disagree - Not applicable / Not valid  -- Disagree - Clinically unable to determine / Unknown  -- Refer to Clinical Documentation Reviewer    PROVIDER RESPONSE TEXT:    This patient has obesity.     Query created by: Ana Maria Kimball on 12/22/2021 9:54 AM      Electronically signed by:  Roz Castillo MD 12/22/2021 9:56 AM

## 2021-12-22 NOTE — PROGRESS NOTES
Bedside shift change report given to Alma Rosa (oncoming nurse) by Nini Fraire (offgoing nurse). Report included the following information SBAR, Kardex, Intake/Output, MAR and Recent Results.

## 2021-12-22 NOTE — PROGRESS NOTES
Transition of Care: SNF; the [address] [place] [address] has accepted; pending insurance auth- started on 12/22    Transport Plan: likely BLS- not set up yet    RUR: 11%    Main contact is sister- Hebert Elkins- [phone]    Discharge pending:  -pending insurance auth  -pending medical progress    0945: this CM spoke to Stephane at [facility]- they are able to accept the referral; they are starting insurance [facility] today 12/22    1100: updated patient at bedside on acceptance to the [facility]- she verbalized understanding      NOTE:  patient lives with sister in two story home. She attends [employer]  [facility], [facility] [phone]. she has been to SNF in the past [facility] [employer]   She is agreeable to SNF if covered under her policy.       Home Situation  Home Environment: Private residence  # Steps to Enter: 3  One/Two Story Residence:  Two story home  Living Alone: No  Current DME Used/Available at Home: Drew Elias, wheelchair, and rolling walker  Tub or Shower Type: Tub/Shower combination    CM following  Chadd Michael RN, CRM

## 2021-12-22 NOTE — ROUTINE PROCESS
Bedside and Verbal shift change report given to Kinza Alfredo (oncoming nurse) by Annika Benitez (offgoing nurse). Report included the following information SBAR, Kardex, Intake/Output, MAR and Recent Results.

## 2021-12-22 NOTE — PROGRESS NOTES
[facility]  Hospitalist Group                                                                                          Hospitalist Progress Note  Carina Tijerina MD  Answering service: [phone] from in house phone        Date of Service:  2021  NAME:  Henri Harris  :  1960  MRN:  [id number]      Admission Summary:   Henri Harris is a 59 y.o. female with a pmhx HFpEF, HTN, DM II, asthma, GERD, hypothyroidism, past CVA with neurogenic bladder and chronic indwelling Dunn, and dyslipidemia who presents with confusion, and several falls, and is being admitted for acute cystitis.       In the ED, VSS. UA is positive for nitrites, leukocyte esterase, large blood, and 4+ bacteria. Labs are significant for creatinine 1.33, alk phos 121. CT head with no acute intracranial process.     In the ED, her Dunn was exchanged, and she received ceftriaxone, and Tylenol.       Interval history / Subjective:   Patient seen for follow-up of catheter associated UTI. Patient seen and examined earlier by me this morning. Patient reports that she had a little bit of nausea this morning but went away with ginger ale. She denies any other symptoms at this time. Assessment & Plan:     #Acute catheter associated UTI /cystitis due to E. Coli  Resolved    #Hx Chronic indwelling Dunn s/p CVA  Continue ceftriaxone for 1 more dose as the patient only received 2  Urine culture grew E. coli sensitive to ceftriaxone . We will stop ceftriaxone today(day 3)  Dunn exchange done in the hospital   DC ceftriaxone after dose     #Multiple falls  Patient with generalized weakness  Patient uses cane, and walker at home, but has had multiple falls. She lives with her sister.   PT/OT/case management  -PT OT recommended SNF, case management working on placement  -Can be discharged once SNF placement completed  Accepted to SNF but pending authorization     #HTN  -continue losartan, and carvedilol     Obesity  Counseled on lifestyle and diet    #HFrEF,NYHA II  -continue lasix     #GERD  -continue PPI     #Hypothyroidism  -continue levothyroxine     #Asthma  -continue prn duoneb and singulair     #Hx CVA  -continue statin     #Depression  -continue cymbalta     Pending authorization for SNF placement. Likely discharge tomorrow. FEN: cardiac  dvt ppx: lovenox  MPOA: Checo Murillo (sister)  Code: full     Hospital Problems  Date Reviewed: 11/1/2021          Codes Class Noted POA    Acute cystitis ICD-10-CM: N30.00  ICD-9-CM: 595.0  12/19/2021 Unknown        Encephalopathy ICD-10-CM: G93.40  ICD-9-CM: 348.30  12/19/2021 Unknown                Review of Systems:   A comprehensive review of systems was negative except for that written in the HPI. Vital Signs:    Last 24hrs VS reviewed since prior progress note. Most recent are:  Visit Vitals  /72   Pulse 68   Temp 98.1 °F (36.7 °C)   Resp 16   Ht 5' 4\" (1.626 m)   Wt 113.5 kg (250 lb 3.6 oz)   SpO2 96%   BMI 42.95 kg/m²         Intake/Output Summary (Last 24 hours) at 12/22/2021 1301  Last data filed at 12/22/2021 0545  Gross per 24 hour   Intake    Output 900 ml   Net -900 ml        Physical Examination:     I had a face to face encounter with this patient and independently examined them on 12/22/2021 as outlined below:          Constitutional:  No acute distress, cooperative, pleasant    ENT:  Oral mucosa moist, oropharynx benign. Resp:  CTA bilaterally. No wheezing/rhonchi/rales. No accessory muscle use   CV:  Regular rhythm, normal rate, no murmurs, gallops, rubs    GI:  Soft, non distended, non tender. normoactive bowel sounds, no hepatosplenomegaly     Musculoskeletal:  No edema, warm, 2+ pulses throughout    Neurologic:  Moves all extremities.   AAOx3, CN II-XII reviewed            Data Review:    Review and/or order of clinical lab test      Labs:     Recent Labs     12/22/21  0405 12/21/21  0429   WBC 5.7 5.8   HGB 11.7 11.7 HCT 36.0 35.9    183     Recent Labs     12/22/21  0405 12/21/21  0429 12/20/21  0327    142 141   K 3.8 3.8 4.2   * 113* 114*   CO2 25 24 24   BUN 19 25* 33*   CREA 1.00 1.04* 1.22*   GLU 89 92 143*   CA 8.3* 8.7 8.7     Recent Labs     12/19/21  1504   ALT 36   *   TBILI 0.6   TP 7.4   ALB 3.7   GLOB 3.7     No results for input(s): INR, PTP, APTT, INREXT, INREXT in the last 72 hours. No results for input(s): FE, TIBC, PSAT, FERR in the last 72 hours. No results found for: FOL, RBCF   No results for input(s): PH, PCO2, PO2 in the last 72 hours. No results for input(s): CPK, CKNDX, TROIQ in the last 72 hours.     No lab exists for component: CPKMB  Lab Results   Component Value Date/Time    Cholesterol, total 156 11/20/2020 03:38 PM    HDL Cholesterol 72 11/20/2020 03:38 PM    LDL, calculated 69.4 11/20/2020 03:38 PM    Triglyceride 73 11/20/2020 03:38 PM    CHOL/HDL Ratio 2.2 11/20/2020 03:38 PM     Lab Results   Component Value Date/Time    Glucose (POC) 101 (H) 09/12/2017 04:49 PM    Glucose (POC) 112 (H) 09/12/2017 11:27 AM    Glucose (POC) 81 09/12/2017 06:46 AM    Glucose (POC) 105 (H) 09/11/2017 10:02 PM    Glucose (POC) 99 09/11/2017 04:56 PM     Lab Results   Component Value Date/Time    Color YELLOW/STRAW 12/19/2021 03:00 PM    Appearance CLOUDY (A) 12/19/2021 03:00 PM    Specific gravity 1.017 12/19/2021 03:00 PM    pH (UA) 6.0 12/19/2021 03:00 PM    Protein TRACE (A) 12/19/2021 03:00 PM    Glucose Negative 12/19/2021 03:00 PM    Ketone Negative 12/19/2021 03:00 PM    Bilirubin Negative 12/19/2021 03:00 PM    Urobilinogen 1.0 12/19/2021 03:00 PM    Nitrites Positive (A) 12/19/2021 03:00 PM    Leukocyte Esterase MODERATE (A) 12/19/2021 03:00 PM    Epithelial cells FEW 12/19/2021 03:00 PM    Bacteria 4+ (A) 12/19/2021 03:00 PM    WBC 20-50 12/19/2021 03:00 PM    RBC  12/19/2021 03:00 PM         Medications Reviewed:     Current Facility-Administered Medications Medication Dose Route Frequency    cefTRIAXone (ROCEPHIN) 1 g in sterile water (preservative free) 10 mL IV syringe  1 g IntraVENous NOW    sodium chloride (NS) flush 5-40 mL  5-40 mL IntraVENous Q8H    sodium chloride (NS) flush 5-40 mL  5-40 mL IntraVENous PRN    acetaminophen (TYLENOL) tablet 650 mg  650 mg Oral Q6H PRN    Or    acetaminophen (TYLENOL) suppository 650 mg  650 mg Rectal Q6H PRN    ondansetron (ZOFRAN ODT) tablet 4 mg  4 mg Oral Q8H PRN    Or    ondansetron (ZOFRAN) injection 4 mg  4 mg IntraVENous Q6H PRN    enoxaparin (LOVENOX) injection 40 mg  40 mg SubCUTAneous DAILY    albuterol-ipratropium (DUO-NEB) 2.5 MG-0.5 MG/3 ML  3 mL Nebulization Q6H PRN    atorvastatin (LIPITOR) tablet 40 mg  40 mg Oral QHS    carvediloL (COREG) tablet 12.5 mg  12.5 mg Oral BID WITH MEALS    DULoxetine (CYMBALTA) capsule 120 mg  120 mg Oral DAILY    furosemide (LASIX) tablet 40 mg  40 mg Oral DAILY    levothyroxine (SYNTHROID) tablet 125 mcg  125 mcg Oral ACB    . PHARMACY TO SUBSTITUTE PER PROTOCOL (Reordered from: linaCLOtide (Linzess) 145 mcg cap capsule)    Per Protocol    losartan (COZAAR) tablet 50 mg  50 mg Oral QHS    pantoprazole (PROTONIX) tablet 40 mg  40 mg Oral DAILY    ferrous sulfate tablet 325 mg  325 mg Oral ACB    montelukast (SINGULAIR) tablet 10 mg  10 mg Oral QHS    gabapentin (NEURONTIN) capsule 100 mg  100 mg Oral TID PRN     ______________________________________________________________________  EXPECTED LENGTH OF STAY: 3d 7h  ACTUAL LENGTH OF STAY:          3                 Bonita Mclean MD

## 2021-12-23 LAB
ANION GAP SERPL CALC-SCNC: 5 MMOL/L (ref 5–15)
BASOPHILS # BLD: 0.1 K/UL (ref 0–0.1)
BASOPHILS NFR BLD: 1 % (ref 0–1)
BUN SERPL-MCNC: 16 MG/DL (ref 6–20)
BUN/CREAT SERPL: 16 (ref 12–20)
CALCIUM SERPL-MCNC: 8.6 MG/DL (ref 8.5–10.1)
CHLORIDE SERPL-SCNC: 110 MMOL/L (ref 97–108)
CO2 SERPL-SCNC: 26 MMOL/L (ref 21–32)
CREAT SERPL-MCNC: 0.99 MG/DL (ref 0.55–1.02)
DIFFERENTIAL METHOD BLD: NORMAL
EOSINOPHIL # BLD: 0.3 K/UL (ref 0–0.4)
EOSINOPHIL NFR BLD: 4 % (ref 0–7)
ERYTHROCYTE [DISTWIDTH] IN BLOOD BY AUTOMATED COUNT: 13.2 % (ref 11.5–14.5)
GLUCOSE SERPL-MCNC: 94 MG/DL (ref 65–100)
HCT VFR BLD AUTO: 37.1 % (ref 35–47)
HGB BLD-MCNC: 12.1 G/DL (ref 11.5–16)
IMM GRANULOCYTES # BLD AUTO: 0 K/UL (ref 0–0.04)
IMM GRANULOCYTES NFR BLD AUTO: 0 % (ref 0–0.5)
LYMPHOCYTES # BLD: 2.7 K/UL (ref 0.8–3.5)
LYMPHOCYTES NFR BLD: 46 % (ref 12–49)
MCH RBC QN AUTO: 28.9 PG (ref 26–34)
MCHC RBC AUTO-ENTMCNC: 32.6 G/DL (ref 30–36.5)
MCV RBC AUTO: 88.5 FL (ref 80–99)
MONOCYTES # BLD: 0.8 K/UL (ref 0–1)
MONOCYTES NFR BLD: 13 % (ref 5–13)
NEUTS SEG # BLD: 2.1 K/UL (ref 1.8–8)
NEUTS SEG NFR BLD: 36 % (ref 32–75)
NRBC # BLD: 0 K/UL (ref 0–0.01)
NRBC BLD-RTO: 0 PER 100 WBC
PLATELET # BLD AUTO: 180 K/UL (ref 150–400)
PMV BLD AUTO: 9.4 FL (ref 8.9–12.9)
POTASSIUM SERPL-SCNC: 3.8 MMOL/L (ref 3.5–5.1)
RBC # BLD AUTO: 4.19 M/UL (ref 3.8–5.2)
SODIUM SERPL-SCNC: 141 MMOL/L (ref 136–145)
WBC # BLD AUTO: 5.9 K/UL (ref 3.6–11)

## 2021-12-23 PROCEDURE — 80048 BASIC METABOLIC PNL TOTAL CA: CPT

## 2021-12-23 PROCEDURE — 74011250636 HC RX REV CODE- 250/636: Performed by: FAMILY MEDICINE

## 2021-12-23 PROCEDURE — 74011250637 HC RX REV CODE- 250/637: Performed by: FAMILY MEDICINE

## 2021-12-23 PROCEDURE — 94760 N-INVAS EAR/PLS OXIMETRY 1: CPT

## 2021-12-23 PROCEDURE — 85025 COMPLETE CBC W/AUTO DIFF WBC: CPT

## 2021-12-23 PROCEDURE — 36415 COLL VENOUS BLD VENIPUNCTURE: CPT

## 2021-12-23 PROCEDURE — 65270000032 HC RM SEMIPRIVATE

## 2021-12-23 RX ADMIN — GABAPENTIN 100 MG: 100 CAPSULE ORAL at 16:55

## 2021-12-23 RX ADMIN — CARVEDILOL 12.5 MG: 12.5 TABLET, FILM COATED ORAL at 16:55

## 2021-12-23 RX ADMIN — FUROSEMIDE 40 MG: 40 TABLET ORAL at 10:04

## 2021-12-23 RX ADMIN — SODIUM CHLORIDE, PRESERVATIVE FREE 10 ML: 5 INJECTION INTRAVENOUS at 16:48

## 2021-12-23 RX ADMIN — PANTOPRAZOLE SODIUM 40 MG: 40 TABLET, DELAYED RELEASE ORAL at 10:04

## 2021-12-23 RX ADMIN — DULOXETINE HYDROCHLORIDE 120 MG: 60 CAPSULE, DELAYED RELEASE ORAL at 10:04

## 2021-12-23 RX ADMIN — LEVOTHYROXINE SODIUM 125 MCG: 0.12 TABLET ORAL at 07:26

## 2021-12-23 RX ADMIN — MONTELUKAST 10 MG: 10 TABLET, FILM COATED ORAL at 22:10

## 2021-12-23 RX ADMIN — ENOXAPARIN SODIUM 40 MG: 100 INJECTION SUBCUTANEOUS at 10:05

## 2021-12-23 RX ADMIN — ATORVASTATIN CALCIUM 40 MG: 40 TABLET, FILM COATED ORAL at 22:10

## 2021-12-23 RX ADMIN — CARVEDILOL 12.5 MG: 12.5 TABLET, FILM COATED ORAL at 07:27

## 2021-12-23 RX ADMIN — LOSARTAN POTASSIUM 50 MG: 50 TABLET, FILM COATED ORAL at 22:10

## 2021-12-23 RX ADMIN — FERROUS SULFATE TAB 325 MG (65 MG ELEMENTAL FE) 325 MG: 325 (65 FE) TAB at 07:26

## 2021-12-23 NOTE — PROGRESS NOTES
Spiritual Care Partner Volunteer visited patient in [place] on 12.23.21    Documented by Gibran Villavicencio, Staff , on 12.23.21  Please call Jessica-JENNIFER ([phone]) to page  if needed

## 2021-12-23 NOTE — PROGRESS NOTES
Transition of Care: SNF; the [address] [place] [address] has accepted; pending insurance auth- started on 12/22     Transport Plan: CEE is on willcall for 12/23     RUR: 10%     Main contact is sister- Marcel Manuel- [phone]     Discharge pending:  -pending insurance [employer]  -pending medical progress     1130: Stephane from the [facility] called; they are trying to get insurance auth but the insurance number is invalid; this CM got copy of insurance card and sent to Stephane via fax    1600: this CM checked in with Stephane at the [facility]; it is still pending        NOTE:  patient lives with sister in two story home. Rolanda Guaman attends [employer], [employer] [phone]. Carie Ricks has been to SNF in the past [facility] [employer]. Rolanda Guaman is agreeable to SNF if covered under her policy.       Home Situation  Home Environment: Private residence  # Steps to Enter: 3  One/Two Story Residence:  Two story home  Living Alone: No  Current DME Used/Available at Home: Cane, quadcane, wheelchair, and rolling walker  Tub or Shower Type: Tub/Shower combination     CM following  Tatyana Kwon RN, CRM          Revision History

## 2021-12-23 NOTE — PROGRESS NOTES
[facility]  Hospitalist Group                                                                                          Hospitalist Progress Note  Blanka Arreola MD  Answering service: [phone] OR [phone] from in house phone        Date of Service:  2021  NAME:  Clare Fonseca  :  1960  MRN:  [id number]      Admission Summary:     Patient presents with urinary tract infection, has history of CVA with neurogenic bladder requiring chronic indwelling Dunn catheter. Patient has completed antibiotics and doing well. Has generalized weakness and awaiting placement to rehab. Interval history / Subjective:       Patient has no acute complaint today. Assessment & Plan:     Catheter associated urinary tract infection  -Patient's urine culture grew E.  Coli  -Completed course of ceftriaxone    Neurogenic bladder  -Due to prior history of CVA, requiring chronic indwelling Dunn catheter  -Dunn catheter was exchanged this hospitalization    Hypertension  -Continue losartan and Coreg  -Blood pressure stable    Chronic systolic congestive heart failure  -Last known EF of 47%  -Continue diuresis with Lasix    Hypothyroidism  -Continue Synthroid    Dyslipidemia  -Continue Lipitor    Obesity  -With BMI of 42.95  -Outpatient follow-up for weight management    Generalized weakness  -Patient with multiple falls due to generalized weakness  -PT OT evaluated patient, recommended SNF  -Patient accepted at [address] [place] [address], pending insurance authorization    Code status: FULL  DVT prophylaxis: SCDs    Care Plan discussed with: Patient/Family  Anticipated Disposition: SNF/LTC  Anticipated Discharge: 24 hours to 48 hours     Hospital Problems  Date Reviewed: 2021          Codes Class Noted POA    Acute cystitis ICD-10-CM: N30.00  ICD-9-CM: 595.0  2021 Unknown        Encephalopathy ICD-10-CM: G93.40  ICD-9-CM: 348.30  2021 Unknown                Review of Systems:   A comprehensive review of systems was negative except for that written in the HPI. Vital Signs:    Last 24hrs VS reviewed since prior progress note. Most recent are:  Visit Vitals  /73   Pulse 77   Temp 97.8 °F (36.6 °C)   Resp 16   Ht 5' 4\" (1.626 m)   Wt 113.5 kg (250 lb 3.6 oz)   SpO2 98%   BMI 42.95 kg/m²         Intake/Output Summary (Last 24 hours) at 12/23/2021 1715  Last data filed at 12/22/2021 1909  Gross per 24 hour   Intake    Output 650 ml   Net -650 ml        Physical Examination:     I had a face to face encounter with this patient and independently examined them on 12/23/2021 as outlined below:          Constitutional:  No acute distress, cooperative, pleasant    ENT:  Oral mucosa moist, oropharynx benign. Resp:  CTA bilaterally. No wheezing/rhonchi/rales. No accessory muscle use   CV:  Regular rhythm, normal rate, no murmurs, gallops, rubs    GI:  Soft, non distended, non tender. normoactive bowel sounds, no hepatosplenomegaly     Musculoskeletal:  No edema, warm, 2+ pulses throughout    Neurologic:  Moves all extremities. AAOx3, CN II-XII reviewed            Data Review:    Review and/or order of clinical lab test      Labs:     Recent Labs     12/23/21  0549 12/22/21  0405   WBC 5.9 5.7   HGB 12.1 11.7   HCT 37.1 36.0    178     Recent Labs     12/23/21  0549 12/22/21  0405 12/21/21  0429    142 142   K 3.8 3.8 3.8   * 112* 113*   CO2 26 25 24   BUN 16 19 25*   CREA 0.99 1.00 1.04*   GLU 94 89 92   CA 8.6 8.3* 8.7     No results for input(s): ALT, AP, TBIL, TBILI, TP, ALB, GLOB, GGT, AML, LPSE in the last 72 hours. No lab exists for component: SGOT, GPT, AMYP, HLPSE  No results for input(s): INR, PTP, APTT, INREXT in the last 72 hours. No results for input(s): FE, TIBC, PSAT, FERR in the last 72 hours. No results found for: FOL, RBCF   No results for input(s): PH, PCO2, PO2 in the last 72 hours.   No results for input(s): CPK, CKNDX, TROIQ in the last 72 hours.    No lab exists for component: CPKMB  Lab Results   Component Value Date/Time    Cholesterol, total 156 11/20/2020 03:38 PM    HDL Cholesterol 72 11/20/2020 03:38 PM    LDL, calculated 69.4 11/20/2020 03:38 PM    Triglyceride 73 11/20/2020 03:38 PM    CHOL/HDL Ratio 2.2 11/20/2020 03:38 PM     Lab Results   Component Value Date/Time    Glucose (POC) 101 (H) 09/12/2017 04:49 PM    Glucose (POC) 112 (H) 09/12/2017 11:27 AM    Glucose (POC) 81 09/12/2017 06:46 AM    Glucose (POC) 105 (H) 09/11/2017 10:02 PM    Glucose (POC) 99 09/11/2017 04:56 PM     Lab Results   Component Value Date/Time    Color YELLOW/STRAW 12/19/2021 03:00 PM    Appearance CLOUDY (A) 12/19/2021 03:00 PM    Specific gravity 1.017 12/19/2021 03:00 PM    pH (UA) 6.0 12/19/2021 03:00 PM    Protein TRACE (A) 12/19/2021 03:00 PM    Glucose Negative 12/19/2021 03:00 PM    Ketone Negative 12/19/2021 03:00 PM    Bilirubin Negative 12/19/2021 03:00 PM    Urobilinogen 1.0 12/19/2021 03:00 PM    Nitrites Positive (A) 12/19/2021 03:00 PM    Leukocyte Esterase MODERATE (A) 12/19/2021 03:00 PM    Epithelial cells FEW 12/19/2021 03:00 PM    Bacteria 4+ (A) 12/19/2021 03:00 PM    WBC 20-50 12/19/2021 03:00 PM    RBC  12/19/2021 03:00 PM         Medications Reviewed:     Current Facility-Administered Medications   Medication Dose Route Frequency    sodium chloride (NS) flush 5-40 mL  5-40 mL IntraVENous Q8H    sodium chloride (NS) flush 5-40 mL  5-40 mL IntraVENous PRN    acetaminophen (TYLENOL) tablet 650 mg  650 mg Oral Q6H PRN    Or    acetaminophen (TYLENOL) suppository 650 mg  650 mg Rectal Q6H PRN    ondansetron (ZOFRAN ODT) tablet 4 mg  4 mg Oral Q8H PRN    Or    ondansetron (ZOFRAN) injection 4 mg  4 mg IntraVENous Q6H PRN    enoxaparin (LOVENOX) injection 40 mg  40 mg SubCUTAneous DAILY    albuterol-ipratropium (DUO-NEB) 2.5 MG-0.5 MG/3 ML  3 mL Nebulization Q6H PRN    atorvastatin (LIPITOR) tablet 40 mg  40 mg Oral QHS    carvediloL (COREG) tablet 12.5 mg  12.5 mg Oral BID WITH MEALS    DULoxetine (CYMBALTA) capsule 120 mg  120 mg Oral DAILY    furosemide (LASIX) tablet 40 mg  40 mg Oral DAILY    levothyroxine (SYNTHROID) tablet 125 mcg  125 mcg Oral ACB    linaCLOtide (LINZESS) capsule 145 mcg (Patient Supplied)  145 mcg Oral ACB    losartan (COZAAR) tablet 50 mg  50 mg Oral QHS    pantoprazole (PROTONIX) tablet 40 mg  40 mg Oral DAILY    ferrous sulfate tablet 325 mg  325 mg Oral ACB    montelukast (SINGULAIR) tablet 10 mg  10 mg Oral QHS    gabapentin (NEURONTIN) capsule 100 mg  100 mg Oral TID PRN     ______________________________________________________________________  EXPECTED LENGTH OF STAY: 3d 7h  ACTUAL LENGTH OF STAY:          4                 Alicia Stanton MD

## 2021-12-24 LAB
BACTERIA SPEC CULT: NORMAL
SERVICE CMNT-IMP: NORMAL

## 2021-12-24 PROCEDURE — 65270000032 HC RM SEMIPRIVATE

## 2021-12-24 PROCEDURE — 94760 N-INVAS EAR/PLS OXIMETRY 1: CPT

## 2021-12-24 PROCEDURE — 74011250636 HC RX REV CODE- 250/636: Performed by: FAMILY MEDICINE

## 2021-12-24 PROCEDURE — 74011250637 HC RX REV CODE- 250/637: Performed by: FAMILY MEDICINE

## 2021-12-24 RX ADMIN — LEVOTHYROXINE SODIUM 125 MCG: 0.12 TABLET ORAL at 06:41

## 2021-12-24 RX ADMIN — GABAPENTIN 100 MG: 100 CAPSULE ORAL at 14:12

## 2021-12-24 RX ADMIN — CARVEDILOL 12.5 MG: 12.5 TABLET, FILM COATED ORAL at 11:31

## 2021-12-24 RX ADMIN — FERROUS SULFATE TAB 325 MG (65 MG ELEMENTAL FE) 325 MG: 325 (65 FE) TAB at 06:41

## 2021-12-24 RX ADMIN — MONTELUKAST 10 MG: 10 TABLET, FILM COATED ORAL at 21:19

## 2021-12-24 RX ADMIN — DULOXETINE HYDROCHLORIDE 120 MG: 60 CAPSULE, DELAYED RELEASE ORAL at 11:31

## 2021-12-24 RX ADMIN — CARVEDILOL 12.5 MG: 12.5 TABLET, FILM COATED ORAL at 20:19

## 2021-12-24 RX ADMIN — FUROSEMIDE 40 MG: 40 TABLET ORAL at 11:30

## 2021-12-24 RX ADMIN — PANTOPRAZOLE SODIUM 40 MG: 40 TABLET, DELAYED RELEASE ORAL at 11:30

## 2021-12-24 RX ADMIN — ATORVASTATIN CALCIUM 40 MG: 40 TABLET, FILM COATED ORAL at 21:19

## 2021-12-24 RX ADMIN — LOSARTAN POTASSIUM 50 MG: 50 TABLET, FILM COATED ORAL at 21:19

## 2021-12-24 RX ADMIN — SODIUM CHLORIDE, PRESERVATIVE FREE 10 ML: 5 INJECTION INTRAVENOUS at 21:19

## 2021-12-24 RX ADMIN — ENOXAPARIN SODIUM 40 MG: 100 INJECTION SUBCUTANEOUS at 11:31

## 2021-12-24 NOTE — PROGRESS NOTES
[facility] Adult  Hospitalist Group                                                                                          Hospitalist Progress Note  Lawanda Diaz MD  Answering service: [phone] OR [phone] from in house phone        Date of Service:  2021  NAME:  Racquel Siddiqui  :  1960  MRN:  [id number]      Admission Summary:     Patient presents with urinary tract infection, has history of CVA with neurogenic bladder requiring chronic indwelling Dunn catheter. Patient has completed antibiotics and doing well. Has generalized weakness and awaiting placement to rehab. Interval history / Subjective:       Patient has no acute complaint today. Still waiting for placement. Assessment & Plan:     Catheter associated urinary tract infection  -Patient's urine culture grew E.  Coli  -Completed course of ceftriaxone    Neurogenic bladder  -Due to prior history of CVA, requiring chronic indwelling Dunn catheter  -Dunn catheter was exchanged this hospitalization    Hypertension  -Continue losartan and Coreg  -Blood pressure stable    Chronic systolic congestive heart failure  -Last known EF of 47%  -Continue diuresis with Lasix    Hypothyroidism  -Continue Synthroid    Dyslipidemia  -Continue Lipitor    Obesity  -With BMI of 42.95  -Outpatient follow-up for weight management    Generalized weakness  -Patient with multiple falls due to generalized weakness  -PT OT evaluated patient, recommended SNF  -Patient accepted at [address] [place] [address], pending insurance authorization    Code status: FULL  DVT prophylaxis: SCDs    Care Plan discussed with: Patient/Family  Anticipated Disposition: SNF/LTC  Anticipated Discharge: 24 hours to 48 hours     Hospital Problems  Date Reviewed: 2021          Codes Class Noted POA    Acute cystitis ICD-10-CM: N30.00  ICD-9-CM: 595.0  2021 Unknown        Encephalopathy ICD-10-CM: G93.40  ICD-9-CM: 348.30  2021 Unknown Review of Systems:   A comprehensive review of systems was negative except for that written in the HPI. Vital Signs:    Last 24hrs VS reviewed since prior progress note. Most recent are:  Visit Vitals  /79   Pulse 98   Temp 98.4 °F (36.9 °C)   Resp 18   Ht 5' 4\" (1.626 m)   Wt 111.8 kg (246 lb 7.6 oz)   SpO2 97%   BMI 42.31 kg/m²         Intake/Output Summary (Last 24 hours) at 12/24/2021 1340  Last data filed at 12/24/2021 0646  Gross per 24 hour   Intake    Output 1450 ml   Net -1450 ml        Physical Examination:     I had a face to face encounter with this patient and independently examined them on 12/24/2021 as outlined below:          Constitutional:  No acute distress, cooperative, pleasant    ENT:  Oral mucosa moist, oropharynx benign. Resp:  CTA bilaterally. No wheezing/rhonchi/rales. No accessory muscle use   CV:  Regular rhythm, normal rate, no murmurs, gallops, rubs    GI:  Soft, non distended, non tender. normoactive bowel sounds, no hepatosplenomegaly     Musculoskeletal:  No edema, warm, 2+ pulses throughout    Neurologic:  Moves all extremities. AAOx3, CN II-XII reviewed            Data Review:    Review and/or order of clinical lab test      Labs:     Recent Labs     12/23/21  0549 12/22/21  0405   WBC 5.9 5.7   HGB 12.1 11.7   HCT 37.1 36.0    178     Recent Labs     12/23/21  0549 12/22/21  0405    142   K 3.8 3.8   * 112*   CO2 26 25   BUN 16 19   CREA 0.99 1.00   GLU 94 89   CA 8.6 8.3*     No results for input(s): ALT, AP, TBIL, TBILI, TP, ALB, GLOB, GGT, AML, LPSE in the last 72 hours. No lab exists for component: SGOT, GPT, AMYP, HLPSE  No results for input(s): INR, PTP, APTT, INREXT, INREXT in the last 72 hours. No results for input(s): FE, TIBC, PSAT, FERR in the last 72 hours. No results found for: FOL, RBCF   No results for input(s): PH, PCO2, PO2 in the last 72 hours.   No results for input(s): CPK, CKNDX, TROIQ in the last 72 hours.    No lab exists for component: CPKMB  Lab Results   Component Value Date/Time    Cholesterol, total 156 11/20/2020 03:38 PM    HDL Cholesterol 72 11/20/2020 03:38 PM    LDL, calculated 69.4 11/20/2020 03:38 PM    Triglyceride 73 11/20/2020 03:38 PM    CHOL/HDL Ratio 2.2 11/20/2020 03:38 PM     Lab Results   Component Value Date/Time    Glucose (POC) 101 (H) 09/12/2017 04:49 PM    Glucose (POC) 112 (H) 09/12/2017 11:27 AM    Glucose (POC) 81 09/12/2017 06:46 AM    Glucose (POC) 105 (H) 09/11/2017 10:02 PM    Glucose (POC) 99 09/11/2017 04:56 PM     Lab Results   Component Value Date/Time    Color YELLOW/STRAW 12/19/2021 03:00 PM    Appearance CLOUDY (A) 12/19/2021 03:00 PM    Specific gravity 1.017 12/19/2021 03:00 PM    pH (UA) 6.0 12/19/2021 03:00 PM    Protein TRACE (A) 12/19/2021 03:00 PM    Glucose Negative 12/19/2021 03:00 PM    Ketone Negative 12/19/2021 03:00 PM    Bilirubin Negative 12/19/2021 03:00 PM    Urobilinogen 1.0 12/19/2021 03:00 PM    Nitrites Positive (A) 12/19/2021 03:00 PM    Leukocyte Esterase MODERATE (A) 12/19/2021 03:00 PM    Epithelial cells FEW 12/19/2021 03:00 PM    Bacteria 4+ (A) 12/19/2021 03:00 PM    WBC 20-50 12/19/2021 03:00 PM    RBC  12/19/2021 03:00 PM         Medications Reviewed:     Current Facility-Administered Medications   Medication Dose Route Frequency    sodium chloride (NS) flush 5-40 mL  5-40 mL IntraVENous Q8H    sodium chloride (NS) flush 5-40 mL  5-40 mL IntraVENous PRN    acetaminophen (TYLENOL) tablet 650 mg  650 mg Oral Q6H PRN    Or    acetaminophen (TYLENOL) suppository 650 mg  650 mg Rectal Q6H PRN    ondansetron (ZOFRAN ODT) tablet 4 mg  4 mg Oral Q8H PRN    Or    ondansetron (ZOFRAN) injection 4 mg  4 mg IntraVENous Q6H PRN    enoxaparin (LOVENOX) injection 40 mg  40 mg SubCUTAneous DAILY    albuterol-ipratropium (DUO-NEB) 2.5 MG-0.5 MG/3 ML  3 mL Nebulization Q6H PRN    atorvastatin (LIPITOR) tablet 40 mg  40 mg Oral QHS    carvediloL (COREG) tablet 12.5 mg  12.5 mg Oral BID WITH MEALS    DULoxetine (CYMBALTA) capsule 120 mg  120 mg Oral DAILY    furosemide (LASIX) tablet 40 mg  40 mg Oral DAILY    levothyroxine (SYNTHROID) tablet 125 mcg  125 mcg Oral ACB    linaCLOtide (LINZESS) capsule 145 mcg (Patient Supplied)  145 mcg Oral ACB    losartan (COZAAR) tablet 50 mg  50 mg Oral QHS    pantoprazole (PROTONIX) tablet 40 mg  40 mg Oral DAILY    ferrous sulfate tablet 325 mg  325 mg Oral ACB    montelukast (SINGULAIR) tablet 10 mg  10 mg Oral QHS    gabapentin (NEURONTIN) capsule 100 mg  100 mg Oral TID PRN     ______________________________________________________________________  EXPECTED LENGTH OF STAY: 3d 7h  ACTUAL LENGTH OF STAY:          5                 Salvador Mclean MD

## 2021-12-24 NOTE — PROGRESS NOTES
RUR: 10% Low     MARCO: SNF; the [facility] has accepted; pending insurance auth- started on 12/22. [facility] on will call for 12/25. Primary Contact: Messi Tate, [phone]    10:00am - BRINA spoke with Gurmeet Reilly, Admissions contact with [address] [place] [address] (p: [phone]). Insurance auth. is still pending. 11:20am - BRINA spoke with Gurmeet Reilly and was informed insurance [place] is still pending. Per Gurmeet Reilly, she would be contacting insurance company to obtain update and call CM back with status. CM provided update to patient and patient's sister at bedside. 12:00pm - CM received call back from Gurmeet Reilly stating [place]. will likely not be approved until Monday, 12/27 due to insurance company being closed for the holiday. CM provided update to patient. CM to continue to follow as needed.     Kaykay Rao, LITA   [phone]

## 2021-12-24 NOTE — PROGRESS NOTES
Bedside and Verbal shift change report given to Lisa Ramirez (oncoming nurse) by Yon Jeter (offgoing nurse). Report included the following information SBAR, Kardex, Intake/Output, MAR and Recent Results.

## 2021-12-25 PROCEDURE — 74011250637 HC RX REV CODE- 250/637: Performed by: FAMILY MEDICINE

## 2021-12-25 PROCEDURE — 94760 N-INVAS EAR/PLS OXIMETRY 1: CPT

## 2021-12-25 PROCEDURE — 65270000032 HC RM SEMIPRIVATE

## 2021-12-25 PROCEDURE — 74011250636 HC RX REV CODE- 250/636: Performed by: FAMILY MEDICINE

## 2021-12-25 RX ORDER — TOPIRAMATE 100 MG/1
100 TABLET, FILM COATED ORAL 2 TIMES DAILY WITH MEALS
Status: DISCONTINUED | OUTPATIENT
Start: 2021-12-25 | End: 2022-01-04 | Stop reason: HOSPADM

## 2021-12-25 RX ORDER — BACLOFEN 10 MG/1
10 TABLET ORAL 3 TIMES DAILY
Status: DISCONTINUED | OUTPATIENT
Start: 2021-12-25 | End: 2022-01-04 | Stop reason: HOSPADM

## 2021-12-25 RX ADMIN — BACLOFEN 10 MG: 10 TABLET ORAL at 21:24

## 2021-12-25 RX ADMIN — SODIUM CHLORIDE, PRESERVATIVE FREE 10 ML: 5 INJECTION INTRAVENOUS at 21:25

## 2021-12-25 RX ADMIN — SODIUM CHLORIDE, PRESERVATIVE FREE 10 ML: 5 INJECTION INTRAVENOUS at 14:28

## 2021-12-25 RX ADMIN — LOSARTAN POTASSIUM 50 MG: 50 TABLET, FILM COATED ORAL at 21:24

## 2021-12-25 RX ADMIN — DULOXETINE HYDROCHLORIDE 120 MG: 60 CAPSULE, DELAYED RELEASE ORAL at 09:30

## 2021-12-25 RX ADMIN — LEVOTHYROXINE SODIUM 125 MCG: 0.12 TABLET ORAL at 06:51

## 2021-12-25 RX ADMIN — PANTOPRAZOLE SODIUM 40 MG: 40 TABLET, DELAYED RELEASE ORAL at 09:24

## 2021-12-25 RX ADMIN — SODIUM CHLORIDE, PRESERVATIVE FREE 10 ML: 5 INJECTION INTRAVENOUS at 06:51

## 2021-12-25 RX ADMIN — BACLOFEN 10 MG: 10 TABLET ORAL at 17:24

## 2021-12-25 RX ADMIN — ATORVASTATIN CALCIUM 40 MG: 40 TABLET, FILM COATED ORAL at 21:24

## 2021-12-25 RX ADMIN — TOPIRAMATE 100 MG: 100 TABLET, FILM COATED ORAL at 17:24

## 2021-12-25 RX ADMIN — MONTELUKAST 10 MG: 10 TABLET, FILM COATED ORAL at 21:24

## 2021-12-25 RX ADMIN — CARVEDILOL 12.5 MG: 12.5 TABLET, FILM COATED ORAL at 17:22

## 2021-12-25 RX ADMIN — ACETAMINOPHEN 650 MG: 325 TABLET ORAL at 06:54

## 2021-12-25 RX ADMIN — CARVEDILOL 12.5 MG: 12.5 TABLET, FILM COATED ORAL at 07:00

## 2021-12-25 RX ADMIN — FUROSEMIDE 40 MG: 40 TABLET ORAL at 09:24

## 2021-12-25 RX ADMIN — FERROUS SULFATE TAB 325 MG (65 MG ELEMENTAL FE) 325 MG: 325 (65 FE) TAB at 06:51

## 2021-12-25 RX ADMIN — ENOXAPARIN SODIUM 40 MG: 100 INJECTION SUBCUTANEOUS at 09:31

## 2021-12-25 NOTE — PROGRESS NOTES
[facility]  [facility]ist [facility]                                                                                          Hospitalist Progress Note  Clifford Riddle MD  Answering service: [phone] OR [phone] from in house phone        Date of Service:  2021  NAME:  Kameron Amin  :  1960  MRN:  [id number]      Admission Summary:     Patient presents with urinary tract infection, has history of CVA with neurogenic bladder requiring chronic indwelling Dunn catheter. Patient has completed antibiotics and doing well. Has generalized weakness and awaiting placement to rehab. Interval history / Subjective:       Patient has complained of some headache today, history of migraine. Topamax was not restarted during this admission. Assessment & Plan:     Catheter associated urinary tract infection  -Patient's urine culture grew E.  Coli  -Completed course of ceftriaxone    Neurogenic bladder  -Due to prior history of CVA, requiring chronic indwelling Dunn catheter  -Dunn catheter was exchanged this hospitalization    Migraine  -Resume Topamax, her home dose of 100 mg twice daily  -Tylenol as needed, monitor    Hypertension  -Continue losartan and Coreg  -Blood pressure stable    Chronic systolic congestive heart failure  -Last known EF of 47%  -Continue diuresis with Lasix    Hypothyroidism  -Continue Synthroid    Dyslipidemia  -Continue Lipitor    Obesity  -With BMI of 42.95  -Outpatient follow-up for weight management    Generalized weakness  -Patient with multiple falls due to generalized weakness  -PT OT evaluated patient, recommended SNF  -Patient accepted at [address] [place] [address], pending insurance authorization    Code status: FULL  DVT prophylaxis: SCDs    Care Plan discussed with: Patient/Family  Anticipated Disposition: SNF/LTC  Anticipated Discharge: 24 hours to 48 hours     Hospital Problems  Date Reviewed: 2021          Codes Class Noted POA    Acute cystitis ICD-10-CM: N30.00  ICD-9-CM: 595.0  12/19/2021 Unknown        Encephalopathy ICD-10-CM: G93.40  ICD-9-CM: 348.30  12/19/2021 Unknown                Review of Systems:   A comprehensive review of systems was negative except for that written in the HPI. Vital Signs:    Last 24hrs VS reviewed since prior progress note. Most recent are:  Visit Vitals  /74   Pulse 76   Temp 98.1 °F (36.7 °C)   Resp 16   Ht 5' 4\" (1.626 m)   Wt 111.8 kg (246 lb 7.6 oz)   SpO2 99%   BMI 42.31 kg/m²         Intake/Output Summary (Last 24 hours) at 12/25/2021 1223  Last data filed at 12/25/2021 2379  Gross per 24 hour   Intake    Output 1200 ml   Net -1200 ml        Physical Examination:     I had a face to face encounter with this patient and independently examined them on 12/25/2021 as outlined below:          Constitutional:  No acute distress, cooperative, pleasant    ENT:  Oral mucosa moist, oropharynx benign. Resp:  CTA bilaterally. No wheezing/rhonchi/rales. No accessory muscle use   CV:  Regular rhythm, normal rate, no murmurs, gallops, rubs    GI:  Soft, non distended, non tender. normoactive bowel sounds, no hepatosplenomegaly     Musculoskeletal:  No edema, warm, 2+ pulses throughout    Neurologic:  Moves all extremities. AAOx3, CN II-XII reviewed            Data Review:    Review and/or order of clinical lab test      Labs:     Recent Labs     12/23/21  0549   WBC 5.9   HGB 12.1   HCT 37.1        Recent Labs     12/23/21  0549      K 3.8   *   CO2 26   BUN 16   CREA 0.99   GLU 94   CA 8.6     No results for input(s): ALT, AP, TBIL, TBILI, TP, ALB, GLOB, GGT, AML, LPSE in the last 72 hours. No lab exists for component: SGOT, GPT, AMYP, HLPSE  No results for input(s): INR, PTP, APTT, INREXT, INREXT in the last 72 hours. No results for input(s): FE, TIBC, PSAT, FERR in the last 72 hours. No results found for: FOL, RBCF   No results for input(s): PH, PCO2, PO2 in the last 72 hours.   No results for input(s): CPK, CKNDX, TROIQ in the last 72 hours.     No lab exists for component: CPKMB  Lab Results   Component Value Date/Time    Cholesterol, total 156 11/20/2020 03:38 PM    HDL Cholesterol 72 11/20/2020 03:38 PM    LDL, calculated 69.4 11/20/2020 03:38 PM    Triglyceride 73 11/20/2020 03:38 PM    CHOL/HDL Ratio 2.2 11/20/2020 03:38 PM     Lab Results   Component Value Date/Time    Glucose (POC) 101 (H) 09/12/2017 04:49 PM    Glucose (POC) 112 (H) 09/12/2017 11:27 AM    Glucose (POC) 81 09/12/2017 06:46 AM    Glucose (POC) 105 (H) 09/11/2017 10:02 PM    Glucose (POC) 99 09/11/2017 04:56 PM     Lab Results   Component Value Date/Time    Color YELLOW/STRAW 12/19/2021 03:00 PM    Appearance CLOUDY (A) 12/19/2021 03:00 PM    Specific gravity 1.017 12/19/2021 03:00 PM    pH (UA) 6.0 12/19/2021 03:00 PM    Protein TRACE (A) 12/19/2021 03:00 PM    Glucose Negative 12/19/2021 03:00 PM    Ketone Negative 12/19/2021 03:00 PM    Bilirubin Negative 12/19/2021 03:00 PM    Urobilinogen 1.0 12/19/2021 03:00 PM    Nitrites Positive (A) 12/19/2021 03:00 PM    Leukocyte Esterase MODERATE (A) 12/19/2021 03:00 PM    Epithelial cells FEW 12/19/2021 03:00 PM    Bacteria 4+ (A) 12/19/2021 03:00 PM    WBC 20-50 12/19/2021 03:00 PM    RBC  12/19/2021 03:00 PM         Medications Reviewed:     Current Facility-Administered Medications   Medication Dose Route Frequency    sodium chloride (NS) flush 5-40 mL  5-40 mL IntraVENous Q8H    sodium chloride (NS) flush 5-40 mL  5-40 mL IntraVENous PRN    acetaminophen (TYLENOL) tablet 650 mg  650 mg Oral Q6H PRN    Or    acetaminophen (TYLENOL) suppository 650 mg  650 mg Rectal Q6H PRN    ondansetron (ZOFRAN ODT) tablet 4 mg  4 mg Oral Q8H PRN    Or    ondansetron (ZOFRAN) injection 4 mg  4 mg IntraVENous Q6H PRN    enoxaparin (LOVENOX) injection 40 mg  40 mg SubCUTAneous DAILY    albuterol-ipratropium (DUO-NEB) 2.5 MG-0.5 MG/3 ML  3 mL Nebulization Q6H PRN    atorvastatin (LIPITOR) tablet 40 mg  40 mg Oral QHS    carvediloL (COREG) tablet 12.5 mg  12.5 mg Oral BID WITH MEALS    DULoxetine (CYMBALTA) capsule 120 mg  120 mg Oral DAILY    furosemide (LASIX) tablet 40 mg  40 mg Oral DAILY    levothyroxine (SYNTHROID) tablet 125 mcg  125 mcg Oral ACB    linaCLOtide (LINZESS) capsule 145 mcg (Patient Supplied)  145 mcg Oral ACB    losartan (COZAAR) tablet 50 mg  50 mg Oral QHS    pantoprazole (PROTONIX) tablet 40 mg  40 mg Oral DAILY    ferrous sulfate tablet 325 mg  325 mg Oral ACB    montelukast (SINGULAIR) tablet 10 mg  10 mg Oral QHS    gabapentin (NEURONTIN) capsule 100 mg  100 mg Oral TID PRN     ______________________________________________________________________  EXPECTED LENGTH OF STAY: 3d 7h  ACTUAL LENGTH OF STAY:          6                 Stephanie Boo MD

## 2021-12-26 PROCEDURE — 65270000032 HC RM SEMIPRIVATE

## 2021-12-26 PROCEDURE — 74011250637 HC RX REV CODE- 250/637: Performed by: FAMILY MEDICINE

## 2021-12-26 PROCEDURE — 74011250636 HC RX REV CODE- 250/636: Performed by: FAMILY MEDICINE

## 2021-12-26 RX ADMIN — GABAPENTIN 100 MG: 100 CAPSULE ORAL at 09:21

## 2021-12-26 RX ADMIN — FERROUS SULFATE TAB 325 MG (65 MG ELEMENTAL FE) 325 MG: 325 (65 FE) TAB at 07:21

## 2021-12-26 RX ADMIN — BACLOFEN 10 MG: 10 TABLET ORAL at 21:47

## 2021-12-26 RX ADMIN — TOPIRAMATE 100 MG: 100 TABLET, FILM COATED ORAL at 17:56

## 2021-12-26 RX ADMIN — SODIUM CHLORIDE, PRESERVATIVE FREE 10 ML: 5 INJECTION INTRAVENOUS at 15:27

## 2021-12-26 RX ADMIN — LEVOTHYROXINE SODIUM 125 MCG: 0.12 TABLET ORAL at 07:19

## 2021-12-26 RX ADMIN — LOSARTAN POTASSIUM 50 MG: 50 TABLET, FILM COATED ORAL at 21:47

## 2021-12-26 RX ADMIN — FUROSEMIDE 40 MG: 40 TABLET ORAL at 09:21

## 2021-12-26 RX ADMIN — BACLOFEN 10 MG: 10 TABLET ORAL at 15:27

## 2021-12-26 RX ADMIN — PANTOPRAZOLE SODIUM 40 MG: 40 TABLET, DELAYED RELEASE ORAL at 09:21

## 2021-12-26 RX ADMIN — DULOXETINE HYDROCHLORIDE 120 MG: 60 CAPSULE, DELAYED RELEASE ORAL at 09:21

## 2021-12-26 RX ADMIN — SODIUM CHLORIDE, PRESERVATIVE FREE 10 ML: 5 INJECTION INTRAVENOUS at 07:20

## 2021-12-26 RX ADMIN — BACLOFEN 10 MG: 10 TABLET ORAL at 09:21

## 2021-12-26 RX ADMIN — ENOXAPARIN SODIUM 40 MG: 100 INJECTION SUBCUTANEOUS at 09:21

## 2021-12-26 RX ADMIN — ATORVASTATIN CALCIUM 40 MG: 40 TABLET, FILM COATED ORAL at 21:47

## 2021-12-26 RX ADMIN — TOPIRAMATE 100 MG: 100 TABLET, FILM COATED ORAL at 07:20

## 2021-12-26 RX ADMIN — GABAPENTIN 100 MG: 100 CAPSULE ORAL at 17:58

## 2021-12-26 RX ADMIN — MONTELUKAST 10 MG: 10 TABLET, FILM COATED ORAL at 21:47

## 2021-12-26 RX ADMIN — CARVEDILOL 12.5 MG: 12.5 TABLET, FILM COATED ORAL at 07:21

## 2021-12-26 RX ADMIN — CARVEDILOL 12.5 MG: 12.5 TABLET, FILM COATED ORAL at 17:56

## 2021-12-26 NOTE — PROGRESS NOTES
[facility]  Hospitalist Group                                                                                          Hospitalist Progress Note  Alicia Stanton MD  Answering service: [phone] OR [phone] from in house phone        Date of Service:  2021  NAME:  Madison Dang  :  1960  MRN:  [id number]      Admission Summary:     Patient presents with urinary tract infection, has history of CVA with neurogenic bladder requiring chronic indwelling Dunn catheter. Patient has completed antibiotics and doing well. Has generalized weakness and awaiting placement to rehab. Interval history / Subjective:       Patient has complained of some headache today, history of migraine. Topamax was not restarted during this admission. Assessment & Plan:     Catheter associated urinary tract infection  -Patient's urine culture grew E.  Coli  -Completed course of ceftriaxone    Neurogenic bladder  -Due to prior history of CVA, requiring chronic indwelling Dunn catheter  -Dunn catheter was exchanged this hospitalization    Migraine  -ContinueTopamax  -Tylenol as needed, monitor    Hypertension  -Continue losartan and Coreg  -Blood pressure stable    Chronic systolic congestive heart failure  -Last known EF of 47%  -Continue diuresis with Lasix    Hypothyroidism  -Continue Synthroid    Dyslipidemia  -Continue Lipitor    Obesity  -With BMI of 42.95  -Outpatient follow-up for weight management    Generalized weakness  -Patient with multiple falls due to generalized weakness  -PT OT evaluated patient, recommended SNF  -Patient accepted at [address] [place] [address], pending insurance authorization    Code status: FULL  DVT prophylaxis: SCDs    Care Plan discussed with: Patient/Family  Anticipated Disposition: SNF/LTC  Anticipated Discharge: 24 hours to 48 hours     Hospital Problems  Date Reviewed: 2021          Codes Class Noted POA    Acute cystitis ICD-10-CM: N30.00  ICD-9-CM: 595.0 12/19/2021 Unknown        Encephalopathy ICD-10-CM: G93.40  ICD-9-CM: 348.30  12/19/2021 Unknown                Review of Systems:   A comprehensive review of systems was negative except for that written in the HPI. Vital Signs:    Last 24hrs VS reviewed since prior progress note. Most recent are:  Visit Vitals  /77   Pulse 65   Temp 98.5 °F (36.9 °C)   Resp 16   Ht 5' 4\" (1.626 m)   Wt 111.8 kg (246 lb 7.6 oz)   SpO2 97%   BMI 42.31 kg/m²         Intake/Output Summary (Last 24 hours) at 12/26/2021 1973  Last data filed at 12/25/2021 1400  Gross per 24 hour   Intake 450 ml   Output 500 ml   Net -50 ml        Physical Examination:     I had a face to face encounter with this patient and independently examined them on 12/26/2021 as outlined below:          Constitutional:  No acute distress, cooperative, pleasant    ENT:  Oral mucosa moist, oropharynx benign. Resp:  CTA bilaterally. No wheezing/rhonchi/rales. No accessory muscle use   CV:  Regular rhythm, normal rate, no murmurs, gallops, rubs    GI:  Soft, non distended, non tender. normoactive bowel sounds, no hepatosplenomegaly     Musculoskeletal:  No edema, warm, 2+ pulses throughout    Neurologic:  Moves all extremities. AAOx3, CN II-XII reviewed            Data Review:    Review and/or order of clinical lab test      Labs:     No results for input(s): WBC, HGB, HCT, PLT, HGBEXT, HCTEXT, PLTEXT, HGBEXT, HCTEXT, PLTEXT in the last 72 hours. No results for input(s): NA, K, CL, CO2, BUN, CREA, GLU, CA, MG, PHOS, URICA in the last 72 hours. No results for input(s): ALT, AP, TBIL, TBILI, TP, ALB, GLOB, GGT, AML, LPSE in the last 72 hours. No lab exists for component: SGOT, GPT, AMYP, HLPSE  No results for input(s): INR, PTP, APTT, INREXT, INREXT in the last 72 hours. No results for input(s): FE, TIBC, PSAT, FERR in the last 72 hours. No results found for: FOL, RBCF   No results for input(s): PH, PCO2, PO2 in the last 72 hours.   No results for input(s): CPK, CKNDX, TROIQ in the last 72 hours.     No lab exists for component: CPKMB  Lab Results   Component Value Date/Time    Cholesterol, total 156 11/20/2020 03:38 PM    HDL Cholesterol 72 11/20/2020 03:38 PM    LDL, calculated 69.4 11/20/2020 03:38 PM    Triglyceride 73 11/20/2020 03:38 PM    CHOL/HDL Ratio 2.2 11/20/2020 03:38 PM     Lab Results   Component Value Date/Time    Glucose (POC) 101 (H) 09/12/2017 04:49 PM    Glucose (POC) 112 (H) 09/12/2017 11:27 AM    Glucose (POC) 81 09/12/2017 06:46 AM    Glucose (POC) 105 (H) 09/11/2017 10:02 PM    Glucose (POC) 99 09/11/2017 04:56 PM     Lab Results   Component Value Date/Time    Color YELLOW/STRAW 12/19/2021 03:00 PM    Appearance CLOUDY (A) 12/19/2021 03:00 PM    Specific gravity 1.017 12/19/2021 03:00 PM    pH (UA) 6.0 12/19/2021 03:00 PM    Protein TRACE (A) 12/19/2021 03:00 PM    Glucose Negative 12/19/2021 03:00 PM    Ketone Negative 12/19/2021 03:00 PM    Bilirubin Negative 12/19/2021 03:00 PM    Urobilinogen 1.0 12/19/2021 03:00 PM    Nitrites Positive (A) 12/19/2021 03:00 PM    Leukocyte Esterase MODERATE (A) 12/19/2021 03:00 PM    Epithelial cells FEW 12/19/2021 03:00 PM    Bacteria 4+ (A) 12/19/2021 03:00 PM    WBC 20-50 12/19/2021 03:00 PM    RBC  12/19/2021 03:00 PM         Medications Reviewed:     Current Facility-Administered Medications   Medication Dose Route Frequency    topiramate (TOPAMAX) tablet 100 mg  100 mg Oral BID WITH MEALS    baclofen (LIORESAL) tablet 10 mg  10 mg Oral TID    sodium chloride (NS) flush 5-40 mL  5-40 mL IntraVENous Q8H    sodium chloride (NS) flush 5-40 mL  5-40 mL IntraVENous PRN    acetaminophen (TYLENOL) tablet 650 mg  650 mg Oral Q6H PRN    Or    acetaminophen (TYLENOL) suppository 650 mg  650 mg Rectal Q6H PRN    ondansetron (ZOFRAN ODT) tablet 4 mg  4 mg Oral Q8H PRN    Or    ondansetron (ZOFRAN) injection 4 mg  4 mg IntraVENous Q6H PRN    enoxaparin (LOVENOX) injection 40 mg  40 mg SubCUTAneous DAILY    albuterol-ipratropium (DUO-NEB) 2.5 MG-0.5 MG/3 ML  3 mL Nebulization Q6H PRN    atorvastatin (LIPITOR) tablet 40 mg  40 mg Oral QHS    carvediloL (COREG) tablet 12.5 mg  12.5 mg Oral BID WITH MEALS    DULoxetine (CYMBALTA) capsule 120 mg  120 mg Oral DAILY    furosemide (LASIX) tablet 40 mg  40 mg Oral DAILY    levothyroxine (SYNTHROID) tablet 125 mcg  125 mcg Oral ACB    linaCLOtide (LINZESS) capsule 145 mcg (Patient Supplied)  145 mcg Oral ACB    losartan (COZAAR) tablet 50 mg  50 mg Oral QHS    pantoprazole (PROTONIX) tablet 40 mg  40 mg Oral DAILY    ferrous sulfate tablet 325 mg  325 mg Oral ACB    montelukast (SINGULAIR) tablet 10 mg  10 mg Oral QHS    gabapentin (NEURONTIN) capsule 100 mg  100 mg Oral TID PRN     ______________________________________________________________________  EXPECTED LENGTH OF STAY: 3d 7h  ACTUAL LENGTH OF STAY:          7                 Brent Swan MD

## 2021-12-27 PROCEDURE — 97530 THERAPEUTIC ACTIVITIES: CPT

## 2021-12-27 PROCEDURE — 97116 GAIT TRAINING THERAPY: CPT

## 2021-12-27 PROCEDURE — 97535 SELF CARE MNGMENT TRAINING: CPT

## 2021-12-27 PROCEDURE — 65270000032 HC RM SEMIPRIVATE

## 2021-12-27 PROCEDURE — 74011250637 HC RX REV CODE- 250/637: Performed by: FAMILY MEDICINE

## 2021-12-27 PROCEDURE — 94760 N-INVAS EAR/PLS OXIMETRY 1: CPT

## 2021-12-27 PROCEDURE — 74011250636 HC RX REV CODE- 250/636: Performed by: FAMILY MEDICINE

## 2021-12-27 RX ADMIN — DULOXETINE HYDROCHLORIDE 120 MG: 60 CAPSULE, DELAYED RELEASE ORAL at 09:01

## 2021-12-27 RX ADMIN — LOSARTAN POTASSIUM 50 MG: 50 TABLET, FILM COATED ORAL at 21:58

## 2021-12-27 RX ADMIN — SODIUM CHLORIDE, PRESERVATIVE FREE 10 ML: 5 INJECTION INTRAVENOUS at 17:57

## 2021-12-27 RX ADMIN — CARVEDILOL 12.5 MG: 12.5 TABLET, FILM COATED ORAL at 17:57

## 2021-12-27 RX ADMIN — BACLOFEN 10 MG: 10 TABLET ORAL at 09:01

## 2021-12-27 RX ADMIN — PANTOPRAZOLE SODIUM 40 MG: 40 TABLET, DELAYED RELEASE ORAL at 09:01

## 2021-12-27 RX ADMIN — FERROUS SULFATE TAB 325 MG (65 MG ELEMENTAL FE) 325 MG: 325 (65 FE) TAB at 07:51

## 2021-12-27 RX ADMIN — MONTELUKAST 10 MG: 10 TABLET, FILM COATED ORAL at 21:58

## 2021-12-27 RX ADMIN — ATORVASTATIN CALCIUM 40 MG: 40 TABLET, FILM COATED ORAL at 21:59

## 2021-12-27 RX ADMIN — GABAPENTIN 100 MG: 100 CAPSULE ORAL at 09:06

## 2021-12-27 RX ADMIN — BACLOFEN 10 MG: 10 TABLET ORAL at 17:56

## 2021-12-27 RX ADMIN — TOPIRAMATE 100 MG: 100 TABLET, FILM COATED ORAL at 08:00

## 2021-12-27 RX ADMIN — TOPIRAMATE 100 MG: 100 TABLET, FILM COATED ORAL at 17:57

## 2021-12-27 RX ADMIN — CARVEDILOL 12.5 MG: 12.5 TABLET, FILM COATED ORAL at 07:51

## 2021-12-27 RX ADMIN — LEVOTHYROXINE SODIUM 125 MCG: 0.12 TABLET ORAL at 07:51

## 2021-12-27 RX ADMIN — ENOXAPARIN SODIUM 40 MG: 100 INJECTION SUBCUTANEOUS at 09:01

## 2021-12-27 RX ADMIN — FUROSEMIDE 40 MG: 40 TABLET ORAL at 09:01

## 2021-12-27 NOTE — PROGRESS NOTES
Transition of Care: SNF; the [address] [place] [address] has accepted; pending insurance auth- started on 12/22 and is still pending     Transport Plan: CEE is on willcall for 12/27     RUR: 10%     [facility]udson [facility]- [phone]     Discharge pending:  -pending insurance auth       0900: this CM called Alli Vargas at the [address] [place] [address]; the insurance [employer] is still pending    1400: Alli Vargas at the [address] [place] [address] informed this CM that patient Sohan Oh been assigned\" at [employer] and [employer] is still pending    1600: attempted to meet with patient to update her on discharge; she was sleeping; will f/u in am        NOTE:  patient lives with sister in two story home. Abbi Kenny attends [employer], Sat [phone]. Sean Bassett has been to SNF in the past [facility] [employer]. Abbi Kenny is agreeable to SNF if covered under her policy.       Home Situation  Home Environment: Private residence  # Steps to Enter: 3  One/Two Story Residence:  Two story home  Living Alone: No  Current DME Used/Available at Home: Cane, quadcane, wheelchair, and rolling walker  Tub or Shower Type: Tub/Shower combination     CM following  Zahra Perez RN, CRM            Revision History                  Revision History

## 2021-12-27 NOTE — PROGRESS NOTES
Problem: Self Care Deficits Care Plan (Adult)  Goal: *Therapy Goal (Edit Goal, Insert Text)  Description: FUNCTIONAL STATUS PRIOR TO ADMISSION: Patient was modified independent (except distant supervision when showering) and using a SB quad cane for functional mobility. HOME SUPPORT: The patient lived with sister who assists as needed. 12/27/2021 Goals reviewed and remain appropriate, continue to progress    Occupational Therapy  Initiated 12/20/2021  1. Patient will perform grooming in stand with modified independence within 7 day(s). 2.  Patient will perform sponge bathing with modified independence within 7 day(s). 3.  Patient will perform lower body dressing with modified independence within 7 day(s). 4.  Patient will perform toilet transfers with modified independence within 7 day(s). 5.  Patient will perform all aspects of toileting with modified independence within 7 day(s). Outcome: Progressing Towards Goal   OCCUPATIONAL THERAPY TREATMENT/WEEKLY RE-ASSESSMENT  Patient: Courtney Vincent (8 y.o. female)  Date: 12/27/2021  Diagnosis: Acute cystitis [N30.00]  Encephalopathy [G93.40] <principal problem not specified>      Precautions:    Chart, occupational therapy assessment, plan of care, and goals were reviewed. ASSESSMENT  Patient continues with skilled OT services and is progressing towards goals. Patient semi supine in bed upon OT arrival and agreeable to participate with therapy. Patient requesting to get washed up performing sponge bath. Patient supervision with bed mobility with increased time and bed modified (HOB raised). Patient SBA sit <> stand and transfers to chair with RW. Patient performs sponge bath sitting in chair with set up assistance and then requesting to ambulate to bathroom for toileting prior to bowel care. Patient SBA toilet transfer and toileting.  Patient then declining to sit on toilet for lower extremity dressing to don brief so performed in standing and patient requiring mod A to feed catheter bag through brief and holding brief for patient to step into, increased assist required on left lower extremity. Patient able to pull brief up into place and setup A for upper extremity dressing in standing. Patient then ambulating back into room and transferring to chair, left sitting in chair with chair alarm activated. Overall, patient with good progress toward initially set goals but not yet reached modified independent level so continue to progress, patient continues to be limited by SOB with exertion, decreased activity tolerance, and impaired standing balance. Patient would benefit from skilled OT services during admission to improve independence with self care and functional mobility/transfers. Recommend discharge to SNF at this time to maximize independence prior to return home with day care services. Current Level of Function Impacting Discharge (ADLs): setup A upper extremity activities of daily living, mod A lower extremity activities of daily living, SBA for mobility and transfers    Other factors to consider for discharge: time since onset, prior level of function, history of CVA, in adult day care program prior to admission          PLAN :  Goals have been updated based on progression since last assessment. Patient continues to benefit from skilled intervention to address the above impairments. Continue to follow patient 5 times a week to address goals.     Recommend with staff: up to chair 3x/day for meals, functional mobility to and from bathroom for toileting    Recommendation for discharge: (in order for the patient to meet his/her long term goals)  Therapy up to 5 days/week in SNF setting    This discharge recommendation:  Has been made in collaboration with the attending provider and/or case management    IF patient discharges home will need the following DME: patient owns DME required for discharge       SUBJECTIVE:   Patient stated I need to use the restroom right now.     OBJECTIVE DATA SUMMARY:   Cognitive/Behavioral Status:  Neurologic State: Alert  Orientation Level: Oriented X4  Cognition: Follows commands             Functional Mobility and Transfers for ADLs:  Bed Mobility:  Rolling: Supervision  Supine to Sit: Supervision;Bed Modified; Additional time  Scooting: Supervision    Transfers:  Sit to Stand: Stand-by assistance  Functional Transfers  Bathroom Mobility: Stand-by assistance  Toilet Transfer : Stand-by assistance  Bed to Chair: Stand-by assistance    Balance:  Sitting: Intact; Without support  Standing: Impaired; With support  Standing - Static: Good;Constant support  Standing - Dynamic : Good;Constant support    ADL Intervention:       Grooming  Position Performed: Seated in chair  Washing Face: Set-up    Upper Body Bathing  Bathing Assistance: Set-up  Position Performed: Seated in chair    Lower Body Bathing  Perineal  : Stand-by assistance  Position Performed: Standing  Lower Body : Supervision  Position Performed: Seated in chair    Upper Body [address]: Set-up    Lower Body Dressing Assistance  Underpants: Moderate assistance (don brief in standing)  Socks: Stand-by assistance  Leg Crossed Method Used: No  Position Performed: Seated edge of bed;Standing  Cues: Verbal cues provided;Don;Physical assistance    Toileting  Bowel Hygiene: Supervision  Clothing Management: Stand-by assistance (to pull up and down briefs in standing)         Pain:  8.5-9/10 throughout    Activity Tolerance:   Fair, SpO2 stable on RA and observed SOB with activity    After treatment patient left in no apparent distress:   Sitting in chair, Call bell within reach and Bed / chair alarm activated    COMMUNICATION/COLLABORATION:   The patients plan of care was discussed with: Registered nurse.      Sung Olivier OTR/L  Time Calculation: 50 mins

## 2021-12-27 NOTE — PROGRESS NOTES
[facility] Adult  Hospitalist Group                                                                                          Hospitalist Progress Note  Shannon Medina MD  Answering service: [phone] OR [phone] from in house phone        Date of Service:  2021  NAME:  Brent Riley  :  1960  MRN:  [id number]      Admission Summary:     Patient presents with urinary tract infection, has history of CVA with neurogenic bladder requiring chronic indwelling Dunn catheter. Patient has completed antibiotics and doing well. Has generalized weakness and awaiting placement to rehab. Interval history / Subjective:       Overall doing better. Denies any acute complaint. Assessment & Plan:     Catheter associated urinary tract infection  -Patient's urine culture grew E. Coli  -Completed course of ceftriaxone    Neurogenic bladder  -Due to prior history of CVA, requiring chronic indwelling Dunn catheter  -Dunn catheter was exchanged this hospitalization    Migraine  -ContinueTopamax  -Tylenol as needed, monitor    Hypertension  -Continue losartan and Coreg  -Blood pressure stable    Chronic systolic congestive heart failure  -Last known EF of 47%  -Continue diuresis with Lasix    Hypothyroidism  -Continue Synthroid    Dyslipidemia  -Continue Lipitor    Obesity  -With BMI of 42.95  -Outpatient follow-up for weight management    Generalized weakness  -Patient with multiple falls due to generalized weakness  -PT OT evaluated patient, recommended SNF  -Patient accepted at [address] [place] [address], pending insurance authorization    Code status: FULL  DVT prophylaxis: SCDs    Care Plan discussed with: Patient/Family  Anticipated Disposition: SNF/LTC, medically stable, pending insurance authorization for placement.   Anticipated Discharge: 24 hours to 48 hours     Hospital Problems  Date Reviewed: 2021          Codes Class Noted POA    Acute cystitis ICD-10-CM: N30.00  ICD-9-CM: 595.0 12/19/2021 Unknown        Encephalopathy ICD-10-CM: G93.40  ICD-9-CM: 348.30  12/19/2021 Unknown                Review of Systems:   A comprehensive review of systems was negative except for that written in the HPI. Vital Signs:    Last 24hrs VS reviewed since prior progress note. Most recent are:  Visit Vitals  BP (!) 135/90   Pulse (!) 58   Temp 98.4 °F (36.9 °C)   Resp 16   Ht 5' 4\" (1.626 m)   Wt 111.8 kg (246 lb 7.6 oz)   SpO2 98%   BMI 42.31 kg/m²         Intake/Output Summary (Last 24 hours) at 12/27/2021 1143  Last data filed at 12/27/2021 0600  Gross per 24 hour   Intake    Output 351.4 ml   Net -351.4 ml        Physical Examination:     I had a face to face encounter with this patient and independently examined them on 12/27/2021 as outlined below:          Constitutional:  No acute distress, cooperative, pleasant    ENT:  Oral mucosa moist, oropharynx benign. Resp:  CTA bilaterally. No wheezing/rhonchi/rales. No accessory muscle use   CV:  Regular rhythm, normal rate, no murmurs, gallops, rubs    GI:  Soft, non distended, non tender. normoactive bowel sounds, no hepatosplenomegaly     Musculoskeletal:  No edema, warm, 2+ pulses throughout    Neurologic:  Moves all extremities. AAOx3, CN II-XII reviewed            Data Review:    Review and/or order of clinical lab test      Labs:     No results for input(s): WBC, HGB, HCT, PLT, HGBEXT, HCTEXT, PLTEXT, HGBEXT, HCTEXT, PLTEXT in the last 72 hours. No results for input(s): NA, K, CL, CO2, BUN, CREA, GLU, CA, MG, PHOS, URICA in the last 72 hours. No results for input(s): ALT, AP, TBIL, TBILI, TP, ALB, GLOB, GGT, AML, LPSE in the last 72 hours. No lab exists for component: SGOT, GPT, AMYP, HLPSE  No results for input(s): INR, PTP, APTT, INREXT, INREXT in the last 72 hours. No results for input(s): FE, TIBC, PSAT, FERR in the last 72 hours. No results found for: FOL, RBCF   No results for input(s): PH, PCO2, PO2 in the last 72 hours.   No results for input(s): CPK, CKNDX, TROIQ in the last 72 hours.     No lab exists for component: CPKMB  Lab Results   Component Value Date/Time    Cholesterol, total 156 11/20/2020 03:38 PM    HDL Cholesterol 72 11/20/2020 03:38 PM    LDL, calculated 69.4 11/20/2020 03:38 PM    Triglyceride 73 11/20/2020 03:38 PM    CHOL/HDL Ratio 2.2 11/20/2020 03:38 PM     Lab Results   Component Value Date/Time    Glucose (POC) 101 (H) 09/12/2017 04:49 PM    Glucose (POC) 112 (H) 09/12/2017 11:27 AM    Glucose (POC) 81 09/12/2017 06:46 AM    Glucose (POC) 105 (H) 09/11/2017 10:02 PM    Glucose (POC) 99 09/11/2017 04:56 PM     Lab Results   Component Value Date/Time    Color YELLOW/STRAW 12/19/2021 03:00 PM    Appearance CLOUDY (A) 12/19/2021 03:00 PM    Specific gravity 1.017 12/19/2021 03:00 PM    pH (UA) 6.0 12/19/2021 03:00 PM    Protein TRACE (A) 12/19/2021 03:00 PM    Glucose Negative 12/19/2021 03:00 PM    Ketone Negative 12/19/2021 03:00 PM    Bilirubin Negative 12/19/2021 03:00 PM    Urobilinogen 1.0 12/19/2021 03:00 PM    Nitrites Positive (A) 12/19/2021 03:00 PM    Leukocyte Esterase MODERATE (A) 12/19/2021 03:00 PM    Epithelial cells FEW 12/19/2021 03:00 PM    Bacteria 4+ (A) 12/19/2021 03:00 PM    WBC 20-50 12/19/2021 03:00 PM    RBC  12/19/2021 03:00 PM         Medications Reviewed:     Current Facility-Administered Medications   Medication Dose Route Frequency    topiramate (TOPAMAX) tablet 100 mg  100 mg Oral BID WITH MEALS    baclofen (LIORESAL) tablet 10 mg  10 mg Oral TID    sodium chloride (NS) flush 5-40 mL  5-40 mL IntraVENous Q8H    sodium chloride (NS) flush 5-40 mL  5-40 mL IntraVENous PRN    acetaminophen (TYLENOL) tablet 650 mg  650 mg Oral Q6H PRN    Or    acetaminophen (TYLENOL) suppository 650 mg  650 mg Rectal Q6H PRN    ondansetron (ZOFRAN ODT) tablet 4 mg  4 mg Oral Q8H PRN    Or    ondansetron (ZOFRAN) injection 4 mg  4 mg IntraVENous Q6H PRN    enoxaparin (LOVENOX) injection 40 mg 40 mg SubCUTAneous DAILY    albuterol-ipratropium (DUO-NEB) 2.5 MG-0.5 MG/3 ML  3 mL Nebulization Q6H PRN    atorvastatin (LIPITOR) tablet 40 mg  40 mg Oral QHS    carvediloL (COREG) tablet 12.5 mg  12.5 mg Oral BID WITH MEALS    DULoxetine (CYMBALTA) capsule 120 mg  120 mg Oral DAILY    furosemide (LASIX) tablet 40 mg  40 mg Oral DAILY    levothyroxine (SYNTHROID) tablet 125 mcg  125 mcg Oral ACB    linaCLOtide (LINZESS) capsule 145 mcg (Patient Supplied)  145 mcg Oral ACB    losartan (COZAAR) tablet 50 mg  50 mg Oral QHS    pantoprazole (PROTONIX) tablet 40 mg  40 mg Oral DAILY    ferrous sulfate tablet 325 mg  325 mg Oral ACB    montelukast (SINGULAIR) tablet 10 mg  10 mg Oral QHS    gabapentin (NEURONTIN) capsule 100 mg  100 mg Oral TID PRN     ______________________________________________________________________  EXPECTED LENGTH OF STAY: 3d 7h  ACTUAL LENGTH OF STAY:          8                 Brent Swan MD

## 2021-12-27 NOTE — PROGRESS NOTES
Verbal shift change report given to [address] (oncoming nurse) by Ceasar Romo RN (offgoing nurse). Report included the following information SBAR, Kardex, Intake/Output, MAR and Recent Results.

## 2021-12-27 NOTE — PROGRESS NOTES
Problem: Mobility Impaired (Adult and Pediatric)  Goal: *Acute Goals and Plan of Care (Insert Text)  Description: FUNCTIONAL STATUS PRIOR TO ADMISSION: Patient was modified independent using a rolling walker or WB quad cane for functional mobility. Has had multiple falls over the past week (more than 4). HOME SUPPORT PRIOR TO ADMISSION: The patient lived with sister who assists as needed. She goes to adult day care 3 days/week. Physical Therapy Goals  Reassessment 12/27/2021  1. Patient will move from supine to sit and sit to supine  and roll side to side in bed with modified independence within 7 day(s). 2.  Patient will transfer from bed to chair and chair to bed with modified independence using the least restrictive device within 7 day(s). 3.  Patient will perform sit to stand with modified independence within 7 day(s). 4.  Patient will ambulate with supervision for 150 feet with the least restrictive device within 7 day(s). 5.  Patient will ascend/descend 15 stairs with 1 handrail(s) with minimal assistance/contact guard assist within 7 day(s). Physical Therapy Goals  Initiated 12/20/2021  1. Patient will move from supine to sit and sit to supine  and roll side to side in bed with modified independence within 7 day(s). 2.  Patient will transfer from bed to chair and chair to bed with modified independence using the least restrictive device within 7 day(s). 3.  Patient will perform sit to stand with modified independence within 7 day(s). 4.  Patient will ambulate with supervision/set-up for 50 feet with the least restrictive device within 7 day(s). 5.  Patient will ascend/descend 15 stairs with 1 handrail(s) with minimal assistance/contact guard assist within 7 day(s).       12/27/2021 1144 by Smitha Tello  Outcome: Progressing Towards Goal   PHYSICAL THERAPY TREATMENT: WEEKLY REASSESSMENT  Patient: Jaime Marie (86 y.o. female)  Date: 12/27/2021  Primary Diagnosis: Acute cystitis [N30.00]  Encephalopathy [G93.40]        Precautions: fall         ASSESSMENT  Patient continues with skilled PT services and is gradually progressing towards goals. Pt presents with R hemiplegia, generalized weakness, decreased endurance, standing balance, and impaired functional mobility below her baseline. Pt ambulates with a quad cane at baseline however is currently needing the support of rolling walker at this time. Pt does well with bed mobility and transfers but requires assistance to lift her LLE into bed. Pt dons and doffs AFO and shoes seated on EOB. Pt ambulated an increased distance with slow radhika requiring extra effort and time to advance her LLE by dragging her foot along floor with LE ER. Pt complained of fatigue from activity and also related this to lack of sleep last night. Patient's progression toward goals since last assessment: gradually progressing, upgraded ambulation goal    Current Level of Function Impacting Discharge (mobility/balance): bed mobility minimal assistance to lift LLE into bed, transfers with CGA, ambulation with rolling walker x 85 feet with CGA      Other factors to consider for discharge: fall risk, pt with history of multiple falls, history of CVA              PLAN :  Goals have been updated based on progression since last assessment. Patient continues to benefit from skilled intervention to address the above impairments. Recommendations and Planned Interventions: bed mobility training, transfer training, gait training, therapeutic exercises, neuromuscular re-education, and patient and family training/education      Frequency/Duration: Patient will be followed by physical therapy:  5 times a week to address goals.     Recommendation for discharge: (in order for the patient to meet his/her long term goals)  Therapy up to 5 days/week in SNF setting    This discharge recommendation:  Has been made in collaboration with the attending provider and/or case management    IF patient discharges home will need the following DME: patient owns DME required for discharge         SUBJECTIVE:   Patient stated I did not sleep well last night. My roommate kept me awake.     OBJECTIVE DATA SUMMARY:   HISTORY:    Past Medical History:   Diagnosis Date    Advanced care planning/counseling discussion 3/4/16    On File    Bronchitis 2/24/2014    Cervicalgia 8/18/15    Dr. Sherman Fairchild    Chest pain 5/25/15    Hospitalized at [facility] 5/25/15 (lab work negative)    Chronic indwelling Dunn catheter 1/24/2018    Initially placed Jan 2018. Mx by Dr. Aly Hyman. Congestive heart failure, unspecified     Last Echo 2/8/15: EF 55-60%    Constipation 6/13/2017    Enthesopathy, spinal ([address] 8/18/15    Dr. Miguel Tim hypertension     Foot drop 8/18/15    Dr. Yordan Frederick attack [facility]) 2/24/2013    Was supposed to See Cardiology for possible pacemaker in november 2014- After Cardiology consult locally, no need, EF greatly improved.  Established with Dr. Ethan Christensen     Hiatal hernia 6/2015    3 cm hiatal hernia     Hip pain 8/18/15    Dr. Sherman Fairchild    Hypercholesterolemia     Hyperglycemia 7/2015    A1c 5.9     Hyperlipidemia 6/30/2015    NMR lipoprofile- LDL P 997, LDL-c 71, HLD-C-39, TG-60, HLD-P (25.2), Small LDL-P -541, LDL size 20.6    Hypothyroid     Insomnia 6/13/2017    Lower extremity edema     Lumbar spondylosis 8/18/15    Dr. Harper Mins 6/2015    EGD/Colonscopy 6/15- Gastritis, internal hemorrhoids and 3 polyps    Menopause     Murmur     EDDIE on CPAP     Was referred to Pulmonology - Uses CPAP    Osteoarthritis of hip 8/18/15    Dr. Michaelle Baptiste, shoulder 8/18/15    Dr. Sherman Fairchild    Radiculopathy, cervical 8/18/15    Dr. Sherman Fairchild    Shoulder pain 8/18/15    Dr. Sherman Fairchild    Spinal stenosis of cervical region 8/18/15    Dr. Sherman Fairchild    Spinal stenosis, lumbar 8/18/15    Dr. Sherman Fairchild    Stroke [facility]) 2/25/2014    Established with Neurology, Anita Garay NP-Just hospitalized at [facility] 2/7/15-2/10/15. CT negative, but Late effect CV accident with increased tone described on discharge summary, Carotid dopplers showed 50% stenosis bilaterally. Vitamin D deficiency 7/2015     Past Surgical History:   Procedure Laterality Date    COLONOSCOPY N/A 6/22/2016    COLONOSCOPY performed by Malika Cole MD at [facility] ENDOSCOPY    HX BREAST BIOPSY Right 8/11/15    Stereo Bx - [facility]--- Benign    HX CHOLECYSTECTOMY      HX COLONOSCOPY  6/2015    Dr. Prashanth Fitch- 3 complete polypectomies, Internal hemorrhoids, difficult study due to spasm. Repeat in 1 year    HX ENDOSCOPY  6/2015    mild gastritis, 3cm hiatal hernia     HX GASTRIC BYPASS  6/1989    HX HEART CATHETERIZATION  2/2014    HX ORTHOPAEDIC      Right middle finger distal amputation    HX PARTIAL THYROIDECTOMY  ~1990    HX THYROIDECTOMY Left 1985    90% of one side of the thyroid removed    IR ASP BLADDER SUPRA CATH  5/24/2019       EXAMINATION/PRESENTATION/DECISION MAKING:   Critical Behavior:  Neurologic State: Alert  Orientation Level: Oriented X4  Cognition: Follows commands                     Functional Mobility:  Bed Mobility:  Rolling: Supervision  Supine to Sit: Supervision; Adaptive equipment; Additional time;Assist x1  Sit to Supine: Minimum assistance;Assist x1;Additional time; Adaptive equipment  Scooting: Supervision  Transfers:  Sit to Stand: Contact guard assistance;Assist x1;Adaptive equipment; Additional time  Stand to Sit: Contact guard assistance;Assist x1              Balance:   Sitting: Intact  Standing: Impaired  Standing - Static: Constant support;Good  Standing - Dynamic : Constant support; Fair  Ambulation/Gait Training:  Distance (ft): 85 Feet (ft)  Assistive Device: Brace/Splint; Walker, rolling;Gait belt  Ambulation - Level of Assistance: Contact guard assistance;Assist x1        Gait Abnormalities: L Foot drop(AFO); Decreased step clearance; Hemiplegic;Trunk sway increased (dragging LLE with LE ER, has difficulty advancing LLE requiring extra time and effort)              Speed/Brenda: Pace decreased (<100 feet/min)  Step Length: Right shortened;Left shortened                 Pain Rating:  Complains of chronic L shoulder pain     Activity Tolerance:   Fair, fatigued, requested to return to bed but up in chair recently today    After treatment patient left in no apparent distress:   Supine in bed and Call bell within reach    COMMUNICATION/EDUCATION:   The patients plan of care was discussed with: Registered nurse. Fall prevention education was provided and the patient/caregiver indicated understanding. and Patient/family agree to work toward stated goals and plan of care.     Thank you for this referral.  Carmen [employer]   Time Calculation: 23 mins

## 2021-12-28 PROCEDURE — 65270000032 HC RM SEMIPRIVATE

## 2021-12-28 PROCEDURE — 74011250636 HC RX REV CODE- 250/636: Performed by: FAMILY MEDICINE

## 2021-12-28 PROCEDURE — 74011250637 HC RX REV CODE- 250/637: Performed by: FAMILY MEDICINE

## 2021-12-28 RX ORDER — TROSPIUM CHLORIDE 20 MG/1
20 TABLET, FILM COATED ORAL
Status: DISCONTINUED | OUTPATIENT
Start: 2021-12-28 | End: 2022-01-04 | Stop reason: HOSPADM

## 2021-12-28 RX ADMIN — CARVEDILOL 12.5 MG: 12.5 TABLET, FILM COATED ORAL at 16:24

## 2021-12-28 RX ADMIN — DULOXETINE HYDROCHLORIDE 120 MG: 60 CAPSULE, DELAYED RELEASE ORAL at 09:18

## 2021-12-28 RX ADMIN — TROSPIUM CHLORIDE 20 MG: 20 TABLET, FILM COATED ORAL at 16:24

## 2021-12-28 RX ADMIN — GABAPENTIN 100 MG: 100 CAPSULE ORAL at 22:14

## 2021-12-28 RX ADMIN — CARVEDILOL 12.5 MG: 12.5 TABLET, FILM COATED ORAL at 07:34

## 2021-12-28 RX ADMIN — BACLOFEN 10 MG: 10 TABLET ORAL at 22:14

## 2021-12-28 RX ADMIN — BACLOFEN 10 MG: 10 TABLET ORAL at 09:18

## 2021-12-28 RX ADMIN — ENOXAPARIN SODIUM 40 MG: 100 INJECTION SUBCUTANEOUS at 09:00

## 2021-12-28 RX ADMIN — PANTOPRAZOLE SODIUM 40 MG: 40 TABLET, DELAYED RELEASE ORAL at 09:18

## 2021-12-28 RX ADMIN — FUROSEMIDE 40 MG: 40 TABLET ORAL at 09:18

## 2021-12-28 RX ADMIN — ATORVASTATIN CALCIUM 40 MG: 40 TABLET, FILM COATED ORAL at 22:14

## 2021-12-28 RX ADMIN — TOPIRAMATE 100 MG: 100 TABLET, FILM COATED ORAL at 07:34

## 2021-12-28 RX ADMIN — MONTELUKAST 10 MG: 10 TABLET, FILM COATED ORAL at 22:14

## 2021-12-28 RX ADMIN — TOPIRAMATE 100 MG: 100 TABLET, FILM COATED ORAL at 16:24

## 2021-12-28 RX ADMIN — LEVOTHYROXINE SODIUM 125 MCG: 0.12 TABLET ORAL at 07:35

## 2021-12-28 RX ADMIN — BACLOFEN 10 MG: 10 TABLET ORAL at 16:24

## 2021-12-28 RX ADMIN — FERROUS SULFATE TAB 325 MG (65 MG ELEMENTAL FE) 325 MG: 325 (65 FE) TAB at 07:35

## 2021-12-28 RX ADMIN — GABAPENTIN 100 MG: 100 CAPSULE ORAL at 09:23

## 2021-12-28 NOTE — PROGRESS NOTES
[facility] Adult  Hospitalist Group                                                                                          Hospitalist Progress Note  Ba Telles MD  Answering service: [phone] or [phone] from in house phone        Date of Service:  2021  NAME:  Eladia Hand  :  1960  MRN:  [id number]      Admission Summary:     Patient presents with urinary tract infection, has history of CVA with neurogenic bladder requiring chronic indwelling Dunn catheter. Patient has completed antibiotics and doing well. Has generalized weakness and awaiting placement to rehab. Interval history / Subjective:       Overall doing jw complain of some bladder spasm today. Assessment & Plan:     Catheter associated urinary tract infection  -Patient's urine culture grew E. Coli  -Completed course of ceftriaxone    Neurogenic bladder  -Due to prior history of CVA, requiring chronic indwelling Dunn catheter  -Dunn catheter was exchanged this hospitalization  -Added trospium 20 mg twice daily for bladder spasm    Migraine  -ContinueTopamax  -Tylenol as needed, monitor    Hypertension  -Continue losartan and Coreg  -Blood pressure stable    Chronic systolic congestive heart failure  -Last known EF of 47%  -Continue diuresis with Lasix    Hypothyroidism  -Continue Synthroid    Dyslipidemia  -Continue Lipitor    Obesity  -With BMI of 42.95  -Outpatient follow-up for weight management    Generalized weakness  -Patient with multiple falls due to generalized weakness  -PT OT evaluated patient, recommended SNF  -Patient accepted at [address] [place] [address], pending insurance authorization    Code status: FULL  DVT prophylaxis: SCDs    Care Plan discussed with: Patient/Family  Anticipated Disposition: SNF/LTC, medically stable, pending insurance authorization for placement.   Anticipated Discharge: 24 hours to 48 hours     Hospital Problems  Date Reviewed: 2021          Codes Class Noted POA    Acute cystitis ICD-10-CM: N30.00  ICD-9-CM: 595.0  12/19/2021 Unknown        Encephalopathy ICD-10-CM: G93.40  ICD-9-CM: 348.30  12/19/2021 Unknown                Review of Systems:   A comprehensive review of systems was negative except for that written in the HPI. Vital Signs:    Last 24hrs VS reviewed since prior progress note. Most recent are:  Visit Vitals  /79   Pulse 68   Temp 98.4 °F (36.9 °C)   Resp 16   Ht 5' 4\" (1.626 m)   Wt 111.8 kg (246 lb 7.6 oz)   SpO2 98%   BMI 42.31 kg/m²         Intake/Output Summary (Last 24 hours) at 12/28/2021 1231  Last data filed at 12/28/2021 5552  Gross per 24 hour   Intake    Output 1150 ml   Net -1150 ml        Physical Examination:     I had a face to face encounter with this patient and independently examined them on 12/28/2021 as outlined below:          Constitutional:  No acute distress, cooperative, pleasant    ENT:  Oral mucosa moist, oropharynx benign. Resp:  CTA bilaterally. No wheezing/rhonchi/rales. No accessory muscle use   CV:  Regular rhythm, normal rate, no murmurs, gallops, rubs    GI:  Soft, non distended, non tender. normoactive bowel sounds, no hepatosplenomegaly     Musculoskeletal:  No edema, warm, 2+ pulses throughout    Neurologic:  Moves all extremities. AAOx3, CN II-XII reviewed            Data Review:    Review and/or order of clinical lab test      Labs:     No results for input(s): WBC, HGB, HCT, PLT, HGBEXT, HCTEXT, PLTEXT, HGBEXT, HCTEXT, PLTEXT in the last 72 hours. No results for input(s): NA, K, CL, CO2, BUN, CREA, GLU, CA, MG, PHOS, URICA in the last 72 hours. No results for input(s): ALT, AP, TBIL, TBILI, TP, ALB, GLOB, GGT, AML, LPSE in the last 72 hours. No lab exists for component: SGOT, GPT, AMYP, HLPSE  No results for input(s): INR, PTP, APTT, INREXT, INREXT in the last 72 hours. No results for input(s): FE, TIBC, PSAT, FERR in the last 72 hours.    No results found for: FOL, RBCF   No results for input(s): PH, PCO2, PO2 in the last 72 hours. No results for input(s): CPK, CKNDX, TROIQ in the last 72 hours.     No lab exists for component: CPKMB  Lab Results   Component Value Date/Time    Cholesterol, total 156 11/20/2020 03:38 PM    HDL Cholesterol 72 11/20/2020 03:38 PM    LDL, calculated 69.4 11/20/2020 03:38 PM    Triglyceride 73 11/20/2020 03:38 PM    CHOL/HDL Ratio 2.2 11/20/2020 03:38 PM     Lab Results   Component Value Date/Time    Glucose (POC) 101 (H) 09/12/2017 04:49 PM    Glucose (POC) 112 (H) 09/12/2017 11:27 AM    Glucose (POC) 81 09/12/2017 06:46 AM    Glucose (POC) 105 (H) 09/11/2017 10:02 PM    Glucose (POC) 99 09/11/2017 04:56 PM     Lab Results   Component Value Date/Time    Color YELLOW/STRAW 12/19/2021 03:00 PM    Appearance CLOUDY (A) 12/19/2021 03:00 PM    Specific gravity 1.017 12/19/2021 03:00 PM    pH (UA) 6.0 12/19/2021 03:00 PM    Protein TRACE (A) 12/19/2021 03:00 PM    Glucose Negative 12/19/2021 03:00 PM    Ketone Negative 12/19/2021 03:00 PM    Bilirubin Negative 12/19/2021 03:00 PM    Urobilinogen 1.0 12/19/2021 03:00 PM    Nitrites Positive (A) 12/19/2021 03:00 PM    Leukocyte Esterase MODERATE (A) 12/19/2021 03:00 PM    Epithelial cells FEW 12/19/2021 03:00 PM    Bacteria 4+ (A) 12/19/2021 03:00 PM    WBC 20-50 12/19/2021 03:00 PM    RBC  12/19/2021 03:00 PM         Medications Reviewed:     Current Facility-Administered Medications   Medication Dose Route Frequency    trospium (SANCTURA) tablet 20 mg  20 mg Oral ACB&D    topiramate (TOPAMAX) tablet 100 mg  100 mg Oral BID WITH MEALS    baclofen (LIORESAL) tablet 10 mg  10 mg Oral TID    sodium chloride (NS) flush 5-40 mL  5-40 mL IntraVENous Q8H    sodium chloride (NS) flush 5-40 mL  5-40 mL IntraVENous PRN    acetaminophen (TYLENOL) tablet 650 mg  650 mg Oral Q6H PRN    Or    acetaminophen (TYLENOL) suppository 650 mg  650 mg Rectal Q6H PRN    ondansetron (ZOFRAN ODT) tablet 4 mg  4 mg Oral Q8H PRN    Or    ondansetron (ZOFRAN) injection 4 mg  4 mg IntraVENous Q6H PRN    enoxaparin (LOVENOX) injection 40 mg  40 mg SubCUTAneous DAILY    albuterol-ipratropium (DUO-NEB) 2.5 MG-0.5 MG/3 ML  3 mL Nebulization Q6H PRN    atorvastatin (LIPITOR) tablet 40 mg  40 mg Oral QHS    carvediloL (COREG) tablet 12.5 mg  12.5 mg Oral BID WITH MEALS    DULoxetine (CYMBALTA) capsule 120 mg  120 mg Oral DAILY    furosemide (LASIX) tablet 40 mg  40 mg Oral DAILY    levothyroxine (SYNTHROID) tablet 125 mcg  125 mcg Oral ACB    linaCLOtide (LINZESS) capsule 145 mcg (Patient Supplied)  145 mcg Oral ACB    losartan (COZAAR) tablet 50 mg  50 mg Oral QHS    pantoprazole (PROTONIX) tablet 40 mg  40 mg Oral DAILY    ferrous sulfate tablet 325 mg  325 mg Oral ACB    montelukast (SINGULAIR) tablet 10 mg  10 mg Oral QHS    gabapentin (NEURONTIN) capsule 100 mg  100 mg Oral TID PRN     ______________________________________________________________________  EXPECTED LENGTH OF STAY: 3d 7h  ACTUAL LENGTH OF STAY:          9                 Thom Ojeda MD

## 2021-12-28 NOTE — PROGRESS NOTES
Transition of Care: SNF; the [address] [place] [address] has accepted; pending insurance auth- started on 12/22 and is still pending     Transport Plan: [facility] is on willcall for 12/28     RUR: 11%     Main contact is sister- Debi Fothergill- [phone]     Discharge pending:  -pending insurance auth        0900: this CM contacted Stephane at the [address] [place] [address]; the insurance Connie Aguilera is still pending    1500: contacted the [facility]; the insurance is still pending; updated patient at bedside; she verbalized understanding       NOTE:  patient lives with sister in two story home. Dwain Viera attends day support/[employer] [facility], Sat [phone]. Gt Alex has been to SNF in the past [facility] [employer]. Dwain Viera is agreeable to SNF if covered under her policy.       Home Situation  Home Environment: Private residence  # Steps to Enter: 3  One/Two Story Residence:  Two story home  Living Alone: No  Current DME Used/Available at Home: Cane, quadcane, wheelchair, and rolling walker  Tub or Shower Type: Tub/Shower combination     CM following  Michael Helm RN, CRM

## 2021-12-28 NOTE — PROGRESS NOTES
Bedside and Verbal shift change report given to Estelle Goodell, RN  (oncoming nurse) by Lizzy Egan (offgoing nurse). Report included the following information SBAR and Kardex.

## 2021-12-29 PROCEDURE — 94760 N-INVAS EAR/PLS OXIMETRY 1: CPT

## 2021-12-29 PROCEDURE — 74011250636 HC RX REV CODE- 250/636: Performed by: FAMILY MEDICINE

## 2021-12-29 PROCEDURE — 97535 SELF CARE MNGMENT TRAINING: CPT

## 2021-12-29 PROCEDURE — 74011250637 HC RX REV CODE- 250/637: Performed by: FAMILY MEDICINE

## 2021-12-29 PROCEDURE — 97530 THERAPEUTIC ACTIVITIES: CPT

## 2021-12-29 PROCEDURE — 97110 THERAPEUTIC EXERCISES: CPT

## 2021-12-29 PROCEDURE — 65270000032 HC RM SEMIPRIVATE

## 2021-12-29 RX ADMIN — GABAPENTIN 100 MG: 100 CAPSULE ORAL at 08:26

## 2021-12-29 RX ADMIN — FUROSEMIDE 40 MG: 40 TABLET ORAL at 09:06

## 2021-12-29 RX ADMIN — CARVEDILOL 12.5 MG: 12.5 TABLET, FILM COATED ORAL at 07:38

## 2021-12-29 RX ADMIN — LEVOTHYROXINE SODIUM 125 MCG: 0.12 TABLET ORAL at 07:39

## 2021-12-29 RX ADMIN — DULOXETINE HYDROCHLORIDE 120 MG: 60 CAPSULE, DELAYED RELEASE ORAL at 09:06

## 2021-12-29 RX ADMIN — FERROUS SULFATE TAB 325 MG (65 MG ELEMENTAL FE) 325 MG: 325 (65 FE) TAB at 07:39

## 2021-12-29 RX ADMIN — GABAPENTIN 100 MG: 100 CAPSULE ORAL at 18:00

## 2021-12-29 RX ADMIN — CARVEDILOL 12.5 MG: 12.5 TABLET, FILM COATED ORAL at 17:57

## 2021-12-29 RX ADMIN — MONTELUKAST 10 MG: 10 TABLET, FILM COATED ORAL at 22:25

## 2021-12-29 RX ADMIN — TROSPIUM CHLORIDE 20 MG: 20 TABLET, FILM COATED ORAL at 17:57

## 2021-12-29 RX ADMIN — ATORVASTATIN CALCIUM 40 MG: 40 TABLET, FILM COATED ORAL at 22:25

## 2021-12-29 RX ADMIN — TOPIRAMATE 100 MG: 100 TABLET, FILM COATED ORAL at 07:39

## 2021-12-29 RX ADMIN — ENOXAPARIN SODIUM 40 MG: 100 INJECTION SUBCUTANEOUS at 09:06

## 2021-12-29 RX ADMIN — TROSPIUM CHLORIDE 20 MG: 20 TABLET, FILM COATED ORAL at 07:39

## 2021-12-29 RX ADMIN — BACLOFEN 10 MG: 10 TABLET ORAL at 17:57

## 2021-12-29 RX ADMIN — PANTOPRAZOLE SODIUM 40 MG: 40 TABLET, DELAYED RELEASE ORAL at 09:06

## 2021-12-29 RX ADMIN — TOPIRAMATE 100 MG: 100 TABLET, FILM COATED ORAL at 17:57

## 2021-12-29 RX ADMIN — BACLOFEN 10 MG: 10 TABLET ORAL at 22:25

## 2021-12-29 RX ADMIN — BACLOFEN 10 MG: 10 TABLET ORAL at 09:05

## 2021-12-29 RX ADMIN — SODIUM CHLORIDE, PRESERVATIVE FREE 10 ML: 5 INJECTION INTRAVENOUS at 14:00

## 2021-12-29 NOTE — PROGRESS NOTES
Problem: Self Care Deficits Care Plan (Adult)  Goal: *Therapy Goal (Edit Goal, Insert Text)  Description: FUNCTIONAL STATUS PRIOR TO ADMISSION: Patient was modified independent (except distant supervision when showering) and using a SB quad cane for functional mobility. HOME SUPPORT: The patient lived with sister who assists as needed. 12/27/2021 Goals reviewed and remain appropriate, continue to progress    Occupational Therapy  Initiated 12/20/2021  1. Patient will perform grooming in stand with modified independence within 7 day(s). 2.  Patient will perform sponge bathing with modified independence within 7 day(s). 3.  Patient will perform lower body dressing with modified independence within 7 day(s). 4.  Patient will perform toilet transfers with modified independence within 7 day(s). 5.  Patient will perform all aspects of toileting with modified independence within 7 day(s). Outcome: Progressing Towards Goal     OCCUPATIONAL THERAPY TREATMENT  Patient: Barron Urrutia (25 y.o. female)  Date: 12/29/2021  Diagnosis: Acute cystitis [N30.00]  Encephalopathy [G93.40] <principal problem not specified>       Precautions:    Chart, occupational therapy assessment, plan of care, and goals were reviewed. ASSESSMENT  Patient continues with skilled OT services and is progressing towards goals. Patient continues to present with decreased endurance and activity tolerance impacting overall safety and independence with ADLs and mobility. Today she is received up in bathroom, reporting need for cain area bathing due to loose stools. She is able to manage toileting tasks with overall supervision and bathing tasks with SBA. She benefits from cues for improved safety and technique for fall prevention, especially with brief mgmt. She require increased assist for distal portions of LB dressing with L LE and for mojica catheter bag mgmt.  Pt visibly SOB after prolonged standing ADL tasks, requiring cues for PLB and energy conservation. She requests return to supine at end of session with all needs met. Continue to recommend d/c to SNF with transition back to home with day care services. Current Level of Function Impacting Discharge (ADLs): overall supervision to SBA for standing portions of ADLs and toileting; up to min/mod A for LB dressing; SBA for ADL transfers    Other factors to consider for discharge: h/o CVA; MOD I PLOF; endurance         PLAN :  Patient continues to benefit from skilled intervention to address the above impairments. Continue treatment per established plan of care to address goals. Recommend with staff: OOB to chair for all meals; mobility to bathroom for toileting; ADLs as needed with SBA    Recommend next OT session: distal LB dressing tasks    Recommendation for discharge: (in order for the patient to meet his/her long term goals)  Therapy up to 5 days/week in SNF setting    This discharge recommendation:  Has been made in collaboration with the attending provider and/or case management    IF patient discharges home will need the following DME: patient owns DME required for discharge       SUBJECTIVE:   Patient stated I feel tired now after all that.     OBJECTIVE DATA SUMMARY:   Cognitive/Behavioral Status:  Neurologic State: Alert  Orientation Level: Oriented X4  Cognition: Follows commands  Perception: Appears intact  Perseveration: No perseveration noted  Safety/Judgement: Awareness of environment; Fall prevention    Functional Mobility and Transfers for ADLs:  Bed Mobility:  Rolling: Supervision  Supine to Sit: Supervision; Additional time  Sit to Supine: Supervision  Scooting: Supervision; Additional time    Transfers:  Sit to Stand: Stand-by assistance  Functional Transfers  Bathroom Mobility: Stand-by assistance  Toilet Transfer : Stand-by assistance    Balance:  Sitting: Intact  Standing: Impaired  Standing - Static: Good;Constant support  Standing - Dynamic : Good;Constant support    ADL Intervention:  Grooming  Grooming Assistance: Supervision;Stand-by assistance  Position Performed: Standing (at sink)  Washing Face: Supervision;Stand-by assistance    Upper Body Bathing  Bathing Assistance: Stand-by assistance  Position Performed: Standing (at sink)  Cues: Verbal cues provided    Lower Body Bathing  Perineal  : Stand-by assistance  Position Performed: Standing    Lower Body Dressing Assistance  Underpants: Minimum assistance; Moderate assistance (to don briefs; A for distal mgmt)  Leg Crossed Method Used: No  Position Performed: Seated in chair;Standing  Cues: Don;Physical assistance; Tactile cues provided;Verbal cues provided;Visual cues provided  Adaptive Equipment Used: Walker    Toileting  Bladder Hygiene: Supervision  Bowel Hygiene: Supervision  Clothing Management: Stand-by assistance  Cues: Verbal cues provided  Adaptive Equipment: Grab bars; Walker    Cognitive Retraining  Safety/Judgement: Awareness of environment; Fall prevention    Pain:  Pt denied pain this session    Activity Tolerance:   Fair, requires rest breaks and observed SOB with activity    After treatment patient left in no apparent distress:   Supine in bed, Call bell within reach, Bed / chair alarm activated and Side rails x 3    COMMUNICATION/COLLABORATION:   The patients plan of care was discussed with: Physical therapist and Registered nurse.      Jennie Shepherd OT  Time Calculation: 23 mins

## 2021-12-29 NOTE — PROGRESS NOTES
Problem: Mobility Impaired (Adult and Pediatric)  Goal: *Acute Goals and Plan of Care (Insert Text)  Description: FUNCTIONAL STATUS PRIOR TO ADMISSION: Patient was modified independent using a rolling walker or WB quad cane for functional mobility. Has had multiple falls over the past week (more than 4). HOME SUPPORT PRIOR TO ADMISSION: The patient lived with sister who assists as needed. She goes to adult day care 3 days/week. Physical Therapy Goals  Reassessment 12/27/2021  1. Patient will move from supine to sit and sit to supine  and roll side to side in bed with modified independence within 7 day(s). 2.  Patient will transfer from bed to chair and chair to bed with modified independence using the least restrictive device within 7 day(s). 3.  Patient will perform sit to stand with modified independence within 7 day(s). 4.  Patient will ambulate with supervision for 150 feet with the least restrictive device within 7 day(s). 5.  Patient will ascend/descend 15 stairs with 1 handrail(s) with minimal assistance/contact guard assist within 7 day(s). Physical Therapy Goals  Initiated 12/20/2021  1. Patient will move from supine to sit and sit to supine  and roll side to side in bed with modified independence within 7 day(s). 2.  Patient will transfer from bed to chair and chair to bed with modified independence using the least restrictive device within 7 day(s). 3.  Patient will perform sit to stand with modified independence within 7 day(s). 4.  Patient will ambulate with supervision/set-up for 50 feet with the least restrictive device within 7 day(s). 5.  Patient will ascend/descend 15 stairs with 1 handrail(s) with minimal assistance/contact guard assist within 7 day(s).       Outcome: Progressing Towards Goal    PHYSICAL THERAPY TREATMENT  Patient: Courtney Vincent (78 y.o. female)  Date: 12/29/2021  Diagnosis: Acute cystitis [N30.00]  Encephalopathy [G93.40] <principal problem not specified>       Precautions:    Chart, physical therapy assessment, plan of care and goals were reviewed. ASSESSMENT  Patient continues with skilled PT services and is slowly progressing towards goals. Pt received supine in bed sleeping, reporting that she was given pain meds and felt good, but presents with difficulties keeping her eyes open. Pt limited by L side weakness from pervious CVA, L hip weakness unable to perform hip flexion seated EOB this session. Pt able to tolerate bed mobility to R side EOB with supervision and time. Pt continued with STS x5 to RW with VC for hip/knee ext with upright posture. Pt continued with sit>sup with supine LE ex with VC and manual assistance, noted pt able to slightly elevate L hip with AAROM with SKTC. Pt completed session back supine with HOB elevated, call bell within reach and all need met at the time. Rn notified of session. Current Level of Function Impacting Discharge (mobility/balance): SBA for STS    Other factors to consider for discharge: fall risk, close to baseline? PLAN :  Patient continues to benefit from skilled intervention to address the above impairments. Continue treatment per established plan of care. to address goals. Recommendation for discharge: (in order for the patient to meet his/her long term goals)  Therapy up to 5 days/week in SNF setting    This discharge recommendation:  Has been made in collaboration with the attending provider and/or case management    IF patient discharges home will need the following DME: to be determined (TBD)       SUBJECTIVE:   Patient stated I will call my insurance myself.     OBJECTIVE DATA SUMMARY:   Critical Behavior:  Neurologic State: Alert  Orientation Level: Oriented X4  Cognition: Follows commands     Functional Mobility Training:  Bed Mobility:  Rolling: Supervision  Supine to Sit: Supervision; Additional time  Sit to Supine: Supervision; Additional time  Scooting: Supervision; Additional time     Transfers:  Sit to Stand: Stand-by assistance; Additional time  Stand to Sit: Stand-by assistance; Additional time     Balance:  Sitting: Intact  Standing: Impaired  Standing - Static: Constant support;Good  Standing - Dynamic : Constant support;Good    Therapeutic Exercises:   Standing LE ex- with support RLE high knee march x10   Supine LE ex- AAROM with manual resistance from hip flex/ext, knee presses, bridges x10 ea    Pain Rating:  No pain reported    Activity Tolerance:   Good and requires rest breaks    After treatment patient left in no apparent distress:   Supine in bed and Call bell within reach    COMMUNICATION/COLLABORATION:   The patients plan of care was discussed with: Registered nurse.      Kenisha Mtz PTA   Time Calculation: 26 mins

## 2021-12-29 NOTE — PROGRESS NOTES
Transition of Care: SNF; the [address] [place] [address] has accepted; insurance initially denied on 12/29; they want peer to peer today 12/29    Peer to peer- [phone] by end of day 12/29; attending aware and will do it today 12/29      Transport Plan: CEE is on willcall for 12/29     RUR: 11%     Main contact is sisterHudson Reyes- [phone]     Discharge pending:  -pending insurance Teofilo Burdick and peer to peer        0945:  Reji Salazar at the [address] [place] [address] contacted this CM; the insurance Teofilo Burdick has been initially denied; insurance wants to do a peer to peer today; this CM informed attending at rounds; he verbalized understanding    1100: updated patient at bedside; she verbalized understanding     1600: per attending; he has tried calling the insurance company [phone] to do the peer to peer; he was shuffled around 3 times; called the number [phone] with reference #[id number] that company gave him; he had to leave a message and has received no phone call back yet    1800: updated patient and sister at bedside; they verbalized understanding        NOTE:  patient lives with sister in two story home. Marylen Harden attends [facility] [phone]. Barrett Prescott has been to SNF in the past [facility]. Marylen Harden is agreeable to SNF if covered under her policy.       Home Situation  Home Environment: Private residence  # Steps to Enter: 3  One/Two Story Residence:  Two story home  Living Alone: No  Current DME Used/Available at Home: Cane, quadcane, wheelchair, and rolling walker  Tub or Shower Type: Tub/Shower combination     CM following  Adair Sagastume RN, CRM          Revision History

## 2021-12-29 NOTE — PROGRESS NOTES
[facility] Adult  Hospitalist Group                                                                                          Hospitalist Progress Note  Leigh Mcdaniel MD  Answering service: [phone] OR [phone] from in house phone        Date of Service:  2021  NAME:  Byron Harris  :  1960  MRN:  [id number]      Admission Summary:     Patient presents with urinary tract infection, has history of CVA with neurogenic bladder requiring chronic indwelling Dunn catheter. Patient has completed antibiotics and doing well. Has generalized weakness and awaiting placement to rehab. Interval history / Subjective:       Overall doing better. Assessment & Plan:     Catheter associated urinary tract infection  -Patient's urine culture grew E. Coli  -Completed course of ceftriaxone    Neurogenic bladder  -Due to prior history of CVA, requiring chronic indwelling Dunn catheter  -Dunn catheter was exchanged this hospitalization  -Added trospium 20 mg twice daily for bladder spasm    Migraine  -ContinueTopamax  -Tylenol as needed, monitor    Hypertension  -Continue losartan and Coreg  -Blood pressure stable    Chronic systolic congestive heart failure  -Last known EF of 47%  -Continue diuresis with Lasix    Hypothyroidism  -Continue Synthroid    Dyslipidemia  -Continue Lipitor    Obesity  -With BMI of 42.95  -Outpatient follow-up for weight management    Generalized weakness  -Patient with multiple falls due to generalized weakness  -PT OT evaluated patient, recommended SNF  -Patient accepted at [address] [place] [address], pending insurance authorization    Code status: FULL  DVT prophylaxis: SCDs    Care Plan discussed with: Patient/Family  Anticipated Disposition: SNF/LTC, medically stable, insurance has denied placement to SNF. Called for [id number], got shuffled around over the phone at least 3 places. Final number provided to call was [phone]. Denial reference number is [id number]. Left message to call back to discuss peer to peer. Anticipated Discharge: 24 hours to 48 hours     Hospital Problems  Date Reviewed: 11/1/2021          Codes Class Noted POA    Acute cystitis ICD-10-CM: N30.00  ICD-9-CM: 595.0  12/19/2021 Unknown        Encephalopathy ICD-10-CM: G93.40  ICD-9-CM: 348.30  12/19/2021 Unknown                Review of Systems:   A comprehensive review of systems was negative except for that written in the HPI. Vital Signs:    Last 24hrs VS reviewed since prior progress note. Most recent are:  Visit Vitals  /60   Pulse 92   Temp 98.2 °F (36.8 °C)   Resp 16   Ht 5' 4\" (1.626 m)   Wt 111.8 kg (246 lb 7.6 oz)   SpO2 97%   BMI 42.31 kg/m²         Intake/Output Summary (Last 24 hours) at 12/29/2021 1301  Last data filed at 12/28/2021 1626  Gross per 24 hour   Intake    Output 300 ml   Net -300 ml        Physical Examination:     I had a face to face encounter with this patient and independently examined them on 12/29/2021 as outlined below:          Constitutional:  No acute distress, cooperative, pleasant    ENT:  Oral mucosa moist, oropharynx benign. Resp:  CTA bilaterally. No wheezing/rhonchi/rales. No accessory muscle use   CV:  Regular rhythm, normal rate, no murmurs, gallops, rubs    GI:  Soft, non distended, non tender. normoactive bowel sounds, no hepatosplenomegaly     Musculoskeletal:  No edema, warm, 2+ pulses throughout    Neurologic:  Moves all extremities. AAOx3, CN II-XII reviewed            Data Review:    Review and/or order of clinical lab test      Labs:     No results for input(s): WBC, HGB, HCT, PLT, HGBEXT, HCTEXT, PLTEXT, HGBEXT, HCTEXT, PLTEXT in the last 72 hours. No results for input(s): NA, K, CL, CO2, BUN, CREA, GLU, CA, MG, PHOS, URICA in the last 72 hours. No results for input(s): ALT, AP, TBIL, TBILI, TP, ALB, GLOB, GGT, AML, LPSE in the last 72 hours.     No lab exists for component: SGOT, GPT, AMYP, HLPSE  No results for input(s): INR, PTP, APTT, INREXT, INREXT in the last 72 hours. No results for input(s): FE, TIBC, PSAT, FERR in the last 72 hours. No results found for: FOL, RBCF   No results for input(s): PH, PCO2, PO2 in the last 72 hours. No results for input(s): CPK, CKNDX, TROIQ in the last 72 hours.     No lab exists for component: CPKMB  Lab Results   Component Value Date/Time    Cholesterol, total 156 11/20/2020 03:38 PM    HDL Cholesterol 72 11/20/2020 03:38 PM    LDL, calculated 69.4 11/20/2020 03:38 PM    Triglyceride 73 11/20/2020 03:38 PM    CHOL/HDL Ratio 2.2 11/20/2020 03:38 PM     Lab Results   Component Value Date/Time    Glucose (POC) 101 (H) 09/12/2017 04:49 PM    Glucose (POC) 112 (H) 09/12/2017 11:27 AM    Glucose (POC) 81 09/12/2017 06:46 AM    Glucose (POC) 105 (H) 09/11/2017 10:02 PM    Glucose (POC) 99 09/11/2017 04:56 PM     Lab Results   Component Value Date/Time    Color YELLOW/STRAW 12/19/2021 03:00 PM    Appearance CLOUDY (A) 12/19/2021 03:00 PM    Specific gravity 1.017 12/19/2021 03:00 PM    pH (UA) 6.0 12/19/2021 03:00 PM    Protein TRACE (A) 12/19/2021 03:00 PM    Glucose Negative 12/19/2021 03:00 PM    Ketone Negative 12/19/2021 03:00 PM    Bilirubin Negative 12/19/2021 03:00 PM    Urobilinogen 1.0 12/19/2021 03:00 PM    Nitrites Positive (A) 12/19/2021 03:00 PM    Leukocyte Esterase MODERATE (A) 12/19/2021 03:00 PM    Epithelial cells FEW 12/19/2021 03:00 PM    Bacteria 4+ (A) 12/19/2021 03:00 PM    WBC 20-50 12/19/2021 03:00 PM    RBC  12/19/2021 03:00 PM         Medications Reviewed:     Current Facility-Administered Medications   Medication Dose Route Frequency    trospium (SANCTURA) tablet 20 mg  20 mg Oral ACB&D    topiramate (TOPAMAX) tablet 100 mg  100 mg Oral BID WITH MEALS    baclofen (LIORESAL) tablet 10 mg  10 mg Oral TID    sodium chloride (NS) flush 5-40 mL  5-40 mL IntraVENous Q8H    sodium chloride (NS) flush 5-40 mL  5-40 mL IntraVENous PRN    acetaminophen (TYLENOL) tablet 650 mg 650 mg Oral Q6H PRN    Or    acetaminophen (TYLENOL) suppository 650 mg  650 mg Rectal Q6H PRN    ondansetron (ZOFRAN ODT) tablet 4 mg  4 mg Oral Q8H PRN    Or    ondansetron (ZOFRAN) injection 4 mg  4 mg IntraVENous Q6H PRN    enoxaparin (LOVENOX) injection 40 mg  40 mg SubCUTAneous DAILY    albuterol-ipratropium (DUO-NEB) 2.5 MG-0.5 MG/3 ML  3 mL Nebulization Q6H PRN    atorvastatin (LIPITOR) tablet 40 mg  40 mg Oral QHS    carvediloL (COREG) tablet 12.5 mg  12.5 mg Oral BID WITH MEALS    DULoxetine (CYMBALTA) capsule 120 mg  120 mg Oral DAILY    furosemide (LASIX) tablet 40 mg  40 mg Oral DAILY    levothyroxine (SYNTHROID) tablet 125 mcg  125 mcg Oral ACB    linaCLOtide (LINZESS) capsule 145 mcg (Patient Supplied)  145 mcg Oral ACB    losartan (COZAAR) tablet 50 mg  50 mg Oral QHS    pantoprazole (PROTONIX) tablet 40 mg  40 mg Oral DAILY    ferrous sulfate tablet 325 mg  325 mg Oral ACB    montelukast (SINGULAIR) tablet 10 mg  10 mg Oral QHS    gabapentin (NEURONTIN) capsule 100 mg  100 mg Oral TID PRN     ______________________________________________________________________  EXPECTED LENGTH OF STAY: 3d 7h  ACTUAL LENGTH OF STAY:          10                 Ten Moore MD

## 2021-12-30 PROCEDURE — 65270000032 HC RM SEMIPRIVATE

## 2021-12-30 PROCEDURE — 74011250636 HC RX REV CODE- 250/636: Performed by: FAMILY MEDICINE

## 2021-12-30 PROCEDURE — 74011250637 HC RX REV CODE- 250/637: Performed by: FAMILY MEDICINE

## 2021-12-30 RX ADMIN — DULOXETINE HYDROCHLORIDE 120 MG: 60 CAPSULE, DELAYED RELEASE ORAL at 09:55

## 2021-12-30 RX ADMIN — FUROSEMIDE 40 MG: 40 TABLET ORAL at 09:56

## 2021-12-30 RX ADMIN — LEVOTHYROXINE SODIUM 125 MCG: 0.12 TABLET ORAL at 08:18

## 2021-12-30 RX ADMIN — MONTELUKAST 10 MG: 10 TABLET, FILM COATED ORAL at 22:24

## 2021-12-30 RX ADMIN — BACLOFEN 10 MG: 10 TABLET ORAL at 22:12

## 2021-12-30 RX ADMIN — TROSPIUM CHLORIDE 20 MG: 20 TABLET, FILM COATED ORAL at 08:18

## 2021-12-30 RX ADMIN — BACLOFEN 10 MG: 10 TABLET ORAL at 09:55

## 2021-12-30 RX ADMIN — FERROUS SULFATE TAB 325 MG (65 MG ELEMENTAL FE) 325 MG: 325 (65 FE) TAB at 08:19

## 2021-12-30 RX ADMIN — TOPIRAMATE 100 MG: 100 TABLET, FILM COATED ORAL at 18:01

## 2021-12-30 RX ADMIN — CARVEDILOL 12.5 MG: 12.5 TABLET, FILM COATED ORAL at 18:01

## 2021-12-30 RX ADMIN — ATORVASTATIN CALCIUM 40 MG: 40 TABLET, FILM COATED ORAL at 22:12

## 2021-12-30 RX ADMIN — SODIUM CHLORIDE, PRESERVATIVE FREE 10 ML: 5 INJECTION INTRAVENOUS at 22:00

## 2021-12-30 RX ADMIN — LOSARTAN POTASSIUM 50 MG: 50 TABLET, FILM COATED ORAL at 22:12

## 2021-12-30 RX ADMIN — TROSPIUM CHLORIDE 20 MG: 20 TABLET, FILM COATED ORAL at 08:19

## 2021-12-30 RX ADMIN — GABAPENTIN 100 MG: 100 CAPSULE ORAL at 10:06

## 2021-12-30 RX ADMIN — TOPIRAMATE 100 MG: 100 TABLET, FILM COATED ORAL at 08:18

## 2021-12-30 RX ADMIN — ENOXAPARIN SODIUM 40 MG: 100 INJECTION SUBCUTANEOUS at 09:55

## 2021-12-30 RX ADMIN — CARVEDILOL 12.5 MG: 12.5 TABLET, FILM COATED ORAL at 08:18

## 2021-12-30 RX ADMIN — BACLOFEN 10 MG: 10 TABLET ORAL at 18:01

## 2021-12-30 RX ADMIN — TROSPIUM CHLORIDE 20 MG: 20 TABLET, FILM COATED ORAL at 18:01

## 2021-12-30 RX ADMIN — PANTOPRAZOLE SODIUM 40 MG: 40 TABLET, DELAYED RELEASE ORAL at 09:55

## 2021-12-30 NOTE — PROGRESS NOTES
Transition of Care: SNF; the [address] [place] [address] has accepted; insurance initially denied on 12/29; peer to peer again today 12/30     Peer to peer- [phone] by end of day 12/30; this is in process    UPDATE: on 12/29- the attending and the insurance doctor traded phone calls but never connected for the peer to peer; attending to followup today 12/30 to complete the peer to peer        Transport Plan: CEE is on willcall for 12/29     RUR: 11%     Main contact is sister- Debi Fothergill- [phone]     Discharge pending:  -pending insurance Connie Coleman and peer to peer        [phone]: attending has spoken to insurance company; they are now requesting the most updated PT/OT notes from 12/29; apparently insurance company was looking at old PT/OT notes; this CM faxed PT/OT notes from 12/29 to [phone]; also faxed the notes to the [address] [place] [address]; updated Amherstsrinivasan Beckman at the [address] [place] [address] on peer to peer; she stated they will have a bed available tomorrow 12/31 if insurance accepts; this CM sent perfectserve text to attending to inform; she verbalized understanding; also this CM update patient at bedside; she verbalized understanding        NOTE:  patient lives with sister in two story home. Dwain Viera attends day support/[facility], Sat [phone]. Gt Alex has been to SNF in the past [facility] [employer]. Dwain Viera is agreeable to SNF if covered under her policy.       Home Situation  Home Environment: Private residence  # Steps to Enter: 3  One/Two Story Residence:  Two story home  Living Alone: No  Current DME Used/Available at Home: Cane, quadcane, wheelchair, and rolling walker  Tub or Shower Type: Tub/Shower combination     CM following  Michael Helm RN, CRM            Revision History                  Revision History

## 2021-12-30 NOTE — PROGRESS NOTES
[address] [facility] Adult  Hospitalist Group                                                                                          Hospitalist Progress Note  Donna Savage, [place]  Answering service: [phone] OR [phone] from in house phone        Date of Service:  2021  NAME:  Kameron Amin  :  1960  MRN:  [id number]      Admission Summary:   Patient presents with urinary tract infection, has history of CVA with neurogenic bladder requiring chronic indwelling Dunn catheter. Patient has completed antibiotics and doing well. Has generalized weakness and awaiting placement to rehab    Interval history / Subjective:   Chart reviewed. Patient observed at bedside. No acute distress noted. No complaints noted. DC pending placement. Assessment & Plan:     # Catheter associated urinary tract infection      - UC + for E. Coli, Course of Ceftriaxone completed. # Neurogenic Bladder      - Hx of CVA requiring chronic indwelling FC      - FC exchanged on       - Continue Trospium 20 mg BID for bladder spasm  # Migraine      - Continue Topamax      - Continue  PRN Tylenol  # HTN  # Dyslipidemia  # Chronic Systolic Congestive Heart Failure     - Followed by Dr. Manju Adam from 2019 showed EF of 44%     - Continue Losartan, Coreg     - Continue Lasix     - Continue Lipitor  # Hypothyroidism     - Continue Synthroid  # Obesity      - BMI of 42.95     - Outpatient follow-up for weight management  # Generalized Weakness     - Patient with multiple falls due to generalized weakness     - PT/OT recommendations for SNF     - Accepted to [address] [place] [address] Auto authorization    Code status: Full  DVT prophylaxis: [address] discussed with: Patient/Family  Anticipated Disposition: Medically stable. DC pending Insurance Auth. Peer to Peer has been attempted by Raquel Melara who will attempt to complete peer to peer today.    Anticipated Discharge: 24 hours to 50 hours     Hospital Problems  Date Reviewed: 11/1/2021          Codes Class Noted POA    Acute cystitis ICD-10-CM: N30.00  ICD-9-CM: 595.0  12/19/2021 Unknown        Encephalopathy ICD-10-CM: G93.40  ICD-9-CM: 348.30  12/19/2021 Unknown                Review of Systems:   Pertinent items are noted in HPI. Vital Signs:    Last 24hrs VS reviewed since prior progress note. Most recent are:  Visit Vitals  /73   Pulse 61   Temp 99.1 °F (37.3 °C)   Resp 18   Ht 5' 4\" (1.626 m)   Wt 114.7 kg (252 lb 13.9 oz)   SpO2 97%   BMI 43.40 kg/m²         Intake/Output Summary (Last 24 hours) at 12/30/2021 1259  Last data filed at 12/30/2021 0930  Gross per 24 hour   Intake    Output 2575 ml   Net -2575 ml        Physical Examination:     I had a face to face encounter with this patient and independently examined them on 12/30/2021 as outlined below:          Constitutional:  No acute distress, cooperative, pleasant    ENT:  Oral mucosa moist, oropharynx benign. Resp:  CTA bilaterally. No wheezing/rhonchi/rales. No accessory muscle use   CV:  Regular rhythm, normal rate    GI:  Soft, non distended, non tender. normoactive bowel sounds    Musculoskeletal:  No edema, warm, 2+ pulses throughout    Neurologic:  Moves all extremities. AAOx3, CN II-XII reviewed            Data Review:    Review and/or order of clinical lab test  Review and/or order of tests in the radiology section of CPT  Review and/or order of tests in the medicine section of CPT      Labs:   No results for input(s): WBC, HGB, HCT, PLT, HGBEXT, HCTEXT, PLTEXT in the last 72 hours. No results for input(s): NA, K, CL, CO2, BUN, CREA, GLU, CA, MG, PHOS, URICA in the last 72 hours. No results for input(s): ALT, AP, TBIL, TBILI, TP, ALB, GLOB, GGT, AML, LPSE in the last 72 hours. No lab exists for component: SGOT, GPT, AMYP, HLPSE  No results for input(s): INR, PTP, APTT, INREXT in the last 72 hours.    No results for input(s): FE, TIBC, PSAT, FERR in the last 72 hours. No results found for: FOL, RBCF   No results for input(s): PH, PCO2, PO2 in the last 72 hours. No results for input(s): CPK, CKNDX, TROIQ in the last 72 hours.     No lab exists for component: CPKMB  Lab Results   Component Value Date/Time    Cholesterol, total 156 11/20/2020 03:38 PM    HDL Cholesterol 72 11/20/2020 03:38 PM    LDL, calculated 69.4 11/20/2020 03:38 PM    Triglyceride 73 11/20/2020 03:38 PM    CHOL/HDL Ratio 2.2 11/20/2020 03:38 PM     Lab Results   Component Value Date/Time    Glucose (POC) 101 (H) 09/12/2017 04:49 PM    Glucose (POC) 112 (H) 09/12/2017 11:27 AM    Glucose (POC) 81 09/12/2017 06:46 AM    Glucose (POC) 105 (H) 09/11/2017 10:02 PM    Glucose (POC) 99 09/11/2017 04:56 PM     Lab Results   Component Value Date/Time    Color YELLOW/STRAW 12/19/2021 03:00 PM    Appearance CLOUDY (A) 12/19/2021 03:00 PM    Specific gravity 1.017 12/19/2021 03:00 PM    pH (UA) 6.0 12/19/2021 03:00 PM    Protein TRACE (A) 12/19/2021 03:00 PM    Glucose Negative 12/19/2021 03:00 PM    Ketone Negative 12/19/2021 03:00 PM    Bilirubin Negative 12/19/2021 03:00 PM    Urobilinogen 1.0 12/19/2021 03:00 PM    Nitrites Positive (A) 12/19/2021 03:00 PM    Leukocyte Esterase MODERATE (A) 12/19/2021 03:00 PM    Epithelial cells FEW 12/19/2021 03:00 PM    Bacteria 4+ (A) 12/19/2021 03:00 PM    WBC 20-50 12/19/2021 03:00 PM    RBC  12/19/2021 03:00 PM         Medications Reviewed:     Current Facility-Administered Medications   Medication Dose Route Frequency    trospium (SANCTURA) tablet 20 mg  20 mg Oral ACB&D    topiramate (TOPAMAX) tablet 100 mg  100 mg Oral BID WITH MEALS    baclofen (LIORESAL) tablet 10 mg  10 mg Oral TID    sodium chloride (NS) flush 5-40 mL  5-40 mL IntraVENous Q8H    sodium chloride (NS) flush 5-40 mL  5-40 mL IntraVENous PRN    acetaminophen (TYLENOL) tablet 650 mg  650 mg Oral Q6H PRN    Or    acetaminophen (TYLENOL) suppository 650 mg  650 mg Rectal Q6H PRN  ondansetron (ZOFRAN ODT) tablet 4 mg  4 mg Oral Q8H PRN    Or    ondansetron (ZOFRAN) injection 4 mg  4 mg IntraVENous Q6H PRN    enoxaparin (LOVENOX) injection 40 mg  40 mg SubCUTAneous DAILY    albuterol-ipratropium (DUO-NEB) 2.5 MG-0.5 MG/3 ML  3 mL Nebulization Q6H PRN    atorvastatin (LIPITOR) tablet 40 mg  40 mg Oral QHS    carvediloL (COREG) tablet 12.5 mg  12.5 mg Oral BID WITH MEALS    DULoxetine (CYMBALTA) capsule 120 mg  120 mg Oral DAILY    furosemide (LASIX) tablet 40 mg  40 mg Oral DAILY    levothyroxine (SYNTHROID) tablet 125 mcg  125 mcg Oral ACB    linaCLOtide (LINZESS) capsule 145 mcg (Patient Supplied)  145 mcg Oral ACB    losartan (COZAAR) tablet 50 mg  50 mg Oral QHS    pantoprazole (PROTONIX) tablet 40 mg  40 mg Oral DAILY    ferrous sulfate tablet 325 mg  325 mg Oral ACB    montelukast (SINGULAIR) tablet 10 mg  10 mg Oral QHS    gabapentin (NEURONTIN) capsule 100 mg  100 mg Oral TID PRN     ______________________________________________________________________  EXPECTED LENGTH OF STAY: 3d 7h  ACTUAL LENGTH OF STAY:          [address]

## 2021-12-30 NOTE — PROGRESS NOTES
Bedside and Verbal shift change report given to [address] (oncoming nurse) by Carlos Webber (offgoing nurse). Report included the following information SBAR, Kardex, Intake/Output, MAR and Recent Results.

## 2021-12-30 NOTE — PROGRESS NOTES
Bedside and Verbal shift change report given to Gianni Roach (oncoming nurse) by Wilfred Aguilera (offgoing nurse). Report included the following information SBAR, Kardex, Intake/Output, MAR and Recent Results.

## 2021-12-31 LAB
ANION GAP SERPL CALC-SCNC: 5 MMOL/L (ref 5–15)
BASOPHILS # BLD: 0 K/UL (ref 0–0.1)
BASOPHILS NFR BLD: 1 % (ref 0–1)
BUN SERPL-MCNC: 27 MG/DL (ref 6–20)
BUN/CREAT SERPL: 26 (ref 12–20)
CALCIUM SERPL-MCNC: 8.8 MG/DL (ref 8.5–10.1)
CHLORIDE SERPL-SCNC: 112 MMOL/L (ref 97–108)
CO2 SERPL-SCNC: 25 MMOL/L (ref 21–32)
CREAT SERPL-MCNC: 1.04 MG/DL (ref 0.55–1.02)
DIFFERENTIAL METHOD BLD: NORMAL
EOSINOPHIL # BLD: 0.2 K/UL (ref 0–0.4)
EOSINOPHIL NFR BLD: 3 % (ref 0–7)
ERYTHROCYTE [DISTWIDTH] IN BLOOD BY AUTOMATED COUNT: 13.5 % (ref 11.5–14.5)
GLUCOSE SERPL-MCNC: 90 MG/DL (ref 65–100)
HCT VFR BLD AUTO: 37.9 % (ref 35–47)
HGB BLD-MCNC: 12 G/DL (ref 11.5–16)
IMM GRANULOCYTES # BLD AUTO: 0 K/UL (ref 0–0.04)
IMM GRANULOCYTES NFR BLD AUTO: 0 % (ref 0–0.5)
LYMPHOCYTES # BLD: 2 K/UL (ref 0.8–3.5)
LYMPHOCYTES NFR BLD: 41 % (ref 12–49)
MCH RBC QN AUTO: 28.5 PG (ref 26–34)
MCHC RBC AUTO-ENTMCNC: 31.7 G/DL (ref 30–36.5)
MCV RBC AUTO: 90 FL (ref 80–99)
MONOCYTES # BLD: 0.6 K/UL (ref 0–1)
MONOCYTES NFR BLD: 13 % (ref 5–13)
NEUTS SEG # BLD: 2.1 K/UL (ref 1.8–8)
NEUTS SEG NFR BLD: 42 % (ref 32–75)
NRBC # BLD: 0 K/UL (ref 0–0.01)
NRBC BLD-RTO: 0 PER 100 WBC
PLATELET # BLD AUTO: 214 K/UL (ref 150–400)
PMV BLD AUTO: 10.2 FL (ref 8.9–12.9)
POTASSIUM SERPL-SCNC: 3.7 MMOL/L (ref 3.5–5.1)
RBC # BLD AUTO: 4.21 M/UL (ref 3.8–5.2)
SODIUM SERPL-SCNC: 142 MMOL/L (ref 136–145)
WBC # BLD AUTO: 5 K/UL (ref 3.6–11)

## 2021-12-31 PROCEDURE — 36415 COLL VENOUS BLD VENIPUNCTURE: CPT

## 2021-12-31 PROCEDURE — 74011250637 HC RX REV CODE- 250/637: Performed by: FAMILY MEDICINE

## 2021-12-31 PROCEDURE — 85025 COMPLETE CBC W/AUTO DIFF WBC: CPT

## 2021-12-31 PROCEDURE — 94760 N-INVAS EAR/PLS OXIMETRY 1: CPT

## 2021-12-31 PROCEDURE — 74011250636 HC RX REV CODE- 250/636: Performed by: FAMILY MEDICINE

## 2021-12-31 PROCEDURE — 65270000032 HC RM SEMIPRIVATE

## 2021-12-31 PROCEDURE — 80048 BASIC METABOLIC PNL TOTAL CA: CPT

## 2021-12-31 RX ADMIN — CARVEDILOL 12.5 MG: 12.5 TABLET, FILM COATED ORAL at 17:48

## 2021-12-31 RX ADMIN — BACLOFEN 10 MG: 10 TABLET ORAL at 17:48

## 2021-12-31 RX ADMIN — BACLOFEN 10 MG: 10 TABLET ORAL at 10:33

## 2021-12-31 RX ADMIN — TOPIRAMATE 100 MG: 100 TABLET, FILM COATED ORAL at 07:00

## 2021-12-31 RX ADMIN — ENOXAPARIN SODIUM 40 MG: 100 INJECTION SUBCUTANEOUS at 10:33

## 2021-12-31 RX ADMIN — DULOXETINE HYDROCHLORIDE 120 MG: 60 CAPSULE, DELAYED RELEASE ORAL at 10:33

## 2021-12-31 RX ADMIN — FERROUS SULFATE TAB 325 MG (65 MG ELEMENTAL FE) 325 MG: 325 (65 FE) TAB at 06:58

## 2021-12-31 RX ADMIN — SODIUM CHLORIDE, PRESERVATIVE FREE 10 ML: 5 INJECTION INTRAVENOUS at 06:00

## 2021-12-31 RX ADMIN — TROSPIUM CHLORIDE 20 MG: 20 TABLET, FILM COATED ORAL at 17:48

## 2021-12-31 RX ADMIN — CARVEDILOL 12.5 MG: 12.5 TABLET, FILM COATED ORAL at 07:00

## 2021-12-31 RX ADMIN — ATORVASTATIN CALCIUM 40 MG: 40 TABLET, FILM COATED ORAL at 21:31

## 2021-12-31 RX ADMIN — LEVOTHYROXINE SODIUM 125 MCG: 0.12 TABLET ORAL at 06:58

## 2021-12-31 RX ADMIN — FUROSEMIDE 40 MG: 40 TABLET ORAL at 10:33

## 2021-12-31 RX ADMIN — TROSPIUM CHLORIDE 20 MG: 20 TABLET, FILM COATED ORAL at 07:00

## 2021-12-31 RX ADMIN — GABAPENTIN 100 MG: 100 CAPSULE ORAL at 03:43

## 2021-12-31 RX ADMIN — BACLOFEN 10 MG: 10 TABLET ORAL at 21:31

## 2021-12-31 RX ADMIN — MONTELUKAST 10 MG: 10 TABLET, FILM COATED ORAL at 21:30

## 2021-12-31 RX ADMIN — PANTOPRAZOLE SODIUM 40 MG: 40 TABLET, DELAYED RELEASE ORAL at 10:33

## 2021-12-31 RX ADMIN — TOPIRAMATE 100 MG: 100 TABLET, FILM COATED ORAL at 17:48

## 2021-12-31 RX ADMIN — LOSARTAN POTASSIUM 50 MG: 50 TABLET, FILM COATED ORAL at 21:31

## 2021-12-31 NOTE — PROGRESS NOTES
Bedside and Verbal shift change report given to Baron Karis RN  (oncoming nurse) by Lito Herzog (offgoing nurse). Report included the following information SBAR and Kardex.

## 2021-12-31 NOTE — PROGRESS NOTES
[facility] Adult  Hospitalist Group                                                                                          Hospitalist Progress Note  Loraine Kayser, [place]  Answering service: [phone] OR [phone] from in house phone        Date of Service:  2021  NAME:  Scott Conde  :  1960  MRN:  [id number]      Admission Summary:   Patient presents with urinary tract infection, has history of CVA with neurogenic bladder requiring chronic indwelling Dunn catheter. Patient has completed antibiotics and doing well. Has generalized weakness and awaiting placement to rehab    Interval history / Subjective:    No acute distress noted. No complaints noted. DC pending placement. Assessment & Plan:     # Catheter associated urinary tract infection      - UC + for E. Coli, Course of Ceftriaxone completed. # Neurogenic Bladder      - Hx of CVA requiring chronic indwelling FC      - FC exchanged on       - Continue Trospium 20 mg BID for bladder spasm  # Migraine      - Continue Topamax      - Continue  PRN Tylenol  # HTN  # Dyslipidemia  # Chronic Systolic Congestive Heart Failure     - Followed by Dr. Ray Sanz from 2019 showed EF of 44%     - Continue Losartan, Coreg     - Continue Lasix     - Continue Lipitor  # Hypothyroidism     - Continue Synthroid  # Obesity      - BMI of 42.95     - Outpatient follow-up for weight management  # Generalized Weakness     - Patient with multiple falls due to generalized weakness     - PT/OT recommendations for SNF     - Accepted to [address] [place] [address] Auto authorization    Code status: Full  DVT prophylaxis: [address] discussed with: Patient/Family  Anticipated Disposition: Medically stable. DC pending Insurance Auth. Peer to Peer has been attempted by Srinivas Prado who will attempt to complete peer to peer today.    Anticipated Discharge: 24 hours to 48 hours     Hospital Problems  Date Reviewed: 11/1/2021          Codes Class Noted POA    Acute cystitis ICD-10-CM: N30.00  ICD-9-CM: 595.0  12/19/2021 Unknown        Encephalopathy ICD-10-CM: G93.40  ICD-9-CM: 348.30  12/19/2021 Unknown                Review of Systems:   Pertinent items are noted in HPI. Vital Signs:    Last 24hrs VS reviewed since prior progress note. Most recent are:  Visit Vitals  /64   Pulse 72   Temp 98 °F (36.7 °C)   Resp 18   Ht 5' 4\" (1.626 m)   Wt 113.6 kg (250 lb 7.1 oz)   SpO2 97%   BMI 42.99 kg/m²         Intake/Output Summary (Last 24 hours) at 12/31/2021 0912  Last data filed at 12/30/2021 2300  Gross per 24 hour   Intake 200 ml   Output 1425 ml   Net -1225 ml        Physical Examination:     I had a face to face encounter with this patient and independently examined them on 12/31/2021 as outlined below:          Constitutional:  No acute distress, cooperative, pleasant    ENT:  Oral mucosa moist, oropharynx benign. Resp:  CTA bilaterally. No wheezing/rhonchi/rales. No accessory muscle use   CV:  Regular rhythm, normal rate    GI:  Soft, non distended, non tender. normoactive bowel sounds    Musculoskeletal:  No edema, warm, 2+ pulses throughout    Neurologic:  Moves all extremities. AAOx3, CN II-XII reviewed            Data Review:    Review and/or order of clinical lab test  Review and/or order of tests in the radiology section of CPT  Review and/or order of tests in the medicine section of CPT      Labs:     Recent Labs     12/31/21  0704   WBC 5.0   HGB 12.0   HCT 37.9        Recent Labs     12/31/21  0704      K 3.7   *   CO2 25   BUN 27*   CREA 1.04*   GLU 90   CA 8.8     No results for input(s): ALT, AP, TBIL, TBILI, TP, ALB, GLOB, GGT, AML, LPSE in the last 72 hours. No lab exists for component: SGOT, GPT, AMYP, HLPSE  No results for input(s): INR, PTP, APTT, INREXT, INREXT in the last 72 hours. No results for input(s): FE, TIBC, PSAT, FERR in the last 72 hours.    No results found for: FOL, RBCF   No results for input(s): PH, PCO2, PO2 in the last 72 hours. No results for input(s): CPK, CKNDX, TROIQ in the last 72 hours.     No lab exists for component: CPKMB  Lab Results   Component Value Date/Time    Cholesterol, total 156 11/20/2020 03:38 PM    HDL Cholesterol 72 11/20/2020 03:38 PM    LDL, calculated 69.4 11/20/2020 03:38 PM    Triglyceride 73 11/20/2020 03:38 PM    CHOL/HDL Ratio 2.2 11/20/2020 03:38 PM     Lab Results   Component Value Date/Time    Glucose (POC) 101 (H) 09/12/2017 04:49 PM    Glucose (POC) 112 (H) 09/12/2017 11:27 AM    Glucose (POC) 81 09/12/2017 06:46 AM    Glucose (POC) 105 (H) 09/11/2017 10:02 PM    Glucose (POC) 99 09/11/2017 04:56 PM     Lab Results   Component Value Date/Time    Color YELLOW/STRAW 12/19/2021 03:00 PM    Appearance CLOUDY (A) 12/19/2021 03:00 PM    Specific gravity 1.017 12/19/2021 03:00 PM    pH (UA) 6.0 12/19/2021 03:00 PM    Protein TRACE (A) 12/19/2021 03:00 PM    Glucose Negative 12/19/2021 03:00 PM    Ketone Negative 12/19/2021 03:00 PM    Bilirubin Negative 12/19/2021 03:00 PM    Urobilinogen 1.0 12/19/2021 03:00 PM    Nitrites Positive (A) 12/19/2021 03:00 PM    Leukocyte Esterase MODERATE (A) 12/19/2021 03:00 PM    Epithelial cells FEW 12/19/2021 03:00 PM    Bacteria 4+ (A) 12/19/2021 03:00 PM    WBC 20-50 12/19/2021 03:00 PM    RBC  12/19/2021 03:00 PM         Medications Reviewed:     Current Facility-Administered Medications   Medication Dose Route Frequency    trospium (SANCTURA) tablet 20 mg  20 mg Oral ACB&D    topiramate (TOPAMAX) tablet 100 mg  100 mg Oral BID WITH MEALS    baclofen (LIORESAL) tablet 10 mg  10 mg Oral TID    sodium chloride (NS) flush 5-40 mL  5-40 mL IntraVENous Q8H    sodium chloride (NS) flush 5-40 mL  5-40 mL IntraVENous PRN    acetaminophen (TYLENOL) tablet 650 mg  650 mg Oral Q6H PRN    Or    acetaminophen (TYLENOL) suppository 650 mg  650 mg Rectal Q6H PRN    ondansetron (ZOFRAN ODT) tablet 4 mg  4 mg Oral Q8H PRN    Or    ondansetron (ZOFRAN) injection 4 mg  4 mg IntraVENous Q6H PRN    enoxaparin (LOVENOX) injection 40 mg  40 mg SubCUTAneous DAILY    albuterol-ipratropium (DUO-NEB) 2.5 MG-0.5 MG/3 ML  3 mL Nebulization Q6H PRN    atorvastatin (LIPITOR) tablet 40 mg  40 mg Oral QHS    carvediloL (COREG) tablet 12.5 mg  12.5 mg Oral BID WITH MEALS    DULoxetine (CYMBALTA) capsule 120 mg  120 mg Oral DAILY    furosemide (LASIX) tablet 40 mg  40 mg Oral DAILY    levothyroxine (SYNTHROID) tablet 125 mcg  125 mcg Oral ACB    linaCLOtide (LINZESS) capsule 145 mcg (Patient Supplied)  145 mcg Oral ACB    losartan (COZAAR) tablet 50 mg  50 mg Oral QHS    pantoprazole (PROTONIX) tablet 40 mg  40 mg Oral DAILY    ferrous sulfate tablet 325 mg  325 mg Oral ACB    montelukast (SINGULAIR) tablet 10 mg  10 mg Oral QHS    gabapentin (NEURONTIN) capsule 100 mg  100 mg Oral TID PRN     ______________________________________________________________________  EXPECTED LENGTH OF STAY: 3d 7h  ACTUAL LENGTH OF STAY:          [address] [facility]

## 2021-12-31 NOTE — PROGRESS NOTES
Transition of Care: SNF; the [address] [place] [address] has accepted; insurance initially denied on 12/29; peer to peer  is still pending-started on 12/29-continued on 12/30; today insurance company is closed on 12/31 and will reopen on 1/3/22     Peer to peer- [phone]; this is in process     UPDATE: on 12/29- the attending and the insurance doctor traded phone calls but never connected for the peer to peer; attending followed up  12/30 to complete the peer to peer; insurance company requested more information; information faxed to them; on 12/31, the insurance company is closed with no answer        Transport Plan: [facility] is on willcall for 1/3/22     RUR: 9%     Main contact is sister- Marcel Manuel- [phone]     Discharge pending:  -pending insurance auth and peer to peer     [id number]: this CM called Elisabeth Sellers at the [address] [place] [address] to f/u on insurance; per leatha the insurance company is closed for the holiday and will not reopen until Monday 1/3/222; this CM updated attending by perfectserve text; she verbalized understanding; this CM updated patient at bedside; she verbalized understanding        NOTE:  patient lives with sister in two story home. Rolanda Guaman attends [email or web address]  [email or web address], Sat [phone]. Carie Ricks has been to SNF in the past [facility] [employer]. Rolanda Guaman is agreeable to SNF if covered under her policy.       Home Situation  Home Environment: Private residence  # Steps to Enter: 3  One/Two Story Residence:  Two story home  Living Alone: No  Current DME Used/Available at Home: Cane, quadcane, wheelchair, and rolling walker  Tub or Shower Type: Tub/Shower combination     CM following  Tatyana Kwon RN, CRM            Revision History                     Revision History

## 2022-01-01 PROCEDURE — 65270000032 HC RM SEMIPRIVATE

## 2022-01-01 PROCEDURE — 74011250636 HC RX REV CODE- 250/636: Performed by: FAMILY MEDICINE

## 2022-01-01 PROCEDURE — 97535 SELF CARE MNGMENT TRAINING: CPT

## 2022-01-01 PROCEDURE — 74011250637 HC RX REV CODE- 250/637: Performed by: FAMILY MEDICINE

## 2022-01-01 RX ADMIN — TOPIRAMATE 100 MG: 100 TABLET, FILM COATED ORAL at 11:17

## 2022-01-01 RX ADMIN — MONTELUKAST 10 MG: 10 TABLET, FILM COATED ORAL at 21:29

## 2022-01-01 RX ADMIN — BACLOFEN 10 MG: 10 TABLET ORAL at 17:01

## 2022-01-01 RX ADMIN — GABAPENTIN 100 MG: 100 CAPSULE ORAL at 11:17

## 2022-01-01 RX ADMIN — FERROUS SULFATE TAB 325 MG (65 MG ELEMENTAL FE) 325 MG: 325 (65 FE) TAB at 06:44

## 2022-01-01 RX ADMIN — DULOXETINE HYDROCHLORIDE 120 MG: 60 CAPSULE, DELAYED RELEASE ORAL at 11:14

## 2022-01-01 RX ADMIN — BACLOFEN 10 MG: 10 TABLET ORAL at 11:14

## 2022-01-01 RX ADMIN — BACLOFEN 10 MG: 10 TABLET ORAL at 21:29

## 2022-01-01 RX ADMIN — LOSARTAN POTASSIUM 50 MG: 50 TABLET, FILM COATED ORAL at 21:29

## 2022-01-01 RX ADMIN — TROSPIUM CHLORIDE 20 MG: 20 TABLET, FILM COATED ORAL at 17:01

## 2022-01-01 RX ADMIN — PANTOPRAZOLE SODIUM 40 MG: 40 TABLET, DELAYED RELEASE ORAL at 11:14

## 2022-01-01 RX ADMIN — TOPIRAMATE 100 MG: 100 TABLET, FILM COATED ORAL at 17:01

## 2022-01-01 RX ADMIN — CARVEDILOL 12.5 MG: 12.5 TABLET, FILM COATED ORAL at 11:09

## 2022-01-01 RX ADMIN — ATORVASTATIN CALCIUM 40 MG: 40 TABLET, FILM COATED ORAL at 21:28

## 2022-01-01 RX ADMIN — TOPIRAMATE 100 MG: 100 TABLET, FILM COATED ORAL at 06:44

## 2022-01-01 RX ADMIN — LEVOTHYROXINE SODIUM 125 MCG: 0.12 TABLET ORAL at 06:44

## 2022-01-01 RX ADMIN — CARVEDILOL 12.5 MG: 12.5 TABLET, FILM COATED ORAL at 17:01

## 2022-01-01 RX ADMIN — TROSPIUM CHLORIDE 20 MG: 20 TABLET, FILM COATED ORAL at 06:43

## 2022-01-01 RX ADMIN — FUROSEMIDE 40 MG: 40 TABLET ORAL at 11:14

## 2022-01-01 RX ADMIN — ENOXAPARIN SODIUM 40 MG: 100 INJECTION SUBCUTANEOUS at 11:14

## 2022-01-01 RX ADMIN — SODIUM CHLORIDE, PRESERVATIVE FREE 10 ML: 5 INJECTION INTRAVENOUS at 14:00

## 2022-01-01 RX ADMIN — CARVEDILOL 12.5 MG: 12.5 TABLET, FILM COATED ORAL at 06:44

## 2022-01-01 NOTE — PROGRESS NOTES
[facility]  [facility]                                                                                          Hospitalist Progress Note  [address]  Answering service: [phone] OR [phone] from in house phone        Date of Service:  2022  NAME:  Margoth Casillas  :  1960  MRN:  [id number]      Admission Summary:   Patient presents with urinary tract infection, has history of CVA with neurogenic bladder requiring chronic indwelling Dunn catheter. Patient has completed antibiotics and doing well. Has generalized weakness and awaiting placement to rehab    Interval history / Subjective:    No acute distress noted. Patient complained about bladder spasms \" acting up, off and on\". Trospium in place. DC pending insurance auth. Assessment & Plan:     # Catheter associated urinary tract infection      - UC + for E. Coli, Course of Ceftriaxone completed. # Neurogenic Bladder      - Hx of CVA requiring chronic indwelling FC      - FC exchanged on       - Continue Trospium 20 mg BID for bladder spasm  # Migraine      - Continue Topamax      - Continue  PRN Tylenol  # HTN  # Dyslipidemia  # Chronic Systolic Congestive Heart Failure     - Followed by Dr. Ethan Ibanez from 2019 showed EF of 44%     - Continue Losartan, Coreg     - Continue Lasix     - Continue Lipitor  # Hypothyroidism     - Continue Synthroid  # Obesity      - BMI of 42.95     - Outpatient follow-up for weight management  # Generalized Weakness     - Patient with multiple falls due to generalized weakness     - PT/OT recommendations for SNF     - Accepted to [address] [facility] Auto authorization    Code status: Full  DVT prophylaxis: [address] [facility] [address] discussed with: Patient/Family  Anticipated Disposition: Medically stable. DC pending Insurance Auth. Peer to Peer has been attempted by Ellyn Ojeda who will attempt to complete peer to peer today.    Anticipated Discharge: 24 hours to 48 hours     Hospital Problems  Date Reviewed: 11/1/2021          Codes Class Noted POA    Acute cystitis ICD-10-CM: N30.00  ICD-9-CM: 595.0  12/19/2021 Unknown        Encephalopathy ICD-10-CM: G93.40  ICD-9-CM: 348.30  12/19/2021 Unknown                Review of Systems:   Pertinent items are noted in HPI. Vital Signs:    Last 24hrs VS reviewed since prior progress note. Most recent are:  Visit Vitals  /82   Pulse 70   Temp 98 °F (36.7 °C)   Resp 18   Ht 5' 4\" (1.626 m)   Wt 113.6 kg (250 lb 7.1 oz)   SpO2 97%   BMI 42.99 kg/m²         Intake/Output Summary (Last 24 hours) at 1/1/2022 0853  Last data filed at 1/1/2022 0457  Gross per 24 hour   Intake 350 ml   Output 2100 ml   Net -1750 ml        Physical Examination:     I had a face to face encounter with this patient and independently examined them on 1/1/2022 as outlined below:          Constitutional:  No acute distress, cooperative, pleasant    ENT:  Oral mucosa moist, oropharynx benign. Resp:  CTA bilaterally. No wheezing/rhonchi/rales. No accessory muscle use   CV:  Regular rhythm, normal rate    GI:  Soft, non distended, non tender. normoactive bowel sounds    Musculoskeletal:  No edema, warm, 2+ pulses throughout    Neurologic:  Moves all extremities. AAOx3, CN II-XII reviewed            Data Review:    Review and/or order of clinical lab test  Review and/or order of tests in the radiology section of CPT  Review and/or order of tests in the medicine section of CPT      Labs:     Recent Labs     12/31/21  0704   WBC 5.0   HGB 12.0   HCT 37.9        Recent Labs     12/31/21  0704      K 3.7   *   CO2 25   BUN 27*   CREA 1.04*   GLU 90   CA 8.8     No results for input(s): ALT, AP, TBIL, TBILI, TP, ALB, GLOB, GGT, AML, LPSE in the last 72 hours. No lab exists for component: SGOT, GPT, AMYP, HLPSE  No results for input(s): INR, PTP, APTT, INREXT, INREXT in the last 72 hours.    No results for input(s): FE, TIBC, PSAT, FERR in the last 72 hours. No results found for: FOL, RBCF   No results for input(s): PH, PCO2, PO2 in the last 72 hours. No results for input(s): CPK, CKNDX, TROIQ in the last 72 hours.     No lab exists for component: CPKMB  Lab Results   Component Value Date/Time    Cholesterol, total 156 11/20/2020 03:38 PM    HDL Cholesterol 72 11/20/2020 03:38 PM    LDL, calculated 69.4 11/20/2020 03:38 PM    Triglyceride 73 11/20/2020 03:38 PM    CHOL/HDL Ratio 2.2 11/20/2020 03:38 PM     Lab Results   Component Value Date/Time    Glucose (POC) 101 (H) 09/12/2017 04:49 PM    Glucose (POC) 112 (H) 09/12/2017 11:27 AM    Glucose (POC) 81 09/12/2017 06:46 AM    Glucose (POC) 105 (H) 09/11/2017 10:02 PM    Glucose (POC) 99 09/11/2017 04:56 PM     Lab Results   Component Value Date/Time    Color YELLOW/STRAW 12/19/2021 03:00 PM    Appearance CLOUDY (A) 12/19/2021 03:00 PM    Specific gravity 1.017 12/19/2021 03:00 PM    pH (UA) 6.0 12/19/2021 03:00 PM    Protein TRACE (A) 12/19/2021 03:00 PM    Glucose Negative 12/19/2021 03:00 PM    Ketone Negative 12/19/2021 03:00 PM    Bilirubin Negative 12/19/2021 03:00 PM    Urobilinogen 1.0 12/19/2021 03:00 PM    Nitrites Positive (A) 12/19/2021 03:00 PM    Leukocyte Esterase MODERATE (A) 12/19/2021 03:00 PM    Epithelial cells FEW 12/19/2021 03:00 PM    Bacteria 4+ (A) 12/19/2021 03:00 PM    WBC 20-50 12/19/2021 03:00 PM    RBC  12/19/2021 03:00 PM         Medications Reviewed:     Current Facility-Administered Medications   Medication Dose Route Frequency    trospium (SANCTURA) tablet 20 mg  20 mg Oral ACB&D    topiramate (TOPAMAX) tablet 100 mg  100 mg Oral BID WITH MEALS    baclofen (LIORESAL) tablet 10 mg  10 mg Oral TID    sodium chloride (NS) flush 5-40 mL  5-40 mL IntraVENous Q8H    sodium chloride (NS) flush 5-40 mL  5-40 mL IntraVENous PRN    acetaminophen (TYLENOL) tablet 650 mg  650 mg Oral Q6H PRN    Or    acetaminophen (TYLENOL) suppository 650 mg  650 mg Rectal Q6H PRN    ondansetron (ZOFRAN ODT) tablet 4 mg  4 mg Oral Q8H PRN    Or    ondansetron (ZOFRAN) injection 4 mg  4 mg IntraVENous Q6H PRN    enoxaparin (LOVENOX) injection 40 mg  40 mg SubCUTAneous DAILY    albuterol-ipratropium (DUO-NEB) 2.5 MG-0.5 MG/3 ML  3 mL Nebulization Q6H PRN    atorvastatin (LIPITOR) tablet 40 mg  40 mg Oral QHS    carvediloL (COREG) tablet 12.5 mg  12.5 mg Oral BID WITH MEALS    DULoxetine (CYMBALTA) capsule 120 mg  120 mg Oral DAILY    furosemide (LASIX) tablet 40 mg  40 mg Oral DAILY    levothyroxine (SYNTHROID) tablet 125 mcg  125 mcg Oral ACB    linaCLOtide (LINZESS) capsule 145 mcg (Patient Supplied)  145 mcg Oral ACB    losartan (COZAAR) tablet 50 mg  50 mg Oral QHS    pantoprazole (PROTONIX) tablet 40 mg  40 mg Oral DAILY    ferrous sulfate tablet 325 mg  325 mg Oral ACB    montelukast (SINGULAIR) tablet 10 mg  10 mg Oral QHS    gabapentin (NEURONTIN) capsule 100 mg  100 mg Oral TID PRN     ______________________________________________________________________  EXPECTED LENGTH OF STAY: 3d 7h  ACTUAL LENGTH OF STAY:          [address] [facility] [address]

## 2022-01-01 NOTE — PROGRESS NOTES
Problem: Self Care Deficits Care Plan (Adult)  Goal: *Therapy Goal (Edit Goal, Insert Text)  Description: FUNCTIONAL STATUS PRIOR TO ADMISSION: Patient was modified independent (except distant supervision when showering) and using a SB quad cane for functional mobility. HOME SUPPORT: The patient lived with sister who assists as needed. 12/27/2021 Goals reviewed and remain appropriate, continue to progress    Occupational Therapy  Initiated 12/20/2021  1. Patient will perform grooming in stand with modified independence within 7 day(s). 2.  Patient will perform sponge bathing with modified independence within 7 day(s). 3.  Patient will perform lower body dressing with modified independence within 7 day(s). 4.  Patient will perform toilet transfers with modified independence within 7 day(s). 5.  Patient will perform all aspects of toileting with modified independence within 7 day(s). Outcome: Progressing Towards Goal   OCCUPATIONAL THERAPY TREATMENT  Patient: Margoth Casillas (12 y.o. female)  Date: 1/1/2022  Diagnosis: Acute cystitis [N30.00]  Encephalopathy [G93.40] <principal problem not specified>       Precautions: Fall,Bed Alarm (L AFO; chronic mojica; [facility] CVA)  Chart, occupational therapy assessment, plan of care, and goals were reviewed. ASSESSMENT  Patient continues with skilled OT services and  progressing towards goals. Good participation despite bladder spasms and L shoulder 8/10 pain which she was pending cortisone injection this week at pain clinic. (could ortho do it here?) as it is a primary limiting factor for effective safe return to home as it limits all self care tasks and the pain wakes her in the night. Puts her at risk for further complications tamika related to fall with interruption of sleep wake cycle; hard to progress well in rehab with 8/10 L affected shoulder pain.  The tx would have potential to facilitate nice self care gains as well as decrease potential for further L UE functional decline. Current Level of Function Impacting Discharge (ADLs): mod I -S UE ADLs; S-min A LE ADLs, S-Min A functional mobility    Other factors to consider for discharge: lives with supportive sister         PLAN :  Patient continues to benefit from skilled intervention to address the above impairments. Continue treatment per established plan of care to address goals. Recommend with staff: up to chair for all meals recommended to build functional endurance    Recommend next OT session:     Recommendation for discharge: (in order for the patient to meet his/her long term goals)  Therapy up to 5 days/week in SNF setting    This discharge recommendation:  Has been made in collaboration with the attending provider and/or case management    IF patient discharges home will need the following DME: TBA       SUBJECTIVE:   Patient stated I will sit up for my meals.     OBJECTIVE DATA SUMMARY:   Cognitive/Behavioral Status:  Neurologic State: Alert; Appropriate for age  Orientation Level: Oriented X4  Cognition: Follows commands; Appropriate safety awareness; Appropriate for age attention/concentration; Appropriate decision making  Perception: Appears intact  Perseveration: No perseveration noted  Safety/Judgement: Awareness of environment; Fall prevention; Insight into deficits    Functional Mobility and Transfers for ADLs:  Bed Mobility:  Rolling: Stand-by assistance  Supine to Sit: Supervision; Additional time; Adaptive equipment  Sit to Supine: Minimum assistance; Additional time (unable to lift L LE into bed)  Scooting: Supervision    Transfers:  Sit to Stand: Contact guard assistance  Functional Transfers  Toilet Transfer : Stand-by assistance;Contact guard assistance (limited by L hemiparesis, weakness w/ hosptal since 12-19-21)  Bed to Chair: Supervision;Contact guard assistance; Additional time; Adaptive equipment (large base quad cane)    Balance:  Sitting: Intact  Sitting - Static: Good (unsupported)  Sitting - Dynamic: Good (unsupported)  Standing: Impaired; With support  Standing - Static: Constant support;Good;Fair (L knee buckle)  Standing - Dynamic : Good;Fair;Constant support    ADL Intervention:  Feeding  Feeding Assistance: Modified independent (increased time for 1 hand container management)    Grooming  Grooming Assistance: Set-up (increased time)    Upper Body Bathing  Bathing Assistance: Set-up; Minimum assistance (A R UE due to L shoulder pain 8/10 (PMH hemipresis))  Position Performed: Seated edge of bed    Lower Body Bathing  Bathing Assistance: Contact guard assistance (L knee with buckle sensation in standing)    Upper Body Dressing Assistance  Dressing Assistance: Set-up (increased time, clothing adapted)    Lower Body Dressing Assistance  Dressing Assistance: Minimum assistance  Socks: Supervision (increased time, R hand only)  Shoes with Elastic Laces:  (AFO in L shoe)  Leg Crossed Method Used: No  Position Performed: Long sitting on bed;Seated edge of bed;Standing    Toileting  Toileting Assistance: Supervision;Contact guard assistance  Bladder Hygiene:  (6/10P! today with bladder spasms w/chronic mojica; RN/M aware)    Cognitive Retraining  Attention to Task: Single task  Maintains Attention For (Time): Greater than 10 minutes  Following Commands: Follows one step commands/directions; Follows two step commands/directions  Safety/Judgement: Awareness of environment; Fall prevention; Insight into deficits        Pain:  Reports 8/10L shoulder pain \"I was scheduled for cortisone shot Monday 1-3-22 for the pain at the pain clinic. \"    6/10 \"bladder spasm pain\" \"they started giving me the medicine- I hope it will get better. \"    Activity Tolerance:   Fair, SpO2 stable on RA, requires frequent rest breaks, and most limited by \"aches and pains and spasms    After treatment patient left in no apparent distress:   Patient positioned in Left sidelying for pressure relief, Call bell within reach, Bed / chair alarm activated, and Side rails x 3    COMMUNICATION/COLLABORATION:   The patients plan of care was discussed with: Registered nurse.      Jeniffer Damon OTR/L  Time Calculation: 30 mins

## 2022-01-02 PROCEDURE — 74011250636 HC RX REV CODE- 250/636: Performed by: FAMILY MEDICINE

## 2022-01-02 PROCEDURE — 74011250637 HC RX REV CODE- 250/637: Performed by: FAMILY MEDICINE

## 2022-01-02 PROCEDURE — 74011000250 HC RX REV CODE- 250: Performed by: FAMILY MEDICINE

## 2022-01-02 PROCEDURE — 65270000032 HC RM SEMIPRIVATE

## 2022-01-02 RX ADMIN — SODIUM CHLORIDE, PRESERVATIVE FREE 10 ML: 5 INJECTION INTRAVENOUS at 22:00

## 2022-01-02 RX ADMIN — CARVEDILOL 12.5 MG: 12.5 TABLET, FILM COATED ORAL at 06:42

## 2022-01-02 RX ADMIN — MONTELUKAST 10 MG: 10 TABLET, FILM COATED ORAL at 22:03

## 2022-01-02 RX ADMIN — FUROSEMIDE 40 MG: 40 TABLET ORAL at 09:42

## 2022-01-02 RX ADMIN — BACLOFEN 10 MG: 10 TABLET ORAL at 22:03

## 2022-01-02 RX ADMIN — DULOXETINE HYDROCHLORIDE 120 MG: 60 CAPSULE, DELAYED RELEASE ORAL at 09:42

## 2022-01-02 RX ADMIN — CARVEDILOL 12.5 MG: 12.5 TABLET, FILM COATED ORAL at 16:21

## 2022-01-02 RX ADMIN — TROSPIUM CHLORIDE 20 MG: 20 TABLET, FILM COATED ORAL at 16:21

## 2022-01-02 RX ADMIN — PANTOPRAZOLE SODIUM 40 MG: 40 TABLET, DELAYED RELEASE ORAL at 09:42

## 2022-01-02 RX ADMIN — FERROUS SULFATE TAB 325 MG (65 MG ELEMENTAL FE) 325 MG: 325 (65 FE) TAB at 06:42

## 2022-01-02 RX ADMIN — LEVOTHYROXINE SODIUM 125 MCG: 0.12 TABLET ORAL at 06:42

## 2022-01-02 RX ADMIN — TOPIRAMATE 100 MG: 100 TABLET, FILM COATED ORAL at 06:42

## 2022-01-02 RX ADMIN — TROSPIUM CHLORIDE 20 MG: 20 TABLET, FILM COATED ORAL at 06:42

## 2022-01-02 RX ADMIN — ENOXAPARIN SODIUM 40 MG: 100 INJECTION SUBCUTANEOUS at 09:42

## 2022-01-02 RX ADMIN — BACLOFEN 10 MG: 10 TABLET ORAL at 16:21

## 2022-01-02 RX ADMIN — TOPIRAMATE 100 MG: 100 TABLET, FILM COATED ORAL at 16:22

## 2022-01-02 RX ADMIN — ATORVASTATIN CALCIUM 40 MG: 40 TABLET, FILM COATED ORAL at 22:03

## 2022-01-02 RX ADMIN — LOSARTAN POTASSIUM 50 MG: 50 TABLET, FILM COATED ORAL at 22:03

## 2022-01-02 RX ADMIN — GABAPENTIN 100 MG: 100 CAPSULE ORAL at 22:03

## 2022-01-02 RX ADMIN — BACLOFEN 10 MG: 10 TABLET ORAL at 09:42

## 2022-01-02 NOTE — PROGRESS NOTES
[facility]  Hospitalist Group                                                                                          Hospitalist Progress Note  Bhavin Frakes, [place]  Answering service: [phone] OR [phone] from in house phone        Date of Service:  2022  NAME:  Bang La  :  1960  MRN:  [id number]      Admission Summary:   Patient presents with urinary tract infection, has history of CVA with neurogenic bladder requiring chronic indwelling Dunn catheter. Patient has completed antibiotics and doing well. Has generalized weakness and awaiting placement to rehab    Interval history / Subjective:    No acute distress noted. Patient resting, respirations even and unlabored. DC pending insurance auth. Assessment & Plan:     # Catheter associated urinary tract infection      - UC + for E. Coli, Course of Ceftriaxone completed. # Neurogenic Bladder      - Hx of CVA requiring chronic indwelling FC      - FC exchanged on       - Continue Trospium 20 mg BID for bladder spasm  # Migraine      - Continue Topamax      - Continue  PRN Tylenol  # HTN  # Dyslipidemia  # Chronic Systolic Congestive Heart Failure     - Followed by Dr. Lalitha Medrano from 2019 showed EF of 44%     - Continue Losartan, Coreg     - Continue Lasix     - Continue Lipitor  # Hypothyroidism     - Continue Synthroid  # Obesity      - BMI of 42.95     - Outpatient follow-up for weight management  # Generalized Weakness     - Patient with multiple falls due to generalized weakness     - PT/OT recommendations for SNF     - Accepted to [address] [place] [address] Auto authorization    Code status: Full  DVT prophylaxis: [address] discussed with: Patient/Family  Anticipated Disposition: Medically stable. DC pending Insurance Auth. Peer to Peer has been attempted by Destin Lehman who will attempt to complete peer to peer today.    Anticipated Discharge: 24 hours to 48 hours Hospital Problems  Date Reviewed: 11/1/2021          Codes Class Noted POA    Acute cystitis ICD-10-CM: N30.00  ICD-9-CM: 595.0  12/19/2021 Unknown        Encephalopathy ICD-10-CM: G93.40  ICD-9-CM: 348.30  12/19/2021 Unknown                Review of Systems:   Pertinent items are noted in HPI. Vital Signs:    Last 24hrs VS reviewed since prior progress note. Most recent are:  Visit Vitals  /72   Pulse 66   Temp 98.4 °F (36.9 °C)   Resp 16   Ht 5' 4\" (1.626 m)   Wt 114.1 kg (251 lb 8.7 oz)   SpO2 97%   BMI 43.18 kg/m²         Intake/Output Summary (Last 24 hours) at 1/2/2022 0736  Last data filed at 1/2/2022 0555  Gross per 24 hour   Intake 1150 ml   Output 1450 ml   Net -300 ml        Physical Examination:     I had a face to face encounter with this patient and independently examined them on 1/2/2022 as outlined below:          Constitutional:  No acute distress, cooperative, pleasant    ENT:  Oral mucosa moist, oropharynx benign. Resp:  CTA bilaterally. No wheezing/rhonchi/rales. No accessory muscle use   CV:  Regular rhythm, normal rate    GI:  Soft, non distended, non tender. normoactive bowel sounds    Musculoskeletal:  No edema, warm, 2+ pulses throughout    Neurologic:  Moves all extremities. AAOx3, CN II-XII reviewed            Data Review:    Review and/or order of clinical lab test  Review and/or order of tests in the radiology section of CPT  Review and/or order of tests in the medicine section of CPT      Labs:     Recent Labs     12/31/21  0704   WBC 5.0   HGB 12.0   HCT 37.9        Recent Labs     12/31/21  0704      K 3.7   *   CO2 25   BUN 27*   CREA 1.04*   GLU 90   CA 8.8     No results for input(s): ALT, AP, TBIL, TBILI, TP, ALB, GLOB, GGT, AML, LPSE in the last 72 hours. No lab exists for component: SGOT, GPT, AMYP, HLPSE  No results for input(s): INR, PTP, APTT, INREXT, INREXT in the last 72 hours.    No results for input(s): FE, TIBC, PSAT, FERR in the last 72 hours. No results found for: FOL, RBCF   No results for input(s): PH, PCO2, PO2 in the last 72 hours. No results for input(s): CPK, CKNDX, TROIQ in the last 72 hours.     No lab exists for component: CPKMB  Lab Results   Component Value Date/Time    Cholesterol, total 156 11/20/2020 03:38 PM    HDL Cholesterol 72 11/20/2020 03:38 PM    LDL, calculated 69.4 11/20/2020 03:38 PM    Triglyceride 73 11/20/2020 03:38 PM    CHOL/HDL Ratio 2.2 11/20/2020 03:38 PM     Lab Results   Component Value Date/Time    Glucose (POC) 101 (H) 09/12/2017 04:49 PM    Glucose (POC) 112 (H) 09/12/2017 11:27 AM    Glucose (POC) 81 09/12/2017 06:46 AM    Glucose (POC) 105 (H) 09/11/2017 10:02 PM    Glucose (POC) 99 09/11/2017 04:56 PM     Lab Results   Component Value Date/Time    Color YELLOW/STRAW 12/19/2021 03:00 PM    Appearance CLOUDY (A) 12/19/2021 03:00 PM    Specific gravity 1.017 12/19/2021 03:00 PM    pH (UA) 6.0 12/19/2021 03:00 PM    Protein TRACE (A) 12/19/2021 03:00 PM    Glucose Negative 12/19/2021 03:00 PM    Ketone Negative 12/19/2021 03:00 PM    Bilirubin Negative 12/19/2021 03:00 PM    Urobilinogen 1.0 12/19/2021 03:00 PM    Nitrites Positive (A) 12/19/2021 03:00 PM    Leukocyte Esterase MODERATE (A) 12/19/2021 03:00 PM    Epithelial cells FEW 12/19/2021 03:00 PM    Bacteria 4+ (A) 12/19/2021 03:00 PM    WBC 20-50 12/19/2021 03:00 PM    RBC  12/19/2021 03:00 PM         Medications Reviewed:     Current Facility-Administered Medications   Medication Dose Route Frequency    trospium (SANCTURA) tablet 20 mg  20 mg Oral ACB&D    topiramate (TOPAMAX) tablet 100 mg  100 mg Oral BID WITH MEALS    baclofen (LIORESAL) tablet 10 mg  10 mg Oral TID    sodium chloride (NS) flush 5-40 mL  5-40 mL IntraVENous Q8H    sodium chloride (NS) flush 5-40 mL  5-40 mL IntraVENous PRN    acetaminophen (TYLENOL) tablet 650 mg  650 mg Oral Q6H PRN    Or    acetaminophen (TYLENOL) suppository 650 mg  650 mg Rectal Q6H PRN    ondansetron (ZOFRAN ODT) tablet 4 mg  4 mg Oral Q8H PRN    Or    ondansetron (ZOFRAN) injection 4 mg  4 mg IntraVENous Q6H PRN    enoxaparin (LOVENOX) injection 40 mg  40 mg SubCUTAneous DAILY    albuterol-ipratropium (DUO-NEB) 2.5 MG-0.5 MG/3 ML  3 mL Nebulization Q6H PRN    atorvastatin (LIPITOR) tablet 40 mg  40 mg Oral QHS    carvediloL (COREG) tablet 12.5 mg  12.5 mg Oral BID WITH MEALS    DULoxetine (CYMBALTA) capsule 120 mg  120 mg Oral DAILY    furosemide (LASIX) tablet 40 mg  40 mg Oral DAILY    levothyroxine (SYNTHROID) tablet 125 mcg  125 mcg Oral ACB    linaCLOtide (LINZESS) capsule 145 mcg (Patient Supplied)  145 mcg Oral ACB    losartan (COZAAR) tablet 50 mg  50 mg Oral QHS    pantoprazole (PROTONIX) tablet 40 mg  40 mg Oral DAILY    ferrous sulfate tablet 325 mg  325 mg Oral ACB    montelukast (SINGULAIR) tablet 10 mg  10 mg Oral QHS    gabapentin (NEURONTIN) capsule 100 mg  100 mg Oral TID PRN     ______________________________________________________________________  EXPECTED LENGTH OF STAY: 3d 7h  ACTUAL LENGTH OF STAY:          [address] [facility]

## 2022-01-03 PROCEDURE — 94760 N-INVAS EAR/PLS OXIMETRY 1: CPT

## 2022-01-03 PROCEDURE — 74011250637 HC RX REV CODE- 250/637: Performed by: FAMILY MEDICINE

## 2022-01-03 PROCEDURE — 97530 THERAPEUTIC ACTIVITIES: CPT

## 2022-01-03 PROCEDURE — 97116 GAIT TRAINING THERAPY: CPT

## 2022-01-03 PROCEDURE — 74011250636 HC RX REV CODE- 250/636: Performed by: FAMILY MEDICINE

## 2022-01-03 PROCEDURE — 74011000250 HC RX REV CODE- 250: Performed by: FAMILY MEDICINE

## 2022-01-03 PROCEDURE — 65270000032 HC RM SEMIPRIVATE

## 2022-01-03 RX ADMIN — TROSPIUM CHLORIDE 20 MG: 20 TABLET, FILM COATED ORAL at 07:14

## 2022-01-03 RX ADMIN — BACLOFEN 10 MG: 10 TABLET ORAL at 09:08

## 2022-01-03 RX ADMIN — TOPIRAMATE 100 MG: 100 TABLET, FILM COATED ORAL at 07:14

## 2022-01-03 RX ADMIN — FERROUS SULFATE TAB 325 MG (65 MG ELEMENTAL FE) 325 MG: 325 (65 FE) TAB at 07:14

## 2022-01-03 RX ADMIN — SODIUM CHLORIDE, PRESERVATIVE FREE 10 ML: 5 INJECTION INTRAVENOUS at 21:49

## 2022-01-03 RX ADMIN — GABAPENTIN 100 MG: 100 CAPSULE ORAL at 07:14

## 2022-01-03 RX ADMIN — TOPIRAMATE 100 MG: 100 TABLET, FILM COATED ORAL at 16:09

## 2022-01-03 RX ADMIN — SODIUM CHLORIDE, PRESERVATIVE FREE 10 ML: 5 INJECTION INTRAVENOUS at 14:00

## 2022-01-03 RX ADMIN — CARVEDILOL 12.5 MG: 12.5 TABLET, FILM COATED ORAL at 16:09

## 2022-01-03 RX ADMIN — GABAPENTIN 100 MG: 100 CAPSULE ORAL at 18:27

## 2022-01-03 RX ADMIN — ENOXAPARIN SODIUM 40 MG: 100 INJECTION SUBCUTANEOUS at 09:08

## 2022-01-03 RX ADMIN — DULOXETINE HYDROCHLORIDE 120 MG: 60 CAPSULE, DELAYED RELEASE ORAL at 09:08

## 2022-01-03 RX ADMIN — PANTOPRAZOLE SODIUM 40 MG: 40 TABLET, DELAYED RELEASE ORAL at 09:07

## 2022-01-03 RX ADMIN — ATORVASTATIN CALCIUM 40 MG: 40 TABLET, FILM COATED ORAL at 21:48

## 2022-01-03 RX ADMIN — FUROSEMIDE 40 MG: 40 TABLET ORAL at 09:07

## 2022-01-03 RX ADMIN — LEVOTHYROXINE SODIUM 125 MCG: 0.12 TABLET ORAL at 07:30

## 2022-01-03 RX ADMIN — TROSPIUM CHLORIDE 20 MG: 20 TABLET, FILM COATED ORAL at 16:30

## 2022-01-03 RX ADMIN — CARVEDILOL 12.5 MG: 12.5 TABLET, FILM COATED ORAL at 07:14

## 2022-01-03 RX ADMIN — BACLOFEN 10 MG: 10 TABLET ORAL at 21:49

## 2022-01-03 RX ADMIN — BACLOFEN 10 MG: 10 TABLET ORAL at 15:12

## 2022-01-03 RX ADMIN — MONTELUKAST 10 MG: 10 TABLET, FILM COATED ORAL at 21:48

## 2022-01-03 RX ADMIN — LOSARTAN POTASSIUM 50 MG: 50 TABLET, FILM COATED ORAL at 21:48

## 2022-01-03 NOTE — PROGRESS NOTES
Transition of Care: SNF; the [address] [place] [address] has accepted; insurance initially denied on 12/29; peer to peer  is still pending-started on 12/29-continued on 12/30;  insurance company was closed on 12/31 and will reopen on 1/3/22     Peer to peer- [phone]; [employer] is in process     UPDATE: on 12/29- the attending and the insurance doctor traded phone calls but never connected for the peer to peer; attending followed up  12/30 to complete the peer to peer; insurance company requested more information; information faxed to them; on 12/31, the insurance company is closed with no answer        Transport Plan: [facility] on willcall for 1/3/22     RUR: 11%     Main contact is - Hebert Elkins- [phone]     Discharge pending:  -pending insurance auth and peer to peer     0900: this CM contacted Amber Reyes at the [facility] to f/u on insurnce auth; no anser; waiting for callback    1130: faxed insurance the last PT/OT notes        NOTE:  patient lives with sister in two story home. Tessa Matthew [facility] [employer] [facility], Sat [phone]. Stormyjose j Little has been to SNF in the past [facility] [employer]. Tessa Matthew is agreeable to SNF if covered under her policy.       Home Situation  Home Environment: Private residence  # Steps to Enter: 3  One/Two Story Residence:  Two story home  Living Alone: No  Current DME Used/Available at Home: Cane, quadcane, wheelchair, and rolling walker  Tub or Shower Type: Tub/Shower combination     CM following  Chadd Michael RN, CRM            Revision History                     Revision History

## 2022-01-03 NOTE — PROGRESS NOTES
Comprehensive Nutrition Assessment    Type and Reason for Visit: Initial,RD nutrition re-screen/LOS  LOS Day 13    Nutrition Recommendations/Plan:    Continue with current diet     Nutrition Assessment:   Nutrition History: Unable to obtain nutrition history at this time      Nutrition Background: PMH: CAD, HTN, DM2, GERD, prior CVA. Patient presented with confusion and falls, acute cystitis. Daily Update: Attempted to call patient twice, however continued to get patient in bed 2 and unable to connect with patient. However per chart patient has been eating well during stay and no weight loss noted. At this point patient awaiting placement    Nutrition Related Findings:   NFPE deferred due to remote assessment      Current Nutrition Therapies:  ADULT DIET Regular; 4 carb choices (60 gm/meal); Low Fat/Low Chol/High Fiber/2 gm Na; Low Sodium (2 gm)    Current Intake:   Average Meal Intake: % Average Supplement Intake: None ordered      Anthropometric Measures:  Height: 5' 4\" (162.6 cm)  Current Body Wt: 114.2 kg (251 lb 12.3 oz) (1/3), Weight source: Bed scale  BMI: 43.2, Obese class 3 (BMI 40.0 or greater)     Ideal Body Weight (lbs) (Calculated): 120 lbs (55 kg), 209.8 %  Usual Body Wt:  , Percent weight change:            Edema: No data recorded   Estimated Daily Nutrient Needs:  Energy (kcal/day): 9518-3868 (Kcal/kg (15-20), Weight Used: Current (114.2 kg))  Protein (g/day):  g (0.8-1.2 g/kg) Weight Used: (Current)  Fluid (ml/day):   (1 ml/kcal)    Nutrition Diagnosis:   No nutrition diagnosis at this time     Nutrition Interventions:   Food and/or Nutrient Delivery: Continue current diet     Coordination of Nutrition Care: Continue to monitor while inpatient    Goals:       Active Goal: Intake remains >75% of nutritional needs by follow up    Nutrition Monitoring and Evaluation:      Food/Nutrient Intake Outcomes: Food and nutrient intake       Discharge Planning:    No discharge needs at this time    Electronically signed by Ariana Kevin MS, RD, LD on 1/3/2022 at 1:49 PM.

## 2022-01-03 NOTE — PROGRESS NOTES
Problem: Mobility Impaired (Adult and Pediatric)  Goal: *Acute Goals and Plan of Care (Insert Text)  Description: FUNCTIONAL STATUS PRIOR TO ADMISSION: Patient was modified independent using a rolling walker or WB quad cane for functional mobility. Has had multiple falls over the past week (more than 4). HOME SUPPORT PRIOR TO ADMISSION: The patient lived with sister who assists as needed. She goes to adult day care 3 days/week. Physical Therapy Goals  Reassessment 12/27/2021  1. Patient will move from supine to sit and sit to supine  and roll side to side in bed with modified independence within 7 day(s). 2.  Patient will transfer from bed to chair and chair to bed with modified independence using the least restrictive device within 7 day(s). 3.  Patient will perform sit to stand with modified independence within 7 day(s). 4.  Patient will ambulate with supervision for 150 feet with the least restrictive device within 7 day(s). 5.  Patient will ascend/descend 15 stairs with 1 handrail(s) with minimal assistance/contact guard assist within 7 day(s). Physical Therapy Goals  Initiated 12/20/2021  1. Patient will move from supine to sit and sit to supine  and roll side to side in bed with modified independence within 7 day(s). 2.  Patient will transfer from bed to chair and chair to bed with modified independence using the least restrictive device within 7 day(s). 3.  Patient will perform sit to stand with modified independence within 7 day(s). 4.  Patient will ambulate with supervision/set-up for 50 feet with the least restrictive device within 7 day(s). 5.  Patient will ascend/descend 15 stairs with 1 handrail(s) with minimal assistance/contact guard assist within 7 day(s).       Outcome: Progressing Towards Goal    PHYSICAL THERAPY TREATMENT  Patient: Kiana Moore (73 y.o. female)  Date: 1/3/2022  Diagnosis: Acute cystitis [N30.00]  Encephalopathy [G93.40] <principal problem not specified>       Precautions: Fall,Bed Alarm (L AFO; chronic mojica; PMH CVA)  Chart, physical therapy assessment, plan of care and goals were reviewed. ASSESSMENT  Patient continues with skilled PT services and is progressing towards goals. Pt agreeable to therapy. Pt requires increase time for mobility and jeremiah AFO. Pt was able to ambulate but poor advancement and clearance on left LE. Noted AFO may need adjustment to increase DF. Pt is motivated to return to baseline. Pt has has several recent falls and is not at her baseline. Current Level of Function Impacting Discharge (mobility/balance): CGA    Other factors to consider for discharge: below baseline, fall history          PLAN :  Patient continues to benefit from skilled intervention to address the above impairments. Continue treatment per established plan of care. to address goals. Recommendation for discharge: (in order for the patient to meet his/her long term goals)  Therapy up to 5 days/week in SNF setting    This discharge recommendation:  Has not yet been discussed the attending provider and/or case management    IF patient discharges home will need the following DME: patient owns DME required for discharge       SUBJECTIVE:   Patient stated It just takes me time. Opal Christianson    OBJECTIVE DATA SUMMARY:   Critical Behavior:  Neurologic State: Alert,Appropriate for age  Orientation Level: Oriented X4  Cognition: Follows commands,Appropriate for age attention/concentration  Safety/Judgement: Awareness of environment,Fall prevention,Insight into deficits  Functional Mobility Training:  Bed Mobility:     Supine to Sit: Stand-by assistance; Additional time              Transfers:  Sit to Stand: Contact guard assistance  Stand to Sit: Contact guard assistance                             Balance:  Sitting: Intact  Standing: Impaired  Standing - Static: Fair  Standing - Dynamic : Fair  Ambulation/Gait Training:  Distance (ft): 60 Feet (ft)  Assistive Device: Brace/Splint;Gait belt;Walker, rolling (left AFO)  Ambulation - Level of Assistance: Contact guard assistance        Gait Abnormalities: Decreased step clearance; Foot drop; Hemiplegic; Step to gait              Speed/Brenda: Delayed; Slow  Step Length: Left shortened;Right shortened                    Stairs: Therapeutic Exercises:     Pain Rating:  Left shoulder, knee and hip  Activity Tolerance:   requires rest breaks    After treatment patient left in no apparent distress:   Sitting in chair, Call bell within reach, and Bed / chair alarm activated    COMMUNICATION/COLLABORATION:   The patients plan of care was discussed with: Registered nurse.      Irma Farris PTA   Time Calculation: 29 mins

## 2022-01-03 NOTE — PROGRESS NOTES
[facility] [facility]  Hospitalist Group                                                                                          Hospitalist Progress Note  Ciarra Mathew, [place]  Answering service: [phone] OR 1963 from in house phone        Date of Service:  1/3/2022  NAME:  Jeff Ayala  :  1960  MRN:  [id number]      Admission Summary:   Patient presents with urinary tract infection, has history of CVA with neurogenic bladder requiring chronic indwelling Dunn catheter. Patient has completed antibiotics and doing well. Has generalized weakness and awaiting placement to rehab    Interval history / Subjective:    No acute distress noted. Patient is concerned about cortisol shot to left shoulder that was scheduled for today. Will likely need to reschedule after discharge. DC pending insurance auth. Assessment & Plan:     # Catheter associated urinary tract infection      - UC + for E. Coli, Course of Ceftriaxone completed. # Neurogenic Bladder      - Hx of CVA requiring chronic indwelling FC      - FC exchanged on admission      - Continue Trospium 20 mg BID for bladder spasm  # Migraine      - Continue Topamax      - Continue  PRN Tylenol  # HTN  # Dyslipidemia  # Chronic Systolic Congestive Heart Failure     - Followed by Dr. Wanda Kapoor from 2019 showed EF of 44%     - Continue Losartan, Coreg     - Continue Lasix     - Continue Lipitor  # Hypothyroidism     - Continue Synthroid  # Obesity      - BMI of 42.95     - Outpatient follow-up for weight management  # Generalized Weakness     - Patient with multiple falls due to generalized weakness     - PT/OT recommendations for SNF     - Accepted to [address] [place] [address] Auto authorization    Code status: Full  [facility] [address] discussed with: Patient/Family  Anticipated Disposition: Medically stable. DC pending Insurance Auth.  Peer to Peer has been attempted by Guilherme Livingston who will attempt to complete peer to peer today. Anticipated Discharge: 24 hours to 48 hours     Hospital Problems  Date Reviewed: 11/1/2021          Codes Class Noted POA    Acute cystitis ICD-10-CM: N30.00  ICD-9-CM: 595.0  12/19/2021 Unknown        Encephalopathy ICD-10-CM: G93.40  ICD-9-CM: 348.30  12/19/2021 Unknown                Review of Systems:   Pertinent items are noted in HPI. Vital Signs:    Last 24hrs VS reviewed since prior progress note. Most recent are:  Visit Vitals  /64   Pulse 64   Temp 98.7 °F (37.1 °C)   Resp 18   Ht 5' 4\" (1.626 m)   Wt 114.2 kg (251 lb 12.3 oz)   SpO2 98%   BMI 43.22 kg/m²         Intake/Output Summary (Last 24 hours) at 1/3/2022 0914  Last data filed at 1/3/2022 0355  Gross per 24 hour   Intake    Output 1255 ml   Net -1255 ml        Physical Examination:     I had a face to face encounter with this patient and independently examined them on 1/3/2022 as outlined below:          Constitutional:  No acute distress, cooperative, pleasant    ENT:  Oral mucosa moist, oropharynx benign. Resp:  CTA bilaterally. No wheezing/rhonchi/rales. No accessory muscle use   CV:  Regular rhythm, normal rate    GI:  Soft, non distended, non tender. normoactive bowel sounds    Musculoskeletal:  No edema, warm, 2+ pulses throughout    Neurologic:  Moves all extremities. AAOx3, CN II-XII reviewed            Data Review:    Review and/or order of clinical lab test  Review and/or order of tests in the radiology section of CPT  Review and/or order of tests in the medicine section of CPT      Labs:     No results for input(s): WBC, HGB, HCT, PLT, HGBEXT, HCTEXT, PLTEXT, HGBEXT, HCTEXT, PLTEXT in the last 72 hours. No results for input(s): NA, K, CL, CO2, BUN, CREA, GLU, CA, MG, PHOS, URICA in the last 72 hours. No results for input(s): ALT, AP, TBIL, TBILI, TP, ALB, GLOB, GGT, AML, LPSE in the last 72 hours.     No lab exists for component: SGOT, GPT, AMYP, HLPSE  No results for input(s): INR, PTP, APTT, INREXT, INREXT in the last 72 hours. No results for input(s): FE, TIBC, PSAT, FERR in the last 72 hours. No results found for: FOL, RBCF   No results for input(s): PH, PCO2, PO2 in the last 72 hours. No results for input(s): CPK, CKNDX, TROIQ in the last 72 hours.     No lab exists for component: CPKMB  Lab Results   Component Value Date/Time    Cholesterol, total 156 11/20/2020 03:38 PM    HDL Cholesterol 72 11/20/2020 03:38 PM    LDL, calculated 69.4 11/20/2020 03:38 PM    Triglyceride 73 11/20/2020 03:38 PM    CHOL/HDL Ratio 2.2 11/20/2020 03:38 PM     Lab Results   Component Value Date/Time    Glucose (POC) 101 (H) 09/12/2017 04:49 PM    Glucose (POC) 112 (H) 09/12/2017 11:27 AM    Glucose (POC) 81 09/12/2017 06:46 AM    Glucose (POC) 105 (H) 09/11/2017 10:02 PM    Glucose (POC) 99 09/11/2017 04:56 PM     Lab Results   Component Value Date/Time    Color YELLOW/STRAW 12/19/2021 03:00 PM    Appearance CLOUDY (A) 12/19/2021 03:00 PM    Specific gravity 1.017 12/19/2021 03:00 PM    pH (UA) 6.0 12/19/2021 03:00 PM    Protein TRACE (A) 12/19/2021 03:00 PM    Glucose Negative 12/19/2021 03:00 PM    Ketone Negative 12/19/2021 03:00 PM    Bilirubin Negative 12/19/2021 03:00 PM    Urobilinogen 1.0 12/19/2021 03:00 PM    Nitrites Positive (A) 12/19/2021 03:00 PM    Leukocyte Esterase MODERATE (A) 12/19/2021 03:00 PM    Epithelial cells FEW 12/19/2021 03:00 PM    Bacteria 4+ (A) 12/19/2021 03:00 PM    WBC 20-50 12/19/2021 03:00 PM    RBC  12/19/2021 03:00 PM         Medications Reviewed:     Current Facility-Administered Medications   Medication Dose Route Frequency    trospium (SANCTURA) tablet 20 mg  20 mg Oral ACB&D    topiramate (TOPAMAX) tablet 100 mg  100 mg Oral BID WITH MEALS    baclofen (LIORESAL) tablet 10 mg  10 mg Oral TID    sodium chloride (NS) flush 5-40 mL  5-40 mL IntraVENous Q8H    sodium chloride (NS) flush 5-40 mL  5-40 mL IntraVENous PRN    acetaminophen (TYLENOL) tablet 650 mg  650 mg Oral Q6H PRN    Or    acetaminophen (TYLENOL) suppository 650 mg  650 mg Rectal Q6H PRN    ondansetron (ZOFRAN ODT) tablet 4 mg  4 mg Oral Q8H PRN    Or    ondansetron (ZOFRAN) injection 4 mg  4 mg IntraVENous Q6H PRN    enoxaparin (LOVENOX) injection 40 mg  40 mg SubCUTAneous DAILY    albuterol-ipratropium (DUO-NEB) 2.5 MG-0.5 MG/3 ML  3 mL Nebulization Q6H PRN    atorvastatin (LIPITOR) tablet 40 mg  40 mg Oral QHS    carvediloL (COREG) tablet 12.5 mg  12.5 mg Oral BID WITH MEALS    DULoxetine (CYMBALTA) capsule 120 mg  120 mg Oral DAILY    furosemide (LASIX) tablet 40 mg  40 mg Oral DAILY    levothyroxine (SYNTHROID) tablet 125 mcg  125 mcg Oral ACB    linaCLOtide (LINZESS) capsule 145 mcg (Patient Supplied)  145 mcg Oral ACB    losartan (COZAAR) tablet 50 mg  50 mg Oral QHS    pantoprazole (PROTONIX) tablet 40 mg  40 mg Oral DAILY    ferrous sulfate tablet 325 mg  325 mg Oral ACB    montelukast (SINGULAIR) tablet 10 mg  10 mg Oral QHS    gabapentin (NEURONTIN) capsule 100 mg  100 mg Oral TID PRN     ______________________________________________________________________  EXPECTED LENGTH OF STAY: 3d 7h  ACTUAL LENGTH OF STAY:          TERRENCE [place]

## 2022-01-04 VITALS
HEIGHT: 64 IN | DIASTOLIC BLOOD PRESSURE: 74 MMHG | SYSTOLIC BLOOD PRESSURE: 114 MMHG | WEIGHT: 251.77 LBS | BODY MASS INDEX: 42.98 KG/M2 | OXYGEN SATURATION: 97 % | RESPIRATION RATE: 20 BRPM | HEART RATE: 64 BPM | TEMPERATURE: 98.2 F

## 2022-01-04 LAB
COVID-19 RAPID TEST, COVR: NOT DETECTED
SOURCE, COVRS: NORMAL

## 2022-01-04 PROCEDURE — 74011000250 HC RX REV CODE- 250: Performed by: FAMILY MEDICINE

## 2022-01-04 PROCEDURE — 87635 SARS-COV-2 COVID-19 AMP PRB: CPT

## 2022-01-04 PROCEDURE — 97530 THERAPEUTIC ACTIVITIES: CPT

## 2022-01-04 PROCEDURE — 97116 GAIT TRAINING THERAPY: CPT

## 2022-01-04 PROCEDURE — 74011250636 HC RX REV CODE- 250/636: Performed by: FAMILY MEDICINE

## 2022-01-04 PROCEDURE — 74011250637 HC RX REV CODE- 250/637: Performed by: FAMILY MEDICINE

## 2022-01-04 RX ORDER — TROSPIUM CHLORIDE 20 MG/1
20 TABLET, FILM COATED ORAL
Qty: 14 TABLET | Refills: 0 | Status: SHIPPED | OUTPATIENT
Start: 2022-01-04 | End: 2022-01-11

## 2022-01-04 RX ADMIN — DULOXETINE HYDROCHLORIDE 120 MG: 60 CAPSULE, DELAYED RELEASE ORAL at 08:18

## 2022-01-04 RX ADMIN — FERROUS SULFATE TAB 325 MG (65 MG ELEMENTAL FE) 325 MG: 325 (65 FE) TAB at 07:16

## 2022-01-04 RX ADMIN — CARVEDILOL 12.5 MG: 12.5 TABLET, FILM COATED ORAL at 07:16

## 2022-01-04 RX ADMIN — GABAPENTIN 100 MG: 100 CAPSULE ORAL at 07:17

## 2022-01-04 RX ADMIN — FUROSEMIDE 40 MG: 40 TABLET ORAL at 08:18

## 2022-01-04 RX ADMIN — TOPIRAMATE 100 MG: 100 TABLET, FILM COATED ORAL at 07:15

## 2022-01-04 RX ADMIN — ENOXAPARIN SODIUM 40 MG: 100 INJECTION SUBCUTANEOUS at 08:18

## 2022-01-04 RX ADMIN — PANTOPRAZOLE SODIUM 40 MG: 40 TABLET, DELAYED RELEASE ORAL at 08:19

## 2022-01-04 RX ADMIN — SODIUM CHLORIDE, PRESERVATIVE FREE 10 ML: 5 INJECTION INTRAVENOUS at 14:00

## 2022-01-04 RX ADMIN — LEVOTHYROXINE SODIUM 125 MCG: 0.12 TABLET ORAL at 07:16

## 2022-01-04 RX ADMIN — TROSPIUM CHLORIDE 20 MG: 20 TABLET, FILM COATED ORAL at 07:16

## 2022-01-04 RX ADMIN — SODIUM CHLORIDE, PRESERVATIVE FREE 10 ML: 5 INJECTION INTRAVENOUS at 06:00

## 2022-01-04 RX ADMIN — BACLOFEN 10 MG: 10 TABLET ORAL at 08:19

## 2022-01-04 RX ADMIN — GABAPENTIN 100 MG: 100 CAPSULE ORAL at 14:38

## 2022-01-04 NOTE — PROGRESS NOTES
CM scheduled with medicaid transport, pt uses walker, walked 60 feet yesterday. Requested 3pm transport but this is Open will call time can take 30 minutes from now up to 3 hours (which is 3pm). Reference is [id number].    1:40pm  Was updated that pt is now going to [facility] and not [facility] due to issue at [facility] regarding covid. Pt in agreement. CM changed destination of medicaid ride to [facility] and  time is still 3pm and they will call unit. Called transport line, [phone] medicaid transport and requested . Since pt can ambulate with walker, she will be transported by [facility] cab and  will call the floor once arrives, provided unit phone of [phone], [facility]. Nurse to bring pt down to entrance to meet cab. Nursing facility will need to meet pt in parking lot once medicaid cab arrives. Updated admissions at [facility] that  will call upon arrival for manual wheelchair to be brought by nursing facility and she verbalized agreement and understanding.      Angella Sadler MSW

## 2022-01-04 NOTE — PROGRESS NOTES
Bedside shift change report given to Shoshana Crane (oncoming nurse) by Preston Dos Santos (offgoing nurse). Report included the following information SBAR.

## 2022-01-04 NOTE — DISCHARGE SUMMARY
Discharge Summary       PATIENT ID: Guillermo Rainey  MRN: [id number]   YOB: 1960    DATE OF ADMISSION: 12/19/2021  2:07 PM    DATE OF DISCHARGE: 01/04/22    PRIMARY CARE PROVIDER: Tahir Jimenez PA-C     ATTENDING PHYSICIAN: Attila Jones MD    DISCHARGING PROVIDER: Attila Jones MD    To contact this individual call [phone] and ask the  to page. If unavailable ask to be transferred the Adult Hospitalist Department. CONSULTATIONS: None    PROCEDURES/SURGERIES: * No surgery found *    ADMITTING DIAGNOSES & HOSPITAL COURSE:   # Catheter associated urinary tract infection  # Neurogenic Bladder  # Migraine  # HTN  # Dyslipidemia  # Chronic Systolic Congestive Heart Failure  # Hypothyroidism  # Obesity  # Generalized Weakness    Vashti Kwon a 59 y. o. female with a pmhx HFpEF, HTN, DM II, asthma, GERD, hypothyroidism, past CVA with neurogenic bladder and chronic indwelling Dunn, and dyslipidemia who presents with confusion, and several falls, and is being admitted for acute cystitis.       In the ED, Abram Dial is positive for nitrites, leukocyte esterase, large blood, and 4+ bacteria. Labs are significant for creatinine 1.33, alk phos 121. CT head with no acute intracranial process.     In the ED, her Dunn was exchanged, and she received ceftriaxone, and Tylenol. Urine culture positive for E. Coli, completed course of cefriaxone. DISCHARGE DIAGNOSES / PLAN:      # Catheter associated urinary tract infection      - UC + for E. Coli, Course of Ceftriaxone completed.     # Neurogenic Bladder      - Hx of CVA requiring chronic indwelling FC      - FC exchanged on admission - patient reports this is changed every 30 days      - Continue Trospium 20 mg BID for bladder spasm    # Migraine      - Continue Topamax      - Continue  PRN Tylenol  # HTN  # Dyslipidemia  # Chronic Systolic Congestive Heart Failure     - Followed by Dr. Stanford Rivera     - [facility] from 12/2019 showed EF of 44%     - Continue Losartan, Coreg     - Continue Lasix     - Continue Lipitor    # Hypothyroidism     - Continue Synthroid    # Obesity      - BMI of 42.95     - Outpatient follow-up for weight management    # Generalized Weakness     - Patient with multiple falls due to generalized weakness     - PT/OT recommendations for SNF     - Accepted to [address] [place] [address]     Code status: Full  DVT prophylaxis: SCDs           Care Plan discussed with: Patient/Family  Anticipated Disposition: Medically stable. ADDITIONAL CARE RECOMMENDATIONS:   Chronic Dunn Catheter: exchanged every 30 day (last done 12/19)    PENDING TEST RESULTS:   At the time of discharge the following test results are still pending: None    FOLLOW UP APPOINTMENTS:    Follow-up Information     Follow up With Specialties Details Why Contact Info    [facility] Erich Vergara [facility] On 1/4/2022 medicaid transport arranged and nurse to bring pt to ED entrance in wheelchair. [address]   [phone]    Jovita Dean PA-C Physician Assistant   [address] [facility] [address]  [phone]               DIET: Heart healthy    ACTIVITY: Activity as tolerated      DISCHARGE MEDICATIONS:  Current Discharge Medication List      START taking these medications    Details   trospium (SANCTURA) 20 mg tablet Take 1 Tablet by mouth Before breakfast and dinner for 7 days.   Qty: 14 Tablet, Refills: 0  Start date: 1/4/2022, End date: 1/11/2022         CONTINUE these medications which have NOT CHANGED    Details   montelukast (SINGULAIR) 10 mg tablet       topiramate (TOPAMAX) 100 mg tablet Take 1 tablet by mouth twice daily  Qty: 180 Tablet, Refills: 3      linaCLOtide (Linzess) 145 mcg cap capsule TAKE 1 CAPSULE BY MOUTH ONCE DAILY BEFORE BREAKFAST FOR CONSTIPATION  Qty: 90 Capsule, Refills: 0    Associated Diagnoses: Chronic idiopathic constipation      furosemide (LASIX) 40 mg tablet Take 1 Tablet by mouth daily. Qty: 90 Tablet, Refills: 0      atorvastatin (LIPITOR) 40 mg tablet Take 1 tablet by mouth once daily  Qty: 90 Tablet, Refills: 0    Associated Diagnoses: High cholesterol; Hypercholesterolemia      carvediloL (COREG) 12.5 mg tablet Take 1 Tablet by mouth two (2) times daily (with meals). Qty: 180 Tablet, Refills: 3    Associated Diagnoses: Essential hypertension      metFORMIN ER (GLUCOPHAGE XR) 500 mg tablet TAKE 1 TABLET BY MOUTH ONCE DAILY WITH SUPPER  Qty: 90 Tablet, Refills: 3    Associated Diagnoses: Prediabetes; Obesity, morbid (HCC)      baclofen (LIORESAL) 20 mg tablet TAKE 1 TABLET BY MOUTH EVERY 8 HOURS AS NEEDED FOR MUSCLE SPASM(S)      cholecalciferol (VITAMIN D3) (5000 Units /125 mcg) capsule Take 1 Capsule by mouth daily. Qty: 90 Capsule, Refills: 3    Associated Diagnoses: Vitamin D deficiency      Botox 200 unit injection INJECT INTO THE BILATERAL MUSCLES OF THE HEAD AND NECK BY PRESCRIBER IN OFFICE FOR MIGRAINES EVERY 3 MONTHS. DISCARD UNUSED PORTION  Qty: 1 Vial, Refills: 3      losartan (COZAAR) 50 mg tablet Take 1 Tab by mouth nightly. Hold for SBP<115  Qty: 90 Tab, Refills: 3    Comments: Change from AM dosing to PM dosing  Associated Diagnoses: Essential hypertension      pantoprazole (PROTONIX) 40 mg tablet Take 1 Tab by mouth daily. Qty: 90 Tab, Refills: 3    Associated Diagnoses: Gastroesophageal reflux disease      BD Single Use Swabs Regular padm Check Blood sugar ONCE daily      galcanezumab-gnlm (Emgality Syringe) 120 mg/mL syrg 1 Syringe by SubCUTAneous route every thirty (30) days. Start 30 days after the loading dose  Qty: 1 Syringe, Refills: 11      cyanocobalamin (VITAMIN B12) 1,000 mcg/mL injection INJECT 1 ML UNDER THE SKIN EVERY 30 DAYS FOR B12 DEFICIENCY  Indications: inadequate vitamin B12  Qty: 10 mL, Refills: 1    Comments: Labs needed for further refills.   Associated Diagnoses: Fatigue, unspecified type; Vitamin B deficiency      Syringe, Disposable, 1 mL syrg Use bimonthly for cyanocobalamin subcutaneous injection. Qty: 25 Syringe, Refills: 0    Associated Diagnoses: Vitamin B deficiency      Needle, Disp, 25 G 25 gauge x 3/4\" ndle Use bimonthly for cyanocobalamin subcutaneous injection. Qty: 1 Box, Refills: 0    Associated Diagnoses: Vitamin B deficiency      fluticasone propionate (FLONASE) 50 mcg/actuation nasal spray 2 Sprays by Both Nostrils route daily. Qty: 1 Bottle, Refills: 0      gabapentin (NEURONTIN) 100 mg capsule Take 100 mg by mouth three (3) times daily as needed for Pain. FREESTYLE LITE STRIPS strip USE STRIP TO CHECK GLUCOSE TWICE DAILY  Refills: 3      FREESTYLE LITE METER monitoring kit USE TO CHECK GLUCOSE ONCE DAILY  Refills: 0      ciclopirox (PENLAC) 8 % solution APPLY TO AFFECTED TOE NAILS DAILY      diclofenac (VOLTAREN) 1 % gel APPLY TOPICALLY TO AFFECTED AREA ONCE DAILY      hydroxyzine HCL (ATARAX) 50 mg tablet TAKE 1 TABLET BY MOUTH TWICE DAILY AS NEEDED FOR ANXIETY      FREESTYLE LANCETS 28 gauge [facility] USE TO CHECK GLUCOSE TWICE DAILY      !! miscellaneous medical supply misc Rollator Dx:I69.359  Qty: 1 Each, Refills: 0    Associated Diagnoses: Hemiplegia, dominant side S/P CVA (cerebrovascular accident) ([facility]      ! ! miscellaneous medical supply [facility] Walker Dx: C36.717  Qty: 1 Each, Refills: 0    Associated Diagnoses: Hemiplegia, dominant side S/P CVA (cerebrovascular accident) ([facility]      omega-3 acid ethyl esters (LOVAZA) 1 gram capsule Take 2 Caps by mouth two (2) times a day. Qty: 120 Cap, Refills: 2      DULoxetine (CYMBALTA) 60 mg capsule Take 2 Caps by mouth daily. Qty: 180 Cap, Refills: 0    Associated Diagnoses: Other chronic pain      calcipotriene (DOVONEX) 0.005 % topical cream Apply  to affected area daily as needed. Qty: 60 g, Refills: 11      albuterol-ipratropium (DUO-NEB) 2.5 mg-0.5 mg/3 ml nebu 3 mL by Nebulization route every six (6) hours as needed.   Qty: 30 Nebule, Refills: 0      ferrous sulfate 325 mg (65 mg iron) tablet Take 325 mg by mouth Daily (before breakfast). aspirin delayed-release 81 mg tablet Take 81 mg by mouth daily. !! - Potential duplicate medications found. Please discuss with provider. STOP taking these medications       Sidestream misc Comments:   Reason for Stopping:         peg-electrolyte soln (NULYTELY) 420 gram solution Comments:   Reason for Stopping:         ipratropium (ATROVENT) 0.02 % soln Comments:   Reason for Stopping:         traZODone (DESYREL) 300 mg tablet Comments:   Reason for Stopping:         oxyCODONE-acetaminophen (PERCOCET 10)  mg per tablet Comments:   Reason for Stopping:         albuterol (PROVENTIL HFA, VENTOLIN HFA, PROAIR HFA) 90 mcg/actuation inhaler Comments:   Reason for Stopping:                 NOTIFY YOUR PHYSICIAN FOR ANY OF THE FOLLOWING:   Fever over 101 degrees for 24 hours. Chest pain, shortness of breath, fever, chills, nausea, vomiting, diarrhea, change in mentation, falling, weakness, bleeding. Severe pain or pain not relieved by medications. Or, any other signs or symptoms that you may have questions about.     DISPOSITION:    Home With:   OT  PT  HH  RN      x Long term SNF/Inpatient Rehab    Independent/assisted living    Hospice    Other:       PATIENT CONDITION AT DISCHARGE:     Functional status    Poor    x Deconditioned     Independent      Cognition   x  Lucid     Forgetful     Dementia      Catheters/lines (plus indication)   x Dunn     PICC     PEG     None      Code status   x  Full code     DNR          CHRONIC MEDICAL DIAGNOSES:  Problem List as of 1/4/2022 Date Reviewed: 11/1/2021          Codes Class Noted - Resolved    Acute cystitis ICD-10-CM: N30.00  ICD-9-CM: 595.0  12/19/2021 - Present        Encephalopathy ICD-10-CM: G93.40  ICD-9-CM: 348.30  12/19/2021 - Present        Anxiety ICD-10-CM: F41.9  ICD-9-CM: 300.00  4/30/2020 - Present        Obesity (BMI 30-39. 9) ICD-10-CM: E66.9  ICD-9-CM: 278.00  12/5/2019 - Present        Dermatitis ICD-10-CM: L30.9  ICD-9-CM: 692.9  6/20/2018 - Present    Overview Signed 6/20/2018  4:33 PM by Tessa Grewal MD     On elbows. Will use hydrocortisone 0.2% cream              Chronic indwelling Dunn catheter ICD-10-CM: Z97.8  ICD-9-CM: V45.89  1/24/2018 - Present    Overview Signed 1/24/2018  8:58 AM by Tessa Grewal MD     Initially placed Jan 2018. Mx by Dr. Troy Jasso. Obesity, morbid ([facility] ICD-10-CM: E66.01  ICD-9-CM: 278.01  1/12/2018 - Present        Vitamin D deficiency ICD-10-CM: E55.9  ICD-9-CM: 268.9  11/2/2017 - Present        Left-sided weakness ICD-10-CM: R53.1  ICD-9-CM: 728.87  9/8/2017 - Present        Constipation ICD-10-CM: K59.00  ICD-9-CM: 564.00  6/13/2017 - Present        Insomnia ICD-10-CM: G47.00  ICD-9-CM: 780.52  6/13/2017 - Present        Prediabetes ICD-10-CM: R73.03  ICD-9-CM: 790.29  10/28/2016 - Present        Chronic pain ICD-10-CM: G89.29  ICD-9-CM: 338.29  10/27/2016 - Present    Overview Signed 10/27/2016  6:00 PM by Tessa Grewal MD     utox and opiate confirmation negative Oct 2016, despite report of taking norco and tramadol. REPEAT on F/U             Postoperative hypothyroidism (Chronic) ICD-10-CM: E89.0  ICD-9-CM: 244.0  3/4/2016 - Present        Thyroid disease ICD-10-CM: E07.9  ICD-9-CM: 246. 9  Unknown - Present    Overview Signed 6/25/2015 11:59 AM by Jacob Bauer     Hypothyroid              Hypercholesterolemia ICD-10-CM: E78.00  ICD-9-CM: 272.0  Unknown - Present        EDDIE on CPAP ICD-10-CM: G47.33, Z99.89  ICD-9-CM: 327.23, V46.8  Unknown - Present    Overview Signed 6/25/2015 11:59 AM by Jacob Part     Was referred to Pulmonology              Essential hypertension ICD-10-CM: I10  ICD-9-CM: 401.9  Unknown - Present        Congestive heart failure ([facility] ICD-10-CM: I50.9  ICD-9-CM: 428.0  Unknown - Present    Overview Signed 6/25/2015 11:59 AM by Cameron Reyes     Last Echo 2/8/15: EF 55-60%             Melena ICD-10-CM: K92.1  ICD-9-CM: 578.1  6/1/2015 - Present    Overview Signed 6/25/2015 11:59 AM by Cameron Reyes     EGD/Colonscopy 6/15- Gastritis, internal hemorrhoids and 3 polyps             Hiatal hernia ICD-10-CM: K44.9  ICD-9-CM: 553.3  6/1/2015 - Present    Overview Signed 6/25/2015 11:59 AM by Cameron Reyes     3 cm hiatal hernia              Chest pain ICD-10-CM: R07.9  ICD-9-CM: 786.50  5/25/2015 - Present    Overview Signed 6/25/2015 11:59 AM by Cameron Reyes     Hospitalized at [facility] 5/25/15 (lab work negative)             Heart palpitations ICD-10-CM: R00.2  ICD-9-CM: 785.1  5/5/2015 - Present        Ventricular ectopic activity ICD-10-CM: I49.3  ICD-9-CM: 427.69  5/5/2015 - Present        Neck pain, bilateral ICD-10-CM: M54.2  ICD-9-CM: 723.1  4/3/2015 - Present        Shoulder pain, bilateral ICD-10-CM: M25.511, M25.512  ICD-9-CM: 719.41  4/3/2015 - Present        Muscle spasticity ICD-10-CM: E57.227  ICD-9-CM: 728.85  4/3/2015 - Present        Hemiparesis and alteration of sensations as late effects of stroke [facility]) ICD-10-CM: M27.137, P63.895  ICD-9-CM: 438.20, 438.6  4/3/2015 - Present        Head pain, chronic ICD-10-CM: R51.9, G89.29  ICD-9-CM: 784.0, 338.29  4/3/2015 - Present        Expressive aphasia ICD-10-CM: R47.01  ICD-9-CM: 784.3  4/3/2015 - Present        History of cardiomyopathy ICD-10-CM: Z86.79  ICD-9-CM: V12.59  10/17/2014 - Present    Overview Signed 6/13/2017 12:00 PM by Guadalupe Ramsay MD     Improved with BP control. Sees Dr. Toniann Merlin.               SOB (shortness of breath) ICD-10-CM: R06.02  ICD-9-CM: 786.05  10/17/2014 - Present        Bronchitis ICD-10-CM: J40  ICD-9-CM: 56  Unknown - Present        High cholesterol ICD-10-CM: E78.00  ICD-9-CM: 272.0  10/14/2014 - Present        Stroke ([address] (Chronic) ICD-10-CM: I63.9  ICD-9-CM: 434.91  2/25/2014 - Present    Overview Addendum 6/13/2017 11:57 AM by Tessa Grewal MD     Established with Neurology, Otis Chavez NP-Just hospitalized at [facility] 2/7/14-2/10/14. CT negative, but Late effect CV accident with increased tone described on discharge summary, Carotid dopplers showed 50% stenosis bilaterally. As of 6/13/2017: Pt notes symptoms occurrence on left side at the time of stroke. Artemio Grant Has done rehab and had improvement, but with fatigue sx may become more obvious. Heaviness and weakness in left arm and leg. Muscle spasms. Cramps              Coronary artery disease involving native coronary artery of native heart without angina pectoris ICD-10-CM: I25.10  ICD-9-CM: 414.01  2/24/2014 - Present    Overview Signed 8/29/2016 10:21 PM by Efrain URBANO     Prior heart attack             RESOLVED: Intractable migraine without aura and without status migrainosus ICD-10-CM: S94.345  ICD-9-CM: 346.11  9/9/2017 - 9/10/2017        RESOLVED: Obesity  ICD-10-CM: E66.09  ICD-9-CM: 278.00  11/3/2016 - 2/27/2018        RESOLVED: Stroke (cerebrum) ([facility] ICD-10-CM: I63.9  ICD-9-CM: 434.91  6/6/2016 - 4/24/2018        RESOLVED: CVA (cerebral infarction) ICD-10-CM: I63.9  ICD-9-CM: 434.91  2/20/2015 - 4/3/2015        RESOLVED: Stroke ([address] ICD-10-CM: I63.9  ICD-9-CM: 434.91  2/23/2014 - 4/3/2015    Overview Addendum 3/24/2015 11:06 AM by Linh OG     Partial paralysis on left side of face, left visual field loss, generalized weakness on left side (hand and foot)  Just hospitalized at [facility] 2/7/15-2/10/15.  CT negative, but Late effect CV accident with increased tone described on discharge summary                   Greater than 30 minutes were spent with the patient on counseling and coordination of care    Signed:   Attila Jones MD  1/4/2022  1:23 PM

## 2022-01-04 NOTE — PROGRESS NOTES
Transition of Care: SNF; the [facility] has accepted; insurance authorized today- [facility]#[id number], valid- 1/4-1/8/22        Transport Plan: medicaid transport set up for 3pm pickup today; CM lead set up and will  change location address with [phone]     RUR: 10%     Main contact is antonino Corona March- [phone]          [id number]: this CM contacted insurance company; spoke to [facility]; patient has been approved to go to the [facility];  [facility]#[id number], valid- 1/4-1/8/22; called the [facility]; spoke to [place]; gave her insurance auth info; patient going to room 603; met with patient at bedside to give her update on discharge; she verbalized understanding; sent perfectserve message to attending to get dc instructions    [id number]- Violettarica Omalley from the [facility] has called; they are unable to accept in their faciltiy today but patient can go to the [address] of [facility] today; this CM called [facility]- [facility]- [facility] go to [facility] 130; updated patient at bedside; she verbalized understanding; also talked to patients sister on phone on change in discharge plan; she verbalized understanding        Medicare pt has received, reviewed, and signed 2nd IM letter informing them of their right to appeal the discharge. Signed copy has been placed on pt bedside chart. Transition of Care Plan to SNF/Rehab    SNF/Rehab Transition:  Patient has been accepted to [address] [place] [address]  and meets criteria for admission. Patient will transported by [facility] transport- and expected to leave at 3pm    Communication to Patient/Family:  Met with patient  and she are agreeable to the transition plan. Communication to SNF/Rehab:  Bedside RN, Bessy Vasquez has been notified to update the transition plan to the facility and call report (phone number [phone]. Discharge information has been updated on the AVS.     Discharge instructions to be fax'd to facility at [facility] #[phone]).          Nursing Please include all hard scripts for controlled substances, med rec and dc summary, and AVS in packet. Reviewed and confirmed with facility, [facility], can manage the patient care needs for the following:     SNF/Rehab Transition:  Patient to follow-up with Home Health:    Reviewed and confirmed with facility, [facility] they can manage the patient care needs for the following:     [place]ison Ladd with (X) only those applicable:    Medication:  [x]  Medications will be available at the facility  []  IV Antibiotics   []  Controlled Substance - hard copy to be sent with patient   []  Weekly Labs   Documents:  [] Hard RX  [x] MAR  [x] Kardex  [x] AVS  [x]Transfer Summary  [x]Discharge   Equipment:  []  CPAP/BiPAP  []  Wound Vacuum  [x]  Dunn or Urinary Device  []  PICC/Central Line  []  Nebulizer  []  Ventilator   Treatment:  []Isolation (for MRSA, VRE, etc.)  []Surgical Drain Management  []Tracheostomy Care  []Dressing Changes  []Dialysis with transportation and chair time. []PEG Care  []Oxygen  []Daily Weights for Heart Failure   Dietary:  []Any diet limitations  []Tube Feedings   []Total Parenteral Management (TPN)   Eligible for Medicaid Long Term Services and Supports  Yes:  [] Eligible for medical assistance or will become eligible within 180 days and UAI completed. [] Provider/Patient and/or support system has requested screening. [] UAI copy provided to patient or responsible party,.  [] UAI unavailable at discharge will send once processed to SNF provider. [] UAI unavailable at discharged mailed to patient  No:   [x] Private pay and is not financially eligible for Medicaid within the next 180 days. [] Reside out-of-state.   [] A residents of a state owned/operated facility that is licensed  by Department [facility] or [facility]  [] Enrollment in [facility] hospice services  [] [address]  [] Patient /Family declines to have screening completed or provide financial information for screening     Financial Resources:  Medicaid    [] Initiated and application pending   [] Full coverage     Advanced Care Plan:  []Surrogate Decision Maker of Care  []POA  []Communicated Code Status (DDNR\", \"Full\")    Other     Financial Resources:  Medicaid    [] Initiated and application pending   [] Full coverage     Advanced Care Plan:  []Surrogate Decision Maker of Care  []POA  []Communicated Code Status  (DDNR\", \"Full\")    Other               NOTE:  patient lives with sister in two story home.  [facility], Sat [phone]. Yaa Noble has [facility] SN[facility]. Nolan Valles is agreeable to SNF if covered under her policy.       Home Situation  Home Environment: Private residence  # Steps to Enter: 3  One/Two Story Residence:  Two story home  Living Alone: No  Current DME Used/Available at Home: Cane, quadcane, wheelchair, and rolling walker  Tub or Shower Type: Tub/Shower combination     CM following  Thom Rehman RN, CRM            Revision History                     Revision History                                                                                                                                    Revision History

## 2022-01-04 NOTE — PROGRESS NOTES
Problem: Mobility Impaired (Adult and Pediatric)  Goal: *Acute Goals and Plan of Care (Insert Text)  Description: FUNCTIONAL STATUS PRIOR TO ADMISSION: Patient was modified independent using a rolling walker or WB quad cane for functional mobility. Has had multiple falls over the past week (more than 4). HOME SUPPORT PRIOR TO ADMISSION: The patient lived with sister who assists as needed. She goes to adult day care 3 days/week. Physical Therapy Goals  Reassessment 12/27/2021; weekly completed 1/4/22, goals appropriate for carryover x7 days  1. Patient will move from supine to sit and sit to supine  and roll side to side in bed with modified independence within 7 day(s). 2.  Patient will transfer from bed to chair and chair to bed with modified independence using the least restrictive device within 7 day(s). 3.  Patient will perform sit to stand with modified independence within 7 day(s). 4.  Patient will ambulate with supervision for 150 feet with the least restrictive device within 7 day(s). 5.  Patient will ascend/descend 15 stairs with 1 handrail(s) with minimal assistance/contact guard assist within 7 day(s). Physical Therapy Goals  Initiated 12/20/2021  1. Patient will move from supine to sit and sit to supine  and roll side to side in bed with modified independence within 7 day(s). 2.  Patient will transfer from bed to chair and chair to bed with modified independence using the least restrictive device within 7 day(s). 3.  Patient will perform sit to stand with modified independence within 7 day(s). 4.  Patient will ambulate with supervision/set-up for 50 feet with the least restrictive device within 7 day(s). 5.  Patient will ascend/descend 15 stairs with 1 handrail(s) with minimal assistance/contact guard assist within 7 day(s).       Outcome: Progressing Towards Goal     PHYSICAL THERAPY TREATMENT: WEEKLY REASSESSMENT  Patient: Sindhu Bonilla (77 y.o. female)  Date: 1/4/2022  Primary Diagnosis: Acute cystitis [N30.00]  Encephalopathy [G93.40]        Precautions:   Fall,Bed Alarm (L AFO; chronic mojica; PMH CVA)      ASSESSMENT  Patient continues with skilled PT services and is progressing towards goals - remains most limited by generalized weakness (L HP at baseline), decreased tolerance to sustained activity, impaired gait, and impaired balance leading to increased falls risk from baseline level. Received pt sitting EOB, very motivate to participate in session. Demos good sitting balance while performing various multilevel reaching tasks and independently donning L AFO. Completed sit<>stands to RW with CGA/SBA and cues for hand placement for safety. Gait training progressed over short distance (requesting to stop in the bathroom) with RW and CGA for safety - demos pronounced L LE ER and drag despite AFO - improved some with cues/facilitation. Remained in bathroom at end of session per request, RN aware, and instructed on use of call string. Continue to recommend discharge to SNF once medically cleared. Will follow. Patient's progression toward goals since last assessment: progressing towards goals     Current Level of Function Impacting Discharge (mobility/balance): CGA/min A with RW    Other factors to consider for discharge: below baseline         PLAN :  Goals have been updated based on progression since last assessment. Patient continues to benefit from skilled intervention to address the above impairments. Recommendations and Planned Interventions: bed mobility training, transfer training, gait training, therapeutic exercises, neuromuscular re-education, orthotic/prosthetic training, patient and family training/education, and therapeutic activities      Frequency/Duration: Patient will be followed by physical therapy:  5 times a week to address goals.     Recommendation for discharge: (in order for the patient to meet his/her long term goals)  Therapy up to 5 days/week in SNF setting    This discharge recommendation:  A follow-up discussion with the attending provider and/or case management is planned    IF patient discharges home will need the following DME: patient owns DME required for discharge         SUBJECTIVE:   Patient stated I might need to sit for an hour, just leave me here.     OBJECTIVE DATA SUMMARY:   HISTORY:    Past Medical History:   Diagnosis Date    Advanced care planning/counseling discussion 3/4/16    On File    Bronchitis 2/24/2014    Cervicalgia 8/18/15    Dr. Ying Pardo    Chest pain 5/25/15    Hospitalized at [facility] 5/25/15 (lab work negative)    Chronic indwelling Dunn catheter 1/24/2018    Initially placed Jan 2018. Mx by Dr. Dago Wan. Congestive heart failure, unspecified     Last Echo 2/8/15: EF 55-60%    Constipation 6/13/2017    Enthesopathy, spinal ([address] 8/18/15    Dr. Aurora Mccormack hypertension     Foot drop 8/18/15    Dr. Vinicius Bliss attack [facility]) 2/24/2013    Was supposed to See Cardiology for possible pacemaker in november 2014- After Cardiology consult locally, no need, EF greatly improved.  Established with Dr. Dennis Porras     Hiatal hernia 6/2015    3 cm hiatal hernia     Hip pain 8/18/15    Dr. Ying Pardo    Hypercholesterolemia     Hyperglycemia 7/2015    A1c 5.9     Hyperlipidemia 6/30/2015    NMR lipoprofile- LDL P 997, LDL-c 71, HLD-C-39, TG-60, HLD-P (25.2), Small LDL-P -541, LDL size 20.6    Hypothyroid     Insomnia 6/13/2017    Lower extremity edema     Lumbar spondylosis 8/18/15    Dr. Vinay Morales 6/2015    EGD/Colonscopy 6/15- Gastritis, internal hemorrhoids and 3 polyps    Menopause     Murmur     EDDIE on CPAP     Was referred to Pulmonology - Uses CPAP    Osteoarthritis of hip 8/18/15    Dr. Can Clark, shoulder 8/18/15    Dr. Ying Pardo    Radiculopathy, cervical 8/18/15    Dr. Ying Pardo    Shoulder pain 8/18/15    Dr. Ying Pardo    Spinal stenosis of cervical region 8/18/15    Dr. Ying Pardo    Spinal stenosis, lumbar 8/18/15    Dr. Lacey Nevarez    Stroke [facility]) 2/25/2014    Established with Neurology, Rudi Caldwell, NP-Just hospitalized at [facility] 2/7/15-2/10/15. CT negative, but Late effect CV accident with increased tone described on discharge summary, Carotid dopplers showed 50% stenosis bilaterally. Vitamin D deficiency 7/2015     Past Surgical History:   Procedure Laterality Date    COLONOSCOPY N/A 6/22/2016    COLONOSCOPY performed by Manuel Simmons MD at [facility] ENDOSCOPY    HX BREAST BIOPSY Right 8/11/15    Stereo Bx - [facility]--- Benign    HX CHOLECYSTECTOMY      HX COLONOSCOPY  6/2015    Dr. Hanna Sifuentes- 3 complete polypectomies, Internal hemorrhoids, difficult study due to spasm. Repeat in 1 year    HX ENDOSCOPY  6/2015    mild gastritis, 3cm hiatal hernia     HX GASTRIC BYPASS  6/1989    HX HEART CATHETERIZATION  2/2014    HX ORTHOPAEDIC      Right middle finger distal amputation    HX PARTIAL THYROIDECTOMY  ~1990    HX THYROIDECTOMY Left 1985    90% of one side of the thyroid removed    IR ASP BLADDER SUPRA CATH  5/24/2019       Personal factors and/or comorbidities impacting plan of care: PMHx    Home Situation  Home Environment: Private residence  # Steps to Enter: 3  Rails to Enter: Yes  One/Two Story Residence: Two story  # of Interior Steps: 15  Living Alone: No  Support Systems: Other Family Member(s) (Lives with sister, attends [employer])  Current DME Used/Available at Home: Cane, quad,Shower chair,Tub transfer bench,Walker,Walker, rollator,Wheelchair (L foot AFO)  Tub or Shower Type: Other (comment) (tub and walk in available)    EXAMINATION/PRESENTATION/DECISION MAKING:   Critical Behavior:  Neurologic State: Alert,Appropriate for age  Orientation Level: Oriented X4  Cognition: Follows commands  Safety/Judgement: Awareness of environment,Fall prevention,Insight into deficits  Hearing:   Auditory  Auditory Impairment: None    Functional Mobility:  Bed Mobility:    Transfers:  Sit to Stand: Stand-by assistance  Stand to Sit: Stand-by assistance        Balance:   Sitting: Intact  Standing: Impaired  Standing - Static: Constant support;Good  Standing - Dynamic : Fair;Constant support  Ambulation/Gait Training:  Distance (ft): 20 Feet (ft)  Assistive Device: Brace/Splint;Gait belt;Walker, rolling  Ambulation - Level of Assistance: Contact guard assistance  Gait Abnormalities: Decreased step clearance; Hemiplegic; Foot drop; Shuffling gait  Speed/Brenda: Slow;Delayed  Step Length: Right shortened;Left shortened     Activity Tolerance:   Good and requires rest breaks    After treatment patient left in no apparent distress:   Call bell within reach and RN aware of pt position and ok'ed    COMMUNICATION/EDUCATION:   The patients plan of care was discussed with: Registered nurse. Fall prevention education was provided and the patient/caregiver indicated understanding., Patient/family have participated as able in goal setting and plan of care. , and Patient/family agree to work toward stated goals and plan of care.     Thank you for this referral.  Cinda Lynne, PT, DPT   Time Calculation: 24 mins

## 2022-01-04 NOTE — PROGRESS NOTES
Report called to [facility] ([phone]), SBAR Given to [employer], left space for further questioning.

## 2022-01-21 ENCOUNTER — HOSPITAL ENCOUNTER (OUTPATIENT)
Dept: PREADMISSION TESTING | Age: 62
Discharge: HOME | End: 2022-01-21

## 2022-01-26 ENCOUNTER — HOSPITAL ENCOUNTER (OUTPATIENT)
Age: 62
Setting detail: OUTPATIENT SURGERY
Discharge: HOME | End: 2022-01-26
Attending: INTERNAL MEDICINE | Admitting: INTERNAL MEDICINE
Payer: MEDICARE

## 2022-01-26 ENCOUNTER — ANESTHESIA (OUTPATIENT)
Dept: ENDOSCOPY | Age: 62
End: 2022-01-26
Payer: MEDICARE

## 2022-01-26 ENCOUNTER — ANESTHESIA EVENT (OUTPATIENT)
Dept: ENDOSCOPY | Age: 62
End: 2022-01-26
Payer: MEDICARE

## 2022-01-26 VITALS
HEART RATE: 63 BPM | WEIGHT: 248 LBS | OXYGEN SATURATION: 98 % | HEIGHT: 66 IN | SYSTOLIC BLOOD PRESSURE: 137 MMHG | RESPIRATION RATE: 11 BRPM | BODY MASS INDEX: 39.86 KG/M2 | DIASTOLIC BLOOD PRESSURE: 82 MMHG | TEMPERATURE: 97.9 F

## 2022-01-26 LAB
COVID-19 RAPID TEST, COVR: NOT DETECTED
SARS-COV-2, COV2: NORMAL
SOURCE, COVRS: NORMAL

## 2022-01-26 PROCEDURE — 76040000019: Performed by: INTERNAL MEDICINE

## 2022-01-26 PROCEDURE — 74011000250 HC RX REV CODE- 250: Performed by: NURSE ANESTHETIST, CERTIFIED REGISTERED

## 2022-01-26 PROCEDURE — 87635 SARS-COV-2 COVID-19 AMP PRB: CPT

## 2022-01-26 PROCEDURE — 76060000031 HC ANESTHESIA FIRST 0.5 HR: Performed by: INTERNAL MEDICINE

## 2022-01-26 PROCEDURE — 74011250636 HC RX REV CODE- 250/636: Performed by: NURSE ANESTHETIST, CERTIFIED REGISTERED

## 2022-01-26 PROCEDURE — 77030010936 HC CLP LIG BSC -C: Performed by: INTERNAL MEDICINE

## 2022-01-26 PROCEDURE — 2709999900 HC NON-CHARGEABLE SUPPLY: Performed by: INTERNAL MEDICINE

## 2022-01-26 PROCEDURE — 77030039825 HC MSK NSL PAP SUPERNO2VA VYRM -B: Performed by: NURSE ANESTHETIST, CERTIFIED REGISTERED

## 2022-01-26 PROCEDURE — 74011250636 HC RX REV CODE- 250/636: Performed by: INTERNAL MEDICINE

## 2022-01-26 DEVICE — WORKING LENGTH 235CM, WORKING CHANNEL 2.8MM
Type: IMPLANTABLE DEVICE | Site: SIGMOID COLON | Status: FUNCTIONAL
Brand: RESOLUTION 360 CLIP

## 2022-01-26 RX ORDER — PROPOFOL 10 MG/ML
INJECTION, EMULSION INTRAVENOUS AS NEEDED
Status: DISCONTINUED | OUTPATIENT
Start: 2022-01-26 | End: 2022-01-26 | Stop reason: HOSPADM

## 2022-01-26 RX ORDER — DEXTROMETHORPHAN/PSEUDOEPHED 2.5-7.5/.8
1.2 DROPS ORAL
Status: DISCONTINUED | OUTPATIENT
Start: 2022-01-26 | End: 2022-01-26 | Stop reason: HOSPADM

## 2022-01-26 RX ORDER — MIDAZOLAM HYDROCHLORIDE 1 MG/ML
.25-5 INJECTION, SOLUTION INTRAMUSCULAR; INTRAVENOUS
Status: DISCONTINUED | OUTPATIENT
Start: 2022-01-26 | End: 2022-01-26 | Stop reason: HOSPADM

## 2022-01-26 RX ORDER — SODIUM CHLORIDE 0.9 % (FLUSH) 0.9 %
5-40 SYRINGE (ML) INJECTION AS NEEDED
Status: DISCONTINUED | OUTPATIENT
Start: 2022-01-26 | End: 2022-01-26 | Stop reason: HOSPADM

## 2022-01-26 RX ORDER — LIDOCAINE HYDROCHLORIDE 20 MG/ML
INJECTION, SOLUTION EPIDURAL; INFILTRATION; INTRACAUDAL; PERINEURAL AS NEEDED
Status: DISCONTINUED | OUTPATIENT
Start: 2022-01-26 | End: 2022-01-26 | Stop reason: HOSPADM

## 2022-01-26 RX ORDER — SODIUM CHLORIDE 0.9 % (FLUSH) 0.9 %
5-40 SYRINGE (ML) INJECTION EVERY 8 HOURS
Status: DISCONTINUED | OUTPATIENT
Start: 2022-01-26 | End: 2022-01-26 | Stop reason: HOSPADM

## 2022-01-26 RX ORDER — FLUMAZENIL 0.1 MG/ML
0.2 INJECTION INTRAVENOUS
Status: DISCONTINUED | OUTPATIENT
Start: 2022-01-26 | End: 2022-01-26 | Stop reason: HOSPADM

## 2022-01-26 RX ORDER — NALOXONE HYDROCHLORIDE 0.4 MG/ML
0.4 INJECTION, SOLUTION INTRAMUSCULAR; INTRAVENOUS; SUBCUTANEOUS
Status: DISCONTINUED | OUTPATIENT
Start: 2022-01-26 | End: 2022-01-26 | Stop reason: HOSPADM

## 2022-01-26 RX ORDER — SODIUM CHLORIDE 9 MG/ML
75 INJECTION, SOLUTION INTRAVENOUS CONTINUOUS
Status: DISCONTINUED | OUTPATIENT
Start: 2022-01-26 | End: 2022-01-26 | Stop reason: HOSPADM

## 2022-01-26 RX ORDER — ATROPINE SULFATE 0.1 MG/ML
0.5 INJECTION INTRAVENOUS
Status: DISCONTINUED | OUTPATIENT
Start: 2022-01-26 | End: 2022-01-26 | Stop reason: HOSPADM

## 2022-01-26 RX ORDER — EPINEPHRINE 0.1 MG/ML
1 INJECTION INTRACARDIAC; INTRAVENOUS
Status: DISCONTINUED | OUTPATIENT
Start: 2022-01-26 | End: 2022-01-26 | Stop reason: HOSPADM

## 2022-01-26 RX ADMIN — PROPOFOL 20 MG: 10 INJECTION, EMULSION INTRAVENOUS at 13:03

## 2022-01-26 RX ADMIN — PROPOFOL 40 MG: 10 INJECTION, EMULSION INTRAVENOUS at 12:55

## 2022-01-26 RX ADMIN — PROPOFOL 40 MG: 10 INJECTION, EMULSION INTRAVENOUS at 12:59

## 2022-01-26 RX ADMIN — SODIUM CHLORIDE: 0.9 INJECTION, SOLUTION INTRAVENOUS at 12:40

## 2022-01-26 RX ADMIN — PROPOFOL 100 MG: 10 INJECTION, EMULSION INTRAVENOUS at 12:52

## 2022-01-26 RX ADMIN — LIDOCAINE HYDROCHLORIDE 60 MG: 20 INJECTION, SOLUTION EPIDURAL; INFILTRATION; INTRACAUDAL; PERINEURAL at 12:52

## 2022-01-26 NOTE — PROCEDURES
Colonoscopy Procedure Note    Elvia Standing  1960  [id number]    Indications:  Please see below. Pre-operative Diagnosis: HX COLON POLYPS    Post-operative Diagnosis: Redundant colon, internal hemorrhoids, poor prep      : Keron Dewitt MD    Referring Provider: Courtney Quiroga PA-C    Sedation:  MAC anesthesia Propofol        Procedure Details:    After detailed informed consent was obtained with all risks and benefits of procedure explained and preoperative exam completed, the patient was taken to the endoscopy suite and placed in the left lateral decubitus position. Upon sequential sedation as per above, a digital rectal exam was performed  and was normal.  The Olympus videocolonoscope  was inserted in the rectum and carefully advanced to the cecum, which was identified by the ileocecal valve and appendiceal orifice. The quality of preparation was inadequate. The colonoscope was slowly withdrawn with careful evaluation between folds. Retroflexion in the rectum was performed. Findings:   · The Olympus video high-definition colonoscope was advanced to the cecum, identified by its typical land marks, with difficulty. She needed counter pressure to reach the cecum. Scope keeps coiling in the sigmoid colon. · Right colon and proximal transverse colon has semi solid stool. · 2 linear mucosal tears noted in the sigmoid and rectosigmoid area, likely from the scope. 2 BS Resolution clips applied to decrease bleed/perforation risk  · Internal hemorrhoids noted      Therapies:  endoclips x 2 placed for prophylaxis    Specimen/s:  none       Complications: None were encountered during the procedure. EBL:  None. Recommendations:     -Repeat colonoscopy with better prep. Keron Dewitt MD  1/26/2022  1:08 PM

## 2022-01-26 NOTE — H&P
Pre-endoscopy H and P    The patient was seen and examined in the room/pre-op holding area. The airway was assessed and documented. The problem list, past medical history, and medications were reviewed. Patient Active Problem List   Diagnosis Code    Coronary artery disease involving native coronary artery of native heart without angina pectoris I25.10    Bronchitis J40    High cholesterol E78.00    History of cardiomyopathy Z86.79    SOB (shortness of breath) R06.02    Neck pain, bilateral M54.2    Shoulder pain, bilateral M25.511, M25.512    Muscle spasticity M62.838    Hemiparesis and alteration of sensations as late effects of stroke ([facility]) I69.359, I69.398    Head pain, chronic R51.9, G89.29    Expressive aphasia R47.01    Heart palpitations R00.2    Ventricular ectopic activity I49.3    Stroke ([facility]) I63.9    Thyroid disease E07.9    Hypercholesterolemia E78.00    EDDIE on CPAP G47.33, Z99.89    Essential hypertension I10    Congestive heart failure ([facility]) I50.9    Chest pain R07.9    Melena K92.1    Hiatal hernia K44.9    Postoperative hypothyroidism E89.0    Chronic pain G89.29    Prediabetes R73.03    Constipation K59.00    Insomnia G47.00    Left-sided weakness R53.1    Vitamin D deficiency E55.9    Obesity, morbid ([facility]) E66.01    Chronic indwelling Dunn catheter Z97.8    Dermatitis L30.9    Obesity (BMI 30-39. 9) E66.9    Anxiety F41.9    Acute cystitis N30.00    Encephalopathy G93.40     Social History     Socioeconomic History    Marital status: SINGLE     Spouse name: Not on file    Number of children: Not on file    Years of education: Not on file    Highest education level: Not on file   Occupational History    Not on file   Tobacco Use    Smoking status: Former Smoker     Packs/day: 0.25     Years: 15.00     Pack years: 3.75     Types: Cigarettes     Quit date: 2007     Years since quittin.6    Smokeless tobacco: Never Used   Vaping Use    Vaping Use: Never used   Substance and Sexual Activity    Alcohol use: No    Drug use: No    Sexual activity: Not Currently   Other Topics Concern    Not on file   Social History Narrative    Not on file     Social Determinants of Health     Financial Resource Strain:     Difficulty of Paying Living Expenses: Not on file   Food Insecurity:     Worried About Running Out of Food in the Last Year: Not on file    Gurjit of Food in the Last Year: Not on file   Transportation Needs:     Lack of Transportation (Medical): Not on file    Lack of Transportation (Non-Medical): Not on file   Physical Activity:     Days of Exercise per Week: Not on file    Minutes of Exercise per Session: Not on file   Stress:     Feeling of Stress : Not on file   Social Connections:     Frequency of Communication with Friends and Family: Not on file    Frequency of Social Gatherings with Friends and Family: Not on file    Attends Restoration Services: Not on file    Active Member of [address] [place] [address] or Organizations: Not on file    Attends Club or Organization Meetings: Not on file    Marital Status: Not on file   Intimate Partner Violence:     Fear of Current or Ex-Partner: Not on file    Emotionally Abused: Not on file    Physically Abused: Not on file    Sexually Abused: Not on file   Housing Stability:     Unable to Pay for Housing in the Last Year: Not on file    Number of [place] in the Last Year: Not on file    Unstable Housing in the Last Year: Not on file     Past Medical History:   Diagnosis Date    Advanced care planning/counseling discussion 3/4/16    On File    Bronchitis 2/24/2014    Cervicalgia 8/18/15    Dr. Flores Said    Chest pain 5/25/15    [facility] (lab work negative)    Chronic indwelling Dunn catheter 1/24/2018    Initially placed Jan 2018. Mx by Dr. Artur Gonzalez.      Congestive heart failure, unspecified     Last Echo 2/8/15: EF 55-60%    Constipation 6/13/2017    Enthesopathy, spinal ([address] 8/18/15    Dr. Nava Reveal    Essential hypertension     Foot drop 8/18/15    Dr. Omar Nowak Heart attack [facility]) 2/24/2013    Was supposed to See Cardiology for possible pacemaker in november 2014- After Cardiology consult locally, no need, EF greatly improved. Established with Dr. Bethanie Marcial Hiatal hernia 6/2015    3 cm hiatal hernia     Hip pain 8/18/15    Dr. Nava Reveal    Hypercholesterolemia     Hyperglycemia 7/2015    A1c 5.9     Hyperlipidemia 6/30/2015    NMR lipoprofile- LDL P 997, LDL-c 71, HLD-C-39, TG-60, HLD-P (25.2), Small LDL-P -541, LDL size 20.6    Hypothyroid     Insomnia 6/13/2017    Lower extremity edema     Lumbar spondylosis 8/18/15    Dr. Padmini Graham 6/2015    EGD/Colonscopy 6/15- Gastritis, internal hemorrhoids and 3 polyps    Menopause     Murmur     EDDIE on CPAP     Was referred to Pulmonology - Uses CPAP    Osteoarthritis of hip 8/18/15    Dr. Nava Reveal    Osteoarthritis, shoulder 8/18/15    Dr. Omar Nowak Radiculopathy, cervical 8/18/15    Dr. Nava Reveal    Shoulder pain 8/18/15    Dr. Omar Nowak Spinal stenosis of cervical region 8/18/15    Dr. Omar Nowak Spinal stenosis, lumbar 8/18/15    Dr. Omar Nowak Stroke [facility]) 2/25/2014    Established with Neurology, Chris Elkins NP-Just hospitalized at [facility] 2/7/15-2/10/15. CT negative, but Late effect CV accident with increased tone described on discharge summary, Carotid dopplers showed 50% stenosis bilaterally.  Vitamin D deficiency 7/2015         Prior to Admission Medications   Prescriptions Last Dose Informant Patient Reported? Taking? BD Single Use Swabs Regular padm   Yes No   Sig: Check Blood sugar ONCE daily   Botox 200 unit injection   No No   Sig: INJECT INTO THE BILATERAL MUSCLES OF THE HEAD AND NECK BY PRESCRIBER IN OFFICE FOR MIGRAINES EVERY 3 MONTHS. DISCARD UNUSED PORTION   DULoxetine (CYMBALTA) 60 mg capsule 1/25/2022 at Unknown time  No Yes   Sig: Take 2 Caps by mouth daily.    FREESTYLE LANCETS 28 gauge misc   Yes No   Sig: USE TO CHECK GLUCOSE TWICE DAILY   FREESTYLE LITE METER monitoring kit   Yes No   Sig: USE TO CHECK GLUCOSE ONCE DAILY   FREESTYLE LITE STRIPS strip   Yes No   Sig: USE STRIP TO CHECK GLUCOSE TWICE DAILY   Needle, Disp, 25 G 25 gauge x 3/4\" ndle   No No   Sig: Use bimonthly for cyanocobalamin subcutaneous injection. Syringe, Disposable, 1 mL syrg   No No   Sig: Use bimonthly for cyanocobalamin subcutaneous injection. albuterol-ipratropium (DUO-NEB) 2.5 mg-0.5 mg/3 ml nebu Unknown at Unknown time  No No   Sig: 3 mL by Nebulization route every six (6) hours as needed. aspirin delayed-release 81 mg tablet   Yes No   Sig: Take 81 mg by mouth daily. atorvastatin (LIPITOR) 40 mg tablet 1/25/2022 at Unknown time  No Yes   Sig: Take 1 tablet by mouth once daily   baclofen (LIORESAL) 20 mg tablet 1/25/2022 at Unknown time  Yes Yes   Sig: TAKE 1 TABLET BY MOUTH EVERY 8 HOURS AS NEEDED FOR MUSCLE SPASM(S)   calcipotriene (DOVONEX) 0.005 % topical cream Unknown at Unknown time  No No   Sig: Apply  to affected area daily as needed. carvediloL (COREG) 12.5 mg tablet 1/22/2022  No No   Sig: Take 1 Tablet by mouth two (2) times daily (with meals). cholecalciferol (VITAMIN D3) (5000 Units /125 mcg) capsule 1/19/2022 at Unknown time  No Yes   Sig: Take 1 Capsule by mouth daily. ciclopirox (PENLAC) 8 % solution   Yes No   Sig: APPLY TO AFFECTED TOE NAILS DAILY   cyanocobalamin (VITAMIN B12) 1,000 mcg/mL injection 1/19/2022 at Unknown time  No Yes   Sig: INJECT 1 ML UNDER THE SKIN EVERY 30 DAYS FOR B12 DEFICIENCY  Indications: inadequate vitamin B12   Patient taking differently: INJECT 1 ML UNDER THE SKIN EVERY 14 DAYS FOR B12 DEFICIENCY  Indications: inadequate vitamin B12   diclofenac (VOLTAREN) 1 % gel   Yes No   Sig: APPLY TOPICALLY TO AFFECTED AREA ONCE DAILY   ferrous sulfate 325 mg (65 mg iron) tablet 1/19/2022 at Unknown time  Yes Yes   Sig: Take 325 mg by mouth Daily (before breakfast).    fluticasone propionate (FLONASE) 50 mcg/actuation nasal spray   No No   Si Sprays by Both Nostrils route daily. furosemide (LASIX) 40 mg tablet 2022 at Unknown time  No Yes   Sig: Take 1 Tablet by mouth daily. gabapentin (NEURONTIN) 100 mg capsule 2022 at Unknown time  Yes Yes   Sig: Take 100 mg by mouth three (3) times daily as needed for Pain.   galcanezumab-gnlm (Emgality Syringe) 120 mg/mL syrg 2021 at Unknown time  No Yes   Si Syringe by SubCUTAneous route every thirty (30) days. Start 30 days after the loading dose   hydroxyzine HCL (ATARAX) 50 mg tablet 2022  Yes No   Sig: TAKE 1 TABLET BY MOUTH TWICE DAILY AS NEEDED FOR ANXIETY   levothyroxine (Euthyrox) 125 mcg tablet 2022 at Unknown time  No Yes   Sig: Take 1 Tablet by mouth Daily (before breakfast). linaCLOtide (Linzess) 145 mcg cap capsule 2022 at Unknown time  No Yes   Sig: TAKE 1 CAPSULE BY MOUTH ONCE DAILY BEFORE BREAKFAST FOR CONSTIPATION   losartan (COZAAR) 50 mg tablet Unknown at Unknown time  No No   Sig: Take 1 Tab by mouth nightly. Hold for SBP<115   metFORMIN ER (GLUCOPHAGE XR) 500 mg tablet 2022 at Unknown time  No Yes   Sig: TAKE 1 TABLET BY MOUTH ONCE DAILY WITH SUPPER   miscellaneous medical supply misc   No No   Sig: Rollator Dx:I69.359   miscellaneous medical supply misc   No No   Sig: Walker Dx: I69.359   montelukast (SINGULAIR) 10 mg tablet 2022 at Unknown time  Yes Yes   omega-3 acid ethyl esters (LOVAZA) 1 gram capsule 2022 at Unknown time  No Yes   Sig: Take 2 Caps by mouth two (2) times a day. pantoprazole (PROTONIX) 40 mg tablet 2022 at Unknown time  No Yes   Sig: Take 1 Tab by mouth daily.    topiramate (TOPAMAX) 100 mg tablet 2022 at Unknown time  No Yes   Sig: Take 1 tablet by mouth twice daily      Facility-Administered Medications: None           The review of systems is:  Negative  for shortness of breath or chest pain      The heart, lungs, and mental status were satisfactory for the administration of deep sedation and for the procedure. I discussed with the patient the objectives, risks, consequences and alternatives to the procedure.       Suzan Stone MD  1/26/2022  12:46 PM

## 2022-01-26 NOTE — ROUTINE PROCESS
Radha Delgado  1960  [id number]    Situation:  Verbal report received from: Stewart Jung  Procedure: Procedure(s):  COLONOSCOPY, needs rapid  RESOLUTION CLIP X 2    Background:    Preoperative diagnosis: HX COLON POLYPS  Postoperative diagnosis: hemorrhoids, poor prep    :  Dr. Misael Guzman  Assistant(s): Endoscopy Technician-1: Michael Hebert  Endoscopy RN-1: Sada Singleton RN    Specimens: * No specimens in log *  H. Pylori  no    Assessment:  Intra-procedure medications       Anesthesia gave intra-procedure sedation and medications, see anesthesia flow sheet yes    Intravenous fluids: NS@ KVO     Vital signs stable yes    Abdominal assessment: round and soft yes    Recommendation:  }

## 2022-01-26 NOTE — DISCHARGE INSTRUCTIONS
[place] Office: [phone]    Gene January  [id number]  1960    EGD/COLONOSCOPY DISCHARGE INSTRUCTIONS  Discomfort:  Sore throat- throat lozenges or warm salt water gargle  redness at IV site- apply warm compress to area; if redness or soreness persist- contact your physician  Gaseous discomfort- walking, belching will help relieve any discomfort  You may not operate a vehicle for 12 hours  You may not engage in an occupation involving machinery or appliances for rest of today. You may not drink alcoholic beverages for at least 12 hours  Avoid making any critical decisions for at least 24 hour  DIET  You may resume your regular diet - however -  remember your colon is empty and a heavy meal will produce gas. Avoid these foods:  fried / greasy foods, excessive carbonated drinks or too much caffeine  MEDICATIONS   Regarding Aspirin or Nonsteroidal medications specifically, please see below. ACTIVITY  You may resume your normal daily activities. Spend the remainder of the day resting -  avoid any strenuous activity. CALL M.D. ANY SIGN OF   Increasing pain, nausea, vomiting  Abdominal distension (swelling)  New increased bleeding (oral or rectal)  Fever (chills)  Pain in chest area  Bloody discharge from nose or mouth  Shortness of breath    You may take any Advil, Aspirin, Ibuprofen, Motrin, Aleve, or  Tylenol as needed for pain. Follow-up Instructions:   Call  Keron Amos MD for any questions or concerns     Telephone # [phone]      Follow-up Information    None

## 2022-01-26 NOTE — ANESTHESIA POSTPROCEDURE EVALUATION
Procedure(s):  COLONOSCOPY, needs rapid  RESOLUTION CLIP X 2. MAC    Anesthesia Post Evaluation      Multimodal analgesia: multimodal analgesia used between 6 hours prior to anesthesia start to PACU discharge  Patient location during evaluation: PACU  Level of consciousness: sleepy but conscious  Pain management: adequate  Airway patency: patent  Anesthetic complications: no  Cardiovascular status: acceptable  Respiratory status: acceptable  Hydration status: acceptable  Comments: +Post-Anesthesia Evaluation and Assessment    Patient: Mariano Olmos MRN: [id number]  SSN: [id number]   YOB: 1960  Age: 59 y.o. Sex: female      Cardiovascular Function/Vital Signs    /82   Pulse 63   Temp 36.6 °C (97.9 °F)   Resp 11   Ht 5' 6\" (1.676 m)   Wt 112.5 kg (248 lb)   SpO2 98%   Breastfeeding No   BMI 40.03 kg/m²     Patient is status post Procedure(s):  COLONOSCOPY, needs rapid  RESOLUTION CLIP X 2. Nausea/Vomiting: Controlled. Postoperative hydration reviewed and adequate. Pain:  Pain Scale 1: Numeric (0 - 10) (01/26/22 1335)  Pain Intensity 1: 0 (01/26/22 1335)   Managed. Neurological Status: At baseline. Mental Status and Level of Consciousness: Arousable. Pulmonary Status:   O2 Device: None (Room air) (01/26/22 1335)   Adequate oxygenation and airway patent. Complications related to anesthesia: None    Post-anesthesia assessment completed. No concerns. Signed By: Aubree Broussard MD    1/26/2022  Post anesthesia nausea and vomiting:  controlled  Final Post Anesthesia Temperature Assessment:  Normothermia (36.0-37.5 degrees C)      INITIAL Post-op Vital signs:   Vitals Value Taken Time   /81 01/26/22 1336   Temp 36.6 °C (97.9 °F) 01/26/22 1319   Pulse 67 01/26/22 1339   Resp 17 01/26/22 1339   SpO2 99 % 01/26/22 1338   Vitals shown include unvalidated device data.

## 2022-01-26 NOTE — PROGRESS NOTES
5  Endoscope was pre-cleaned at bedside immediately following procedure by Adis alexander.      1314  . Received report from anesthesia. Assumed pt care. VSS.

## 2022-01-26 NOTE — ANESTHESIA PREPROCEDURE EVALUATION
Anesthetic History   No history of anesthetic complications            Review of Systems / Medical History  Patient summary reviewed, nursing notes reviewed and pertinent labs reviewed    Pulmonary        Sleep apnea: CPAP           Neuro/Psych       CVA (post cath)  TIA     Cardiovascular    Hypertension: well controlled        Dysrhythmias : PVC  Past MI (2013), CAD (no signif CAD on Cath) and hyperlipidemia    Exercise tolerance: <4 METS: Uses walker  Comments: TTE (2017): normal EF, G1ddf   GI/Hepatic/Renal     GERD (not completely controlled on med): poorly controlled      Hiatal hernia     Endo/Other      Hypothyroidism: well controlled  Morbid obesity and arthritis (lumbar & cervical spinal stenosisi)     Other Findings            Physical Exam    Airway  Mallampati: II  TM Distance: 4 - 6 cm  Neck ROM: decreased range of motion   Mouth opening: Normal     Cardiovascular    Rhythm: regular  Rate: normal      Pertinent negatives: No murmur   Dental    Dentition: Full upper dentures and Full lower dentures     Pulmonary  Breath sounds clear to auscultation               Abdominal  GI exam deferred       Other Findings            Anesthetic Plan    ASA: 3  Anesthesia type: MAC          Induction: Intravenous  Anesthetic plan and risks discussed with: Patient      Supernova CPAP

## 2022-02-01 ENCOUNTER — OFFICE VISIT (OUTPATIENT)
Dept: CARDIOLOGY CLINIC | Age: 62
End: 2022-02-01
Payer: MEDICARE

## 2022-02-01 VITALS
HEART RATE: 70 BPM | BODY MASS INDEX: 39.37 KG/M2 | RESPIRATION RATE: 18 BRPM | HEIGHT: 66 IN | SYSTOLIC BLOOD PRESSURE: 100 MMHG | WEIGHT: 245 LBS | DIASTOLIC BLOOD PRESSURE: 66 MMHG

## 2022-02-01 DIAGNOSIS — I69.398 HEMIPARESIS AND ALTERATION OF SENSATIONS AS LATE EFFECTS OF STROKE (HCC): ICD-10-CM

## 2022-02-01 DIAGNOSIS — I10 ESSENTIAL HYPERTENSION: ICD-10-CM

## 2022-02-01 DIAGNOSIS — R53.1 LEFT-SIDED WEAKNESS: ICD-10-CM

## 2022-02-01 DIAGNOSIS — E78.00 HIGH CHOLESTEROL: ICD-10-CM

## 2022-02-01 DIAGNOSIS — Z86.79 HISTORY OF CARDIOMYOPATHY: ICD-10-CM

## 2022-02-01 DIAGNOSIS — I50.9 CHRONIC CONGESTIVE HEART FAILURE, UNSPECIFIED HEART FAILURE TYPE (HCC): ICD-10-CM

## 2022-02-01 DIAGNOSIS — I69.359 HEMIPARESIS AND ALTERATION OF SENSATIONS AS LATE EFFECTS OF STROKE (HCC): ICD-10-CM

## 2022-02-01 DIAGNOSIS — R00.2 HEART PALPITATIONS: ICD-10-CM
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
## 2022-02-01 DIAGNOSIS — E66.01 OBESITY, MORBID (HCC): ICD-10-CM

## 2022-02-01 DIAGNOSIS — K21.9 GASTROESOPHAGEAL REFLUX DISEASE, UNSPECIFIED WHETHER ESOPHAGITIS PRESENT: ICD-10-CM

## 2022-02-01 DIAGNOSIS — R73.03 PREDIABETES: ICD-10-CM

## 2022-02-01 DIAGNOSIS — E78.00 HYPERCHOLESTEROLEMIA: Primary | ICD-10-CM

## 2022-02-01 DIAGNOSIS — R06.02 SOB (SHORTNESS OF BREATH): ICD-10-CM

## 2022-02-01 DIAGNOSIS — I25.10 CORONARY ARTERY DISEASE INVOLVING NATIVE CORONARY ARTERY OF NATIVE HEART WITHOUT ANGINA PECTORIS: ICD-10-CM

## 2022-02-01 PROCEDURE — G8754 DIAS BP LESS 90: HCPCS | Performed by: SPECIALIST

## 2022-02-01 PROCEDURE — G9899 SCRN MAM PERF RSLTS DOC: HCPCS | Performed by: SPECIALIST

## 2022-02-01 PROCEDURE — 99214 OFFICE O/P EST MOD 30 MIN: CPT | Performed by: SPECIALIST

## 2022-02-01 PROCEDURE — G8427 DOCREV CUR MEDS BY ELIG CLIN: HCPCS | Performed by: SPECIALIST

## 2022-02-01 PROCEDURE — G8510 SCR DEP NEG, NO PLAN REQD: HCPCS | Performed by: SPECIALIST

## 2022-02-01 PROCEDURE — G8752 SYS BP LESS 140: HCPCS | Performed by: SPECIALIST

## 2022-02-01 PROCEDURE — G8417 CALC BMI ABV UP PARAM F/U: HCPCS | Performed by: SPECIALIST

## 2022-02-01 PROCEDURE — 3017F COLORECTAL CA SCREEN DOC REV: CPT | Performed by: SPECIALIST

## 2022-02-01 PROCEDURE — 1111F DSCHRG MED/CURRENT MED MERGE: CPT | Performed by: SPECIALIST

## 2022-02-01 RX ORDER — TRAZODONE HYDROCHLORIDE 300 MG/1
300 TABLET ORAL
COMMUNITY
End: 2022-06-10

## 2022-02-01 RX ORDER — FUROSEMIDE 40 MG/1
40 TABLET ORAL DAILY
Qty: 90 TABLET | Refills: 3 | Status: SHIPPED | OUTPATIENT
Start: 2022-02-01

## 2022-02-01 RX ORDER — OXYCODONE AND ACETAMINOPHEN 10; 325 MG/1; MG/1
1 TABLET ORAL AS NEEDED
COMMUNITY
Start: 2022-01-18

## 2022-02-01 RX ORDER — ATORVASTATIN CALCIUM 40 MG/1
TABLET, FILM COATED ORAL
Qty: 90 TABLET | Refills: 3 | Status: SHIPPED | OUTPATIENT
Start: 2022-02-01

## 2022-02-03 DIAGNOSIS — E89.0 POSTOPERATIVE HYPOTHYROIDISM: Chronic | ICD-10-CM

## 2022-02-03 NOTE — TELEPHONE ENCOUNTER
PCP: Eddi Gandhi PA-C     Last appt: 9/21/2021   Future Appointments   Date Time Provider Denilson Thompson   2/11/2022 11:30 AM MAKEDA Moreno BS AMB   3/24/2022  9:20 AM Vanessa Drew DO NEUSM BS AMB   8/2/2022 10:00 AM MD YESENIA Buckner BS AMB        Requested Prescriptions     Pending Prescriptions Disp Refills    levothyroxine (Euthyrox) 125 mcg tablet 90 Tablet 0     Sig: Take 1 Tablet by mouth Daily (before breakfast).

## 2022-02-03 NOTE — TELEPHONE ENCOUNTER
Requested Prescriptions     Pending Prescriptions Disp Refills    levothyroxine (Euthyrox) 125 mcg tablet 90 Tablet 0     Sig: Take 1 Tablet by mouth Daily (before breakfast).

## 2022-02-04 RX ORDER — LEVOTHYROXINE SODIUM 125 UG/1
125 TABLET ORAL
Qty: 90 TABLET | Refills: 0 | Status: SHIPPED | OUTPATIENT
Start: 2022-02-04 | End: 2022-04-25 | Stop reason: SDUPTHER

## 2022-02-11 ENCOUNTER — TELEPHONE (OUTPATIENT)
Dept: INTERNAL MEDICINE CLINIC | Age: 62
End: 2022-02-11

## 2022-02-11 ENCOUNTER — HOME HEALTH ADMISSION (OUTPATIENT)
Dept: HOME HEALTH SERVICES | Facility: HOME HEALTH | Age: 62
End: 2022-02-11
Payer: MEDICARE

## 2022-02-11 ENCOUNTER — OFFICE VISIT (OUTPATIENT)
Dept: INTERNAL MEDICINE CLINIC | Age: 62
End: 2022-02-11
Payer: MEDICARE

## 2022-02-11 VITALS
HEART RATE: 75 BPM | DIASTOLIC BLOOD PRESSURE: 87 MMHG | HEIGHT: 66 IN | OXYGEN SATURATION: 98 % | WEIGHT: 248 LBS | BODY MASS INDEX: 39.86 KG/M2 | RESPIRATION RATE: 18 BRPM | TEMPERATURE: 98.5 F | SYSTOLIC BLOOD PRESSURE: 118 MMHG

## 2022-02-11 DIAGNOSIS — I50.9 CHRONIC CONGESTIVE HEART FAILURE, UNSPECIFIED HEART FAILURE TYPE (HCC): ICD-10-CM

## 2022-02-11 DIAGNOSIS — F41.9 ANXIETY: ICD-10-CM

## 2022-02-11 DIAGNOSIS — I69.359 HEMIPARESIS AND ALTERATION OF SENSATIONS AS LATE EFFECTS OF STROKE (HCC): ICD-10-CM

## 2022-02-11 DIAGNOSIS — R26.9 ABNORMAL GAIT: ICD-10-CM

## 2022-02-11 DIAGNOSIS — I69.398 HEMIPARESIS AND ALTERATION OF SENSATIONS AS LATE EFFECTS OF STROKE (HCC): ICD-10-CM

## 2022-02-11 DIAGNOSIS — N32.89 BLADDER SPASM: ICD-10-CM

## 2022-02-11 DIAGNOSIS — R29.6 REPEATED FALLS: ICD-10-CM

## 2022-02-11 DIAGNOSIS — G47.33 OSA ON CPAP: ICD-10-CM

## 2022-02-11 DIAGNOSIS — I10 ESSENTIAL HYPERTENSION: ICD-10-CM

## 2022-02-11 DIAGNOSIS — E53.9 VITAMIN B DEFICIENCY: ICD-10-CM

## 2022-02-11 DIAGNOSIS — Z99.89 OSA ON CPAP: ICD-10-CM

## 2022-02-11 DIAGNOSIS — E66.01 OBESITY, MORBID (HCC): ICD-10-CM

## 2022-02-11 DIAGNOSIS — I63.9 CEREBROVASCULAR ACCIDENT (CVA), UNSPECIFIED MECHANISM (HCC): Primary | Chronic | ICD-10-CM

## 2022-02-11 DIAGNOSIS — E55.9 VITAMIN D DEFICIENCY: ICD-10-CM

## 2022-02-11 DIAGNOSIS — E89.0 POSTOPERATIVE HYPOTHYROIDISM: Chronic | ICD-10-CM

## 2022-02-11 DIAGNOSIS — R73.03 PREDIABETES: ICD-10-CM

## 2022-02-11 DIAGNOSIS — I25.10 CORONARY ARTERY DISEASE INVOLVING NATIVE CORONARY ARTERY OF NATIVE HEART WITHOUT ANGINA PECTORIS: ICD-10-CM

## 2022-02-11 DIAGNOSIS — R53.83 FATIGUE, UNSPECIFIED TYPE: ICD-10-CM

## 2022-02-11 DIAGNOSIS — E78.00 HYPERCHOLESTEROLEMIA: ICD-10-CM

## 2022-02-11 PROCEDURE — G8427 DOCREV CUR MEDS BY ELIG CLIN: HCPCS | Performed by: PHYSICIAN ASSISTANT

## 2022-02-11 PROCEDURE — 99215 OFFICE O/P EST HI 40 MIN: CPT | Performed by: PHYSICIAN ASSISTANT

## 2022-02-11 PROCEDURE — G8752 SYS BP LESS 140: HCPCS | Performed by: PHYSICIAN ASSISTANT

## 2022-02-11 PROCEDURE — G8417 CALC BMI ABV UP PARAM F/U: HCPCS | Performed by: PHYSICIAN ASSISTANT

## 2022-02-11 PROCEDURE — G8754 DIAS BP LESS 90: HCPCS | Performed by: PHYSICIAN ASSISTANT

## 2022-02-11 PROCEDURE — G9899 SCRN MAM PERF RSLTS DOC: HCPCS | Performed by: PHYSICIAN ASSISTANT

## 2022-02-11 PROCEDURE — 3017F COLORECTAL CA SCREEN DOC REV: CPT | Performed by: PHYSICIAN ASSISTANT

## 2022-02-11 PROCEDURE — G8432 DEP SCR NOT DOC, RNG: HCPCS | Performed by: PHYSICIAN ASSISTANT

## 2022-02-11 RX ORDER — HYDROXYZINE 50 MG/1
TABLET, FILM COATED ORAL
Qty: 60 TABLET | Refills: 2 | Status: SHIPPED | OUTPATIENT
Start: 2022-02-11 | End: 2022-06-10 | Stop reason: SDUPTHER

## 2022-02-11 RX ORDER — TROSPIUM CHLORIDE 20 MG/1
20 TABLET, FILM COATED ORAL
COMMUNITY
End: 2022-02-11 | Stop reason: SDUPTHER

## 2022-02-11 RX ORDER — CYANOCOBALAMIN 1000 UG/ML
INJECTION, SOLUTION INTRAMUSCULAR; SUBCUTANEOUS
Qty: 10 ML | Refills: 2 | Status: SHIPPED | OUTPATIENT
Start: 2022-02-11 | End: 2022-10-07

## 2022-02-11 RX ORDER — TROSPIUM CHLORIDE 20 MG/1
20 TABLET, FILM COATED ORAL
Qty: 60 TABLET | Refills: 5 | Status: SHIPPED | OUTPATIENT
Start: 2022-02-11 | End: 2022-09-16

## 2022-02-11 NOTE — PATIENT INSTRUCTIONS
Letters will be sent to insurance for Foldable ramp and Chair lift in house. Long term disability paperwork will be completed.      Home health PT will be contacted for you for referral

## 2022-02-11 NOTE — PROGRESS NOTES
Brent Riley is a 59y.o. year old female seen in clinic today for   Chief Complaint   Patient presents with    Follow-up     hospital and nursing home, pt had UTI and fell multiple times, went to ED and went to Hospital then nursing facility     Heart Problem    Hypertension       she is here today to follow up for multiple complaints. Hx of CVA in 2014 during cardiac cath. L sided weakness and numbess since. Hx of CHF and CAD followed by Dr. Sabina Greco. Has chronic pain associated with chronic neck issues. Followed by Dr. Carlos Pugh for oxycodone use and neurontin. See's Dr. Elizabeth Gusman for Neurology. Was hospitalized with UTI after multiple falls in December and in hospital for 2 weeks at [facility]. Discharged out to rehab facility where she lived for several weeks getting PT. She is now home and is struggling. Still feels like she is stumbling often. No actual falls. Has pain in her L foot. Notes when she takes her brace off she really struggles. No CP, edema, SOB. Requesting Letter of medical necessity for stair lift in house. Not able to get up to her room. Needing to use more wheel chair, both travel and in house. Also requesting Letter of medical necessity for foldable ramp for same reasons. Brings with her long term disability paperwork today. Current Outpatient Medications on File Prior to Visit   Medication Sig Dispense Refill    trospium (SANCTURA) 20 mg tablet Take 20 mg by mouth Before breakfast and dinner.  levothyroxine (Euthyrox) 125 mcg tablet Take 1 Tablet by mouth Daily (before breakfast). 90 Tablet 0    oxyCODONE-acetaminophen (PERCOCET 10)  mg per tablet as needed.  traZODone (DESYREL) 300 mg tablet Take 300 mg by mouth nightly.  atorvastatin (LIPITOR) 40 mg tablet Take 1 tablet by mouth once daily 90 Tablet 3    furosemide (LASIX) 40 mg tablet Take 1 Tablet by mouth daily.  90 Tablet 3    montelukast (SINGULAIR) 10 mg tablet       topiramate (TOPAMAX) 100 mg tablet Take 1 tablet by mouth twice daily 180 Tablet 3    linaCLOtide (Linzess) 145 mcg cap capsule TAKE 1 CAPSULE BY MOUTH ONCE DAILY BEFORE BREAKFAST FOR CONSTIPATION 90 Capsule 0    carvediloL (COREG) 12.5 mg tablet Take 1 Tablet by mouth two (2) times daily (with meals). 180 Tablet 3    metFORMIN ER (GLUCOPHAGE XR) 500 mg tablet TAKE 1 TABLET BY MOUTH ONCE DAILY WITH SUPPER 90 Tablet 3    baclofen (LIORESAL) 20 mg tablet TAKE 1 TABLET BY MOUTH EVERY 8 HOURS AS NEEDED FOR MUSCLE SPASM(S)      cholecalciferol (VITAMIN D3) (5000 Units /125 mcg) capsule Take 1 Capsule by mouth daily. 90 Capsule 3    Botox 200 unit injection INJECT INTO THE BILATERAL MUSCLES OF THE HEAD AND NECK BY PRESCRIBER IN OFFICE FOR MIGRAINES EVERY 3 MONTHS. DISCARD UNUSED PORTION 1 Vial 3    losartan (COZAAR) 50 mg tablet Take 1 Tab by mouth nightly. Hold for SBP<115 90 Tab 3    pantoprazole (PROTONIX) 40 mg tablet Take 1 Tab by mouth daily. 90 Tab 3    BD Single Use Swabs Regular padm Check Blood sugar ONCE daily      galcanezumab-gnlm (Emgality Syringe) 120 mg/mL syrg 1 Syringe by SubCUTAneous route every thirty (30) days. Start 30 days after the loading dose 1 Syringe 11    Syringe, Disposable, 1 mL syrg Use bimonthly for cyanocobalamin subcutaneous injection. 25 Syringe 0    Needle, Disp, 25 G 25 gauge x 3/4\" ndle Use bimonthly for cyanocobalamin subcutaneous injection. 1 Box 0    fluticasone propionate (FLONASE) 50 mcg/actuation nasal spray 2 Sprays by Both Nostrils route daily. 1 Bottle 0    gabapentin (NEURONTIN) 100 mg capsule Take 100 mg by mouth three (3) times daily as needed for Pain.       FREESTYLE LITE STRIPS strip USE STRIP TO CHECK GLUCOSE TWICE DAILY  3    FREESTYLE LITE METER monitoring kit USE TO CHECK GLUCOSE ONCE DAILY  0    ciclopirox (PENLAC) 8 % solution APPLY TO AFFECTED TOE NAILS DAILY      diclofenac (VOLTAREN) 1 % gel APPLY TOPICALLY TO AFFECTED AREA ONCE DAILY      hydroxyzine HCL (ATARAX) 50 mg tablet TAKE 1 TABLET BY MOUTH TWICE DAILY AS NEEDED FOR ANXIETY      FREESTYLE LANCETS 28 gauge misc USE TO CHECK GLUCOSE TWICE DAILY      miscellaneous medical supply misc Rollator Dx:I69.359 1 Each 0    miscellaneous medical supply [facility] [place] Dx: I69.359 1 Each 0    omega-3 acid ethyl esters (LOVAZA) 1 gram capsule Take 2 Caps by mouth two (2) times a day. 120 Cap 2    DULoxetine (CYMBALTA) 60 mg capsule Take 2 Caps by mouth daily. 180 Cap 0    calcipotriene (DOVONEX) 0.005 % topical cream Apply  to affected area daily as needed. 60 g 11    albuterol-ipratropium (DUO-NEB) 2.5 mg-0.5 mg/3 ml nebu 3 mL by Nebulization route every six (6) hours as needed. 30 Nebule 0    ferrous sulfate 325 mg (65 mg iron) tablet Take 325 mg by mouth Daily (before breakfast).  aspirin delayed-release 81 mg tablet Take 81 mg by mouth daily.  cyanocobalamin (VITAMIN B12) 1,000 mcg/mL injection INJECT 1 ML UNDER THE SKIN EVERY 30 DAYS FOR B12 DEFICIENCY  Indications: inadequate vitamin B12 (Patient taking differently: INJECT 1 ML UNDER THE SKIN EVERY 14 DAYS FOR B12 DEFICIENCY  Indications: inadequate vitamin B12) 10 mL 1     No current facility-administered medications on file prior to visit. Allergies   Allergen Reactions    Bees [Hymenoptera Allergenic Extract] Shortness of Breath and Swelling    Strawberry Shortness of Breath and Swelling    Lisinopril Cough     Past Medical History:   Diagnosis Date    Advanced care planning/counseling discussion 3/4/16    On File    Bronchitis 2/24/2014    Cervicalgia 8/18/15    Dr. Cook Martina    Chest pain 5/25/15    Hospitalized at [facility] 5/25/15 (lab work negative)    Chronic indwelling Dunn catheter 1/24/2018    Initially placed Jan 2018. Mx by Dr. Misty Lal.      Congestive heart failure, unspecified     Last Echo 2/8/15: EF 55-60%    Constipation 6/13/2017    Enthesopathy, spinal ([address] 8/18/15    Dr. Rosa Ayala hypertension     Foot drop 8/18/15    Dr. Felix Aopnte Heart attack [facility]) 2/24/2013    Was supposed to See Cardiology for possible pacemaker in november 2014- After Cardiology consult locally, no need, EF greatly improved. Established with Dr. Ferdinand Lockhart Hiatal hernia 6/2015    3 cm hiatal hernia     Hip pain 8/18/15    Dr. Papa Genao    Hypercholesterolemia     Hyperglycemia 7/2015    A1c 5.9     Hyperlipidemia 6/30/2015    NMR lipoprofile- LDL P 997, LDL-c 71, HLD-C-39, TG-60, HLD-P (25.2), Small LDL-P -541, LDL size 20.6    Hypothyroid     Insomnia 6/13/2017    Lower extremity edema     Lumbar spondylosis 8/18/15    Dr. Selena Hector 6/2015    EGD/Colonscopy 6/15- Gastritis, internal hemorrhoids and 3 polyps    Menopause     Murmur     EDDIE on CPAP     Was referred to Pulmonology - Uses CPAP    Osteoarthritis of hip 8/18/15    Dr. Papa Genao    Osteoarthritis, shoulder 8/18/15    Dr. Felix Aponte Radiculopathy, cervical 8/18/15    Dr. Papa Genao    Shoulder pain 8/18/15    Dr. Felix Aponte Spinal stenosis of cervical region 8/18/15    Dr. Felix Aponte Spinal stenosis, lumbar 8/18/15    Dr. Felix Aponte Stroke [facility]) 2/25/2014    Established with Neurology, Sia Bhakta NP-Just hospitalized at [facility] 2/7/15-2/10/15. CT negative, but Late effect CV accident with increased tone described on discharge summary, Carotid dopplers showed 50% stenosis bilaterally.  Vitamin D deficiency 7/2015      Past Surgical History:   Procedure Laterality Date    COLONOSCOPY N/A 6/22/2016    COLONOSCOPY performed by Vilma Pereira MD at [facility] ENDOSCOPY    COLONOSCOPY N/A 1/26/2022    COLONOSCOPY, needs rapid performed by Bisi Zapata MD at [facility] ENDOSCOPY    HX BREAST BIOPSY Right 8/11/15    Stereo Bx - [facility]--- Benign    HX CHOLECYSTECTOMY      HX COLONOSCOPY  6/2015    Dr. Church Esters- 3 complete polypectomies, Internal hemorrhoids, difficult study due to spasm.  Repeat in 1 year    HX ENDOSCOPY  6/2015    mild gastritis, 3cm hiatal hernia     HX GASTRIC BYPASS 1989    HX HEART CATHETERIZATION  2014    HX ORTHOPAEDIC      Right middle finger distal amputation    HX PARTIAL THYROIDECTOMY  ~    HX THYROIDECTOMY Left 1985    90% of one side of the thyroid removed    IR ASP BLADDER SUPRA CATH  2019        Family History   Problem Relation Age of Onset    Heart Disease Father 61    Broken Bones Father         Hip/fell    Hypertension Mother     Heart Disease Brother 62    Broken Bones Brother         Hip/fell    Diabetes Maternal Grandmother         Social History     Socioeconomic History    Marital status: SINGLE     Spouse name: Not on file    Number of children: Not on file    Years of education: Not on file    Highest education level: Not on file   Occupational History    Not on file   Tobacco Use    Smoking status: Former Smoker     Packs/day: 0.25     Years: 15.00     Pack years: 3.75     Types: Cigarettes     Quit date: 2007     Years since quittin.6    Smokeless tobacco: Never Used   Vaping Use    Vaping Use: Never used   Substance and Sexual Activity    Alcohol use: No    Drug use: No    Sexual activity: Not Currently   Other Topics Concern    Not on file   Social History Narrative    Not on file     Social Determinants of Health     Financial Resource Strain:     Difficulty of Paying Living Expenses: Not on file   Food Insecurity:     Worried About Running Out of Food in the Last Year: Not on file    Gurjit of Food in the Last Year: Not on file   Transportation Needs:     Lack of Transportation (Medical): Not on file    Lack of Transportation (Non-Medical):  Not on file   Physical Activity:     Days of Exercise per Week: Not on file    Minutes of Exercise per Session: Not on file   Stress:     Feeling of Stress : Not on file   Social Connections:     Frequency of Communication with Friends and Family: Not on file    Frequency of Social Gatherings with Friends and Family: Not on file    Attends Mu-ism Services: Not on file    Active Member of Clubs or Organizations: Not on file    Attends Club or Organization Meetings: Not on file    Marital Status: Not on file   Intimate Partner Violence:     Fear of Current or Ex-Partner: Not on file    Emotionally Abused: Not on file    Physically Abused: Not on file    Sexually Abused: Not on file   Housing Stability:     Unable to Pay for Housing in the Last Year: Not on file    Number of [place] in the Last Year: Not on file    Unstable Housing in the Last Year: Not on file           Visit Vitals  /87 (BP 1 Location: Left arm, BP Patient Position: Sitting, BP Cuff Size: Adult)   Pulse 75   Temp 98.5 °F (36.9 °C) (Oral)   Resp 18   Ht 5' 6\" (1.676 m)   Wt 248 lb (112.5 kg)   LMP  (LMP Unknown)   SpO2 98%   BMI 40.03 kg/m²       Review of Systems   Constitutional: Negative for chills, fever, malaise/fatigue and weight loss. HENT: Negative for ear pain, hearing loss and sore throat. Respiratory: Negative for cough and shortness of breath. Cardiovascular: Negative for chest pain and leg swelling. Gastrointestinal: Negative for abdominal pain, blood in stool, constipation, diarrhea, heartburn, melena, nausea and vomiting. Genitourinary: Negative for dysuria and hematuria. Musculoskeletal: Positive for myalgias (L foot). Negative for back pain. Skin: Negative for rash. Neurological: Positive for focal weakness. Negative for headaches. Psychiatric/Behavioral: Negative for depression. Physical Exam  Vitals and nursing note reviewed. Constitutional:       Appearance: Normal appearance. She is obese. Comments: Using a cane     HENT:      Head: Normocephalic and atraumatic. Right Ear: External ear normal.      Left Ear: External ear normal.      Nose: Nose normal.      Mouth/Throat:      Mouth: Mucous membranes are moist.      Pharynx: Oropharynx is clear.    Eyes:      Conjunctiva/sclera: Conjunctivae normal.      Pupils: Pupils are equal, round, and reactive to light. Neck:      Vascular: No carotid bruit. Cardiovascular:      Rate and Rhythm: Normal rate and regular rhythm. Pulses: Normal pulses. Heart sounds: Normal heart sounds. Pulmonary:      Effort: Pulmonary effort is normal.      Breath sounds: Normal breath sounds. No wheezing, rhonchi or rales. Abdominal:      General: Abdomen is flat. Bowel sounds are normal. There is no distension. Palpations: There is no mass. Tenderness: There is no abdominal tenderness. There is no guarding or rebound. Musculoskeletal:         General: Normal range of motion. Cervical back: Normal range of motion and neck supple. No rigidity. Right lower leg: No edema. Left lower leg: No edema. Comments: L lower extremity brace in place. Skin:     General: Skin is warm and dry. Capillary Refill: Capillary refill takes less than 2 seconds. Neurological:      General: No focal deficit present. Mental Status: She is alert and oriented to person, place, and time. Sensory: No sensory deficit. Motor: Weakness (L sided, chronic) present. Gait: Gait abnormal.   Psychiatric:         Mood and Affect: Mood normal.         Behavior: Behavior normal.         Thought Content: Thought content normal.          No results found for this or any previous visit (from the past 24 hour(s)). ASSESSMENT AND PLAN  Diagnoses and all orders for this visit:    1. Cerebrovascular accident (CVA), unspecified mechanism ([address]  -     AMB SUPPLY ORDER  -     REFERRAL TO HOME HEALTH    2. Chronic congestive heart failure, unspecified heart failure type ([address]    3. Essential hypertension  -     URINALYSIS W/ REFLEX CULTURE; Future  -     METABOLIC PANEL, COMPREHENSIVE; Future    4. Coronary artery disease involving native coronary artery of native heart without angina pectoris    5. Postoperative hypothyroidism  -     THYROID CASCADE PROFILE; Future    6. Hemiparesis and alteration of sensations as late effects of stroke (HCC)  -     AMB SUPPLY ORDER  -     REFERRAL TO HOME HEALTH    7. EDDIE on CPAP    8. Hypercholesterolemia  -     LIPID PANEL; Future    9. Prediabetes  -     HEMOGLOBIN A1C WITH EAG; Future    10. Vitamin D deficiency  -     VITAMIN D, 25 HYDROXY; Future    11. Obesity, morbid ([facility] [address]  -     [facility]    12. Anxiety  -     hydrOXYzine HCL (ATARAX) 50 mg tablet; TAKE 1 TABLET BY MOUTH TWICE DAILY AS NEEDED FOR ANXIETY    13. Fatigue, unspecified type  -     cyanocobalamin (VITAMIN B12) 1,000 mcg/mL injection; INJECT 1 ML UNDER THE SKIN EVERY 14 DAYS FOR B12 DEFICIENCY  Indications: inadequate vitamin B12  -     CBC WITH AUTOMATED DIFF; Future  -     REFERRAL TO HOME HEALTH    14. Vitamin B deficiency  -     cyanocobalamin (VITAMIN B12) 1,000 mcg/mL injection; INJECT 1 ML UNDER THE SKIN EVERY 14 DAYS FOR B12 DEFICIENCY  Indications: inadequate vitamin B12  -     Needle, Disp, 25 G 25 gauge x 3/4\" ndle; Use bimonthly for cyanocobalamin subcutaneous injection. 15. Repeated falls  -     AMB SUPPLY ORDER  -     REFERRAL TO HOME HEALTH    16. Abnormal gait  -     AMB SUPPLY ORDER  -     REFERRAL TO HOME HEALTH    17. Bladder spasm  -     trospium (SANCTURA) 20 mg tablet; Take 1 Tablet by mouth Before breakfast and dinner. Refills of B12, Hydroxyzine and Sanctura given. Paperwork completed for Long Term Disability. Letters and new ordered for foldable ramp given. Will try and get [facility] home health to come and continue home  PT for her as she feels L lower leg is getting worse and she is still high fall risk. Needs to work towards getting chair lift and ramp in place to help prevent falls. Stable at this time. Labs ordered. Continue current management of chronic conditions. I have discussed the diagnosis with the patient and the intended plan as seen in the above orders. Patient is in agreement.   The patient has received an after-visit summary and questions were answered concerning future plans. I have discussed medication side effects and warnings with the patient as well.     Antonio Perez PA-C

## 2022-02-11 NOTE — TELEPHONE ENCOUNTER
Shelia powell [address] needs verbal order start of care for 2/14/2022. Please call Shelia.      Luis# [phone]

## 2022-02-11 NOTE — PROGRESS NOTES
Identified pt with two pt identifiers(name and ). Reviewed record in preparation for visit and have obtained necessary documentation. Chief Complaint   Patient presents with    Follow-up     hospital and nursing home, pt had UTI and fell multiple times, went to ED and went to Hospital then nursing facility     Heart Problem    Hypertension        Vitals:    22 1128   BP: 118/87   Pulse: 75   Resp: 18   Temp: 98.5 °F (36.9 °C)   TempSrc: Oral   SpO2: 98%   Weight: 248 lb (112.5 kg)   Height: 5' 6\" (1.676 m)   PainSc:   0 - No pain       Health Maintenance Due   Topic    COVID-19 Vaccine (2 - Booster for Ecomm[employer] series)       Coordination of Care Questionnaire:  :   1) Have you been to an emergency room, urgent care, or hospitalized since your last visit? If yes, where when, and reason for visit? YES      2. Have seen or consulted any other health care provider since your last visit? If yes, where when, and reason for visit? YES been in a nursing facility      Patient is accompanied by self I have received verbal consent from Margoth Casillas to discuss any/all medical information while they are present in the room.

## 2022-02-11 NOTE — LETTER
2/11/2022 12:07 PM    Ms. Mariano Olmos  [facility] [place] [phone]    To whom it may concern,      May this letter serve as a statement of necessity for my patient, Ms. Zonia Fragoso. She has necessity for a foldable ramp to be used for ease in movement inside the house and transport van while using wheelchair. She has significant weakness status post cerebrovascular accident. Feel free to contact my office with any questions or concerns.         Sincerely,      Cristian Stevens PA-C

## 2022-02-11 NOTE — LETTER
2/11/2022 11:45 AM    Ms. Ted Cuevas  [address] [place] [phone]      This letter should serve as a statement of necessity for the patient, Saba Malagon. The necessity if a stair lift in her home. She has significant disability secondary to cerebrovascular accident leading to chronic mobility impairment, muscle weakness and gait problems. She is not able to climb stairs and is limited to using a cane for short distances and wheelchair use most other times. Stair lift in her home would allow her to get to her bedroom in her home where as she currently does not have the ability to climb stairs on her own without significant bodily help from others.      Feel free to contact my at my office with any questions or concerns         Sincerely,      Suri León PA-C

## 2022-02-14 ENCOUNTER — HOME CARE VISIT (OUTPATIENT)
Dept: SCHEDULING | Facility: HOME HEALTH | Age: 62
End: 2022-02-14
Payer: MEDICARE

## 2022-02-14 PROCEDURE — 400013 HH SOC

## 2022-02-14 PROCEDURE — G0151 HHCP-SERV OF PT,EA 15 MIN: HCPCS

## 2022-02-15 DIAGNOSIS — E66.01 OBESITY, MORBID (HCC): ICD-10-CM

## 2022-02-15 DIAGNOSIS — R73.03 PREDIABETES: ICD-10-CM

## 2022-02-15 RX ORDER — METFORMIN HYDROCHLORIDE 500 MG/1
TABLET, EXTENDED RELEASE ORAL
Qty: 90 TABLET | Refills: 3 | Status: SHIPPED | OUTPATIENT
Start: 2022-02-15 | End: 2022-06-10

## 2022-02-15 NOTE — TELEPHONE ENCOUNTER
PCP: Mel Grove PA-C    Last appt: 2/11/2022  Future Appointments   Date Time Provider Department Center   2/17/2022 To Be Determined [facility] [address]   2/21/2022 To Be Determined [facility]   2/24/2022 To Be Determined [facility]   3/24/2022  9:20 AM Vanessa Drew DO NEUSM BS AMB   6/10/2022 10:30 AM MAKEDA Pérez BS AMB   8/2/2022 10:00 AM MD YESENIA Peters BS AMB       Requested Prescriptions     Pending Prescriptions Disp Refills    metFORMIN ER (GLUCOPHAGE XR) 500 mg tablet 90 Tablet 3     Sig: TAKE 1 TABLET BY MOUTH ONCE DAILY WITH SUPPER

## 2022-02-16 VITALS
HEART RATE: 58 BPM | OXYGEN SATURATION: 97 % | RESPIRATION RATE: 17 BRPM | DIASTOLIC BLOOD PRESSURE: 60 MMHG | TEMPERATURE: 98.4 F | SYSTOLIC BLOOD PRESSURE: 102 MMHG

## 2022-02-17 ENCOUNTER — HOME CARE VISIT (OUTPATIENT)
Dept: SCHEDULING | Facility: HOME HEALTH | Age: 62
End: 2022-02-17
Payer: MEDICARE

## 2022-02-18 ENCOUNTER — HOME CARE VISIT (OUTPATIENT)
Dept: HOME HEALTH SERVICES | Facility: HOME HEALTH | Age: 62
End: 2022-02-18
Payer: MEDICARE

## 2022-02-21 ENCOUNTER — HOME CARE VISIT (OUTPATIENT)
Dept: SCHEDULING | Facility: HOME HEALTH | Age: 62
End: 2022-02-21
Payer: MEDICARE

## 2022-02-21 PROBLEM — J44.9 COPD WITHOUT EXACERBATION (HCC): Status: ACTIVE | Noted: 2022-02-21

## 2022-02-21 NOTE — TELEPHONE ENCOUNTER
Requested Prescriptions     Pending Prescriptions Disp Refills    galcanezumab-gnlm (Emgality Syringe) 120 mg/mL syrg       Si Syringe by SubCUTAneous route every thirty (30) days.  Start 30 days after the loading dose

## 2022-02-22 ENCOUNTER — TELEPHONE (OUTPATIENT)
Dept: INTERNAL MEDICINE CLINIC | Age: 62
End: 2022-02-22

## 2022-02-22 RX ORDER — GALCANEZUMAB 120 MG/ML
1 INJECTION, SOLUTION SUBCUTANEOUS
Qty: 1 EACH | Refills: 11 | Status: SHIPPED | OUTPATIENT
Start: 2022-02-22 | End: 2022-10-13 | Stop reason: SDUPTHER

## 2022-02-24 ENCOUNTER — TELEPHONE (OUTPATIENT)
Dept: INTERNAL MEDICINE CLINIC | Age: 62
End: 2022-02-24

## 2022-02-25 ENCOUNTER — HOME CARE VISIT (OUTPATIENT)
Dept: SCHEDULING | Facility: HOME HEALTH | Age: 62
End: 2022-02-25
Payer: MEDICARE

## 2022-02-25 PROCEDURE — G0151 HHCP-SERV OF PT,EA 15 MIN: HCPCS

## 2022-02-25 NOTE — TELEPHONE ENCOUNTER
Denied on February 24  PA Case: [id number], Status: Denied. Notification: Completed. Any alternatives?

## 2022-02-28 ENCOUNTER — HOME CARE VISIT (OUTPATIENT)
Dept: SCHEDULING | Facility: HOME HEALTH | Age: 62
End: 2022-02-28
Payer: MEDICARE

## 2022-02-28 VITALS
SYSTOLIC BLOOD PRESSURE: 108 MMHG | OXYGEN SATURATION: 98 % | RESPIRATION RATE: 17 BRPM | TEMPERATURE: 98.1 F | HEART RATE: 76 BPM | DIASTOLIC BLOOD PRESSURE: 61 MMHG

## 2022-02-28 PROCEDURE — G0151 HHCP-SERV OF PT,EA 15 MIN: HCPCS

## 2022-03-04 ENCOUNTER — HOME CARE VISIT (OUTPATIENT)
Dept: SCHEDULING | Facility: HOME HEALTH | Age: 62
End: 2022-03-04
Payer: MEDICARE

## 2022-03-04 PROCEDURE — G0151 HHCP-SERV OF PT,EA 15 MIN: HCPCS

## 2022-03-06 VITALS
TEMPERATURE: 98.3 F | OXYGEN SATURATION: 98 % | RESPIRATION RATE: 17 BRPM | HEART RATE: 70 BPM | DIASTOLIC BLOOD PRESSURE: 60 MMHG | SYSTOLIC BLOOD PRESSURE: 115 MMHG

## 2022-03-07 ENCOUNTER — HOME CARE VISIT (OUTPATIENT)
Dept: SCHEDULING | Facility: HOME HEALTH | Age: 62
End: 2022-03-07
Payer: MEDICARE

## 2022-03-07 VITALS
OXYGEN SATURATION: 97 % | TEMPERATURE: 98.2 F | SYSTOLIC BLOOD PRESSURE: 112 MMHG | HEART RATE: 70 BPM | DIASTOLIC BLOOD PRESSURE: 72 MMHG | RESPIRATION RATE: 17 BRPM

## 2022-03-07 PROCEDURE — G0151 HHCP-SERV OF PT,EA 15 MIN: HCPCS

## 2022-03-08 ENCOUNTER — HOME CARE VISIT (OUTPATIENT)
Dept: HOME HEALTH SERVICES | Facility: HOME HEALTH | Age: 62
End: 2022-03-08
Payer: MEDICARE

## 2022-03-10 ENCOUNTER — TELEPHONE (OUTPATIENT)
Dept: NEUROLOGY | Age: 62
End: 2022-03-10

## 2022-03-10 NOTE — TELEPHONE ENCOUNTER
Re: botox    Reviewing Botox and see PA is needed, created CMM key#  [id number]    Submitted and awaiting update.   Will review cpt 67327 after and review SSP

## 2022-03-11 ENCOUNTER — HOME CARE VISIT (OUTPATIENT)
Dept: SCHEDULING | Facility: HOME HEALTH | Age: 62
End: 2022-03-11
Payer: MEDICARE

## 2022-03-11 PROCEDURE — G0151 HHCP-SERV OF PT,EA 15 MIN: HCPCS

## 2022-03-13 VITALS
TEMPERATURE: 98.1 F | OXYGEN SATURATION: 98 % | SYSTOLIC BLOOD PRESSURE: 112 MMHG | DIASTOLIC BLOOD PRESSURE: 67 MMHG | RESPIRATION RATE: 17 BRPM | HEART RATE: 76 BPM

## 2022-03-15 ENCOUNTER — HOME CARE VISIT (OUTPATIENT)
Dept: HOME HEALTH SERVICES | Facility: HOME HEALTH | Age: 62
End: 2022-03-15
Payer: MEDICARE

## 2022-03-17 ENCOUNTER — DOCUMENTATION ONLY (OUTPATIENT)
Dept: NEUROLOGY | Age: 62
End: 2022-03-17

## 2022-03-18 ENCOUNTER — HOME CARE VISIT (OUTPATIENT)
Dept: HOME HEALTH SERVICES | Facility: HOME HEALTH | Age: 62
End: 2022-03-18
Payer: MEDICARE

## 2022-03-18 PROBLEM — F41.9 ANXIETY: Status: ACTIVE | Noted: 2020-04-30

## 2022-03-18 PROBLEM — K59.00 CONSTIPATION: Status: ACTIVE | Noted: 2017-06-13

## 2022-03-18 PROBLEM — Z97.8 CHRONIC INDWELLING FOLEY CATHETER: Status: ACTIVE | Noted: 2018-01-24

## 2022-03-18 PROBLEM — G93.40 ENCEPHALOPATHY: Status: ACTIVE | Noted: 2021-12-19

## 2022-03-19 PROBLEM — E66.01 OBESITY, MORBID (HCC): Status: ACTIVE | Noted: 2018-01-12

## 2022-03-19 PROBLEM — J44.9 COPD WITHOUT EXACERBATION (HCC): Status: ACTIVE | Noted: 2022-02-21

## 2022-03-19 PROBLEM — E55.9 VITAMIN D DEFICIENCY: Status: ACTIVE | Noted: 2017-11-02

## 2022-03-19 PROBLEM — E66.9 OBESITY (BMI 30-39.9): Status: ACTIVE | Noted: 2019-12-05

## 2022-03-19 PROBLEM — G47.00 INSOMNIA: Status: ACTIVE | Noted: 2017-06-13

## 2022-03-19 PROBLEM — R53.1 LEFT-SIDED WEAKNESS: Status: ACTIVE | Noted: 2017-09-08

## 2022-03-19 PROBLEM — N30.00 ACUTE CYSTITIS: Status: ACTIVE | Noted: 2021-12-19

## 2022-03-20 PROBLEM — L30.9 DERMATITIS: Status: ACTIVE | Noted: 2018-06-20

## 2022-03-21 ENCOUNTER — HOME CARE VISIT (OUTPATIENT)
Dept: SCHEDULING | Facility: HOME HEALTH | Age: 62
End: 2022-03-21
Payer: MEDICARE

## 2022-03-21 PROCEDURE — 400013 HH SOC

## 2022-03-21 PROCEDURE — G0151 HHCP-SERV OF PT,EA 15 MIN: HCPCS

## 2022-03-24 ENCOUNTER — TELEPHONE (OUTPATIENT)
Dept: INTERNAL MEDICINE CLINIC | Age: 62
End: 2022-03-24

## 2022-03-24 ENCOUNTER — OFFICE VISIT (OUTPATIENT)
Dept: NEUROLOGY | Age: 62
End: 2022-03-24
Payer: MEDICARE

## 2022-03-24 VITALS
BODY MASS INDEX: 39.53 KG/M2 | OXYGEN SATURATION: 100 % | DIASTOLIC BLOOD PRESSURE: 86 MMHG | HEART RATE: 107 BPM | SYSTOLIC BLOOD PRESSURE: 130 MMHG | WEIGHT: 246 LBS | RESPIRATION RATE: 16 BRPM | HEIGHT: 66 IN | TEMPERATURE: 97.3 F

## 2022-03-24 DIAGNOSIS — E66.01 OBESITY, MORBID (HCC): ICD-10-CM

## 2022-03-24 DIAGNOSIS — G43.719 INTRACTABLE CHRONIC MIGRAINE WITHOUT AURA AND WITHOUT STATUS MIGRAINOSUS: Primary | ICD-10-CM

## 2022-03-24 DIAGNOSIS — M62.838 MUSCLE SPASTICITY: ICD-10-CM

## 2022-03-24 DIAGNOSIS — I69.359 HEMIPARESIS AND ALTERATION OF SENSATIONS AS LATE EFFECTS OF STROKE (HCC): Primary | ICD-10-CM

## 2022-03-24 DIAGNOSIS — I69.398 HEMIPARESIS AND ALTERATION OF SENSATIONS AS LATE EFFECTS OF STROKE (HCC): Primary | ICD-10-CM

## 2022-03-24 DIAGNOSIS — R53.1 LEFT-SIDED WEAKNESS: ICD-10-CM

## 2022-03-24 PROCEDURE — 64615 CHEMODENERV MUSC MIGRAINE: CPT | Performed by: PSYCHIATRY & NEUROLOGY

## 2022-03-24 NOTE — PROGRESS NOTES
Botox Injection Note     3/24/2022     Patient:  Go Calles   YOB: 1960  Date of Visit: 3/24/2022      Indication: patient has chronic migraine headaches greater than 15 days per month lasting more than 4 hours each. She has failed or not tolerated 2 or more medication preventatives. Written consent was signed and verified by me. Risks and benefits were discussed to include possible cosmetic asymmetry which is not permament or life threatening. Patient name and  was confirmed prior to procedure. Time out performed. Procedure:   Botox concentration: 200 units in 2 ml of preservative-free normal saline. 1:1 dilution. LOT#: [id number]  EXP:  10 2024    Injections and distribution as follow :      Units/site  Sites Loc Subtotal    procerus 5 1 ML 5   corrugaters 2.5 2 BL 5   frontalis 5 8 BL 40   Temporalis 10 4 BL 40   Occipitalis 10 2 BL 20   Cervical paraspinals 5 4 BL 20   Trapezius 10 4 BL 40         200 units Botox were reconstituted, 170 units injected as above and the remainder was unavoidably wasted. Patient tolerated procedure well. Return in 3 months for repeat injections.     _____________________________   Rojelio Moctezmua DO  [address], ABPN

## 2022-03-24 NOTE — TELEPHONE ENCOUNTER
Patient called and said that they need a referral from the doctor for the dual medical equipment.  Also the letters the doctor written they want them to be faxed from the doctor per Ms Clari Krishnan there fax number is [phone] any question call Ms Cesar Lucia [phone]

## 2022-03-25 ENCOUNTER — HOME CARE VISIT (OUTPATIENT)
Dept: SCHEDULING | Facility: HOME HEALTH | Age: 62
End: 2022-03-25
Payer: MEDICARE

## 2022-03-25 PROCEDURE — G0151 HHCP-SERV OF PT,EA 15 MIN: HCPCS

## 2022-03-25 NOTE — TELEPHONE ENCOUNTER
Ambulatory supply order for stair lift in her home amd foldable wheelchair ramp, both letters and last office note faxed to [facility] [phone], confirmation received.

## 2022-03-27 VITALS
HEART RATE: 78 BPM | OXYGEN SATURATION: 98 % | RESPIRATION RATE: 17 BRPM | SYSTOLIC BLOOD PRESSURE: 112 MMHG | DIASTOLIC BLOOD PRESSURE: 67 MMHG | TEMPERATURE: 98.1 F

## 2022-03-30 DIAGNOSIS — K59.04 CHRONIC IDIOPATHIC CONSTIPATION: ICD-10-CM

## 2022-03-30 NOTE — TELEPHONE ENCOUNTER
PCP: Sergio Aguila PA-C     Last appt: 2/11/2022   Future Appointments   Date Time Provider Denilson Densoni   6/10/2022 10:30 AM Sergio Aguila PA-C [facility] BS AMB   6/16/2022 10:40 AM Vanessa Drew DO [facility] BS AMB   8/2/2022 10:00 AM MD YESENIA Luna BS AMB        Requested Prescriptions     Pending Prescriptions Disp Refills    linaCLOtide (Linzess) 145 mcg cap capsule 90 Capsule 0     Sig: TAKE 1 CAPSULE BY MOUTH ONCE DAILY BEFORE BREAKFAST FOR CONSTIPATION

## 2022-04-25 DIAGNOSIS — E89.0 POSTOPERATIVE HYPOTHYROIDISM: Chronic | ICD-10-CM

## 2022-04-25 RX ORDER — LEVOTHYROXINE SODIUM 125 UG/1
125 TABLET ORAL
Qty: 90 TABLET | Refills: 0 | Status: SHIPPED | OUTPATIENT
Start: 2022-04-25 | End: 2022-04-27

## 2022-04-25 NOTE — TELEPHONE ENCOUNTER
PCP: Ammon Wilson PA-C     Last appt: 2/11/2022   Future Appointments   Date Time Provider Denilson Thompson   6/10/2022 10:30 AM MAKEDA Dodd BS AMB   6/16/2022 10:40 AM Vanessa Drew DO [facility] BS AMB   8/2/2022 10:00 AM MD YESENIA Esparza BS AMB        Requested Prescriptions     Pending Prescriptions Disp Refills    levothyroxine (Euthyrox) 125 mcg tablet 90 Tablet 0     Sig: Take 1 Tablet by mouth Daily (before breakfast).

## 2022-04-27 DIAGNOSIS — E89.0 POSTOPERATIVE HYPOTHYROIDISM: Chronic | ICD-10-CM

## 2022-04-27 RX ORDER — LEVOTHYROXINE SODIUM 125 UG/1
TABLET ORAL
Qty: 90 TABLET | Refills: 0 | Status: SHIPPED | OUTPATIENT
Start: 2022-04-27 | End: 2022-06-10 | Stop reason: SDUPTHER

## 2022-05-24 NOTE — PROGRESS NOTES
Ms. Tan Velazquez, your labs look good. Your blood sugar came back very low . Have you been having low sugars in the morning before you eat? I feel this may have been due to the timing of the fasting lab but the # was at 30 which is very very low. We may need to cut your Metformin in half  as your A1C is also almost under the prediabetic level as well. Make sure you continue a vitamin D supplement as you continue to be deficient with the 5000 units a day. Thyroid, blood counts, cholesterol and urine all look good!

## 2022-05-25 LAB
GLUCOSE SERPL-MCNC: 30 MG/DL (ref 65–99)
POTASSIUM SERPL-SCNC: 3.6 MMOL/L (ref 3.5–5.2)

## 2022-05-27 LAB
25(OH)D3+25(OH)D2 SERPL-MCNC: 29.6 NG/ML (ref 30–100)
APPEARANCE UR: CLEAR
BACTERIA #/AREA URNS HPF: ABNORMAL /[HPF]
BACTERIA UR CULT: ABNORMAL
BACTERIA UR CULT: ABNORMAL
BASOPHILS # BLD AUTO: 0.1 X10E3/UL (ref 0–0.2)
BASOPHILS NFR BLD AUTO: 1 %
BILIRUB UR QL STRIP: NEGATIVE
CASTS URNS QL MICRO: ABNORMAL /LPF
CHOLEST SERPL-MCNC: 138 MG/DL (ref 100–199)
COLOR UR: YELLOW
EOSINOPHIL # BLD AUTO: 0.1 X10E3/UL (ref 0–0.4)
EOSINOPHIL NFR BLD AUTO: 2 %
EPI CELLS #/AREA URNS HPF: ABNORMAL /HPF (ref 0–10)
ERYTHROCYTE [DISTWIDTH] IN BLOOD BY AUTOMATED COUNT: 13 % (ref 11.7–15.4)
EST. AVERAGE GLUCOSE BLD GHB EST-MCNC: 117 MG/DL
GLUCOSE UR QL STRIP: NEGATIVE
HBA1C MFR BLD: 5.7 % (ref 4.8–5.6)
HCT VFR BLD AUTO: 41.3 % (ref 34–46.6)
HDLC SERPL-MCNC: 57 MG/DL
HGB BLD-MCNC: 13.1 G/DL (ref 11.1–15.9)
HGB UR QL STRIP: ABNORMAL
IMM GRANULOCYTES # BLD AUTO: 0 X10E3/UL (ref 0–0.1)
IMM GRANULOCYTES NFR BLD AUTO: 0 %
KETONES UR QL STRIP: NEGATIVE
LDLC SERPL CALC-MCNC: 67 MG/DL (ref 0–99)
LEUKOCYTE ESTERASE UR QL STRIP: ABNORMAL
LYMPHOCYTES # BLD AUTO: 2 X10E3/UL (ref 0.7–3.1)
LYMPHOCYTES NFR BLD AUTO: 36 %
MCH RBC QN AUTO: 28.2 PG (ref 26.6–33)
MCHC RBC AUTO-ENTMCNC: 31.7 G/DL (ref 31.5–35.7)
MCV RBC AUTO: 89 FL (ref 79–97)
MICRO URNS: ABNORMAL
MONOCYTES # BLD AUTO: 0.8 X10E3/UL (ref 0.1–0.9)
MONOCYTES NFR BLD AUTO: 15 %
NEUTROPHILS # BLD AUTO: 2.5 X10E3/UL (ref 1.4–7)
NEUTROPHILS NFR BLD AUTO: 46 %
NITRITE UR QL STRIP: NEGATIVE
PH UR STRIP: 6 [PH] (ref 5–7.5)
PLATELET # BLD AUTO: 230 X10E3/UL (ref 150–450)
PROT UR QL STRIP: NEGATIVE
RBC # BLD AUTO: 4.65 X10E6/UL (ref 3.77–5.28)
RBC #/AREA URNS HPF: ABNORMAL /HPF (ref 0–2)
SP GR UR STRIP: <=1.005 (ref 1–1.03)
TRIGL SERPL-MCNC: 68 MG/DL (ref 0–149)
TSH SERPL DL<=0.005 MIU/L-ACNC: 0.55 UIU/ML (ref 0.45–4.5)
URINALYSIS REFLEX, 377202: ABNORMAL
UROBILINOGEN UR STRIP-MCNC: 0.2 MG/DL (ref 0.2–1)
VLDLC SERPL CALC-MCNC: 14 MG/DL (ref 5–40)
WBC # BLD AUTO: 5.5 X10E3/UL (ref 3.4–10.8)
WBC #/AREA URNS HPF: ABNORMAL /HPF (ref 0–5)

## 2022-05-27 RX ORDER — CEPHALEXIN 500 MG/1
500 CAPSULE ORAL 2 TIMES DAILY
Qty: 14 CAPSULE | Refills: 0 | Status: SHIPPED | OUTPATIENT
Start: 2022-05-27 | End: 2022-06-10 | Stop reason: ALTCHOICE

## 2022-06-10 ENCOUNTER — TELEPHONE (OUTPATIENT)
Dept: NEUROLOGY | Age: 62
End: 2022-06-10

## 2022-06-10 ENCOUNTER — OFFICE VISIT (OUTPATIENT)
Dept: INTERNAL MEDICINE CLINIC | Age: 62
End: 2022-06-10
Payer: MEDICARE

## 2022-06-10 VITALS
OXYGEN SATURATION: 97 % | SYSTOLIC BLOOD PRESSURE: 110 MMHG | HEIGHT: 66 IN | TEMPERATURE: 97.6 F | DIASTOLIC BLOOD PRESSURE: 73 MMHG | WEIGHT: 260 LBS | RESPIRATION RATE: 18 BRPM | BODY MASS INDEX: 41.78 KG/M2 | HEART RATE: 73 BPM

## 2022-06-10 DIAGNOSIS — F41.9 ANXIETY: ICD-10-CM

## 2022-06-10 DIAGNOSIS — E55.9 VITAMIN D DEFICIENCY: ICD-10-CM

## 2022-06-10 DIAGNOSIS — I50.9 CHRONIC CONGESTIVE HEART FAILURE, UNSPECIFIED HEART FAILURE TYPE (HCC): ICD-10-CM

## 2022-06-10 DIAGNOSIS — K21.9 GASTROESOPHAGEAL REFLUX DISEASE: ICD-10-CM

## 2022-06-10 DIAGNOSIS — E89.0 POSTOPERATIVE HYPOTHYROIDISM: Chronic | ICD-10-CM

## 2022-06-10 DIAGNOSIS — I69.398 HEMIPARESIS AND ALTERATION OF SENSATIONS AS LATE EFFECTS OF STROKE (HCC): ICD-10-CM

## 2022-06-10 DIAGNOSIS — I25.10 CORONARY ARTERY DISEASE INVOLVING NATIVE CORONARY ARTERY OF NATIVE HEART WITHOUT ANGINA PECTORIS: ICD-10-CM

## 2022-06-10 DIAGNOSIS — I69.359 HEMIPARESIS AND ALTERATION OF SENSATIONS AS LATE EFFECTS OF STROKE (HCC): ICD-10-CM

## 2022-06-10 DIAGNOSIS — J44.9 COPD WITHOUT EXACERBATION (HCC): ICD-10-CM

## 2022-06-10 DIAGNOSIS — Z00.00 MEDICARE ANNUAL WELLNESS VISIT, SUBSEQUENT: Primary | ICD-10-CM

## 2022-06-10 DIAGNOSIS — G47.33 OSA ON CPAP: ICD-10-CM

## 2022-06-10 DIAGNOSIS — K59.04 CHRONIC IDIOPATHIC CONSTIPATION: ICD-10-CM

## 2022-06-10 DIAGNOSIS — Z99.89 OSA ON CPAP: ICD-10-CM

## 2022-06-10 DIAGNOSIS — I63.9 CEREBROVASCULAR ACCIDENT (CVA), UNSPECIFIED MECHANISM (HCC): Chronic | ICD-10-CM

## 2022-06-10 DIAGNOSIS — R73.03 PREDIABETES: ICD-10-CM

## 2022-06-10 DIAGNOSIS — I10 ESSENTIAL HYPERTENSION: ICD-10-CM

## 2022-06-10 PROCEDURE — G8417 CALC BMI ABV UP PARAM F/U: HCPCS | Performed by: PHYSICIAN ASSISTANT

## 2022-06-10 PROCEDURE — 3017F COLORECTAL CA SCREEN DOC REV: CPT | Performed by: PHYSICIAN ASSISTANT

## 2022-06-10 PROCEDURE — G8752 SYS BP LESS 140: HCPCS | Performed by: PHYSICIAN ASSISTANT

## 2022-06-10 PROCEDURE — G8427 DOCREV CUR MEDS BY ELIG CLIN: HCPCS | Performed by: PHYSICIAN ASSISTANT

## 2022-06-10 PROCEDURE — G9899 SCRN MAM PERF RSLTS DOC: HCPCS | Performed by: PHYSICIAN ASSISTANT

## 2022-06-10 PROCEDURE — 99214 OFFICE O/P EST MOD 30 MIN: CPT | Performed by: PHYSICIAN ASSISTANT

## 2022-06-10 PROCEDURE — G8754 DIAS BP LESS 90: HCPCS | Performed by: PHYSICIAN ASSISTANT

## 2022-06-10 PROCEDURE — G0439 PPPS, SUBSEQ VISIT: HCPCS | Performed by: PHYSICIAN ASSISTANT

## 2022-06-10 PROCEDURE — G8432 DEP SCR NOT DOC, RNG: HCPCS | Performed by: PHYSICIAN ASSISTANT

## 2022-06-10 RX ORDER — LOSARTAN POTASSIUM 50 MG/1
50 TABLET ORAL
Qty: 90 TABLET | Refills: 3 | Status: SHIPPED | OUTPATIENT
Start: 2022-06-10

## 2022-06-10 RX ORDER — HYDROXYZINE 50 MG/1
TABLET, FILM COATED ORAL
Qty: 60 TABLET | Refills: 2 | Status: SHIPPED | OUTPATIENT
Start: 2022-06-10 | End: 2022-09-09

## 2022-06-10 RX ORDER — LEVOTHYROXINE SODIUM 125 UG/1
125 TABLET ORAL
Qty: 90 TABLET | Refills: 0 | Status: SHIPPED | OUTPATIENT
Start: 2022-06-10 | End: 2022-08-01

## 2022-06-10 RX ORDER — ONABOTULINUMTOXINA 200 [USP'U]/1
INJECTION, POWDER, LYOPHILIZED, FOR SOLUTION INTRADERMAL; INTRAMUSCULAR
Qty: 200 UNITS | Refills: 3 | Status: SHIPPED | OUTPATIENT
Start: 2022-06-10

## 2022-06-10 RX ORDER — PANTOPRAZOLE SODIUM 40 MG/1
40 TABLET, DELAYED RELEASE ORAL DAILY
Qty: 90 TABLET | Refills: 3 | Status: SHIPPED | OUTPATIENT
Start: 2022-06-10

## 2022-06-10 NOTE — PROGRESS NOTES
Chelsey De La Fuente is a 57y.o. year old female seen in clinic today for   Chief Complaint   Patient presents with    Follow-up     hypertension & stroke       she is here today to follow up for CAD, HTN, Pre-DM, CVA    Follows with Cardiology for heart trouble CHF/CAD. See's Parish Rainey twice a year. Reports persistent palpitations but Cardiology notes her heart is stable and healthy. Hypertension: Controlled with 50 mg Losartan, Lasix 40 mg, Carvedilol 12.5 BID  Compliant with medications? Always, manages her own meds  Compliant with diet? Not the best diet, notes she snacks alot  Compliant with exercise? Unable to exercise due to hx of CVA  Average blood pressure readings outside of office. normal    Denies any HA, dizziness, CP, SOB, edema, visual changes    Hx of CVA and migraine headache. Followed by Dr. Sonal Dobbs for botox, getting next week. Has been trying to reach office for new order, working well. Has chronic L sided weakness and paresthesia. Has brace to L foot. Uses gabapentin for pain. Uses emgality pen for migraines    Goes to adult center few times per week to get out of the house. Notes she is snacking more out of boredom because they are not doing anything and the fellow people at the center are mostly elderly and not active. Trips have not resumed since COVID. Getting consistent low sugars at times, not always improved with eating. Blood work showed low BG fasting when checked. Has chronic pain, specifically recently in L shoulder. Worse if she sleeps on L side. Uses chronic muscle relaxants baclofen  Uses CPAP for EDDIE, followed by Arianna Xiong MD.     Naun Garcia successfully for chronic constipation  Uses protonix for GERD successfully    she specifically denies any CP, SOB, HA. Dizziness, fevers, chills, N/V/D, urinary symptoms or other bowel changes.     Current Outpatient Medications on File Prior to Visit   Medication Sig Dispense Refill    levothyroxine (SYNTHROID) 125 mcg tablet TAKE 1 TABLET BY MOUTH ONCE DAILY BEFORE BREAKFAST 90 Tablet 0    linaCLOtide (Linzess) 145 mcg cap capsule TAKE 1 CAPSULE BY MOUTH ONCE DAILY BEFORE BREAKFAST FOR CONSTIPATION 90 Capsule 0    galcanezumab-gnlm (Emgality Syringe) 120 mg/mL syrg 1 Syringe by SubCUTAneous route every thirty (30) days. Start 30 days after the loading dose 1 Each 11    cyanocobalamin (VITAMIN B12) 1,000 mcg/mL injection INJECT 1 ML UNDER THE SKIN EVERY 14 DAYS FOR B12 DEFICIENCY  Indications: inadequate vitamin B12 10 mL 2    hydrOXYzine HCL (ATARAX) 50 mg tablet TAKE 1 TABLET BY MOUTH TWICE DAILY AS NEEDED FOR ANXIETY 60 Tablet 2    Needle, Disp, 25 G 25 gauge x 3/4\" ndle Use bimonthly for cyanocobalamin subcutaneous injection. 25 Each 3    trospium (SANCTURA) 20 mg tablet Take 1 Tablet by mouth Before breakfast and dinner. 60 Tablet 5    oxyCODONE-acetaminophen (PERCOCET 10)  mg per tablet Take 1 Tablet by mouth as needed for Pain.  atorvastatin (LIPITOR) 40 mg tablet Take 1 tablet by mouth once daily 90 Tablet 3    furosemide (LASIX) 40 mg tablet Take 1 Tablet by mouth daily. 90 Tablet 3    montelukast (SINGULAIR) 10 mg tablet Take 10 mg by mouth daily.  topiramate (TOPAMAX) 100 mg tablet Take 1 tablet by mouth twice daily 180 Tablet 3    carvediloL (COREG) 12.5 mg tablet Take 1 Tablet by mouth two (2) times daily (with meals). 180 Tablet 3    baclofen (LIORESAL) 20 mg tablet TAKE 1 TABLET BY MOUTH EVERY 8 HOURS AS NEEDED FOR MUSCLE SPASM(S)      Botox 200 unit injection INJECT INTO THE BILATERAL MUSCLES OF THE HEAD AND NECK BY PRESCRIBER IN OFFICE FOR MIGRAINES EVERY 3 MONTHS. DISCARD UNUSED PORTION 1 Vial 3    losartan (COZAAR) 50 mg tablet Take 1 Tab by mouth nightly. Hold for SBP<115 90 Tab 3    pantoprazole (PROTONIX) 40 mg tablet Take 1 Tab by mouth daily.  90 Tab 3    BD Single Use Swabs Regular padm Check Blood sugar ONCE daily      Syringe, Disposable, 1 mL syrg Use bimonthly for cyanocobalamin subcutaneous injection. 25 Syringe 0    fluticasone propionate (FLONASE) 50 mcg/actuation nasal spray 2 Sprays by Both Nostrils route daily. 1 Bottle 0    gabapentin (NEURONTIN) 100 mg capsule Take 100 mg by mouth three (3) times daily as needed for Pain.  FREESTYLE LITE STRIPS strip USE STRIP TO CHECK GLUCOSE TWICE DAILY  3    FREESTYLE LITE METER monitoring kit USE TO CHECK GLUCOSE ONCE DAILY  0    ciclopirox (PENLAC) 8 % solution APPLY TO AFFECTED TOE NAILS DAILY      diclofenac (VOLTAREN) 1 % gel APPLY TOPICALLY TO AFFECTED AREA ONCE DAILY      FREESTYLE LANCETS 28 gauge [facility] USE TO CHECK GLUCOSE TWICE DAILY      miscellaneous medical supply misc Rollator Dx:I69.359 1 Each 0    miscellaneous medical supply [facility] Walker Dx: I69.359 1 Each 0    omega-3 acid ethyl esters (LOVAZA) 1 gram capsule Take 2 Caps by mouth two (2) times a day. 120 Cap 2    DULoxetine (CYMBALTA) 60 mg capsule Take 2 Caps by mouth daily. 180 Cap 0    calcipotriene (DOVONEX) 0.005 % topical cream Apply  to affected area daily as needed. 60 g 11    albuterol-ipratropium (DUO-NEB) 2.5 mg-0.5 mg/3 ml nebu 3 mL by Nebulization route every six (6) hours as needed. (Patient taking differently: 3 mL by Nebulization route every six (6) hours as needed for Wheezing.) 30 Nebule 0    ferrous sulfate 325 mg (65 mg iron) tablet Take 325 mg by mouth Daily (before breakfast).  aspirin delayed-release 81 mg tablet Take 81 mg by mouth daily.  [DISCONTINUED] cephALEXin (KEFLEX) 500 mg capsule Take 1 Capsule by mouth two (2) times a day. 14 Capsule 0    [DISCONTINUED] metFORMIN ER (GLUCOPHAGE XR) 500 mg tablet TAKE 1 TABLET BY MOUTH ONCE DAILY WITH SUPPER 90 Tablet 3    [DISCONTINUED] traZODone (DESYREL) 300 mg tablet Take 300 mg by mouth nightly.  [DISCONTINUED] cholecalciferol (VITAMIN D3) (5000 Units /125 mcg) capsule Take 1 Capsule by mouth daily.  90 Capsule 3     No current facility-administered medications on file prior to visit. Allergies   Allergen Reactions    Bees [Hymenoptera Allergenic Extract] Shortness of Breath and Swelling    Strawberry Shortness of Breath and Swelling    Lisinopril Cough     Past Medical History:   Diagnosis Date    Advanced care planning/counseling discussion 3/4/16    On File    Bronchitis 2/24/2014    Cervicalgia 8/18/15    Dr. Elva Zelaya    Chest pain 5/25/15    Hospitalized at [facility] 5/25/15 (lab work negative)    Chronic indwelling Dunn catheter 1/24/2018    Initially placed Jan 2018. Mx by Dr. Genny Farrell.  Congestive heart failure, unspecified     Last Echo 2/8/15: EF 55-60%    Constipation 6/13/2017    Enthesopathy, spinal ([address] 8/18/15    Dr. Elva Zelaya    Essential hypertension     Foot drop 8/18/15    Dr. Jacquie Nascimento Heart attack [facility]) 2/24/2013    Was supposed to See Cardiology for possible pacemaker in november 2014- After Cardiology consult locally, no need, EF greatly improved. Established with Dr. Gabriel Keith Hiatal hernia 6/2015    3 cm hiatal hernia     Hip pain 8/18/15    Dr. Elva Zelaya    Hypercholesterolemia     Hyperglycemia 7/2015    A1c 5.9     Hyperlipidemia 6/30/2015    NMR lipoprofile- LDL P 997, LDL-c 71, HLD-C-39, TG-60, HLD-P (25.2), Small LDL-P -541, LDL size 20.6    Hypothyroid     Insomnia 6/13/2017    Lower extremity edema     Lumbar spondylosis 8/18/15    Dr. Adam Melgar 6/2015    EGD/Colonscopy 6/15- Gastritis, internal hemorrhoids and 3 polyps    Menopause     Murmur     EDDIE on CPAP     Was referred to Pulmonology - Uses CPAP    Osteoarthritis of hip 8/18/15    Dr. Elva Zelaya    Osteoarthritis, shoulder 8/18/15    Dr. Jacquie Nascimento Radiculopathy, cervical 8/18/15    Dr. Elva Zelaya    Shoulder pain 8/18/15    Dr. Jacquie Nascimento Spinal stenosis of cervical region 8/18/15    Dr. Jacquie Nascimento Spinal stenosis, lumbar 8/18/15    Dr. Jacquie Nascimento Stroke [facility]) 2/25/2014    Established with Neurology, Melvin Hart NP-Just hospitalized at [facility] 2/7/15-2/10/15.  CT negative, but Late effect CV accident with increased tone described on discharge summary, Carotid dopplers showed 50% stenosis bilaterally.  Vitamin D deficiency 7/2015      Past Surgical History:   Procedure Laterality Date    COLONOSCOPY N/A 6/22/2016    COLONOSCOPY performed by Becki Champion MD at [facility] ENDOSCOPY    COLONOSCOPY N/A 1/26/2022    COLONOSCOPY, needs rapid performed by Liat Painter MD at [facility] ENDOSCOPY    HX BREAST BIOPSY Right 8/11/15    Stereo Bx - [facility]--- Benign    HX CHOLECYSTECTOMY      HX COLONOSCOPY  6/2015    Dr. Tosha Reddy- 3 complete polypectomies, Internal hemorrhoids, difficult study due to spasm.  Repeat in 1 year    HX ENDOSCOPY  6/2015    mild gastritis, 3cm hiatal hernia     HX GASTRIC BYPASS  6/1989    HX HEART CATHETERIZATION  2/2014    HX ORTHOPAEDIC      Right middle finger distal amputation    HX PARTIAL THYROIDECTOMY  ~1990    HX THYROIDECTOMY Left 1985    90% of one side of the thyroid removed    IR ASP BLADDER SUPRA CATH  5/24/2019        Family History   Problem Relation Age of Onset    Heart Disease Father 61    Broken Bones Father         Hip/fell    Hypertension Mother     Heart Disease Brother 62    Broken Bones Brother         Hip/fell    Diabetes Maternal Grandmother         Social History     Socioeconomic History    Marital status: SINGLE     Spouse name: Not on file    Number of children: Not on file    Years of education: Not on file    Highest education level: Not on file   Occupational History    Not on file   Tobacco Use    Smoking status: Former Smoker     Packs/day: 0.25     Years: 15.00     Pack years: 3.75     Types: Cigarettes     Quit date: 6/13/2007     Years since quitting: 15.0    Smokeless tobacco: Never Used   Vaping Use    Vaping Use: Never used   Substance and Sexual Activity    Alcohol use: No    Drug use: No    Sexual activity: Not Currently   Other Topics Concern    Not on file   Social History Narrative    Not on file     Social Determinants of Health     Financial Resource Strain:     Difficulty of Paying Living Expenses: Not on file   Food Insecurity:     Worried About Running Out of Food in the Last Year: Not on file    Gurjit of Food in the Last Year: Not on file   Transportation Needs:     Lack of Transportation (Medical): Not on file    Lack of Transportation (Non-Medical): Not on file   Physical Activity:     Days of Exercise per Week: Not on file    Minutes of Exercise per Session: Not on file   Stress:     Feeling of Stress : Not on file   Social Connections:     Frequency of Communication with Friends and Family: Not on file    Frequency of Social Gatherings with Friends and Family: Not on file    Attends Jain Services: Not on file    Active Member of [address] [place] [address] or Organizations: Not on file    Attends Club or Organization Meetings: Not on file    Marital Status: Not on file   Intimate Partner Violence:     Fear of Current or Ex-Partner: Not on file    Emotionally Abused: Not on file    Physically Abused: Not on file    Sexually Abused: Not on file   Housing Stability:     Unable to Pay for Housing in the Last Year: Not on file    Number of [place] in the Last Year: Not on file    Unstable Housing in the Last Year: Not on file           Visit Vitals  /73 (BP 1 Location: Left upper arm, BP Patient Position: Sitting, BP Cuff Size: Large adult)   Pulse 73   Temp 97.6 °F (36.4 °C) (Oral)   Resp 18   Ht 5' 6\" (1.676 m)   Wt 260 lb (117.9 kg)   LMP  (LMP Unknown)   SpO2 97%   BMI 41.97 kg/m²       Review of Systems   Constitutional: Negative for chills, fever, malaise/fatigue and weight loss. HENT: Negative for ear pain, hearing loss and sore throat. Respiratory: Negative for cough and shortness of breath. Cardiovascular: Negative for chest pain and leg swelling. Gastrointestinal: Negative for abdominal pain, blood in stool, constipation, diarrhea, heartburn, melena, nausea and vomiting. Genitourinary: Negative for dysuria and hematuria. Musculoskeletal: Negative for back pain. Skin: Negative for rash. Neurological: Negative for seizures and headaches. Psychiatric/Behavioral: Negative for depression. Physical Exam  Vitals and nursing note reviewed. Constitutional:       Appearance: Normal appearance. She is obese. Comments: Walks with walker    HENT:      Head: Normocephalic and atraumatic. Right Ear: External ear normal.      Left Ear: Ear canal and external ear normal.      Nose: Nose normal.      Mouth/Throat:      Mouth: Mucous membranes are moist.      Pharynx: Oropharynx is clear. Eyes:      Conjunctiva/sclera: Conjunctivae normal.      Pupils: Pupils are equal, round, and reactive to light. Neck:      Vascular: No carotid bruit. Cardiovascular:      Rate and Rhythm: Normal rate and regular rhythm. Pulses: Normal pulses. Heart sounds: Normal heart sounds. Pulmonary:      Effort: Pulmonary effort is normal.      Breath sounds: Normal breath sounds. No wheezing, rhonchi or rales. Abdominal:      General: Abdomen is flat. Bowel sounds are normal. There is no distension. Palpations: There is no mass. Tenderness: There is no abdominal tenderness. There is no guarding or rebound. Musculoskeletal:         General: Normal range of motion. Cervical back: Normal range of motion and neck supple. No rigidity. Right lower leg: No edema. Left lower leg: No edema. Comments: L lower leg in brace for stability   Skin:     General: Skin is warm and dry. Capillary Refill: Capillary refill takes less than 2 seconds. Neurological:      General: No focal deficit present. Mental Status: She is alert and oriented to person, place, and time. Sensory: No sensory deficit. Motor: Weakness (LLE chronic weakness) present.       Gait: Gait normal.   Psychiatric:         Mood and Affect: Mood normal.         Behavior: Behavior normal.         Thought Content: Thought content normal.          No results found for this or any previous visit (from the past 24 hour(s)). ASSESSMENT AND PLAN  Diagnoses and all orders for this visit:    1. Chronic congestive heart failure, unspecified heart failure type ([facility]  Followed by Cardiology, no signs of decompensation on BB and Lasix  2. Coronary artery disease involving native coronary artery of native heart without angina pectoris  Followed by Cardiology, stable CVD system on BB and Lasix  3. Essential hypertension  -     losartan (COZAAR) 50 mg tablet; Take 1 Tablet by mouth nightly. Hold for SBP<115  At goal on triple therapy, Lasix for CHF, BB for CAD/CHF/CVA  4. Cerebrovascular accident (CVA), unspecified mechanism ([facility]  With L sided hemiparesis and decreased sensations, stable with LLE brace and using walker  5. Postoperative hypothyroidism  -     levothyroxine (SYNTHROID) 125 mcg tablet; Take 1 Tablet by mouth Daily (before breakfast). Thyroid at goal last check, continue 125 mcg  6. Hemiparesis and alteration of sensations as late effects of stroke ([facility]  See above  7. COPD without exacerbation ([facility]  Not needing daily preventative inhaler, has nebulizer at home PRN  8. EDDIE on CPAP  Compliant  9. Vitamin D deficiency    10. Prediabetes  Stop Metformin due to hypoglycemia. Recheck A1C in 4 months   11. Medicare annual wellness visit, subsequent    12. Chronic idiopathic constipation  -     linaCLOtide (Linzess) 145 mcg cap capsule; TAKE 1 CAPSULE BY MOUTH ONCE DAILY BEFORE BREAKFAST FOR CONSTIPATION  Successful daily BMs with Linzess   13. Gastroesophageal reflux disease  -     pantoprazole (PROTONIX) 40 mg tablet; Take 1 Tablet by mouth daily. Compliant daily, no GERD sx's   14. Anxiety  -     hydrOXYzine HCL (ATARAX) 50 mg tablet; TAKE 1 TABLET BY MOUTH TWICE DAILY AS NEEDED FOR ANXIETY  PRN hydroxyzine use.  Cymbalta for pain, Depression         I have discussed the diagnosis with the patient and the intended plan as seen in the above orders. Patient is in agreement. The patient has received an after-visit summary and questions were answered concerning future plans. I have discussed medication side effects and warnings with the patient as well. Magdalena Saleh PA-C    This is the Subsequent Medicare Annual Wellness Exam, performed 12 months or more after the Initial AWV or the last Subsequent AWV    I have reviewed the patient's medical history in detail and updated the computerized patient record. Assessment/Plan   Education and counseling provided:  Are appropriate based on today's review and evaluation  End-of-Life planning (with patient's consent)  Pneumococcal Vaccine    1. Chronic congestive heart failure, unspecified heart failure type ([facility]  2. Coronary artery disease involving native coronary artery of native heart without angina pectoris  3. Essential hypertension  The following orders have not been finalized:  -     losartan (COZAAR) 50 mg tablet  4. Cerebrovascular accident (CVA), unspecified mechanism ([facility]  5. Postoperative hypothyroidism  The following orders have not been finalized:  -     levothyroxine (SYNTHROID) 125 mcg tablet  6. Hemiparesis and alteration of sensations as late effects of stroke ([facility]  7. COPD without exacerbation ([facility]  8. EDDIE on CPAP  9. Vitamin D deficiency  10. Prediabetes  11. Medicare annual wellness visit, subsequent  12. Chronic idiopathic constipation  The following orders have not been finalized:  -     linaCLOtide (Linzess) 145 mcg cap capsule  13. Gastroesophageal reflux disease  The following orders have not been finalized:  -     pantoprazole (PROTONIX) 40 mg tablet  14.  Anxiety  The following orders have not been finalized:  -     hydrOXYzine HCL (ATARAX) 50 mg tablet       Depression Risk Factor Screening     3 most recent PHQ Screens 6/10/2022   PHQ Not Done -   Little interest or pleasure in doing things Not at all   Feeling down, depressed, irritable, or hopeless Not at all   Total Score PHQ 2 0       Alcohol & Drug Abuse Risk Screen    Do you average more than 1 drink per night or more than 7 drinks a week:  No    On any one occasion in the past three months have you have had more than 3 drinks containing alcohol:  No          Functional Ability and Level of Safety    Hearing: Hearing is good. had hearing test, has occasional problems with L ear      Activities of Daily Living: The home contains: handrails and grab bars  Patient needs help with:  transportation      Ambulation: with difficulty, uses a walker     Fall Risk:  Fall Risk Assessment, last 12 mths 3/18/2021   Able to walk? Yes   Fall in past 12 months? 0   Do you feel unsteady? 0   Are you worried about falling 0   Number of falls in past 12 months -   Fall with injury?  -      Abuse Screen:  Patient is not abused       Cognitive Screening    Has your family/caregiver stated any concerns about your memory: no     Cognitive Screening: Normal - MMSE (Mini Mental Status Exam), Mini Cog Test    Health Maintenance Due     Health Maintenance Due   Topic Date Due    Pneumococcal 0-64 years (2 - PCV) 08/29/2017    COVID-19 Vaccine (2 - Booster for [employer] series) 06/07/2021    Medicare Yearly Exam  06/22/2022       Patient Care Team   Patient Care Team:  Gualberto Gray PA-C as PCP - General (Physician Assistant)  Gualberto Gray PA-C as PCP - [facility] Empaneled Provider  Teofilo Jean MD (Cardiovascular Disease Physician)  Glen Tirado NP (Nurse Practitioner)  Fabiola Guerrero MD as Physician (Physical Medicine and Rehabilitation Physician)  Edna Casanova MD (Sleep Medicine Physician)  Hilary Wells MD as Consulting Provider (Endocrinology Physician)  Gurdeep Liu as   Brenda Prasad MD as Physician (Female Pelvic Medicine and Reconstructive Surgery)  Inocencio Garcia MD as Physician (Gastroenterology)    History     Patient Active Problem List Diagnosis Code    Coronary artery disease involving native coronary artery of native heart without angina pectoris I25.10    Bronchitis J40    High cholesterol E78.00    History of cardiomyopathy Z86.79    SOB (shortness of breath) R06.02    Neck pain, bilateral M54.2    Shoulder pain, bilateral M25.511, M25.512    Muscle spasticity M62.838    Hemiparesis and alteration of sensations as late effects of stroke ([facility]) I69.359, I69.398    Head pain, chronic R51.9, G89.29    Expressive aphasia R47.01    Heart palpitations R00.2    Ventricular ectopic activity I49.3    Stroke ([facility]) I63.9    Thyroid disease E07.9    Hypercholesterolemia E78.00    EDDIE on CPAP G47.33, Z99.89    Essential hypertension I10    Congestive heart failure ([facility]) I50.9    Chest pain R07.9    Melena K92.1    Hiatal hernia K44.9    Postoperative hypothyroidism E89.0    Chronic pain G89.29    Prediabetes R73.03    Constipation K59.00    Insomnia G47.00    Left-sided weakness R53.1    Vitamin D deficiency E55.9    Obesity, morbid ([facility]) E66.01    Chronic indwelling Dunn catheter Z97.8    Dermatitis L30.9    Obesity (BMI 30-39. 9) E66.9    Anxiety F41.9    Acute cystitis N30.00    Encephalopathy G93.40    COPD without exacerbation ([facility] J44.9     Past Medical History:   Diagnosis Date    Advanced care planning/counseling discussion 3/4/16    On File    Bronchitis 2/24/2014    Cervicalgia 8/18/15    Dr. Diego Chavez    Chest pain 5/25/15    Hospitalized at [facility] 5/25/15 (lab work negative)    Chronic indwelling Dunn catheter 1/24/2018    Initially placed Jan 2018. Mx by Dr. Dipesh Smith.      Congestive heart failure, unspecified     Last Echo 2/8/15: EF 55-60%    Constipation 6/13/2017    Enthesopathy, spinal ([facility] 8/18/15    Dr. Diego Chavez    Essential hypertension     Foot drop 8/18/15    Dr. Indu Gomez Heart attack [facility]) 2/24/2013    Was supposed to See Cardiology for possible pacemaker in november 2014- After Cardiology consult locally, no need, EF greatly improved. Established with Dr. Elisabeth Zayas Hiatal hernia 6/2015    3 cm hiatal hernia     Hip pain 8/18/15    Dr. Shyann Arzate    Hypercholesterolemia     Hyperglycemia 7/2015    A1c 5.9     Hyperlipidemia 6/30/2015    NMR lipoprofile- LDL P 997, LDL-c 71, HLD-C-39, TG-60, HLD-P (25.2), Small LDL-P -541, LDL size 20.6    Hypothyroid     Insomnia 6/13/2017    Lower extremity edema     Lumbar spondylosis 8/18/15    Dr. Mark Rodrigues 6/2015    EGD/Colonscopy 6/15- Gastritis, internal hemorrhoids and 3 polyps    Menopause     Murmur     EDDIE on CPAP     Was referred to Pulmonology - Uses CPAP    Osteoarthritis of hip 8/18/15    Dr. Shyann Arzate    Osteoarthritis, shoulder 8/18/15    Dr. Benites Radiculopathy, cervical 8/18/15    Dr. Shyann Arzate    Shoulder pain 8/18/15    Dr. Benites Spinal stenosis of cervical region 8/18/15    Dr. Benites Spinal stenosis, lumbar 8/18/15    Dr. Benites Stroke [facility]) 2/25/2014    Established with Neurology, Dirkie Seen, NP-Just hospitalized at [facility] 2/7/15-2/10/15. CT negative, but Late effect CV accident with increased tone described on discharge summary, Carotid dopplers showed 50% stenosis bilaterally.  Vitamin D deficiency 7/2015      Past Surgical History:   Procedure Laterality Date    COLONOSCOPY N/A 6/22/2016    COLONOSCOPY performed by Oswald Mcadams MD at [facility] ENDOSCOPY    COLONOSCOPY N/A 1/26/2022    COLONOSCOPY, needs rapid performed by Johan Richter MD at [facility] ENDOSCOPY    HX BREAST BIOPSY Right 8/11/15    Stereo Bx - [facility]--- Benign    HX CHOLECYSTECTOMY      HX COLONOSCOPY  6/2015    Dr. Socorro Bang- 3 complete polypectomies, Internal hemorrhoids, difficult study due to spasm.  Repeat in 1 year    HX ENDOSCOPY  6/2015    mild gastritis, 3cm hiatal hernia     HX GASTRIC BYPASS  6/1989    HX HEART CATHETERIZATION  2/2014    HX ORTHOPAEDIC      Right middle finger distal amputation    HX PARTIAL THYROIDECTOMY  ~1990  HX THYROIDECTOMY Left 1985    90% of one side of the thyroid removed    IR ASP BLADDER SUPRA CATH  5/24/2019     Current Outpatient Medications   Medication Sig Dispense Refill    levothyroxine (SYNTHROID) 125 mcg tablet TAKE 1 TABLET BY MOUTH ONCE DAILY BEFORE BREAKFAST 90 Tablet 0    linaCLOtide (Linzess) 145 mcg cap capsule TAKE 1 CAPSULE BY MOUTH ONCE DAILY BEFORE BREAKFAST FOR CONSTIPATION 90 Capsule 0    galcanezumab-gnlm (Emgality Syringe) 120 mg/mL syrg 1 Syringe by SubCUTAneous route every thirty (30) days. Start 30 days after the loading dose 1 Each 11    cyanocobalamin (VITAMIN B12) 1,000 mcg/mL injection INJECT 1 ML UNDER THE SKIN EVERY 14 DAYS FOR B12 DEFICIENCY  Indications: inadequate vitamin B12 10 mL 2    hydrOXYzine HCL (ATARAX) 50 mg tablet TAKE 1 TABLET BY MOUTH TWICE DAILY AS NEEDED FOR ANXIETY 60 Tablet 2    Needle, Disp, 25 G 25 gauge x 3/4\" ndle Use bimonthly for cyanocobalamin subcutaneous injection. 25 Each 3    trospium (SANCTURA) 20 mg tablet Take 1 Tablet by mouth Before breakfast and dinner. 60 Tablet 5    oxyCODONE-acetaminophen (PERCOCET 10)  mg per tablet Take 1 Tablet by mouth as needed for Pain.  atorvastatin (LIPITOR) 40 mg tablet Take 1 tablet by mouth once daily 90 Tablet 3    furosemide (LASIX) 40 mg tablet Take 1 Tablet by mouth daily. 90 Tablet 3    montelukast (SINGULAIR) 10 mg tablet Take 10 mg by mouth daily.  topiramate (TOPAMAX) 100 mg tablet Take 1 tablet by mouth twice daily 180 Tablet 3    carvediloL (COREG) 12.5 mg tablet Take 1 Tablet by mouth two (2) times daily (with meals). 180 Tablet 3    baclofen (LIORESAL) 20 mg tablet TAKE 1 TABLET BY MOUTH EVERY 8 HOURS AS NEEDED FOR MUSCLE SPASM(S)      Botox 200 unit injection INJECT INTO THE BILATERAL MUSCLES OF THE HEAD AND NECK BY PRESCRIBER IN OFFICE FOR MIGRAINES EVERY 3 MONTHS. DISCARD UNUSED PORTION 1 Vial 3    losartan (COZAAR) 50 mg tablet Take 1 Tab by mouth nightly.  Hold for SBP<115 90 Tab 3    pantoprazole (PROTONIX) 40 mg tablet Take 1 Tab by mouth daily. 90 Tab 3    BD Single Use Swabs Regular padm Check Blood sugar ONCE daily      Syringe, Disposable, 1 mL syrg Use bimonthly for cyanocobalamin subcutaneous injection. 25 Syringe 0    fluticasone propionate (FLONASE) 50 mcg/actuation nasal spray 2 Sprays by Both Nostrils route daily. 1 Bottle 0    gabapentin (NEURONTIN) 100 mg capsule Take 100 mg by mouth three (3) times daily as needed for Pain.  FREESTYLE LITE STRIPS strip USE STRIP TO CHECK GLUCOSE TWICE DAILY  3    FREESTYLE LITE METER monitoring kit USE TO CHECK GLUCOSE ONCE DAILY  0    ciclopirox (PENLAC) 8 % solution APPLY TO AFFECTED TOE NAILS DAILY      diclofenac (VOLTAREN) 1 % gel APPLY TOPICALLY TO AFFECTED AREA ONCE DAILY      FREESTYLE LANCETS 28 gauge [facility] USE TO CHECK GLUCOSE TWICE DAILY      miscellaneous medical supply misc Rollator Dx:I69.359 1 Each 0    miscellaneous medical supply [facility] Walker Dx: I69.359 1 Each 0    omega-3 acid ethyl esters (LOVAZA) 1 gram capsule Take 2 Caps by mouth two (2) times a day. 120 Cap 2    DULoxetine (CYMBALTA) 60 mg capsule Take 2 Caps by mouth daily. 180 Cap 0    calcipotriene (DOVONEX) 0.005 % topical cream Apply  to affected area daily as needed. 60 g 11    albuterol-ipratropium (DUO-NEB) 2.5 mg-0.5 mg/3 ml nebu 3 mL by Nebulization route every six (6) hours as needed. (Patient taking differently: 3 mL by Nebulization route every six (6) hours as needed for Wheezing.) 30 Nebule 0    ferrous sulfate 325 mg (65 mg iron) tablet Take 325 mg by mouth Daily (before breakfast).  aspirin delayed-release 81 mg tablet Take 81 mg by mouth daily.        Allergies   Allergen Reactions    Bees [Hymenoptera Allergenic Extract] Shortness of Breath and Swelling    Strawberry Shortness of Breath and Swelling    Lisinopril Cough       Family History   Problem Relation Age of Onset    Heart Disease Father 61    Broken Bones Father         Hip/fell    Hypertension Mother     Heart Disease Brother 62    Broken Bones Brother         Hip/fell    Diabetes Maternal Grandmother      Social History     Tobacco Use    Smoking status: Former Smoker     Packs/day: 0.25     Years: 15.00     Pack years: 3.75     Types: Cigarettes     Quit date: 6/13/2007     Years since quitting: 15.0    Smokeless tobacco: Never Used   Substance Use Topics    Alcohol use: No         Meche Kwon PA-C

## 2022-06-10 NOTE — PATIENT INSTRUCTIONS
Medicare Wellness Visit, Female     The best way to live healthy is to have a lifestyle where you eat a well-balanced diet, exercise regularly, limit alcohol use, and quit all forms of tobacco/nicotine, if applicable. Regular preventive services are another way to keep healthy. Preventive services (vaccines, screening tests, monitoring & exams) can help personalize your care plan, which helps you manage your own care. Screening tests can find health problems at the earliest stages, when they are easiest to treat. [facility] follows the current, evidence-based guidelines published by the [facility] Mandujano ([facility]) when recommending preventive services for our patients. Because we follow these guidelines, sometimes recommendations change over time as research supports it. (For example, mammograms used to be recommended annually. Even though Medicare will still pay for an annual mammogram, the newer guidelines recommend a mammogram every two years for women of average risk). Of course, you and your doctor may decide to screen more often for some diseases, based on your risk and your co-morbidities (chronic disease you are already diagnosed with). Preventive services for you include:  - Medicare offers their members a free annual wellness visit, which is time for you and your primary care provider to discuss and plan for your preventive service needs. Take advantage of this benefit every year!  -All adults over the age of 72 should receive the recommended pneumonia vaccines. Current USPSTF guidelines recommend a series of two vaccines for the best pneumonia protection.   -All adults should have a flu vaccine yearly and a tetanus vaccine every 10 years.   -All adults age 48 and older should receive the shingles vaccines (series of two vaccines).       -All adults age 38-68 who are overweight should have a diabetes screening test once every three years.   -All adults born between [birth year] and 1965 should be screened once for Hepatitis C.  -Other screening tests and preventive services for persons with diabetes include: an eye exam to screen for diabetic retinopathy, a kidney function test, a foot exam, and stricter control over your cholesterol.   -Cardiovascular screening for adults with routine risk involves an electrocardiogram (ECG) at intervals determined by your doctor.   -Colorectal cancer screenings should be done for adults age 54-65 with no increased risk factors for colorectal cancer. There are a number of acceptable methods of screening for this type of cancer. Each test has its own benefits and drawbacks. Discuss with your doctor what is most appropriate for you during your annual wellness visit. The different tests include: colonoscopy (considered the best screening method), a fecal occult blood test, a fecal DNA test, and sigmoidoscopy.    -A bone mass density test is recommended when a woman turns 65 to screen for osteoporosis. This test is only recommended one time, as a screening. Some providers will use this same test as a disease monitoring tool if you already have osteoporosis. -Breast cancer screenings are recommended every other year for women of normal risk, age 54-69.  -Cervical cancer screenings for women over age 72 are only recommended with certain risk factors.      Here is a list of your current Health Maintenance items (your personalized list of preventive services) with a due date:  Health Maintenance Due   Topic Date Due    Pneumococcal Vaccine (2 - PCV) 08/29/2017    COVID-19 Vaccine (2 - Booster for Fani series) 06/07/2021    Annual Well Visit  06/22/2022

## 2022-06-10 NOTE — TELEPHONE ENCOUNTER
Patient calling stating she's trying to get her Botox for 06/16/22 but [address] needs the doctor to send them the prescription. Please call.

## 2022-06-10 NOTE — PROGRESS NOTES
Chief Complaint   Patient presents with    Follow-up     hypertension & stroke          Vitals:    06/10/22 1034   BP: 110/73   Pulse: 73   Resp: 18   Temp: 97.6 °F (36.4 °C)   TempSrc: Oral   SpO2: 97%   Weight: 260 lb (117.9 kg)   Height: 5' 6\" (1.676 m)   PainSc:   7   PainLoc: Shoulder       Health Maintenance Due   Topic    Pneumococcal 0-64 years (2 - PCV)    COVID-19 Vaccine (2 - Booster for [employer] series)    Medicare Yearly Exam        1. \"Have you been to the ER, urgent care clinic since your last visit? Hospitalized since your last visit? \" No    2. \"Have you seen or consulted any other health care providers outside of the [address] [place] [address] since your last visit? \" No     3. For patients aged 39-70: Has the patient had a colonoscopy / FIT/ Cologuard? Yes - no Care Gap present      If the patient is female:    4. For patients aged 41-77: Has the patient had a mammogram within the past 2 years? Yes - no Care Gap present      5. For patients aged 21-65: Has the patient had a pap smear?  Yes - no Care Gap present

## 2022-06-16 ENCOUNTER — OFFICE VISIT (OUTPATIENT)
Dept: NEUROLOGY | Age: 62
End: 2022-06-16
Payer: MEDICARE

## 2022-06-16 VITALS
OXYGEN SATURATION: 97 % | HEIGHT: 66 IN | BODY MASS INDEX: 41.78 KG/M2 | DIASTOLIC BLOOD PRESSURE: 88 MMHG | WEIGHT: 260 LBS | HEART RATE: 78 BPM | SYSTOLIC BLOOD PRESSURE: 140 MMHG

## 2022-06-16 DIAGNOSIS — G43.719 INTRACTABLE CHRONIC MIGRAINE WITHOUT AURA AND WITHOUT STATUS MIGRAINOSUS: Primary | ICD-10-CM

## 2022-06-16 PROCEDURE — 64615 CHEMODENERV MUSC MIGRAINE: CPT | Performed by: PSYCHIATRY & NEUROLOGY

## 2022-06-16 NOTE — PROGRESS NOTES
Botox Injection Note     2022     Patient:  Elke Collier   YOB: 1960  Date of Visit: 2022      Indication: patient has chronic migraine headaches greater than 15 days per month lasting more than 4 hours each. She has failed or not tolerated 2 or more medication preventatives. Written consent was signed and verified by me. Risks and benefits were discussed to include possible cosmetic asymmetry which is not permament or life threatening. Patient name and  was confirmed prior to procedure. Time out performed. Procedure:   Botox concentration: 200 units in 2 ml of preservative-free normal saline. 1:1 dilution. LOT#: [id number]  EXP:  2025    Injections and distribution as follow :      Units/site  Sites Loc Subtotal    procerus 5 1 ML 5   corrugaters 2.5 2 BL 5   frontalis 5 8 BL 40   Temporalis 10 4 BL 40   Occipitalis 10 2 BL 20   Cervical paraspinals 5 4 BL 20   Trapezius 10 4 BL 40         200 units Botox were reconstituted, 170 units injected as above and the remainder was unavoidably wasted. Patient tolerated procedure well. Return in 3 months for repeat injections.     _____________________________   Fabby Castro, DO  [address], ABPN

## 2022-06-16 NOTE — PROGRESS NOTES
Chief Complaint   Patient presents with    Procedure     Botox     Visit Vitals  BP (!) 140/88 (BP 1 Location: Left upper arm, BP Patient Position: Sitting)   Pulse 78   Ht 5' 6\" (1.676 m)   Wt 260 lb (117.9 kg)   SpO2 97%   BMI 41.97 kg/m²

## 2022-07-27 ENCOUNTER — TELEPHONE (OUTPATIENT)
Dept: INTERNAL MEDICINE CLINIC | Age: 62
End: 2022-07-27

## 2022-07-27 NOTE — TELEPHONE ENCOUNTER
Pt called and stated that she now has a care coordinator through her insurance helping get her a lift chair. The coordinators name is Pollo Domínguez and she needs a medical necessity form filled out by the provider and emailed to her at [employer]. [email or web address]  Her number is [phone] for any questions

## 2022-07-28 NOTE — TELEPHONE ENCOUNTER
Message left for pt to call back. Sandra coordinators number left [phone] is a fax machine. More information is need, company usually faxes the provider a form to fill out.

## 2022-07-29 ENCOUNTER — TELEPHONE (OUTPATIENT)
Dept: INTERNAL MEDICINE CLINIC | Age: 62
End: 2022-07-29

## 2022-07-29 NOTE — TELEPHONE ENCOUNTER
Letter from 2/11/22 reference to stair lift faxed to [facility] [phone], confirmation received. Phone number [phone].

## 2022-08-01 DIAGNOSIS — E89.0 POSTOPERATIVE HYPOTHYROIDISM: Chronic | ICD-10-CM

## 2022-08-01 RX ORDER — LEVOTHYROXINE SODIUM 125 UG/1
TABLET ORAL
Qty: 90 TABLET | Refills: 0 | Status: SHIPPED | OUTPATIENT
Start: 2022-08-01

## 2022-08-04 ENCOUNTER — OFFICE VISIT (OUTPATIENT)
Dept: CARDIOLOGY CLINIC | Age: 62
End: 2022-08-04
Payer: MEDICARE

## 2022-08-04 VITALS
RESPIRATION RATE: 16 BRPM | BODY MASS INDEX: 41.46 KG/M2 | WEIGHT: 258 LBS | OXYGEN SATURATION: 98 % | HEART RATE: 78 BPM | DIASTOLIC BLOOD PRESSURE: 60 MMHG | HEIGHT: 66 IN | SYSTOLIC BLOOD PRESSURE: 100 MMHG

## 2022-08-04 DIAGNOSIS — I50.9 CHRONIC CONGESTIVE HEART FAILURE, UNSPECIFIED HEART FAILURE TYPE (HCC): ICD-10-CM

## 2022-08-04 DIAGNOSIS — E66.01 OBESITY, MORBID (HCC): ICD-10-CM

## 2022-08-04 DIAGNOSIS — Z86.79 HISTORY OF CARDIOMYOPATHY: ICD-10-CM

## 2022-08-04 DIAGNOSIS — I10 ESSENTIAL HYPERTENSION: ICD-10-CM

## 2022-08-04 DIAGNOSIS — I69.398 HEMIPARESIS AND ALTERATION OF SENSATIONS AS LATE EFFECTS OF STROKE (HCC): ICD-10-CM

## 2022-08-04 DIAGNOSIS — E78.00 HIGH CHOLESTEROL: ICD-10-CM

## 2022-08-04 DIAGNOSIS — E78.00 HYPERCHOLESTEROLEMIA: ICD-10-CM

## 2022-08-04 DIAGNOSIS — I49.3 VENTRICULAR ECTOPIC ACTIVITY: ICD-10-CM

## 2022-08-04 DIAGNOSIS — I69.359 HEMIPARESIS AND ALTERATION OF SENSATIONS AS LATE EFFECTS OF STROKE (HCC): ICD-10-CM

## 2022-08-04 DIAGNOSIS — R00.2 HEART PALPITATIONS: Primary | ICD-10-CM

## 2022-08-04 PROCEDURE — 99214 OFFICE O/P EST MOD 30 MIN: CPT | Performed by: SPECIALIST

## 2022-08-04 PROCEDURE — G8417 CALC BMI ABV UP PARAM F/U: HCPCS | Performed by: SPECIALIST

## 2022-08-04 PROCEDURE — G8432 DEP SCR NOT DOC, RNG: HCPCS | Performed by: SPECIALIST

## 2022-08-04 PROCEDURE — G9899 SCRN MAM PERF RSLTS DOC: HCPCS | Performed by: SPECIALIST

## 2022-08-04 PROCEDURE — G8752 SYS BP LESS 140: HCPCS | Performed by: SPECIALIST

## 2022-08-04 PROCEDURE — G8754 DIAS BP LESS 90: HCPCS | Performed by: SPECIALIST

## 2022-08-04 PROCEDURE — 3017F COLORECTAL CA SCREEN DOC REV: CPT | Performed by: SPECIALIST

## 2022-08-04 PROCEDURE — G8427 DOCREV CUR MEDS BY ELIG CLIN: HCPCS | Performed by: SPECIALIST

## 2022-08-04 RX ORDER — CARVEDILOL 12.5 MG/1
12.5 TABLET ORAL 2 TIMES DAILY WITH MEALS
Qty: 180 TABLET | Refills: 3 | Status: SHIPPED | OUTPATIENT
Start: 2022-08-04

## 2022-08-04 NOTE — PROGRESS NOTES
HISTORY OF PRESENT ILLNESS  Sasha Simpson is a 57 y.o. female. She has a history of cardiac catheterization quite a few years ago in [place] showing normal coronary arteries. She had a significant stroke following the procedure. Her echocardiograms have shown some inferior wall hypokinesis with mildly reduced ejection fraction and a stress test done within the past 1 to 2 years confirmed this and showed ejection fraction of 47%. There was no ischemia. She is seen today not in a wheelchair as before but with a walker. She goes to an adult  center several days a week and lives with her sister. Despite her weight increase of 13 pounds her metformin has been discontinued by her primary care physician. Her hemoglobin A1c has been inching up with her weight and is now 5.7. When I last saw her blood pressure was only 033 systolic and it is seen today. She is on a beta-blocker carvedilol for her cardiomyopathy. She also takes losartan and furosemide. Her lab work is being followed by her primary care physician.   She does feel better overall than she has in the past.  HPI  Patient Active Problem List   Diagnosis Code    Coronary artery disease involving native coronary artery of native heart without angina pectoris I25.10    Bronchitis J40    High cholesterol E78.00    History of cardiomyopathy Z86.79    SOB (shortness of breath) R06.02    Neck pain, bilateral M54.2    Shoulder pain, bilateral M25.511, M25.512    Muscle spasticity M62.838    Hemiparesis and alteration of sensations as late effects of stroke (HCC) I69.359, I69.398    Head pain, chronic R51.9, G89.29    Expressive aphasia R47.01    Heart palpitations R00.2    Ventricular ectopic activity I49.3    Stroke ([facility] [address] I63.9    Thyroid disease E07.9    Hypercholesterolemia E78.00    EDDIE on CPAP G47.33, Z99.89    Essential hypertension I10    Congestive heart failure (HCC) I50.9    Chest pain R07.9    Melena K92.1    Hiatal hernia K44.9 Postoperative hypothyroidism E89.0    Chronic pain G89.29    Prediabetes R73.03    Constipation K59.00    Insomnia G47.00    Left-sided weakness R53.1    Vitamin D deficiency E55.9    Obesity, morbid (HCC) E66.01    Chronic indwelling Dunn catheter Z97.8    Dermatitis L30.9    Obesity (BMI 30-39. 9) E66.9    Anxiety F41.9    Acute cystitis N30.00    Encephalopathy G93.40    COPD without exacerbation (HCC) J44.9     Current Outpatient Medications   Medication Sig Dispense Refill    carvediloL (COREG) 12.5 mg tablet Take 1 Tablet by mouth two (2) times daily (with meals). 180 Tablet 3    levothyroxine (SYNTHROID) 125 mcg tablet TAKE 1 TABLET BY MOUTH ONCE DAILY BEFORE BREAKFAST 90 Tablet 0    linaCLOtide (Linzess) 145 mcg cap capsule TAKE 1 CAPSULE BY MOUTH ONCE DAILY BEFORE BREAKFAST FOR CONSTIPATION 90 Capsule 1    losartan (COZAAR) 50 mg tablet Take 1 Tablet by mouth nightly. Hold for SBP<115 90 Tablet 3    pantoprazole (PROTONIX) 40 mg tablet Take 1 Tablet by mouth daily. 90 Tablet 3    hydrOXYzine HCL (ATARAX) 50 mg tablet TAKE 1 TABLET BY MOUTH TWICE DAILY AS NEEDED FOR ANXIETY 60 Tablet 2    Botox 200 unit injection Inject 155 units to selected muscles head and neck bilaterally every 12 weeks  Indications: migraine prevention 200 Units 3    galcanezumab-gnlm (Emgality Syringe) 120 mg/mL syrg 1 Syringe by SubCUTAneous route every thirty (30) days. Start 30 days after the loading dose 1 Each 11    cyanocobalamin (VITAMIN B12) 1,000 mcg/mL injection INJECT 1 ML UNDER THE SKIN EVERY 14 DAYS FOR B12 DEFICIENCY  Indications: inadequate vitamin B12 10 mL 2    Needle, Disp, 25 G 25 gauge x 3/4\" ndle Use bimonthly for cyanocobalamin subcutaneous injection. 25 Each 3    trospium (SANCTURA) 20 mg tablet Take 1 Tablet by mouth Before breakfast and dinner. 60 Tablet 5    oxyCODONE-acetaminophen (PERCOCET 10)  mg per tablet Take 1 Tablet by mouth as needed for Pain.       atorvastatin (LIPITOR) 40 mg tablet Take 1 tablet by mouth once daily 90 Tablet 3    furosemide (LASIX) 40 mg tablet Take 1 Tablet by mouth daily. 90 Tablet 3    montelukast (SINGULAIR) 10 mg tablet Take 10 mg by mouth daily. topiramate (TOPAMAX) 100 mg tablet Take 1 tablet by mouth twice daily 180 Tablet 3    baclofen (LIORESAL) 20 mg tablet TAKE 1 TABLET BY MOUTH EVERY 8 HOURS AS NEEDED FOR MUSCLE SPASM(S)      BD Single Use Swabs Regular padm Check Blood sugar ONCE daily      Syringe, Disposable, 1 mL syrg Use bimonthly for cyanocobalamin subcutaneous injection. 25 Syringe 0    fluticasone propionate (FLONASE) 50 mcg/actuation nasal spray 2 Sprays by Both Nostrils route daily. 1 Bottle 0    gabapentin (NEURONTIN) 100 mg capsule Take 100 mg by mouth three (3) times daily as needed for Pain. FREESTYLE LITE STRIPS strip USE STRIP TO CHECK GLUCOSE TWICE DAILY  3    FREESTYLE LITE METER monitoring kit USE TO CHECK GLUCOSE ONCE DAILY  0    ciclopirox (PENLAC) 8 % solution APPLY TO AFFECTED TOE NAILS DAILY      diclofenac (VOLTAREN) 1 % gel APPLY TOPICALLY TO AFFECTED AREA ONCE DAILY      miscellaneous medical supply misc Rollator Dx:I69.359 1 Each 0    miscellaneous medical supply [facility] Walker Dx: K61.575 1 Each 0    omega-3 acid ethyl esters (LOVAZA) 1 gram capsule Take 2 Caps by mouth two (2) times a day. 120 Cap 2    DULoxetine (CYMBALTA) 60 mg capsule Take 2 Caps by mouth daily. 180 Cap 0    calcipotriene (DOVONEX) 0.005 % topical cream Apply  to affected area daily as needed. 60 g 11    albuterol-ipratropium (DUO-NEB) 2.5 mg-0.5 mg/3 ml nebu 3 mL by Nebulization route every six (6) hours as needed. (Patient taking differently: 3 mL by Nebulization route every six (6) hours as needed for Wheezing.) 30 Nebule 0    ferrous sulfate 325 mg (65 mg iron) tablet Take 325 mg by mouth Daily (before breakfast). aspirin delayed-release 81 mg tablet Take 81 mg by mouth daily.       FREESTYLE LANCETS 28 gauge [facility] USE TO CHECK GLUCOSE TWICE DAILY       Past Medical History:   Diagnosis Date    Advanced care planning/counseling discussion 3/4/16    On File    Bronchitis 2/24/2014    Cervicalgia 8/18/15    Dr. Fran Ramirez    Chest pain 5/25/15    Hospitalized at [facility] 5/25/15 (lab work negative)    Chronic indwelling Dunn catheter 1/24/2018    Initially placed Jan 2018. Mx by Dr. Kerby Paget. Congestive heart failure, unspecified     Last Echo 2/8/15: EF 55-60%    Constipation 6/13/2017    Enthesopathy, spinal ([address] 8/18/15    Dr. Yamile Celestin hypertension     Foot drop 8/18/15    Dr. Corwin Maki attack [facility]) 2/24/2013    Was supposed to See Cardiology for possible pacemaker in november 2014- After Cardiology consult locally, no need, EF greatly improved. Established with Dr. Gudelia Dos Santos     Hiatal hernia 6/2015    3 cm hiatal hernia     Hip pain 8/18/15    Dr. Fran Ramirez    Hypercholesterolemia     Hyperglycemia 7/2015    A1c 5.9     Hyperlipidemia 6/30/2015    NMR lipoprofile- LDL P 997, LDL-c 71, HLD-C-39, TG-60, HLD-P (25.2), Small LDL-P -541, LDL size 20.6    Hypothyroid     Insomnia 6/13/2017    Lower extremity edema     Lumbar spondylosis 8/18/15    Dr. Noris Palacios 6/2015    EGD/Colonscopy 6/15- Gastritis, internal hemorrhoids and 3 polyps    Menopause     Murmur     EDDIE on CPAP     Was referred to Pulmonology - Uses CPAP    Osteoarthritis of hip 8/18/15    Dr. Patricia Pereira, shoulder 8/18/15    Dr. Fran Ramirez    Radiculopathy, cervical 8/18/15    Dr. Fran Ramirez    Shoulder pain 8/18/15    Dr. Fran Ramirez    Spinal stenosis of cervical region 8/18/15    Dr. Fran Ramirez    Spinal stenosis, lumbar 8/18/15    Dr. Fran Ramirez    Stroke [facility]) 2/25/2014    Established with Neurology, Nam Morse NP-Just hospitalized at [facility] 2/7/15-2/10/15. CT negative, but Late effect CV accident with increased tone described on discharge summary, Carotid dopplers showed 50% stenosis bilaterally.      Vitamin D deficiency 7/2015     Past Surgical History:   Procedure Laterality Date    COLONOSCOPY N/A 6/22/2016    COLONOSCOPY performed by Jessica Epperson MD at [facility] ENDOSCOPY    COLONOSCOPY N/A 1/26/2022    COLONOSCOPY, needs rapid performed by Hallie Turcios MD at [facility] ENDOSCOPY    HX BREAST BIOPSY Right 8/11/15    [facility]    HX CHOLECYSTECTOMY      HX COLONOSCOPY  6/2015    Dr. Giles Nipple- 3 complete polypectomies, Internal hemorrhoids, difficult study due to spasm. Repeat in 1 year    HX ENDOSCOPY  6/2015    mild gastritis, 3cm hiatal hernia     HX GASTRIC BYPASS  6/1989    HX HEART CATHETERIZATION  2/2014    HX ORTHOPAEDIC      Right middle finger distal amputation    HX PARTIAL THYROIDECTOMY  ~1990    HX THYROIDECTOMY Left 1985    90% of one side of the thyroid removed    IR ASP BLADDER SUPRA CATH  5/24/2019       Review of Systems   Constitutional: Negative. Eyes: Negative. Neurological:  Positive for focal weakness. All other systems reviewed and are negative. Visit Vitals  /60   Pulse 78   Resp 16   Ht 5' 6\" (1.676 m)   Wt 258 lb (117 kg)   LMP  (LMP Unknown)   SpO2 98%   BMI 41.64 kg/m²       Physical Exam  Constitutional:       Appearance: She is obese. HENT:      Head: Normocephalic. Right Ear: External ear normal.      Left Ear: External ear normal.      Nose: Nose normal.      Mouth/Throat:      Mouth: Mucous membranes are moist.   Eyes:      Extraocular Movements: Extraocular movements intact. Cardiovascular:      Rate and Rhythm: Normal rate and regular rhythm. Heart sounds: Normal heart sounds. Pulmonary:      Breath sounds: Normal breath sounds. Abdominal:      Palpations: Abdomen is soft. Musculoskeletal:         General: Normal range of motion. Cervical back: Normal range of motion. Skin:     General: Skin is warm. Neurological:      Mental Status: She is alert. Mental status is at baseline.    Psychiatric:         Behavior: Behavior normal.       ASSESSMENT and PLAN  Her blood pressure seems relatively low in the office and I was tempted to cut back on her medications but upon careful questioning she tells me that her pressure can be as high as 150 at her day support program.  She has not had any passing out or near passing out spells. Therefore I will continue her current regimen of carvedilol furosemide and losartan and I will extend her follow-up to 1 year at this time. I told her to try not to gain any more weight.

## 2022-08-30 ENCOUNTER — TELEPHONE (OUTPATIENT)
Dept: INTERNAL MEDICINE CLINIC | Age: 62
End: 2022-08-30

## 2022-08-30 NOTE — TELEPHONE ENCOUNTER
Juan Farmer called and said she never receiived the email for medical nessity for Dwain Carballo .     Ms Angela Cee number is [phone]

## 2022-09-09 DIAGNOSIS — F41.9 ANXIETY: ICD-10-CM

## 2022-09-09 RX ORDER — HYDROXYZINE 50 MG/1
TABLET, FILM COATED ORAL
Qty: 60 TABLET | Refills: 0 | Status: SHIPPED | OUTPATIENT
Start: 2022-09-09

## 2022-10-03 ENCOUNTER — TRANSCRIBE ORDER (OUTPATIENT)
Dept: SCHEDULING | Age: 62
End: 2022-10-03

## 2022-10-03 DIAGNOSIS — Z12.31 ENCOUNTER FOR SCREENING MAMMOGRAM FOR MALIGNANT NEOPLASM OF BREAST: Primary | ICD-10-CM

## 2022-10-07 ENCOUNTER — TELEPHONE (OUTPATIENT)
Dept: NEUROLOGY | Age: 62
End: 2022-10-07

## 2022-10-07 ENCOUNTER — OFFICE VISIT (OUTPATIENT)
Dept: INTERNAL MEDICINE CLINIC | Age: 62
End: 2022-10-07
Payer: MEDICARE

## 2022-10-07 VITALS
DIASTOLIC BLOOD PRESSURE: 79 MMHG | HEART RATE: 67 BPM | RESPIRATION RATE: 18 BRPM | HEIGHT: 66 IN | WEIGHT: 259.4 LBS | BODY MASS INDEX: 41.69 KG/M2 | SYSTOLIC BLOOD PRESSURE: 111 MMHG | TEMPERATURE: 97.8 F | OXYGEN SATURATION: 95 %

## 2022-10-07 DIAGNOSIS — E66.01 OBESITY, MORBID (HCC): ICD-10-CM

## 2022-10-07 DIAGNOSIS — R73.03 PREDIABETES: ICD-10-CM

## 2022-10-07 DIAGNOSIS — I69.398 HEMIPARESIS AND ALTERATION OF SENSATIONS AS LATE EFFECTS OF STROKE (HCC): ICD-10-CM

## 2022-10-07 DIAGNOSIS — I25.10 CORONARY ARTERY DISEASE INVOLVING NATIVE CORONARY ARTERY OF NATIVE HEART WITHOUT ANGINA PECTORIS: ICD-10-CM

## 2022-10-07 DIAGNOSIS — I69.359 HEMIPARESIS AND ALTERATION OF SENSATIONS AS LATE EFFECTS OF STROKE (HCC): ICD-10-CM

## 2022-10-07 DIAGNOSIS — Z23 NEEDS FLU SHOT: ICD-10-CM

## 2022-10-07 DIAGNOSIS — I50.9 CHRONIC CONGESTIVE HEART FAILURE, UNSPECIFIED HEART FAILURE TYPE (HCC): Primary | ICD-10-CM

## 2022-10-07 DIAGNOSIS — E78.00 HYPERCHOLESTEROLEMIA: ICD-10-CM

## 2022-10-07 DIAGNOSIS — E89.0 POSTOPERATIVE HYPOTHYROIDISM: Chronic | ICD-10-CM

## 2022-10-07 DIAGNOSIS — J44.9 COPD WITHOUT EXACERBATION (HCC): ICD-10-CM

## 2022-10-07 LAB — HBA1C MFR BLD HPLC: 5.6 %

## 2022-10-07 PROCEDURE — 99214 OFFICE O/P EST MOD 30 MIN: CPT | Performed by: PHYSICIAN ASSISTANT

## 2022-10-07 PROCEDURE — 83036 HEMOGLOBIN GLYCOSYLATED A1C: CPT | Performed by: PHYSICIAN ASSISTANT

## 2022-10-07 PROCEDURE — G8754 DIAS BP LESS 90: HCPCS | Performed by: PHYSICIAN ASSISTANT

## 2022-10-07 PROCEDURE — G8417 CALC BMI ABV UP PARAM F/U: HCPCS | Performed by: PHYSICIAN ASSISTANT

## 2022-10-07 PROCEDURE — G8427 DOCREV CUR MEDS BY ELIG CLIN: HCPCS | Performed by: PHYSICIAN ASSISTANT

## 2022-10-07 PROCEDURE — G8752 SYS BP LESS 140: HCPCS | Performed by: PHYSICIAN ASSISTANT

## 2022-10-07 PROCEDURE — 3017F COLORECTAL CA SCREEN DOC REV: CPT | Performed by: PHYSICIAN ASSISTANT

## 2022-10-07 PROCEDURE — G0008 ADMIN INFLUENZA VIRUS VAC: HCPCS | Performed by: PHYSICIAN ASSISTANT

## 2022-10-07 PROCEDURE — G9899 SCRN MAM PERF RSLTS DOC: HCPCS | Performed by: PHYSICIAN ASSISTANT

## 2022-10-07 PROCEDURE — 90686 IIV4 VACC NO PRSV 0.5 ML IM: CPT | Performed by: PHYSICIAN ASSISTANT

## 2022-10-07 PROCEDURE — G8432 DEP SCR NOT DOC, RNG: HCPCS | Performed by: PHYSICIAN ASSISTANT

## 2022-10-07 NOTE — PROGRESS NOTES
Elvia Shen is a 57y.o. year old female seen in clinic today for   Chief Complaint   Patient presents with    Diabetes       she is here today to follow up for IFG, HTN, CHF , hx of stroke    Hypertension: Controlled with carvedilol 12.5 mg bid, losartan 50 mg, lasix 40 mg,   Compliant with medications? always  Compliant with diet? Low salt, but states she has trouble with portions  Compliant with exercise? Unable to exercise due to stroke  Average blood pressure readings outside of office. N/a    Denies any HA, dizziness, CP, SOB, edema, visual changes    Hx of migraines. Follows with neurology. Gets emgality shots and botox every 3 months, takes topamax 100 mg twice daily. Ifg: stopped metformin due to A1C 5.2 last check and lower blood sugars. Off metformin A1C today 5.6 , no hypoglycemia. Still has occasional SOB and few CP. Follows with Cardiology and uses BB and Lasix, no edema. All good reports. Compliant with lipitor 40 mg, no s/e  Hx of stroke with L sided hemiparesis. Has LLE brace to help with stability. No falls. Hx of COPD: uses duo nebs prn. Uses singulair    Hx of chronic constipation: compliant with linzess daily. Good results. Gabapentin and oxycodone for chronic pain related to weight, arthritis and neuropathy from stroke. Mood well controlled. Gets bored sometimes with adult day care she goes to 3 days a week. Doing well with atarax prn bid for anxiety and cymbalta for depression/pain. she specifically denies any Dizziness, fevers, chills, N/V/D, urinary symptoms or other bowel changes.     Current Outpatient Medications on File Prior to Visit   Medication Sig Dispense Refill    trospium (SANCTURA) 20 mg tablet TAKE 1 TABLET BY MOUTH BEFORE BREAKFAST AND BEFORE SUPPER 60 Tablet 5    hydrOXYzine HCL (ATARAX) 50 mg tablet Take 1 tablet by mouth twice daily as needed for anxiety 60 Tablet 0    carvediloL (COREG) 12.5 mg tablet Take 1 Tablet by mouth two (2) times daily (with meals). 180 Tablet 3    levothyroxine (SYNTHROID) 125 mcg tablet TAKE 1 TABLET BY MOUTH ONCE DAILY BEFORE BREAKFAST 90 Tablet 0    linaCLOtide (Linzess) 145 mcg cap capsule TAKE 1 CAPSULE BY MOUTH ONCE DAILY BEFORE BREAKFAST FOR CONSTIPATION 90 Capsule 1    losartan (COZAAR) 50 mg tablet Take 1 Tablet by mouth nightly. Hold for SBP<115 90 Tablet 3    pantoprazole (PROTONIX) 40 mg tablet Take 1 Tablet by mouth daily. 90 Tablet 3    Botox 200 unit injection Inject 155 units to selected muscles head and neck bilaterally every 12 weeks  Indications: migraine prevention 200 Units 3    galcanezumab-gnlm (Emgality Syringe) 120 mg/mL syrg 1 Syringe by SubCUTAneous route every thirty (30) days. Start 30 days after the loading dose 1 Each 11    Needle, Disp, 25 G 25 gauge x 3/4\" ndle Use bimonthly for cyanocobalamin subcutaneous injection. 25 Each 3    oxyCODONE-acetaminophen (PERCOCET 10)  mg per tablet Take 1 Tablet by mouth as needed for Pain. atorvastatin (LIPITOR) 40 mg tablet Take 1 tablet by mouth once daily 90 Tablet 3    furosemide (LASIX) 40 mg tablet Take 1 Tablet by mouth daily. 90 Tablet 3    montelukast (SINGULAIR) 10 mg tablet Take 10 mg by mouth daily. topiramate (TOPAMAX) 100 mg tablet Take 1 tablet by mouth twice daily 180 Tablet 3    baclofen (LIORESAL) 20 mg tablet TAKE 1 TABLET BY MOUTH EVERY 8 HOURS AS NEEDED FOR MUSCLE SPASM(S)      BD Single Use Swabs Regular padm Check Blood sugar ONCE daily      Syringe, Disposable, 1 mL syrg Use bimonthly for cyanocobalamin subcutaneous injection. 25 Syringe 0    fluticasone propionate (FLONASE) 50 mcg/actuation nasal spray 2 Sprays by Both Nostrils route daily. 1 Bottle 0    gabapentin (NEURONTIN) 100 mg capsule Take 100 mg by mouth three (3) times daily as needed for Pain.       FREESTYLE LITE STRIPS strip USE STRIP TO CHECK GLUCOSE TWICE DAILY  3    FREESTYLE LITE METER monitoring kit USE TO CHECK GLUCOSE ONCE DAILY  0    ciclopirox (PENLAC) 8 % solution APPLY TO AFFECTED TOE NAILS DAILY      diclofenac (VOLTAREN) 1 % gel APPLY TOPICALLY TO AFFECTED AREA ONCE DAILY      FREESTYLE LANCETS 28 gauge misc USE TO CHECK GLUCOSE TWICE DAILY      miscellaneous medical supply misc Rollator Dx:I69.359 1 Each 0    miscellaneous medical supply [facility] [place] Walker Dx: I69.359 1 Each 0    DULoxetine (CYMBALTA) 60 mg capsule Take 2 Caps by mouth daily. 180 Cap 0    calcipotriene (DOVONEX) 0.005 % topical cream Apply  to affected area daily as needed. 60 g 11    albuterol-ipratropium (DUO-NEB) 2.5 mg-0.5 mg/3 ml nebu 3 mL by Nebulization route every six (6) hours as needed. (Patient taking differently: 3 mL by Nebulization route every six (6) hours as needed for Wheezing.) 30 Nebule 0    ferrous sulfate 325 mg (65 mg iron) tablet Take 325 mg by mouth Daily (before breakfast). aspirin delayed-release 81 mg tablet Take 81 mg by mouth daily. cyanocobalamin (VITAMIN B12) 1,000 mcg/mL injection INJECT 1 ML UNDER THE SKIN EVERY 14 DAYS FOR B12 DEFICIENCY  Indications: inadequate vitamin B12 10 mL 2    omega-3 acid ethyl esters (LOVAZA) 1 gram capsule Take 2 Caps by mouth two (2) times a day. 120 Cap 2     No current facility-administered medications on file prior to visit. Allergies   Allergen Reactions    Bees [Hymenoptera Allergenic Extract] Shortness of Breath and Swelling    Strawberry Shortness of Breath and Swelling    Lisinopril Cough     Past Medical History:   Diagnosis Date    Advanced care planning/counseling discussion 3/4/16    On File    Bronchitis 2/24/2014    Cervicalgia 8/18/15    Dr. Rocio Arita    Chest pain 5/25/15    Hospitalized at [facility] 5/25/15 (lab work negative)    Chronic indwelling Dunn catheter 1/24/2018    Initially placed Jan 2018. Mx by Dr. Jerry Kelly.      Congestive heart failure, unspecified     Last Echo 2/8/15: EF 55-60%    Constipation 6/13/2017    Enthesopathy, spinal ([address] 8/18/15    Dr. Andrew Weaver hypertension     Foot drop 8/18/15    Dr. Reji Wilson attack [facility]) 2/24/2013    Was supposed to See Cardiology for possible pacemaker in november 2014- After Cardiology consult locally, no need, EF greatly improved. Established with Dr. Dionte Lowe     Hiatal hernia 6/2015    3 cm hiatal hernia     Hip pain 8/18/15    Dr. Nanda Ren    Hypercholesterolemia     Hyperglycemia 7/2015    A1c 5.9     Hyperlipidemia 6/30/2015    NMR lipoprofile- LDL P 997, LDL-c 71, HLD-C-39, TG-60, HLD-P (25.2), Small LDL-P -541, LDL size 20.6    Hypothyroid     Insomnia 6/13/2017    Lower extremity edema     Lumbar spondylosis 8/18/15    Dr. Watson Walls 6/2015    EGD/Colonscopy 6/15- Gastritis, internal hemorrhoids and 3 polyps    Menopause     Murmur     EDDIE on CPAP     Was referred to Pulmonology - Uses CPAP    Osteoarthritis of hip 8/18/15    Dr. Jaffe Primus, shoulder 8/18/15    Dr. Nanda Ren    Radiculopathy, cervical 8/18/15    Dr. Nanda Ren    Shoulder pain 8/18/15    Dr. Nanda Ren    Spinal stenosis of cervical region 8/18/15    Dr. Nanda Ren    Spinal stenosis, lumbar 8/18/15    Dr. Nanda Ren    Stroke [facility]) 2/25/2014    Established with Neurology, Kristin Farfan, JYOTI-Just hospitalized at [facility] 2/7/15-2/10/15. CT negative, but Late effect CV accident with increased tone described on discharge summary, Carotid dopplers showed 50% stenosis bilaterally. Vitamin D deficiency 7/2015      Past Surgical History:   Procedure Laterality Date    COLONOSCOPY N/A 6/22/2016    COLONOSCOPY performed by Sandra Tenorio MD at [facility] ENDOSCOPY    COLONOSCOPY N/A 1/26/2022    COLONOSCOPY, needs rapid performed by Arnold Villavicencio MD at [facility] ENDOSCOPY    HX BREAST BIOPSY Right 8/11/15    Stereo Bx - [facility]--- Benign    HX CHOLECYSTECTOMY      HX COLONOSCOPY  6/2015    Dr. Madelin Claudio- 3 complete polypectomies, Internal hemorrhoids, difficult study due to spasm.  Repeat in 1 year    HX ENDOSCOPY  6/2015    mild gastritis, 3cm hiatal hernia     HX GASTRIC BYPASS  6/1989    HX HEART CATHETERIZATION  2/2014    HX ORTHOPAEDIC      Right middle finger distal amputation    HX PARTIAL THYROIDECTOMY  ~1990    HX THYROIDECTOMY Left 1985    90% of one side of the thyroid removed    IR ASP BLADDER SUPRA CATH  5/24/2019        Family History   Problem Relation Age of Onset    Heart Disease Father 61    Broken Bones Father         Hip/fell    Hypertension Mother     Heart Disease Brother 62    Broken Bones Brother         Hip/fell    Diabetes Maternal Grandmother         Social History     Socioeconomic History    Marital status: SINGLE     Spouse name: Not on file    Number of children: Not on file    Years of education: Not on file    Highest education level: Not on file   Occupational History    Not on file   Tobacco Use    Smoking status: Former     Packs/day: 0.25     Years: 15.00     Pack years: 3.75     Types: Cigarettes     Quit date: 6/13/2007     Years since quitting: 15.3    Smokeless tobacco: Never   Vaping Use    Vaping Use: Never used   Substance and Sexual Activity    Alcohol use: No    Drug use: No    Sexual activity: Not Currently   Other Topics Concern    Not on file   Social History Narrative    Not on file     Social Determinants of Health     Financial Resource Strain: Not on file   Food Insecurity: Not on file   Transportation Needs: Not on file   Physical Activity: Not on file   Stress: Not on file   Social Connections: Not on file   Intimate Partner Violence: Not on file   Housing Stability: Not on file           Visit Vitals  /79 (BP 1 Location: Left upper arm, BP Patient Position: Sitting, BP Cuff Size: Large adult)   Pulse 67   Temp 97.8 °F (36.6 °C) (Oral)   Resp 18   Ht 5' 6\" (1.676 m)   Wt 259 lb 6.4 oz (117.7 kg)   LMP  (LMP Unknown)   SpO2 95%   BMI 41.87 kg/m²       Review of Systems   Constitutional:  Negative for chills, fever, malaise/fatigue and weight loss. HENT:  Negative for ear pain, hearing loss and sore throat. Respiratory:  Negative for cough. Cardiovascular:  Negative for leg swelling. Gastrointestinal:  Negative for abdominal pain, blood in stool, constipation, diarrhea, heartburn, melena, nausea and vomiting. Genitourinary:  Negative for dysuria and hematuria. Musculoskeletal:  Negative for back pain and myalgias. Skin:  Negative for rash. Neurological:  Negative for weakness. Psychiatric/Behavioral:  Negative for depression. Physical Exam  Vitals and nursing note reviewed. Constitutional:       Appearance: Normal appearance. She is obese. Comments: Walks with walker    HENT:      Head: Normocephalic and atraumatic. Right Ear: External ear normal.      Left Ear: Ear canal and external ear normal.      Nose: Nose normal.      Mouth/Throat:      Mouth: Mucous membranes are moist.      Pharynx: Oropharynx is clear. Eyes:      Conjunctiva/sclera: Conjunctivae normal.      Pupils: Pupils are equal, round, and reactive to light. Neck:      Vascular: No carotid bruit. Cardiovascular:      Rate and Rhythm: Normal rate and regular rhythm. Pulses: Normal pulses. Heart sounds: Normal heart sounds. Pulmonary:      Effort: Pulmonary effort is normal.      Breath sounds: Normal breath sounds. No wheezing, rhonchi or rales. Abdominal:      General: Abdomen is flat. Bowel sounds are normal. There is no distension. Palpations: There is no mass. Tenderness: There is no abdominal tenderness. There is no guarding or rebound. Musculoskeletal:         General: Normal range of motion. Cervical back: Normal range of motion and neck supple. No rigidity. Right lower leg: No edema. Left lower leg: No edema. Comments: L lower leg in brace for stability   Skin:     General: Skin is warm and dry. Capillary Refill: Capillary refill takes less than 2 seconds. Neurological:      General: No focal deficit present. Mental Status: She is alert and oriented to person, place, and time. Sensory: No sensory deficit. Motor: Weakness (LLE chronic weakness) present. Gait: Gait normal.   Psychiatric:         Mood and Affect: Mood normal.         Behavior: Behavior normal.         Thought Content: Thought content normal.        Recent Results (from the past 24 hour(s))   AMB POC HEMOGLOBIN A1C    Collection Time: 10/07/22 11:00 AM   Result Value Ref Range    Hemoglobin A1c (POC) 5.6 %        ASSESSMENT AND PLAN  Diagnoses and all orders for this visit:    1. Chronic congestive heart failure, unspecified heart failure type ([address]  No signs of decompensation. Doing well with lasix 40 mg and BB  2. Coronary artery disease involving native coronary artery of native heart without angina pectoris  Stable, following with Cardiology  3. Postoperative hypothyroidism  -     THYROID CASCADE PROFILE; Future  Recheck levels in 3 months   4. Hemiparesis and alteration of sensations as late effects of stroke ([address]  Continue good strengthening and brace to LLE  5. COPD without exacerbation ([address]  Stable without daily medicine, has prn nebs and uses singulair daily  6. Prediabetes  -     AMB POC HEMOGLOBIN A1C  5.6, will continue off medicine. No hypo or hyperglycemia symptoms  7. Obesity, morbid ([address]  Discussed need for portion control  8. Hypercholesterolemia  -     METABOLIC PANEL, COMPREHENSIVE; Future  -     CBC WITH AUTOMATED DIFF; Future  -     LIPID PANEL; Future  At goal with lipitor 40 mg  9. Needs flu shot  -     INFLUENZA, FLUARIX, FLULAVAL, FLUZONE (AGE 6 MO+), AFLURIA(AGE 3Y+) IM, PF, 0.5 ML         I have discussed the diagnosis with the patient and the intended plan as seen in the above orders. Patient is in agreement. The patient has received an after-visit summary and questions were answered concerning future plans. I have discussed medication side effects and warnings with the patient as well.     Cuco Maurer PA-C

## 2022-10-07 NOTE — PROGRESS NOTES
Kevin Degree  Identified pt with two pt identifiers(name and ). Chief Complaint   Patient presents with    Diabetes       Reviewed record In preparation for visit and have obtained necessary documentation. 1. Have you been to the ER, urgent care clinic or hospitalized since your last visit? No     2. Have you seen or consulted any other health care providers outside of the [address] [place] [address] since your last visit? Include any pap smears or colon screening. No    Vitals reviewed with provider. Health Maintenance reviewed: After verbal order read back of JASPER Nagy, patient received Flu Shot in left deltoid. [address]: [id number] Lot: [id number] Exp 23. Patient tolerated procedure without complaints and received VIS. Health Maintenance Due   Topic    COVID-19 Vaccine (2 - Booster for Fani series)    Flu Vaccine (1)          Wt Readings from Last 3 Encounters:   22 258 lb (117 kg)   22 260 lb (117.9 kg)   06/10/22 260 lb (117.9 kg)        Temp Readings from Last 3 Encounters:   06/10/22 97.6 °F (36.4 °C) (Oral)   22 98.1 °F (36.7 °C)   22 97.3 °F (36.3 °C) (Temporal)        BP Readings from Last 3 Encounters:   22 100/60   22 (!) 140/88   06/10/22 110/73        Pulse Readings from Last 3 Encounters:   22 78   22 78   06/10/22 73      There were no vitals filed for this visit.        Learning Assessment:   :       Learning Assessment 2019 2018 10/13/2017 3/24/2015   PRIMARY LEARNER Patient Patient Patient Patient   HIGHEST LEVEL OF EDUCATION - PRIMARY LEARNER  - - - SOME COLLEGE   BARRIERS PRIMARY LEARNER - - - NONE   CO-LEARNER CAREGIVER - - - No   CO-LEARNER NAME - - - n/a   PRIMARY LANGUAGE ENGLISH ENGLISH ENGLISH ENGLISH   LEARNER PREFERENCE PRIMARY DEMONSTRATION DEMONSTRATION LISTENING LISTENING   ANSWERED BY patient patient patient patient   RELATIONSHIP SELF SELF SELF SELF        Depression Screening:   :       3 most recent PHQ Screens 10/7/2022   PHQ Not Done -   Little interest or pleasure in doing things Not at all   Feeling down, depressed, irritable, or hopeless Not at all   Total Score PHQ 2 0        Fall Risk Assessment:   :       Fall Risk Assessment, last 12 mths 3/18/2021   Able to walk? Yes   Fall in past 12 months? 0   Do you feel unsteady? 0   Are you worried about falling 0   Number of falls in past 12 months -   Fall with injury? -        Abuse Screening:   :       Abuse Screening Questionnaire 6/21/2021 1/30/2020 9/5/2019 3/5/2019   Do you ever feel afraid of your partner? N N N N   Are you in a relationship with someone who physically or mentally threatens you? N N N N   Is it safe for you to go home?  Y Y Y Y        ADL Screening:   :       ADL Assessment 6/21/2021   Feeding yourself No Help Needed   Getting from bed to chair Help Needed   Getting dressed Help Needed   Bathing or showering Help Needed   Walk across the room (includes cane/walker) No Help Needed   Using the telphone No Help Needed   Taking your medications Help Needed   Preparing meals Help Needed   Managing money (expenses/bills) Help Needed   Moderately strenuous housework (laundry) Help Needed   Shopping for personal items (toiletries/medicines) Help Needed   Shopping for groceries Help Needed   Driving Help Needed   Climbing a flight of stairs Help Needed   Getting to places beyond walking distances Help Needed

## 2022-10-07 NOTE — PATIENT INSTRUCTIONS
Vaccine Information Statement    Influenza (Flu) Vaccine (Inactivated or Recombinant): What You Need to Know    Many vaccine information statements are available in Japanese and other languages. See www.immunize.org/vis. Hojas de información sobre vacunas están disponibles en español y en muchos otros idiomas. Visite www.immunize.org/vis. 1. Why get vaccinated? Influenza vaccine can prevent influenza (flu). Flu is a contagious disease that spreads around the [facility] every year, usually between October and May. Anyone can get the flu, but it is more dangerous for some people. Infants and young children, people 72 years and older, pregnant people, and people with certain health conditions or a weakened immune system are at greatest risk of flu complications. Pneumonia, bronchitis, sinus infections, and ear infections are examples of flu-related complications. If you have a medical condition, such as heart disease, cancer, or diabetes, flu can make it worse. Flu can cause fever and chills, sore throat, muscle aches, fatigue, cough, headache, and runny or stuffy nose. Some people may have vomiting and diarrhea, though this is more common in children than adults. In an average year, thousands of people in the [facility] die from flu, and many more are hospitalized. Flu vaccine prevents millions of illnesses and flu-related visits to the doctor each year. 2. Influenza vaccines     CDC recommends everyone 6 months and older get vaccinated every flu season. Children 6 months through 6years of age may need 2 doses during a single flu season. Everyone else needs only 1 dose each flu season. It takes about 2 weeks for protection to develop after vaccination. There are many flu viruses, and they are always changing. Each year a new flu vaccine is made to protect against the influenza viruses believed to be likely to cause disease in the upcoming flu season.  Even when the vaccine doesnt exactly match these viruses, it may still provide some protection. Influenza vaccine does not cause flu. Influenza vaccine may be given at the same time as other vaccines. 3. Talk with your health care provider    Tell your vaccination provider if the person getting the vaccine:  Has had an allergic reaction after a previous dose of influenza vaccine, or has any severe, life-threatening allergies   Has ever had Guillain-Barré Syndrome (also called GBS)    In some cases, your health care provider may decide to postpone influenza vaccination until a future visit. Influenza vaccine can be administered at any time during pregnancy. People who are or will be pregnant during influenza season should receive inactivated influenza vaccine. People with minor illnesses, such as a cold, may be vaccinated. People who are moderately or severely ill should usually wait until they recover before getting influenza vaccine. Your health care provider can give you more information. 4. Risks of a vaccine reaction    Soreness, redness, and swelling where the shot is given, fever, muscle aches, and headache can happen after influenza vaccination. There may be a very small increased risk of Guillain-Barré Syndrome (GBS) after inactivated influenza vaccine (the flu shot). [place] children who get the flu shot along with pneumococcal vaccine (PCV13) and/or DTaP vaccine at the same time might be slightly more likely to have a seizure caused by fever. Tell your health care provider if a child who is getting flu vaccine has ever had a seizure. People sometimes faint after medical procedures, including vaccination. Tell your provider if you feel dizzy or have vision changes or ringing in the ears. As with any medicine, there is a very remote chance of a vaccine causing a severe allergic reaction, other serious injury, or death. 5. What if there is a serious problem?     An allergic reaction could occur after the vaccinated person leaves the clinic. If you see signs of a severe allergic reaction (hives, swelling of the face and throat, difficulty breathing, a fast heartbeat, dizziness, or weakness), call 9-1-1 and get the person to the nearest hospital.    For other signs that concern you, call your health care provider. Adverse reactions should be reported to the Vaccine Adverse Event Reporting System (VAERS). Your health care provider will usually file this report, or you can do it yourself. Visit the VAERS website at www.vaers. [facility].gov or call 9-142.301.6312. VAERS is only for reporting reactions, and VAERS staff members do not give medical advice. 6. The National Vaccine Injury Compensation Program    The [facility] Vaccine Injury Compensation Program (VICP) is a federal program that was created to compensate people who may have been injured by certain vaccines. Claims regarding alleged injury or death due to vaccination have a time limit for filing, which may be as short as two years. Visit the VICP website at www.[facility].gov/vaccinecompensation or call 6-645.244.7527 to learn about the program and about filing a claim. 7. How can I learn more? Ask your health care provider. Call your local or state health department. Visit the website of the Food and Drug Administration (FDA) for vaccine package inserts and additional information at www.fda.gov/vaccines-blood-biologics/vaccines. Contact the Centers for Disease Control and Prevention (CDC): Call 3-189.600.8159 (1-491-BYQ-INFO) or  Visit CDCs influenza website at www.cdc.gov/flu. Vaccine Information Statement   Inactivated Influenza Vaccine   8/6/2021  42 RAMYA [place] 737RC-32   Department of Health and Human Services  Centers for Disease Control and Prevention    Office Use Only

## 2022-10-08 NOTE — TELEPHONE ENCOUNTER
Re: Botox    Reviewing botox schedule and unable to locate any update on auth that was submitted. Created case in [facility]'S [facility] key# [id number]    Rcvdmessage that pt recently filled medication.

## 2022-10-10 NOTE — TELEPHONE ENCOUNTER
Re: botox    Follow up about SSP and per review pt wanted script sent to [facility]. Reviewed pt on [facility] portal and see med was shipped on 09/08/22. Sent message to nurse to confirm rcpt of med in office since there is no note. Also entered pt into [facility]otox 1 portal to confirm CPT PA needed or not.

## 2022-10-13 ENCOUNTER — OFFICE VISIT (OUTPATIENT)
Dept: NEUROLOGY | Age: 62
End: 2022-10-13
Payer: MEDICARE

## 2022-10-13 VITALS
OXYGEN SATURATION: 9 % | SYSTOLIC BLOOD PRESSURE: 128 MMHG | HEIGHT: 66 IN | BODY MASS INDEX: 38.57 KG/M2 | DIASTOLIC BLOOD PRESSURE: 80 MMHG | WEIGHT: 240 LBS | RESPIRATION RATE: 16 BRPM | HEART RATE: 73 BPM

## 2022-10-13 DIAGNOSIS — G43.719 INTRACTABLE CHRONIC MIGRAINE WITHOUT AURA AND WITHOUT STATUS MIGRAINOSUS: Primary | ICD-10-CM

## 2022-10-13 PROCEDURE — 64615 CHEMODENERV MUSC MIGRAINE: CPT | Performed by: PSYCHIATRY & NEUROLOGY

## 2022-10-13 RX ORDER — GALCANEZUMAB 120 MG/ML
1 INJECTION, SOLUTION SUBCUTANEOUS
Qty: 1 EACH | Refills: 11 | Status: SHIPPED | OUTPATIENT
Start: 2022-10-13

## 2022-10-13 RX ORDER — TOPIRAMATE 100 MG/1
TABLET, FILM COATED ORAL
Qty: 180 TABLET | Refills: 3 | Status: SHIPPED | OUTPATIENT
Start: 2022-10-13

## 2022-10-13 NOTE — PROGRESS NOTES
Botox Injection Note     10/13/2022     Patient:  Danielle Asencio   YOB: 1960  Date of Visit: 10/13/2022      Indication: patient has chronic migraine headaches greater than 15 days per month lasting more than 4 hours each. She has failed or not tolerated 2 or more medication preventatives. Written consent was signed and verified by me. Risks and benefits were discussed to include possible cosmetic asymmetry which is not permament or life threatening. Patient name and  was confirmed prior to procedure. Time out performed. Procedure:   Botox concentration: 200 units in 2 ml of preservative-free normal saline. 1:1 dilution. LOT#: [id number]  EXP:  2025    Injections and distribution as follow :      Units/site  Sites Loc Subtotal    procerus 5 1 ML 5   corrugaters 2.5 2 BL 5   frontalis 5 8 BL 40   Temporalis 10 4 BL 40   Occipitalis 10 2 BL 20   Cervical paraspinals 5 4 BL 20   Trapezius 10 4 BL 40         200 units Botox were reconstituted, 170 units injected as above and the remainder was unavoidably wasted. Patient tolerated procedure well. Return in 3 months for repeat injections.     _____________________________   Rishi Montiel, DO  [address], ABPN

## 2022-10-13 NOTE — PROGRESS NOTES
Chief Complaint   Patient presents with    Procedure     Botox       Visit Vitals  /80 (BP 1 Location: Left upper arm, BP Patient Position: Sitting)   Pulse 73   Resp 16   Ht 5' 6\" (1.676 m)   Wt 240 lb (108.9 kg)   SpO2 (!) 9%   BMI 38.74 kg/m²

## 2022-10-19 ENCOUNTER — HOSPITAL ENCOUNTER (OUTPATIENT)
Dept: MAMMOGRAPHY | Age: 62
Discharge: HOME | End: 2022-10-19
Attending: PHYSICIAN ASSISTANT
Payer: MEDICARE

## 2022-10-19 DIAGNOSIS — Z12.31 ENCOUNTER FOR SCREENING MAMMOGRAM FOR MALIGNANT NEOPLASM OF BREAST: ICD-10-CM

## 2022-10-19 PROCEDURE — 77067 SCR MAMMO BI INCL CAD: CPT

## 2022-11-14 ENCOUNTER — TELEPHONE (OUTPATIENT)
Dept: INTERNAL MEDICINE CLINIC | Age: 62
End: 2022-11-14

## 2022-11-14 NOTE — TELEPHONE ENCOUNTER
Tropsium Chloride 20mg is not covered by insurance. The plan's preferred alternatives: Oxybutynin Tab 5mg ER  Tolterodine Cap 2mg ER      PA for Tropsium has been sent to insurance via cover my meds.

## 2022-11-15 NOTE — TELEPHONE ENCOUNTER
Approvedon November 14  PA Case: [id number], Status: Approved, Coverage Starts on: 10/1/2022 12:00:00 AM, Coverage Ends on: 11/14/2023 12:00:00 AM.    Pharmacy notified.

## 2023-01-07 NOTE — PROGRESS NOTES
Ms. Daniel Escort look great. Thyroid is normal. Cholesterol is at goal.  Kidney function remains mildly elevated so ensure your getting plenty of fluids and avoid ibuprofen and NSAID products. All other labs look great!  Keep up the good work

## 2023-01-09 DIAGNOSIS — I10 ESSENTIAL HYPERTENSION: ICD-10-CM

## 2023-01-09 RX ORDER — CARVEDILOL 12.5 MG/1
TABLET ORAL
Qty: 180 TABLET | Refills: 0 | Status: SHIPPED | OUTPATIENT
Start: 2023-01-09

## 2023-01-13 ENCOUNTER — OFFICE VISIT (OUTPATIENT)
Dept: NEUROLOGY | Age: 63
End: 2023-01-13
Payer: MEDICARE

## 2023-01-13 VITALS
WEIGHT: 240 LBS | DIASTOLIC BLOOD PRESSURE: 86 MMHG | HEIGHT: 66 IN | SYSTOLIC BLOOD PRESSURE: 138 MMHG | OXYGEN SATURATION: 96 % | HEART RATE: 70 BPM | BODY MASS INDEX: 38.57 KG/M2

## 2023-01-13 DIAGNOSIS — G43.719 INTRACTABLE CHRONIC MIGRAINE WITHOUT AURA AND WITHOUT STATUS MIGRAINOSUS: Primary | ICD-10-CM

## 2023-01-13 RX ORDER — QUETIAPINE FUMARATE 100 MG/1
TABLET, FILM COATED ORAL
COMMUNITY
Start: 2023-01-09

## 2023-01-13 RX ORDER — FLUTICASONE PROPIONATE 50 MCG
SPRAY, SUSPENSION (ML) NASAL
COMMUNITY
Start: 2022-11-30

## 2023-01-13 RX ORDER — FLUCONAZOLE 150 MG/1
TABLET ORAL
COMMUNITY
Start: 2023-01-10

## 2023-01-13 NOTE — PROGRESS NOTES
Botox Injection Note     2023     Patient:  Racquel Siddiqui   YOB: 1960  Date of Visit: 2023      Indication: patient has chronic migraine headaches greater than 15 days per month lasting more than 4 hours each. She has failed or not tolerated 2 or more medication preventatives. Written consent was signed and verified by me. Risks and benefits were discussed to include possible cosmetic asymmetry which is not permament or life threatening. Patient name and  was confirmed prior to procedure. Time out performed. Procedure:   Botox concentration: 200 units in 2 ml of preservative-free normal saline. 1:1 dilution. LOT#: [id number]  EXP:  2025    Injections and distribution as follow :      Units/site  Sites Loc Subtotal    procerus 5 1 ML 5   corrugaters 2.5 2 BL 5   frontalis 5 8 BL 40   Temporalis 10 4 BL 40   Occipitalis 10 2 BL 20   Cervical paraspinals 5 4 BL 20   Trapezius 10 4 BL 40         200 units Botox were reconstituted, 170 units injected as above and the remainder was unavoidably wasted. Patient tolerated procedure well. Return in 3 months for repeat injections.     _____________________________   Cheko Durbin DO  [address], ABPN

## 2023-01-13 NOTE — PROGRESS NOTES
Chief Complaint   Patient presents with    Procedure     Botox     Visit Vitals  /86 (BP 1 Location: Right upper arm, BP Patient Position: Sitting)   Pulse 70   Ht 5' 6\" (1.676 m)   Wt 240 lb (108.9 kg)   SpO2 96%   BMI 38.74 kg/m²

## 2023-01-17 DIAGNOSIS — R73.03 PREDIABETES: ICD-10-CM

## 2023-01-17 DIAGNOSIS — E66.01 OBESITY, MORBID (HCC): Primary | ICD-10-CM

## 2023-01-17 DIAGNOSIS — N32.89 BLADDER SPASM: ICD-10-CM

## 2023-01-17 RX ORDER — SEMAGLUTIDE 1.34 MG/ML
0.25 INJECTION, SOLUTION SUBCUTANEOUS
Qty: 1 BOX | Refills: 2 | Status: SHIPPED | OUTPATIENT
Start: 2023-01-17

## 2023-01-17 RX ORDER — TROSPIUM CHLORIDE 20 MG/1
TABLET, FILM COATED ORAL
Qty: 60 TABLET | Refills: 5 | Status: CANCELLED | OUTPATIENT
Start: 2023-01-17

## 2023-01-17 RX ORDER — OXYBUTYNIN CHLORIDE 10 MG/1
10 TABLET, EXTENDED RELEASE ORAL DAILY
Qty: 30 TABLET | Refills: 1 | Status: SHIPPED | OUTPATIENT
Start: 2023-01-17

## 2023-01-17 NOTE — TELEPHONE ENCOUNTER
Please notify patient I have sent in [address] in replacement of trospium until February when pharmacy can fill again    I sent in [address]. It is a once weekly injection. You start with 0.25 mg weekly injections and after 4 weeks you will increase to dose to 0.5 mg on the pen injection. After 2 months if you would like to increase dose we can discuss going to 1 mg pens. Side effects include nausea, abd pain ,diarrhea.  On the days following injection

## 2023-01-18 NOTE — TELEPHONE ENCOUNTER
Contacted pt verified name and . [facility]. Pt agreed.

## 2023-01-24 ENCOUNTER — TELEPHONE (OUTPATIENT)
Dept: INTERNAL MEDICINE CLINIC | Age: 63
End: 2023-01-24

## 2023-01-24 DIAGNOSIS — E78.00 HYPERCHOLESTEROLEMIA: ICD-10-CM

## 2023-01-24 DIAGNOSIS — E89.0 POSTOPERATIVE HYPOTHYROIDISM: Chronic | ICD-10-CM

## 2023-01-24 DIAGNOSIS — E78.00 HIGH CHOLESTEROL: ICD-10-CM

## 2023-01-24 RX ORDER — ATORVASTATIN CALCIUM 40 MG/1
TABLET, FILM COATED ORAL
Qty: 90 TABLET | Refills: 2 | Status: SHIPPED | OUTPATIENT
Start: 2023-01-24

## 2023-01-24 RX ORDER — LEVOTHYROXINE SODIUM 125 UG/1
125 TABLET ORAL
Qty: 90 TABLET | Refills: 0 | Status: SHIPPED | OUTPATIENT
Start: 2023-01-24

## 2023-01-24 RX ORDER — FUROSEMIDE 40 MG/1
40 TABLET ORAL DAILY
Qty: 90 TABLET | Refills: 2 | Status: SHIPPED | OUTPATIENT
Start: 2023-01-24

## 2023-01-24 NOTE — TELEPHONE ENCOUNTER
PCP: Sadaf Harris PA-C     Last appt: 10/7/2022     Future Appointments   Date Time Provider Denilson Thompson   3/22/2023 11:00 AM MD RONAN Kurtz  AMB   [facility]2023  8[facility]   4/12/2023  9:30 AM MD RONAN Kurtz  AMB   8/4/2023  9:00 AM MD YESENIA Lancaster  AMB          Requested Prescriptions     Pending Prescriptions Disp Refills    levothyroxine (SYNTHROID) 125 mcg tablet 90 Tablet 0     Sig: Take 1 Tablet by mouth Daily (before breakfast).

## 2023-01-24 NOTE — TELEPHONE ENCOUNTER
Requested Prescriptions     Signed Prescriptions Disp Refills    atorvastatin (LIPITOR) 40 mg tablet 90 Tablet 2     Sig: Take 1 tablet by mouth once daily     Authorizing Provider: Micaela Villa     Ordering User: Denny OG    furosemide (LASIX) 40 mg tablet 90 Tablet 2     Sig: Take 1 Tablet by mouth daily.      Authorizing Provider: Micaela Villa     Ordering User: Augustin Mathew    Per Dr. Gilford Coats verbal order     Future Appointments   Date Time Provider Denilson Thompson   3/22/2023 11:00 AM MD RONAN Jeter BS AMB   4/4/2023  8:30 AM MAKEDA Singh BS AMB   4/12/2023  9:30 AM MD RONAN Jeter BS AMB   8/4/2023  9:00 AM MD YESENIA Aguirre BS AMB

## 2023-01-27 ENCOUNTER — TELEPHONE (OUTPATIENT)
Dept: INTERNAL MEDICINE CLINIC | Age: 63
End: 2023-01-27

## 2023-01-27 NOTE — TELEPHONE ENCOUNTER
Sandra with [facility] called and need a Medical  Necessity for a chair lift.   Please call Aishwarya Parker at [phone]

## 2023-02-09 ENCOUNTER — TELEPHONE (OUTPATIENT)
Dept: INTERNAL MEDICINE CLINIC | Age: 63
End: 2023-02-09

## 2023-02-09 NOTE — TELEPHONE ENCOUNTER
Nataliia Joy with [facility] called and stated that the pt needs an aspirin clearance for surgery on MOnday. She asked if we can fax it to them.  Her number is [phone] fax [phone]

## 2023-02-10 NOTE — TELEPHONE ENCOUNTER
Emerita Fry from Urology called to confirm that pt is to not take aspirin the morning of her procedure.

## 2023-02-27 ENCOUNTER — OFFICE VISIT (OUTPATIENT)
Dept: INTERNAL MEDICINE CLINIC | Age: 63
End: 2023-02-27
Payer: MEDICARE

## 2023-02-27 VITALS
RESPIRATION RATE: 16 BRPM | DIASTOLIC BLOOD PRESSURE: 79 MMHG | HEART RATE: 76 BPM | BODY MASS INDEX: 41.69 KG/M2 | HEIGHT: 66 IN | SYSTOLIC BLOOD PRESSURE: 114 MMHG | WEIGHT: 259.4 LBS | TEMPERATURE: 97.9 F

## 2023-02-27 DIAGNOSIS — I69.398 HEMIPARESIS AND ALTERATION OF SENSATIONS AS LATE EFFECTS OF STROKE (HCC): ICD-10-CM

## 2023-02-27 DIAGNOSIS — Z99.89 OSA ON CPAP: ICD-10-CM

## 2023-02-27 DIAGNOSIS — F41.9 ANXIETY: ICD-10-CM

## 2023-02-27 DIAGNOSIS — J44.9 COPD WITHOUT EXACERBATION (HCC): ICD-10-CM

## 2023-02-27 DIAGNOSIS — I69.359 HEMIPARESIS AND ALTERATION OF SENSATIONS AS LATE EFFECTS OF STROKE (HCC): ICD-10-CM

## 2023-02-27 DIAGNOSIS — G47.33 OSA ON CPAP: ICD-10-CM

## 2023-02-27 DIAGNOSIS — I10 ESSENTIAL HYPERTENSION: ICD-10-CM

## 2023-02-27 DIAGNOSIS — M62.838 MUSCLE SPASTICITY: ICD-10-CM

## 2023-02-27 DIAGNOSIS — E89.0 POSTOPERATIVE HYPOTHYROIDISM: Chronic | ICD-10-CM

## 2023-02-27 DIAGNOSIS — E55.9 VITAMIN D DEFICIENCY: ICD-10-CM

## 2023-02-27 DIAGNOSIS — I50.9 CHRONIC CONGESTIVE HEART FAILURE, UNSPECIFIED HEART FAILURE TYPE (HCC): Primary | ICD-10-CM

## 2023-02-27 PROCEDURE — 3074F SYST BP LT 130 MM HG: CPT | Performed by: PHYSICIAN ASSISTANT

## 2023-02-27 PROCEDURE — G8432 DEP SCR NOT DOC, RNG: HCPCS | Performed by: PHYSICIAN ASSISTANT

## 2023-02-27 PROCEDURE — 3078F DIAST BP <80 MM HG: CPT | Performed by: PHYSICIAN ASSISTANT

## 2023-02-27 PROCEDURE — G8427 DOCREV CUR MEDS BY ELIG CLIN: HCPCS | Performed by: PHYSICIAN ASSISTANT

## 2023-02-27 PROCEDURE — 3017F COLORECTAL CA SCREEN DOC REV: CPT | Performed by: PHYSICIAN ASSISTANT

## 2023-02-27 PROCEDURE — 99214 OFFICE O/P EST MOD 30 MIN: CPT | Performed by: PHYSICIAN ASSISTANT

## 2023-02-27 PROCEDURE — G9899 SCRN MAM PERF RSLTS DOC: HCPCS | Performed by: PHYSICIAN ASSISTANT

## 2023-02-27 PROCEDURE — G8417 CALC BMI ABV UP PARAM F/U: HCPCS | Performed by: PHYSICIAN ASSISTANT

## 2023-02-27 NOTE — PROGRESS NOTES
Shimon Stockton is a 57y.o. year old female seen in clinic today for   Chief Complaint   Patient presents with    Form Completion     Room 4A //        she presents with paperwork needing completion. Needs paperwork for medical necessity for stair lift. Notes her stairs at home she has begun to be worried and afraid going up and down and has become weaker and weaker where she only goes up and down once a day when she goes out to adult day care. She sometimes goes down on her buttocks and up on her buttocks sliding and does not feel comfortable doing that. She also needs paperwork completed for [facility]. She has chronic L sided weakness, has brace to LLE. She has chronic SOB with activity due to CHF. she specifically denies any CP, SOB, HA,fevers, chills, N/V/D, urinary symptoms or other bowel changes. Current Outpatient Medications on File Prior to Visit   Medication Sig Dispense Refill    atorvastatin (LIPITOR) 40 mg tablet Take 1 tablet by mouth once daily 90 Tablet 2    furosemide (LASIX) 40 mg tablet Take 1 Tablet by mouth daily. 90 Tablet 2    levothyroxine (SYNTHROID) 125 mcg tablet Take 1 Tablet by mouth Daily (before breakfast). 90 Tablet 0    oxybutynin chloride XL (DITROPAN XL) 10 mg CR tablet Take 1 Tablet by mouth daily. 30 Tablet 1    semaglutide (Ozempic) 0.25 mg or 0.5 mg/dose (2 mg/1.5 ml) subq pen 0.25 mg by SubCUTAneous route every seven (7) days.  Increase to 0.5 mg weekly after 4 weeks 1 Box 2    fluconazole (DIFLUCAN) 150 mg tablet TAKE 1 TABLET BY MOUTH AS A ONE TIME DOSE MAY REPEAT IN 3 DAYS IF STILL SYMPTOMATIC      fluticasone propionate (FLONASE) 50 mcg/actuation nasal spray USE 1 TO 2 SPRAY(S) IN EACH NOSTRIL ONCE DAILY AS NEEDED      QUEtiapine (SEROquel) 100 mg tablet TAKE 1 TABLET BY MOUTH AT BEDTIME AS NEEDED FOR SLEEP      carvediloL (COREG) 12.5 mg tablet TAKE 1 TABLET BY MOUTH TWICE DAILY WITH MEALS 180 Tablet 0    linaCLOtide (Linzess) 145 mcg cap capsule TAKE 1 CAPSULE BY MOUTH ONCE DAILY BEFORE BREAKFAST FOR CONSTIPATION 90 Capsule 1    topiramate (TOPAMAX) 100 mg tablet Take 1 tablet by mouth twice daily 180 Tablet 3    galcanezumab-gnlm (Emgality Syringe) 120 mg/mL syrg 1 Syringe by SubCUTAneous route every thirty (30) days. Start 30 days after the loading dose 1 Each 11    trospium (SANCTURA) 20 mg tablet TAKE 1 TABLET BY MOUTH BEFORE BREAKFAST AND BEFORE SUPPER 60 Tablet 5    hydrOXYzine HCL (ATARAX) 50 mg tablet Take 1 tablet by mouth twice daily as needed for anxiety 60 Tablet 0    losartan (COZAAR) 50 mg tablet Take 1 Tablet by mouth nightly. Hold for SBP<115 90 Tablet 3    pantoprazole (PROTONIX) 40 mg tablet Take 1 Tablet by mouth daily. 90 Tablet 3    Botox 200 unit injection Inject 155 units to selected muscles head and neck bilaterally every 12 weeks  Indications: migraine prevention 200 Units 3    Needle, Disp, 25 G 25 gauge x 3/4\" ndle Use bimonthly for cyanocobalamin subcutaneous injection. 25 Each 3    oxyCODONE-acetaminophen (PERCOCET 10)  mg per tablet Take 1 Tablet by mouth as needed for Pain.      montelukast (SINGULAIR) 10 mg tablet Take 10 mg by mouth daily. baclofen (LIORESAL) 20 mg tablet TAKE 1 TABLET BY MOUTH EVERY 8 HOURS AS NEEDED FOR MUSCLE SPASM(S)      BD Single Use Swabs Regular padm Check Blood sugar ONCE daily      Syringe, Disposable, 1 mL syrg Use bimonthly for cyanocobalamin subcutaneous injection. 25 Syringe 0    gabapentin (NEURONTIN) 100 mg capsule Take 100 mg by mouth three (3) times daily as needed for Pain.       FREESTYLE LITE STRIPS strip USE STRIP TO CHECK GLUCOSE TWICE DAILY  3    FREESTYLE LITE METER monitoring kit USE TO CHECK GLUCOSE ONCE DAILY  0    ciclopirox (PENLAC) 8 % solution APPLY TO AFFECTED TOE NAILS DAILY      diclofenac (VOLTAREN) 1 % gel APPLY TOPICALLY TO AFFECTED AREA ONCE DAILY      FREESTYLE LANCETS 28 gauge misc USE TO CHECK GLUCOSE TWICE DAILY      miscellaneous medical supply misc Rollator Dx:I69.359 1 Each 0    miscellaneous medical supply [facility] Walker Dx: I69.359 1 Each 0    DULoxetine (CYMBALTA) 60 mg capsule Take 2 Caps by mouth daily. 180 Cap 0    calcipotriene (DOVONEX) 0.005 % topical cream Apply  to affected area daily as needed. 60 g 11    albuterol-ipratropium (DUO-NEB) 2.5 mg-0.5 mg/3 ml nebu 3 mL by Nebulization route every six (6) hours as needed. (Patient taking differently: 3 mL by Nebulization route every six (6) hours as needed for Wheezing.) 30 Nebule 0    ferrous sulfate 325 mg (65 mg iron) tablet Take 325 mg by mouth Daily (before breakfast). aspirin delayed-release 81 mg tablet Take 81 mg by mouth daily. No current facility-administered medications on file prior to visit. Allergies   Allergen Reactions    Bees [Hymenoptera Allergenic Extract] Shortness of Breath and Swelling    Strawberry Shortness of Breath and Swelling    Lisinopril Cough     Past Medical History:   Diagnosis Date    Advanced care planning/counseling discussion 03/04/2016    On File    Bronchitis 02/24/2014    Cervicalgia 08/18/2015    Dr. Maria M Dang    Chest pain 05/25/2015    [facility]ospital[facility] 5/25/15 (lab work negative)    Chronic indwelling Dunn catheter 01/24/2018    Initially placed Jan 2018. Mx by Dr. Rubina Eng. Congestive heart failure, unspecified     Last Echo 2/8/15: EF 55-60%    Constipation 06/13/2017    Enthesopathy, spinal ([facility] [address] 08/18/2015    Dr. Estella Roger hypertension     Foot drop 08/18/2015    Dr. Domi Caban attack [facility]) 02/24/2013    Was supposed to See Cardiology for possible pacemaker in november 2014- After Cardiology consult locally, no need, EF greatly improved.  Established with Dr. Yuly Heck     Hiatal hernia 06/2015    3 cm hiatal hernia     Hip pain 08/18/2015    Dr. Maria M Dang    Hypercholesterolemia     Hyperglycemia 07/2015    A1c 5.9     Hyperlipidemia 06/30/2015    NMR lipoprofile- LDL P 997, LDL-c 71, HLD-C-39, TG-60, HLD-P (25.2), Small LDL-P -541, LDL size 20.6    Hypothyroid     Insomnia 06/13/2017    Lower extremity edema     Lumbar spondylosis 08/18/2015    Dr. Yossi Mace 06/2015    EGD/Colonscopy 6/15- Gastritis, internal hemorrhoids and 3 polyps    Menopause     Murmur     EDDIE on CPAP     Was referred to Pulmonology - Uses CPAP    Osteoarthritis of hip 08/18/2015    Dr. Vinita Ramey    Osteoarthritis, shoulder 08/18/2015    Dr. Vinita Ramey    Radiculopathy, cervical 08/18/2015    Dr. Vinita Ramey    Shoulder pain 08/18/2015    Dr. Vinita Ramey    Spinal stenosis of cervical region 08/18/2015    Dr. Vinita Ramey    Spinal stenosis, lumbar 08/18/2015    Dr. Vinita Ramey    Stroke [facility]) 02/25/2014    Established with Neurology, Jonn Suggs NP-[facility]15. CT negative, but Late effect CV accident with increased tone described on discharge summary, Carotid dopplers showed 50% stenosis bilaterally. Vitamin D deficiency 07/2015      Past Surgical History:   Procedure Laterality Date    COLONOSCOPY N/A 6/22/2016    COLONOSCOPY performed by Samina Hurd MD at [facility] ENDOSCOPY    COLONOSCOPY N/A 1/26/2022    COLONOSCOPY, needs rapid performed by Derian Cope MD at [facility] ENDOSCOPY    HX BREAST BIOPSY Right 8/11/15    Stereo Bx - [facility]--- Benign    HX CHOLECYSTECTOMY      HX COLONOSCOPY  6/2015    Dr. Herlinda Hemphill- 3 complete polypectomies, Internal hemorrhoids, difficult study due to spasm.  Repeat in 1 year    HX ENDOSCOPY  6/2015    mild gastritis, 3cm hiatal hernia     HX GASTRIC BYPASS  6/1989    HX HEART CATHETERIZATION  2/2014    HX ORTHOPAEDIC      Right middle finger distal amputation    HX PARTIAL THYROIDECTOMY  ~1990    HX THYROIDECTOMY Left 1985    90% of one side of the thyroid removed    IR ASP BLADDER SUPRA CATH  5/24/2019        Family History   Problem Relation Age of Onset    Heart Disease Father 61    Broken Bones Father         Hip/fell    Hypertension Mother     Heart Disease Brother 62    Broken Bones Brother Hip/fell    Diabetes Maternal Grandmother         Social History     Socioeconomic History    Marital status: SINGLE     Spouse name: Not on file    Number of children: Not on file    Years of education: Not on file    Highest education level: Not on file   Occupational History    Not on file   Tobacco Use    Smoking status: Former     Packs/day: 0.25     Years: 15.00     Pack years: 3.75     Types: Cigarettes     Quit date: 6/13/2007     Years since quitting: 15.7    Smokeless tobacco: Never   Vaping Use    Vaping Use: Never used   Substance and Sexual Activity    Alcohol use: No    Drug use: No    Sexual activity: Not Currently   Other Topics Concern    Not on file   Social History Narrative    Not on file     Social Determinants of Health     Financial Resource Strain: Not on file   Food Insecurity: Not on file   Transportation Needs: Not on file   Physical Activity: Not on file   Stress: Not on file   Social Connections: Not on file   Intimate Partner Violence: Not on file   Housing Stability: Not on file         Visit Vitals  /79 (BP 1 Location: Left upper arm, BP Patient Position: Sitting, BP Cuff Size: Large adult)   Pulse 76   Temp 97.9 °F (36.6 °C) (Oral)   Resp 16   Ht 5' 6\" (1.676 m)   Wt 259 lb 6.4 oz (117.7 kg)   LMP  (LMP Unknown)   BMI 41.87 kg/m²       Review of Systems   Constitutional:  Negative for chills, fever, malaise/fatigue and weight loss. HENT:  Negative for ear pain, hearing loss and sore throat. Respiratory:  Negative for cough and shortness of breath. Cardiovascular:  Negative for chest pain and leg swelling. Gastrointestinal:  Negative for abdominal pain, blood in stool, constipation, diarrhea, heartburn, melena, nausea and vomiting. Genitourinary:  Negative for dysuria and hematuria. Musculoskeletal:  Negative for back pain and myalgias. Skin:  Negative for rash. Neurological:  Negative for weakness and headaches.    Psychiatric/Behavioral:  Negative for depression. Physical Exam  Vitals and nursing note reviewed. Constitutional:       Appearance: Normal appearance. She is morbidly obese. HENT:      Head: Normocephalic and atraumatic. Right Ear: Tympanic membrane, ear canal and external ear normal.      Left Ear: Tympanic membrane, ear canal and external ear normal.      Nose: Nose normal.      Mouth/Throat:      Mouth: Mucous membranes are moist.      Pharynx: Oropharynx is clear. Eyes:      Conjunctiva/sclera: Conjunctivae normal.      Pupils: Pupils are equal, round, and reactive to light. Neck:      Vascular: No carotid bruit. Cardiovascular:      Rate and Rhythm: Normal rate and regular rhythm. Pulses: Normal pulses. Heart sounds: Normal heart sounds. Pulmonary:      Effort: Pulmonary effort is normal.      Breath sounds: Normal breath sounds. No wheezing, rhonchi or rales. Abdominal:      General: Abdomen is flat. Bowel sounds are normal. There is no distension. Palpations: There is no mass. Tenderness: There is no abdominal tenderness. There is no guarding or rebound. Musculoskeletal:         General: Normal range of motion. Cervical back: Normal range of motion and neck supple. No rigidity. Skin:     General: Skin is warm and dry. Capillary Refill: Capillary refill takes less than 2 seconds. Neurological:      General: No focal deficit present. Mental Status: She is alert and oriented to person, place, and time. Sensory: No sensory deficit. Motor: Weakness (4/5 lue weakness, 4/5 lle weakness with brace in place) present. Gait: Gait abnormal (using walker). Psychiatric:         Mood and Affect: Mood normal.         Behavior: Behavior normal.         Thought Content: Thought content normal.         ASSESSMENT AND PLAN:  Diagnoses and all orders for this visit:    1. Chronic congestive heart failure, unspecified heart failure type ([address]    2. Essential hypertension    3. Postoperative hypothyroidism    4. Hemiparesis and alteration of sensations as late effects of stroke ([address]    5. COPD without exacerbation ([address]    6. EDDIE on CPAP    7. Anxiety    8. Muscle spasticity    9. Vitamin D deficiency     Paperwork completed for medical necessity for stair lift secondary for chronic gait abnormality, L sided hemiparesis sp CVA. Paperwork completed for adult day care. Exam stable. No new issues. Follow up as planned next month for follow up      I have discussed the diagnosis with the patient and the intended plan as seen in the above orders. Patient is in agreement. The patient has received an after-visit summary and questions were answered concerning future plans. I have discussed medication side effects and warnings with the patient as well.     Bill Mcdowell PA-C

## 2023-02-27 NOTE — PROGRESS NOTES
Harriet Jaquez  Identified pt with two pt identifiers(name and ). Chief Complaint   Patient presents with    Form Completion     Room 4A //        Reviewed record In preparation for visit and have obtained necessary documentation. 1. Have you been to the ER, urgent care clinic or hospitalized since your last visit? No     2. Have you seen or consulted any other health care providers outside of the [address] [place] [address] since your last visit? Include any pap smears or colon screening. No    Patient does not have an advance directive. Vitals reviewed with provider.     Health Maintenance reviewed:     Health Maintenance Due   Topic    COVID-19 Vaccine (2 - Booster for Fani series)          Wt Readings from Last 3 Encounters:   23 259 lb 6.4 oz (117.7 kg)   23 240 lb (108.9 kg)   10/13/22 240 lb (108.9 kg)        Temp Readings from Last 3 Encounters:   10/07/22 97.8 °F (36.6 °C) (Oral)   06/10/22 97.6 °F (36.4 °C) (Oral)   22 98.1 °F (36.7 °C)        BP Readings from Last 3 Encounters:   23 138/86   10/13/22 128/80   10/07/22 111/79        Pulse Readings from Last 3 Encounters:   23 70   10/13/22 73   10/07/22 67        Vitals:    23 1627   Resp: 16   Weight: 259 lb 6.4 oz (117.7 kg)   Height: 5' 6\" (1.676 m)   PainSc:   0 - No pain          Learning Assessment:   :       Learning Assessment 2019 2018 10/13/2017 3/24/2015   PRIMARY LEARNER Patient Patient Patient Patient   HIGHEST LEVEL OF EDUCATION - PRIMARY LEARNER  - - - SOME COLLEGE   BARRIERS PRIMARY LEARNER - - - NONE   CO-LEARNER CAREGIVER - - - No   CO-LEARNER NAME - - - n/a   PRIMARY LANGUAGE ENGLISH ENGLISH ENGLISH ENGLISH   LEARNER PREFERENCE PRIMARY DEMONSTRATION DEMONSTRATION LISTENING LISTENING   ANSWERED BY patient patient patient patient   RELATIONSHIP SELF SELF SELF SELF        Depression Screening:   :       3 most recent PHQ Screens 2023   PHQ Not Done -   Little interest or pleasure in doing things Several days   Feeling down, depressed, irritable, or hopeless Several days   Total Score PHQ 2 2        Fall Risk Assessment:   :       Fall Risk Assessment, last 12 mths 3/18/2021   Able to walk? Yes   Fall in past 12 months? 0   Do you feel unsteady? 0   Are you worried about falling 0   Number of falls in past 12 months -   Fall with injury? -        Abuse Screening:   :       Abuse Screening Questionnaire 6/21/2021 1/30/2020 9/5/2019 3/5/2019   Do you ever feel afraid of your partner? N N N N   Are you in a relationship with someone who physically or mentally threatens you? N N N N   Is it safe for you to go home?  Y Y Y Y        ADL Screening:   :       ADL Assessment 6/21/2021   Feeding yourself No Help Needed   Getting from bed to chair Help Needed   Getting dressed Help Needed   Bathing or showering Help Needed   Walk across the room (includes cane/walker) No Help Needed   Using the telphone No Help Needed   Taking your medications Help Needed   Preparing meals Help Needed   Managing money (expenses/bills) Help Needed   Moderately strenuous housework (laundry) Help Needed   Shopping for personal items (toiletries/medicines) Help Needed   Shopping for groceries Help Needed   Driving Help Needed   Climbing a flight of stairs Help Needed   Getting to places beyond walking distances Help Needed

## 2023-03-09 NOTE — PROGRESS NOTES
[address] [facility], [address]  Tel.  [phone]  Fax. 100 [facility] [address]  [place], [address] [facility]  Tel.  [phone]  Fax. [phone] [address] [facility] Freddie Cervantes   Tel.  [phone]  Fax. [phone]     Cherry Worthington is a 60 y.o. female seen for a positive airway pressure follow-up. She reports no problems using the device. The following problems are identified:    Drowsiness no Problems exhaling no   Snoring no Forget to put on no   Mask Comfortable yes Can't fall asleep no   Dry Mouth no Mask falls off no   Air Leaking no Frequent awakenings no       She admits that her sleep has improved. Allergies   Allergen Reactions    Bees [Hymenoptera Allergenic Extract] Shortness of Breath and Swelling    Strawberry Shortness of Breath and Swelling    Lisinopril Cough       She has a current medication list which includes the following prescription(s): morphine ir, metformin er, carvedilol, spironolactone, cyclobenzaprine, levothyroxine, hydralazine, amlodipine, duloxetine, lidocaine, furosemide, pantoprazole, linaclotide, losartan, atorvastatin, cyanocobalamin, zolpidem, ferrous sulfate, methocarbamol, aspirin delayed-release, acetaminophen, glucose blood vi test strips, lancets, hydrocodone-acetaminophen, albuterol, insulin syringe-needle u-100, tramadol, and epinephrine. .      She  has a past medical history of Advanced care planning/counseling discussion (3/4/16); Bronchitis (2/24/2014); Cervicalgia (8/18/15); Chest pain (5/25/15); Congestive heart failure, unspecified; Enthesopathy, spinal ([facility] [address] (8/18/15); Essential hypertension; Foot drop (8/18/15); Heart attack ([facility] [address] (2/24/2013); Hiatal hernia (6/2015); Hip pain (8/18/15); Hypercholesterolemia; Hyperglycemia (7/2015); Hyperlipidemia (6/30/2015); Hypothyroid; Lower extremity edema; Lumbar spondylosis (8/18/15); Melena (6/2015);  Murmur; EDDIE on CPAP; Osteoarthritis of hip Physical Therapy Visit       Visit: 32      Referred by: Anson Blank MD; Medical Diagnosis (from order):    Diagnosis Information      Diagnosis    755.67 (ICD-9-CM) - Q66.89 (ICD-10-CM) - Tarsal coalition    736.73 (ICD-9-CM) - Q66.72 (ICD-10-CM) - Cavus deformity of left foot            Visit Type: Daily Treatment Note    SUBJECTIVE                                                                                                             Present and reporting subjective information: patient and mother  Mom would like to start OT and has referral. Will provide to [facility] at end of session.   Understood exercises need to focus on heel raises to progress ankle strength prior to surgery.     OBJECTIVE                                                                                                                    Observation:   Behavior: follows 1 step commands  Posture:  Comments/Details: Right foot points out in stance.       Range of Motion   Comments / Details: Increased metatarsal extension noted at left vs right PROM and AROM in gait observation, heel raises and manual assessment    Strength  (scale as noted: numerical: 0-5; descriptor: weak, good, fair, poor, trance, absent; standard test position unless noted)   Comments / Details: Heel raises at support on right 2 inches lifts x 20. On left able to raise 2 inches double LE independent of support. SL LE with upper body support x 10          Circumference:   Comments / Details: Calf muscle (8 inches above malleoli - widest girth of muscle):  R = 12 inches; L = 11.5 inches  Thigh muscle (hamstring/quad - 6 inches above knee joint line):  R = 16 inches; L 15.75 inches      Stair Ambulation:   Ascend:     Assist: independent    Pattern: reciprocal    Railing: left    Number of steps: standard flight    Description: out-toeing  Descend:     Pattern: reciprocal    Railing: right    Number of Steps: standard flight    Description: out-toeing                 TREATMENT                                                                                                                  Therapeutic Exercise:  Warm up ankle DF/PF  SL balance  Heel raises L heel raise at support  Stair navigation with railing alternating LE  10 stairs up/down x 4- lighter steps to improve eccentric control  Step downs from step stool, cues to slow navigation   Seated heel raises  Jumping on trampoline  SL sit to stand  x10 each  Bridges x 20  sidelying hip abduction LE raises 2x10 each side    Skilled input: verbal instruction/cues and tactile instruction/cues    Home Exercise Program: Hand out created and given to family    Access Code: [email or web address]  URL: [email or web address]  Date: 03/02/2023  Prepared by: Bri Loera    Exercises  ? Bird Dog - 1 x daily - 3 sets - 30 reps  ? Supine Double Leg Lift - 1 x daily - 7 x weekly - 2 sets - 10 reps  ? Supine Bridge - 4 x weekly - 2 sets - 20 reps  ? Standing Heel Raise with Support - 2 x daily - 2-3 sets - 10 reps  ? Sidelying Hip Abduction - 1 x daily - 3 sets - 10 reps  ? Sit to Stand - 1 x daily - 3 sets - 10 reps  ? Supine Chest Stretch with Elbows Bent - 1 x daily - 7 x weekly - 3 sets - 10 reps  ? Seated Upper Trapezius Stretch - 1 x daily - 7 x weekly - 3 sets - 10 reps  ? Standing Gastroc Stretch - 1 x daily - 7 x weekly - 3 sets - 10 reps  ? Prone Quadriceps Stretch - 1 x daily - 7 x weekly - 3 sets - 10 reps - 60 sec hold  ? Supine Piriformis Stretch with Foot on Ground - 1 x daily - 7 x weekly - 3 sets - 10 reps  ? Long Sitting Hamstring Stretch - 1 x daily - 7 x weekly - 3 sets - 10 reps  ? Single Leg Jumps - 1 x daily - 7 x weekly - 3 sets - 10 reps  ? Single Leg Heel Raise with Chair Support - 1 x daily - 7 x weekly - 3 sets - 10 reps  ? Standing Bilateral Heel Raise on Step - 1 x daily - 7 x weekly - 3 sets - 10 reps              ASSESSMENT                                                                        (8/18/15); Osteoarthritis, shoulder (8/18/15); Radiculopathy, cervical (8/18/15); Shoulder pain (8/18/15); Spinal stenosis of cervical region (8/18/15); Spinal stenosis, lumbar (8/18/15); Stroke [facility]) (2/25/2014); and Vitamin D deficiency (7/2015). Deville Sleepiness Score: 12   and Modified F.O.S.Q. Score Total / 2: 16   which reflect status quo sleep quality over therapy time. O>    Visit Vitals    /68    Pulse 74    Ht 5' 6\" (1.676 m)    Wt 333 lb (151 kg)    SpO2 99%    BMI 53.75 kg/m2           General:   Alert, oriented, not in distress   Neck:   No JVD    Chest/Lungs:  symetrical lung expansion , no accessory muscle use    Extremities:  no obvious rashes , negative edema    Neuro:  No focal deficits ; No obvious tremor    Psych:  Normal affect ,  Normal countenance ;         A>    ICD-10-CM ICD-9-CM    1. EDDIE (obstructive sleep apnea) G47.33 327.23 AMB SUPPLY ORDER   2. Obesity, morbid, BMI 50 or higher ([facility]) E66.01 278.01      AHI = ?. On CPAP :  15-20 cmH2O. Compliant:      yes    Therapeutic Response:  Positive    P>      * Follow-up Disposition:  Return in about 1 year (around 3/20/2018), or if symptoms worsen or fail to improve. * She was asked to contact our office for any problems regarding PAP therapy. * Counseling was provided regarding the importance of regular PAP use and on proper sleep hygiene and safe driving. * Re-enforced proper and regular cleaning for the device. I have reviewed/discussed the above normal BMI with the patient. I have recommended the following interventions: dietary management education, guidance, and counseling . The plan is as follows: I have counseled this patient on diet and exercise regimens. .    Thank you for allowing us to participate in your patient's medical care.     Rocio Garnica M.D.  (electronically signed)  Diplomate in Sleep Medicine, [facility]                                         Patricia presents to PT s/p 37 weeks since revision left foot surgery.  Reviewed activities needed to demonstrate full recovery. Included ability to demonstrate full range of motion heel raises for better ankle stability. patient able to demonstrate at support with better heel raise however limited with SL raises on left.  Discussed next steps with parent and to have PT visits spread across leading up to anticipated surgery to monitor strength gains. Anticipate to progress to next step completing at edge of step to promote eccentric control at elevated step. Continue weekly per POC.   Education:   - Results of above outlined education: Verbalizes understanding and Demonstrates understanding      PLAN                                                                                                                           Suggestions for next session as indicated: Progress per plan of care        GOALS                                                                                                                           Long Term Goals: to be met by end of plan of care  1.  Patient demonstrates bilateral heel to toe gait pattern without assistive device 50% of the time with 1 verbal cue across 50 foot pathway in order to decrease atypical gait pattern.   Goal Attainment Scale (GAS)    -2: (with assistive device, minimal weight foot flat on left) 0%    -1: 25%      0: per goal written above    +1: 75%    +2: 100%        GAS Key        -2=Much less than expected outcome (baseline); -1=Less than expected outcome (progressing);          0=Patient achieves expected outcome after intervention (goal, set at evaluation); +1=Better than          expected outcome; +2=Much better than expected outcome    Importance: 3  Difficulty: 3  Weight: 9    Baseline: -2  Achieved: 2 Change: 4   Status: met   2. Patient demonstrates single limb balance on either lower extremity for at least 2 seconds in  order to complete higher level navigation skills such as stair climbing and playground activities with peers.   Goal Attainment Scale (GAS)    -2: 0 seconds    -1:  1 second      0: per goal written above    +1:  3 seconds    +2: 4 seconds    Importance: 3  Difficulty: 2  Weight: 6    Baseline: -2  Achieved: 2  Change: 4    Status: met  Over 5/10 trials 4 seconds on right and left.   3. Patient to achieve 10 left active heel raises (plantar flexion) while standing at a support in order to demonstrate improved push off strength for walking and play.  Goal Attainment Scale (GAS)    -2: standing at support, 0 heel raises    -1: standing at support 5 heel raises      0: per goal written above    +1: independent of support, 5 heel raises    +2: independent of support, 10 heel raises    Importance: 3  Difficulty: 3  Weight: 9    Baseline: -2  Achieved: -1 Change: 1    Status: progressing/ongoing Left heel raise minimal at support. Right x 10 at support, fatigue at gastroc   GAS T Score Calculations:    Baseline: 22.8  Achieved: 61.9  Change: 39.1  4. Patient steps up a 6 inch curb/step holding the railing/wall leading with left lower extremity over 6 times within 30 seconds in order to improve independence with stair navigation.     Status: progressing/ongoing Decreased eccentric control noted on left but able to follow cue to control descent (quiet steps).      Therapy procedure time and total treatment time can be found documented on the Time Entry flowsheet

## 2023-03-20 ENCOUNTER — OFFICE VISIT (OUTPATIENT)
Dept: NEUROLOGY | Age: 63
End: 2023-03-20
Payer: MEDICARE

## 2023-03-20 VITALS
DIASTOLIC BLOOD PRESSURE: 80 MMHG | SYSTOLIC BLOOD PRESSURE: 126 MMHG | OXYGEN SATURATION: 98 % | RESPIRATION RATE: 18 BRPM | HEART RATE: 75 BPM

## 2023-03-20 DIAGNOSIS — I69.359 CVA, OLD, HEMIPARESIS (HCC): ICD-10-CM

## 2023-03-20 DIAGNOSIS — G43.709 CHRONIC MIGRAINE W/O AURA W/O STATUS MIGRAINOSUS, NOT INTRACTABLE: Primary | ICD-10-CM

## 2023-03-20 PROCEDURE — G8427 DOCREV CUR MEDS BY ELIG CLIN: HCPCS | Performed by: PSYCHIATRY & NEUROLOGY

## 2023-03-20 PROCEDURE — G8417 CALC BMI ABV UP PARAM F/U: HCPCS | Performed by: PSYCHIATRY & NEUROLOGY

## 2023-03-20 PROCEDURE — 3074F SYST BP LT 130 MM HG: CPT | Performed by: PSYCHIATRY & NEUROLOGY

## 2023-03-20 PROCEDURE — 3079F DIAST BP 80-89 MM HG: CPT | Performed by: PSYCHIATRY & NEUROLOGY

## 2023-03-20 PROCEDURE — 99215 OFFICE O/P EST HI 40 MIN: CPT | Performed by: PSYCHIATRY & NEUROLOGY

## 2023-03-20 PROCEDURE — G9899 SCRN MAM PERF RSLTS DOC: HCPCS | Performed by: PSYCHIATRY & NEUROLOGY

## 2023-03-20 PROCEDURE — G8432 DEP SCR NOT DOC, RNG: HCPCS | Performed by: PSYCHIATRY & NEUROLOGY

## 2023-03-20 PROCEDURE — 3017F COLORECTAL CA SCREEN DOC REV: CPT | Performed by: PSYCHIATRY & NEUROLOGY

## 2023-03-20 RX ORDER — ONABOTULINUMTOXINA 200 [USP'U]/1
INJECTION, POWDER, LYOPHILIZED, FOR SOLUTION INTRADERMAL; INTRAMUSCULAR
Qty: 200 UNITS | Refills: 3 | Status: SHIPPED | OUTPATIENT
Start: 2023-03-20

## 2023-03-20 NOTE — PROGRESS NOTES
Chief Complaint   Patient presents with    Migraine       HISTORY OF PRESENT ILLNESS  Romana Roads is a 57 y.o. female who came in to establish care with me. She has been doing botulinum toxin injections with Dr. Christi Escamilla for the past 3 years. She has chronic migraines and is currently taking Botox every 3 months, Emgality monthly injections along with topiramate daily. Still gets migraines at least 1 or 2 days out of the week but they are not as intense or prolonged. Does not frequently use any abortive or rescue medications. Past medical history includes stroke that occurred after cardiac catheterization when she had a heart attack in 2014. She has residual left-sided hemiparesis and uses a walker for ambulation. Takes aspirin and a statin. Underlying risk factors include hypertension, prediabetes and dyslipidemia. Past Medical History:   Diagnosis Date    Advanced care planning/counseling discussion 03/04/2016    On File    Bronchitis 02/24/2014    Cervicalgia 08/18/2015    Dr. Adriana Green    Chest pain 05/25/2015    [facility] (lab work negative)    Chronic indwelling Dunn catheter 01/24/2018    Initially placed Jan 2018. Mx by Dr. Maria Del Rosario Watt. Congestive heart failure, unspecified     Last Echo 2/8/15: EF 55-60%    Constipation 06/13/2017    Enthesopathy, spinal ([facility] [address] 08/18/2015    Dr. Young Olson hypertension     Foot drop 08/18/2015    Dr. Matilda Deshpande attack [facility]) 02/24/2013    Was supposed to See Cardiology for possible pacemaker in november 2014- After Cardiology consult locally, no need, EF greatly improved.  Established with Dr. Blum Cardinal     Hiatal hernia 06/2015    3 cm hiatal hernia     Hip pain 08/18/2015    Dr. Adriana Green    Hypercholesterolemia     Hyperglycemia 07/2015    A1c 5.9     Hyperlipidemia 06/30/2015    NMR lipoprofile- LDL P 997, LDL-c 71, HLD-C-39, TG-60, HLD-P (25.2), Small LDL-P -541, LDL size 20.6    Hypothyroid     Insomnia 06/13/2017    Lower extremity edema     Lumbar spondylosis 08/18/2015    Dr. Joyce Tracy 06/2015    EGD/Colonscopy 6/15- Gastritis, internal hemorrhoids and 3 polyps    Menopause     Murmur     EDDIE on CPAP     Was referred to Pulmonology - Uses CPAP    Osteoarthritis of hip 08/18/2015    Dr. Chaya Rojas    Osteoarthritis, shoulder 08/18/2015    Dr. Chaya Rojas    Radiculopathy, cervical 08/18/2015    Dr. Chaya Rojas    Shoulder pain 08/18/2015    Dr. Chaya Rojas    Spinal stenosis of cervical region 08/18/2015    Dr. Chaya Rojas    Spinal stenosis, lumbar 08/18/2015    Dr. Chaya Rojas    Stroke [facility]) 02/25/2014    Established with Neurology, Danial Rios, NP-[facility]15. CT negative, but Late effect CV accident with increased tone described on discharge summary, Carotid dopplers showed 50% stenosis bilaterally. Vitamin D deficiency 07/2015     Current Outpatient Medications   Medication Sig    Botox 200 unit injection Inject 155 units to selected muscles head and neck bilaterally every 12 weeks  Indications: migraine prevention    atorvastatin (LIPITOR) 40 mg tablet Take 1 tablet by mouth once daily    levothyroxine (SYNTHROID) 125 mcg tablet Take 1 Tablet by mouth Daily (before breakfast). oxybutynin chloride XL (DITROPAN XL) 10 mg CR tablet Take 1 Tablet by mouth daily. semaglutide (Ozempic) 0.25 mg or 0.5 mg/dose (2 mg/1.5 ml) subq pen 0.25 mg by SubCUTAneous route every seven (7) days.  Increase to 0.5 mg weekly after 4 weeks    fluconazole (DIFLUCAN) 150 mg tablet TAKE 1 TABLET BY MOUTH AS A ONE TIME DOSE MAY REPEAT IN 3 DAYS IF STILL SYMPTOMATIC    fluticasone propionate (FLONASE) 50 mcg/actuation nasal spray USE 1 TO 2 SPRAY(S) IN EACH NOSTRIL ONCE DAILY AS NEEDED    QUEtiapine (SEROquel) 100 mg tablet 150 mg.    carvediloL (COREG) 12.5 mg tablet TAKE 1 TABLET BY MOUTH TWICE DAILY WITH MEALS    linaCLOtide (Linzess) 145 mcg cap capsule TAKE 1 CAPSULE BY MOUTH ONCE DAILY BEFORE BREAKFAST FOR CONSTIPATION    topiramate (TOPAMAX) 100 mg tablet Take 1 tablet by mouth twice daily    galcanezumab-gnlm (Emgality Syringe) 120 mg/mL syrg 1 Syringe by SubCUTAneous route every thirty (30) days. Start 30 days after the loading dose    hydrOXYzine HCL (ATARAX) 50 mg tablet Take 1 tablet by mouth twice daily as needed for anxiety    losartan (COZAAR) 50 mg tablet Take 1 Tablet by mouth nightly. Hold for SBP<115    pantoprazole (PROTONIX) 40 mg tablet Take 1 Tablet by mouth daily. Needle, Disp, 25 G 25 gauge x 3/4\" ndle Use bimonthly for cyanocobalamin subcutaneous injection. oxyCODONE-acetaminophen (PERCOCET 10)  mg per tablet Take 1 Tablet by mouth as needed for Pain.    montelukast (SINGULAIR) 10 mg tablet Take 10 mg by mouth daily. baclofen (LIORESAL) 20 mg tablet TAKE 1 TABLET BY MOUTH EVERY 8 HOURS AS NEEDED FOR MUSCLE SPASM(S)    BD Single Use Swabs Regular padm Check Blood sugar ONCE daily    Syringe, Disposable, 1 mL syrg Use bimonthly for cyanocobalamin subcutaneous injection. gabapentin (NEURONTIN) 100 mg capsule Take 100 mg by mouth three (3) times daily as needed for Pain. FREESTYLE LITE STRIPS strip USE STRIP TO CHECK GLUCOSE TWICE DAILY    FREESTYLE LITE METER monitoring kit USE TO CHECK GLUCOSE ONCE DAILY    ciclopirox (PENLAC) 8 % solution APPLY TO AFFECTED TOE NAILS DAILY    diclofenac (VOLTAREN) 1 % gel APPLY TOPICALLY TO AFFECTED AREA ONCE DAILY    FREESTYLE LANCETS 28 gauge [facility] USE TO CHECK GLUCOSE TWICE DAILY    miscellaneous medical supply misc Rollator Dx:I69.359    miscellaneous medical supply [facility] Walker Dx: B31.652    DULoxetine (CYMBALTA) 60 mg capsule Take 2 Caps by mouth daily. calcipotriene (DOVONEX) 0.005 % topical cream Apply  to affected area daily as needed. albuterol-ipratropium (DUO-NEB) 2.5 mg-0.5 mg/3 ml nebu 3 mL by Nebulization route every six (6) hours as needed.  (Patient taking differently: 3 mL by Nebulization route every six (6) hours as needed for Wheezing.)    ferrous sulfate 325 mg (65 mg iron) tablet Take 325 mg by mouth Daily (before breakfast). aspirin delayed-release 81 mg tablet Take 81 mg by mouth daily. furosemide (LASIX) 40 mg tablet Take 1 Tablet by mouth daily. trospium (SANCTURA) 20 mg tablet TAKE 1 TABLET BY MOUTH BEFORE BREAKFAST AND BEFORE SUPPER     No current facility-administered medications for this visit. Allergies   Allergen Reactions    Bees [Hymenoptera Allergenic Extract] Shortness of Breath and Swelling    Strawberry Shortness of Breath and Swelling    Lisinopril Cough     Family History   Problem Relation Age of Onset    Heart Disease Father 61    Broken Bones Father         Hip/fell    Hypertension Mother     Heart Disease Brother 62    Broken Bones Brother         Hip/fell    Diabetes Maternal Grandmother      Social History     Tobacco Use    Smoking status: Former     Packs/day: 0.25     Years: 15.00     Pack years: 3.75     Types: Cigarettes     Quit date: 6/13/2007     Years since quitting: 15.7    Smokeless tobacco: Never   Vaping Use    Vaping Use: Never used   Substance Use Topics    Alcohol use: No    Drug use: No     Past Surgical History:   Procedure Laterality Date    COLONOSCOPY N/A 6/22/2016    COLONOSCOPY performed by Anne Palumbo MD at [facility] ENDOSCOPY    COLONOSCOPY N/A 1/26/2022    COLONOSCOPY, needs rapid performed by Brandon El MD at [facility] ENDOSCOPY    HX BREAST BIOPSY Right 8/11/15    Stereo Bx - [facility]--- Benign    HX CHOLECYSTECTOMY      HX COLONOSCOPY  6/2015    Dr. Porfirio Soto- 3 complete polypectomies, Internal hemorrhoids, difficult study due to spasm.  Repeat in 1 year    HX ENDOSCOPY  6/2015    mild gastritis, 3cm hiatal hernia     HX GASTRIC BYPASS  6/1989    HX HEART CATHETERIZATION  2/2014    HX ORTHOPAEDIC      Right middle finger distal amputation    HX PARTIAL THYROIDECTOMY  ~1990    HX THYROIDECTOMY Left 1985    90% of one side of the thyroid removed    IR ASP BLADDER SUPRA CATH  5/24/2019         REVIEW OF SYSTEMS  Review of Systems - History obtained from the patient  Psychological ROS: negative  ENT ROS: negative  Hematological and Lymphatic ROS: negative  Endocrine ROS: negative  Respiratory ROS: no cough, shortness of breath, or wheezing  Cardiovascular ROS: no chest pain or dyspnea on exertion  Gastrointestinal ROS: no abdominal pain, change in bowel habits, or black or bloody stools  Genito-Urinary ROS: no dysuria, trouble voiding, or hematuria  Musculoskeletal ROS: negative  Dermatological ROS: negative      PHYSICAL EXAMINATION:    Visit Vitals  /80   Pulse 75   Resp 18   SpO2 98%     General:  Well groomed individual in no acute distress. Neck: Supple, nontender, no bruits, no pain with resistance to active range of motion. Heart: Regular rate and rhythm. Normal S1S2. Lungs:  Equal chest expansion, no cough, no wheeze  Musculoskeletal:  Extremities revealed no edema and had full range of motion of joints. Psych:  Good mood and bright affect      NEUROLOGICAL EXAMINATION:     Mental Status:   Alert and oriented to person, place, and time. Attention span and concentration are normal. Speech is fluent . Cranial Nerves:    II, III, IV, VI:  Visual acuity grossly intact. Visual fields are normal.    Pupils are equal, round, and reactive. Extra-ocular movements are full and fluid. V-XII: Hearing is grossly intact. Facial features are symmetric, with normal sensation and strength. The palate rises symmetrically and the tongue protrudes midline. Motor Examination: Increased muscle tone in the left upper and lower extremity with foot drop on the left. She has an AFO brace.       Sensory exam:  Normal throughout     Coordination:  Finger to nose and rapid arm movement testing was normal.   No resting or intention tremor    Gait and Station: Walks with a walker         LABS / IMAGING  CT Results (most recent):  Results from Hospital Encounter encounter on 12/19/21    CT HEAD WO CONT    Narrative  EXAM: CT HEAD WO CONT    INDICATION: Acute mental status change    COMPARISON: 9/8/2017. CONTRAST: None. TECHNIQUE: Unenhanced CT of the head was performed using 5 mm images. Brain and  bone windows were generated. Coronal and sagittal reformats. CT dose reduction  was achieved through use of a standardized protocol tailored for this  examination and automatic exposure control for dose modulation. FINDINGS:  The ventricles and sulci are normal in size, shape and configuration. . There is  periventricular white matter disease, asymmetric to the right. There is no  intracranial hemorrhage, extra-axial collection, or mass effect. The basilar  cisterns are open. No CT evidence of acute infarct. The bone windows demonstrate no abnormalities. The visualized portions of the  paranasal sinuses and mastoid air cells are clear. Impression  No acute intracranial process seen      ASSESSMENT    ICD-10-CM ICD-9-CM    1. Chronic migraine w/o aura w/o status migrainosus, not intractable  G43.709 346.70 Botox 200 unit injection      2. CVA, old, hemiparesis [facility])  I69.359 438.20           DISCUSSION  Ms. Shelly Thompson has chronic refractory migraines who continues to get at least 2-3 migraine headache days per month but this is significantly better than what she used to have. Currently doing Botox injections and these have made a significant difference. We will continue doing these every 12 weeks. In addition, she is on Emgality and topiramate which she may also continue.     She has a history of right MCA stroke in 2014 after cardiac catheterization, likely due to embolism  Continue aspirin and statin  She has some limitations but is independent with activities of daily living      Teddy Rojas MD  Diplomate, American Board of Psychiatry & Neurology (Neurology)  Diplomate, American Board of Psychiatry & Neurology (Clinical Neurophysiology)  Diplomate, American Board of Electrodiagnostic Medicine

## 2023-04-04 ENCOUNTER — OFFICE VISIT (OUTPATIENT)
Dept: INTERNAL MEDICINE CLINIC | Age: 63
End: 2023-04-04
Payer: MEDICARE

## 2023-04-04 VITALS
RESPIRATION RATE: 16 BRPM | HEART RATE: 71 BPM | BODY MASS INDEX: 41.5 KG/M2 | WEIGHT: 258.2 LBS | TEMPERATURE: 97.6 F | SYSTOLIC BLOOD PRESSURE: 104 MMHG | OXYGEN SATURATION: 98 % | HEIGHT: 66 IN | DIASTOLIC BLOOD PRESSURE: 67 MMHG

## 2023-04-04 DIAGNOSIS — R73.03 PREDIABETES: ICD-10-CM

## 2023-04-04 DIAGNOSIS — Z99.89 OSA ON CPAP: ICD-10-CM

## 2023-04-04 DIAGNOSIS — G47.00 INSOMNIA, UNSPECIFIED TYPE: ICD-10-CM

## 2023-04-04 DIAGNOSIS — E89.0 POSTOPERATIVE HYPOTHYROIDISM: Chronic | ICD-10-CM

## 2023-04-04 DIAGNOSIS — E55.9 VITAMIN D DEFICIENCY: ICD-10-CM

## 2023-04-04 DIAGNOSIS — I10 ESSENTIAL HYPERTENSION: ICD-10-CM

## 2023-04-04 DIAGNOSIS — I25.10 CORONARY ARTERY DISEASE INVOLVING NATIVE CORONARY ARTERY OF NATIVE HEART WITHOUT ANGINA PECTORIS: ICD-10-CM

## 2023-04-04 DIAGNOSIS — J44.9 COPD WITHOUT EXACERBATION (HCC): ICD-10-CM

## 2023-04-04 DIAGNOSIS — I69.359 HEMIPARESIS AND ALTERATION OF SENSATIONS AS LATE EFFECTS OF STROKE (HCC): ICD-10-CM

## 2023-04-04 DIAGNOSIS — G47.33 OSA ON CPAP: ICD-10-CM

## 2023-04-04 DIAGNOSIS — I50.9 CHRONIC CONGESTIVE HEART FAILURE, UNSPECIFIED HEART FAILURE TYPE (HCC): Primary | ICD-10-CM

## 2023-04-04 DIAGNOSIS — E78.00 HYPERCHOLESTEROLEMIA: ICD-10-CM

## 2023-04-04 DIAGNOSIS — I69.398 HEMIPARESIS AND ALTERATION OF SENSATIONS AS LATE EFFECTS OF STROKE (HCC): ICD-10-CM

## 2023-04-04 PROCEDURE — G9899 SCRN MAM PERF RSLTS DOC: HCPCS | Performed by: PHYSICIAN ASSISTANT

## 2023-04-04 PROCEDURE — 3017F COLORECTAL CA SCREEN DOC REV: CPT | Performed by: PHYSICIAN ASSISTANT

## 2023-04-04 PROCEDURE — G8432 DEP SCR NOT DOC, RNG: HCPCS | Performed by: PHYSICIAN ASSISTANT

## 2023-04-04 PROCEDURE — 3078F DIAST BP <80 MM HG: CPT | Performed by: PHYSICIAN ASSISTANT

## 2023-04-04 PROCEDURE — 99214 OFFICE O/P EST MOD 30 MIN: CPT | Performed by: PHYSICIAN ASSISTANT

## 2023-04-04 PROCEDURE — G8427 DOCREV CUR MEDS BY ELIG CLIN: HCPCS | Performed by: PHYSICIAN ASSISTANT

## 2023-04-04 PROCEDURE — 3074F SYST BP LT 130 MM HG: CPT | Performed by: PHYSICIAN ASSISTANT

## 2023-04-04 PROCEDURE — G8417 CALC BMI ABV UP PARAM F/U: HCPCS | Performed by: PHYSICIAN ASSISTANT

## 2023-04-04 RX ORDER — QUETIAPINE FUMARATE 200 MG/1
200 TABLET, FILM COATED ORAL
Qty: 30 TABLET | Refills: 2 | Status: SHIPPED
Start: 2023-04-04

## 2023-04-04 RX ORDER — CICLOPIROX 80 MG/ML
SOLUTION TOPICAL
Qty: 6.6 ML | Refills: 2 | Status: SHIPPED
Start: 2023-04-04

## 2023-04-04 RX ORDER — CETIRIZINE HYDROCHLORIDE 10 MG/1
10 TABLET ORAL
Qty: 90 TABLET | Refills: 1 | Status: SHIPPED
Start: 2023-04-04

## 2023-04-04 NOTE — PROGRESS NOTES
Brittney Camacho is a 57y.o. year old female seen in clinic today for   Chief Complaint   Patient presents with    Hypertension     Room 4B //     Cholesterol Problem       she is here today to follow up for HTN, HLD, preDM. Hx of CAD,CHF,CVA with L sided weakness    Hypertension: Controlled with lasix 40 mg, coreg 12.5 mg bid, losartan 50 mg  Compliant with medications? yes  Compliant with diet? Yes, no added salt. Relatively healthy  Compliant with exercise? Unable to do much exercise  Average blood pressure readings outside of office. Normal at all visits    Denies any HA, dizziness, CP, SOB, edema, visual changes    CAD/HLD: has hx of PAF. Follows with Cardiology Dr. Dougie De La Cruz once a year. Has not needed additional interventions. Compliant with lasix, BB, ASA 81 mg    Pt has a hx of Hypothyroidism. They take 125 mcg of thyroid replacement. They are compliant with their daily medications. Pt denies any temperature imbalances, hair related symptoms, bowel changes or abnormal weight gain/loss. Lab Results   Component Value Date/Time    TSH 0.805 01/06/2023 12:11 PM    TSH 0.64 11/20/2020 03:38 PM     Has been struggling with sleep. Follows with psych for sleep and mood. Has been using 100 mg seroquel which helps her fall asleep but is having fatigue issues during the day due to waking up multiple times at night    Has chronic L sided weakness sp CVA. Uses brace on LLE, walker for balance. Goes to adult day care center few days a week for activity, lives with her sister. Struggling with itching at night. Uses singulair for asthma/allergies but still having more pollen related allergies, not currently on antihistamine. Has some chronic pain and hx of migraines. Uses oxycodone regularly for pains related to stroke, also on gabapentin, baclofen for muscle spasms.  Migraine preventative topamax 100 mg BID and uses emgality pen through neurology    Requests refill of penlac for toe nail fungus as she no longer has podiatrist.     Had successful botox with urology for urinary incontinence. Working very well.    she specifically denies any CP, SOB, HA, Dizziness, fevers, chills, N/V/D, urinary symptoms or other bowel changes. Current Outpatient Medications on File Prior to Visit   Medication Sig Dispense Refill    Botox 200 unit injection Inject 155 units to selected muscles head and neck bilaterally every 12 weeks  Indications: migraine prevention 200 Units 3    atorvastatin (LIPITOR) 40 mg tablet Take 1 tablet by mouth once daily 90 Tablet 2    furosemide (LASIX) 40 mg tablet Take 1 Tablet by mouth daily. 90 Tablet 2    levothyroxine (SYNTHROID) 125 mcg tablet Take 1 Tablet by mouth Daily (before breakfast). 90 Tablet 0    oxybutynin chloride XL (DITROPAN XL) 10 mg CR tablet Take 1 Tablet by mouth daily. 30 Tablet 1    semaglutide (Ozempic) 0.25 mg or 0.5 mg/dose (2 mg/1.5 ml) subq pen 0.25 mg by SubCUTAneous route every seven (7) days. Increase to 0.5 mg weekly after 4 weeks 1 Box 2    fluticasone propionate (FLONASE) 50 mcg/actuation nasal spray USE 1 TO 2 SPRAY(S) IN EACH NOSTRIL ONCE DAILY AS NEEDED      carvediloL (COREG) 12.5 mg tablet TAKE 1 TABLET BY MOUTH TWICE DAILY WITH MEALS 180 Tablet 0    linaCLOtide (Linzess) 145 mcg cap capsule TAKE 1 CAPSULE BY MOUTH ONCE DAILY BEFORE BREAKFAST FOR CONSTIPATION 90 Capsule 1    topiramate (TOPAMAX) 100 mg tablet Take 1 tablet by mouth twice daily 180 Tablet 3    galcanezumab-gnlm (Emgality Syringe) 120 mg/mL syrg 1 Syringe by SubCUTAneous route every thirty (30) days. Start 30 days after the loading dose 1 Each 11    hydrOXYzine HCL (ATARAX) 50 mg tablet Take 1 tablet by mouth twice daily as needed for anxiety 60 Tablet 0    losartan (COZAAR) 50 mg tablet Take 1 Tablet by mouth nightly. Hold for SBP<115 90 Tablet 3    pantoprazole (PROTONIX) 40 mg tablet Take 1 Tablet by mouth daily.  90 Tablet 3    Needle, Disp, 25 G 25 gauge x 3/4\" ndle Use bimonthly for cyanocobalamin subcutaneous injection. 25 Each 3    oxyCODONE-acetaminophen (PERCOCET 10)  mg per tablet Take 1 Tablet by mouth as needed for Pain.      montelukast (SINGULAIR) 10 mg tablet Take 10 mg by mouth daily. baclofen (LIORESAL) 20 mg tablet TAKE 1 TABLET BY MOUTH EVERY 8 HOURS AS NEEDED FOR MUSCLE SPASM(S)      BD Single Use Swabs Regular padm Check Blood sugar ONCE daily      Syringe, Disposable, 1 mL syrg Use bimonthly for cyanocobalamin subcutaneous injection. 25 Syringe 0    gabapentin (NEURONTIN) 100 mg capsule Take 100 mg by mouth three (3) times daily as needed for Pain. FREESTYLE LITE STRIPS strip USE STRIP TO CHECK GLUCOSE TWICE DAILY  3    FREESTYLE LITE METER monitoring kit USE TO CHECK GLUCOSE ONCE DAILY  0    diclofenac (VOLTAREN) 1 % gel APPLY TOPICALLY TO AFFECTED AREA ONCE DAILY      FREESTYLE LANCETS 28 gauge misc USE TO CHECK GLUCOSE TWICE DAILY      miscellaneous medical supply misc Rollator Dx:I69.359 1 Each 0    miscellaneous medical supply [facility] Walker Dx: I69.359 1 Each 0    DULoxetine (CYMBALTA) 60 mg capsule Take 2 Caps by mouth daily. 180 Cap 0    calcipotriene (DOVONEX) 0.005 % topical cream Apply  to affected area daily as needed. 60 g 11    albuterol-ipratropium (DUO-NEB) 2.5 mg-0.5 mg/3 ml nebu 3 mL by Nebulization route every six (6) hours as needed. (Patient taking differently: 3 mL by Nebulization route every six (6) hours as needed for Wheezing.) 30 Nebule 0    ferrous sulfate 325 mg (65 mg iron) tablet Take 325 mg by mouth Daily (before breakfast). aspirin delayed-release 81 mg tablet Take 81 mg by mouth daily.       [DISCONTINUED] fluconazole (DIFLUCAN) 150 mg tablet TAKE 1 TABLET BY MOUTH AS A ONE TIME DOSE MAY REPEAT IN 3 DAYS IF STILL SYMPTOMATIC      [DISCONTINUED] QUEtiapine (SEROquel) 100 mg tablet 150 mg.      trospium (SANCTURA) 20 mg tablet TAKE 1 TABLET BY MOUTH BEFORE BREAKFAST AND BEFORE SUPPER 60 Tablet 5    [DISCONTINUED] ciclopirox (PENLAC) 8 % solution APPLY TO AFFECTED TOE NAILS DAILY       No current facility-administered medications on file prior to visit. Allergies   Allergen Reactions    Bees [Hymenoptera Allergenic Extract] Shortness of Breath and Swelling    Strawberry Shortness of Breath and Swelling    Venom-Wasp Shortness of Breath and Swelling    Lisinopril Cough     Past Medical History:   Diagnosis Date    Advanced care planning/counseling discussion 03/04/2016    On File    Bronchitis 02/24/2014    Cervicalgia 08/18/2015    Dr. Franci Dubois    Chest pain 05/25/2015    Hospitalized at [facility] 5/25/15 (lab work negative)    Chronic indwelling Dunn catheter 01/24/2018    Initially placed Jan 2018. Mx by Dr. Dong Renee. Congestive heart failure, unspecified     Last Echo 2/8/15: EF 55-60%    Constipation 06/13/2017    Enthesopathy, spinal ([address] 08/18/2015    Dr. Chema Maldonado hypertension     Foot drop 08/18/2015    Dr. Nikita Avendano attack [facility]) 02/24/2013    Was supposed to See Cardiology for possible pacemaker in november 2014- After Cardiology consult locally, no need, EF greatly improved.  Established with Dr. Dougie De La Cruz     Hiatal hernia 06/2015    3 cm hiatal hernia     Hip pain 08/18/2015    Dr. Franci Dubois    Hypercholesterolemia     Hyperglycemia 07/2015    A1c 5.9     Hyperlipidemia 06/30/2015    NMR lipoprofile- LDL P 997, LDL-c 71, HLD-C-39, TG-60, HLD-P (25.2), Small LDL-P -541, LDL size 20.6    Hypothyroid     Insomnia 06/13/2017    Lower extremity edema     Lumbar spondylosis 08/18/2015    Dr. Tony Median 06/2015    EGD/Colonscopy 6/15- Gastritis, internal hemorrhoids and 3 polyps    Menopause     Murmur     EDDIE on CPAP     Was referred to Pulmonology - Uses CPAP    Osteoarthritis of hip 08/18/2015    Dr. Kishore Dunn, shoulder 08/18/2015    Dr. Franci Dubois    Radiculopathy, cervical 08/18/2015    Dr. Franci Dubois    Shoulder pain 08/18/2015    Dr. Franci Dubois    Spinal stenosis of cervical region 08/18/2015    Dr. Franci Dubois    Spinal stenosis, lumbar 08/18/2015    Dr. Franci Dubois    Stroke [facility]) 02/25/2014    Established with Neurology, Virginia Collier NP-Just hospitalized at [facility] 2/7/15-2/10/15. CT negative, but Late effect CV accident with increased tone described on discharge summary, Carotid dopplers showed 50% stenosis bilaterally. Vitamin D deficiency 07/2015      Past Surgical History:   Procedure Laterality Date    COLONOSCOPY N/A 6/22/2016    COLONOSCOPY performed by Leonel Roland MD at [facility] ENDOSCOPY    COLONOSCOPY N/A 1/26/2022    COLONOSCOPY, needs rapid performed by Aileen Cameron MD at [facility] ENDOSCOPY    HX BREAST BIOPSY Right 8/11/15    Stereo Bx - [facility]--- Benign    HX CHOLECYSTECTOMY      HX COLONOSCOPY  6/2015    Dr. Raheem Gonzalez- 3 complete polypectomies, Internal hemorrhoids, difficult study due to spasm. Repeat in 1 year    HX ENDOSCOPY  6/2015    mild gastritis, 3cm hiatal hernia     HX GASTRIC BYPASS  6/1989    HX HEART CATHETERIZATION  2/2014    HX ORTHOPAEDIC      Right middle finger distal amputation    HX PARTIAL THYROIDECTOMY  ~1990    HX THYROIDECTOMY Left 1985    90% of one side of the thyroid removed    IR ASP BLADDER SUPRA CATH  5/24/2019        Family History   Problem Relation Age of Onset    Heart Disease Father 61    Broken Bones Father         Hip/fell    Hypertension Mother     Heart Disease Brother 62    Broken Bones Brother         Hip/fell    Diabetes Maternal Grandmother         Social History     Socioeconomic History    Marital status: SINGLE     Spouse name: Not on file    Number of children: Not on file    Years of education: Not on file    Highest education level: Not on file   Occupational History    Not on file   Tobacco Use    Smoking status: Former     Packs/day: 0.25     Years: 15.00     Pack years: 3.75     Types: Cigarettes     Quit date: 6/13/2007     Years since quitting: 15.8    Smokeless tobacco: Never   Vaping Use    Vaping Use: Never used   Substance and Sexual Activity    Alcohol use: No    Drug use:  No Sexual activity: Not Currently   Other Topics Concern    Not on file   Social History Narrative    Not on file     Social Determinants of Health     Financial Resource Strain: Not on file   Food Insecurity: Not on file   Transportation Needs: Not on file   Physical Activity: Not on file   Stress: Not on file   Social Connections: Not on file   Intimate Partner Violence: Not on file   Housing Stability: Not on file           Visit Vitals  /67 (BP 1 Location: Left upper arm, BP Patient Position: Sitting, BP Cuff Size: Large adult)   Pulse 71   Temp 97.6 °F (36.4 °C) (Oral)   Resp 16   Ht 5' 6\" (1.676 m)   Wt 258 lb 3.2 oz (117.1 kg)   LMP  (LMP Unknown)   SpO2 98%   BMI 41.67 kg/m²       Review of Systems   Constitutional:  Negative for chills, fever, malaise/fatigue and weight loss. HENT:  Negative for ear pain, hearing loss and sore throat. Respiratory:  Negative for cough and shortness of breath. Cardiovascular:  Negative for chest pain and leg swelling. Gastrointestinal:  Negative for abdominal pain, blood in stool, constipation, diarrhea, heartburn, melena, nausea and vomiting. Genitourinary:  Negative for dysuria and hematuria. Musculoskeletal:  Positive for myalgias. Negative for back pain. Skin:  Positive for itching. Negative for rash. Neurological:  Negative for weakness and headaches. Endo/Heme/Allergies:  Positive for environmental allergies. Psychiatric/Behavioral:  Negative for depression. The patient has insomnia. Physical Exam  Vitals and nursing note reviewed. Constitutional:       Appearance: Normal appearance. She is morbidly obese. HENT:      Head: Normocephalic and atraumatic. Right Ear: Tympanic membrane, ear canal and external ear normal.      Left Ear: Tympanic membrane, ear canal and external ear normal.      Nose: Nose normal.      Mouth/Throat:      Mouth: Mucous membranes are moist.      Pharynx: Oropharynx is clear.    Eyes:      Conjunctiva/sclera: Conjunctivae normal.      Pupils: Pupils are equal, round, and reactive to light. Neck:      Vascular: No carotid bruit. Cardiovascular:      Rate and Rhythm: Normal rate and regular rhythm. Pulses: Normal pulses. Heart sounds: Normal heart sounds. Pulmonary:      Effort: Pulmonary effort is normal.      Breath sounds: Normal breath sounds. No wheezing, rhonchi or rales. Abdominal:      General: Abdomen is flat. Bowel sounds are normal. There is no distension. Palpations: There is no mass. Tenderness: There is no abdominal tenderness. There is no guarding or rebound. Musculoskeletal:         General: Normal range of motion. Cervical back: Normal range of motion and neck supple. No rigidity. Right lower leg: No edema. Left lower leg: No edema. Skin:     General: Skin is warm and dry. Capillary Refill: Capillary refill takes less than 2 seconds. Neurological:      General: No focal deficit present. Mental Status: She is alert and oriented to person, place, and time. Sensory: No sensory deficit. Motor: Weakness (4/5 lue weakness, 4/5 lle weakness with brace in place) present. Gait: Gait abnormal (using walker). Psychiatric:         Mood and Affect: Mood normal.         Behavior: Behavior normal.         Thought Content: Thought content normal.        No results found for this or any previous visit (from the past 24 hour(s)). ASSESSMENT AND PLAN  Diagnoses and all orders for this visit:    1. Chronic congestive heart failure, unspecified heart failure type (HCC)  Stable on Lasix, BB. Hx of PAF, none heard on exam today RRR  2. Essential hypertension  -     METABOLIC PANEL, COMPREHENSIVE; Future  -     CBC WITH AUTOMATED DIFF; Future  At goal with losartan, BB and lasix  3. Coronary artery disease involving native coronary artery of native heart without angina pectoris  Continue BB, ASA, no angina  4.  Postoperative hypothyroidism  - THYROID CASCADE PROFILE; Future  At goal, repeat labs before next visit  5. Hemiparesis and alteration of sensations as late effects of stroke (HCC)  L side, brace on LLE  6. COPD without exacerbation ([address]  Stable  7. EDDIE on CPAP  stable  8. Hypercholesterolemia  -     LIPID PANEL; Future  At goal on lipitor 40 for CAD/CVA prevention  9. Prediabetes  -     HEMOGLOBIN A1C WITH EAG; Future  At goal, low dose ozempic for weight  10. Vitamin D deficiency  -     VITAMIN D, 25 HYDROXY; Future  At goal, recheck labs  11. Insomnia, unspecified type  -     QUEtiapine (SEROquel) 200 mg tablet; Take 1 Tablet by mouth nightly. Trial increase of seroquel to 200 mg, not able to use 150 due to inability to break 100 mg tabs in half  12. Onychomycosis  -     ciclopirox (PENLAC) 8 % solution; APPLY TO AFFECTED TOE NAILS DAILY  Refill  13. Itching  -     cetirizine (ZYRTEC) 10 mg tablet; Take 1 Tablet by mouth nightly. Trial zyrtec daily for allergies and night time itching      I have discussed the diagnosis with the patient and the intended plan as seen in the above orders. Patient is in agreement. The patient has received an after-visit summary and questions were answered concerning future plans. I have discussed medication side effects and warnings with the patient as well.     Marsha Paz PA-C

## 2023-04-04 NOTE — PROGRESS NOTES
Denise Chisholm  Identified pt with two pt identifiers(name and ). Chief Complaint   Patient presents with    Hypertension     Room 4B //     Cholesterol Problem       Reviewed record In preparation for visit and have obtained necessary documentation. 1. Have you been to the ER, urgent care clinic or hospitalized since your last visit? No     2. Have you seen or consulted any other health care providers outside of the [address] [place] [address] since your last visit? Include any pap smears or colon screening. No    Patient does not have an advance directive. Vitals reviewed with provider. Health Maintenance reviewed: There are no preventive care reminders to display for this patient.        Wt Readings from Last 3 Encounters:   23 258 lb 3.2 oz (117.1 kg)   23 259 lb 6.4 oz (117.7 kg)   23 240 lb (108.9 kg)        Temp Readings from Last 3 Encounters:   23 97.9 °F (36.6 °C) (Oral)   10/07/22 97.8 °F (36.6 °C) (Oral)   06/10/22 97.6 °F (36.4 °C) (Oral)        BP Readings from Last 3 Encounters:   23 126/80   23 114/79   23 138/86        Pulse Readings from Last 3 Encounters:   23 75   23 76   23 70        Vitals:    23 0825   Resp: 16   Weight: 258 lb 3.2 oz (117.1 kg)   Height: 5' 6\" (1.676 m)   PainSc:   5   PainLoc: Shoulder          Learning Assessment:   :       Learning Assessment 2019 2018 10/13/2017 3/24/2015   PRIMARY LEARNER Patient Patient Patient Patient   HIGHEST LEVEL OF EDUCATION - PRIMARY LEARNER  - - - SOME COLLEGE   BARRIERS PRIMARY LEARNER - - - NONE   CO-LEARNER CAREGIVER - - - No   CO-LEARNER NAME - - - n/a   PRIMARY LANGUAGE ENGLISH ENGLISH ENGLISH ENGLISH   LEARNER PREFERENCE PRIMARY DEMONSTRATION DEMONSTRATION LISTENING LISTENING   ANSWERED BY patient patient patient patient   RELATIONSHIP SELF SELF SELF SELF        Depression Screening:   :       3 most recent PHQ Screens 2023   PHQ Not Done -   Little interest or pleasure in doing things Several days   Feeling down, depressed, irritable, or hopeless Several days   Total Score PHQ 2 2        Fall Risk Assessment:   :       Fall Risk Assessment, last 12 mths 3/18/2021   Able to walk? Yes   Fall in past 12 months? 0   Do you feel unsteady? 0   Are you worried about falling 0   Number of falls in past 12 months -   Fall with injury? -        Abuse Screening:   :       Abuse Screening Questionnaire 6/21/2021 1/30/2020 9/5/2019 3/5/2019   Do you ever feel afraid of your partner? N N N N   Are you in a relationship with someone who physically or mentally threatens you? N N N N   Is it safe for you to go home?  Y Y Y Y        ADL Screening:   :       ADL Assessment 6/21/2021   Feeding yourself No Help Needed   Getting from bed to chair Help Needed   Getting dressed Help Needed   Bathing or showering Help Needed   Walk across the room (includes cane/walker) No Help Needed   Using the telphone No Help Needed   Taking your medications Help Needed   Preparing meals Help Needed   Managing money (expenses/bills) Help Needed   Moderately strenuous housework (laundry) Help Needed   Shopping for personal items (toiletries/medicines) Help Needed   Shopping for groceries Help Needed   Driving Help Needed   Climbing a flight of stairs Help Needed   Getting to places beyond walking distances Help Needed

## 2023-05-08 ENCOUNTER — TELEPHONE (OUTPATIENT)
Facility: CLINIC | Age: 63
End: 2023-05-08

## 2023-05-08 NOTE — TELEPHONE ENCOUNTER
Patient called and stated that [facility] will be contacting us about her urinary supplies. I informed her that we will be on the look out for it.

## 2023-05-15 NOTE — TELEPHONE ENCOUNTER
Detail Level: Detailed Pts last ov for actual med check was 4/12/2018    She has only been seen for procedure visits. Next appt 3/5/20 is a procedure.

## 2023-06-02 RX ORDER — LOSARTAN POTASSIUM 50 MG/1
TABLET ORAL
Qty: 90 TABLET | Refills: 3 | Status: SHIPPED | OUTPATIENT
Start: 2023-06-02

## 2023-06-02 NOTE — TELEPHONE ENCOUNTER
PCP: Vanna Latham PA-C     Last appt:  4/4/2023      Future Appointments   Date Time Provider Manoj Reese   7/12/2023 11:00 AM MD JULIANO Seals BS AMB   8/4/2023  9:00 AM MD KEON Matias BS AMB   10/5/2023  8:30 AM Vanna Latham PA-C [facility] BS AMB          Requested Prescriptions     Pending Prescriptions Disp Refills    losartan (COZAAR) 50 MG tablet [Pharmacy Med Name: Losartan Potassium 50 MG Oral Tablet] 90 tablet 3     Sig: TAKE 1 TABLET BY MOUTH NIGHTLY HOLD  FOR  SBP  LESS  THAN  115

## 2023-06-06 LAB
BASOPHILS # BLD AUTO: 0 X10E3/UL (ref 0–0.2)
BASOPHILS NFR BLD AUTO: 1 %
EOSINOPHIL # BLD AUTO: 0.2 X10E3/UL (ref 0–0.4)
EOSINOPHIL NFR BLD AUTO: 3 %
ERYTHROCYTE [DISTWIDTH] IN BLOOD BY AUTOMATED COUNT: 13.7 % (ref 11.7–15.4)
HCT VFR BLD AUTO: 38.1 % (ref 34–46.6)
HGB BLD-MCNC: 12.7 G/DL (ref 11.1–15.9)
IMM GRANULOCYTES # BLD AUTO: 0 X10E3/UL (ref 0–0.1)
IMM GRANULOCYTES NFR BLD AUTO: 0 %
LYMPHOCYTES # BLD AUTO: 1.7 X10E3/UL (ref 0.7–3.1)
LYMPHOCYTES NFR BLD AUTO: 34 %
MCH RBC QN AUTO: 29.5 PG (ref 26.6–33)
MCHC RBC AUTO-ENTMCNC: 33.3 G/DL (ref 31.5–35.7)
MCV RBC AUTO: 89 FL (ref 79–97)
MONOCYTES # BLD AUTO: 0.8 X10E3/UL (ref 0.1–0.9)
MONOCYTES NFR BLD AUTO: 16 %
NEUTROPHILS # BLD AUTO: 2.3 X10E3/UL (ref 1.4–7)
NEUTROPHILS NFR BLD AUTO: 46 %
PLATELET # BLD AUTO: 201 X10E3/UL (ref 150–450)
RBC # BLD AUTO: 4.3 X10E6/UL (ref 3.77–5.28)
WBC # BLD AUTO: 5 X10E3/UL (ref 3.4–10.8)

## 2023-06-06 RX ORDER — TROSPIUM CHLORIDE 20 MG/1
TABLET, FILM COATED ORAL
Qty: 60 TABLET | Refills: 5 | Status: SHIPPED | OUTPATIENT
Start: 2023-06-06

## 2023-06-06 NOTE — TELEPHONE ENCOUNTER
PCP: Papi Monzon PA-C     Last appt:  2023      Future Appointments   Date Time Provider Manoj Reese   2023 11:00 AM MD JULIANO Mas BS AMB   2023  9:00 AM MD KEON Britton BS AMB   10/5/2023  8:30 AM Papi Monzon PA-C [facility] BS AMB          Requested Prescriptions     Pending Prescriptions Disp Refills    trospium (SANCTURA) 20 MG tablet [Pharmacy Med Name: Trospium Chloride 20 MG Oral Tablet] 60 tablet 5     Sig: TAKE 1 TABLET BY MOUTH BEFORE BREAKFAST AND BEFORE SUPPER

## 2023-06-07 LAB
25(OH)D3+25(OH)D2 SERPL-MCNC: 21.5 NG/ML (ref 30–100)
ALBUMIN SERPL-MCNC: 4 G/DL (ref 3.8–4.8)
ALBUMIN/GLOB SERPL: 1.7 {RATIO} (ref 1.2–2.2)
ALP SERPL-CCNC: 144 IU/L (ref 44–121)
ALT SERPL-CCNC: 16 IU/L (ref 0–32)
AST SERPL-CCNC: 23 IU/L (ref 0–40)
BILIRUB SERPL-MCNC: 0.3 MG/DL (ref 0–1.2)
BUN SERPL-MCNC: 21 MG/DL (ref 8–27)
BUN/CREAT SERPL: 19 (ref 12–28)
CALCIUM SERPL-MCNC: 8.7 MG/DL (ref 8.7–10.3)
CHLORIDE SERPL-SCNC: 106 MMOL/L (ref 96–106)
CHOLEST SERPL-MCNC: 134 MG/DL (ref 100–199)
CO2 SERPL-SCNC: 25 MMOL/L (ref 20–29)
CREAT SERPL-MCNC: 1.08 MG/DL (ref 0.57–1)
EGFRCR SERPLBLD CKD-EPI 2021: 57 ML/MIN/1.73
EST. AVERAGE GLUCOSE BLD GHB EST-MCNC: 108 MG/DL
GLOBULIN SER CALC-MCNC: 2.3 G/DL (ref 1.5–4.5)
GLUCOSE SERPL-MCNC: 82 MG/DL (ref 70–99)
HBA1C MFR BLD: 5.4 % (ref 4.8–5.6)
HDLC SERPL-MCNC: 65 MG/DL
LDLC SERPL CALC-MCNC: 58 MG/DL (ref 0–99)
POTASSIUM SERPL-SCNC: 4.3 MMOL/L (ref 3.5–5.2)
PROT SERPL-MCNC: 6.3 G/DL (ref 6–8.5)
SODIUM SERPL-SCNC: 143 MMOL/L (ref 134–144)
TRIGL SERPL-MCNC: 49 MG/DL (ref 0–149)
TSH SERPL DL<=0.005 MIU/L-ACNC: 1.28 UIU/ML (ref 0.45–4.5)
VLDLC SERPL CALC-MCNC: 11 MG/DL (ref 5–40)

## 2023-06-18 RX ORDER — PANTOPRAZOLE SODIUM 40 MG/1
TABLET, DELAYED RELEASE ORAL
Qty: 90 TABLET | Refills: 3 | Status: SHIPPED | OUTPATIENT
Start: 2023-06-18

## 2023-06-19 RX ORDER — QUETIAPINE FUMARATE 200 MG/1
TABLET, FILM COATED ORAL
Qty: 30 TABLET | Refills: 2 | Status: SHIPPED | OUTPATIENT
Start: 2023-06-19

## 2023-06-19 NOTE — TELEPHONE ENCOUNTER
PCP: Gael Araiza PA-C     Last appt:  4/4/2023      Future Appointments   Date Time Provider Manoj Reese   7/12/2023 11:00 AM MD JULIANO Snell BS AMB   8/4/2023  9:00 AM MD KEON Borges BS AMB   10/5/2023  8:30 AM Gael Araiza PA-C [facility] BS AMB          Requested Prescriptions     Pending Prescriptions Disp Refills    QUEtiapine (SEROQUEL) 200 MG tablet [Pharmacy Med Name: QUEtiapine Fumarate 200 MG Oral Tablet] 30 tablet 2     Sig: Take 1 tablet by mouth nightly

## 2023-06-23 NOTE — TELEPHONE ENCOUNTER
PCP: Enmanuel Murphy PA-C     Last appt:  4/4/2023      Future Appointments   Date Time Provider Manoj Reese   7/12/2023 11:00 AM MD JULIANO Sherwood BS AMB   8/4/2023  9:00 AM MD KEON Jeter BS AMB   10/5/2023  8:30 AM Enmanuel Murphy PA-C [facility] BS AMB          Requested Prescriptions     Pending Prescriptions Disp Refills    ciclopirox (PENLAC) 8 % solution [Pharmacy Med Name: Ciclopirox 8 % External Solution] 7 mL 0     Sig: APPLY  SOLUTION TOPICALLY TO AFFECTED TOE NAILS DAILY

## 2023-06-26 RX ORDER — LINACLOTIDE 145 UG/1
CAPSULE, GELATIN COATED ORAL
Qty: 90 CAPSULE | Refills: 3 | Status: SHIPPED | OUTPATIENT
Start: 2023-06-26

## 2023-06-26 NOTE — TELEPHONE ENCOUNTER
PCP: James Asif PA-C     Last appt:  4/4/2023      Future Appointments   Date Time Provider [address]   7/12/2023 11:00 AM MD JULIANO Hernandez BS AMB   8/4/2023  9:00 AM MD KEON Lewis BS AMB   10/5/2023  8:30 AM James Asif PA-C [facility] BS AMB          Requested Prescriptions     Pending Prescriptions Disp Refills    LINZESS 145 MCG capsule [Pharmacy Med Name: Linzess 145 MCG Oral Capsule] 90 capsule 0     Sig: TAKE 1 CAPSULE BY MOUTH ONCE DAILY BEFORE BREAKFAST FOR CONSTIPATION

## 2023-07-12 ENCOUNTER — PROCEDURE VISIT (OUTPATIENT)
Age: 63
End: 2023-07-12
Payer: MEDICARE

## 2023-07-12 VITALS
DIASTOLIC BLOOD PRESSURE: 80 MMHG | OXYGEN SATURATION: 97 % | BODY MASS INDEX: 40.5 KG/M2 | HEIGHT: 66 IN | WEIGHT: 252 LBS | HEART RATE: 83 BPM | RESPIRATION RATE: 16 BRPM | SYSTOLIC BLOOD PRESSURE: 134 MMHG

## 2023-07-12 DIAGNOSIS — G43.709 CHRONIC MIGRAINE WITHOUT AURA, NOT INTRACTABLE, WITHOUT STATUS MIGRAINOSUS: Primary | ICD-10-CM

## 2023-07-12 PROCEDURE — 64615 CHEMODENERV MUSC MIGRAINE: CPT | Performed by: PSYCHIATRY & NEUROLOGY

## 2023-07-12 NOTE — PROGRESS NOTES
Botox Injection Note       Indication: patient has chronic recurrent migraine, more than 15 days/ month with duration longer than 4 hours. Tried many preventives , no avail. Previous injections have helped her significantly. She now rarely gets a migraine and duration is less than 2 hours. Procedure:   Botox concentration: 200 units in 4 ml of preservative-free normal saline. 31 sites injections, distribution as follow      Units/site  Sites Sides Subtotal    Procerus 5 1 1 5    5 1 2 10   Frontalis 5 2 2 20   Temporalis 5 4 2 40   Occipitalis 5 3 2 30   Upper cervical paraspinalis 5 2 2 20   Trapezius 5 3 2 30         200 units Botox were reconstituted, 155 units injected as above and the remainder was unavoidably wasted.      Patient tolerated procedure well.     _____________________________   Madhuri Greenfield M.D.

## 2023-07-20 RX ORDER — SEMAGLUTIDE 0.68 MG/ML
INJECTION, SOLUTION SUBCUTANEOUS
Qty: 3 ML | Refills: 0 | Status: SHIPPED | OUTPATIENT
Start: 2023-07-20

## 2023-07-20 NOTE — TELEPHONE ENCOUNTER
PCP: Natasha Lorenzo PA-C     Last appt:  4/4/2023      Future Appointments   Date Time Provider [address]   8/4/2023  9:00 AM MD KEON Cordova BS AMB   10/4/2023 10:00 AM MD JULIANO Damico AMB   10/5/2023  8:30 AM JERED TysonMA BS AMB   10/25/2023 10:00 AM MD JULIANO Damico BS AMB          Requested Prescriptions     Pending Prescriptions Disp Refills    OZEMPIC, 0.25 OR 0.5 MG/DOSE, 2 MG/3ML SOPN [Pharmacy Med Name: Lorella [place] (0.25 or 0.5 MG/DOSE) 2 MG/3ML Subcutaneous Solution Pen-injector] 3 mL 0     Sig: INJECT 0.25 MG SUBCUTANEOUSLY  ONCE A WEEK AND THEN INCREASE TO 0.5MG ONCE A WEEK AFTER  4  WEEKS

## 2023-07-31 ENCOUNTER — TELEPHONE (OUTPATIENT)
Facility: CLINIC | Age: 63
End: 2023-07-31

## 2023-07-31 NOTE — TELEPHONE ENCOUNTER
----- Message from Marzena Zuniga sent at 7/31/2023 10:19 AM EDT -----  Subject: Message to Provider    QUESTIONS  Information for Provider? pt wants Alessandra Kaplan to call back to discuss about her   medications had to r/s her apt.   ---------------------------------------------------------------------------  --------------  [address]  [phone]; OK to leave message on voicemail  ---------------------------------------------------------------------------  --------------  SCRIPT ANSWERS  Relationship to Patient?  Self

## 2023-08-04 ENCOUNTER — OFFICE VISIT (OUTPATIENT)
Age: 63
End: 2023-08-04
Payer: MEDICARE

## 2023-08-04 VITALS
SYSTOLIC BLOOD PRESSURE: 130 MMHG | DIASTOLIC BLOOD PRESSURE: 72 MMHG | WEIGHT: 256.2 LBS | BODY MASS INDEX: 41.17 KG/M2 | HEIGHT: 66 IN

## 2023-08-04 DIAGNOSIS — I42.0 DILATED CARDIOMYOPATHY (HCC): ICD-10-CM

## 2023-08-04 DIAGNOSIS — I69.398 OTHER SEQUELAE OF CEREBRAL INFARCTION: ICD-10-CM

## 2023-08-04 DIAGNOSIS — E66.01 MORBID (SEVERE) OBESITY DUE TO EXCESS CALORIES (HCC): ICD-10-CM

## 2023-08-04 DIAGNOSIS — I10 ESSENTIAL (PRIMARY) HYPERTENSION: Primary | ICD-10-CM

## 2023-08-04 DIAGNOSIS — I69.359 HEMIPARESIS AND ALTERATION OF SENSATIONS AS LATE EFFECTS OF STROKE (HCC): ICD-10-CM

## 2023-08-04 DIAGNOSIS — I69.359 HEMIPLEGIA AND HEMIPARESIS FOLLOWING CEREBRAL INFARCTION AFFECTING UNSPECIFIED SIDE (HCC): ICD-10-CM

## 2023-08-04 DIAGNOSIS — I69.398 HEMIPARESIS AND ALTERATION OF SENSATIONS AS LATE EFFECTS OF STROKE (HCC): ICD-10-CM

## 2023-08-04 DIAGNOSIS — R73.03 PREDIABETES: ICD-10-CM

## 2023-08-04 DIAGNOSIS — I49.3 VENTRICULAR PREMATURE DEPOLARIZATION: ICD-10-CM

## 2023-08-04 PROCEDURE — 99214 OFFICE O/P EST MOD 30 MIN: CPT | Performed by: SPECIALIST

## 2023-08-04 RX ORDER — FUROSEMIDE 40 MG/1
40 TABLET ORAL DAILY
Qty: 90 TABLET | Refills: 3 | Status: SHIPPED | OUTPATIENT
Start: 2023-08-04

## 2023-08-04 RX ORDER — CARVEDILOL 12.5 MG/1
12.5 TABLET ORAL 2 TIMES DAILY WITH MEALS
Qty: 180 TABLET | Refills: 3 | Status: SHIPPED | OUTPATIENT
Start: 2023-08-04

## 2023-08-04 ASSESSMENT — PATIENT HEALTH QUESTIONNAIRE - PHQ9
SUM OF ALL RESPONSES TO PHQ QUESTIONS 1-9: 0
SUM OF ALL RESPONSES TO PHQ QUESTIONS 1-9: 0
2. FEELING DOWN, DEPRESSED OR HOPELESS: 0
SUM OF ALL RESPONSES TO PHQ QUESTIONS 1-9: 0
2. FEELING DOWN, DEPRESSED OR HOPELESS: 0
SUM OF ALL RESPONSES TO PHQ QUESTIONS 1-9: 0
1. LITTLE INTEREST OR PLEASURE IN DOING THINGS: 0
SUM OF ALL RESPONSES TO PHQ QUESTIONS 1-9: 0
SUM OF ALL RESPONSES TO PHQ9 QUESTIONS 1 & 2: 0
SUM OF ALL RESPONSES TO PHQ QUESTIONS 1-9: 0

## 2023-08-04 ASSESSMENT — ENCOUNTER SYMPTOMS: SHORTNESS OF BREATH: 1

## 2023-08-04 NOTE — PROGRESS NOTES
Onabotulinumtoxin A (BOTOX) 200 units injection Inject 155 units to selected muscles head and neck bilaterally every 12 weeks  Indications: migraine prevention      oxybutynin (DITROPAN-XL) 10 MG extended release tablet Take 1 tablet by mouth daily      oxyCODONE-acetaminophen (PERCOCET)  MG per tablet Take 1 tablet by mouth as needed. topiramate (TOPAMAX) 100 MG tablet Take 1 tablet by mouth 2 times daily      hydrOXYzine HCl (ATARAX) 50 MG tablet Take 1 tablet by mouth 2 times daily as needed      QUEtiapine (SEROQUEL) 100 MG tablet 150 mg (Patient not taking: Reported on 8/4/2023)       No current facility-administered medications for this visit. Allergies   Allergen Reactions    Carlos Shortness Of Breath and Swelling    Wasp Venom Protein Shortness Of Breath and Swelling    Lisinopril Cough     Past Medical History:   Diagnosis Date    Advanced care planning/counseling discussion 03/04/2016    On File    Bronchitis 02/24/2014    Cervicalgia 08/18/2015    Dr. Maicol Holguin    Chest pain 05/25/2015    Hospitalized at [facility] 5/25/15 (lab work negative)    Chronic indwelling Kern catheter 01/24/2018    Initially placed Jan 2018. Mx by Dr. Jose F Arnold. Congestive heart failure, unspecified     Last Echo 2/8/15: EF 55-60%    Constipation 06/13/2017    Enthesopathy, spinal ([address]) 08/18/2015    Dr. Drew Duarte hypertension     Foot drop 08/18/2015    Dr. Kaylyn Dent attack [facility]) 02/24/2013    Was supposed to See Cardiology for possible pacemaker in november 2014- After Cardiology consult locally, no need, EF greatly improved.  Established with Dr. Asad Adame     Hiatal hernia 06/2015    3 cm hiatal hernia     Hip pain 08/18/2015    Dr. Maicol Holguin    Hypercholesterolemia     Hyperglycemia 07/2015    A1c 5.9     Hyperlipidemia 06/30/2015    NMR lipoprofile- LDL P 997, LDL-c 71, HLD-C-39, TG-60, HLD-P (25.2), Small LDL-P -541, LDL size 20.6    Hypothyroid     Insomnia 06/13/2017    Lower extremity edema

## 2023-08-11 RX ORDER — CETIRIZINE HYDROCHLORIDE 10 MG/1
TABLET, FILM COATED ORAL
Qty: 90 TABLET | Refills: 2 | Status: SHIPPED | OUTPATIENT
Start: 2023-08-11

## 2023-08-11 NOTE — TELEPHONE ENCOUNTER
PCP: Ifeanyi Vazquez PA-C     Last appt:  4/4/2023      Future Appointments   Date Time Provider [address]   10/4/2023 10:00 AM MD JULIANO Velazquez BS AMB   [facility]   10/25/2023 10:00 AM MD JULIANO Velazquez BS AMB   8/16/2024  9:00 AM MD KEON Ryder BS AMB          Requested Prescriptions     Pending Prescriptions Disp Refills    EQ ALLERGY RELIEF, CETIRIZINE, 10 MG tablet [Pharmacy Med Name: EQ Allergy Relief (Cetirizine) 10 MG Oral Tablet] 90 tablet 0     Sig: Take 1 tablet by mouth nightly

## 2023-09-26 ENCOUNTER — TELEPHONE (OUTPATIENT)
Facility: CLINIC | Age: 63
End: 2023-09-26

## 2023-09-26 NOTE — TELEPHONE ENCOUNTER
Jeff called and wanted to follow up on a order for a stair lift for this pt.       Call [facility] [phone]

## 2023-09-26 NOTE — TELEPHONE ENCOUNTER
I tried to call the number provided but was unable to reach anyone. I will try again at a later date.

## 2023-09-28 NOTE — TELEPHONE ENCOUNTER
I have tried to call the number below but was not able to reach anyone. I have not heard or received anything in regards to a stair lift.

## 2023-10-19 ENCOUNTER — TELEPHONE (OUTPATIENT)
Facility: CLINIC | Age: 63
End: 2023-10-19

## 2023-10-20 ENCOUNTER — TELEPHONE (OUTPATIENT)
Age: 63
End: 2023-10-20

## 2023-10-20 ENCOUNTER — TELEPHONE (OUTPATIENT)
Facility: CLINIC | Age: 63
End: 2023-10-20

## 2023-10-20 ENCOUNTER — TRANSCRIBE ORDERS (OUTPATIENT)
Facility: HOSPITAL | Age: 63
End: 2023-10-20

## 2023-10-20 DIAGNOSIS — G43.709 CHRONIC MIGRAINE W/O AURA W/O STATUS MIGRAINOSUS, NOT INTRACTABLE: Primary | ICD-10-CM

## 2023-10-20 DIAGNOSIS — Z12.31 SCREENING MAMMOGRAM FOR HIGH-RISK PATIENT: Primary | ICD-10-CM

## 2023-10-20 DIAGNOSIS — I69.398 HEMIPARESIS AND ALTERATION OF SENSATIONS AS LATE EFFECTS OF STROKE (HCC): Primary | ICD-10-CM

## 2023-10-20 DIAGNOSIS — I69.359 HEMIPARESIS AND ALTERATION OF SENSATIONS AS LATE EFFECTS OF STROKE (HCC): Primary | ICD-10-CM

## 2023-10-20 NOTE — TELEPHONE ENCOUNTER
----- Message from Jose Yan sent at 10/20/2023  9:43 AM EDT -----  Subject: Referral Request    Reason for referral request? order for a Motorized Scooter  Provider patient wants to be referred to(if known):     Provider Phone Number(if known): Additional Information for Provider? Please contact patient when order is   ready.  please advise.  ---------------------------------------------------------------------------  --------------  Johnny MACIEL    [phone]; OK to leave message on voicemail  ---------------------------------------------------------------------------  --------------

## 2023-10-20 NOTE — TELEPHONE ENCOUNTER
I called the patient and verified them by name and date of birth. I informed her on the approval of the order. She stated understanding and would like for us to mail it.

## 2023-10-20 NOTE — TELEPHONE ENCOUNTER
Patient called to confirmed her appointment for 10/25/23 but in the system it was scheduled for 10/4/23 as a no show. Patient would like to reschedule Botox appointment.

## 2023-10-23 ENCOUNTER — TELEPHONE (OUTPATIENT)
Age: 63
End: 2023-10-23

## 2023-10-23 RX ORDER — TOPIRAMATE 100 MG/1
100 TABLET, FILM COATED ORAL 2 TIMES DAILY
Qty: 180 TABLET | Refills: 1 | Status: SHIPPED | OUTPATIENT
Start: 2023-10-23

## 2023-10-23 NOTE — TELEPHONE ENCOUNTER
Called [facility]. [facility] states patient will need a new script. It appears the order has additional refills on it and script was printed but pharmacy is stating they see no refills on it. Will resend script.      LOV: 03/20/23  Last refill: 03/20/23 with 3 refills  Next visit: 01/10/24

## 2023-10-23 NOTE — TELEPHONE ENCOUNTER
Patient called asking for a refill on topiramate 100mg-take 1 tablet twice daily     LOV: 07/12/23    Last refill by Dr. Gary Urena: 10/13/22 with 3 refills as a year supply    Next appt:11/08/23    First started on 04/12/2018 by Dr. Gary Urena (office note)

## 2023-10-24 ENCOUNTER — TELEPHONE (OUTPATIENT)
Age: 63
End: 2023-10-24

## 2023-10-24 NOTE — TELEPHONE ENCOUNTER
Re: botox    Rcvd teams message for [facility] review, looks to be [facility], script was sent yesterday to [facility] and they also confirmed [facility] and sent script there.

## 2023-10-31 SDOH — ECONOMIC STABILITY: FOOD INSECURITY: WITHIN THE PAST 12 MONTHS, YOU WORRIED THAT YOUR FOOD WOULD RUN OUT BEFORE YOU GOT MONEY TO BUY MORE.: NEVER TRUE

## 2023-10-31 SDOH — ECONOMIC STABILITY: FOOD INSECURITY: WITHIN THE PAST 12 MONTHS, THE FOOD YOU BOUGHT JUST DIDN'T LAST AND YOU DIDN'T HAVE MONEY TO GET MORE.: NEVER TRUE

## 2023-10-31 SDOH — ECONOMIC STABILITY: HOUSING INSECURITY
IN THE LAST 12 MONTHS, WAS THERE A TIME WHEN YOU DID NOT HAVE A STEADY PLACE TO SLEEP OR SLEPT IN A SHELTER (INCLUDING NOW)?: NO

## 2023-10-31 SDOH — ECONOMIC STABILITY: TRANSPORTATION INSECURITY
IN THE PAST 12 MONTHS, HAS LACK OF TRANSPORTATION KEPT YOU FROM MEETINGS, WORK, OR FROM GETTING THINGS NEEDED FOR DAILY LIVING?: NO

## 2023-10-31 SDOH — ECONOMIC STABILITY: INCOME INSECURITY: HOW HARD IS IT FOR YOU TO PAY FOR THE VERY BASICS LIKE FOOD, HOUSING, MEDICAL CARE, AND HEATING?: NOT HARD AT ALL

## 2023-11-02 SDOH — HEALTH STABILITY: PHYSICAL HEALTH: ON AVERAGE, HOW MANY DAYS PER WEEK DO YOU ENGAGE IN MODERATE TO STRENUOUS EXERCISE (LIKE A BRISK WALK)?: 2 DAYS

## 2023-11-02 SDOH — HEALTH STABILITY: PHYSICAL HEALTH: ON AVERAGE, HOW MANY MINUTES DO YOU ENGAGE IN EXERCISE AT THIS LEVEL?: 20 MIN

## 2023-11-02 ASSESSMENT — PATIENT HEALTH QUESTIONNAIRE - PHQ9
SUM OF ALL RESPONSES TO PHQ9 QUESTIONS 1 & 2: 0
SUM OF ALL RESPONSES TO PHQ QUESTIONS 1-9: 0
2. FEELING DOWN, DEPRESSED OR HOPELESS: 0
SUM OF ALL RESPONSES TO PHQ QUESTIONS 1-9: 0
1. LITTLE INTEREST OR PLEASURE IN DOING THINGS: 0
SUM OF ALL RESPONSES TO PHQ QUESTIONS 1-9: 0
SUM OF ALL RESPONSES TO PHQ QUESTIONS 1-9: 0

## 2023-11-02 ASSESSMENT — LIFESTYLE VARIABLES
HOW OFTEN DO YOU HAVE A DRINK CONTAINING ALCOHOL: NEVER
HOW OFTEN DO YOU HAVE SIX OR MORE DRINKS ON ONE OCCASION: 1
HOW OFTEN DO YOU HAVE A DRINK CONTAINING ALCOHOL: 1
HOW MANY STANDARD DRINKS CONTAINING ALCOHOL DO YOU HAVE ON A TYPICAL DAY: 0
HOW MANY STANDARD DRINKS CONTAINING ALCOHOL DO YOU HAVE ON A TYPICAL DAY: PATIENT DOES NOT DRINK

## 2023-11-03 ENCOUNTER — OFFICE VISIT (OUTPATIENT)
Facility: CLINIC | Age: 63
End: 2023-11-03

## 2023-11-03 VITALS
TEMPERATURE: 98 F | DIASTOLIC BLOOD PRESSURE: 75 MMHG | BODY MASS INDEX: 39.53 KG/M2 | RESPIRATION RATE: 18 BRPM | HEIGHT: 66 IN | OXYGEN SATURATION: 98 % | WEIGHT: 246 LBS | HEART RATE: 87 BPM | SYSTOLIC BLOOD PRESSURE: 111 MMHG

## 2023-11-03 DIAGNOSIS — I25.10 CORONARY ARTERY DISEASE INVOLVING NATIVE CORONARY ARTERY OF NATIVE HEART WITHOUT ANGINA PECTORIS: ICD-10-CM

## 2023-11-03 DIAGNOSIS — E55.9 VITAMIN D DEFICIENCY: ICD-10-CM

## 2023-11-03 DIAGNOSIS — R73.03 PREDIABETES: ICD-10-CM

## 2023-11-03 DIAGNOSIS — F51.01 PRIMARY INSOMNIA: ICD-10-CM

## 2023-11-03 DIAGNOSIS — E89.0 POSTOPERATIVE HYPOTHYROIDISM: ICD-10-CM

## 2023-11-03 DIAGNOSIS — I10 ESSENTIAL HYPERTENSION: ICD-10-CM

## 2023-11-03 DIAGNOSIS — Z00.00 ENCOUNTER FOR SUBSEQUENT ANNUAL WELLNESS VISIT (AWV) IN MEDICARE PATIENT: Primary | ICD-10-CM

## 2023-11-03 DIAGNOSIS — I69.398 HEMIPARESIS AND ALTERATION OF SENSATIONS AS LATE EFFECTS OF STROKE (HCC): ICD-10-CM

## 2023-11-03 DIAGNOSIS — J44.9 COPD WITHOUT EXACERBATION (HCC): ICD-10-CM

## 2023-11-03 DIAGNOSIS — I69.359 HEMIPARESIS AND ALTERATION OF SENSATIONS AS LATE EFFECTS OF STROKE (HCC): ICD-10-CM

## 2023-11-03 DIAGNOSIS — E78.00 HYPERCHOLESTEROLEMIA: ICD-10-CM

## 2023-11-03 RX ORDER — PREGABALIN 150 MG/1
CAPSULE ORAL
COMMUNITY
Start: 2023-10-25

## 2023-11-03 RX ORDER — TIRZEPATIDE 2.5 MG/.5ML
2.5 INJECTION, SOLUTION SUBCUTANEOUS WEEKLY
Qty: 2 ML | Refills: 2 | Status: SHIPPED | OUTPATIENT
Start: 2023-11-03

## 2023-11-03 RX ORDER — LEVOTHYROXINE SODIUM 0.12 MG/1
125 TABLET ORAL
Qty: 90 TABLET | Refills: 3 | Status: SHIPPED | OUTPATIENT
Start: 2023-11-03

## 2023-11-03 RX ORDER — TEMAZEPAM 15 MG/1
15 CAPSULE ORAL NIGHTLY PRN
Qty: 30 CAPSULE | Refills: 2 | Status: SHIPPED | OUTPATIENT
Start: 2023-11-03 | End: 2024-02-01

## 2023-11-03 NOTE — PROGRESS NOTES
Medicare Annual Wellness Visit    Fahad Reyna is here for Medicare AWV    Assessment & Plan   Encounter for subsequent annual wellness visit (AWV) in Medicare patient  Essential hypertension  Controlled with lasix, losartan, and coreg. BP normal, labs looking good last check  Coronary artery disease involving native coronary artery of native heart without angina pectoris  Unchanged  COPD without exacerbation (HCC)  Unchanged  Postoperative hypothyroidism  Refilled Synthroid. Pt has not been taking it in 2 months. Will recheck labs in 4 months at follow up  Hemiparesis and alteration of sensations as late effects of stroke (HCC)  Unchanged  Hypercholesterolemia  Controlled with Lipitor  Vitamin D deficiency  Unchanged  Prediabetes  Unchanged, for weight loss improvement switch from ozempic to mounjaro 2.5, can increase monthly as tolerated   Primary insomnia  -     temazepam (RESTORIL) 15 MG capsule; Take 1 capsule by mouth nightly as needed for Sleep for up to 90 days. Max Daily Amount: 15 mg, Disp-30 capsule, R-2Normal  Not pleased with seroquel, feels tired and drainaged all the time. Wishes to try something new. Did not do well with trazodone or ambien. Can continue seeing psychiatry. Not to use alongside pain medication for risk of medication     Recommendations for Preventive Services Due: see orders and patient instructions/AVS.  Recommended screening schedule for the next 5-10 years is provided to the patient in written form: see Patient Instructions/AVS.     No follow-ups on file. Subjective     Pt here for Medicare wellness visit. Bp elevated today, pt been checking at home getting 150/80s. Pt compliant with meds. Pt has been feeling more stress/anxiety lately around missing prescription and insurance issues with getting a stair lift. Pt thinks higher BP is attributed to that. Pt sees psychiatrist, has been taking hydroxyzine for anxiety and does not notice improvement.  Psych gave prescription

## 2023-11-08 ENCOUNTER — PROCEDURE VISIT (OUTPATIENT)
Age: 63
End: 2023-11-08

## 2023-11-08 VITALS
SYSTOLIC BLOOD PRESSURE: 124 MMHG | RESPIRATION RATE: 16 BRPM | DIASTOLIC BLOOD PRESSURE: 72 MMHG | BODY MASS INDEX: 39.53 KG/M2 | HEIGHT: 66 IN | WEIGHT: 246 LBS

## 2023-11-08 DIAGNOSIS — G43.709 CHRONIC MIGRAINE WITHOUT AURA, NOT INTRACTABLE, WITHOUT STATUS MIGRAINOSUS: Primary | ICD-10-CM

## 2023-11-08 NOTE — PROGRESS NOTES
Botox Injection Note       Indication: patient has chronic recurrent migraine, more than 15 days/ month with duration longer than 4 hours. Tried many preventives , no avail. Previous injections have helped her significantly. She now rarely gets a migraine and duration is less than 2 hours. Procedure:   Botox concentration: 200 units in 4 ml of preservative-free normal saline. 31 sites injections, distribution as follow      Units/site  Sites Sides Subtotal    Procerus 5 1 1 5    5 1 2 10   Frontalis 5 2 2 20   Temporalis 5 4 2 40   Occipitalis 5 3 2 30   Upper cervical paraspinalis 5 2 2 20   Trapezius 5 3 2 30         200 units Botox were reconstituted, 155 units injected as above and the remainder was unavoidably wasted.      Patient tolerated procedure well.     _____________________________   Lilliana Hickman M.D.

## 2023-11-21 DIAGNOSIS — E78.00 PURE HYPERCHOLESTEROLEMIA, UNSPECIFIED: Primary | ICD-10-CM

## 2023-11-21 RX ORDER — TIRZEPATIDE 2.5 MG/.5ML
2.5 INJECTION, SOLUTION SUBCUTANEOUS WEEKLY
Qty: 2 ML | Refills: 2 | Status: SHIPPED | OUTPATIENT
Start: 2023-11-21

## 2023-11-21 NOTE — TELEPHONE ENCOUNTER
PCP: Brian Menon PA-C     Last appt:  11/3/2023      Future Appointments   Date Time Provider [address]   11/24/2023  2:30 PM [facility]   2/7/2024  9:00 AM MD MARQUIS Christian BS AMB   3/8/2024 10:30 AM Brian Menon PA-C [facility] BS AMB   8/16/2024  9:00 AM MD KEON Snowden  AMB          Requested Prescriptions     Pending Prescriptions Disp Refills    Tirzepatide (MOUNJARO) 2.5 MG/0.5ML SOPN SC injection 2 mL 2     Sig: Inject 0.5 mLs into the skin once a week

## 2023-11-22 RX ORDER — ATORVASTATIN CALCIUM 40 MG/1
40 TABLET, FILM COATED ORAL DAILY
Qty: 90 TABLET | Refills: 0 | Status: SHIPPED | OUTPATIENT
Start: 2023-11-22 | End: 2023-11-22 | Stop reason: SDUPTHER

## 2023-11-22 RX ORDER — ATORVASTATIN CALCIUM 40 MG/1
40 TABLET, FILM COATED ORAL DAILY
Qty: 90 TABLET | Refills: 3 | Status: SHIPPED | OUTPATIENT
Start: 2023-11-22

## 2023-11-22 NOTE — TELEPHONE ENCOUNTER
Requested Prescriptions     Signed Prescriptions Disp Refills    atorvastatin (LIPITOR) 40 MG tablet 90 tablet 3     Sig: Take 1 tablet by mouth daily     Authorizing Provider: [facility]     Ordering User: Madi     Per Dr. Frank Matthews verbal order.

## 2023-11-24 ENCOUNTER — HOSPITAL ENCOUNTER (OUTPATIENT)
Facility: HOSPITAL | Age: 63
End: 2023-11-24
Payer: MEDICARE

## 2023-11-24 VITALS — WEIGHT: 246 LBS | HEIGHT: 66 IN | BODY MASS INDEX: 39.53 KG/M2

## 2023-11-24 DIAGNOSIS — Z12.31 SCREENING MAMMOGRAM FOR HIGH-RISK PATIENT: ICD-10-CM

## 2023-11-24 PROCEDURE — 77067 SCR MAMMO BI INCL CAD: CPT

## 2023-12-01 ENCOUNTER — TELEPHONE (OUTPATIENT)
Facility: CLINIC | Age: 63
End: 2023-12-01

## 2023-12-01 RX ORDER — TIRZEPATIDE 5 MG/.5ML
5 INJECTION, SOLUTION SUBCUTANEOUS WEEKLY
Qty: 2 ML | Refills: 1 | Status: SHIPPED | OUTPATIENT
Start: 2023-12-01

## 2023-12-29 ENCOUNTER — APPOINTMENT (OUTPATIENT)
Facility: HOSPITAL | Age: 63
DRG: 698 | End: 2023-12-29
Payer: MEDICARE

## 2023-12-29 ENCOUNTER — HOSPITAL ENCOUNTER (INPATIENT)
Facility: HOSPITAL | Age: 63
LOS: 4 days | Discharge: NURSING, REHAB OR LONG-TERM CARE | DRG: 698 | End: 2024-01-03
Attending: STUDENT IN AN ORGANIZED HEALTH CARE EDUCATION/TRAINING PROGRAM | Admitting: FAMILY MEDICINE
Payer: MEDICARE

## 2023-12-29 DIAGNOSIS — N39.0 URINARY TRACT INFECTION WITHOUT HEMATURIA, SITE UNSPECIFIED: ICD-10-CM

## 2023-12-29 DIAGNOSIS — U07.1 COVID-19: Primary | ICD-10-CM

## 2023-12-29 DIAGNOSIS — R62.7 FAILURE TO THRIVE IN ADULT: ICD-10-CM

## 2023-12-29 DIAGNOSIS — A41.9 SEPTICEMIA (HCC): ICD-10-CM

## 2023-12-29 LAB
ALBUMIN SERPL-MCNC: 4 G/DL (ref 3.5–5)
ALBUMIN/GLOB SERPL: 0.9 (ref 1.1–2.2)
ALP SERPL-CCNC: 154 U/L (ref 45–117)
ALT SERPL-CCNC: 45 U/L (ref 12–78)
ANION GAP SERPL CALC-SCNC: 3 MMOL/L (ref 5–15)
APPEARANCE UR: ABNORMAL
AST SERPL-CCNC: 78 U/L (ref 15–37)
BACTERIA URNS QL MICRO: ABNORMAL /HPF
BASOPHILS # BLD: 0 K/UL (ref 0–0.1)
BASOPHILS NFR BLD: 0 % (ref 0–1)
BILIRUB SERPL-MCNC: 0.6 MG/DL (ref 0.2–1)
BILIRUB UR QL: NEGATIVE
BUN SERPL-MCNC: 30 MG/DL (ref 6–20)
BUN/CREAT SERPL: 22 (ref 12–20)
CALCIUM SERPL-MCNC: 8.9 MG/DL (ref 8.5–10.1)
CHLORIDE SERPL-SCNC: 111 MMOL/L (ref 97–108)
CO2 SERPL-SCNC: 25 MMOL/L (ref 21–32)
COLOR UR: ABNORMAL
COMMENT:: NORMAL
COMMENT:: PRESENT
CREAT SERPL-MCNC: 1.35 MG/DL (ref 0.55–1.02)
DIFFERENTIAL METHOD BLD: ABNORMAL
EOSINOPHIL # BLD: 0 K/UL (ref 0–0.4)
EOSINOPHIL NFR BLD: 0 % (ref 0–7)
EPITH CASTS URNS QL MICRO: ABNORMAL /LPF
ERYTHROCYTE [DISTWIDTH] IN BLOOD BY AUTOMATED COUNT: 13.4 % (ref 11.5–14.5)
FLUAV AG NPH QL IA: NEGATIVE
FLUBV AG NOSE QL IA: NEGATIVE
GLOBULIN SER CALC-MCNC: 4.4 G/DL (ref 2–4)
GLUCOSE SERPL-MCNC: 98 MG/DL (ref 65–100)
GLUCOSE UR STRIP.AUTO-MCNC: NEGATIVE MG/DL
HCT VFR BLD AUTO: 45 % (ref 35–47)
HGB BLD-MCNC: 14 G/DL (ref 11.5–16)
HGB UR QL STRIP: ABNORMAL
IMM GRANULOCYTES # BLD AUTO: 0 K/UL (ref 0–0.04)
IMM GRANULOCYTES NFR BLD AUTO: 0 % (ref 0–0.5)
KETONES UR QL STRIP.AUTO: NEGATIVE MG/DL
LACTATE SERPL-SCNC: 1.4 MMOL/L (ref 0.4–2)
LEUKOCYTE ESTERASE UR QL STRIP.AUTO: ABNORMAL
LIPASE SERPL-CCNC: 48 U/L (ref 13–75)
LYMPHOCYTES # BLD: 0.4 K/UL (ref 0.8–3.5)
LYMPHOCYTES NFR BLD: 5 % (ref 12–49)
MCH RBC QN AUTO: 28.9 PG (ref 26–34)
MCHC RBC AUTO-ENTMCNC: 31.1 G/DL (ref 30–36.5)
MCV RBC AUTO: 93 FL (ref 80–99)
MONOCYTES # BLD: 0.6 K/UL (ref 0–1)
MONOCYTES NFR BLD: 8 % (ref 5–13)
NEUTS SEG # BLD: 7 K/UL (ref 1.8–8)
NEUTS SEG NFR BLD: 87 % (ref 32–75)
NITRITE UR QL STRIP.AUTO: POSITIVE
NRBC # BLD: 0 K/UL (ref 0–0.01)
NRBC BLD-RTO: 0 PER 100 WBC
PH UR STRIP: 5.5 (ref 5–8)
PLATELET # BLD AUTO: 189 K/UL (ref 150–400)
PMV BLD AUTO: 10.8 FL (ref 8.9–12.9)
POTASSIUM SERPL-SCNC: 4.8 MMOL/L (ref 3.5–5.1)
PROT SERPL-MCNC: 8.4 G/DL (ref 6.4–8.2)
PROT UR STRIP-MCNC: 30 MG/DL
RBC # BLD AUTO: 4.84 M/UL (ref 3.8–5.2)
RBC #/AREA URNS HPF: ABNORMAL /HPF (ref 0–5)
RBC MORPH BLD: ABNORMAL
RBC MORPH BLD: ABNORMAL
SARS-COV-2 RDRP RESP QL NAA+PROBE: DETECTED
SODIUM SERPL-SCNC: 139 MMOL/L (ref 136–145)
SOURCE: ABNORMAL
SP GR UR REFRACTOMETRY: 1.02 (ref 1–1.03)
SPECIMEN HOLD: NORMAL
SPECIMEN HOLD: NORMAL
UROBILINOGEN UR QL STRIP.AUTO: 0.2 EU/DL (ref 0.2–1)
WBC # BLD AUTO: 8 K/UL (ref 3.6–11)
WBC URNS QL MICRO: ABNORMAL /HPF (ref 0–4)

## 2023-12-29 PROCEDURE — 71045 X-RAY EXAM CHEST 1 VIEW: CPT

## 2023-12-29 PROCEDURE — 72125 CT NECK SPINE W/O DYE: CPT

## 2023-12-29 PROCEDURE — 36415 COLL VENOUS BLD VENIPUNCTURE: CPT

## 2023-12-29 PROCEDURE — 83690 ASSAY OF LIPASE: CPT

## 2023-12-29 PROCEDURE — 87635 SARS-COV-2 COVID-19 AMP PRB: CPT

## 2023-12-29 PROCEDURE — 73030 X-RAY EXAM OF SHOULDER: CPT

## 2023-12-29 PROCEDURE — 87040 BLOOD CULTURE FOR BACTERIA: CPT

## 2023-12-29 PROCEDURE — 87086 URINE CULTURE/COLONY COUNT: CPT

## 2023-12-29 PROCEDURE — 72170 X-RAY EXAM OF PELVIS: CPT

## 2023-12-29 PROCEDURE — 96374 THER/PROPH/DIAG INJ IV PUSH: CPT

## 2023-12-29 PROCEDURE — 83605 ASSAY OF LACTIC ACID: CPT

## 2023-12-29 PROCEDURE — 70450 CT HEAD/BRAIN W/O DYE: CPT

## 2023-12-29 PROCEDURE — 99285 EMERGENCY DEPT VISIT HI MDM: CPT

## 2023-12-29 PROCEDURE — 81001 URINALYSIS AUTO W/SCOPE: CPT

## 2023-12-29 PROCEDURE — 6370000000 HC RX 637 (ALT 250 FOR IP): Performed by: STUDENT IN AN ORGANIZED HEALTH CARE EDUCATION/TRAINING PROGRAM

## 2023-12-29 PROCEDURE — 85025 COMPLETE CBC W/AUTO DIFF WBC: CPT

## 2023-12-29 PROCEDURE — 80053 COMPREHEN METABOLIC PANEL: CPT

## 2023-12-29 PROCEDURE — 87804 INFLUENZA ASSAY W/OPTIC: CPT

## 2023-12-29 RX ORDER — ACETAMINOPHEN 325 MG/1
650 TABLET ORAL EVERY 4 HOURS PRN
Status: DISCONTINUED | OUTPATIENT
Start: 2023-12-29 | End: 2023-12-30 | Stop reason: DRUGHIGH

## 2023-12-29 RX ADMIN — ACETAMINOPHEN 650 MG: 325 TABLET ORAL at 23:14

## 2023-12-29 ASSESSMENT — PAIN DESCRIPTION - LOCATION: LOCATION: SHOULDER

## 2023-12-29 ASSESSMENT — PAIN DESCRIPTION - PAIN TYPE: TYPE: ACUTE PAIN

## 2023-12-29 ASSESSMENT — PAIN DESCRIPTION - DESCRIPTORS: DESCRIPTORS: ACHING

## 2023-12-29 ASSESSMENT — PAIN DESCRIPTION - ORIENTATION: ORIENTATION: LEFT

## 2023-12-29 ASSESSMENT — PAIN - FUNCTIONAL ASSESSMENT: PAIN_FUNCTIONAL_ASSESSMENT: 0-10

## 2023-12-29 ASSESSMENT — PAIN SCALES - GENERAL: PAINLEVEL_OUTOF10: 9

## 2023-12-30 PROBLEM — U07.1 COVID-19: Status: ACTIVE | Noted: 2023-12-30

## 2023-12-30 PROCEDURE — 6360000002 HC RX W HCPCS: Performed by: PHYSICIAN ASSISTANT

## 2023-12-30 PROCEDURE — 6370000000 HC RX 637 (ALT 250 FOR IP): Performed by: PHYSICIAN ASSISTANT

## 2023-12-30 PROCEDURE — 2580000003 HC RX 258: Performed by: PHYSICIAN ASSISTANT

## 2023-12-30 PROCEDURE — 1100000000 HC RM PRIVATE

## 2023-12-30 PROCEDURE — 2060000000 HC ICU INTERMEDIATE R&B

## 2023-12-30 PROCEDURE — 2580000003 HC RX 258: Performed by: STUDENT IN AN ORGANIZED HEALTH CARE EDUCATION/TRAINING PROGRAM

## 2023-12-30 PROCEDURE — 6360000002 HC RX W HCPCS: Performed by: STUDENT IN AN ORGANIZED HEALTH CARE EDUCATION/TRAINING PROGRAM

## 2023-12-30 RX ORDER — ACETAMINOPHEN 325 MG/1
650 TABLET ORAL EVERY 6 HOURS PRN
Status: DISCONTINUED | OUTPATIENT
Start: 2023-12-30 | End: 2024-01-03 | Stop reason: HOSPADM

## 2023-12-30 RX ORDER — ACETAMINOPHEN 650 MG/1
650 SUPPOSITORY RECTAL EVERY 6 HOURS PRN
Status: DISCONTINUED | OUTPATIENT
Start: 2023-12-30 | End: 2024-01-03 | Stop reason: HOSPADM

## 2023-12-30 RX ORDER — SODIUM CHLORIDE, SODIUM LACTATE, POTASSIUM CHLORIDE, CALCIUM CHLORIDE 600; 310; 30; 20 MG/100ML; MG/100ML; MG/100ML; MG/100ML
INJECTION, SOLUTION INTRAVENOUS CONTINUOUS
Status: DISCONTINUED | OUTPATIENT
Start: 2023-12-30 | End: 2023-12-31

## 2023-12-30 RX ORDER — ATORVASTATIN CALCIUM 40 MG/1
40 TABLET, FILM COATED ORAL DAILY
Status: DISCONTINUED | OUTPATIENT
Start: 2023-12-30 | End: 2024-01-03 | Stop reason: HOSPADM

## 2023-12-30 RX ORDER — PANTOPRAZOLE SODIUM 40 MG/1
40 TABLET, DELAYED RELEASE ORAL DAILY
Status: DISCONTINUED | OUTPATIENT
Start: 2023-12-30 | End: 2024-01-03 | Stop reason: HOSPADM

## 2023-12-30 RX ORDER — SODIUM CHLORIDE 0.9 % (FLUSH) 0.9 %
5-40 SYRINGE (ML) INJECTION PRN
Status: DISCONTINUED | OUTPATIENT
Start: 2023-12-30 | End: 2024-01-03 | Stop reason: HOSPADM

## 2023-12-30 RX ORDER — GUAIFENESIN/DEXTROMETHORPHAN 100-10MG/5
5 SYRUP ORAL EVERY 4 HOURS PRN
Status: DISCONTINUED | OUTPATIENT
Start: 2023-12-30 | End: 2024-01-03 | Stop reason: HOSPADM

## 2023-12-30 RX ORDER — ONDANSETRON 2 MG/ML
4 INJECTION INTRAMUSCULAR; INTRAVENOUS EVERY 6 HOURS PRN
Status: DISCONTINUED | OUTPATIENT
Start: 2023-12-30 | End: 2024-01-03 | Stop reason: HOSPADM

## 2023-12-30 RX ORDER — POLYETHYLENE GLYCOL 3350 17 G/17G
17 POWDER, FOR SOLUTION ORAL DAILY PRN
Status: DISCONTINUED | OUTPATIENT
Start: 2023-12-30 | End: 2024-01-03 | Stop reason: HOSPADM

## 2023-12-30 RX ORDER — SODIUM CHLORIDE 0.9 % (FLUSH) 0.9 %
5-40 SYRINGE (ML) INJECTION EVERY 12 HOURS SCHEDULED
Status: DISCONTINUED | OUTPATIENT
Start: 2023-12-30 | End: 2024-01-03 | Stop reason: HOSPADM

## 2023-12-30 RX ORDER — LEVOTHYROXINE SODIUM 0.12 MG/1
125 TABLET ORAL
Status: DISCONTINUED | OUTPATIENT
Start: 2023-12-31 | End: 2024-01-03 | Stop reason: HOSPADM

## 2023-12-30 RX ORDER — MONTELUKAST SODIUM 10 MG/1
10 TABLET ORAL DAILY
Status: DISCONTINUED | OUTPATIENT
Start: 2023-12-30 | End: 2024-01-03 | Stop reason: HOSPADM

## 2023-12-30 RX ORDER — SODIUM CHLORIDE 9 MG/ML
INJECTION, SOLUTION INTRAVENOUS PRN
Status: DISCONTINUED | OUTPATIENT
Start: 2023-12-30 | End: 2024-01-03 | Stop reason: HOSPADM

## 2023-12-30 RX ORDER — ASPIRIN 81 MG/1
81 TABLET ORAL DAILY
Status: DISCONTINUED | OUTPATIENT
Start: 2023-12-30 | End: 2024-01-03 | Stop reason: HOSPADM

## 2023-12-30 RX ORDER — BENZONATATE 100 MG/1
100 CAPSULE ORAL 3 TIMES DAILY PRN
Status: DISCONTINUED | OUTPATIENT
Start: 2023-12-30 | End: 2024-01-03 | Stop reason: HOSPADM

## 2023-12-30 RX ORDER — POTASSIUM CHLORIDE 7.45 MG/ML
10 INJECTION INTRAVENOUS PRN
Status: DISCONTINUED | OUTPATIENT
Start: 2023-12-30 | End: 2024-01-03 | Stop reason: HOSPADM

## 2023-12-30 RX ORDER — IPRATROPIUM BROMIDE AND ALBUTEROL SULFATE 2.5; .5 MG/3ML; MG/3ML
1 SOLUTION RESPIRATORY (INHALATION) EVERY 4 HOURS PRN
Status: DISCONTINUED | OUTPATIENT
Start: 2023-12-30 | End: 2023-12-31

## 2023-12-30 RX ORDER — ENOXAPARIN SODIUM 100 MG/ML
30 INJECTION SUBCUTANEOUS 2 TIMES DAILY
Status: DISCONTINUED | OUTPATIENT
Start: 2023-12-30 | End: 2024-01-03 | Stop reason: HOSPADM

## 2023-12-30 RX ORDER — FLUTICASONE PROPIONATE 50 MCG
1 SPRAY, SUSPENSION (ML) NASAL DAILY
Status: DISCONTINUED | OUTPATIENT
Start: 2023-12-30 | End: 2024-01-03 | Stop reason: HOSPADM

## 2023-12-30 RX ORDER — POTASSIUM CHLORIDE 750 MG/1
40 TABLET, FILM COATED, EXTENDED RELEASE ORAL PRN
Status: DISCONTINUED | OUTPATIENT
Start: 2023-12-30 | End: 2024-01-03 | Stop reason: HOSPADM

## 2023-12-30 RX ORDER — MAGNESIUM SULFATE IN WATER 40 MG/ML
2000 INJECTION, SOLUTION INTRAVENOUS PRN
Status: DISCONTINUED | OUTPATIENT
Start: 2023-12-30 | End: 2024-01-03 | Stop reason: HOSPADM

## 2023-12-30 RX ORDER — DULOXETIN HYDROCHLORIDE 60 MG/1
120 CAPSULE, DELAYED RELEASE ORAL DAILY
Status: DISCONTINUED | OUTPATIENT
Start: 2023-12-30 | End: 2024-01-03 | Stop reason: HOSPADM

## 2023-12-30 RX ORDER — ONDANSETRON 4 MG/1
4 TABLET, ORALLY DISINTEGRATING ORAL EVERY 8 HOURS PRN
Status: DISCONTINUED | OUTPATIENT
Start: 2023-12-30 | End: 2024-01-03 | Stop reason: HOSPADM

## 2023-12-30 RX ADMIN — MONTELUKAST 10 MG: 10 TABLET, FILM COATED ORAL at 11:59

## 2023-12-30 RX ADMIN — ATORVASTATIN CALCIUM 40 MG: 10 TABLET, FILM COATED ORAL at 11:59

## 2023-12-30 RX ADMIN — ACETAMINOPHEN 650 MG: 325 TABLET ORAL at 17:13

## 2023-12-30 RX ADMIN — WATER 1000 MG: 1 INJECTION INTRAMUSCULAR; INTRAVENOUS; SUBCUTANEOUS at 00:21

## 2023-12-30 RX ADMIN — DULOXETINE HYDROCHLORIDE 120 MG: 60 CAPSULE, DELAYED RELEASE ORAL at 11:59

## 2023-12-30 RX ADMIN — ENOXAPARIN SODIUM 30 MG: 100 INJECTION SUBCUTANEOUS at 22:10

## 2023-12-30 RX ADMIN — SODIUM CHLORIDE, PRESERVATIVE FREE 10 ML: 5 INJECTION INTRAVENOUS at 23:58

## 2023-12-30 RX ADMIN — ENOXAPARIN SODIUM 30 MG: 100 INJECTION SUBCUTANEOUS at 11:59

## 2023-12-30 RX ADMIN — WATER 1000 MG: 1 INJECTION INTRAMUSCULAR; INTRAVENOUS; SUBCUTANEOUS at 22:09

## 2023-12-30 RX ADMIN — SODIUM CHLORIDE, POTASSIUM CHLORIDE, SODIUM LACTATE AND CALCIUM CHLORIDE: 600; 310; 30; 20 INJECTION, SOLUTION INTRAVENOUS at 12:10

## 2023-12-30 RX ADMIN — PANTOPRAZOLE SODIUM 40 MG: 40 TABLET, DELAYED RELEASE ORAL at 11:59

## 2023-12-30 RX ADMIN — ASPIRIN 81 MG: 81 TABLET, COATED ORAL at 11:59

## 2023-12-30 ASSESSMENT — PAIN DESCRIPTION - LOCATION: LOCATION: SHOULDER;LEG

## 2023-12-30 ASSESSMENT — PAIN SCALES - GENERAL: PAINLEVEL_OUTOF10: 7

## 2023-12-30 NOTE — ED NOTES
Bedside and Verbal shift change report given to Jerrod RN (oncoming nurse) by Wilbert RN (offgoing nurse). Report included the following information Nurse Handoff Report, ED SBAR, MAR, and Recent Results.

## 2023-12-30 NOTE — ED PROVIDER NOTES
[facility] EMERGENCY DEP  EMERGENCY DEPARTMENT ENCOUNTER      Pt Name: Deepthi Kessler  MRN: [id number]  Birthdate 1960  Date of evaluation: 12/29/2023  Provider: Mariposa Kapoor DO    CHIEF COMPLAINT       Chief Complaint   Patient presents with    Fall     Patient to ED from home via EMS with complaint of unwitnessed fall into \"debris\" according to EMS. Patient reports no injury and takes aspirin. Patient arrives to ED with chronic miller. Patient  alert oriented to self and situation not time.       [facility]   Past Medical History:   Diagnosis Date    Advanced care planning/counseling discussion 03/04/2016    On File    Bronchitis 02/24/2014    Cervicalgia 08/18/2015    Dr. JENISE Khan    Chest pain 05/25/2015    Hospitalized at [facility] 5/25/15 (lab work negative)    Chronic indwelling Miller catheter 01/24/2018    Initially placed Jan 2018. Mx by Dr. Venkatesh Arredondo.     Congestive heart failure, unspecified     Last Echo 2/8/15: EF 55-60%    Constipation 06/13/2017    Enthesopathy, spinal (HCC) 08/18/2015    Dr. JENISE Khan    Essential hypertension     Foot drop 08/18/2015    Dr. JENISE Khan    Heart attack (HCC) 02/24/2013    Was supposed to See Cardiology for possible pacemaker in november 2014- After Cardiology consult locally, no need, EF greatly improved. Established with Dr. Liriano     Hiatal hernia 06/2015    3 cm hiatal hernia     Hip pain 08/18/2015    Dr. JENISE Khan    Hypercholesterolemia     Hyperglycemia 07/2015    A1c 5.9     Hyperlipidemia 06/30/2015    NMR lipoprofile- LDL P 997, LDL-c 71, HLD-C-39, TG-60, HLD-P (25.2), Small LDL-P -541, LDL size 20.6    Hypothyroid     Insomnia 06/13/2017    Lower extremity edema     Lumbar spondylosis 08/18/2015    Dr. JENISE Khan    Melena 06/2015    EGD/Colonscopy 6/15- Gastritis, internal hemorrhoids and 3 polyps    Menopause     Murmur     SAIRA on CPAP     Was referred to Pulmonology - Uses CPAP    Osteoarthritis of hip 08/18/2015    Dr. JENISE Khan    Osteoarthritis, shoulder 08/18/2015

## 2023-12-30 NOTE — ED NOTES
Patient presents to ED via EMS with complaint of fall in debris. Patient reports left sided defecit from previous stroke. Patient has fever 100.6 in triage. Patient mildly confused.

## 2023-12-30 NOTE — H&P
History and Physical    Date of Service:  12/30/2023  Primary Care Provider: Mayco Teixeira PA-C  Source of information: Patient, Chart review    Chief Complaint: Fall (Patient to ED from home via EMS with complaint of unwitnessed fall into \"debris\" according to EMS. Patient reports no injury and takes aspirin. Patient arrives to ED with chronic miller. Patient  alert oriented to self and situation not time.)      History of Presenting Illness:   Deepthi Kessler is a 63 y.o. female with a PMHx of HTN, HLD, prediabetes, Hypothyroidism, SAIRA on CPAP, CAD, COPD, CVA with L sided hemiparesis and chronic suprapubic catheter (last exchanged ~2 weeks ago)    She presented to the ED with a chief complaint of  generalized weakness and fall. She reports a fall at home while she was attempting to grab her hat from a nightstand. She was too weak to get up and had to wait approximately 45 minutes for her sister to help her up. Notably she has residual L sided weakness from a CVA. She denies any head or neck trauma or any blood thinners. In the ED she was noted to have persistent fever and tested positive for COVID-19 infection. She currently denies any shortness of breath, cough, congestion, or chest pain. Her UA was concerning for UTI, she does have a chronic suprapubic catheter which was exchanged about 2 weeks ago. She follows with urology here at [facility]. She denies any abdominal pain, N/V/D.    ED Workup  - COVID-19: positive  - UA: +nitrite, +leuk esterase, 1+ bacteria  - CT head: no acute findings  - CT C-spine: no acute fx or subluxation  - CXR: no acute procecss  - Xray hips pending     REVIEW OF SYSTEMS:  A comprehensive review of systems was negative except for that written in the History of Present Illness.     Past Medical History:   Diagnosis Date    Advanced care planning/counseling discussion 03/04/2016    On File    Bronchitis 02/24/2014    Cervicalgia 08/18/2015    Dr. JENISE Khan    Chest pain 05/25/2015

## 2023-12-31 LAB
BACTERIA SPEC CULT: NORMAL
CC UR VC: NORMAL
SERVICE CMNT-IMP: NORMAL

## 2023-12-31 PROCEDURE — 97530 THERAPEUTIC ACTIVITIES: CPT

## 2023-12-31 PROCEDURE — 6360000002 HC RX W HCPCS: Performed by: PHYSICIAN ASSISTANT

## 2023-12-31 PROCEDURE — 94760 N-INVAS EAR/PLS OXIMETRY 1: CPT

## 2023-12-31 PROCEDURE — 97166 OT EVAL MOD COMPLEX 45 MIN: CPT

## 2023-12-31 PROCEDURE — 2060000000 HC ICU INTERMEDIATE R&B

## 2023-12-31 PROCEDURE — 6370000000 HC RX 637 (ALT 250 FOR IP): Performed by: PHYSICIAN ASSISTANT

## 2023-12-31 PROCEDURE — 2580000003 HC RX 258: Performed by: PHYSICIAN ASSISTANT

## 2023-12-31 RX ORDER — IPRATROPIUM BROMIDE AND ALBUTEROL SULFATE 2.5; .5 MG/3ML; MG/3ML
1 SOLUTION RESPIRATORY (INHALATION) EVERY 4 HOURS PRN
Status: DISCONTINUED | OUTPATIENT
Start: 2023-12-31 | End: 2024-01-03 | Stop reason: HOSPADM

## 2023-12-31 RX ADMIN — ACETAMINOPHEN 650 MG: 325 TABLET ORAL at 06:39

## 2023-12-31 RX ADMIN — ATORVASTATIN CALCIUM 40 MG: 10 TABLET, FILM COATED ORAL at 08:37

## 2023-12-31 RX ADMIN — SODIUM CHLORIDE, PRESERVATIVE FREE 10 ML: 5 INJECTION INTRAVENOUS at 21:12

## 2023-12-31 RX ADMIN — WATER 1000 MG: 1 INJECTION INTRAMUSCULAR; INTRAVENOUS; SUBCUTANEOUS at 23:30

## 2023-12-31 RX ADMIN — ASPIRIN 81 MG: 81 TABLET, COATED ORAL at 08:37

## 2023-12-31 RX ADMIN — MONTELUKAST 10 MG: 10 TABLET, FILM COATED ORAL at 08:37

## 2023-12-31 RX ADMIN — ACETAMINOPHEN 650 MG: 325 TABLET ORAL at 13:52

## 2023-12-31 RX ADMIN — ENOXAPARIN SODIUM 30 MG: 100 INJECTION SUBCUTANEOUS at 21:11

## 2023-12-31 RX ADMIN — PANTOPRAZOLE SODIUM 40 MG: 40 TABLET, DELAYED RELEASE ORAL at 08:38

## 2023-12-31 RX ADMIN — ENOXAPARIN SODIUM 30 MG: 100 INJECTION SUBCUTANEOUS at 08:37

## 2023-12-31 RX ADMIN — FLUTICASONE PROPIONATE 1 SPRAY: 50 SPRAY, METERED NASAL at 08:38

## 2023-12-31 RX ADMIN — DULOXETINE HYDROCHLORIDE 120 MG: 60 CAPSULE, DELAYED RELEASE ORAL at 08:37

## 2023-12-31 RX ADMIN — LEVOTHYROXINE SODIUM 125 MCG: 0.12 TABLET ORAL at 06:39

## 2023-12-31 ASSESSMENT — PAIN DESCRIPTION - LOCATION
LOCATION: SHOULDER
LOCATION: HEAD

## 2023-12-31 ASSESSMENT — PAIN SCALES - GENERAL
PAINLEVEL_OUTOF10: 0
PAINLEVEL_OUTOF10: 7
PAINLEVEL_OUTOF10: 0
PAINLEVEL_OUTOF10: 3
PAINLEVEL_OUTOF10: 0

## 2023-12-31 ASSESSMENT — PAIN DESCRIPTION - DESCRIPTORS
DESCRIPTORS: SORE
DESCRIPTORS: ACHING

## 2023-12-31 ASSESSMENT — PAIN DESCRIPTION - ORIENTATION: ORIENTATION: RIGHT

## 2023-12-31 NOTE — ED NOTES
TRANSFER - OUT REPORT:    Verbal report given to Harleen harry on Deepthi Kessler  being transferred to  for routine progression of patient care       Report consisted of patient's Situation, Background, Assessment and   Recommendations(SBAR).     Information from the following report(s) Nurse Handoff Report, Index, ED SBAR, and MAR was reviewed with the receiving nurse.    Kinder Fall Assessment:                           Lines:   Peripheral IV 12/29/23 Right Antecubital (Active)        Opportunity for questions and clarification was provided.      Patient transported with:  Registered Nurse

## 2024-01-01 LAB
ANION GAP SERPL CALC-SCNC: 5 MMOL/L (ref 5–15)
BUN SERPL-MCNC: 12 MG/DL (ref 6–20)
BUN/CREAT SERPL: 14 (ref 12–20)
CALCIUM SERPL-MCNC: 7.9 MG/DL (ref 8.5–10.1)
CHLORIDE SERPL-SCNC: 117 MMOL/L (ref 97–108)
CO2 SERPL-SCNC: 21 MMOL/L (ref 21–32)
CREAT SERPL-MCNC: 0.86 MG/DL (ref 0.55–1.02)
GLUCOSE SERPL-MCNC: 101 MG/DL (ref 65–100)
POTASSIUM SERPL-SCNC: 3.4 MMOL/L (ref 3.5–5.1)
SODIUM SERPL-SCNC: 143 MMOL/L (ref 136–145)

## 2024-01-01 PROCEDURE — 36415 COLL VENOUS BLD VENIPUNCTURE: CPT

## 2024-01-01 PROCEDURE — 97530 THERAPEUTIC ACTIVITIES: CPT

## 2024-01-01 PROCEDURE — 2580000003 HC RX 258: Performed by: PHYSICIAN ASSISTANT

## 2024-01-01 PROCEDURE — 6360000002 HC RX W HCPCS: Performed by: PHYSICIAN ASSISTANT

## 2024-01-01 PROCEDURE — 2060000000 HC ICU INTERMEDIATE R&B

## 2024-01-01 PROCEDURE — 6370000000 HC RX 637 (ALT 250 FOR IP): Performed by: PHYSICIAN ASSISTANT

## 2024-01-01 PROCEDURE — 80048 BASIC METABOLIC PNL TOTAL CA: CPT

## 2024-01-01 PROCEDURE — 97116 GAIT TRAINING THERAPY: CPT

## 2024-01-01 PROCEDURE — 97162 PT EVAL MOD COMPLEX 30 MIN: CPT

## 2024-01-01 RX ADMIN — LEVOTHYROXINE SODIUM 125 MCG: 0.12 TABLET ORAL at 06:15

## 2024-01-01 RX ADMIN — ENOXAPARIN SODIUM 30 MG: 100 INJECTION SUBCUTANEOUS at 22:24

## 2024-01-01 RX ADMIN — MONTELUKAST 10 MG: 10 TABLET, FILM COATED ORAL at 09:52

## 2024-01-01 RX ADMIN — SODIUM CHLORIDE, PRESERVATIVE FREE 10 ML: 5 INJECTION INTRAVENOUS at 22:25

## 2024-01-01 RX ADMIN — ATORVASTATIN CALCIUM 40 MG: 10 TABLET, FILM COATED ORAL at 09:52

## 2024-01-01 RX ADMIN — PANTOPRAZOLE SODIUM 40 MG: 40 TABLET, DELAYED RELEASE ORAL at 09:52

## 2024-01-01 RX ADMIN — FLUTICASONE PROPIONATE 1 SPRAY: 50 SPRAY, METERED NASAL at 09:53

## 2024-01-01 RX ADMIN — ENOXAPARIN SODIUM 30 MG: 100 INJECTION SUBCUTANEOUS at 09:53

## 2024-01-01 RX ADMIN — ACETAMINOPHEN 650 MG: 325 TABLET ORAL at 22:24

## 2024-01-01 RX ADMIN — DULOXETINE HYDROCHLORIDE 120 MG: 60 CAPSULE, DELAYED RELEASE ORAL at 09:52

## 2024-01-01 RX ADMIN — SODIUM CHLORIDE, PRESERVATIVE FREE 10 ML: 5 INJECTION INTRAVENOUS at 09:52

## 2024-01-01 RX ADMIN — ASPIRIN 81 MG: 81 TABLET, COATED ORAL at 09:52

## 2024-01-01 RX ADMIN — WATER 1000 MG: 1 INJECTION INTRAMUSCULAR; INTRAVENOUS; SUBCUTANEOUS at 23:21

## 2024-01-01 ASSESSMENT — PAIN DESCRIPTION - ORIENTATION: ORIENTATION: LEFT

## 2024-01-01 ASSESSMENT — PAIN DESCRIPTION - LOCATION: LOCATION: HIP;SHOULDER

## 2024-01-01 ASSESSMENT — PAIN SCALES - GENERAL: PAINLEVEL_OUTOF10: 9

## 2024-01-01 ASSESSMENT — PAIN DESCRIPTION - DESCRIPTORS: DESCRIPTORS: THROBBING

## 2024-01-02 PROBLEM — U07.1 COVID-19: Status: RESOLVED | Noted: 2023-12-30 | Resolved: 2024-01-02

## 2024-01-02 LAB
ANION GAP SERPL CALC-SCNC: 7 MMOL/L (ref 5–15)
BUN SERPL-MCNC: 11 MG/DL (ref 6–20)
BUN/CREAT SERPL: 13 (ref 12–20)
CALCIUM SERPL-MCNC: 8.4 MG/DL (ref 8.5–10.1)
CHLORIDE SERPL-SCNC: 116 MMOL/L (ref 97–108)
CO2 SERPL-SCNC: 21 MMOL/L (ref 21–32)
CREAT SERPL-MCNC: 0.87 MG/DL (ref 0.55–1.02)
GLUCOSE SERPL-MCNC: 84 MG/DL (ref 65–100)
POTASSIUM SERPL-SCNC: 3.5 MMOL/L (ref 3.5–5.1)
SODIUM SERPL-SCNC: 144 MMOL/L (ref 136–145)

## 2024-01-02 PROCEDURE — 80048 BASIC METABOLIC PNL TOTAL CA: CPT

## 2024-01-02 PROCEDURE — 6370000000 HC RX 637 (ALT 250 FOR IP): Performed by: PHYSICIAN ASSISTANT

## 2024-01-02 PROCEDURE — 6360000002 HC RX W HCPCS: Performed by: PHYSICIAN ASSISTANT

## 2024-01-02 PROCEDURE — 6370000000 HC RX 637 (ALT 250 FOR IP): Performed by: FAMILY MEDICINE

## 2024-01-02 PROCEDURE — 36415 COLL VENOUS BLD VENIPUNCTURE: CPT

## 2024-01-02 PROCEDURE — 1100000000 HC RM PRIVATE

## 2024-01-02 PROCEDURE — 2580000003 HC RX 258: Performed by: PHYSICIAN ASSISTANT

## 2024-01-02 RX ORDER — CEFUROXIME AXETIL 500 MG/1
500 TABLET ORAL 2 TIMES DAILY
Qty: 6 TABLET | Refills: 0 | Status: SHIPPED | OUTPATIENT
Start: 2024-01-02 | End: 2024-01-05

## 2024-01-02 RX ADMIN — ATORVASTATIN CALCIUM 40 MG: 10 TABLET, FILM COATED ORAL at 09:38

## 2024-01-02 RX ADMIN — LEVOTHYROXINE SODIUM 125 MCG: 0.12 TABLET ORAL at 06:46

## 2024-01-02 RX ADMIN — MONTELUKAST 10 MG: 10 TABLET, FILM COATED ORAL at 09:38

## 2024-01-02 RX ADMIN — PANTOPRAZOLE SODIUM 40 MG: 40 TABLET, DELAYED RELEASE ORAL at 09:38

## 2024-01-02 RX ADMIN — FLUTICASONE PROPIONATE 1 SPRAY: 50 SPRAY, METERED NASAL at 09:38

## 2024-01-02 RX ADMIN — ENOXAPARIN SODIUM 30 MG: 100 INJECTION SUBCUTANEOUS at 09:38

## 2024-01-02 RX ADMIN — ENOXAPARIN SODIUM 30 MG: 100 INJECTION SUBCUTANEOUS at 20:49

## 2024-01-02 RX ADMIN — ASPIRIN 81 MG: 81 TABLET, COATED ORAL at 09:38

## 2024-01-02 RX ADMIN — IPRATROPIUM BROMIDE AND ALBUTEROL SULFATE 1 DOSE: 2.5; .5 SOLUTION RESPIRATORY (INHALATION) at 03:11

## 2024-01-02 RX ADMIN — DULOXETINE HYDROCHLORIDE 120 MG: 60 CAPSULE, DELAYED RELEASE ORAL at 09:38

## 2024-01-02 RX ADMIN — ACETAMINOPHEN 650 MG: 325 TABLET ORAL at 20:49

## 2024-01-02 RX ADMIN — SODIUM CHLORIDE, PRESERVATIVE FREE 10 ML: 5 INJECTION INTRAVENOUS at 20:54

## 2024-01-02 ASSESSMENT — PAIN SCALES - GENERAL
PAINLEVEL_OUTOF10: 7
PAINLEVEL_OUTOF10: 0

## 2024-01-02 ASSESSMENT — PAIN DESCRIPTION - ORIENTATION: ORIENTATION: RIGHT

## 2024-01-02 ASSESSMENT — PAIN DESCRIPTION - LOCATION: LOCATION: SHOULDER

## 2024-01-02 NOTE — PLAN OF CARE
Problem: Discharge Planning  Goal: Discharge to home or other facility with appropriate resources  Outcome: Progressing     Problem: Pain  Goal: Verbalizes/displays adequate comfort level or baseline comfort level  Outcome: Progressing     
  Problem: Discharge Planning  Goal: Discharge to home or other facility with appropriate resources  Outcome: Progressing  Flowsheets  Taken 1/2/2024 0942 by Carine Dotson RN  Discharge to home or other facility with appropriate resources: Identify barriers to discharge with patient and caregiver  Taken 1/1/2024 2045 by Selam Nelson RN  Discharge to home or other facility with appropriate resources:   Identify barriers to discharge with patient and caregiver   Arrange for needed discharge resources and transportation as appropriate   Identify discharge learning needs (meds, wound care, etc)     Problem: Discharge Planning  Goal: Discharge to home or other facility with appropriate resources  Outcome: Progressing  Flowsheets  Taken 1/2/2024 0942 by Carine Dotson RN  Discharge to home or other facility with appropriate resources: Identify barriers to discharge with patient and caregiver  Taken 1/1/2024 2045 by Selam Nelson RN  Discharge to home or other facility with appropriate resources:   Identify barriers to discharge with patient and caregiver   Arrange for needed discharge resources and transportation as appropriate   Identify discharge learning needs (meds, wound care, etc)     Problem: Discharge Planning  Goal: Discharge to home or other facility with appropriate resources  Outcome: Progressing  Flowsheets  Taken 1/2/2024 0942 by Carine Dotson RN  Discharge to home or other facility with appropriate resources: Identify barriers to discharge with patient and caregiver  Taken 1/1/2024 2045 by Selam Nelson RN  Discharge to home or other facility with appropriate resources:   Identify barriers to discharge with patient and caregiver   Arrange for needed discharge resources and transportation as appropriate   Identify discharge learning needs (meds, wound care, etc)     
  Problem: Occupational Therapy - Adult  Goal: By Discharge: Performs self-care activities at highest level of function for planned discharge setting.  See evaluation for individualized goals.  Description: FUNCTIONAL STATUS PRIOR TO ADMISSION:  patient lives in 2 level home with sister and required up to mod A with ADLs at baseline. Patient reports using RW at baseline for household distances.     Occupational Therapy Goals:  Initiated 12/31/2023  1.  Patient will perform grooming in unsupported sitting with Set-up within 7 day(s).  2.  Patient will perform bathing using most appropriate DME with Minimal Assist within 7 day(s).  3.  Patient will perform lower body dressing using most appropriate DME with Moderate Assist within 7 day(s).  4.  Patient will perform toilet transfers to/from [facility] with Moderate Assist  within 7 day(s).  5.  Patient will perform all aspects of toileting with Moderate Assist within 7 day(s).  6.  Patient will participate in upper extremity therapeutic exercise/activities with Supervision for 5 minutes within 7 day(s).    7.  Patient will utilize energy conservation techniques during functional activities with verbal cues within 7 day(s).    Outcome: Progressing    OCCUPATIONAL THERAPY EVALUATION    Patient: Deepthi Kessler (63 y.o. female)  Date: 12/31/2023  Primary Diagnosis: Septicemia (HCC) [A41.9]  Failure to thrive in adult [R62.7]  Urinary tract infection without hematuria, site unspecified [N39.0]  COVID-19 [U07.1]         Precautions: falls    ASSESSMENT :  The patient is limited by decreased functional mobility, independence in ADLs, ROM, strength, body mechanics, activity tolerance, endurance, coordination, balance, posture, increased pain levels s/p admission for septicemia, COVID-19 and UTI. Patient lives with sister who assists with ADLs at baseline - patient used RW at baseline for mobility. Today, patient required mod A and additional time for bed mobility to come to 
  Problem: Pain  Goal: Verbalizes/displays adequate comfort level or baseline comfort level  Outcome: Progressing  Flowsheets (Taken 12/30/2023 2345 by Kalyn Hooker RN)  Verbalizes/displays adequate comfort level or baseline comfort level: Encourage patient to monitor pain and request assistance     Problem: Safety - Adult  Goal: Free from fall injury  Outcome: Progressing  Flowsheets (Taken 12/31/2023 0831 by Kalyn Hooker RN)  Free From Fall Injury: Instruct family/caregiver on patient safety     Problem: Skin/Tissue Integrity  Goal: Absence of new skin breakdown  Description: 1.  Monitor for areas of redness and/or skin breakdown  2.  Assess vascular access sites hourly  3.  Every 4-6 hours minimum:  Change oxygen saturation probe site  4.  Every 4-6 hours:  If on nasal continuous positive airway pressure, respiratory therapy assess nares and determine need for appliance change or resting period.  Outcome: Progressing     Problem: Chronic Conditions and Co-morbidities  Goal: Patient's chronic conditions and co-morbidity symptoms are monitored and maintained or improved  Outcome: Progressing  Flowsheets (Taken 12/31/2023 0800)  Care Plan - Patient's Chronic Conditions and Co-Morbidity Symptoms are Monitored and Maintained or Improved: Monitor and assess patient's chronic conditions and comorbid symptoms for stability, deterioration, or improvement     
(unsupported);Fair (occasional)  Sitting - Dynamic: Not tested  Standing: Impaired  Standing - Static: Fair;Constant support  Standing - Dynamic: Fair;Constant support  Ambulation/Gait Training:                                                                                                                                                                                                                                                                                    [facility] AM-PAC®      Basic Mobility Inpatient Short Form (6-Clicks) Version 2    How much help is needed turning from your back to your side while in a flat bed without using bedrails?: A Little  How much help is needed moving from lying on your back to sitting on the side of a flat bed without using bedrails?: A Lot  How much help is needed moving to and from a bed to a chair?: A Lot  How much help is needed standing up from a chair using your arms?: A Lot  How much help is needed walking in hospital room?: A Lot  How much help is needed climbing 3-5 steps with a railing?: Total    -PAC Inpatient Mobility Raw Score : 12  -PAC Inpatient T-Scale Score : 35.33     Cutoff score ?171,2,3 had higher odds of discharging home with home health or need of SNF/IPR.    1. Bryanna Miranda, Ashu Gay Sandra D. Passek, Shankar Perez, Pk Miranda.  Validity of the AM-PAC “6-Clicks” Inpatient Daily Activity and Basic Mobility Short Forms. Physical Therapy Mar 2014, 94 (3) 379-391; DOI: 10.2522/ptj.99438227  2. Jj ROWELL, Cody J, Elie J, Mikey J. Association of AM-PAC \"6-Clicks\" Basic Mobility and Daily Activity Scores With Discharge Destination. Phys Ther. 2021 Apr 4;101(4):iwdv880. doi: 10.1093/ptj/aztg459. PMID: 74709250.  3. Allyson KHAN, Berna ZHAO, Amada S, Selma K, Miguel S. Activity Measure for Post-Acute Care \"6-Clicks\" Basic Mobility Scores Predict Discharge Destination After Acute Care Hospitalization in

## 2024-01-02 NOTE — DISCHARGE INSTRUCTIONS
Independent/assisted living    Hospice    Other:       PATIENT CONDITION AT DISCHARGE:     Functional status    Poor    x Deconditioned     Independent      Cognition    x Lucid     Forgetful     Dementia      Catheters/lines (plus indication)   x Kern     PICC     PEG     None      Code status    x Full code     DNR      PHYSICAL EXAMINATION AT DISCHARGE:  Please see progress note      CHRONIC MEDICAL DIAGNOSES:  [unfilled]      [facility] Checked:   Yes x     PROBLEM LIST Updated:  Yes x         Signed:   Armin Ghotra MD  1/2/2024  9:08 AM

## 2024-01-03 VITALS
WEIGHT: 246 LBS | HEART RATE: 84 BPM | OXYGEN SATURATION: 97 % | SYSTOLIC BLOOD PRESSURE: 143 MMHG | RESPIRATION RATE: 16 BRPM | TEMPERATURE: 97.7 F | DIASTOLIC BLOOD PRESSURE: 97 MMHG | BODY MASS INDEX: 39.71 KG/M2

## 2024-01-03 LAB
BACTERIA SPEC CULT: NORMAL
BACTERIA SPEC CULT: NORMAL
SERVICE CMNT-IMP: NORMAL
SERVICE CMNT-IMP: NORMAL

## 2024-01-03 PROCEDURE — 6360000002 HC RX W HCPCS: Performed by: PHYSICIAN ASSISTANT

## 2024-01-03 PROCEDURE — 94760 N-INVAS EAR/PLS OXIMETRY 1: CPT

## 2024-01-03 PROCEDURE — 6370000000 HC RX 637 (ALT 250 FOR IP): Performed by: PHYSICIAN ASSISTANT

## 2024-01-03 PROCEDURE — 2580000003 HC RX 258: Performed by: PHYSICIAN ASSISTANT

## 2024-01-03 RX ADMIN — PANTOPRAZOLE SODIUM 40 MG: 40 TABLET, DELAYED RELEASE ORAL at 09:19

## 2024-01-03 RX ADMIN — ATORVASTATIN CALCIUM 40 MG: 10 TABLET, FILM COATED ORAL at 09:19

## 2024-01-03 RX ADMIN — WATER 1000 MG: 1 INJECTION INTRAMUSCULAR; INTRAVENOUS; SUBCUTANEOUS at 00:09

## 2024-01-03 RX ADMIN — DULOXETINE HYDROCHLORIDE 120 MG: 60 CAPSULE, DELAYED RELEASE ORAL at 09:19

## 2024-01-03 RX ADMIN — SODIUM CHLORIDE, PRESERVATIVE FREE 10 ML: 5 INJECTION INTRAVENOUS at 09:19

## 2024-01-03 RX ADMIN — LEVOTHYROXINE SODIUM 125 MCG: 0.12 TABLET ORAL at 05:55

## 2024-01-03 RX ADMIN — ASPIRIN 81 MG: 81 TABLET, COATED ORAL at 09:19

## 2024-01-03 RX ADMIN — ENOXAPARIN SODIUM 30 MG: 100 INJECTION SUBCUTANEOUS at 09:19

## 2024-01-03 RX ADMIN — MONTELUKAST 10 MG: 10 TABLET, FILM COATED ORAL at 09:19

## 2024-01-03 NOTE — PROGRESS NOTES
[facility]                                                                                          Hospitalist Progress Note  Armin Ghotra MD  Office Phone: [phone]        Date of Service:  2024  NAME:  Deepthi Kessler  :  1960  MRN:  [id number]       Admission Summary:      Deepthi Kessler is a 63 y.o. female with a PMHx of HTN, HLD, prediabetes, Hypothyroidism, SAIRA on CPAP, CAD, COPD, CVA with L sided hemiparesis and chronic suprapubic catheter (last exchanged ~2 weeks ago)     She presented to the ED with a chief complaint of  generalized weakness and fall. She reports a fall at home while she was attempting to grab her hat from a nightstand. She was too weak to get up and had to wait approximately 45 minutes for her sister to help her up. Notably she has residual L sided weakness from a CVA. She denies any head or neck trauma or any blood thinners. In the ED she was noted to have persistent fever and tested positive for COVID-19 infection. She currently denies any shortness of breath, cough, congestion, or chest pain. Her UA was concerning for UTI, she does have a chronic suprapubic catheter which was exchanged about 2 weeks ago. She follows with urology here at [facility]. She denies any abdominal pain, N/V/D.     ED Workup  - COVID-19: positive  - UA: +nitrite, +leuk esterase, 1+ bacteria  - CT head: no acute findings  - CT C-spine: no acute fx or subluxation  - CXR: no acute procecss  - Xray hips pending    Interval history / Subjective:   Follow up UTI   \"I agree with physical therapy that I need rehab because my leg is week\"  Assessment & Plan:      UTI  Chronic suprapubic catheter  - admit patient to med/surg with remote telemetry  - ABX: Ceftriaxone for now, tailor pending cultures  -urology evaluated, change suprapubic change 2 weeks ago, if cultures positive advised change tube, postpone botox injection      COVID-19 Infection  COPD  SAIRA on 
  Physician Progress Note      PATIENT:               SHEEBA SHERMAN  CSN #:                  [id number]  :                       1960  ADMIT DATE:       2023 8:29 PM  DISCH DATE:  RESPONDING  PROVIDER #:        Armin Ghotra MD          QUERY TEXT:      Pt admitted with UTI.  Pt noted to have chronic miller in H&P . If   possible, please document in the progress notes and discharge summary if you   are evaluating and/or treating any of the following:    The medical record reflects the following:  Risk Factors: chronic suprapubic catheter  Clinical Indicators: H&P  \"Her UA was concerning for UTI, she does have a   chronic suprapubic catheter which was exchanged about 2 weeks ago. UTI.   Chronic suprapubic catheter\"  Treatment: IV Ceftriaxone, BCx    Thank you,  Briana Veliz RN CDI CRCR  Clinical Documentation  [phone] or via [employer]  [email or web address]  Options provided:  -- UTI due to chronic indwelling urinary catheter  -- UTI not due to indwelling urinary catheter  -- Other - I will add my own diagnosis  -- Disagree - Not applicable / Not valid  -- Disagree - Clinically unable to determine / Unknown  -- Refer to Clinical Documentation Reviewer    PROVIDER RESPONSE TEXT:    UTI is due to the chronic indwelling urinary catheter.    Query created by: Briana Veliz on 2024 12:06 PM      QUERY TEXT:      Patient admitted with UTI. Documentation reflects Septicemia in ED dated   .  If possible, please document in the progress notes and discharge   summary if Septicemia was:    The medical record reflects the following:  Risk Factors: UTI  Clinical Indicators: ED  \"Septicemia\" H&P  \"UTI\" Temp 102.5 (39.2),   , RR 22  Treatment: IV Ceftriaxone, BCx      Thank you,  Briana Veliz RN CDI CRCR  Clinical Documentation  [phone] or via [email or web address]  [email or web address]  Options provided:  -- Septicemia confirmed after study  -- Septicemia ruled out after study  -- 
0730-Bedside and Verbal shift change report given to DEISI Hammer (oncoming nurse) by DEISI Mccain (offgoing nurse). Report included the following information Nurse Handoff Report, Index, MAR, Recent Results, and Neuro Assessment.      0745-Temp 99.5.    0800-IVF discontinued per MD.    1000-Patient assisted onto bedpan by CNA to have a BM.    1050-Dr. Tapia at bedside.  Patient possibly for D/C tomorrow.    1100-Patient able to have a large BM on bedpan.    1345-OT at bedside working with patient.    1353-Patient c/o 3/10 H/A.  Tylenol given.    1453-H/A resolved.    1900-Bedside and Verbal shift change report given to Kalyn (oncoming nurse) by DEISI Hammer (offgoing nurse). Report included the following information Nurse Handoff Report, Index, Intake/Output, MAR, and Neuro Assessment.    
Admission Medication Reconciliation:    Information obtained from:  Rx Query, chart review  RxQuery data available¹:  Yes    Comments/Recommendations: Updated PTA meds/reviewed patient's allergies.    1)  Did not speak with patient due to COVID-19 precautions. Rx Query and chart review were utilized to complete med rec.  Recent and consistent insurance claim information in Rx Query.    2)  Medication changes (since last review):  Added  - none    Adjusted  - hydroxyzine from BID to TID PRN    Removed  - duplicate topiramate     ¹RxQuery pharmacy benefit data reflects medications filled and processed through the patient's insurance, however   this data does NOT capture whether the medication was picked up or is currently being taken by the patient.    Allergies:  Strawberry, Wasp venom protein, and Lisinopril      Chief Complaint for this Admission:    Chief Complaint   Patient presents with    Fall     Patient to ED from home via EMS with complaint of unwitnessed fall into \"debris\" according to EMS. Patient reports no injury and takes aspirin. Patient arrives to ED with chronic miller. Patient  alert oriented to self and situation not time.     Prior to Admission Medications:   Prior to Admission Medications   Prescriptions Last Dose Informant   DULoxetine (CYMBALTA) 60 MG extended release capsule     Sig: Take 2 capsules by mouth daily   EQ ALLERGY RELIEF, CETIRIZINE, 10 MG tablet     Sig: Take 1 tablet by mouth nightly   Galcanezumab-gnlm (EMGALITY) 120 MG/ML SOSY     Sig: Inject 1 mL into the skin every 30 days   LINZESS 145 MCG capsule     Sig: TAKE 1 CAPSULE BY MOUTH ONCE DAILY BEFORE BREAKFAST FOR CONSTIPATION   Onabotulinumtoxin A (BOTOX) 200 units injection     Sig: Inject 155 units to selected muscles head and neck bilaterally every 12 weeks  Indications: migraine prevention   Tirzepatide (MOUNJARO) 5 MG/0.5ML SOPN SC injection     Sig: Inject 0.5 mLs into the skin once a week   aspirin 81 MG EC tablet   
Attempted to call report X4. Call dropped every time. No other number listed in google  for [facility].     2:30 pm: Report given to Josette. All questions answered.  
Patient was referred for admission by ED physician.  As of the time of the referral patient has 1 low-grade fever recorded.  ED physician was advised to treat the patient just a little bit in the emergency room and if the fever is persistent then patient can be referred for admission.  Did not hear anything back from the ED providers until when I called the charge nurse to get update on the patient  vital signs.  Since the patient the fever is persistent we will place the patient on board to be seen for admission.  
Physical Therapy    Attempted to see patient for follow up PT session, but she is currently too fatigued and achy and not able to participate.  We will follow up tomorrow.    Sheba Marquez, PT    
  montelukast 10 MG tablet  Commonly known as: SINGULAIR     Mounjaro 5 MG/0.5ML Sopn SC injection  Generic drug: Tirzepatide  Inject 0.5 mLs into the skin once a week     Onabotulinumtoxin A 200 units injection  Commonly known as: BOTOX  Inject 155 units to selected muscles head and neck bilaterally every 12 weeks  Indications: migraine prevention     oxyBUTYnin 10 MG extended release tablet  Commonly known as: DITROPAN-XL     oxyCODONE-acetaminophen  MG per tablet  Commonly known as: PERCOCET     pantoprazole 40 MG tablet  Commonly known as: PROTONIX  Take 1 tablet by mouth once daily     pregabalin 150 MG capsule  Commonly known as: LYRICA     temazepam 15 MG capsule  Commonly known as: Restoril  Take 1 capsule by mouth nightly as needed for Sleep for up to 90 days. Max Daily Amount: 15 mg     trospium 20 MG tablet  Commonly known as: SANCTURA  TAKE 1 TABLET BY MOUTH BEFORE BREAKFAST AND BEFORE SUPPER            STOP taking these medications      diclofenac sodium 1 % Gel  Commonly known as: VOLTAREN               Where to Get Your Medications        These medications were sent to [id number] - [place], [place] - [address] - P [phone] - F [phone]  [address] [place] [address]      Phone: [phone]   cefUROXime 500 MG tablet     Discharge instruction given to the patient. All questions answered.   
urine but this resolved.  She is otherwise feeling well.  She will continue to present as scheduled for SP tube changes managed by urology.    PAST MEDICAL HISTORY:    Allergies: LISINOPRIL (Moderate)  DENIES: Latex, Shellfish, X-Ray Dye, Iodine.     Medications: Emgality Pen 120 mg/mL pen injector (galcanezumab-gnlm)   furosemide 40 mg tablet (furosemide)   gabapentin 100 mg capsule (gabapentin)   fluticasone propionate 50 mcg/actuation spray,suspension (fluticasone propionate)   montelukast 10 mg tablet (montelukast)   quetiapine 100 mg tablet (quetiapine)   hydroxyzine HCl 50 mg tablet (hydroxyzine hcl)   trospium 20 mg tablet (trospium)   oxycodone-acetaminophen  mg tablet (oxycodone-acetaminophen)   * CORTIZONE INJECTIONS injections in  left shoulder; left knee, right wrist  q 3 months in doctor's office  Synthroid 125 mcg tablet (levothyroxine) once a day   carvedilol 6.25 mg tablet (carvedilol) Take 1 tablet twice a day   * ASPIRIN 81 MG ORAL TABLET once a day   Linzess 145 mcg capsule (linaclotide) Take as directed   topiramate 100 mg tablet (topiramate) twice a day   ProAir HFA 90 mcg/actuation HFA aerosol inhaler (albuterol sulfate) Use as needed   losartan 100 mg tablet (losartan) once a day   * FEOSOL 325 (65 FE) Take 1 tablet once a day   atorvastatin 40 mg tablet (atorvastatin) Take 1 tablet every night   Cymbalta 60 mg capsule,delayed release(DR/EC) (duloxetine) twice a day   baclofen 20 mg tablet (baclofen) four times a day   * VITAMIN D2 TABLET Take 1 capsule once a day   cyclobenzaprine 10 mg tablet (cyclobenzaprine) Take 1 tablet three times a day as needed   pantoprazole 40 mg tablet,delayed release (DR/EC) (pantoprazole) Take 1 tablet once a day     Problems: Suprapubic pain (ICD-789.09) (SRC76-U95.30)  Retention of urine (ICD-788.20) (COZ06-D09.9)  Nocturia (ICD-788.43) (JOS49-X61.1)  Urge incontinence (ICD-788.31) (ZKR13-Q79.41)  Stress incontinence (ICD-625.6) (JCK14-L88.3)  Urinary 
0903  Last data filed at 1/2/2024 0830  Gross per 24 hour   Intake --   Output 1475 ml   Net -1475 ml          Physical Examination:     I had a face to face encounter with this patient and independently examined them on 1/2/2024 as outlined below:          General : alert x 3, awake, no acute distress,   HEENT: PEERL, EOMI, moist mucus membrane, TM clear  Neck: supple, no JVD, no meningeal signs  Chest: Clear to auscultation bilaterally   CVS: S1 S2 heard, Capillary refill less than 2 seconds  Abd: soft/ non tender, non distended, BS physiological,   Ext: no clubbing, no cyanosis, no edema, brisk 2+ DP pulses  Neuro/Psych: pleasant mood and affect, CN 2-12 grossly intact, sensory grossly within normal limit, Strength 5/5 in all extremities, DTR 1+ x 4  Skin: warm     Data Review:    Review and/or order of clinical lab test      I have personally and independently reviewed all pertinent labs, diagnostic studies, imaging, and have provided independent interpretation of the same.     Labs:     No results for input(s): \"WBC\", \"HGB\", \"HCT\", \"PLT\" in the last 72 hours.    Recent Labs     01/01/24  0517 01/02/24  0659    144   K 3.4* 3.5   * 116*   CO2 21 21   BUN 12 11       No results for input(s): \"ALT\", \"TP\", \"ALB\", \"GLOB\", \"GGT\", \"AML\" in the last 72 hours.    Invalid input(s): \"SGOT\", \"GPT\", \"AP\", \"TBIL\", \"TBILI\", \"AMYP\", \"LPSE\", \"HLPSE\"    No results for input(s): \"INR\", \"APTT\" in the last 72 hours.    Invalid input(s): \"PTP\"   No results for input(s): \"TIBC\", \"FERR\" in the last 72 hours.    Invalid input(s): \"FE\", \"PSAT\"   No results found for: \"FOL\", \"RBCF\"   No results for input(s): \"PH\", \"PCO2\", \"PO2\" in the last 72 hours.  No results for input(s): \"CPK\" in the last 72 hours.    Invalid input(s): \"CPKMB\", \"CKNDX\", \"TROIQ\"  Lab Results   Component Value Date/Time    CHOL 134 06/06/2023 10:12 AM    CHOL 132 01/06/2023 12:11 PM    HDL 65 06/06/2023 10:12 AM     No results found for: 
\"PTP\"   No results for input(s): \"TIBC\", \"FERR\" in the last 72 hours.    Invalid input(s): \"FE\", \"PSAT\"   No results found for: \"FOL\", \"RBCF\"   No results for input(s): \"PH\", \"PCO2\", \"PO2\" in the last 72 hours.  No results for input(s): \"CPK\" in the last 72 hours.    Invalid input(s): \"CPKMB\", \"CKNDX\", \"TROIQ\"  Lab Results   Component Value Date/Time    CHOL 134 06/06/2023 10:12 AM    CHOL 132 01/06/2023 12:11 PM    HDL 65 06/06/2023 10:12 AM     No results found for: \"GLUCPOC\"  [unfilled]    Notes reviewed from all clinical/nonclinical/nursing services involved in patient's clinical care. Care coordination discussions were held with appropriate clinical/nonclinical/ nursing providers based on care coordination needs.         Patients current active Medications were reviewed, considered, added and adjusted based on the clinical condition today.      Home Medications were reconciled to the best of my ability given all available resources at the time of admission. Route is PO if not otherwise noted.      Admission Status:[id number]:::1}      Medications Reviewed:     Current Facility-Administered Medications   Medication Dose Route Frequency    sodium chloride flush 0.9 % injection 5-40 mL  5-40 mL IntraVENous 2 times per day    sodium chloride flush 0.9 % injection 5-40 mL  5-40 mL IntraVENous PRN    0.9 % sodium chloride infusion   IntraVENous PRN    potassium chloride (KLOR-CON) extended release tablet 40 mEq  40 mEq Oral PRN    Or    potassium bicarb-citric acid (EFFER-K) effervescent tablet 40 mEq  40 mEq Oral PRN    Or    potassium chloride 10 mEq/100 mL IVPB (Peripheral Line)  10 mEq IntraVENous PRN    magnesium sulfate 2000 mg in 50 mL IVPB premix  2,000 mg IntraVENous PRN    enoxaparin Sodium (LOVENOX) injection 30 mg  30 mg SubCUTAneous BID    ondansetron (ZOFRAN-ODT) disintegrating tablet 4 mg  4 mg Oral Q8H PRN    Or    ondansetron (ZOFRAN) injection 4 mg  4 mg IntraVENous Q6H PRN    polyethylene glycol 
mg  100 mg Oral TID PRN    atorvastatin (LIPITOR) tablet 40 mg  40 mg Oral Daily    DULoxetine (CYMBALTA) extended release capsule 120 mg  120 mg Oral Daily    fluticasone (FLONASE) 50 MCG/ACT nasal spray 1 spray  1 spray Each Nostril Daily    montelukast (SINGULAIR) tablet 10 mg  10 mg Oral Daily    levothyroxine (SYNTHROID) tablet 125 mcg  125 mcg Oral QAM AC    aspirin EC tablet 81 mg  81 mg Oral Daily    pantoprazole (PROTONIX) tablet 40 mg  40 mg Oral Daily     ______________________________________________________________________  EXPECTED LENGTH OF STAY: 4  ACTUAL LENGTH OF STAY:          4                 Armin Ghotra MD

## 2024-01-03 NOTE — CARE COORDINATION
Medicaid LTSS Screening submitted for processing on the [facility] portal.      MERLYN MAHAJAN    
Transition of Care Plan to SNF/Rehab    Communication to Patient/Family:  Met with patient and family and they are agreeable to the transition plan. The Plan for Transition of Care is related to the following treatment goals: [facility]    The Patient and/or patient representative was provided with a choice of provider and agrees  with the discharge plan.      Yes [x] No []    A Freedom of choice list was provided with basic dialogue that supports the patient's individualized plan of care/goals and shares the quality data associated with the providers.       Yes [x] No []    SNF/Rehab Transition:  Patient has been accepted to [facility] SNF/Rehab and meets criteria for admission.   Patient will transported by Hospital to Home and expected to leave at 3pm    Communication to SNF/Rehab:  Bedside RN has been notified to update the transition plan to the facility and call report ([phone]).  Discharge information has been updated on the AVS. And communicated to facility via TXCOM/All Scripts, or CC link.     Discharge instructions to be fax'd to facility via [facility].    Nursing Please include all hard scripts for controlled substances, med rec and dc summary, and AVS in packet.     Reviewed and confirmed with Glory in [facility] admissions, facility can manage the patient care needs for the following:     Ted with (X) only those applicable:  Medication:  [x]Medications are available at the facility  []IV Antibiotics    []Controlled Substance - hard copies available sent.  []Weekly Labs    Equipment:  []CPAP/BiPAP  []Wound Vacuum  []Kern or Urinary Device  []PICC/Central Line  []Nebulizer  []Ventilator    Treatment:  [x]Isolation (for MRSA, VRE, etc.) COVID 19  []Surgical Drain Management  []Tracheostomy Care  []Dressing Changes  []Dialysis with transportation  []PEG Care  []Oxygen  []Daily Weights for Heart Failure    Dietary:  []Any diet limitations  []Tube Feedings   []Total Parenteral 
Transition of Care Plan:    RUR: 15% Moderate  Prior Level of Functioning: Home with sister and niece  [facility]position: [facility]   If SNF or IPR: Date FOC offered: 1/1/24  Date FOC received: 1/1/24  Accepting facility: Referral pending  Follow up appointments: PCP, Specialist  DME needed: QB cane, rolling walker, wheel chair  Transportation at discharge: TBD  IM/IMM Medicare/ letter given: 1/1/24  Caregiver Contact: Emergency contact sister Justus Kessler, [phone]  Discharge Caregiver contacted prior to discharge? NA  Care Conference needed? NA  Barriers to discharge: Placement    [facility] [phone], left message.     11:30 CM received call back from Glory in admissions, patient has been accepted and auth will be submitted. CM to monitor.    Mariely Yang, MS  [phone]  
Toilet Handicap height   Bathroom Equipment Grab bars in shower   Bathroom Accessibility Accessible   Home Equipment Cane, quad;Walker, rolling;Walker, 4 wheeled;Wheelchair-manual   Receives Help From Family   Active  No   Patient's  Info Sister, Justus Kessler   Occupation Retired   Services At/After Discharge   Services At/After Discharge Skilled Nursing Facility (SNF)   Mode of Transport at Discharge BLS   Condition of Participation: Discharge Planning   The Plan for Transition of Care is related to the following treatment goals: Discharge planning   The Patient and/or Patient Representative was provided with a Choice of Provider? Patient Representative   Name of the Patient Representative who was provided with the Choice of Provider and agrees with the Discharge Plan?  Sister   The Patient and/Or Patient Representative agree with the Discharge Plan? Yes   Freedom of Choice list was provided with basic dialogue that supports the patient's individualized plan of care/goals, treatment preferences, and shares the quality data associated with the providers?  Yes     LEONARDO Aguilar CCM  Care Management

## 2024-01-03 NOTE — DISCHARGE SUMMARY
Discharge Summary       PATIENT ID: Deepthi Kessler  MRN: [id number]   YOB: 1960    DATE OF ADMISSION: 12/29/2023  8:29 PM    DATE OF DISCHARGE: 1/3/2024   PRIMARY CARE PROVIDER: Mayco Teixeira PA-C     ATTENDING PHYSICIAN: Dr Armin Ghotra  DISCHARGING PROVIDER: Armin Ghotra MD    To contact this individual call [phone] and ask the  to page.  If unavailable ask to be transferred the Adult Hospitalist Department.    CONSULTATIONS: IP CONSULT TO UROLOGY  IP CONSULT TO CASE MANAGEMENT    PROCEDURES/SURGERIES: * No surgery found *    ADMITTING DIAGNOSES & HOSPITAL COURSE:   UTI  Chronic suprapubic catheter  - culture unremarkable  - ABX: Ceftriaxone   -urology evaluated, change suprapubic change 2 weeks ago, if cultures positive advised change tube, postpone botox injection      COVID-19 Infection  COPD  SAIRA on CPAP  - No acute respiratory distress, on room air, no baseline O2 needs  - PRN duonebs  - Continue montelukast  - QHS CPAP     Hypertension: Resume home medication tomorrow if blood pressure better  -     Hx of CVA w/Left sided hemiparesis  Hx of CAD  HLD  - Continue ASA and statin  - PT/OT consulted      Hypothyroidism: Continue home levothyroxine     Prediabetes: SSI    PENDING TEST RESULTS:   At the time of discharge the following test results are still pending: cultures    FOLLOW UP APPOINTMENTS:    PCP  Urology       ADDITIONAL CARE RECOMMENDATIONS:   BP check twice daily    DIET: cardiac diet    ACTIVITY: activity as tolerated      DISCHARGE MEDICATIONS:   See Medication Reconciliation Form      NOTIFY YOUR PHYSICIAN FOR ANY OF THE FOLLOWING:   Fever over 101 degrees for 24 hours.   Chest pain, shortness of breath, fever, chills, nausea, vomiting, diarrhea, change in mentation, falling, weakness, bleeding. Severe pain or pain not relieved by medications.  Or, any other signs or symptoms that you may have questions about.    DISPOSITION:    Home With:   OT  PT  MAEGAN LIPSCOMB    
mg-albuterol 2.5 mg 0.5-2.5 (3) MG/3ML Soln nebulizer solution  Commonly known as: DUONEB     levothyroxine 125 MCG tablet  Commonly known as: SYNTHROID  Take 1 tablet by mouth every morning (before breakfast)     Linzess 145 MCG capsule  Generic drug: linaclotide  TAKE 1 CAPSULE BY MOUTH ONCE DAILY BEFORE BREAKFAST FOR CONSTIPATION     losartan 50 MG tablet  Commonly known as: COZAAR  TAKE 1 TABLET BY MOUTH NIGHTLY HOLD  FOR  SBP  LESS  THAN  115     montelukast 10 MG tablet  Commonly known as: SINGULAIR     Mounjaro 5 MG/0.5ML Sopn SC injection  Generic drug: Tirzepatide  Inject 0.5 mLs into the skin once a week     Onabotulinumtoxin A 200 units injection  Commonly known as: BOTOX  Inject 155 units to selected muscles head and neck bilaterally every 12 weeks  Indications: migraine prevention     oxyBUTYnin 10 MG extended release tablet  Commonly known as: DITROPAN-XL     oxyCODONE-acetaminophen  MG per tablet  Commonly known as: PERCOCET     pantoprazole 40 MG tablet  Commonly known as: PROTONIX  Take 1 tablet by mouth once daily     pregabalin 150 MG capsule  Commonly known as: LYRICA     temazepam 15 MG capsule  Commonly known as: Restoril  Take 1 capsule by mouth nightly as needed for Sleep for up to 90 days. Max Daily Amount: 15 mg     trospium 20 MG tablet  Commonly known as: SANCTURA  TAKE 1 TABLET BY MOUTH BEFORE BREAKFAST AND BEFORE SUPPER            STOP taking these medications      diclofenac sodium 1 % Gel  Commonly known as: VOLTAREN               Where to Get Your Medications        These medications were sent to [facility] [address] - P [phone] - F [phone]  [address] [place] [address]      Phone: [phone]   cefUROXime 500 MG tablet           NOTIFY YOUR PHYSICIAN FOR ANY OF THE FOLLOWING:   Fever over 101 degrees for 24 hours.   Chest pain, shortness of breath, fever, chills, nausea, vomiting, diarrhea, change in mentation, falling, weakness,

## 2024-01-22 ENCOUNTER — TELEPHONE (OUTPATIENT)
Facility: CLINIC | Age: 64
End: 2024-01-22

## 2024-01-22 DIAGNOSIS — I50.9 CONGESTIVE HEART FAILURE, UNSPECIFIED HF CHRONICITY, UNSPECIFIED HEART FAILURE TYPE (HCC): Primary | ICD-10-CM

## 2024-01-22 DIAGNOSIS — I69.359 HEMIPARESIS AND ALTERATION OF SENSATIONS AS LATE EFFECTS OF STROKE (HCC): ICD-10-CM

## 2024-01-22 DIAGNOSIS — I69.398 HEMIPARESIS AND ALTERATION OF SENSATIONS AS LATE EFFECTS OF STROKE (HCC): ICD-10-CM

## 2024-01-22 NOTE — TELEPHONE ENCOUNTER
Pt called and stated that she is having to go through a different [employer] company for a stair lift and needs a new letter for this. She stated that her sister can come pick it up this afternoon

## 2024-01-24 NOTE — TELEPHONE ENCOUNTER
I called the patient and verified them by name and date of birth. I informed her that the letter is available for pickup. She stated understanding and had no further questions.

## 2024-02-06 ENCOUNTER — TELEPHONE (OUTPATIENT)
Age: 64
End: 2024-02-06

## 2024-02-06 NOTE — TELEPHONE ENCOUNTER
Lia persaud from [employer] calling to check when last dosages were given to patient for chronic migraine diagnosis.    [facility] call   [phone]

## 2024-02-06 NOTE — TELEPHONE ENCOUNTER
Re: Botox Buy and Bill    Entered pt into botox 1 for 2024 benefits. Also faxed out PA request form for Jeff ([facility]) to [phone], included  and cpt [phone]. Awaiting update on both.

## 2024-02-09 NOTE — TELEPHONE ENCOUNTER
Re: Botox Buy and bill    Rcvd BV for 2024 benefits. Scanend to chart. Pt is not eligible for saving card.     Also vd approval fax [facility] plan for cpt [id number] and . Auth# [id number], effective 02/06/24-02/05/25    Letter scanned to chat, approval under [employer] plan.  Update sent to nurse. Updated appt desk top

## 2024-03-06 ENCOUNTER — PROCEDURE VISIT (OUTPATIENT)
Age: 64
End: 2024-03-06

## 2024-03-06 VITALS
WEIGHT: 236 LBS | BODY MASS INDEX: 37.93 KG/M2 | SYSTOLIC BLOOD PRESSURE: 142 MMHG | RESPIRATION RATE: 16 BRPM | HEIGHT: 66 IN | OXYGEN SATURATION: 99 % | DIASTOLIC BLOOD PRESSURE: 84 MMHG | HEART RATE: 81 BPM

## 2024-03-06 DIAGNOSIS — I69.359 HEMIPARESIS AND ALTERATION OF SENSATIONS AS LATE EFFECTS OF STROKE (HCC): ICD-10-CM

## 2024-03-06 DIAGNOSIS — E66.01 SEVERE OBESITY (BMI 35.0-39.9) WITH COMORBIDITY (HCC): ICD-10-CM

## 2024-03-06 DIAGNOSIS — I69.398 HEMIPARESIS AND ALTERATION OF SENSATIONS AS LATE EFFECTS OF STROKE (HCC): ICD-10-CM

## 2024-03-06 DIAGNOSIS — G43.709 CHRONIC MIGRAINE WITHOUT AURA, NOT INTRACTABLE, WITHOUT STATUS MIGRAINOSUS: Primary | ICD-10-CM

## 2024-03-06 RX ORDER — TOPIRAMATE 100 MG/1
100 TABLET, FILM COATED ORAL 2 TIMES DAILY
Qty: 180 TABLET | Refills: 3 | Status: SHIPPED | OUTPATIENT
Start: 2024-03-06

## 2024-03-06 RX ORDER — GALCANEZUMAB 120 MG/ML
120 INJECTION, SOLUTION SUBCUTANEOUS
Qty: 1.12 ML | Refills: 5 | Status: SHIPPED | OUTPATIENT
Start: 2024-03-06

## 2024-03-06 NOTE — PROGRESS NOTES
Botox Injection Note       Indication: patient has chronic recurrent migraine, more than 15 days/ month with duration longer than 4 hours. Tried many preventives , no avail.  Previous injections have helped her significantly.  She now rarely gets a migraine and duration is less than 2 hours.    Procedure:   Botox concentration: 200 units in 4 ml of preservative-free normal saline.   31 sites injections, distribution as follow      Units/site  Sites Sides Subtotal    Procerus 5 1 1 5    5 1 2 10   Frontalis 5 2 2 20   Temporalis 5 4 2 40   Occipitalis 5 3 2 30   Upper cervical paraspinalis 5 2 2 20   Trapezius 5 3 2 30         200 units Botox were reconstituted, 155 units injected as above and the remainder was unavoidably wasted.     Patient tolerated procedure well.     _____________________________   Gatito Bradshaw M.D.

## 2024-03-08 ENCOUNTER — OFFICE VISIT (OUTPATIENT)
Facility: CLINIC | Age: 64
End: 2024-03-08

## 2024-03-08 VITALS
HEART RATE: 77 BPM | DIASTOLIC BLOOD PRESSURE: 82 MMHG | OXYGEN SATURATION: 98 % | RESPIRATION RATE: 16 BRPM | SYSTOLIC BLOOD PRESSURE: 123 MMHG | WEIGHT: 239 LBS | HEIGHT: 66 IN | TEMPERATURE: 98.1 F | BODY MASS INDEX: 38.41 KG/M2

## 2024-03-08 DIAGNOSIS — M62.81 GENERALIZED MUSCLE WEAKNESS: ICD-10-CM

## 2024-03-08 DIAGNOSIS — J44.9 COPD WITHOUT EXACERBATION (HCC): ICD-10-CM

## 2024-03-08 DIAGNOSIS — I50.9 CONGESTIVE HEART FAILURE, UNSPECIFIED HF CHRONICITY, UNSPECIFIED HEART FAILURE TYPE (HCC): Primary | ICD-10-CM

## 2024-03-08 DIAGNOSIS — R73.03 PREDIABETES: ICD-10-CM

## 2024-03-08 DIAGNOSIS — I69.359 HEMIPARESIS AND ALTERATION OF SENSATIONS AS LATE EFFECTS OF STROKE (HCC): ICD-10-CM

## 2024-03-08 DIAGNOSIS — G47.33 OSA ON CPAP: ICD-10-CM

## 2024-03-08 DIAGNOSIS — I10 ESSENTIAL HYPERTENSION: ICD-10-CM

## 2024-03-08 DIAGNOSIS — E55.9 VITAMIN D DEFICIENCY: ICD-10-CM

## 2024-03-08 DIAGNOSIS — F51.01 PRIMARY INSOMNIA: ICD-10-CM

## 2024-03-08 DIAGNOSIS — E16.2 HYPOGLYCEMIA WITHOUT DIAGNOSIS OF DIABETES MELLITUS: ICD-10-CM

## 2024-03-08 DIAGNOSIS — E89.0 POSTOPERATIVE HYPOTHYROIDISM: ICD-10-CM

## 2024-03-08 DIAGNOSIS — E78.00 HYPERCHOLESTEROLEMIA: ICD-10-CM

## 2024-03-08 DIAGNOSIS — I69.398 HEMIPARESIS AND ALTERATION OF SENSATIONS AS LATE EFFECTS OF STROKE (HCC): ICD-10-CM

## 2024-03-08 RX ORDER — TEMAZEPAM 15 MG/1
15 CAPSULE ORAL NIGHTLY PRN
Qty: 30 CAPSULE | Refills: 1 | Status: SHIPPED | OUTPATIENT
Start: 2024-03-08 | End: 2024-05-07

## 2024-03-08 ASSESSMENT — ENCOUNTER SYMPTOMS
NAUSEA: 0
CONSTIPATION: 0
RESPIRATORY NEGATIVE: 1
VOMITING: 0
DIARRHEA: 0
SHORTNESS OF BREATH: 0
BLOOD IN STOOL: 0
ABDOMINAL PAIN: 0
EYES NEGATIVE: 1

## 2024-03-08 ASSESSMENT — PATIENT HEALTH QUESTIONNAIRE - PHQ9
SUM OF ALL RESPONSES TO PHQ QUESTIONS 1-9: 0
1. LITTLE INTEREST OR PLEASURE IN DOING THINGS: 0
2. FEELING DOWN, DEPRESSED OR HOPELESS: 0
SUM OF ALL RESPONSES TO PHQ QUESTIONS 1-9: 0
SUM OF ALL RESPONSES TO PHQ9 QUESTIONS 1 & 2: 0

## 2024-03-08 NOTE — PROGRESS NOTES
Deepthi Kessler  Identified pt with two pt identifiers(name and ).     Chief Complaint   Patient presents with    Hypertension     Room 4B //     Cholesterol Problem       Reviewed record In preparation for visit and have obtained necessary documentation.     1. Have you been to the ER, urgent care clinic or hospitalized since your last visit? No     2. Have you seen or consulted any other health care providers outside of the [facility] since your last visit? Include any pap smears or colon screening. No    Patient has an advance directive.     Vitals reviewed with provider.    Health Maintenance reviewed:     Health Maintenance Due   Topic    HIV screen     Pneumococcal 65+ years Vaccine (2 - PCV)    Respiratory Syncytial Virus (RSV) Pregnant or age 60 yrs+ (1 - 1-dose 60+ series)    Flu vaccine (1)    COVID-19 Vaccine (2 -  season)    Annual Wellness Visit (Medicare Advantage)           Wt Readings from Last 3 Encounters:   24 107 kg (236 lb)   23 111.6 kg (246 lb)   23 111.6 kg (246 lb)        Temp Readings from Last 3 Encounters:   24 97.7 °F (36.5 °C) (Oral)   23 98 °F (36.7 °C) (Oral)        BP Readings from Last 3 Encounters:   24 (!) 142/84   24 (!) 143/97   23 124/72        Pulse Readings from Last 3 Encounters:   24 81   24 84   23 87             No data to display                  Learning Assessment:   :          No data to display                  Fall Risk Assessment:         2023     8:17 PM 2023     9:00 AM 2022    12:32 PM 2022     9:28 AM 1/3/2022     9:48 PM 1/3/2022     9:00 AM 2022    10:10 PM   Amb Fall Risk Assessment and TUG Test   Do you feel unsteady or are you worried about falling?  no yes        2 or more falls in past year? yes yes        Fall with injury in past year? no no        Total Score   3 2 2 3 3         Abuse Screening:          No data to display

## 2024-03-11 LAB
25(OH)D3+25(OH)D2 SERPL-MCNC: 23.6 NG/ML (ref 30–100)
ALBUMIN SERPL-MCNC: 4.2 G/DL (ref 3.9–4.9)
ALBUMIN/GLOB SERPL: 1.8 {RATIO} (ref 1.2–2.2)
ALP SERPL-CCNC: 121 IU/L (ref 44–121)
ALT SERPL-CCNC: 17 IU/L (ref 0–32)
AST SERPL-CCNC: 22 IU/L (ref 0–40)
BILIRUB SERPL-MCNC: 0.5 MG/DL (ref 0–1.2)
BUN SERPL-MCNC: 16 MG/DL (ref 8–27)
BUN/CREAT SERPL: 19 (ref 12–28)
CALCIUM SERPL-MCNC: 9 MG/DL (ref 8.7–10.3)
CHLORIDE SERPL-SCNC: 106 MMOL/L (ref 96–106)
CHOLEST SERPL-MCNC: 165 MG/DL (ref 100–199)
CO2 SERPL-SCNC: 23 MMOL/L (ref 20–29)
CREAT SERPL-MCNC: 0.84 MG/DL (ref 0.57–1)
EGFRCR SERPLBLD CKD-EPI 2021: 77 ML/MIN/1.73
EST. AVERAGE GLUCOSE BLD GHB EST-MCNC: 108 MG/DL
GLOBULIN SER CALC-MCNC: 2.3 G/DL (ref 1.5–4.5)
GLUCOSE SERPL-MCNC: 69 MG/DL (ref 70–99)
HBA1C MFR BLD: 5.4 % (ref 4.8–5.6)
HDLC SERPL-MCNC: 54 MG/DL
LDLC SERPL CALC-MCNC: 93 MG/DL (ref 0–99)
POTASSIUM SERPL-SCNC: 3.7 MMOL/L (ref 3.5–5.2)
PROT SERPL-MCNC: 6.5 G/DL (ref 6–8.5)
SODIUM SERPL-SCNC: 143 MMOL/L (ref 134–144)
T4 FREE SERPL-MCNC: 1.03 NG/DL (ref 0.82–1.77)
TRIGL SERPL-MCNC: 97 MG/DL (ref 0–149)
TSH SERPL DL<=0.005 MIU/L-ACNC: 6.54 UIU/ML (ref 0.45–4.5)
VLDLC SERPL CALC-MCNC: 18 MG/DL (ref 5–40)

## 2024-05-03 ENCOUNTER — TELEPHONE (OUTPATIENT)
Facility: CLINIC | Age: 64
End: 2024-05-03

## 2024-05-03 NOTE — TELEPHONE ENCOUNTER
I called the patient and verified them by name and date of birth. She stated that she needs the referral for physical therapy faxed to [facility] on [facility]. I informed her that I have faxed the referral. She stated understanding and had no further questions.

## 2024-05-03 NOTE — TELEPHONE ENCOUNTER
----- Message from Allyssa Belt sent at 5/3/2024 11:24 AM EDT -----  Subject: Referral Request    Reason for referral request? patient is wanting to have an referral put on   file for physical therapy. Please advise.  Provider patient wants to be referred to(if known):     Provider Phone Number(if known):    Additional Information for Provider?   ---------------------------------------------------------------------------  --------------  CALL BACK INFO    [phone]; OK to leave message on voicemail  ---------------------------------------------------------------------------  --------------

## 2024-05-31 RX ORDER — TROSPIUM CHLORIDE 20 MG/1
TABLET, FILM COATED ORAL
Qty: 60 TABLET | Refills: 5 | Status: SHIPPED | OUTPATIENT
Start: 2024-05-31

## 2024-05-31 NOTE — TELEPHONE ENCOUNTER
PCP: Mayco Teixeira PA-C     Last appt:  3/8/2024      Future Appointments   Date Time Provider Department Center   6/12/2024  9:30 AM Gatito Bradshaw MD [facility] AMB   7/18/2024 10:30 AM Mayco Teixeira PA-C [facility] AM[facility]   8/16/2024  9:00 AM Ted Liriano MD [facility] AM[facility]          Requested Prescriptions     Pending Prescriptions Disp Refills    trospium (SANCTURA) 20 MG tablet [Pharmacy Med Name: Trospium Chloride 20 MG Oral Tablet] 60 tablet 0     Sig: TAKE 1 TABLET BY MOUTH BEFORE BREAKFAST AND BEFORE SUPPER

## 2024-06-12 ENCOUNTER — PROCEDURE VISIT (OUTPATIENT)
Age: 64
End: 2024-06-12
Payer: MEDICARE

## 2024-06-12 VITALS
HEIGHT: 66 IN | OXYGEN SATURATION: 94 % | WEIGHT: 262 LBS | DIASTOLIC BLOOD PRESSURE: 68 MMHG | BODY MASS INDEX: 42.11 KG/M2 | SYSTOLIC BLOOD PRESSURE: 128 MMHG | RESPIRATION RATE: 16 BRPM | HEART RATE: 69 BPM

## 2024-06-12 DIAGNOSIS — G43.709 CHRONIC MIGRAINE WITHOUT AURA, NOT INTRACTABLE, WITHOUT STATUS MIGRAINOSUS: Primary | ICD-10-CM

## 2024-06-12 PROCEDURE — 64615 CHEMODENERV MUSC MIGRAINE: CPT | Performed by: PSYCHIATRY & NEUROLOGY

## 2024-06-12 RX ORDER — GALCANEZUMAB 120 MG/ML
120 INJECTION, SOLUTION SUBCUTANEOUS
Qty: 1.12 ML | Refills: 5 | Status: SHIPPED | OUTPATIENT
Start: 2024-06-12

## 2024-06-12 NOTE — PROGRESS NOTES
Botox Injection Note       Indication: patient has chronic recurrent migraine, more than 15 days/ month with duration longer than 4 hours. Tried many preventives , no avail.  Previous injections have helped her significantly.  She now rarely gets a migraine and duration is less than 2 hours.    Procedure:   Botox concentration: 200 units in 4 ml of preservative-free normal saline.   31 sites injections, distribution as follow      Units/site  Sites Sides Subtotal    Procerus 5 1 1 5    5 1 2 10   Frontalis 5 2 2 20   Temporalis 5 4 2 40   Occipitalis 5 3 2 30   Upper cervical paraspinalis 5 2 2 20   Trapezius 5 3 2 30         155 units were reconstituted and injected as above   Patient tolerated procedure well.     _____________________________   Gatito Bradshaw M.D.

## 2024-06-19 DIAGNOSIS — E78.00 PURE HYPERCHOLESTEROLEMIA, UNSPECIFIED: ICD-10-CM

## 2024-06-19 RX ORDER — ATORVASTATIN CALCIUM 40 MG/1
40 TABLET, FILM COATED ORAL DAILY
Qty: 90 TABLET | Refills: 0 | Status: SHIPPED | OUTPATIENT
Start: 2024-06-19

## 2024-06-19 NOTE — TELEPHONE ENCOUNTER
Requested Prescriptions     Signed Prescriptions Disp Refills    atorvastatin (LIPITOR) 40 MG tablet 90 tablet 0     Sig: Take 1 tablet by mouth once daily     Authorizing Provider: AUSTIN LIRIANO     Ordering User: ANDREW BLAIR   Per Dr. Liriano's verbal order.

## 2024-06-26 ENCOUNTER — APPOINTMENT (OUTPATIENT)
Facility: HOSPITAL | Age: 64
DRG: 057 | End: 2024-06-26
Payer: MEDICARE

## 2024-06-26 ENCOUNTER — HOSPITAL ENCOUNTER (INPATIENT)
Facility: HOSPITAL | Age: 64
LOS: 1 days | Discharge: NURSING, REHAB OR LONG-TERM CARE | DRG: 057 | End: 2024-06-28
Attending: EMERGENCY MEDICINE | Admitting: FAMILY MEDICINE
Payer: MEDICARE

## 2024-06-26 DIAGNOSIS — I69.30 HISTORY OF STROKE WITH RESIDUAL DEFICIT: ICD-10-CM

## 2024-06-26 DIAGNOSIS — G89.29 OTHER CHRONIC PAIN: ICD-10-CM

## 2024-06-26 DIAGNOSIS — G93.40 ENCEPHALOPATHY: Primary | ICD-10-CM

## 2024-06-26 LAB
ALBUMIN SERPL-MCNC: 3 G/DL (ref 3.5–5)
ALBUMIN/GLOB SERPL: 1 (ref 1.1–2.2)
ALP SERPL-CCNC: 122 U/L (ref 45–117)
ALT SERPL-CCNC: 59 U/L (ref 12–78)
ANION GAP SERPL CALC-SCNC: 0 MMOL/L (ref 5–15)
AST SERPL-CCNC: 37 U/L (ref 15–37)
BASOPHILS # BLD: 0 K/UL (ref 0–0.1)
BASOPHILS NFR BLD: 1 % (ref 0–1)
BILIRUB SERPL-MCNC: 0.4 MG/DL (ref 0.2–1)
BUN SERPL-MCNC: 22 MG/DL (ref 6–20)
BUN/CREAT SERPL: 20 (ref 12–20)
CALCIUM SERPL-MCNC: 7.6 MG/DL (ref 8.5–10.1)
CHLORIDE SERPL-SCNC: 120 MMOL/L (ref 97–108)
CO2 SERPL-SCNC: 24 MMOL/L (ref 21–32)
COMMENT:: NORMAL
CREAT SERPL-MCNC: 1.08 MG/DL (ref 0.55–1.02)
DIFFERENTIAL METHOD BLD: ABNORMAL
EKG ATRIAL RATE: 73 BPM
EKG DIAGNOSIS: NORMAL
EKG P AXIS: 36 DEGREES
EKG P-R INTERVAL: 166 MS
EKG Q-T INTERVAL: 386 MS
EKG QRS DURATION: 90 MS
EKG QTC CALCULATION (BAZETT): 425 MS
EKG R AXIS: 1 DEGREES
EKG T AXIS: 5 DEGREES
EKG VENTRICULAR RATE: 73 BPM
EOSINOPHIL # BLD: 0.2 K/UL (ref 0–0.4)
EOSINOPHIL NFR BLD: 4 % (ref 0–7)
ERYTHROCYTE [DISTWIDTH] IN BLOOD BY AUTOMATED COUNT: 14.6 % (ref 11.5–14.5)
ETHANOL SERPL-MCNC: <10 MG/DL (ref 0–0.08)
GLOBULIN SER CALC-MCNC: 2.9 G/DL (ref 2–4)
GLUCOSE SERPL-MCNC: 93 MG/DL (ref 65–100)
HCT VFR BLD AUTO: 38.4 % (ref 35–47)
HGB BLD-MCNC: 12 G/DL (ref 11.5–16)
IMM GRANULOCYTES # BLD AUTO: 0 K/UL (ref 0–0.04)
IMM GRANULOCYTES NFR BLD AUTO: 0 % (ref 0–0.5)
INR PPP: 1.2 (ref 0.9–1.1)
LYMPHOCYTES # BLD: 1.3 K/UL (ref 0.8–3.5)
LYMPHOCYTES NFR BLD: 31 % (ref 12–49)
MCH RBC QN AUTO: 28.9 PG (ref 26–34)
MCHC RBC AUTO-ENTMCNC: 31.3 G/DL (ref 30–36.5)
MCV RBC AUTO: 92.5 FL (ref 80–99)
MONOCYTES # BLD: 0.5 K/UL (ref 0–1)
MONOCYTES NFR BLD: 14 % (ref 5–13)
NEUTS SEG # BLD: 2 K/UL (ref 1.8–8)
NEUTS SEG NFR BLD: 50 % (ref 32–75)
NRBC # BLD: 0 K/UL (ref 0–0.01)
NRBC BLD-RTO: 0 PER 100 WBC
PLATELET # BLD AUTO: 157 K/UL (ref 150–400)
PMV BLD AUTO: 9.9 FL (ref 8.9–12.9)
POTASSIUM SERPL-SCNC: 3.8 MMOL/L (ref 3.5–5.1)
PROT SERPL-MCNC: 5.9 G/DL (ref 6.4–8.2)
PROTHROMBIN TIME: 12 SEC (ref 9–11.1)
RBC # BLD AUTO: 4.15 M/UL (ref 3.8–5.2)
SODIUM SERPL-SCNC: 144 MMOL/L (ref 136–145)
SPECIMEN HOLD: NORMAL
TROPONIN I SERPL HS-MCNC: 11 NG/L (ref 0–51)
WBC # BLD AUTO: 4 K/UL (ref 3.6–11)

## 2024-06-26 PROCEDURE — 36415 COLL VENOUS BLD VENIPUNCTURE: CPT

## 2024-06-26 PROCEDURE — 85025 COMPLETE CBC W/AUTO DIFF WBC: CPT

## 2024-06-26 PROCEDURE — 96360 HYDRATION IV INFUSION INIT: CPT

## 2024-06-26 PROCEDURE — 94761 N-INVAS EAR/PLS OXIMETRY MLT: CPT

## 2024-06-26 PROCEDURE — 96372 THER/PROPH/DIAG INJ SC/IM: CPT

## 2024-06-26 PROCEDURE — 85610 PROTHROMBIN TIME: CPT

## 2024-06-26 PROCEDURE — G0378 HOSPITAL OBSERVATION PER HR: HCPCS

## 2024-06-26 PROCEDURE — 4A03X5D MEASUREMENT OF ARTERIAL FLOW, INTRACRANIAL, EXTERNAL APPROACH: ICD-10-PCS | Performed by: FAMILY MEDICINE

## 2024-06-26 PROCEDURE — 80053 COMPREHEN METABOLIC PANEL: CPT

## 2024-06-26 PROCEDURE — 70498 CT ANGIOGRAPHY NECK: CPT

## 2024-06-26 PROCEDURE — 99291 CRITICAL CARE FIRST HOUR: CPT

## 2024-06-26 PROCEDURE — 6370000000 HC RX 637 (ALT 250 FOR IP): Performed by: HOSPITALIST

## 2024-06-26 PROCEDURE — 96375 TX/PRO/DX INJ NEW DRUG ADDON: CPT

## 2024-06-26 PROCEDURE — 96361 HYDRATE IV INFUSION ADD-ON: CPT

## 2024-06-26 PROCEDURE — 99285 EMERGENCY DEPT VISIT HI MDM: CPT

## 2024-06-26 PROCEDURE — 70450 CT HEAD/BRAIN W/O DYE: CPT

## 2024-06-26 PROCEDURE — 70551 MRI BRAIN STEM W/O DYE: CPT

## 2024-06-26 PROCEDURE — 6360000004 HC RX CONTRAST MEDICATION: Performed by: RADIOLOGY

## 2024-06-26 PROCEDURE — 82077 ASSAY SPEC XCP UR&BREATH IA: CPT

## 2024-06-26 PROCEDURE — 95813 EEG EXTND MNTR 61-119 MIN: CPT | Performed by: PSYCHIATRY & NEUROLOGY

## 2024-06-26 PROCEDURE — 6360000002 HC RX W HCPCS: Performed by: HOSPITALIST

## 2024-06-26 PROCEDURE — 93005 ELECTROCARDIOGRAM TRACING: CPT | Performed by: EMERGENCY MEDICINE

## 2024-06-26 PROCEDURE — 93010 ELECTROCARDIOGRAM REPORT: CPT | Performed by: SPECIALIST

## 2024-06-26 PROCEDURE — 2580000003 HC RX 258: Performed by: HOSPITALIST

## 2024-06-26 PROCEDURE — 84484 ASSAY OF TROPONIN QUANT: CPT

## 2024-06-26 PROCEDURE — 0042T CT BRAIN PERFUSION: CPT

## 2024-06-26 RX ORDER — SODIUM CHLORIDE 0.9 % (FLUSH) 0.9 %
5-40 SYRINGE (ML) INJECTION PRN
Status: DISCONTINUED | OUTPATIENT
Start: 2024-06-26 | End: 2024-06-28 | Stop reason: HOSPADM

## 2024-06-26 RX ORDER — MONTELUKAST SODIUM 10 MG/1
10 TABLET ORAL
Status: DISCONTINUED | OUTPATIENT
Start: 2024-06-26 | End: 2024-06-28 | Stop reason: HOSPADM

## 2024-06-26 RX ORDER — HYDROXYZINE HYDROCHLORIDE 25 MG/1
50 TABLET, FILM COATED ORAL EVERY 8 HOURS PRN
Status: DISCONTINUED | OUTPATIENT
Start: 2024-06-26 | End: 2024-06-28 | Stop reason: HOSPADM

## 2024-06-26 RX ORDER — ONDANSETRON 4 MG/1
4 TABLET, ORALLY DISINTEGRATING ORAL EVERY 8 HOURS PRN
Status: DISCONTINUED | OUTPATIENT
Start: 2024-06-26 | End: 2024-06-28 | Stop reason: HOSPADM

## 2024-06-26 RX ORDER — ASPIRIN 81 MG/1
81 TABLET ORAL DAILY
Status: DISCONTINUED | OUTPATIENT
Start: 2024-06-26 | End: 2024-06-26 | Stop reason: SDUPTHER

## 2024-06-26 RX ORDER — SODIUM CHLORIDE 9 MG/ML
INJECTION, SOLUTION INTRAVENOUS PRN
Status: DISCONTINUED | OUTPATIENT
Start: 2024-06-26 | End: 2024-06-28 | Stop reason: HOSPADM

## 2024-06-26 RX ORDER — CARVEDILOL 12.5 MG/1
12.5 TABLET ORAL 2 TIMES DAILY WITH MEALS
Status: DISCONTINUED | OUTPATIENT
Start: 2024-06-26 | End: 2024-06-28 | Stop reason: HOSPADM

## 2024-06-26 RX ORDER — DULOXETIN HYDROCHLORIDE 30 MG/1
120 CAPSULE, DELAYED RELEASE ORAL DAILY
Status: DISCONTINUED | OUTPATIENT
Start: 2024-06-27 | End: 2024-06-28 | Stop reason: HOSPADM

## 2024-06-26 RX ORDER — 0.9 % SODIUM CHLORIDE 0.9 %
1000 INTRAVENOUS SOLUTION INTRAVENOUS ONCE
Status: COMPLETED | OUTPATIENT
Start: 2024-06-26 | End: 2024-06-26

## 2024-06-26 RX ORDER — SODIUM CHLORIDE 0.9 % (FLUSH) 0.9 %
5-40 SYRINGE (ML) INJECTION EVERY 12 HOURS SCHEDULED
Status: DISCONTINUED | OUTPATIENT
Start: 2024-06-26 | End: 2024-06-28 | Stop reason: HOSPADM

## 2024-06-26 RX ORDER — CETIRIZINE HYDROCHLORIDE 10 MG/1
10 TABLET ORAL NIGHTLY
Status: DISCONTINUED | OUTPATIENT
Start: 2024-06-26 | End: 2024-06-28 | Stop reason: HOSPADM

## 2024-06-26 RX ORDER — POLYETHYLENE GLYCOL 3350 17 G/17G
17 POWDER, FOR SOLUTION ORAL DAILY PRN
Status: DISCONTINUED | OUTPATIENT
Start: 2024-06-26 | End: 2024-06-28 | Stop reason: HOSPADM

## 2024-06-26 RX ORDER — ATORVASTATIN CALCIUM 40 MG/1
40 TABLET, FILM COATED ORAL DAILY
Status: DISCONTINUED | OUTPATIENT
Start: 2024-06-27 | End: 2024-06-28 | Stop reason: HOSPADM

## 2024-06-26 RX ORDER — TOPIRAMATE 100 MG/1
100 TABLET, FILM COATED ORAL 2 TIMES DAILY
Status: DISCONTINUED | OUTPATIENT
Start: 2024-06-26 | End: 2024-06-28 | Stop reason: HOSPADM

## 2024-06-26 RX ORDER — ENOXAPARIN SODIUM 100 MG/ML
30 INJECTION SUBCUTANEOUS EVERY 12 HOURS
Status: DISCONTINUED | OUTPATIENT
Start: 2024-06-26 | End: 2024-06-28 | Stop reason: HOSPADM

## 2024-06-26 RX ORDER — SODIUM CHLORIDE 9 MG/ML
INJECTION, SOLUTION INTRAVENOUS CONTINUOUS
Status: DISCONTINUED | OUTPATIENT
Start: 2024-06-26 | End: 2024-06-27

## 2024-06-26 RX ORDER — ASPIRIN 300 MG/1
300 SUPPOSITORY RECTAL DAILY
Status: DISCONTINUED | OUTPATIENT
Start: 2024-06-27 | End: 2024-06-28 | Stop reason: HOSPADM

## 2024-06-26 RX ORDER — IPRATROPIUM BROMIDE AND ALBUTEROL SULFATE 2.5; .5 MG/3ML; MG/3ML
1 SOLUTION RESPIRATORY (INHALATION) EVERY 6 HOURS PRN
Status: DISCONTINUED | OUTPATIENT
Start: 2024-06-26 | End: 2024-06-28 | Stop reason: HOSPADM

## 2024-06-26 RX ORDER — BACLOFEN 10 MG/1
20 TABLET ORAL 2 TIMES DAILY PRN
Status: DISCONTINUED | OUTPATIENT
Start: 2024-06-26 | End: 2024-06-26

## 2024-06-26 RX ORDER — OXYCODONE AND ACETAMINOPHEN 10; 325 MG/1; MG/1
1 TABLET ORAL EVERY 6 HOURS PRN
Status: DISCONTINUED | OUTPATIENT
Start: 2024-06-26 | End: 2024-06-28 | Stop reason: HOSPADM

## 2024-06-26 RX ORDER — ONDANSETRON 2 MG/ML
4 INJECTION INTRAMUSCULAR; INTRAVENOUS EVERY 6 HOURS PRN
Status: DISCONTINUED | OUTPATIENT
Start: 2024-06-26 | End: 2024-06-28 | Stop reason: HOSPADM

## 2024-06-26 RX ORDER — OXYBUTYNIN CHLORIDE 10 MG/1
10 TABLET, EXTENDED RELEASE ORAL DAILY
Status: DISCONTINUED | OUTPATIENT
Start: 2024-06-26 | End: 2024-06-26 | Stop reason: SDUPTHER

## 2024-06-26 RX ORDER — FUROSEMIDE 40 MG/1
40 TABLET ORAL DAILY
Status: DISCONTINUED | OUTPATIENT
Start: 2024-06-26 | End: 2024-06-28

## 2024-06-26 RX ORDER — TROSPIUM CHLORIDE 20 MG/1
20 TABLET, FILM COATED ORAL
Status: DISCONTINUED | OUTPATIENT
Start: 2024-06-26 | End: 2024-06-28 | Stop reason: HOSPADM

## 2024-06-26 RX ORDER — PANTOPRAZOLE SODIUM 40 MG/1
40 TABLET, DELAYED RELEASE ORAL DAILY
Status: DISCONTINUED | OUTPATIENT
Start: 2024-06-27 | End: 2024-06-28 | Stop reason: HOSPADM

## 2024-06-26 RX ORDER — ASPIRIN 81 MG/1
81 TABLET, CHEWABLE ORAL DAILY
Status: DISCONTINUED | OUTPATIENT
Start: 2024-06-27 | End: 2024-06-28 | Stop reason: HOSPADM

## 2024-06-26 RX ORDER — FERROUS SULFATE 325(65) MG
325 TABLET ORAL
Status: DISCONTINUED | OUTPATIENT
Start: 2024-06-27 | End: 2024-06-28 | Stop reason: HOSPADM

## 2024-06-26 RX ORDER — CICLOPIROX 80 MG/ML
SOLUTION TOPICAL NIGHTLY
Status: DISCONTINUED | OUTPATIENT
Start: 2024-06-26 | End: 2024-06-27

## 2024-06-26 RX ORDER — FLUTICASONE PROPIONATE 50 MCG
2 SPRAY, SUSPENSION (ML) NASAL DAILY PRN
Status: DISCONTINUED | OUTPATIENT
Start: 2024-06-27 | End: 2024-06-28 | Stop reason: HOSPADM

## 2024-06-26 RX ORDER — BACLOFEN 10 MG/1
20 TABLET ORAL 3 TIMES DAILY
Status: DISCONTINUED | OUTPATIENT
Start: 2024-06-26 | End: 2024-06-26 | Stop reason: DRUGHIGH

## 2024-06-26 RX ORDER — PREGABALIN 75 MG/1
150 CAPSULE ORAL 3 TIMES DAILY
Status: DISCONTINUED | OUTPATIENT
Start: 2024-06-26 | End: 2024-06-28 | Stop reason: HOSPADM

## 2024-06-26 RX ORDER — BACLOFEN 10 MG/1
20 TABLET ORAL 2 TIMES DAILY PRN
Status: DISCONTINUED | OUTPATIENT
Start: 2024-06-26 | End: 2024-06-28 | Stop reason: HOSPADM

## 2024-06-26 RX ORDER — CALCIPOTRIENE 50 UG/G
CREAM TOPICAL DAILY PRN
Status: DISCONTINUED | OUTPATIENT
Start: 2024-06-26 | End: 2024-06-28 | Stop reason: HOSPADM

## 2024-06-26 RX ORDER — LOSARTAN POTASSIUM 50 MG/1
50 TABLET ORAL DAILY
Status: DISCONTINUED | OUTPATIENT
Start: 2024-06-26 | End: 2024-06-28 | Stop reason: HOSPADM

## 2024-06-26 RX ORDER — CLOPIDOGREL BISULFATE 75 MG/1
75 TABLET ORAL
Status: COMPLETED | OUTPATIENT
Start: 2024-06-26 | End: 2024-06-26

## 2024-06-26 RX ORDER — LORAZEPAM 2 MG/ML
0.5 INJECTION INTRAMUSCULAR EVERY 6 HOURS PRN
Status: DISCONTINUED | OUTPATIENT
Start: 2024-06-26 | End: 2024-06-28 | Stop reason: HOSPADM

## 2024-06-26 RX ORDER — LIDOCAINE HYDROCHLORIDE 20 MG/ML
JELLY TOPICAL ONCE
Status: DISCONTINUED | OUTPATIENT
Start: 2024-06-26 | End: 2024-06-28 | Stop reason: HOSPADM

## 2024-06-26 RX ADMIN — ENOXAPARIN SODIUM 30 MG: 100 INJECTION SUBCUTANEOUS at 22:04

## 2024-06-26 RX ADMIN — PREGABALIN 150 MG: 75 CAPSULE ORAL at 19:15

## 2024-06-26 RX ADMIN — Medication 10 ML: at 22:04

## 2024-06-26 RX ADMIN — IOPAMIDOL 80 ML: 755 INJECTION, SOLUTION INTRAVENOUS at 14:19

## 2024-06-26 RX ADMIN — LORAZEPAM 0.5 MG: 2 INJECTION, SOLUTION INTRAMUSCULAR; INTRAVENOUS at 20:06

## 2024-06-26 RX ADMIN — IOPAMIDOL 40 ML: 755 INJECTION, SOLUTION INTRAVENOUS at 14:18

## 2024-06-26 RX ADMIN — SODIUM CHLORIDE: 9 INJECTION, SOLUTION INTRAVENOUS at 19:13

## 2024-06-26 RX ADMIN — MONTELUKAST 10 MG: 10 TABLET, FILM COATED ORAL at 22:03

## 2024-06-26 RX ADMIN — CETIRIZINE HYDROCHLORIDE 10 MG: 10 TABLET, FILM COATED ORAL at 22:03

## 2024-06-26 RX ADMIN — CLOPIDOGREL BISULFATE 75 MG: 75 TABLET ORAL at 15:31

## 2024-06-26 RX ADMIN — SODIUM CHLORIDE 1000 ML: 9 INJECTION, SOLUTION INTRAVENOUS at 15:32

## 2024-06-26 RX ADMIN — TOPIRAMATE 100 MG: 100 TABLET, FILM COATED ORAL at 22:03

## 2024-06-26 NOTE — PROGRESS NOTES
Neurocritical Care Code Stroke Documentation      Symptoms: Left-side weakness, lethargy, slurred speech   Baseline mRS:   2   Last Known Well: 0800   Medical hx: Past Medical History:   Diagnosis Date    Advanced care planning/counseling discussion 03/04/2016    On File    Bronchitis 02/24/2014    Cervicalgia 08/18/2015    Dr. JENISE Khan    Chest pain 05/25/2015    Hospitalized at [facility] 5/25/15 (lab work negative)    Chronic indwelling Kern catheter 01/24/2018    Initially placed Jan 2018. Mx by Dr. Venkatesh Arredondo.     Congestive heart failure, unspecified     Last Echo 2/8/15: EF 55-60%    Constipation 06/13/2017    Enthesopathy, spinal ([facility]) 08/18/2015    Dr. JENISE Khan    Essential hypertension     Foot drop 08/18/2015    Dr. JENISE Khan    Heart attack ([facility]) 02/24/2013    Was supposed to See Cardiology for possible pacemaker in november 2014- After Cardiology consult locally, no need, EF greatly improved. Established with Dr. Liriano     Hiatal hernia 06/2015    3 cm hiatal hernia     Hip pain 08/18/2015    Dr. JENISE Khan    Hypercholesterolemia     Hyperglycemia 07/2015    A1c 5.9     Hyperlipidemia 06/30/2015    NMR lipoprofile- LDL P 997, LDL-c 71, HLD-C-39, TG-60, HLD-P (25.2), Small LDL-P -541, LDL size 20.6    Hypothyroid     Insomnia 06/13/2017    Lower extremity edema     Lumbar spondylosis 08/18/2015    Dr. JENISE Khan    Melena 06/2015    EGD/Colonscopy 6/15- Gastritis, internal hemorrhoids and 3 polyps    Menopause     Murmur     SAIRA on CPAP     Was referred to Pulmonology - Uses CPAP    Osteoarthritis of hip 08/18/2015    Dr. JENISE Khan    Osteoarthritis, shoulder 08/18/2015    Dr. JENISE Khan    Radiculopathy, cervical 08/18/2015    Dr. JENISE Khan    Shoulder pain 08/18/2015    Dr. JENISE Khan    Spinal stenosis of cervical region 08/18/2015    Dr. JENISE Khan    Spinal stenosis, lumbar 08/18/2015    Dr. JENISE Khan    Stroke ([facility]) 02/25/2014    Established with Neurology, Bryanna Jean-Baptiste, NP-Just hospitalized at [facility] 2/7/15-2/10/15. CT negative, but Late effect CV

## 2024-06-26 NOTE — H&P
History and Physical    Date of Service:  6/26/2024  Primary Care Provider: Mayco Teixeira PA-C  Source of information: The patient and Chart review    Chief Complaint: Aphasia and Extremity Weakness      History of Presenting Illness:   Deepthi Kessler is a 64 y.o. female who presents with changes in mental status.     Please note that the whole history is as provided by the patient's sister as the patient was not able to give any meaningful history.    64 yr old female with pmh of stroke with residual left sided weakness, chronic diastolic CHF, OA, spinal stenosis presents from her adult day care with changes in mental status. When she woke up this am she was having some \"tremors' but otherwise she was quite at her baseline. At 7:30 am she went to adult day care and at around 1pm the sister got a call that the patient was more confused.     Per ER documentation, the patient had richard left sided weakness and slurred speech than her baseline but the sister sitting beside the patient does not think so.    The patient denies any headache, blurry vision, sore throat, trouble swallowing, trouble with speech, chest pain, SOB, cough, fever, chills, N/V/D, abd pain, urinary symptoms, constipation, recent travels, sick contacts, focal or generalized neurological symptoms, falls, injuries, rashes, contact with COVID-19 diagnosed patients, hematemesis, melena, hemoptysis, hematuria, rashes, denies starting any new medications and denies any other concerns or problems besides as mentioned above.         REVIEW OF SYSTEMS:  A comprehensive review of systems was negative except for that written in the History of Present Illness.     Past Medical History:   Diagnosis Date    Advanced care planning/counseling discussion 03/04/2016    On File    Bronchitis 02/24/2014    Cervicalgia 08/18/2015    Dr. JENISE Khan    Chest pain 05/25/2015    Hospitalized at [facility] 5/25/15 (lab work negative)    Chronic indwelling Kern catheter

## 2024-06-26 NOTE — PROGRESS NOTES
Lovenox Monitoring  Indication: DVT Prophylaxis  Recent Labs     06/26/24  1358   HGB 12.0      INR 1.2*     Current Weight: 118 kg (as of June 12 2024)  Est. CrCl = 50-60 ml/min  Current Dose: 40 mg subcutaneously every 24 hours.  Plan: Change to enoxaparin 30 mg Q12H for weight 118 kg and crcl > 30 ml/min

## 2024-06-26 NOTE — ED PROVIDER NOTES
INSERT SUPRAPUBIC CATH 5/24/2019 [facility] ANGIO IR    IR ASP BLADDER W INSERT SUPRAPUBIC CATH  5/24/2019    ORTHOPEDIC SURGERY      Right middle finger distal amputation    THYROIDECTOMY Left 1985    90% of one side of the thyroid removed    THYROIDECTOMY, PARTIAL      1990    UPPER GASTROINTESTINAL ENDOSCOPY  6/2015    mild gastritis, 3cm hiatal hernia          CURRENT MEDICATIONS       Previous Medications    ASPIRIN 81 MG EC TABLET    Take 1 tablet by mouth daily    ATORVASTATIN (LIPITOR) 40 MG TABLET    Take 1 tablet by mouth once daily    BACLOFEN (LIORESAL) 20 MG TABLET    TAKE 1 TABLET BY MOUTH EVERY 8 HOURS AS NEEDED FOR MUSCLE SPASM(S)    CALCIPOTRIENE (DOVONEX) 0.005 % CREAM    Apply topically daily as needed    CARVEDILOL (COREG) 12.5 MG TABLET    Take 1 tablet by mouth 2 times daily (with meals)    CICLOPIROX (PENLAC) 8 % SOLUTION    APPLY  SOLUTION TOPICALLY TO AFFECTED TOE NAILS DAILY    DULOXETINE (CYMBALTA) 60 MG EXTENDED RELEASE CAPSULE    Take 2 capsules by mouth daily    EQ ALLERGY RELIEF, CETIRIZINE, 10 MG TABLET    Take 1 tablet by mouth nightly    FERROUS SULFATE (IRON 325) 325 (65 FE) MG TABLET    Take 1 tablet by mouth every morning (before breakfast)    FLUTICASONE (FLONASE) 50 MCG/ACT NASAL SPRAY    USE 1 TO 2 SPRAY(S) IN EACH NOSTRIL ONCE DAILY AS NEEDED    FUROSEMIDE (LASIX) 40 MG TABLET    Take 1 tablet by mouth daily    GALCANEZUMAB-GNLM (EMGALITY) 120 MG/ML SOSY INJECTION    Inject 1 mL into the skin every 30 days    HYDROXYZINE HCL (ATARAX) 50 MG TABLET    Take 1 tablet by mouth every 8 hours as needed    IPRATROPIUM-ALBUTEROL (DUONEB) 0.5-2.5 (3) MG/3ML SOLN NEBULIZER SOLUTION    Inhale 3 mLs into the lungs every 6 hours as needed    LEVOTHYROXINE (SYNTHROID) 125 MCG TABLET    Take 1 tablet by mouth every morning (before breakfast)    LINZESS 145 MCG CAPSULE    TAKE 1 CAPSULE BY MOUTH ONCE DAILY BEFORE BREAKFAST FOR CONSTIPATION    LOSARTAN (COZAAR) 50 MG TABLET    TAKE 1 TABLET BY  administration in time range)   clopidogrel (PLAVIX) tablet 75 mg (has no administration in time range)   iopamidol (ISOVUE-370) 76 % injection 100 mL (80 mLs IntraVENous Given 6/26/24 1419)   iopamidol (ISOVUE-370) 76 % injection 100 mL (40 mLs IntraVENous Given 6/26/24 1418)       Perfect Serve Consult for Admission  2:57 PM    ED Room Number: ER30/30  Patient Name and age:  Deepthi Kessler 64 y.o.  female  Working Diagnosis:   1. Encephalopathy    2. History of stroke with residual deficit        COVID-19 Suspicion: No  Sepsis present:  No  Reassessment needed: No  Code Status:  Full Code  Readmission: No  Isolation Requirements: no  Recommended Level of Care: telemetry  Department: [facility] Adult ED - [phone]  Consulting Provider: Carlitos rodriguez    Other:  65yo F with history of CVA with L sided hemiparesis presented via EMS from day program as CODE STROKE with worsening L sided weakness, confusion, and slurred speech. CT scans are negative. She has confusion and perseveration on exam. Treating with DAPT for now, but ordered MRI and EEG for further eval per michael recs.     Total critical care time spent exclusive of procedures:  32 minutes.       FINAL IMPRESSION      1. Encephalopathy    2. History of stroke with residual deficit          DISPOSITION/PLAN   DISPOSITION Admitted 06/26/2024 03:13:57 PM        PATIENT REFERRED TO:  No follow-up provider specified.    DISCHARGE MEDICATIONS:  New Prescriptions    No medications on file         (Please note that portions of this note were completed with a voice recognition program.  Efforts were made to edit the dictations but occasionally words are mis-transcribed.)    Humberto Faustin MD (electronically signed)  Emergency Attending Physician             Humberto Faustin MD  06/26/24 1820

## 2024-06-26 NOTE — ED TRIAGE NOTES
Pt arrives via ems from adult day care. Pt arrived to  baseline at 0800. LKW 0800. Pt baseline has left sided weakness but staff reports worsened left side weakness, Slurred speech and \"feeling funny\" and left shoulder pain. Pt has miller catheter in place and left leg brace. EMS reports , /60. , , 96% RA.

## 2024-06-27 PROBLEM — R29.90 STROKE-LIKE SYMPTOM: Status: ACTIVE | Noted: 2024-06-27

## 2024-06-27 LAB
ALBUMIN SERPL-MCNC: 3.1 G/DL (ref 3.5–5)
ALBUMIN/GLOB SERPL: 1.1 (ref 1.1–2.2)
ALP SERPL-CCNC: 129 U/L (ref 45–117)
ALT SERPL-CCNC: 53 U/L (ref 12–78)
AMPHET UR QL SCN: NEGATIVE
ANION GAP SERPL CALC-SCNC: 5 MMOL/L (ref 5–15)
APPEARANCE UR: CLEAR
AST SERPL-CCNC: 33 U/L (ref 15–37)
BACTERIA URNS QL MICRO: NEGATIVE /HPF
BARBITURATES UR QL SCN: NEGATIVE
BENZODIAZ UR QL: NEGATIVE
BILIRUB SERPL-MCNC: 0.4 MG/DL (ref 0.2–1)
BILIRUB UR QL: NEGATIVE
BUN SERPL-MCNC: 22 MG/DL (ref 6–20)
BUN/CREAT SERPL: 22 (ref 12–20)
CALCIUM SERPL-MCNC: 8.1 MG/DL (ref 8.5–10.1)
CANNABINOIDS UR QL SCN: NEGATIVE
CHLORIDE SERPL-SCNC: 115 MMOL/L (ref 97–108)
CHOLEST SERPL-MCNC: 125 MG/DL
CO2 SERPL-SCNC: 24 MMOL/L (ref 21–32)
COCAINE UR QL SCN: NEGATIVE
COLOR UR: ABNORMAL
CREAT SERPL-MCNC: 0.98 MG/DL (ref 0.55–1.02)
EPITH CASTS URNS QL MICRO: ABNORMAL /LPF
ERYTHROCYTE [DISTWIDTH] IN BLOOD BY AUTOMATED COUNT: 14.6 % (ref 11.5–14.5)
EST. AVERAGE GLUCOSE BLD GHB EST-MCNC: 103 MG/DL
GLOBULIN SER CALC-MCNC: 2.7 G/DL (ref 2–4)
GLUCOSE SERPL-MCNC: 82 MG/DL (ref 65–100)
GLUCOSE UR STRIP.AUTO-MCNC: NEGATIVE MG/DL
HBA1C MFR BLD: 5.2 % (ref 4–5.6)
HCT VFR BLD AUTO: 37.1 % (ref 35–47)
HDLC SERPL-MCNC: 59 MG/DL
HDLC SERPL: 2.1 (ref 0–5)
HGB BLD-MCNC: 11.6 G/DL (ref 11.5–16)
HGB UR QL STRIP: ABNORMAL
KETONES UR QL STRIP.AUTO: NEGATIVE MG/DL
LDLC SERPL CALC-MCNC: 57 MG/DL (ref 0–100)
LEUKOCYTE ESTERASE UR QL STRIP.AUTO: ABNORMAL
Lab: NORMAL
MCH RBC QN AUTO: 28.8 PG (ref 26–34)
MCHC RBC AUTO-ENTMCNC: 31.3 G/DL (ref 30–36.5)
MCV RBC AUTO: 92.1 FL (ref 80–99)
METHADONE UR QL: NEGATIVE
NITRITE UR QL STRIP.AUTO: NEGATIVE
NRBC # BLD: 0 K/UL (ref 0–0.01)
NRBC BLD-RTO: 0 PER 100 WBC
OPIATES UR QL: NEGATIVE
PCP UR QL: NEGATIVE
PH UR STRIP: 5.5 (ref 5–8)
PLATELET # BLD AUTO: 158 K/UL (ref 150–400)
PMV BLD AUTO: 9.8 FL (ref 8.9–12.9)
POTASSIUM SERPL-SCNC: 3.9 MMOL/L (ref 3.5–5.1)
PROT SERPL-MCNC: 5.8 G/DL (ref 6.4–8.2)
PROT UR STRIP-MCNC: ABNORMAL MG/DL
RBC # BLD AUTO: 4.03 M/UL (ref 3.8–5.2)
RBC #/AREA URNS HPF: ABNORMAL /HPF (ref 0–5)
SODIUM SERPL-SCNC: 144 MMOL/L (ref 136–145)
SP GR UR REFRACTOMETRY: 1.01 (ref 1–1.03)
SPECIMEN HOLD: NORMAL
TRIGL SERPL-MCNC: 45 MG/DL
UROBILINOGEN UR QL STRIP.AUTO: 1 EU/DL (ref 0.2–1)
VLDLC SERPL CALC-MCNC: 9 MG/DL
WBC # BLD AUTO: 4.2 K/UL (ref 3.6–11)
WBC URNS QL MICRO: ABNORMAL /HPF (ref 0–4)

## 2024-06-27 PROCEDURE — 99223 1ST HOSP IP/OBS HIGH 75: CPT | Performed by: STUDENT IN AN ORGANIZED HEALTH CARE EDUCATION/TRAINING PROGRAM

## 2024-06-27 PROCEDURE — 97162 PT EVAL MOD COMPLEX 30 MIN: CPT

## 2024-06-27 PROCEDURE — 97530 THERAPEUTIC ACTIVITIES: CPT

## 2024-06-27 PROCEDURE — 95816 EEG AWAKE AND DROWSY: CPT | Performed by: PSYCHIATRY & NEUROLOGY

## 2024-06-27 PROCEDURE — 96372 THER/PROPH/DIAG INJ SC/IM: CPT

## 2024-06-27 PROCEDURE — 2060000000 HC ICU INTERMEDIATE R&B

## 2024-06-27 PROCEDURE — G0378 HOSPITAL OBSERVATION PER HR: HCPCS

## 2024-06-27 PROCEDURE — 95816 EEG AWAKE AND DROWSY: CPT

## 2024-06-27 PROCEDURE — 92610 EVALUATE SWALLOWING FUNCTION: CPT

## 2024-06-27 PROCEDURE — 2580000003 HC RX 258: Performed by: HOSPITALIST

## 2024-06-27 PROCEDURE — 96365 THER/PROPH/DIAG IV INF INIT: CPT

## 2024-06-27 PROCEDURE — 81001 URINALYSIS AUTO W/SCOPE: CPT

## 2024-06-27 PROCEDURE — 4A00X4Z MEASUREMENT OF CENTRAL NERVOUS ELECTRICAL ACTIVITY, EXTERNAL APPROACH: ICD-10-PCS | Performed by: PSYCHIATRY & NEUROLOGY

## 2024-06-27 PROCEDURE — 6370000000 HC RX 637 (ALT 250 FOR IP): Performed by: HOSPITALIST

## 2024-06-27 PROCEDURE — 95813 EEG EXTND MNTR 61-119 MIN: CPT

## 2024-06-27 PROCEDURE — 83036 HEMOGLOBIN GLYCOSYLATED A1C: CPT

## 2024-06-27 PROCEDURE — 36415 COLL VENOUS BLD VENIPUNCTURE: CPT

## 2024-06-27 PROCEDURE — 1100000003 HC PRIVATE W/ TELEMETRY

## 2024-06-27 PROCEDURE — 97165 OT EVAL LOW COMPLEX 30 MIN: CPT

## 2024-06-27 PROCEDURE — 85027 COMPLETE CBC AUTOMATED: CPT

## 2024-06-27 PROCEDURE — 80053 COMPREHEN METABOLIC PANEL: CPT

## 2024-06-27 PROCEDURE — 6360000002 HC RX W HCPCS: Performed by: HOSPITALIST

## 2024-06-27 PROCEDURE — 80307 DRUG TEST PRSMV CHEM ANLYZR: CPT

## 2024-06-27 PROCEDURE — 80061 LIPID PANEL: CPT

## 2024-06-27 PROCEDURE — 92523 SPEECH SOUND LANG COMPREHEN: CPT

## 2024-06-27 PROCEDURE — 6360000002 HC RX W HCPCS: Performed by: STUDENT IN AN ORGANIZED HEALTH CARE EDUCATION/TRAINING PROGRAM

## 2024-06-27 RX ORDER — MAGNESIUM SULFATE IN WATER 40 MG/ML
2000 INJECTION, SOLUTION INTRAVENOUS ONCE
Status: COMPLETED | OUTPATIENT
Start: 2024-06-27 | End: 2024-06-27

## 2024-06-27 RX ADMIN — TROSPIUM CHLORIDE 20 MG: 20 TABLET, FILM COATED ORAL at 16:00

## 2024-06-27 RX ADMIN — LEVOTHYROXINE SODIUM 125 MCG: 0.03 TABLET ORAL at 07:27

## 2024-06-27 RX ADMIN — PANTOPRAZOLE SODIUM 40 MG: 40 TABLET, DELAYED RELEASE ORAL at 10:10

## 2024-06-27 RX ADMIN — PREGABALIN 150 MG: 75 CAPSULE ORAL at 09:00

## 2024-06-27 RX ADMIN — FERROUS SULFATE TAB 325 MG (65 MG ELEMENTAL FE) 325 MG: 325 (65 FE) TAB at 12:10

## 2024-06-27 RX ADMIN — MONTELUKAST 10 MG: 10 TABLET, FILM COATED ORAL at 20:33

## 2024-06-27 RX ADMIN — OXYCODONE AND ACETAMINOPHEN 1 TABLET: 10; 325 TABLET ORAL at 23:16

## 2024-06-27 RX ADMIN — DULOXETINE HYDROCHLORIDE 120 MG: 30 CAPSULE, DELAYED RELEASE ORAL at 09:00

## 2024-06-27 RX ADMIN — ATORVASTATIN CALCIUM 40 MG: 40 TABLET, FILM COATED ORAL at 10:10

## 2024-06-27 RX ADMIN — Medication 10 ML: at 10:10

## 2024-06-27 RX ADMIN — ASPIRIN 81 MG CHEWABLE TABLET 81 MG: 81 TABLET CHEWABLE at 09:00

## 2024-06-27 RX ADMIN — PREGABALIN 150 MG: 75 CAPSULE ORAL at 20:33

## 2024-06-27 RX ADMIN — PREGABALIN 150 MG: 75 CAPSULE ORAL at 13:11

## 2024-06-27 RX ADMIN — MAGNESIUM SULFATE HEPTAHYDRATE 2000 MG: 40 INJECTION, SOLUTION INTRAVENOUS at 13:06

## 2024-06-27 RX ADMIN — ENOXAPARIN SODIUM 30 MG: 100 INJECTION SUBCUTANEOUS at 09:00

## 2024-06-27 RX ADMIN — SODIUM CHLORIDE: 9 INJECTION, SOLUTION INTRAVENOUS at 07:39

## 2024-06-27 RX ADMIN — CETIRIZINE HYDROCHLORIDE 10 MG: 10 TABLET, FILM COATED ORAL at 20:33

## 2024-06-27 RX ADMIN — OXYCODONE AND ACETAMINOPHEN 1 TABLET: 10; 325 TABLET ORAL at 04:08

## 2024-06-27 RX ADMIN — TOPIRAMATE 100 MG: 100 TABLET, FILM COATED ORAL at 09:00

## 2024-06-27 RX ADMIN — TOPIRAMATE 100 MG: 100 TABLET, FILM COATED ORAL at 20:33

## 2024-06-27 RX ADMIN — ENOXAPARIN SODIUM 30 MG: 100 INJECTION SUBCUTANEOUS at 20:33

## 2024-06-27 RX ADMIN — TROSPIUM CHLORIDE 20 MG: 20 TABLET, FILM COATED ORAL at 07:27

## 2024-06-27 ASSESSMENT — PAIN DESCRIPTION - LOCATION
LOCATION: BACK;LEG
LOCATION: HEAD

## 2024-06-27 ASSESSMENT — PAIN DESCRIPTION - ORIENTATION: ORIENTATION: LOWER;RIGHT;LEFT

## 2024-06-27 ASSESSMENT — PAIN SCALES - GENERAL
PAINLEVEL_OUTOF10: 7
PAINLEVEL_OUTOF10: 7

## 2024-06-27 ASSESSMENT — PAIN DESCRIPTION - DESCRIPTORS
DESCRIPTORS: ACHING
DESCRIPTORS: THROBBING;NAGGING

## 2024-06-27 NOTE — PLAN OF CARE
Problem: Occupational Therapy - Adult  Goal: By Discharge: Performs self-care activities at highest level of function for planned discharge setting.  See evaluation for individualized goals.  Description: FUNCTIONAL STATUS PRIOR TO ADMISSION:  Patient was ambulatory using w/c chair for longer distances, chair lift to her bedroom where she utilized rw  Receives Help From: Family, ADL Assistance: Independent,  ,  ,  ,  ,  , Homemaking Assistance: Needs assistance, Ambulation Assistance: Needs assistance, Transfer Assistance: Independent, Active : No     HOME SUPPORT: Patient lived sister and reported that she received assistance as needed.    Occupational Therapy Goals:  Initiated 6/27/2024  1.  Patient will perform self-feeding with [place] within 7 day(s).  2.  Patient will perform grooming with Minimal Assist within 7 day(s).  3.  Patient will perform bathing with Minimal Assist within 7 day(s).  4.  Patient will perform toilet transfers with Minimal Assist  within 7 day(s).  5.  Patient will perform all aspects of toileting with Minimal Assist within 7 day(s).  6.  Patient will participate in upper extremity therapeutic exercise/activities with Minimal Assist for 5 minutes within 7 day(s).    7.  Patient will utilize energy conservation techniques during functional activities with verbal cues within 7 day(s).   Outcome: Progressing   OCCUPATIONAL THERAPY EVALUATION    Patient: Deepthi Kessler (64 y.o. female)  Date: 6/27/2024  Primary Diagnosis: Encephalopathy [G93.40]  Left-sided weakness [R53.1]  History of stroke with residual deficit [I69.30]  Stroke-like symptom [R29.90]         Precautions: Fall Risk                  ASSESSMENT :  The patient is limited by decreased functional mobility, independence in ADLs, high-level IADLs, ROM, strength, body mechanics, activity tolerance, endurance, safety awareness, coordination, balance, posture, fine-motor control  s/p admission for stroke like  assistance  Problem Solving: Decreased awareness of errors;Assistance required to identify errors made;Assistance required to correct errors made  Insights: Decreased awareness of deficits  Initiation: Requires cues for some  Sequencing: Requires cues for some              Range of Motion:   AROM: Generally decreased, functional         Strength:  Strength: Generally decreased, functional      Coordination:  Coordination: Generally decreased, functional (L worse then R at baseline)     Coordination: Generally decreased, functional      Tone & Sensation:   Tone: Normal  Sensation: Intact          Functional Mobility and Transfers for ADLs:    Bed Mobility:     Bed Mobility Training  Bed Mobility Training: Yes  Overall Level of Assistance: Contact-guard assistance;Assist X2  Interventions: Verbal cues;Safety awareness training  Supine to Sit: Contact-guard assistance;Assist X2;Additional time  Scooting: Minimum assistance;Assist X1;Additional time    Transfers:      Transfer Training  Transfer Training: Yes  Overall Level of Assistance: Moderate assistance;Assist X2  Interventions: Safety awareness training;Verbal cues  Sit to Stand: Moderate assistance;Additional time  Stand to Sit: Moderate assistance;Additional time;Assist X2  Bed to Chair: Moderate assistance;Assist X2;Adaptive equipment (RW)                     Balance:      Balance  Sitting: Impaired  Sitting - Static: Fair (occasional)  Sitting - Dynamic: Not tested  Standing: Impaired  Standing - Static: Fair;Constant support  Standing - Dynamic: Constant support;Poor      ADL Assessment:          Feeding: Setup;Based on clinical judgement       Grooming: Moderate assistance  Grooming Skilled Clinical Factors: in standing    UE Bathing: Minimal assistance            LE Bathing: Minimal assistance       UE Dressing: Minimal assistance       LE Dressing: Minimal assistance       Toileting: Moderate assistance                         ADL Intervention and task

## 2024-06-27 NOTE — PLAN OF CARE
sensation. Pt received semi-fowlers in bed agreeable to PT services. Pt completed mobility to EOB at overall CGA x2. Noted decreased functional strength and diminished sensation in L LE > R. Unclear of baseline strength and sensation due to pt being poor historian. Pt exhibited increased posterior lean with prolonged sitting, however correctable with tactile and verbal cueing. Pt declined use of home AFO. Transfer to bedside chair with RW completed at mod A x2 level, requiring assistance for sequencing of RW. Gait characterized by decreased clearance and stance time of L LE. Pt is limited by listed impairments affecting her ability to navigate the home environment at this time. Recommend SNF at d/c pending continued progress.    Functional Outcome Measure:  The patient scored 3/56 on the BBS outcome measure which is indicative of high risk for falls.         PLAN :  Recommendations and Planned Interventions:   bed mobility training, transfer training, gait training, and neuromuscular re-education    Frequency/Duration: Patient will be followed by physical therapy to address goals, PT Plan of Care: 5 times/week to address goals.    Recommend with staff: therapy recommendations for staff: Recommend mobility with staff assist x2 using gait belt and rolling walker.    Recommend for next PT session: sit to stand transfers, bed to bedside chair transfers, and further progression of gait with existing device    Recommendation for discharge: (in order for the patient to meet his/her long term goals): Therapy up to 5 days/week in Skilled nursing facility    Other factors to consider for discharge: patient's current support system is unable to meet their requirements for physical assistance, poor safety awareness, high risk for falls, not safe to be alone, and concern for safely navigating or managing the home environment    IF patient discharges home will need the following DME: patient owns DME required for discharge    Decision-Making   HIGH Complexity :3+ comorbidities / personal factors will impact the outcome/ POC  MEDIUM Complexity : 3 Standardized tests and measures addressin body structure, function, activity limitation and / or participation in recreation  LOW Complexity : Stable, uncomplicated  Marina Balance Test  HIGH    Based on the above components, the patient evaluation is determined to be of the following complexity level: Low     Regarding student involvement in patient care:  A student participated in this treatment session. Per CMS Medicare statements and APTA guidelines I certify that the following was true:  1. I was present and directly observed the entire session.  2. I made all skilled judgments and clinical decisions regarding care.  3. I am the practitioner responsible for assessment, treatment, and documentation.

## 2024-06-27 NOTE — CONSULTS
Neurology Consult Note     NAME: Deepthi Kessler   :  1960   MRN:  [id number]   DATE:  2024    Assessment and Plan:     63 yo F with extensive PMH including prior stroke with residual L sided weakness, migraine headaches, HTN, HLD, SAIRA presenting with episode of acute on chronic dysarthria, R sided weakness, preceded by tremulousness. UA negative. Cr mildly elevated Ct neuroimaging without acute changes or stenosis. MRI brain showed small chronic bifrontal infarcts and CIWM but no acute acute changes. Exam shows L sided weakness and numbness as well as slow, mildly dysarthric speech. Presentation most consistent with recrudescence of prior stroke symptoms, though without clear trigger. Seizure possible but without specific evidence. Of note, patient is on numerous medications that could potentially cause encephalopathy in the chronically ill and elderly patient population. Polypharmacy is a distinct concern.    - Continue home ASA and statin  - Routine EEG  - Management of polypharmacy per primary team  - PT/OT/SLP as needed    Subjective   HPI:  Deepthi Kessler is a 64 y.o. female who presents with Left-sided weakness. Neurology was consulted for AMS. History obtained per patient and chart review.    Ms. Kessler presents from her adult  due to reported acute on chronic dysarthria, L sided weakness, trembling. Per patient, she had felt tremulous yesterday morning and was dropping objects with both hands. She also noted that L arm was weaker than usual. Staff reportedly noted worsened dysarthria. No reports of focal jerking movements or loss of consciousness. UA negative. CT neuroimaging without acute changes or stenosis. MRI brain showed small chronic bifrontal infarcts but no acute changes. Cr slightly elevated at 1.08.     Patient feels back to  - 5.0     Comprehensive Metabolic Panel w/ Reflex to MG    Collection Time: 06/27/24  4:02 AM   Result Value Ref Range    Sodium 144 136 - 145 mmol/L    Potassium 3.9 3.5 - 5.1 mmol/L    Chloride 115 (H) 97 - 108 mmol/L    CO2 24 21 - 32 mmol/L    Anion Gap 5 5 - 15 mmol/L    Glucose 82 65 - 100 mg/dL    BUN 22 (H) 6 - 20 MG/DL    Creatinine 0.98 0.55 - 1.02 MG/DL    BUN/Creatinine Ratio 22 (H) 12 - 20      Est, Glom Filt Rate 64 >60 ml/min/1.73m2    Calcium 8.1 (L) 8.5 - 10.1 MG/DL    Total Bilirubin 0.4 0.2 - 1.0 MG/DL    ALT 53 12 - 78 U/L    AST 33 15 - 37 U/L    Alk Phosphatase 129 (H) 45 - 117 U/L    Total Protein 5.8 (L) 6.4 - 8.2 g/dL    Albumin 3.1 (L) 3.5 - 5.0 g/dL    Globulin 2.7 2.0 - 4.0 g/dL    Albumin/Globulin Ratio 1.1 1.1 - 2.2     Urinalysis with Microscopic    Collection Time: 06/27/24  7:29 AM   Result Value Ref Range    Color, UA YELLOW/STRAW      Appearance CLEAR CLEAR      Specific Gravity, UA 1.015 1.003 - 1.030      pH, Urine 5.5 5.0 - 8.0      Protein, UA TRACE (A) NEG mg/dL    Glucose, Ur Negative NEG mg/dL    Ketones, Urine Negative NEG mg/dL    Bilirubin, Urine Negative NEG      Blood, Urine MODERATE (A) NEG      Urobilinogen, Urine 1.0 0.2 - 1.0 EU/dL    Nitrite, Urine Negative NEG      Leukocyte Esterase, Urine SMALL (A) NEG      WBC, UA 5-10 0 - 4 /hpf    RBC, UA 10-20 0 - 5 /hpf    Epithelial Cells, UA FEW FEW /lpf    BACTERIA, URINE Negative NEG /hpf   Urine Drug Screen    Collection Time: 06/27/24  7:29 AM   Result Value Ref Range    Amphetamine, Urine Negative NEG      Barbiturates, Urine Negative NEG      Benzodiazepines, Urine Negative NEG      Cocaine, Urine Negative NEG      Methadone, Urine Negative NEG      Opiates, Urine Negative NEG      Phencyclidine, Urine Negative NEG      THC, TH-Cannabinol, Urine Negative NEG      Comments: (NOTE)    Urine Culture Hold Sample    Collection Time: 06/27/24  7:29 AM    Specimen: Urine   Result Value Ref Range    Specimen HOld

## 2024-06-27 NOTE — PLAN OF CARE
Problem: Discharge Planning  Goal: Discharge to home or other facility with appropriate resources  Outcome: Progressing     Problem: Skin/Tissue Integrity  Goal: Absence of new skin breakdown  Description: 1.  Monitor for areas of redness and/or skin breakdown  2.  Assess vascular access sites hourly  3.  Every 4-6 hours minimum:  Change oxygen saturation probe site  4.  Every 4-6 hours:  If on nasal continuous positive airway pressure, respiratory therapy assess nares and determine need for appliance change or resting period.  Outcome: Progressing     Problem: Safety - Adult  Goal: Free from fall injury  Outcome: Progressing     Problem: Chronic Conditions and Co-morbidities  Goal: Patient's chronic conditions and co-morbidity symptoms are monitored and maintained or improved  Outcome: Progressing

## 2024-06-27 NOTE — PROGRESS NOTES
2048- Spoke with RN supervisor Melinda regarding our request for aid in changing superpubic catheter and collecting UA.  Was told they would follow-up.

## 2024-06-27 NOTE — CARE COORDINATION
Care Management Initial Assessment       RUR: N/A  Readmission? No  1st IM letter given? Yes - 6/27/24  1st  letter given: No  Patient has BCBS Medicare --[employer]   / Medicaid    Admission  Encephalopathy  Left sided weakness    cva  Hx  CVA, chronic suprapubic catheter, HTN  SAIRA      Patient   inpatient today per NP Jose Castaneda    Disposition   SNF  Referral sent via [facility]port to [facility] [phone]  accepted  Patient will require insurance authorization  Authorization started today  #[id number]    Transportation   BLS/ wheelchair van    Medical follow up  PCP and specialist    Contact  Sister  Justus Kessler  [phone]    Previous:  HH  none  Snf/IPR  [facility] 13/24--2/21/24  DME   RW, quad cane, hospital bed, stair lift  Outpatient therapy    [facility] Tuesday and Thursdays     CM met with patient in her room to introduce self and explain role.   Patient was alert and oriented and confirmed demographics PCP and insurance.    Patient lives in a two level home with Her sister and two adult nieces.  Patient has a stair lift to the 2nd floor where her bedroom and bathroom is located.    Patient was independent with adl's and iadl's prior to admission with sister assisting as needed.    Patient uses RW, quad cane and gustavo wheelchair for mobility     Patient goes to [facility] [address] [place] [address] Rd [phone] Tuesday, Wednesday, Thursday, and Saturday.  8 am-3 pm.   She uses Medicaid transportation--[employer] wheelchair van for transport.   She or the director of the adult day care arranges her rides--   (Patient said he has not wanted HH in the past as she cannot go to adult day care with HH.   (She has opted for [facility] outpatient)    Patient said she would return to [facility] for snf if recommended.   Therapy recommended SNF--  CM talked with BRI Castaneda and he is in agreement and CM sent referral to [facility]  Car;Van   Occupation Retired   Discharge Planning   Type of Residence Other (Comment)  (home vs snf)   Living Arrangements Family Members   Current Services Prior To Admission   ([facility] 1/24)   Potential Assistance Needed Skilled Nursing Facility   DME Ordered? No   Potential Assistance Purchasing Medications No  (medicaid)   Type of Home Care Services None   Patient expects to be discharged to: Unknown  (home vs SNF)   Services At/After Discharge   Transition of Care Consult (CM Consult) Discharge Planning   Mode of Transport at Discharge Other (see comment)  (family vs bls)   Condition of Participation: Discharge Planning   The Plan for Transition of Care is related to the following treatment goals: SNF   [facility]   The Patient and/or Patient Representative was provided with a Choice of Provider? Patient   The Patient and/Or Patient Representative agree with the Discharge Plan? Yes   Freedom of Choice list was provided with basic dialogue that supports the patient's individualized plan of care/goals, treatment preferences, and shares the quality data associated with the providers?  Yes      MERLYN HUYNH

## 2024-06-27 NOTE — PROGRESS NOTES
0100- Contacted nursing supervisor Melinda regarding the previous request for aid in changing superpubic catheter and collecting UA. Was told she was unable to find anyone who is a superuser of replacing suprapubic catheters but will continue to look

## 2024-06-27 NOTE — PROGRESS NOTES
Attempted to deliver and verbally explain the MOON with patient. Patient was with clinical staff. Millicent Worley, Care Management Assistant

## 2024-06-27 NOTE — PROGRESS NOTES
Speech LAnguage Pathology EVALUATION    Patient: Deepthi Kessler (64 y.o. female)  Date: 6/27/2024  Primary Diagnosis: Encephalopathy [G93.40]  Left-sided weakness [R53.1]  History of stroke with residual deficit [I69.30]    Precautions: Fall Risk    ASSESSMENT :  Patient participated in clinical swallow and cognitive-linguistic evaluation. MRI Brain negative for acute intracranial process and showed chronic lacunar infarcts in periventricular R and L frontal lobes. She denies oropharyngeal dysphagia at present or in past. Functional oropharyngeal swallow appreciated with PO trials of thin liquids and solids. Low suspicion for aspiration risk based on bedside presentation. Recommend she remain on current PO diet of regular texture with thin liquids with general swallow precautions. No further swallowing intervention warranted. Patient reports decline in communication from her baseline. AMRs and SMRs inconsistent. Speech rate slow but intelligibility preserved. Patient with reduced complex word retrieval speed/efficiency, notably for open-ended naming tasks (divergent naming, thought organization). She followed multi-step commands and demonstrated a reliable y/n response. Slowed processing speed noted. Suspect functional communication deficits cognitive in nature and likely consistent with prior CVA. SLP will continue to follow to address aforementioned clinical deficits.    Patient will benefit from skilled intervention to address the above impairments.     PLAN :  Recommendations and Planned Interventions:  Diet: Regular and thin liquids  -Oral medications whole with liquids  -Upright for PO intake   -Routine oral care    Communication:  -Provide extra processing and response time  -Provide verbal description and/or binary choices    Recommend next SLP session: Open-ended naming, ongoing informal cognitive-linguistic assessment as clinically warranted    Acute SLP Services: Yes, patient will be followed by

## 2024-06-27 NOTE — PROGRESS NOTES
Hospitalist Progress Note  MIROSLAVA Morales NP  Answering service: [phone] OR [phone] from in house phone        Date of Service:  2024  NAME:  Deepthi Kessler  :  1960  MRN:  [id number]      Admission Summary:   Per H&P   Deepthi Kessler is a 64 y.o. female who presents with changes in mental status.      Please note that the whole history is as provided by the patient's sister as the patient was not able to give any meaningful history.     64 yr old female with pmh of stroke with residual left sided weakness, chronic diastolic CHF, OA, spinal stenosis presents from her adult day care with changes in mental status. When she woke up this am she was having some \"tremors' but otherwise she was quite at her baseline. At 7:30 am she went to adult day care and at around 1pm the sister got a call that the patient was more confused.      Per ER documentation, the patient had richard left sided weakness and slurred speech than her baseline but the sister sitting beside the patient does not think so.     The patient denies any headache, blurry vision, sore throat, trouble swallowing, trouble with speech, chest pain, SOB, cough, fever, chills, N/V/D, abd pain, urinary symptoms, constipation, recent travels, sick contacts, focal or generalized neurological symptoms, falls, injuries, rashes, contact with COVID-19 diagnosed patients, hematemesis, melena, hemoptysis, hematuria, rashes, denies starting any new medications and denies any other concerns or problems besides as mentioned above.     Interval history / Subjective:   Saw the patient today on rounds.  Still feels woozy however symptoms improved.     Assessment & Plan:         Expressive aphasia  History of stroke with residual left sided weakness  CT unremarkable  CTA with no LVO  CTP unremarkable  MRI negative  Has been seen by neurology, appreciate  solution 1 Dose  1 Dose Inhalation Q6H PRN    montelukast (SINGULAIR) tablet 10 mg  10 mg Oral QHS    oxyCODONE-acetaminophen (PERCOCET)  MG per tablet 1 tablet  1 tablet Oral Q6H PRN    pantoprazole (PROTONIX) tablet 40 mg  40 mg Oral Daily    ciclopirox (PENLAC) 8 % solution   Topical Nightly    linaclotide (LINZESS) capsule 145 mcg  145 mcg Oral QAM AC    cetirizine (ZYRTEC) tablet 10 mg  10 mg Oral Nightly    pregabalin (LYRICA) capsule 150 mg  150 mg Oral TID    levothyroxine (SYNTHROID) tablet 125 mcg  125 mcg Oral QAM AC    topiramate (TOPAMAX) tablet 100 mg  100 mg Oral BID    atorvastatin (LIPITOR) tablet 40 mg  40 mg Oral Daily    trospium (SANCTURA) tablet 20 mg  20 mg Oral BID AC    [Held by provider] losartan (COZAAR) tablet 50 mg  50 mg Oral Daily    [Held by provider] furosemide (LASIX) tablet 40 mg  40 mg Oral Daily    calcipotriene (DOVONEX) 0.005 % cream   Topical Daily PRN    [Held by provider] carvedilol (COREG) tablet 12.5 mg  12.5 mg Oral BID WC    sodium chloride flush 0.9 % injection 5-40 mL  5-40 mL IntraVENous 2 times per day    sodium chloride flush 0.9 % injection 5-40 mL  5-40 mL IntraVENous PRN    0.9 % sodium chloride infusion   IntraVENous PRN    ondansetron (ZOFRAN-ODT) disintegrating tablet 4 mg  4 mg Oral Q8H PRN    Or    ondansetron (ZOFRAN) injection 4 mg  4 mg IntraVENous Q6H PRN    polyethylene glycol (GLYCOLAX) packet 17 g  17 g Oral Daily PRN    enoxaparin Sodium (LOVENOX) injection 30 mg  30 mg SubCUTAneous Q12H    aspirin chewable tablet 81 mg  81 mg Oral Daily    Or    aspirin suppository 300 mg  300 mg Rectal Daily    baclofen (LIORESAL) tablet 20 mg  20 mg Oral BID PRN    lidocaine (XYLOCAINE) 2 % uro-jet   Topical Once     ______________________________________________________________________  EXPECTED LENGTH OF STAY: Unable to retrieve estimated LOS  ACTUAL LENGTH OF STAY:          0                 MIROSLAVA Morales NP

## 2024-06-28 VITALS
DIASTOLIC BLOOD PRESSURE: 70 MMHG | HEART RATE: 77 BPM | SYSTOLIC BLOOD PRESSURE: 117 MMHG | TEMPERATURE: 98.4 F | RESPIRATION RATE: 15 BRPM | BODY MASS INDEX: 40.71 KG/M2 | WEIGHT: 253.31 LBS | OXYGEN SATURATION: 98 % | HEIGHT: 66 IN

## 2024-06-28 PROBLEM — I69.30 HISTORY OF STROKE WITH RESIDUAL DEFICIT: Status: ACTIVE | Noted: 2024-06-28

## 2024-06-28 PROCEDURE — 6370000000 HC RX 637 (ALT 250 FOR IP): Performed by: HOSPITALIST

## 2024-06-28 PROCEDURE — 6360000002 HC RX W HCPCS: Performed by: HOSPITALIST

## 2024-06-28 PROCEDURE — 6370000000 HC RX 637 (ALT 250 FOR IP): Performed by: NURSE PRACTITIONER

## 2024-06-28 PROCEDURE — 94760 N-INVAS EAR/PLS OXIMETRY 1: CPT

## 2024-06-28 PROCEDURE — 99231 SBSQ HOSP IP/OBS SF/LOW 25: CPT | Performed by: NURSE PRACTITIONER

## 2024-06-28 PROCEDURE — 2580000003 HC RX 258: Performed by: HOSPITALIST

## 2024-06-28 RX ORDER — OXYCODONE AND ACETAMINOPHEN 10; 325 MG/1; MG/1
1 TABLET ORAL EVERY 6 HOURS PRN
Qty: 40 TABLET | Refills: 0 | Status: SHIPPED | OUTPATIENT
Start: 2024-06-28 | End: 2024-07-08

## 2024-06-28 RX ORDER — FUROSEMIDE 40 MG/1
40 TABLET ORAL DAILY
Status: DISCONTINUED | OUTPATIENT
Start: 2024-06-28 | End: 2024-06-28 | Stop reason: HOSPADM

## 2024-06-28 RX ORDER — PREGABALIN 150 MG/1
CAPSULE ORAL
Qty: 60 CAPSULE | Refills: 3 | Status: SHIPPED | OUTPATIENT
Start: 2024-06-28 | End: 2024-07-28

## 2024-06-28 RX ADMIN — TOPIRAMATE 100 MG: 100 TABLET, FILM COATED ORAL at 09:48

## 2024-06-28 RX ADMIN — ATORVASTATIN CALCIUM 40 MG: 40 TABLET, FILM COATED ORAL at 09:48

## 2024-06-28 RX ADMIN — PREGABALIN 150 MG: 75 CAPSULE ORAL at 12:33

## 2024-06-28 RX ADMIN — DULOXETINE HYDROCHLORIDE 120 MG: 30 CAPSULE, DELAYED RELEASE ORAL at 09:46

## 2024-06-28 RX ADMIN — ENOXAPARIN SODIUM 30 MG: 100 INJECTION SUBCUTANEOUS at 09:49

## 2024-06-28 RX ADMIN — PANTOPRAZOLE SODIUM 40 MG: 40 TABLET, DELAYED RELEASE ORAL at 09:48

## 2024-06-28 RX ADMIN — ASPIRIN 81 MG CHEWABLE TABLET 81 MG: 81 TABLET CHEWABLE at 09:48

## 2024-06-28 RX ADMIN — OXYCODONE AND ACETAMINOPHEN 1 TABLET: 10; 325 TABLET ORAL at 09:57

## 2024-06-28 RX ADMIN — PREGABALIN 150 MG: 75 CAPSULE ORAL at 09:49

## 2024-06-28 RX ADMIN — FERROUS SULFATE TAB 325 MG (65 MG ELEMENTAL FE) 325 MG: 325 (65 FE) TAB at 12:33

## 2024-06-28 RX ADMIN — TROSPIUM CHLORIDE 20 MG: 20 TABLET, FILM COATED ORAL at 05:39

## 2024-06-28 RX ADMIN — FUROSEMIDE 40 MG: 40 TABLET ORAL at 10:29

## 2024-06-28 RX ADMIN — Medication 10 ML: at 09:50

## 2024-06-28 RX ADMIN — LEVOTHYROXINE SODIUM 125 MCG: 0.03 TABLET ORAL at 05:39

## 2024-06-28 NOTE — PROGRESS NOTES
contrast-enhanced head CT:    The ventricles are midline without hydrocephalus.  There is no acute intra or  extra-axial hemorrhage. Mild to moderate bilateral subcortical and  periventricular areas of patchy low attenuation is nonspecific but likely  related to changes of chronic small vessel ischemic disease. The basal cisterns  are clear.  The paranasal sinuses are clear.    CTA NECK:    Great vessels: Patent great vessel origins    Right subclavian artery: Patent    Left subclavian artery: Patent    Right common carotid artery: Patent    Left common carotid artery: Patent    Cervical right internal carotid artery: Patent with proximal atherosclerosis  causing no significant stenosis by NASCET criteria.    Cervical left internal carotid artery: Patent with proximal atherosclerosis  causing no significant stenosis by NASCET criteria.    Right vertebral artery: Patent    Left vertebral artery: Patent    CTA HEAD:    Right cavernous internal carotid artery: Mild proximal ectasia measuring 6 mm  (2-308) with mild to moderate atherosclerosis    Left cavernous internal carotid artery: Mild to moderate atherosclerosis    Anterior cerebral arteries: Patent    Anterior communicating artery: Patent    Right middle cerebral artery: Patent    Left middle cerebral artery: Patent    Posterior communicating arteries: Patent    Posterior cerebral arteries: Mild bilateral atherosclerosis    Basilar artery: Patent    Distal vertebral arteries: Patent    Measurements use NASCET criteria.    CT perfusion brain: No significant cerebral perfusion abnormality    Distended esophagus with internal contents.    Moderate multilevel cervical spondylosis.    Impression  1. No acute large vessel arterial occlusion or cerebral perfusion abnormality.  Mild to moderate atherosclerotic changes. Fusiform ectasia of the right  cavernous internal carotid artery.  2. Moderately distended esophagus.          Electronically signed by Yasmin ROWELL  Av      Care Plan discussed with:  Patient x   Family    RN    Care Manager    Consultant/Specialist:       Signed:MIROSLAVA Man - COLLETTE

## 2024-06-28 NOTE — CARE COORDINATION
Transition of Care Plan to SNF/Rehab    Communication to Patient/Family:  Met with patient and family and they are agreeable to the transition plan. The Plan for Transition of Care is related to the following treatment goals: SNF    The Patient and/or patient representative was provided with a choice of provider and agrees  with the discharge plan.      Yes [x] No []    A Freedom of choice list was provided with basic dialogue that supports the patient's individualized plan of care/goals and shares the quality data associated with the providers.       Yes [x] No []    SNF/Rehab Transition:  Patient has been accepted to [facility] SNF/Rehab and meets criteria for admission.   Patient will transported by [facility] and expected to leave at 3pm.    Communication to SNF/Rehab:  Bedside RN, Raquel, has been notified to update the transition plan to the facility and call report ([phone]; Room 401).  Discharge information has been updated on the AVS. And communicated to facility via WrapMail/All CareSpotter, or CC link.     Discharge instructions to be fax'd to facility at (Fax #).     Nursing Please include all hard scripts for controlled substances, med rec and dc summary, and AVS in packet.     Reviewed and confirmed with facility, [facility], can manage the patient care needs for the following:     Ted with (X) only those applicable:  Medication:  [x]Medications are available at the facility  []IV Antibiotics    []Controlled Substance - hard copies available sent.  []Weekly Labs    Equipment:  []CPAP/BiPAP  []Wound Vacuum  []Kern or Urinary Device  []PICC/Central Line  []Nebulizer  []Ventilator    Treatment:  []Isolation (for MRSA, VRE, etc.)  []Surgical Drain Management  []Tracheostomy Care  []Dressing Changes  []Dialysis with transportation  []PEG Care  []Oxygen  []Daily Weights for Heart Failure    Dietary:  []Any diet limitations  []Tube Feedings   []Total Parenteral Management (TPN)

## 2024-06-28 NOTE — PROCEDURES
PROCEDURE: ROUTINE INPATIENT EEG  NAME:   Deepthi Kessler  ACCOUNT NUMBER : [id number]  MRN:   [id number]  DATE OF SERVICE: 6/27/2024    HISTORY/INDICATION: Patient is a 64-year-old female who was brought in from her adult  due to altered mental status.  Patient has a history of CVA with left-sided weakness.  EEG is performed to assess for evidence of underlying epilepsy as an etiology for mental status changes.    MEDICATIONS:   Current Facility-Administered Medications   Medication Dose Route Frequency Provider Last Rate Last Admin    linaclotide (LINZESS) capsule 145 mcg (Patient Supplied)  145 mcg Oral QAM Armin Thomas MD   145 mcg at 06/28/24 0538    LORazepam (ATIVAN) injection 0.5 mg  0.5 mg IntraVENous Q6H PRN Armin Ghotra MD   0.5 mg at 06/26/24 2006    DULoxetine (CYMBALTA) extended release capsule 120 mg  120 mg Oral Daily Armin Ghotra MD   120 mg at 06/28/24 0946    ferrous sulfate (IRON 325) tablet 325 mg  325 mg Oral Armin Sharif MD   325 mg at 06/27/24 1210    fluticasone (FLONASE) 50 MCG/ACT nasal spray 2 spray  2 spray Each Nostril Daily PRN Armin Ghotra MD        hydrOXYzine HCl (ATARAX) tablet 50 mg  50 mg Oral Q8H PRN Armin Ghotra MD        ipratropium 0.5 mg-albuterol 2.5 mg (DUONEB) nebulizer solution 1 Dose  1 Dose Inhalation Q6H PRN Armin Ghotra MD        montelukast (SINGULAIR) tablet 10 mg  10 mg Oral QHS Armin Ghotra MD   10 mg at 06/27/24 2033    oxyCODONE-acetaminophen (PERCOCET)  MG per tablet 1 tablet  1 tablet Oral Q6H PRN Armin Ghotra MD   1 tablet at 06/27/24 2316    pantoprazole (PROTONIX) tablet 40 mg  40 mg Oral Daily Armin Ghotra MD   40 mg at 06/28/24 0948    cetirizine (ZYRTEC) tablet 10 mg  10 mg Oral Nightly Armin Ghotra MD   10 mg at 06/27/24 2033    pregabalin (LYRICA) capsule 150 mg  150 mg Oral TID Armin Ghotra MD   150 mg at 06/28/24 0949    levothyroxine (SYNTHROID) tablet 125 mcg  125 mcg Oral QAM AC Armin Ghotra MD   125 mcg at 06/28/24 0528     topiramate (TOPAMAX) tablet 100 mg  100 mg Oral BID Armin Ghotra MD   100 mg at 06/28/24 0948    atorvastatin (LIPITOR) tablet 40 mg  40 mg Oral Daily Armin Ghotra MD   40 mg at 06/28/24 0948    trospium (SANCTURA) tablet 20 mg  20 mg Oral BID AC Armin Ghotra MD   20 mg at 06/28/24 0539    [Held by provider] losartan (COZAAR) tablet 50 mg  50 mg Oral Daily Armin Ghotra MD        [Held by provider] furosemide (LASIX) tablet 40 mg  40 mg Oral Daily Armin Ghotra MD        calcipotriene (DOVONEX) 0.005 % cream   Topical Daily PRN Armin Ghotra MD        [Held by provider] carvedilol (COREG) tablet 12.5 mg  12.5 mg Oral BID WC Armin Ghotra MD        sodium chloride flush 0.9 % injection 5-40 mL  5-40 mL IntraVENous 2 times per day Armin Ghotra MD   10 mL at 06/28/24 0950    sodium chloride flush 0.9 % injection 5-40 mL  5-40 mL IntraVENous PRN Armin Ghotra MD        0.9 % sodium chloride infusion   IntraVENous PRN Armin Ghotra MD        ondansetron (ZOFRAN-ODT) disintegrating tablet 4 mg  4 mg Oral Q8H PRN Armin Ghotra MD        Or    ondansetron (ZOFRAN) injection 4 mg  4 mg IntraVENous Q6H PRN Armin Ghotra MD        polyethylene glycol (GLYCOLAX) packet 17 g  17 g Oral Daily PRN Armin Ghotra MD        enoxaparin Sodium (LOVENOX) injection 30 mg  30 mg SubCUTAneous Q12H Armin Ghotra MD   30 mg at 06/28/24 0949    aspirin chewable tablet 81 mg  81 mg Oral Daily Armin Ghotra MD   81 mg at 06/28/24 0948    Or    aspirin suppository 300 mg  300 mg Rectal Daily Armin Ghotra MD        baclofen (LIORESAL) tablet 20 mg  20 mg Oral BID PRN Armin Ghotra MD        lidocaine (XYLOCAINE) 2 % uro-jet   Topical Once Armin Ghotra MD           CONDITIONS OF RECORDING: This is a routine 21-channel EEG recording performed in accordance with the international 10-20 system with one channel devoted to limited EKG. This study was done during states of wakefulness and drowsiness. Photic stimulation was performed as an activating

## 2024-06-28 NOTE — DISCHARGE SUMMARY
Discharge Summary       PATIENT ID: Deepthi Kessler  MRN: [id number]   YOB: 1960    DATE OF ADMISSION: 6/26/2024  1:40 PM    DATE OF DISCHARGE: 6/28/2024   PRIMARY CARE PROVIDER: Mayco Teixeira PA-C     ATTENDING PHYSICIAN: SORIN Arcos MD  DISCHARGING PROVIDER: MIROSLAVA Morales NP    To contact this individual call [phone] and ask the  to page.  If unavailable ask to be transferred the Adult Hospitalist Department.    CONSULTATIONS: IP CONSULT TO TELE-NEUROLOGY  IP CONSULT TO HOSPITALIST  IP CONSULT TO NEUROLOGY    PROCEDURES/SURGERIES: * No surgery found *     ADMITTING DIAGNOSES & HOSPITAL COURSE:   Per H&P   Deepthi Kessler is a 64 y.o. female who presents with changes in mental status.      Please note that the whole history is as provided by the patient's sister as the patient was not able to give any meaningful history.     64 yr old female with pmh of stroke with residual left sided weakness, chronic diastolic CHF, OA, spinal stenosis presents from her adult day care with changes in mental status. When she woke up this am she was having some \"tremors' but otherwise she was quite at her baseline. At 7:30 am she went to adult day care and at around 1pm the sister got a call that the patient was more confused.      Per ER documentation, the patient had richard left sided weakness and slurred speech than her baseline but the sister sitting beside the patient does not think so.     The patient denies any headache, blurry vision, sore throat, trouble swallowing, trouble with speech, chest pain, SOB, cough, fever, chills, N/V/D, abd pain, urinary symptoms, constipation, recent travels, sick contacts, focal or generalized neurological symptoms, falls, injuries, rashes, contact with COVID-19 diagnosed patients, hematemesis, melena, hemoptysis, hematuria, rashes, denies starting any new medications and denies any other concerns or problems besides as mentioned above.

## 2024-06-28 NOTE — DISCHARGE INSTRUCTIONS
Discharge SNF/Rehab Instructions/LTAC       PATIENT ID: Deepthi Kessler  MRN: [id number]   YOB: 1960    DATE OF ADMISSION: 6/26/2024  1:40 PM    DATE OF DISCHARGE: 6/28/2024    PRIMARY CARE PROVIDER: Mayco Teixeira PA-C       ATTENDING PHYSICIAN: Brandi Arcos MD  DISCHARGING PROVIDER: MIROSLAVA Morales NP     To contact this individual call [phone] and ask the  to page.   If unavailable ask to be transferred the Adult Hospitalist Department.    CONSULTATIONS: IP CONSULT TO TELE-NEUROLOGY  IP CONSULT TO HOSPITALIST  IP CONSULT TO NEUROLOGY    PROCEDURES/SURGERIES: * No surgery found *    ADMITTING DIAGNOSES & HOSPITAL COURSE:   Per H&P   Deepthi Kessler is a 64 y.o. female who presents with changes in mental status.      Please note that the whole history is as provided by the patient's sister as the patient was not able to give any meaningful history.     64 yr old female with pmh of stroke with residual left sided weakness, chronic diastolic CHF, OA, spinal stenosis presents from her adult day care with changes in mental status. When she woke up this am she was having some \"tremors' but otherwise she was quite at her baseline. At 7:30 am she went to adult day care and at around 1pm the sister got a call that the patient was more confused.      Per ER documentation, the patient had richard left sided weakness and slurred speech than her baseline but the sister sitting beside the patient does not think so.     The patient denies any headache, blurry vision, sore throat, trouble swallowing, trouble with speech, chest pain, SOB, cough, fever, chills, N/V/D, abd pain, urinary symptoms, constipation, recent travels, sick contacts, focal or generalized neurological symptoms, falls, injuries, rashes, contact with COVID-19 diagnosed patients, hematemesis, melena, hemoptysis, hematuria, rashes, denies starting any new medications and denies any other concerns or problems

## 2024-06-28 NOTE — PROGRESS NOTES
Stroke Education provided to patient and the following topics were discussed    1. Patients personal risk factors for stroke are hyperlipidemia and hypertension    2. Warning signs of Stroke:        * Sudden numbness or weakness of the face, arm or leg, especially on one side of          The body            * Sudden confusion, trouble speaking or understanding        * Sudden trouble seeing in one or both eyes        * Sudden trouble walking, dizziness, loss of balance or coordination        * Sudden severe headache with no known cause      3. Importance of activation Emergency Medical Services ( 9-1-1 ) immediately if experience any warning signs of stroke.    4. Be sure and schedule a follow-up appointment with your primary care doctor or any specialists as instructed.       6.  Smoking and second-hand smoke greatly increase your risk of stroke, cardiovascular disease and death. Smoking history never    7. Information provided was [facility] Stroke Education Binder, Stroke Handouts, or Verbal Education    8. Documentation of teaching completed in Patient Education Activity and on Care Plan with teaching response noted?  Yes    TRANSFER - OUT REPORT:    Verbal report given to RN on Deepthi Kessler  being transferred to [facility] for routine progression of patient care       Report consisted of patient's Situation, Background, Assessment and   Recommendations(SBAR).     Information from the following report(s) Neuro Assessment was reviewed with the receiving nurse.         Opportunity for questions and clarification was provided.

## 2024-06-28 NOTE — PLAN OF CARE
Problem: Discharge Planning  Goal: Discharge to home or other facility with appropriate resources  Outcome: Adequate for Discharge     Problem: Skin/Tissue Integrity  Goal: Absence of new skin breakdown  Description: 1.  Monitor for areas of redness and/or skin breakdown  2.  Assess vascular access sites hourly  3.  Every 4-6 hours minimum:  Change oxygen saturation probe site  4.  Every 4-6 hours:  If on nasal continuous positive airway pressure, respiratory therapy assess nares and determine need for appliance change or resting period.  Outcome: Adequate for Discharge     Problem: Chronic Conditions and Co-morbidities  Goal: Patient's chronic conditions and co-morbidity symptoms are monitored and maintained or improved  Outcome: Adequate for Discharge

## 2024-06-30 NOTE — PROCEDURES
PROCEDURE: RAPID EEG  NAME:   Deepthi Sherman  ACCOUNT NUMBER : [id number]    DATE OF SERVICE: 6/26/24    EEG Session Report  Patient Name: DEEPTHI SHERMAN  Medical ID: [id number]  YOB: 1960  Age: 64  Session Duration:  Jun 26, 2024 2:35 PM - Jun 26, 2024 4:00 PM  Recording Total Time: 01:24:43  Ordering Physician: PINA SOLITARIO  Primary Indication: Undifferentiated AMS  Location: ED      CONDITIONS OF RECORDING: This EEG was obtained using a 10 lead, 8 channel system positioned circumferentially without any parasagittal coverage (rapid EEG). Computer selected EEG is reviewed as well as background features and all clinically significant events. Clarity algorithm utilized and implemented to provide analysis of underlying activity and seizure detection used to facilitate reading.  I have reviewed the study in its entirety.    DESCRIPTION OF RECORDING:  Background activity consists of mixed slow and fast frequencies ranging from delta to faster beta range.  No asymmetry or sharp wave activity seen. There was significant lead artifact at T5 and T6, as well as muscle artifact seen.      IMPRESSION:  Within the limitations of the study, there are no clear or well-formed electrographic seizures or epileptiform discharges seen.     Note the absence of electrographic seizures does not rule out the diagnosis of a seizure disorder.  Clinical correlation is advised.  If clinical suspicion still persists, would recommend a routine EEG or continuous EEG monitoring with a 10-20 international electrode system for improved spatial resolution and parasagittal coverage.    Computer generated seizure burden 0%    Briana Dejesus MD

## 2024-07-15 ENCOUNTER — TELEPHONE (OUTPATIENT)
Facility: CLINIC | Age: 64
End: 2024-07-15

## 2024-07-18 ENCOUNTER — OFFICE VISIT (OUTPATIENT)
Facility: CLINIC | Age: 64
End: 2024-07-18
Payer: MEDICARE

## 2024-07-18 VITALS
BODY MASS INDEX: 38.83 KG/M2 | OXYGEN SATURATION: 94 % | HEIGHT: 66 IN | DIASTOLIC BLOOD PRESSURE: 67 MMHG | RESPIRATION RATE: 16 BRPM | WEIGHT: 241.6 LBS | HEART RATE: 71 BPM | SYSTOLIC BLOOD PRESSURE: 103 MMHG

## 2024-07-18 DIAGNOSIS — I50.9 CONGESTIVE HEART FAILURE, UNSPECIFIED HF CHRONICITY, UNSPECIFIED HEART FAILURE TYPE (HCC): ICD-10-CM

## 2024-07-18 DIAGNOSIS — E66.01 OBESITY, MORBID (HCC): ICD-10-CM

## 2024-07-18 DIAGNOSIS — R73.03 PREDIABETES: ICD-10-CM

## 2024-07-18 DIAGNOSIS — G47.33 OSA ON CPAP: ICD-10-CM

## 2024-07-18 DIAGNOSIS — E89.0 POSTOPERATIVE HYPOTHYROIDISM: ICD-10-CM

## 2024-07-18 DIAGNOSIS — I25.10 CORONARY ARTERY DISEASE INVOLVING NATIVE CORONARY ARTERY OF NATIVE HEART WITHOUT ANGINA PECTORIS: ICD-10-CM

## 2024-07-18 DIAGNOSIS — Z00.00 ENCOUNTER FOR SUBSEQUENT ANNUAL WELLNESS VISIT (AWV) IN MEDICARE PATIENT: Primary | ICD-10-CM

## 2024-07-18 DIAGNOSIS — J44.9 COPD WITHOUT EXACERBATION (HCC): ICD-10-CM

## 2024-07-18 DIAGNOSIS — E55.9 VITAMIN D DEFICIENCY: ICD-10-CM

## 2024-07-18 DIAGNOSIS — I50.22 CHRONIC SYSTOLIC (CONGESTIVE) HEART FAILURE (HCC): ICD-10-CM

## 2024-07-18 DIAGNOSIS — I69.30 HISTORY OF STROKE WITH RESIDUAL DEFICIT: ICD-10-CM

## 2024-07-18 DIAGNOSIS — F41.9 ANXIETY: ICD-10-CM

## 2024-07-18 DIAGNOSIS — R26.89 BALANCE PROBLEM: ICD-10-CM

## 2024-07-18 DIAGNOSIS — I10 ESSENTIAL HYPERTENSION: ICD-10-CM

## 2024-07-18 DIAGNOSIS — E78.00 HYPERCHOLESTEROLEMIA: ICD-10-CM

## 2024-07-18 PROCEDURE — 3078F DIAST BP <80 MM HG: CPT | Performed by: PHYSICIAN ASSISTANT

## 2024-07-18 PROCEDURE — 1123F ACP DISCUSS/DSCN MKR DOCD: CPT | Performed by: PHYSICIAN ASSISTANT

## 2024-07-18 PROCEDURE — 3074F SYST BP LT 130 MM HG: CPT | Performed by: PHYSICIAN ASSISTANT

## 2024-07-18 PROCEDURE — G0439 PPPS, SUBSEQ VISIT: HCPCS | Performed by: PHYSICIAN ASSISTANT

## 2024-07-18 PROCEDURE — 99214 OFFICE O/P EST MOD 30 MIN: CPT | Performed by: PHYSICIAN ASSISTANT

## 2024-07-18 RX ORDER — TIRZEPATIDE 5 MG/.5ML
5 INJECTION, SOLUTION SUBCUTANEOUS
Qty: 2 ML | Refills: 2 | Status: SHIPPED | OUTPATIENT
Start: 2024-07-18

## 2024-07-18 ASSESSMENT — LIFESTYLE VARIABLES
HOW MANY STANDARD DRINKS CONTAINING ALCOHOL DO YOU HAVE ON A TYPICAL DAY: PATIENT DOES NOT DRINK
HOW OFTEN DO YOU HAVE A DRINK CONTAINING ALCOHOL: NEVER

## 2024-07-18 ASSESSMENT — PATIENT HEALTH QUESTIONNAIRE - PHQ9
SUM OF ALL RESPONSES TO PHQ9 QUESTIONS 1 & 2: 0
2. FEELING DOWN, DEPRESSED OR HOPELESS: NOT AT ALL
SUM OF ALL RESPONSES TO PHQ QUESTIONS 1-9: 0
SUM OF ALL RESPONSES TO PHQ QUESTIONS 1-9: 0
1. LITTLE INTEREST OR PLEASURE IN DOING THINGS: NOT AT ALL
SUM OF ALL RESPONSES TO PHQ QUESTIONS 1-9: 0
SUM OF ALL RESPONSES TO PHQ QUESTIONS 1-9: 0

## 2024-07-18 NOTE — PATIENT INSTRUCTIONS
Emergency Dental Care     [facility] by [facility]  [address] [place] [address]  Open M, W, F: 8AM - 5PM and T, Th: 8AM-6PM  Phone: [phone], press 4  $70 for Emergency Care  $60 for first routine care, then pay by sliding scale based upon income.     [facility]  [address] [place] [address]  Phone: [phone]     [facility]  [address] [place] [address]  Open Monday - Friday 8AM - 4:30 PM  Phone: [phone]    [facility]   [facility], [facility], [address] [place] [address]   Phone: [phone] to confirm a time for emergency treatment   Pediatrics: [phone]   $75 per tooth, extractions only      [facility]  [facility], [facility], [address] [place] [address], 2nd Floor, [address]  First Come First Service starting at 8:30 AM - 3 PM M - F    [facility]   [address] [facility] [address]   Phone [phone] or [phone]   Hours [id number] (extractions)   Simple tooth extraction: $60 per tooth, $55 per x-ray      [facility] Residents only, over the age of 18  Phone: [phone]. Leave message saying you need an appointment to register.  Hours: Tuesday Evenings    [facility] (in [place])   [facility] Residents only   Phone: [phone], leave message saying you need an appointment to register   Hours: Wed 6-9p     Non-Urgent Dental Care Clinics     [facility]   [address] [place] [address]   Dental Clinic: [phone]   Oral Surgery Clinic: [phone]       Preventing Falls: Care Instructions  Injuries and health problems such as trouble walking or poor eyesight can increase your risk of falling. So can some medicines. But there are things you can do to help prevent falls. You can exercise to get stronger. You

## 2024-07-18 NOTE — PROGRESS NOTES
Medicare Annual Wellness Visit    Deepthi Kessler is here for Medicare AWV (Room 4A // )    Assessment & Plan   Encounter for subsequent annual wellness visit (AWV) in Medicare patient  Congestive heart failure, unspecified HF chronicity, unspecified heart failure type (HCC)  Euvolemic, continue current management. Follows with Cards regularly  Essential hypertension-at goal, no med adjustments, continue carvedilol, lasix, losartan 50  Coronary artery disease involving native coronary artery of native heart without angina pectoris-stable on statin and asa  COPD without exacerbation (HCC)-asymptomatic  SAIRA on CPAP-stable on CPAP  Postoperative hypothyroidism-Labs in fall  Prediabetes-at goal, last check 5.2  Vitamin D deficiency-at goal  Obesity, morbid (HCC)-restart lower dose mounjaro, watch for hypoglycemia, d/c if low  Hypercholesterolemia-at goal on 40 mg lipitor  History of stroke with residual deficit  -     External Referral To Physical Therapy  [facility] back [facility] to continue  Anxiety  Balance problem  -     External Referral To Physical Therapy  Chronic systolic (congestive) heart failure    Biggest problems remain weight, pain control and balance. Hx of LLE weakness from stroke. Reviewed hospital record from recent admission, no major concerns. No issues since. Will return her to PT and adult center during day.   Will help get coverage for pain medicine percocet which she takes daily as she missed appt with pain center. Continue lyrica    For weight will restart 5 mg zepbound to see if covered. Will need to check BG daily fasting to watch for hypoglycemia.     Recommendations for Preventive Services Due: see orders and patient instructions/AVS.  Recommended screening schedule for the next 5-10 years is provided to the patient in written form: see Patient Instructions/AVS.     No follow-ups on file.     Subjective   The following acute and/or chronic problems were also addressed today:  Patient

## 2024-07-23 ENCOUNTER — TELEPHONE (OUTPATIENT)
Facility: CLINIC | Age: 64
End: 2024-07-23

## 2024-07-23 NOTE — TELEPHONE ENCOUNTER
I called and verified the patient by name and date of birth, then proceeded with the message from the provider. The patient understood and had no questions at this time.

## 2024-07-23 NOTE — TELEPHONE ENCOUNTER
We have reviewed your request for ZEPBOUND 5 MG/0.5 ML PEN submitted for the member  identified above, and we denied your request for the following reason:  because Medicare law does not include drugs for weight control under Medicare Part D  coverage. This is one of those drugs. This is not a medical necessity decision. It is a benefit  issue only. We based this decision on Chapter 6 section 20.1 of the Medicare Prescription Drug Benefit Manual. For more information, talk to your prescriber or call 1-800-MEDICARE.

## 2024-08-06 ENCOUNTER — TELEPHONE (OUTPATIENT)
Facility: CLINIC | Age: 64
End: 2024-08-06

## 2024-08-06 NOTE — TELEPHONE ENCOUNTER
Pt stated she was trying to get into [facility] but they do not take her insurance. She would like to know if there is something provider can do to help or refer her somewhere else

## 2024-08-07 ENCOUNTER — TELEPHONE (OUTPATIENT)
Facility: CLINIC | Age: 64
End: 2024-08-07

## 2024-08-07 NOTE — TELEPHONE ENCOUNTER
I called the patient and verified them by name and date of birth. I informed her that the next option would be to call the insurance company to see who is in network. She stated understanding and had no further questions.

## 2024-08-07 NOTE — TELEPHONE ENCOUNTER
Pt called and stated that she needed a request put in for [facility] meals. Pt stated that it is where they bring meals to one's home after a hospital stay

## 2024-09-10 NOTE — PROGRESS NOTES
HISTORY OF PRESENT ILLNESS  Madison Dang is a 59 y.o. female. She has left-sided weakness after a stroke which occurred in the setting of cardiac catheterization quite a few years ago in [place]. She was in the hospital in December of this past year after falling 4 times. She was eventually sent to rehab. She may have been septic. Blood pressure is lower than when I saw her before and increased her carvedilol but she complains of dizziness. She lives with her sister. She had a stress test done in May of last year that showed previous infarction but no ischemia with ejection fraction of 47%. HPI  Patient Active Problem List   Diagnosis Code    Coronary artery disease involving native coronary artery of native heart without angina pectoris I25.10    Bronchitis J40    High cholesterol E78.00    History of cardiomyopathy Z86.79    SOB (shortness of breath) R06.02    Neck pain, bilateral M54.2    Shoulder pain, bilateral M25.511, M25.512    Muscle spasticity M62.838    Hemiparesis and alteration of sensations as late effects of stroke ([facility]) I69.359, I69.398    Head pain, chronic R51.9, G89.29    Expressive aphasia R47.01    Heart palpitations R00.2    Ventricular ectopic activity I49.3    Stroke ([facility]) I63.9    Thyroid disease E07.9    Hypercholesterolemia E78.00    EDDIE on CPAP G47.33, Z99.89    Essential hypertension I10    Congestive heart failure ([facility]) I50.9    Chest pain R07.9    Melena K92.1    Hiatal hernia K44.9    Postoperative hypothyroidism E89.0    Chronic pain G89.29    Prediabetes R73.03    Constipation K59.00    Insomnia G47.00    Left-sided weakness R53.1    Vitamin D deficiency E55.9    Obesity, morbid ([facility]) E66.01    Chronic indwelling Dunn catheter Z97.8    Dermatitis L30.9    Obesity (BMI 30-39. 9) E66.9    Anxiety F41.9    Acute cystitis N30.00    Encephalopathy G93.40     Current Outpatient Medications   Medication Sig Dispense Refill    oxyCODONE-acetaminophen (PERCOCET 10)  mg per tablet as needed.  traZODone (DESYREL) 300 mg tablet Take 300 mg by mouth nightly.  atorvastatin (LIPITOR) 40 mg tablet Take 1 tablet by mouth once daily 90 Tablet 3    furosemide (LASIX) 40 mg tablet Take 1 Tablet by mouth daily. 90 Tablet 3    montelukast (SINGULAIR) 10 mg tablet       topiramate (TOPAMAX) 100 mg tablet Take 1 tablet by mouth twice daily 180 Tablet 3    linaCLOtide (Linzess) 145 mcg cap capsule TAKE 1 CAPSULE BY MOUTH ONCE DAILY BEFORE BREAKFAST FOR CONSTIPATION 90 Capsule 0    carvediloL (COREG) 12.5 mg tablet Take 1 Tablet by mouth two (2) times daily (with meals). 180 Tablet 3    metFORMIN ER (GLUCOPHAGE XR) 500 mg tablet TAKE 1 TABLET BY MOUTH ONCE DAILY WITH SUPPER 90 Tablet 3    levothyroxine (Euthyrox) 125 mcg tablet Take 1 Tablet by mouth Daily (before breakfast). 90 Tablet 0    baclofen (LIORESAL) 20 mg tablet TAKE 1 TABLET BY MOUTH EVERY 8 HOURS AS NEEDED FOR MUSCLE SPASM(S)      cholecalciferol (VITAMIN D3) (5000 Units /125 mcg) capsule Take 1 Capsule by mouth daily. 90 Capsule 3    Botox 200 unit injection INJECT INTO THE BILATERAL MUSCLES OF THE HEAD AND NECK BY PRESCRIBER IN OFFICE FOR MIGRAINES EVERY 3 MONTHS. DISCARD UNUSED PORTION 1 Vial 3    losartan (COZAAR) 50 mg tablet Take 1 Tab by mouth nightly. Hold for SBP<115 90 Tab 3    pantoprazole (PROTONIX) 40 mg tablet Take 1 Tab by mouth daily. 90 Tab 3    BD Single Use Swabs Regular padm Check Blood sugar ONCE daily      galcanezumab-gnlm (Emgality Syringe) 120 mg/mL syrg 1 Syringe by SubCUTAneous route every thirty (30) days.  Start 30 days after the loading dose 1 Syringe 11    cyanocobalamin (VITAMIN B12) 1,000 mcg/mL injection INJECT 1 ML UNDER THE SKIN EVERY 30 DAYS FOR B12 DEFICIENCY  Indications: inadequate vitamin B12 (Patient taking differently: INJECT 1 ML UNDER THE SKIN EVERY 14 DAYS FOR B12 DEFICIENCY  Indications: inadequate vitamin B12) 10 mL 1    Syringe, Disposable, 1 mL syrg Use bimonthly for cyanocobalamin subcutaneous injection. 25 Syringe 0    Needle, Disp, 25 G 25 gauge x 3/4\" ndle Use bimonthly for cyanocobalamin subcutaneous injection. 1 Box 0    fluticasone propionate (FLONASE) 50 mcg/actuation nasal spray 2 Sprays by Both Nostrils route daily. 1 Bottle 0    gabapentin (NEURONTIN) 100 mg capsule Take 100 mg by mouth three (3) times daily as needed for Pain.  FREESTYLE LITE STRIPS strip USE STRIP TO CHECK GLUCOSE TWICE DAILY  3    FREESTYLE LITE METER monitoring kit USE TO CHECK GLUCOSE ONCE DAILY  0    ciclopirox (PENLAC) 8 % solution APPLY TO AFFECTED TOE NAILS DAILY      diclofenac (VOLTAREN) 1 % gel APPLY TOPICALLY TO AFFECTED AREA ONCE DAILY      hydroxyzine HCL (ATARAX) 50 mg tablet TAKE 1 TABLET BY MOUTH TWICE DAILY AS NEEDED FOR ANXIETY      FREESTYLE LANCETS 28 gauge misc USE TO CHECK GLUCOSE TWICE DAILY      miscellaneous medical supply misc Rollator Dx:I69.359 1 Each 0    miscellaneous medical supply [facility] Dx: I69.359 1 Each 0    omega-3 acid ethyl esters (LOVAZA) 1 gram capsule Take 2 Caps by mouth two (2) times a day. 120 Cap 2    DULoxetine (CYMBALTA) 60 mg capsule Take 2 Caps by mouth daily. 180 Cap 0    calcipotriene (DOVONEX) 0.005 % topical cream Apply  to affected area daily as needed. 60 g 11    albuterol-ipratropium (DUO-NEB) 2.5 mg-0.5 mg/3 ml nebu 3 mL by Nebulization route every six (6) hours as needed. 30 Nebule 0    ferrous sulfate 325 mg (65 mg iron) tablet Take 325 mg by mouth Daily (before breakfast).  aspirin delayed-release 81 mg tablet Take 81 mg by mouth daily. Past Medical History:   Diagnosis Date    Advanced care planning/counseling discussion 3/4/16    On File    Bronchitis 2/24/2014    Cervicalgia 8/18/15    Dr. Gerhardt Nanny    Chest pain 5/25/15    Hospitalized at [facility] 5/25/15 (lab work negative)    Chronic indwelling Dunn catheter 1/24/2018    Initially placed Jan 2018. Mx by Dr. Dago Wan.  Congestive heart failure, unspecified     Last Echo 2/8/15: EF 55-60%    Constipation 6/13/2017    Enthesopathy, spinal ([address] 8/18/15    Dr. Ying Pardo    Essential hypertension     Foot drop 8/18/15    Dr. Tere Little Heart attack [facility]) 2/24/2013    Was supposed to See Cardiology for possible pacemaker in november 2014- After Cardiology consult locally, no need, EF greatly improved. Established with Dr. Rigoberto Kulkarni Hiatal hernia 6/2015    3 cm hiatal hernia     Hip pain 8/18/15    Dr. Ying Pardo    Hypercholesterolemia     Hyperglycemia 7/2015    A1c 5.9     Hyperlipidemia 6/30/2015    NMR lipoprofile- LDL P 997, LDL-c 71, HLD-C-39, TG-60, HLD-P (25.2), Small LDL-P -541, LDL size 20.6    Hypothyroid     Insomnia 6/13/2017    Lower extremity edema     Lumbar spondylosis 8/18/15    Dr. Sisi Cordero 6/2015    EGD/Colonscopy 6/15- Gastritis, internal hemorrhoids and 3 polyps    Menopause     Murmur     EDDIE on CPAP     Was referred to Pulmonology - Uses CPAP    Osteoarthritis of hip 8/18/15    Dr. Ying Pardo    Osteoarthritis, shoulder 8/18/15    Dr. Tere Little Radiculopathy, cervical 8/18/15    Dr. Ying Pardo    Shoulder pain 8/18/15    Dr. Tere Little Spinal stenosis of cervical region 8/18/15    Dr. Tere Little Spinal stenosis, lumbar 8/18/15    Dr. Tere Little Stroke [facility]) 2/25/2014    Established with Neurology, Danica Bull NP-[facility]. CT negative, but Late effect CV accident with increased tone described on discharge summary, Carotid dopplers showed 50% stenosis bilaterally.      Vitamin D deficiency 7/2015     Past Surgical History:   Procedure Laterality Date    COLONOSCOPY N/A 6/22/2016    COLONOSCOPY performed by Deny Montez MD at [facility] ENDOSCOPY    COLONOSCOPY N/A 1/26/2022    COLONOSCOPY, needs rapid performed by Flavio Ponce MD at [facility] ENDOSCOPY    HX BREAST BIOPSY Right 8/11/15    Stereo Bx - [facility]--- Benign    HX CHOLECYSTECTOMY      HX COLONOSCOPY  6/2015    Dr. Areli Tompkins- 3 complete polypectomies, Internal hemorrhoids, difficult study due to spasm. Repeat in 1 year    HX ENDOSCOPY  6/2015    mild gastritis, 3cm hiatal hernia     HX GASTRIC BYPASS  6/1989    HX HEART CATHETERIZATION  2/2014    HX ORTHOPAEDIC      Right middle finger distal amputation    HX PARTIAL THYROIDECTOMY  ~1990    HX THYROIDECTOMY Left 1985    90% of one side of the thyroid removed    IR ASP BLADDER SUPRA CATH  5/24/2019       Review of Systems   Musculoskeletal: Positive for falls. Neurological: Positive for focal weakness. All other systems reviewed and are negative. Visit Vitals  /66 (BP 1 Location: Left upper arm, BP Patient Position: Sitting, BP Cuff Size: Adult)   Pulse 70   Resp 18   Ht 5' 6\" (1.676 m)   Wt 245 lb (111.1 kg)   LMP  (LMP Unknown)   BMI 39.54 kg/m²       Physical Exam  Constitutional:       Appearance: She is obese. HENT:      Head: Normocephalic. Right Ear: External ear normal.      Left Ear: External ear normal.      Nose: Nose normal.      Mouth/Throat:      Mouth: Mucous membranes are moist.   Eyes:      Extraocular Movements: Extraocular movements intact. Cardiovascular:      Rate and Rhythm: Normal rate and regular rhythm. Heart sounds: Normal heart sounds. Pulmonary:      Breath sounds: Normal breath sounds. Abdominal:      Palpations: Abdomen is soft. Musculoskeletal:         General: No deformity. Cervical back: Normal range of motion. Skin:     General: Skin is warm. Neurological:      Mental Status: She is alert and oriented to person, place, and time. Psychiatric:         Behavior: Behavior normal.         ASSESSMENT and PLAN  Blood pressure is relatively low although I checked back in the chart and it has been higher but controlled with taking over the past 6 to 8 weeks. I will keep her medications the same. She is now seen in a wheelchair whereas that was not her situation previously.   For now I will continue her regimen and plan to see her back in 6 months. None

## 2024-09-11 ENCOUNTER — PROCEDURE VISIT (OUTPATIENT)
Age: 64
End: 2024-09-11

## 2024-09-11 VITALS
HEIGHT: 66 IN | WEIGHT: 247 LBS | HEART RATE: 69 BPM | SYSTOLIC BLOOD PRESSURE: 118 MMHG | OXYGEN SATURATION: 99 % | RESPIRATION RATE: 16 BRPM | BODY MASS INDEX: 39.7 KG/M2 | DIASTOLIC BLOOD PRESSURE: 76 MMHG

## 2024-09-11 DIAGNOSIS — G43.709 CHRONIC MIGRAINE WITHOUT AURA, NOT INTRACTABLE, WITHOUT STATUS MIGRAINOSUS: Primary | ICD-10-CM

## 2024-09-13 ENCOUNTER — TELEPHONE (OUTPATIENT)
Age: 64
End: 2024-09-13

## 2024-10-07 RX ORDER — PANTOPRAZOLE SODIUM 40 MG/1
TABLET, DELAYED RELEASE ORAL
Qty: 90 TABLET | Refills: 1 | Status: SHIPPED | OUTPATIENT
Start: 2024-10-07

## 2024-10-07 NOTE — TELEPHONE ENCOUNTER
PCP: Mayco Teixeira PA-C     Last appt:  7/18/2024      Future Appointments   Date Time Provider Department Center   11/25/2024  9:15 AM [facility]   12/18/2024 11:30 AM Gatito Bradshaw MD [facility]   1/20/2025 10:00 AM Mayco Teixeira PA-C [facility]P   1/20/2025  1:00 PM [facility]C [facility]          Requested Prescriptions     Pending Prescriptions Disp Refills    pantoprazole (PROTONIX) 40 MG tablet [Pharmacy Med Name: Pantoprazole Sodium 40 MG Oral Tablet Delayed Release] 90 tablet 0     Sig: Take 1 tablet by mouth once daily

## 2024-10-23 ENCOUNTER — TELEMEDICINE (OUTPATIENT)
Facility: CLINIC | Age: 64
End: 2024-10-23

## 2024-10-23 DIAGNOSIS — Z53.9 PERSONS ENCOUNTERING HEALTH SERVICES FOR SPECIFIC PROCEDURES, NOT CARRIED OUT: Primary | ICD-10-CM

## 2024-10-23 NOTE — PROGRESS NOTES
Deepthi Kessler  Identified pt with two pt identifiers(name and ).     Chief Complaint   Patient presents with    Orders     Stand Up Rollator        Reviewed record In preparation for visit and have obtained necessary documentation.     1. Have you been to the ER, urgent care clinic or hospitalized since your last visit? No     2. Have you seen or consulted any other health care providers outside of the [facility] since your last visit? Include any pap smears or colon screening. No    Patient has an advance directive.     Vitals reviewed with provider.    Health Maintenance reviewed:     Health Maintenance Due   Topic    Flu vaccine (1)    COVID-19 Vaccine ( season)          Wt Readings from Last 3 Encounters:   24 112 kg (247 lb)   24 109.6 kg (241 lb 9.6 oz)   24 114.9 kg (253 lb 4.9 oz)        Temp Readings from Last 3 Encounters:   24 98.4 °F (36.9 °C) (Oral)   24 98.1 °F (36.7 °C) (Oral)   24 97.7 °F (36.5 °C) (Oral)        BP Readings from Last 3 Encounters:   24 118/76   24 103/67   24 117/70        Pulse Readings from Last 3 Encounters:   24 69   24 71   24 77             No data to display                  Learning Assessment:   :          No data to display                  Fall Risk Assessment:         8/15/2024    11:00 AM 2024    10:58 AM 2024    10:41 AM 2023     8:17 PM 2023     9:00 AM 2022    12:32 PM 2022     9:28 AM   Amb Fall Risk Assessment and TUG Test   Do you feel unsteady or are you worried about falling?  yes no yes no yes     2 or more falls in past year? yes no yes yes yes     Fall with injury in past year? yes no no no no     Total Score      3 2         Abuse Screening:          No data to display                  ADL Screening:          No data to display

## 2024-10-28 ENCOUNTER — OFFICE VISIT (OUTPATIENT)
Facility: CLINIC | Age: 64
End: 2024-10-28
Payer: MEDICARE

## 2024-10-28 VITALS
HEART RATE: 71 BPM | HEIGHT: 66 IN | SYSTOLIC BLOOD PRESSURE: 130 MMHG | BODY MASS INDEX: 39.98 KG/M2 | RESPIRATION RATE: 16 BRPM | WEIGHT: 248.8 LBS | OXYGEN SATURATION: 95 % | DIASTOLIC BLOOD PRESSURE: 85 MMHG | TEMPERATURE: 98.1 F

## 2024-10-28 DIAGNOSIS — I69.359 HEMIPARESIS AND ALTERATION OF SENSATIONS AS LATE EFFECTS OF STROKE (HCC): Primary | ICD-10-CM

## 2024-10-28 DIAGNOSIS — M54.2 CHRONIC NECK PAIN: ICD-10-CM

## 2024-10-28 DIAGNOSIS — M48.02 SPINAL STENOSIS, CERVICAL REGION: ICD-10-CM

## 2024-10-28 DIAGNOSIS — I69.30 HISTORY OF STROKE WITH RESIDUAL DEFICIT: ICD-10-CM

## 2024-10-28 DIAGNOSIS — I69.398 HEMIPARESIS AND ALTERATION OF SENSATIONS AS LATE EFFECTS OF STROKE (HCC): Primary | ICD-10-CM

## 2024-10-28 DIAGNOSIS — G89.29 CHRONIC NECK PAIN: ICD-10-CM

## 2024-10-28 DIAGNOSIS — E66.01 OBESITY, MORBID: ICD-10-CM

## 2024-10-28 PROCEDURE — 3075F SYST BP GE 130 - 139MM HG: CPT | Performed by: PHYSICIAN ASSISTANT

## 2024-10-28 PROCEDURE — 3079F DIAST BP 80-89 MM HG: CPT | Performed by: PHYSICIAN ASSISTANT

## 2024-10-28 PROCEDURE — 1160F RVW MEDS BY RX/DR IN RCRD: CPT | Performed by: PHYSICIAN ASSISTANT

## 2024-10-28 PROCEDURE — 1123F ACP DISCUSS/DSCN MKR DOCD: CPT | Performed by: PHYSICIAN ASSISTANT

## 2024-10-28 PROCEDURE — 1159F MED LIST DOCD IN RCRD: CPT | Performed by: PHYSICIAN ASSISTANT

## 2024-10-28 PROCEDURE — 99214 OFFICE O/P EST MOD 30 MIN: CPT | Performed by: PHYSICIAN ASSISTANT

## 2024-10-28 PROCEDURE — 1126F AMNT PAIN NOTED NONE PRSNT: CPT | Performed by: PHYSICIAN ASSISTANT

## 2024-10-28 RX ORDER — TIRZEPATIDE 5 MG/.5ML
5 INJECTION, SOLUTION SUBCUTANEOUS
Qty: 2 ML | Refills: 1 | Status: SHIPPED | OUTPATIENT
Start: 2024-10-28

## 2024-10-28 RX ORDER — FUROSEMIDE 40 MG/1
40 TABLET ORAL DAILY
Qty: 90 TABLET | Refills: 3 | Status: SHIPPED | OUTPATIENT
Start: 2024-10-28

## 2024-10-28 ASSESSMENT — ENCOUNTER SYMPTOMS
SHORTNESS OF BREATH: 0
RESPIRATORY NEGATIVE: 1
EYES NEGATIVE: 1
GASTROINTESTINAL NEGATIVE: 1

## 2024-10-28 NOTE — PROGRESS NOTES
Deepthi Kessler  Identified pt with two pt identifiers(name and ).     Chief Complaint   Patient presents with    Orders     Room 4B //        Reviewed record In preparation for visit and have obtained necessary documentation.     1. Have you been to the ER, urgent care clinic or hospitalized since your last visit? No     2. Have you seen or consulted any other health care providers outside of the [facility] since your last visit? Include any pap smears or colon screening. No    Patient does not have an advance directive.     Vitals reviewed with provider.    Health Maintenance reviewed:     Health Maintenance Due   Topic    Flu vaccine (1)          Wt Readings from Last 3 Encounters:   10/28/24 112.9 kg (248 lb 12.8 oz)   24 112 kg (247 lb)   24 109.6 kg (241 lb 9.6 oz)        Temp Readings from Last 3 Encounters:   24 98.4 °F (36.9 °C) (Oral)   24 98.1 °F (36.7 °C) (Oral)   24 97.7 °F (36.5 °C) (Oral)        BP Readings from Last 3 Encounters:   24 118/76   24 103/67   24 117/70        Pulse Readings from Last 3 Encounters:   24 69   24 71   24 77             No data to display                  Learning Assessment:          No data to display                  Fall Risk Assessment:   :         8/15/2024    11:00 AM 2024    10:58 AM 2024    10:41 AM 2023     8:17 PM 2023     9:00 AM 2022    12:32 PM 2022     9:28 AM   Amb Fall Risk Assessment and TUG Test   Do you feel unsteady or are you worried about falling?  yes no yes no yes     2 or more falls in past year? yes no yes yes yes     Fall with injury in past year? yes no no no no     Total Score      3 2       Abuse Screening:   :          No data to display                   ADL Screening:   :          No data to display

## 2024-10-29 NOTE — PROGRESS NOTES
Deepthi Kessler is a 64 y.o. year old female seen in clinic today for   Chief Complaint   Patient presents with    Orders     Room 4B //        she is here today for face to face encounter to have DME ordered. She is requesting a new stand up rollator and a recliner chair for her downstairs.  She has hx of CVA with limited mobility and resulting morbid obesity. She has chronic L sided weakness from the stroke. Her mobility now is currently with a lower rollator causing her to bend her back and causes chronic back pain. She is interested in the stand up rollator to reduce back pain. She is currently working with pain management for shots in her low back.    She notes the recliner she is unable to spend much time in her downstairs where she lives with her sister. And spends most of her time in her bed where she has her bed and is able to get up and out with ease but her furniture in the first floor she is unable to get up and out of with ease.     She is requesting another refill of lasix. She would also like a new refill of zepbound as mounjaro worked well for her for weight loss in the past.    She also notes increased neck pain. Seems unrelated to the botox injections she gets for migraines. Notes worse in AM and seems to improve throughout the day mildly.     she specifically denies any CP, SOB, HA. Dizziness, fevers, chills, N/V/D, urinary symptoms or other bowel changes.    Current Outpatient Medications on File Prior to Visit   Medication Sig Dispense Refill    pantoprazole (PROTONIX) 40 MG tablet Take 1 tablet by mouth once daily 90 tablet 1    atorvastatin (LIPITOR) 40 MG tablet Take 1 tablet by mouth once daily 90 tablet 0    galcanezumab-gnlm (EMGALITY) 120 MG/ML SOSY injection Inject 1 mL into the skin every 30 days 1.12 mL 5    trospium (SANCTURA) 20 MG tablet TAKE 1 TABLET BY MOUTH BEFORE BREAKFAST AND BEFORE SUPPER 60 tablet 5    topiramate (TOPAMAX) 100 MG tablet Take 1 tablet by mouth 2 times daily

## 2024-11-18 ENCOUNTER — HOSPITAL ENCOUNTER (OUTPATIENT)
Facility: HOSPITAL | Age: 64
Discharge: HOME | End: 2024-11-21
Payer: MEDICARE

## 2024-11-18 DIAGNOSIS — M48.02 SPINAL STENOSIS, CERVICAL REGION: ICD-10-CM

## 2024-11-18 DIAGNOSIS — G89.29 CHRONIC NECK PAIN: ICD-10-CM

## 2024-11-18 DIAGNOSIS — M54.2 CHRONIC NECK PAIN: ICD-10-CM

## 2024-11-18 PROCEDURE — 72040 X-RAY EXAM NECK SPINE 2-3 VW: CPT

## 2024-11-18 PROCEDURE — 72141 MRI NECK SPINE W/O DYE: CPT

## 2024-11-19 DIAGNOSIS — Z79.899 LONG TERM USE OF DRUG: Primary | ICD-10-CM

## 2024-11-19 RX ORDER — POTASSIUM CHLORIDE 750 MG/1
10 TABLET, EXTENDED RELEASE ORAL DAILY
Qty: 30 TABLET | Refills: 1 | Status: SHIPPED | OUTPATIENT
Start: 2024-11-19

## 2024-11-22 ENCOUNTER — TELEPHONE (OUTPATIENT)
Facility: CLINIC | Age: 64
End: 2024-11-22

## 2024-11-22 PROBLEM — M50.30 BULGING OF CERVICAL INTERVERTEBRAL DISC: Status: ACTIVE | Noted: 2024-11-22

## 2024-11-22 PROBLEM — M48.02 CERVICAL STENOSIS OF SPINE: Status: ACTIVE | Noted: 2024-11-22

## 2024-11-25 ENCOUNTER — HOSPITAL ENCOUNTER (OUTPATIENT)
Facility: HOSPITAL | Age: 64
Discharge: HOME | End: 2024-11-28
Payer: MEDICARE

## 2024-11-25 VITALS — BODY MASS INDEX: 38.31 KG/M2 | WEIGHT: 238.4 LBS | HEIGHT: 66 IN

## 2024-11-25 DIAGNOSIS — Z12.31 VISIT FOR SCREENING MAMMOGRAM: ICD-10-CM

## 2024-11-25 PROCEDURE — 77063 BREAST TOMOSYNTHESIS BI: CPT

## 2024-11-26 ENCOUNTER — TELEPHONE (OUTPATIENT)
Age: 64
End: 2024-11-26

## 2024-11-26 NOTE — TELEPHONE ENCOUNTER
Botox Drug Acquisition: BUY AND BILL  POS 11 Druu Btx  Dx: G43.709  Insurance: Ro GRIMMW plan  Submission Type: Availity  [facility]:   Request tracking # [id number]  Reference #: [id number] (letter says Pharmacy approval)  Approval Range: [place] APPROVED 24-25    CPT: 82456  Request tracking# [id number]  NO AUTH REQUIRED

## 2024-12-18 ENCOUNTER — PROCEDURE VISIT (OUTPATIENT)
Age: 64
End: 2024-12-18

## 2024-12-18 VITALS
DIASTOLIC BLOOD PRESSURE: 62 MMHG | HEIGHT: 66 IN | SYSTOLIC BLOOD PRESSURE: 122 MMHG | WEIGHT: 243 LBS | BODY MASS INDEX: 39.05 KG/M2 | OXYGEN SATURATION: 97 % | HEART RATE: 69 BPM | RESPIRATION RATE: 16 BRPM

## 2024-12-18 DIAGNOSIS — G43.709 CHRONIC MIGRAINE WITHOUT AURA, NOT INTRACTABLE, WITHOUT STATUS MIGRAINOSUS: Primary | ICD-10-CM

## 2025-01-01 NOTE — TELEPHONE ENCOUNTER
PCP: Lisa Lawson. Prince Efrain MD    Last appt: 4/26/2018  Future Appointments  Date Time Provider Denilson Thompson   7/10/2018 10:20 AM DO RONAN Guo ALICE SCHED   9/25/2018 9:00 AM [facility] Prince Efrain MD [facility] SCHED   11/7/2018 9:20 AM Elysia Gamez  [address]       Requested Prescriptions     Pending Prescriptions Disp Refills    lidocaine (LIDODERM) 5 % 90 Each 3     Sig: Apply patch to the affected area for 12 hours a day and remove for 12 hours a day.        Prior labs and Blood pressures:  BP Readings from Last 3 Encounters:   04/27/18 97/78   04/24/18 118/75   04/12/18 112/78     Lab Results   Component Value Date/Time    Sodium 143 03/27/2018 12:06 PM    Potassium 4.2 03/27/2018 12:06 PM    Chloride 105 03/27/2018 12:06 PM    CO2 24 03/27/2018 12:06 PM    Anion gap 9 09/12/2017 10:06 AM    Glucose 99 03/27/2018 12:06 PM    BUN 15 03/27/2018 12:06 PM    Creatinine 0.86 03/27/2018 12:06 PM    BUN/Creatinine ratio 17 03/27/2018 12:06 PM    GFR est AA 86 03/27/2018 12:06 PM    GFR est non-AA 74 03/27/2018 12:06 PM    Calcium 9.1 03/27/2018 12:06 PM     Lab Results   Component Value Date/Time    Hemoglobin A1c 6.4 (H) 09/09/2017 02:40 AM    Hemoglobin A1c (POC) 5.6 01/24/2018 08:58 AM     Lab Results   Component Value Date/Time    Cholesterol, total 111 09/09/2017 02:40 AM    HDL Cholesterol 37 09/09/2017 02:40 AM    LDL, calculated 61.8 09/09/2017 02:40 AM    VLDL, calculated 12.2 09/09/2017 02:40 AM    Triglyceride 61 09/09/2017 02:40 AM    CHOL/HDL Ratio 3.0 09/09/2017 02:40 AM     Lab Results   Component Value Date/Time    VITAMIN D, 25-HYDROXY 121.0 (H) 03/27/2018 12:06 PM       Lab Results   Component Value Date/Time    TSH 0.438 (L) 03/27/2018 12:06 PM
Please ask patient what she uses the prilocaine/lidocaine cream for.
Request for Prilocaine and Lidocaine cream 2.5%/2.5%, 720gm for 90 days.
Requested Prescriptions     Pending Prescriptions Disp Refills    glucose blood VI test strips (BLOOD GLUCOSE TEST) strip 100 Strip 11     Sig: For use with glucometer to check blood sugar once a day in morning before eating.  Please dispense VERIO FLEX STRIPS     Request for Blood Glucose Meter- 2 refills, Alcohol Pads- 1 per test, Test Strips- 1 per test, Lancet Device- 1 per refill, Control Solution- 1 per refill, and Lancets- 1 per test.
Requested Prescriptions     Pending Prescriptions Disp Refills    lidocaine (LIDODERM) 5 % 90 Each 3     Sig: Apply patch to the affected area for 12 hours a day and remove for 12 hours a day.      Diclofenac 1.5% solution, 300ml
102

## 2025-01-20 ENCOUNTER — OFFICE VISIT (OUTPATIENT)
Facility: CLINIC | Age: 65
End: 2025-01-20

## 2025-01-20 VITALS
DIASTOLIC BLOOD PRESSURE: 82 MMHG | OXYGEN SATURATION: 95 % | HEART RATE: 70 BPM | SYSTOLIC BLOOD PRESSURE: 120 MMHG | RESPIRATION RATE: 16 BRPM | WEIGHT: 249.8 LBS | BODY MASS INDEX: 40.15 KG/M2 | TEMPERATURE: 97.8 F | HEIGHT: 66 IN

## 2025-01-20 DIAGNOSIS — I25.10 CORONARY ARTERY DISEASE INVOLVING NATIVE CORONARY ARTERY OF NATIVE HEART WITHOUT ANGINA PECTORIS: ICD-10-CM

## 2025-01-20 DIAGNOSIS — I10 ESSENTIAL HYPERTENSION: ICD-10-CM

## 2025-01-20 DIAGNOSIS — J44.9 COPD WITHOUT EXACERBATION (HCC): ICD-10-CM

## 2025-01-20 DIAGNOSIS — E89.0 POSTOPERATIVE HYPOTHYROIDISM: ICD-10-CM

## 2025-01-20 DIAGNOSIS — F51.01 PRIMARY INSOMNIA: ICD-10-CM

## 2025-01-20 DIAGNOSIS — I69.359 HEMIPARESIS AND ALTERATION OF SENSATIONS AS LATE EFFECTS OF STROKE (HCC): ICD-10-CM

## 2025-01-20 DIAGNOSIS — I69.398 HEMIPARESIS AND ALTERATION OF SENSATIONS AS LATE EFFECTS OF STROKE (HCC): ICD-10-CM

## 2025-01-20 DIAGNOSIS — E55.9 VITAMIN D DEFICIENCY: ICD-10-CM

## 2025-01-20 DIAGNOSIS — Z00.00 ENCOUNTER FOR SUBSEQUENT ANNUAL WELLNESS VISIT (AWV) IN MEDICARE PATIENT: Primary | ICD-10-CM

## 2025-01-20 DIAGNOSIS — G47.33 OSA ON CPAP: ICD-10-CM

## 2025-01-20 DIAGNOSIS — F41.9 ANXIETY: ICD-10-CM

## 2025-01-20 DIAGNOSIS — R73.03 PREDIABETES: ICD-10-CM

## 2025-01-20 DIAGNOSIS — I50.22 CHRONIC SYSTOLIC (CONGESTIVE) HEART FAILURE (HCC): ICD-10-CM

## 2025-01-20 DIAGNOSIS — I69.30 HISTORY OF STROKE WITH RESIDUAL DEFICIT: ICD-10-CM

## 2025-01-20 DIAGNOSIS — Z97.8 CHRONIC INDWELLING FOLEY CATHETER: ICD-10-CM

## 2025-01-20 DIAGNOSIS — E66.01 MORBID (SEVERE) OBESITY DUE TO EXCESS CALORIES: ICD-10-CM

## 2025-01-20 DIAGNOSIS — R39.89 SUSPECTED UTI: ICD-10-CM

## 2025-01-20 DIAGNOSIS — E78.00 HYPERCHOLESTEROLEMIA: ICD-10-CM

## 2025-01-20 RX ORDER — LANCETS 30 GAUGE
EACH MISCELLANEOUS
Qty: 100 EACH | Refills: 3 | Status: SHIPPED | OUTPATIENT
Start: 2025-01-20

## 2025-01-20 RX ORDER — LEVOTHYROXINE SODIUM 125 UG/1
125 TABLET ORAL
Qty: 90 TABLET | Refills: 3 | Status: SHIPPED | OUTPATIENT
Start: 2025-01-20

## 2025-01-20 RX ORDER — NITROFURANTOIN 25; 75 MG/1; MG/1
100 CAPSULE ORAL 2 TIMES DAILY
Qty: 28 CAPSULE | Refills: 0 | Status: SHIPPED | OUTPATIENT
Start: 2025-01-20 | End: 2025-02-03

## 2025-01-20 RX ORDER — TEMAZEPAM 15 MG/1
15 CAPSULE ORAL NIGHTLY PRN
Qty: 30 CAPSULE | Refills: 1 | Status: SHIPPED | OUTPATIENT
Start: 2025-01-20 | End: 2025-03-21

## 2025-01-20 ASSESSMENT — PATIENT HEALTH QUESTIONNAIRE - PHQ9
SUM OF ALL RESPONSES TO PHQ QUESTIONS 1-9: 0
1. LITTLE INTEREST OR PLEASURE IN DOING THINGS: NOT AT ALL
2. FEELING DOWN, DEPRESSED OR HOPELESS: NOT AT ALL
SUM OF ALL RESPONSES TO PHQ QUESTIONS 1-9: 0
SUM OF ALL RESPONSES TO PHQ QUESTIONS 1-9: 0
SUM OF ALL RESPONSES TO PHQ9 QUESTIONS 1 & 2: 0
SUM OF ALL RESPONSES TO PHQ QUESTIONS 1-9: 0

## 2025-01-20 ASSESSMENT — LIFESTYLE VARIABLES
HOW OFTEN DO YOU HAVE A DRINK CONTAINING ALCOHOL: NEVER
HOW MANY STANDARD DRINKS CONTAINING ALCOHOL DO YOU HAVE ON A TYPICAL DAY: PATIENT DOES NOT DRINK

## 2025-01-20 NOTE — PROGRESS NOTES
Deepthi Kessler  Identified pt with two pt identifiers(name and ).     Chief Complaint   Patient presents with    Hypertension     Room 4B //     Thyroid Problem    Medicare AWV       Reviewed record In preparation for visit and have obtained necessary documentation.     1. Have you been to the ER, urgent care clinic or hospitalized since your last visit? No     2. Have you seen or consulted any other health care providers outside of the [facility] since your last visit? Include any pap smears or colon screening. No    Patient has an advance directive.     Vitals reviewed with provider.    Health Maintenance reviewed:     Health Maintenance Due   Topic    Annual Wellness Visit (Medicare Advantage)           Wt Readings from Last 3 Encounters:   25 113.3 kg (249 lb 12.8 oz)   24 110.2 kg (243 lb)   24 108.1 kg (238 lb 6.4 oz)        Temp Readings from Last 3 Encounters:   10/28/24 98.1 °F (36.7 °C) (Oral)   24 98.4 °F (36.9 °C) (Oral)   24 98.1 °F (36.7 °C) (Oral)        BP Readings from Last 3 Encounters:   24 122/62   10/28/24 130/85   24 118/76        Pulse Readings from Last 3 Encounters:   24 69   10/28/24 71   24 69             No data to display                  Learning Assessment:   :          No data to display                  Fall Risk Assessment:         2025     9:49 AM 8/15/2024    11:00 AM 2024    10:58 AM 2024    10:41 AM 2023     8:17 PM 2023     9:00 AM 2022    12:32 PM   Amb Fall Risk Assessment and TUG Test   Do you feel unsteady or are you worried about falling?  no yes no yes no yes    2 or more falls in past year? no yes no yes yes yes    Fall with injury in past year? no yes no no no no    Total Score       3         Abuse Screening:          No data to display                  ADL Screening:          No data to display

## 2025-01-20 NOTE — PROGRESS NOTES
Medicare Annual Wellness Visit    Deepthi Kessler is here for Hypertension (Room 4B // ), Thyroid Problem, and Medicare AWV    Assessment & Plan   Essential hypertension  -     CBC with Auto Differential; Future  -     Comprehensive Metabolic Panel; Future  BP at goal, no changes today  Hemiparesis and alteration of sensations as late effects of stroke (HCC)  Continues to struggle with exercise and weight due to effects of stroke and inactivity, L leg has edema issues sporadically as well. None today. Chronic   Chronic systolic (congestive) heart failure (HCC)  Stable, euvolemia. Used 40 mg lasix daily, carvedilol 12.5 mg bid  COPD without exacerbation (HCC)-stable, getting LDCT this year through pulmonology  Coronary artery disease involving native coronary artery of native heart without angina pectoris-no angina, follows with Cardiology, compliant with asa and BB  SAIRA on CPAP-CPAP Compliant  Postoperative hypothyroidism  -     levothyroxine (SYNTHROID) 125 MCG tablet; Take 1 tablet by mouth every morning (before breakfast), Disp-90 tablet, R-3Normal  -     T4, Free; Future  -     TSH; Future  Anxiety-stable. Mood has been good.  Chronic indwelling Kern catheter-suspects UTI today, will send in macrobid, due to see Urology Friday for change  Hypercholesterolemia  -     Lipid Panel; Future  History of stroke with residual deficit  Prediabetes  -     OneTouch Delica Lancets 30G MISC; Use to check blood pressure at least once daily. DX: R79.03, Disp-100 each, R-3Normal  -     Hemoglobin A1C; Future  Vitamin D deficiency  -     Vitamin D 25 Hydroxy; Future  Primary insomnia  -     temazepam (RESTORIL) 15 MG capsule; Take 1 capsule by mouth nightly as needed for Sleep for up to 60 days. Max Daily Amount: 15 mg, Disp-30 capsule, R-1Normal  Doing okay with temazepam, need to leave at low dose given lyrica use and polypharmacy  Suspected UTI  -     nitrofurantoin, macrocrystal-monohydrate, (MACROBID) 100 MG capsule;

## 2025-01-20 NOTE — PATIENT INSTRUCTIONS
take.  Where can you learn more?  Go to [email or web address] and enter G551 to learn more about \"Learning About Vision Tests.\"  Current as of: July 31, 2024  Content Version: 14.3  © 2024 [employer].   Care instructions adapted under license by [employer]. If you have questions about a medical condition or this instruction, always ask your healthcare professional. [employer], disclaims any warranty or liability for your use of this information.         Learning About Activities of Daily Living  What are activities of daily living?     Activities of daily living (ADLs) are the basic self-care tasks you do every day. These include eating, bathing, dressing, and moving around.  As you age, and if you have health problems, you may find that it's harder to do some of these tasks. If so, your doctor can suggest ideas that may help.  To measure what kind of help you may need, your doctor will ask how well you are able to do ADLs. Let your doctor know if there are any tasks that you are having trouble doing. This is an important first step to getting help. And when you have the help you need, you can stay as independent as possible.  How will a doctor assess your ADLs?  Asking about ADLs is part of a routine health checkup your doctor will likely do as you age. Your health check might be done in a doctor's office, in your home, or at a hospital. The goal is to find out if you are having any problems that could make it hard to care for yourself or that make it unsafe for you to be on your own.  To measure your ADLs, your doctor will ask how hard it is for you to do routine tasks. Your doctor may also want to know if you have changed the way you do a task because of a health problem. Your doctor may watch how you:  Walk back and forth.  Keep your balance while you stand or walk.  Move from sitting to standing or from a bed to a chair.  Button or unbutton a shirt or sweater.  Remove

## 2025-01-21 LAB
25(OH)D3+25(OH)D2 SERPL-MCNC: 18.8 NG/ML (ref 30–100)
ALBUMIN SERPL-MCNC: 4.1 G/DL (ref 3.9–4.9)
ALP SERPL-CCNC: 148 IU/L (ref 44–121)
ALT SERPL-CCNC: 58 IU/L (ref 0–32)
AST SERPL-CCNC: 50 IU/L (ref 0–40)
BASOPHILS # BLD AUTO: 0 X10E3/UL (ref 0–0.2)
BASOPHILS NFR BLD AUTO: 1 %
BILIRUB SERPL-MCNC: 0.4 MG/DL (ref 0–1.2)
BUN SERPL-MCNC: 24 MG/DL (ref 8–27)
BUN/CREAT SERPL: 23 (ref 12–28)
CALCIUM SERPL-MCNC: 8.5 MG/DL (ref 8.7–10.3)
CHLORIDE SERPL-SCNC: 108 MMOL/L (ref 96–106)
CHOLEST SERPL-MCNC: 142 MG/DL (ref 100–199)
CO2 SERPL-SCNC: 23 MMOL/L (ref 20–29)
CREAT SERPL-MCNC: 1.05 MG/DL (ref 0.57–1)
EGFRCR SERPLBLD CKD-EPI 2021: 59 ML/MIN/1.73
EOSINOPHIL # BLD AUTO: 0.1 X10E3/UL (ref 0–0.4)
EOSINOPHIL NFR BLD AUTO: 3 %
ERYTHROCYTE [DISTWIDTH] IN BLOOD BY AUTOMATED COUNT: 13.1 % (ref 11.7–15.4)
GLOBULIN SER CALC-MCNC: 2.5 G/DL (ref 1.5–4.5)
GLUCOSE SERPL-MCNC: 67 MG/DL (ref 70–99)
HBA1C MFR BLD: 5.5 % (ref 4.8–5.6)
HCT VFR BLD AUTO: 40.6 % (ref 34–46.6)
HDLC SERPL-MCNC: 61 MG/DL
HGB BLD-MCNC: 13 G/DL (ref 11.1–15.9)
IMM GRANULOCYTES # BLD AUTO: 0 X10E3/UL (ref 0–0.1)
IMM GRANULOCYTES NFR BLD AUTO: 0 %
LDLC SERPL CALC-MCNC: 71 MG/DL (ref 0–99)
LYMPHOCYTES # BLD AUTO: 1.6 X10E3/UL (ref 0.7–3.1)
LYMPHOCYTES NFR BLD AUTO: 43 %
MCH RBC QN AUTO: 29.9 PG (ref 26.6–33)
MCHC RBC AUTO-ENTMCNC: 32 G/DL (ref 31.5–35.7)
MCV RBC AUTO: 93 FL (ref 79–97)
MONOCYTES # BLD AUTO: 0.4 X10E3/UL (ref 0.1–0.9)
MONOCYTES NFR BLD AUTO: 11 %
NEUTROPHILS # BLD AUTO: 1.5 X10E3/UL (ref 1.4–7)
NEUTROPHILS NFR BLD AUTO: 42 %
PLATELET # BLD AUTO: 170 X10E3/UL (ref 150–450)
POTASSIUM SERPL-SCNC: 4 MMOL/L (ref 3.5–5.2)
PROT SERPL-MCNC: 6.6 G/DL (ref 6–8.5)
RBC # BLD AUTO: 4.35 X10E6/UL (ref 3.77–5.28)
SODIUM SERPL-SCNC: 143 MMOL/L (ref 134–144)
T4 FREE SERPL-MCNC: 1.36 NG/DL (ref 0.82–1.77)
TRIGL SERPL-MCNC: 46 MG/DL (ref 0–149)
TSH SERPL DL<=0.005 MIU/L-ACNC: 1.24 UIU/ML (ref 0.45–4.5)
VLDLC SERPL CALC-MCNC: 10 MG/DL (ref 5–40)
WBC # BLD AUTO: 3.6 X10E3/UL (ref 3.4–10.8)

## 2025-03-19 RX ORDER — FLUTICASONE PROPIONATE 50 MCG
2 SPRAY, SUSPENSION (ML) NASAL DAILY PRN
Qty: 16 G | Refills: 2 | Status: SHIPPED | OUTPATIENT
Start: 2025-03-19

## 2025-03-19 NOTE — TELEPHONE ENCOUNTER
Caller requests Refill of:    fluticasone (FLONASE) 50 MCG/ACT nasal spray     Please send to:    [facility]     Visit Appointment History:  Future Appointments:  Future Appointments   Date Time Provider Department Center   3/26/2025 11:20 AM Gatito Bradshaw MD [facility]   7/21/2025 11:00 AM Mayco Teixeira, JERED [facility] DEP         Last Appointment With Me:  1/20/2025         Caller confirmed instructions and dosages as correct.    Caller was advised that Meds will be refilled as soon as possible, however there can be a 48-72 business hour turn around on refill requests.

## 2025-03-19 NOTE — TELEPHONE ENCOUNTER
PCP: Mayco Teixeira PA-C     Last appt:  2025      Future Appointments   Date Time Provider Department Center   3/26/2025 11:20 AM Gatito Bradshaw MD [facility]   2025 11:00 AM Mayco Teixeira PA-C [facility]          Requested Prescriptions     Pending Prescriptions Disp Refills    fluticasone (FLONASE) 50 MCG/ACT nasal spray 16 g 2     Si sprays by Nasal route daily as needed for Rhinitis

## 2025-03-25 ENCOUNTER — TELEPHONE (OUTPATIENT)
Facility: CLINIC | Age: 65
End: 2025-03-25

## 2025-03-26 ENCOUNTER — PROCEDURE VISIT (OUTPATIENT)
Age: 65
End: 2025-03-26

## 2025-03-26 DIAGNOSIS — G43.709 CHRONIC MIGRAINE WITHOUT AURA, NOT INTRACTABLE, WITHOUT STATUS MIGRAINOSUS: Primary | ICD-10-CM

## 2025-04-07 RX ORDER — PANTOPRAZOLE SODIUM 40 MG/1
40 TABLET, DELAYED RELEASE ORAL DAILY
Qty: 90 TABLET | Refills: 0 | Status: SHIPPED | OUTPATIENT
Start: 2025-04-07

## 2025-04-07 NOTE — TELEPHONE ENCOUNTER
PCP: Mayco Teixeira PA-C     Last appt:  1/20/2025      Future Appointments   Date Time Provider Department Center   7/9/2025 10:30 AM Gatito Bradshaw MD [facility]   7/21/2025 11:00 AM Mayco Teixeira PA-C [facility]          Requested Prescriptions     Pending Prescriptions Disp Refills    pantoprazole (PROTONIX) 40 MG tablet [Pharmacy Med Name: Pantoprazole Sodium 40 MG Oral Tablet Delayed Release] 90 tablet 0     Sig: Take 1 tablet by mouth once daily

## 2025-04-26 DIAGNOSIS — F51.01 PRIMARY INSOMNIA: ICD-10-CM

## 2025-04-28 RX ORDER — TEMAZEPAM 15 MG/1
CAPSULE ORAL
Qty: 30 CAPSULE | Refills: 2 | Status: SHIPPED | OUTPATIENT
Start: 2025-04-28 | End: 2025-07-27

## 2025-04-28 NOTE — TELEPHONE ENCOUNTER
PCP: Mayco Teixeira PA-C     Last appt:  1/20/2025      Future Appointments   Date Time Provider Department Center   7/9/2025 10:30 AM Gatito Bradshaw MD [facility]   7/21/2025 11:00 AM Mayco Teixeira PA-C [facility]          Requested Prescriptions     Pending Prescriptions Disp Refills    temazepam (RESTORIL) 15 MG capsule [Pharmacy Med Name: Temazepam 15 MG Oral Capsule] 30 capsule 0     Sig: TAKE 1 CAPSULE BY MOUTH NIGHTLY AS NEEDED FOR SLEEP MAX  DAILY  AMOUNT  15MG

## 2025-05-08 ENCOUNTER — TELEMEDICINE (OUTPATIENT)
Facility: CLINIC | Age: 65
End: 2025-05-08
Payer: MEDICARE

## 2025-05-08 DIAGNOSIS — I69.398 HEMIPARESIS AND ALTERATION OF SENSATIONS AS LATE EFFECTS OF STROKE (HCC): Primary | ICD-10-CM

## 2025-05-08 DIAGNOSIS — R26.89 BALANCE PROBLEM: ICD-10-CM

## 2025-05-08 DIAGNOSIS — I69.359 HEMIPARESIS AND ALTERATION OF SENSATIONS AS LATE EFFECTS OF STROKE (HCC): Primary | ICD-10-CM

## 2025-05-08 DIAGNOSIS — M62.81 GENERALIZED MUSCLE WEAKNESS: ICD-10-CM

## 2025-05-08 DIAGNOSIS — I69.30 HISTORY OF STROKE WITH RESIDUAL DEFICIT: ICD-10-CM

## 2025-05-08 PROCEDURE — 99213 OFFICE O/P EST LOW 20 MIN: CPT | Performed by: PHYSICIAN ASSISTANT

## 2025-05-08 PROCEDURE — 1160F RVW MEDS BY RX/DR IN RCRD: CPT | Performed by: PHYSICIAN ASSISTANT

## 2025-05-08 PROCEDURE — 1123F ACP DISCUSS/DSCN MKR DOCD: CPT | Performed by: PHYSICIAN ASSISTANT

## 2025-05-08 PROCEDURE — 1159F MED LIST DOCD IN RCRD: CPT | Performed by: PHYSICIAN ASSISTANT

## 2025-05-08 ASSESSMENT — ENCOUNTER SYMPTOMS
NAUSEA: 0
ABDOMINAL PAIN: 0
DIARRHEA: 0
SHORTNESS OF BREATH: 0
EYES NEGATIVE: 1
BLOOD IN STOOL: 0
VOMITING: 0
RESPIRATORY NEGATIVE: 1
CONSTIPATION: 0

## 2025-05-08 NOTE — PROGRESS NOTES
2025    TELEHEALTH EVALUATION -- Audio/Visual    HPI:    Deepthi Kessler (:  1960) has requested an audio/video evaluation for the following concern(s):    Face to face discussion to get new wheelchair. Would benefit from electric wheelchair. Has had old mechanical wheelchair for 10 years. Has hx of CVA with L sided deficits. Needs electric due to arm weakness and further weakness and pain involved with chronic pushing of the mechanical wheelchair. She notes she has also had a worsening tremor it becomes near impossible to be mobile with the wheelchair at times.     Review of Systems   Constitutional:  Negative for chills and fever.   HENT: Negative.     Eyes: Negative.    Respiratory: Negative.  Negative for shortness of breath.    Cardiovascular:  Negative for chest pain, palpitations and leg swelling.   Gastrointestinal:  Negative for abdominal pain, blood in stool, constipation, diarrhea, nausea and vomiting.   Genitourinary:  Negative for dysuria and hematuria.   Musculoskeletal: Negative.    Skin:  Negative for rash.   Neurological:  Positive for tremors and weakness. Negative for dizziness and headaches.   Psychiatric/Behavioral: Negative.         Prior to Visit Medications    Medication Sig Taking? Authorizing Provider   temazepam (RESTORIL) 15 MG capsule TAKE 1 CAPSULE BY MOUTH NIGHTLY AS NEEDED FOR SLEEP MAX  DAILY  AMOUNT  15MG Yes Mayco Teixeira PA-C   pantoprazole (PROTONIX) 40 MG tablet Take 1 tablet by mouth once daily Yes Evon Rice MD   fluticasone (FLONASE) 50 MCG/ACT nasal spray 2 sprays by Nasal route daily as needed for Rhinitis Yes Mayco Teixeira PA-C   levothyroxine (SYNTHROID) 125 MCG tablet Take 1 tablet by mouth every morning (before breakfast) Yes Mayco Teixeira PA-C   OneTouch Delica Lancets 30G MISC Use to check blood pressure at least once daily. DX: R79.03 Yes Mayco Teixeira PA-C   potassium chloride (KLOR-CON M) 10 MEQ extended release tablet

## 2025-05-08 NOTE — PROGRESS NOTES
Deepthi Kessler  Identified pt with two pt identifiers(name and ).     Chief Complaint   Patient presents with    Mobility Evaluation       Reviewed record In preparation for visit and have obtained necessary documentation.     1. Have you been to the ER, urgent care clinic or hospitalized since your last visit? No     2. Have you seen or consulted any other health care providers outside of the [facility] since your last visit? Include any pap smears or colon screening. No    Patient has an advance directive.     Vitals reviewed with provider.    Health Maintenance reviewed:     There are no preventive care reminders to display for this patient.       Wt Readings from Last 3 Encounters:   25 113.3 kg (249 lb 12.8 oz)   24 110.2 kg (243 lb)   24 108.1 kg (238 lb 6.4 oz)        Temp Readings from Last 3 Encounters:   25 97.8 °F (36.6 °C) (Oral)   10/28/24 98.1 °F (36.7 °C) (Oral)   24 98.4 °F (36.9 °C) (Oral)        BP Readings from Last 3 Encounters:   25 120/82   24 122/62   10/28/24 130/85        Pulse Readings from Last 3 Encounters:   25 70   24 69   10/28/24 71             No data to display                  Learning Assessment:   :          No data to display                  Fall Risk Assessment:         2025     9:49 AM 8/15/2024    11:00 AM 2024    10:58 AM 2024    10:41 AM 2023     8:17 PM 2023     9:00 AM 2022    12:32 PM   Amb Fall Risk Assessment and TUG Test   Do you feel unsteady or are you worried about falling?  no yes no yes no yes    2 or more falls in past year? no yes no yes yes yes    Fall with injury in past year? no yes no no no no    Total Score       3         Abuse Screening:          No data to display                  ADL Screening:          No data to display

## 2025-06-10 DIAGNOSIS — G43.709 CHRONIC MIGRAINE WITHOUT AURA, NOT INTRACTABLE, WITHOUT STATUS MIGRAINOSUS: Primary | ICD-10-CM

## 2025-06-10 RX ORDER — GALCANEZUMAB 120 MG/ML
INJECTION, SOLUTION SUBCUTANEOUS
Qty: 1 ADJUSTABLE DOSE PRE-FILLED PEN SYRINGE | Refills: 5 | Status: ACTIVE | OUTPATIENT
Start: 2025-06-10

## 2025-06-30 RX ORDER — PANTOPRAZOLE SODIUM 40 MG/1
40 TABLET, DELAYED RELEASE ORAL DAILY
Qty: 90 TABLET | Refills: 0 | Status: SHIPPED | OUTPATIENT
Start: 2025-06-30

## 2025-06-30 NOTE — TELEPHONE ENCOUNTER
PCP: Mayco Teixeira PA-C     Last appt:  5/8/2025      Future Appointments   Date Time Provider Department Center   7/9/2025 10:30 AM Gatito Bradshaw MD [facility]   7/21/2025 11:00 AM Mayco Teixeira PA-C [facility]          Requested Prescriptions     Pending Prescriptions Disp Refills    pantoprazole (PROTONIX) 40 MG tablet [Pharmacy Med Name: Pantoprazole Sodium 40 MG Oral Tablet Delayed Release] 90 tablet 0     Sig: Take 1 tablet by mouth once daily

## 2025-07-09 ENCOUNTER — OFFICE VISIT (OUTPATIENT)
Age: 65
End: 2025-07-09
Payer: MEDICARE

## 2025-07-09 VITALS
BODY MASS INDEX: 40.66 KG/M2 | WEIGHT: 253 LBS | DIASTOLIC BLOOD PRESSURE: 66 MMHG | HEART RATE: 87 BPM | SYSTOLIC BLOOD PRESSURE: 122 MMHG | RESPIRATION RATE: 16 BRPM | HEIGHT: 66 IN | OXYGEN SATURATION: 99 %

## 2025-07-09 DIAGNOSIS — G43.709 CHRONIC MIGRAINE WITHOUT AURA, NOT INTRACTABLE, WITHOUT STATUS MIGRAINOSUS: Primary | ICD-10-CM

## 2025-07-09 PROCEDURE — 64615 CHEMODENERV MUSC MIGRAINE: CPT | Performed by: PSYCHIATRY & NEUROLOGY

## 2025-07-09 NOTE — PROGRESS NOTES
Chief Complaint   Patient presents with    Procedure     Botox      Vitals:    07/09/25 1058   BP: 122/66   Pulse: 87   Resp: 16   SpO2: 99%     NDC: 2233-5633-06  Lot: [id number]  Exp: 10/30/2027

## 2025-07-21 ENCOUNTER — OFFICE VISIT (OUTPATIENT)
Facility: CLINIC | Age: 65
End: 2025-07-21
Payer: MEDICARE

## 2025-07-21 VITALS
HEART RATE: 58 BPM | RESPIRATION RATE: 16 BRPM | HEIGHT: 66 IN | SYSTOLIC BLOOD PRESSURE: 107 MMHG | BODY MASS INDEX: 40.84 KG/M2 | OXYGEN SATURATION: 98 % | DIASTOLIC BLOOD PRESSURE: 69 MMHG

## 2025-07-21 DIAGNOSIS — J44.9 COPD WITHOUT EXACERBATION (HCC): ICD-10-CM

## 2025-07-21 DIAGNOSIS — G47.33 OSA ON CPAP: ICD-10-CM

## 2025-07-21 DIAGNOSIS — M48.02 SPINAL STENOSIS, CERVICAL REGION: ICD-10-CM

## 2025-07-21 DIAGNOSIS — E55.9 VITAMIN D DEFICIENCY: ICD-10-CM

## 2025-07-21 DIAGNOSIS — R73.03 PREDIABETES: ICD-10-CM

## 2025-07-21 DIAGNOSIS — E89.0 POSTOPERATIVE HYPOTHYROIDISM: ICD-10-CM

## 2025-07-21 DIAGNOSIS — I25.10 CORONARY ARTERY DISEASE INVOLVING NATIVE CORONARY ARTERY OF NATIVE HEART WITHOUT ANGINA PECTORIS: ICD-10-CM

## 2025-07-21 DIAGNOSIS — I69.30 HISTORY OF STROKE WITH RESIDUAL DEFICIT: ICD-10-CM

## 2025-07-21 DIAGNOSIS — F41.9 ANXIETY: ICD-10-CM

## 2025-07-21 DIAGNOSIS — E78.00 HYPERCHOLESTEROLEMIA: ICD-10-CM

## 2025-07-21 DIAGNOSIS — I69.359 HEMIPARESIS AND ALTERATION OF SENSATIONS AS LATE EFFECTS OF STROKE (HCC): Primary | ICD-10-CM

## 2025-07-21 DIAGNOSIS — Z97.8 CHRONIC INDWELLING FOLEY CATHETER: ICD-10-CM

## 2025-07-21 DIAGNOSIS — I10 ESSENTIAL HYPERTENSION: ICD-10-CM

## 2025-07-21 DIAGNOSIS — E78.00 PURE HYPERCHOLESTEROLEMIA, UNSPECIFIED: ICD-10-CM

## 2025-07-21 DIAGNOSIS — I50.22 CHRONIC SYSTOLIC (CONGESTIVE) HEART FAILURE (HCC): ICD-10-CM

## 2025-07-21 DIAGNOSIS — F51.01 PRIMARY INSOMNIA: ICD-10-CM

## 2025-07-21 DIAGNOSIS — I69.398 HEMIPARESIS AND ALTERATION OF SENSATIONS AS LATE EFFECTS OF STROKE (HCC): Primary | ICD-10-CM

## 2025-07-21 PROCEDURE — 99214 OFFICE O/P EST MOD 30 MIN: CPT | Performed by: PHYSICIAN ASSISTANT

## 2025-07-21 PROCEDURE — 1126F AMNT PAIN NOTED NONE PRSNT: CPT | Performed by: PHYSICIAN ASSISTANT

## 2025-07-21 PROCEDURE — 1159F MED LIST DOCD IN RCRD: CPT | Performed by: PHYSICIAN ASSISTANT

## 2025-07-21 PROCEDURE — 3074F SYST BP LT 130 MM HG: CPT | Performed by: PHYSICIAN ASSISTANT

## 2025-07-21 PROCEDURE — 3078F DIAST BP <80 MM HG: CPT | Performed by: PHYSICIAN ASSISTANT

## 2025-07-21 PROCEDURE — 1123F ACP DISCUSS/DSCN MKR DOCD: CPT | Performed by: PHYSICIAN ASSISTANT

## 2025-07-21 PROCEDURE — 1160F RVW MEDS BY RX/DR IN RCRD: CPT | Performed by: PHYSICIAN ASSISTANT

## 2025-07-21 RX ORDER — LOSARTAN POTASSIUM 50 MG/1
50 TABLET ORAL DAILY
Qty: 90 TABLET | Refills: 3 | Status: SHIPPED | OUTPATIENT
Start: 2025-07-21

## 2025-07-21 RX ORDER — MIRABEGRON 50 MG/1
50 TABLET, FILM COATED, EXTENDED RELEASE ORAL DAILY
COMMUNITY

## 2025-07-21 RX ORDER — ATORVASTATIN CALCIUM 40 MG/1
40 TABLET, FILM COATED ORAL DAILY
Qty: 90 TABLET | Refills: 3 | Status: SHIPPED | OUTPATIENT
Start: 2025-07-21

## 2025-07-21 RX ORDER — CETIRIZINE HYDROCHLORIDE 10 MG/1
10 TABLET ORAL NIGHTLY
Qty: 90 TABLET | Refills: 3 | Status: SHIPPED | OUTPATIENT
Start: 2025-07-21

## 2025-07-21 RX ORDER — PANTOPRAZOLE SODIUM 40 MG/1
40 TABLET, DELAYED RELEASE ORAL DAILY
Qty: 90 TABLET | Refills: 3 | Status: SHIPPED | OUTPATIENT
Start: 2025-07-21

## 2025-07-21 ASSESSMENT — ENCOUNTER SYMPTOMS
DIARRHEA: 0
NAUSEA: 0
SHORTNESS OF BREATH: 0
BLOOD IN STOOL: 0
CONSTIPATION: 0
RESPIRATORY NEGATIVE: 1
EYES NEGATIVE: 1
VOMITING: 0
ABDOMINAL PAIN: 0

## 2025-07-21 NOTE — PROGRESS NOTES
Deepthi Kessler is a 65 y.o. year old female seen in clinic today for   Chief Complaint   Patient presents with    Hypertension     Room 4A //     Cholesterol Problem    Blood Sugar Problem       she is here today to follow up for HTN, CAD, hx of CVA, Pre-DM and hypothyroid  She is overall doing well. Still going to adult center during week, still enjoying this. No new physical complaints.  Has indwelling miller catheter. Notes some recent med changes, reports she saw Virtualist who she is unsure how she got in touch with who changed her to Myrbetric (25 or 50?) and had pharmacy issues as they were unsure which to fill. She had her trospium stopped. She continues on oxybutnin. She normally follows with [facility].   She is otherwise compliant with meds.    Her pain management group at [facility] is requesting she discontinue her temazepam for sleep in order to go back on increased oxycodone for pain.     She uses daily lasix for hx of CHF, notes no increase in edema or SOB.     she specifically denies any CP, SOB, HA. Dizziness, fevers, chills, N/V/D, urinary symptoms or other bowel changes.    Current Outpatient Medications on File Prior to Visit   Medication Sig Dispense Refill    mirabegron (MYRBETRIQ) 50 MG TB24 Take 50 mg by mouth daily      Galcanezumab-gnlm (EMGALITY) 120 MG/ML SOAJ INJECT 1 ML SUBCUTANEOUSLY  ONCE EVERY MONTH 1 Adjustable Dose Pre-filled Pen Syringe 5    fluticasone (FLONASE) 50 MCG/ACT nasal spray 2 sprays by Nasal route daily as needed for Rhinitis 16 g 2    levothyroxine (SYNTHROID) 125 MCG tablet Take 1 tablet by mouth every morning (before breakfast) 90 tablet 3    OneTouch Delica Lancets 30G MISC Use to check blood pressure at least once daily. DX: R79.03 100 each 3    potassium chloride (KLOR-CON M) 10 MEQ extended release tablet Take 1 tablet by mouth daily With use of lasix (Patient taking differently: Take 1 tablet by mouth daily On days when you take lasix) 30 
        Abuse Screening:          No data to display                  ADL Screenin/8/2025     8:00 AM   ADL ASSESSMENT   Feeding yourself Help Needed   Getting from bed to chair Help Needed   Getting dressed Help Needed   Bathing or showering Help Needed   Walk across the room (includes cane/walker) Help Needed   Using the telphone Help Needed   Taking your medications Help Needed   Preparing meals Help Needed   Managing money (expenses/bills) Help Needed   Moderately strenuous housework (laundry) Help Needed   Shopping for personal items (toiletries/medicines) Help Needed   Shopping for groceries Help Needed   Driving Help Needed   Climbing a flight of stairs Help Needed   Getting to places beyond walking distances Help Needed

## 2025-07-22 LAB
ALBUMIN SERPL-MCNC: 3.9 G/DL (ref 3.9–4.9)
ALP SERPL-CCNC: 152 IU/L (ref 44–121)
ALT SERPL-CCNC: 26 IU/L (ref 0–32)
AST SERPL-CCNC: 24 IU/L (ref 0–40)
BASOPHILS # BLD AUTO: 0 X10E3/UL (ref 0–0.2)
BASOPHILS NFR BLD AUTO: 1 %
BILIRUB SERPL-MCNC: 0.3 MG/DL (ref 0–1.2)
BUN SERPL-MCNC: 22 MG/DL (ref 8–27)
BUN/CREAT SERPL: 26 (ref 12–28)
CALCIUM SERPL-MCNC: 8.6 MG/DL (ref 8.7–10.3)
CHLORIDE SERPL-SCNC: 107 MMOL/L (ref 96–106)
CHOLEST SERPL-MCNC: 140 MG/DL (ref 100–199)
CO2 SERPL-SCNC: 23 MMOL/L (ref 20–29)
CREAT SERPL-MCNC: 0.85 MG/DL (ref 0.57–1)
EGFRCR SERPLBLD CKD-EPI 2021: 75 ML/MIN/1.73
EOSINOPHIL # BLD AUTO: 0.2 X10E3/UL (ref 0–0.4)
EOSINOPHIL NFR BLD AUTO: 4 %
ERYTHROCYTE [DISTWIDTH] IN BLOOD BY AUTOMATED COUNT: 12.8 % (ref 11.7–15.4)
EST. AVERAGE GLUCOSE BLD GHB EST-MCNC: 108 MG/DL
GLOBULIN SER CALC-MCNC: 2.2 G/DL (ref 1.5–4.5)
GLUCOSE SERPL-MCNC: 85 MG/DL (ref 70–99)
HBA1C MFR BLD: 5.4 % (ref 4.8–5.6)
HCT VFR BLD AUTO: 40.8 % (ref 34–46.6)
HDLC SERPL-MCNC: 62 MG/DL
HGB BLD-MCNC: 12.7 G/DL (ref 11.1–15.9)
IMM GRANULOCYTES # BLD AUTO: 0 X10E3/UL (ref 0–0.1)
IMM GRANULOCYTES NFR BLD AUTO: 0 %
LDLC SERPL CALC-MCNC: 66 MG/DL (ref 0–99)
LYMPHOCYTES # BLD AUTO: 1.6 X10E3/UL (ref 0.7–3.1)
LYMPHOCYTES NFR BLD AUTO: 37 %
MCH RBC QN AUTO: 29.7 PG (ref 26.6–33)
MCHC RBC AUTO-ENTMCNC: 31.1 G/DL (ref 31.5–35.7)
MCV RBC AUTO: 95 FL (ref 79–97)
MONOCYTES # BLD AUTO: 0.5 X10E3/UL (ref 0.1–0.9)
MONOCYTES NFR BLD AUTO: 12 %
NEUTROPHILS # BLD AUTO: 2.1 X10E3/UL (ref 1.4–7)
NEUTROPHILS NFR BLD AUTO: 46 %
PLATELET # BLD AUTO: 166 X10E3/UL (ref 150–450)
POTASSIUM SERPL-SCNC: 4 MMOL/L (ref 3.5–5.2)
PROT SERPL-MCNC: 6.1 G/DL (ref 6–8.5)
RBC # BLD AUTO: 4.28 X10E6/UL (ref 3.77–5.28)
SODIUM SERPL-SCNC: 140 MMOL/L (ref 134–144)
T4 FREE SERPL-MCNC: 1.5 NG/DL (ref 0.82–1.77)
TRIGL SERPL-MCNC: 59 MG/DL (ref 0–149)
TSH SERPL DL<=0.005 MIU/L-ACNC: 0.17 UIU/ML (ref 0.45–4.5)
VLDLC SERPL CALC-MCNC: 12 MG/DL (ref 5–40)
WBC # BLD AUTO: 4.4 X10E3/UL (ref 3.4–10.8)

## 2025-07-29 ENCOUNTER — OFFICE VISIT (OUTPATIENT)
Age: 65
End: 2025-07-29
Payer: MEDICARE

## 2025-07-29 VITALS
HEART RATE: 71 BPM | SYSTOLIC BLOOD PRESSURE: 122 MMHG | DIASTOLIC BLOOD PRESSURE: 60 MMHG | HEIGHT: 66 IN | WEIGHT: 255.8 LBS | BODY MASS INDEX: 41.11 KG/M2 | OXYGEN SATURATION: 98 %

## 2025-07-29 DIAGNOSIS — R00.2 HEART PALPITATIONS: ICD-10-CM

## 2025-07-29 DIAGNOSIS — R06.02 SHORTNESS OF BREATH: ICD-10-CM

## 2025-07-29 DIAGNOSIS — I25.10 CORONARY ARTERY DISEASE INVOLVING NATIVE CORONARY ARTERY OF NATIVE HEART WITHOUT ANGINA PECTORIS: ICD-10-CM

## 2025-07-29 DIAGNOSIS — I50.22 CHRONIC SYSTOLIC (CONGESTIVE) HEART FAILURE (HCC): Primary | ICD-10-CM

## 2025-07-29 DIAGNOSIS — R06.02 SOB (SHORTNESS OF BREATH): ICD-10-CM

## 2025-07-29 DIAGNOSIS — I10 ESSENTIAL HYPERTENSION: ICD-10-CM

## 2025-07-29 DIAGNOSIS — E78.00 HYPERCHOLESTEROLEMIA: ICD-10-CM

## 2025-07-29 DIAGNOSIS — I49.3 VENTRICULAR ECTOPIC ACTIVITY: ICD-10-CM

## 2025-07-29 PROCEDURE — 99214 OFFICE O/P EST MOD 30 MIN: CPT | Performed by: INTERNAL MEDICINE

## 2025-07-29 PROCEDURE — 93005 ELECTROCARDIOGRAM TRACING: CPT | Performed by: INTERNAL MEDICINE

## 2025-07-29 ASSESSMENT — PATIENT HEALTH QUESTIONNAIRE - PHQ9
SUM OF ALL RESPONSES TO PHQ QUESTIONS 1-9: 0
1. LITTLE INTEREST OR PLEASURE IN DOING THINGS: NOT AT ALL
2. FEELING DOWN, DEPRESSED OR HOPELESS: NOT AT ALL
SUM OF ALL RESPONSES TO PHQ QUESTIONS 1-9: 0

## 2025-07-29 NOTE — PROGRESS NOTES
[address] [place] [address] [phone] [address], [place] [address]     Subjective:        Deepthi Kessler is a 65 y.o. female is here for routine f/u.    History of Present Illness  The patient is a 65-year-old female who presents for follow-up of her history of stroke during a heart catheterization that occurred years ago in [place], [place]. She has a history of hypertension, hyperlipidemia, and hypothyroidism. She takes Lasix for swelling.    She reports experiencing shortness of breath, which she describes as intermittent and occasionally alarming. Additionally, she experiences chest flutters every few days, lasting from seconds to hours. Her last stress test was conducted in 2021.    She is currently on carvedilol for her high blood pressure and is seeking a refill of this medication. For her cholesterol, she is on atorvastatin 40 mg daily. Swelling in her legs is under good control with Lasix.     The patient denies chest pain, orthopnea, PND, syncope, presyncope or fatigue.    Patient Active Problem List    Diagnosis Date Noted    Body mass index [BMI] 40.0-44.9, adult (Z68.41) 01/20/2025    Bulging of cervical intervertebral disc 11/22/2024    Cervical stenosis of spine 11/22/2024    Chronic systolic (congestive) heart failure 07/18/2024    History of stroke with residual deficit 06/28/2024    Stroke-like symptom 06/27/2024    COPD without exacerbation (HCC) 02/21/2022    Encephalopathy 12/19/2021    Acute cystitis 12/19/2021    Anxiety 04/30/2020    Obesity (BMI 30-39.9) 12/05/2019    Dermatitis 06/20/2018    Chronic indwelling Kern catheter 01/24/2018    Obesity, morbid (HCC) 01/12/2018    Vitamin D deficiency 11/02/2017    Left-sided weakness 09/08/2017    Constipation 06/13/2017    Insomnia 06/13/2017    Congestive heart failure (HCC)     Prediabetes 10/28/2016    Chronic pain 10/27/2016    Postoperative hypothyroidism 03/04/2016    Essential

## 2025-07-29 NOTE — PATIENT INSTRUCTIONS
You will be scheduled for a Nuclear Stress Test after your appointment today.    Nuclear stress testing evaluates blood flow to your heart muscle and assesses cardiac function. There are 2 parts (Rest/Stress) to this procedure and will include  an IV administration of a stressing medication called Lexiscan. *Please arrive 15 minutes prior to your appointment time    Test Duration:     -Two day testing will take 2 hours each day. Your second day(resting images) will be scheduled after the first day is completed    Day of testing instructions:    NO CAFFEINE (not even decaffeinated products) 24 HOURS PRIOR TO TESTING. This includes coffee, soda, tea, chocolate, multivitamins, and migraine medication, like Excedrin or Fioricet that contains caffeine.  Nothing to eat or drink 4 HOURS prior to testing  NO NICOTINE 12 hours prior to testing   DIABETIC PATIENTS: Take half of your insulin with a light meal 4 hours before your test.  Wear comfortable clothes and shoes (Shirts with no metal, shorts or pants, tennis shoes, no heels or flip flops)    IMPORTANT: This testing involves a cardiac tracer ordered specifically for you. If you are unable to make your appointment, please call to cancel/reschedule AT LEAST 24 hours prior to your appointment so your tracer can be cancelled. [phone].

## 2025-07-30 ENCOUNTER — TELEPHONE (OUTPATIENT)
Age: 65
End: 2025-07-30

## 2025-07-30 NOTE — TELEPHONE ENCOUNTER
Enrolled with [employer] - Ordered and being shipped to patient's home address on file.  ETA within 5-7 business days.         Message  Received: Yesterday  Radha Sheikh LPN [place], [place] Hello. Could you please orderr a 7 days E. Holter for palpitations per dr. Umana for this patient.  thanks

## 2025-08-05 ENCOUNTER — TELEPHONE (OUTPATIENT)
Facility: CLINIC | Age: 65
End: 2025-08-05

## 2025-08-05 DIAGNOSIS — G43.709 CHRONIC MIGRAINE WITHOUT AURA, NOT INTRACTABLE, WITHOUT STATUS MIGRAINOSUS: ICD-10-CM

## 2025-08-05 RX ORDER — GALCANEZUMAB 120 MG/ML
120 INJECTION, SOLUTION SUBCUTANEOUS
Qty: 1 ADJUSTABLE DOSE PRE-FILLED PEN SYRINGE | Refills: 5 | Status: ACTIVE | OUTPATIENT
Start: 2025-08-05

## 2025-08-05 RX ORDER — TOPIRAMATE 100 MG/1
100 TABLET, FILM COATED ORAL 2 TIMES DAILY
Qty: 180 TABLET | Refills: 3 | Status: SHIPPED | OUTPATIENT
Start: 2025-08-05

## 2025-08-07 ENCOUNTER — TELEPHONE (OUTPATIENT)
Age: 65
End: 2025-08-07

## 2025-08-14 ENCOUNTER — ANCILLARY PROCEDURE (OUTPATIENT)
Age: 65
End: 2025-08-14
Payer: MEDICARE

## 2025-08-14 VITALS
HEART RATE: 69 BPM | DIASTOLIC BLOOD PRESSURE: 82 MMHG | WEIGHT: 255 LBS | BODY MASS INDEX: 42.49 KG/M2 | HEIGHT: 65 IN | SYSTOLIC BLOOD PRESSURE: 120 MMHG

## 2025-08-14 DIAGNOSIS — I10 HTN (HYPERTENSION): ICD-10-CM

## 2025-08-14 DIAGNOSIS — R06.02 SHORTNESS OF BREATH: ICD-10-CM

## 2025-08-14 DIAGNOSIS — R06.02 SOB (SHORTNESS OF BREATH): ICD-10-CM

## 2025-08-14 PROCEDURE — 93242 EXT ECG>48HR<7D RECORDING: CPT

## 2025-08-14 PROCEDURE — 78452 HT MUSCLE IMAGE SPECT MULT: CPT

## 2025-08-14 PROCEDURE — A9500 TC99M SESTAMIBI: HCPCS | Performed by: INTERNAL MEDICINE

## 2025-08-14 PROCEDURE — PBSHW PBB SHADOW CHARGE: Performed by: INTERNAL MEDICINE

## 2025-08-14 RX ORDER — REGADENOSON 0.08 MG/ML
0.4 INJECTION, SOLUTION INTRAVENOUS
Status: COMPLETED | OUTPATIENT
Start: 2025-08-14 | End: 2025-08-14

## 2025-08-14 RX ORDER — TETRAKIS(2-METHOXYISOBUTYLISOCYANIDE)COPPER(I) TETRAFLUOROBORATE 1 MG/ML
24.6 INJECTION, POWDER, LYOPHILIZED, FOR SOLUTION INTRAVENOUS
Status: COMPLETED | OUTPATIENT
Start: 2025-08-14 | End: 2025-08-14

## 2025-08-14 RX ADMIN — REGADENOSON 0.4 MG: 0.08 INJECTION, SOLUTION INTRAVENOUS at 09:00

## 2025-08-14 RX ADMIN — TECHNETIUM TC-99M SESTAMIBI 24.6 MILLICURIE: 1 INJECTION INTRAVENOUS at 08:50

## 2025-08-18 ENCOUNTER — ANCILLARY PROCEDURE (OUTPATIENT)
Age: 65
End: 2025-08-18
Payer: MEDICARE

## 2025-08-18 ENCOUNTER — APPOINTMENT (OUTPATIENT)
Age: 65
End: 2025-08-18
Payer: MEDICARE

## 2025-08-18 VITALS
SYSTOLIC BLOOD PRESSURE: 120 MMHG | BODY MASS INDEX: 42.49 KG/M2 | DIASTOLIC BLOOD PRESSURE: 82 MMHG | HEIGHT: 65 IN | WEIGHT: 255 LBS

## 2025-08-18 DIAGNOSIS — R06.02 SHORTNESS OF BREATH: ICD-10-CM

## 2025-08-18 PROCEDURE — A9500 TC99M SESTAMIBI: HCPCS | Performed by: INTERNAL MEDICINE

## 2025-08-18 PROCEDURE — PBSHW PBB SHADOW CHARGE: Performed by: INTERNAL MEDICINE

## 2025-08-18 PROCEDURE — 93306 TTE W/DOPPLER COMPLETE: CPT | Performed by: INTERNAL MEDICINE

## 2025-08-18 RX ORDER — TETRAKIS(2-METHOXYISOBUTYLISOCYANIDE)COPPER(I) TETRAFLUOROBORATE 1 MG/ML
26.4 INJECTION, POWDER, LYOPHILIZED, FOR SOLUTION INTRAVENOUS
Status: COMPLETED | OUTPATIENT
Start: 2025-08-18 | End: 2025-08-18

## 2025-08-18 RX ADMIN — TECHNETIUM TC-99M SESTAMIBI 26.4 MILLICURIE: 1 INJECTION INTRAVENOUS at 08:35

## 2025-08-19 LAB
ECHO AO ASC DIAM: 3.6 CM
ECHO AO ASCENDING AORTA INDEX: 1.64 CM/M2
ECHO AO ROOT DIAM: 3.8 CM
ECHO AO ROOT INDEX: 1.74 CM/M2
ECHO AV AREA PEAK VELOCITY: 2.7 CM2
ECHO AV AREA VTI: 2.8 CM2
ECHO AV AREA/BSA PEAK VELOCITY: 1.2 CM2/M2
ECHO AV AREA/BSA VTI: 1.3 CM2/M2
ECHO AV MEAN GRADIENT: 6 MMHG
ECHO AV MEAN VELOCITY: 1.1 M/S
ECHO AV PEAK GRADIENT: 11 MMHG
ECHO AV PEAK VELOCITY: 1.6 M/S
ECHO AV VELOCITY RATIO: 0.75
ECHO AV VTI: 28.4 CM
ECHO BSA: 2.3 M2
ECHO EST RA PRESSURE: 3 MMHG
ECHO LA DIAMETER INDEX: 1.92 CM/M2
ECHO LA DIAMETER: 4.2 CM
ECHO LA TO AORTIC ROOT RATIO: 1.11
ECHO LA VOL A-L A2C: 69 ML (ref 22–52)
ECHO LA VOL A-L A4C: 69 ML (ref 22–52)
ECHO LA VOL BP: 70 ML (ref 22–52)
ECHO LA VOL MOD A2C: 66 ML (ref 22–52)
ECHO LA VOL MOD A4C: 66 ML (ref 22–52)
ECHO LA VOL/BSA BIPLANE: 32 ML/M2 (ref 16–34)
ECHO LA VOLUME AREA LENGTH: 73 ML
ECHO LA VOLUME INDEX A-L A2C: 32 ML/M2 (ref 16–34)
ECHO LA VOLUME INDEX A-L A4C: 32 ML/M2 (ref 16–34)
ECHO LA VOLUME INDEX AREA LENGTH: 33 ML/M2 (ref 16–34)
ECHO LA VOLUME INDEX MOD A2C: 30 ML/M2 (ref 16–34)
ECHO LA VOLUME INDEX MOD A4C: 30 ML/M2 (ref 16–34)
ECHO LV E' LATERAL VELOCITY: 11.91 CM/S
ECHO LV E' SEPTAL VELOCITY: 10.75 CM/S
ECHO LV EDV A2C: 94 ML
ECHO LV EDV A4C: 128 ML
ECHO LV EDV BP: 111 ML (ref 56–104)
ECHO LV EDV INDEX A4C: 58 ML/M2
ECHO LV EDV INDEX BP: 51 ML/M2
ECHO LV EDV NDEX A2C: 43 ML/M2
ECHO LV EF PHYSICIAN: 60 %
ECHO LV EJECTION FRACTION A2C: 56 %
ECHO LV EJECTION FRACTION A4C: 60 %
ECHO LV EJECTION FRACTION BIPLANE: 58 % (ref 55–100)
ECHO LV ESV A2C: 42 ML
ECHO LV ESV A4C: 52 ML
ECHO LV ESV BP: 47 ML (ref 19–49)
ECHO LV ESV INDEX A2C: 19 ML/M2
ECHO LV ESV INDEX A4C: 24 ML/M2
ECHO LV ESV INDEX BP: 21 ML/M2
ECHO LV FRACTIONAL SHORTENING: 18 % (ref 28–44)
ECHO LV INTERNAL DIMENSION DIASTOLE INDEX: 2.01 CM/M2
ECHO LV INTERNAL DIMENSION DIASTOLIC: 4.4 CM (ref 3.9–5.3)
ECHO LV INTERNAL DIMENSION SYSTOLIC INDEX: 1.64 CM/M2
ECHO LV INTERNAL DIMENSION SYSTOLIC: 3.6 CM
ECHO LV IVSD: 1 CM (ref 0.6–0.9)
ECHO LV MASS 2D: 147.8 G (ref 67–162)
ECHO LV MASS INDEX 2D: 67.5 G/M2 (ref 43–95)
ECHO LV POSTERIOR WALL DIASTOLIC: 1 CM (ref 0.6–0.9)
ECHO LV RELATIVE WALL THICKNESS RATIO: 0.45
ECHO LVOT AREA: 3.8 CM2
ECHO LVOT AV VTI INDEX: 0.74
ECHO LVOT DIAM: 2.2 CM
ECHO LVOT MEAN GRADIENT: 3 MMHG
ECHO LVOT PEAK GRADIENT: 6 MMHG
ECHO LVOT PEAK VELOCITY: 1.2 M/S
ECHO LVOT STROKE VOLUME INDEX: 36.4 ML/M2
ECHO LVOT SV: 79.8 ML
ECHO LVOT VTI: 21 CM
ECHO MV A VELOCITY: 0.86 M/S
ECHO MV AREA VTI: 5.4 CM2
ECHO MV E DECELERATION TIME (DT): 121.7 MS
ECHO MV E VELOCITY: 0.43 M/S
ECHO MV E/A RATIO: 0.5
ECHO MV E/E' LATERAL: 3.61
ECHO MV E/E' RATIO (AVERAGED): 3.81
ECHO MV E/E' SEPTAL: 4
ECHO MV LVOT VTI INDEX: 0.7
ECHO MV MAX VELOCITY: 0.8 M/S
ECHO MV MEAN GRADIENT: 1 MMHG
ECHO MV MEAN VELOCITY: 0.5 M/S
ECHO MV PEAK GRADIENT: 3 MMHG
ECHO MV VTI: 14.8 CM
ECHO RA VOLUME: 49 ML
ECHO RA VOLUME: 51 ML
ECHO RIGHT VENTRICULAR SYSTOLIC PRESSURE (RVSP): 29 MMHG
ECHO RV BASAL DIMENSION: 4.2 CM
ECHO RV FREE WALL PEAK S': 13.1 CM/S
ECHO RV TAPSE: 2.9 CM (ref 1.7–?)
ECHO RVOT PEAK GRADIENT: 1 MMHG
ECHO RVOT PEAK VELOCITY: 0.5 M/S
ECHO TV REGURGITANT MAX VELOCITY: 2.53 M/S
ECHO TV REGURGITANT PEAK GRADIENT: 26 MMHG

## 2025-08-19 PROCEDURE — 93306 TTE W/DOPPLER COMPLETE: CPT | Performed by: INTERNAL MEDICINE

## 2025-08-19 RX ORDER — FLUTICASONE PROPIONATE 50 MCG
2 SPRAY, SUSPENSION (ML) NASAL DAILY PRN
Qty: 16 G | Refills: 2 | Status: SHIPPED | OUTPATIENT
Start: 2025-08-19

## 2025-08-21 LAB
ECHO BSA: 2.3 M2
NUC STRESS EJECTION FRACTION: 48 %
STRESS BASELINE DIAS BP: 82 MMHG
STRESS BASELINE HR: 69 BPM
STRESS BASELINE ST DEPRESSION: 0 MM
STRESS BASELINE SYS BP: 120 MMHG
STRESS ESTIMATED WORKLOAD: 1 METS
STRESS O2 SAT PEAK: 98 %
STRESS O2 SAT REST: 99 %
STRESS PEAK DIAS BP: 80 MMHG
STRESS PEAK SYS BP: 110 MMHG
STRESS PERCENT HR ACHIEVED: 54 %
STRESS POST PEAK HR: 82 BPM
STRESS RATE PRESSURE PRODUCT: 9020 BPM*MMHG
STRESS ST DEPRESSION: 0 MM
STRESS TARGET HR: 153 BPM
TID: 1.28

## 2025-08-24 ENCOUNTER — RESULTS FOLLOW-UP (OUTPATIENT)
Age: 65
End: 2025-08-24

## (undated) DEVICE — SET ADMIN 16ML TBNG L100IN 2 Y INJ SITE IV PIGGY BK DISP

## (undated) DEVICE — Device

## (undated) DEVICE — Z DISCONTINUED PER MEDLINE LINE GAS SAMPLING O2/CO2 LNG AD 13 FT NSL W/ TBNG FILTERLINE

## (undated) DEVICE — SOLIDIFIER FLD 2OZ 1500CC N DISINF IN BTL DISP SAFESORB

## (undated) DEVICE — BASIN EMSIS 16OZ GRAPHITE PLAS KID SHP MOLD GRAD FOR ORAL

## (undated) DEVICE — CATH IV AUTOGRD BC PNK 20GA 25 -- INSYTE

## (undated) DEVICE — TOWEL 4 PLY TISS 19X30 SUE WHT

## (undated) DEVICE — NEONATAL-ADULT SPO2 SENSOR: Brand: NELLCOR

## (undated) DEVICE — HYPODERMIC SAFETY NEEDLE: Brand: MAGELLAN

## (undated) DEVICE — 1200 GUARD II KIT W/5MM TUBE W/O VAC TUBE: Brand: GUARDIAN

## (undated) DEVICE — SYR 10ML LUER LOK 1/5ML GRAD --

## (undated) DEVICE — ELECTRODE,RADIOTRANSLUCENT,FOAM,5PK: Brand: MEDLINE

## (undated) DEVICE — SYR 3ML LL TIP 1/10ML GRAD --